# Patient Record
Sex: FEMALE | Race: BLACK OR AFRICAN AMERICAN | Employment: UNEMPLOYED | URBAN - METROPOLITAN AREA
[De-identification: names, ages, dates, MRNs, and addresses within clinical notes are randomized per-mention and may not be internally consistent; named-entity substitution may affect disease eponyms.]

---

## 2017-01-18 RX ORDER — INSULIN LISPRO 100 [IU]/ML
INJECTION, SOLUTION INTRAVENOUS; SUBCUTANEOUS
Qty: 1 PACKAGE | Refills: 5 | Status: SHIPPED | OUTPATIENT
Start: 2017-01-18 | End: 2017-11-17

## 2017-01-18 RX ORDER — INSULIN GLARGINE 100 [IU]/ML
30 INJECTION, SOLUTION SUBCUTANEOUS
Qty: 1 VIAL | Refills: 5
Start: 2017-01-18 | End: 2017-11-17

## 2017-01-18 RX ORDER — LANCETS
EACH MISCELLANEOUS
Qty: 60 EACH | Refills: 11 | Status: SHIPPED | OUTPATIENT
Start: 2017-01-18 | End: 2017-01-31 | Stop reason: SDUPTHER

## 2017-01-26 DIAGNOSIS — E11.65 UNCONTROLLED TYPE 2 DIABETES MELLITUS WITH HYPERGLYCEMIA, UNSPECIFIED LONG TERM INSULIN USE STATUS: Primary | ICD-10-CM

## 2017-01-28 RX ORDER — INSULIN PUMP SYRINGE, 3 ML
EACH MISCELLANEOUS
Qty: 1 KIT | Refills: 0 | Status: SHIPPED | OUTPATIENT
Start: 2017-01-28 | End: 2017-01-31 | Stop reason: SDUPTHER

## 2017-01-31 RX ORDER — LANCETS
EACH MISCELLANEOUS
Qty: 60 EACH | Refills: 11 | Status: SHIPPED | OUTPATIENT
Start: 2017-01-31 | End: 2017-11-17

## 2017-01-31 NOTE — TELEPHONE ENCOUNTER
Pt has changed her mind, she will be picking the Blood-Glucose Meter monitoring up from [facility], she would like test strips called in as well  Pt ph number is [phone]

## 2017-02-15 RX ORDER — TRAMADOL HYDROCHLORIDE 50 MG/1
50 TABLET ORAL
Qty: 20 TAB | Refills: 0 | OUTPATIENT
Start: 2017-02-15 | End: 2017-02-22 | Stop reason: SDUPTHER

## 2017-02-15 NOTE — TELEPHONE ENCOUNTER
----- Message from Karen Saucedo sent at 2/15/2017  9:31 AM EST -----  Regarding: NP [employer]/rx  Pt (p) [phone], pt said she just called 20 min ago for NP [employer] to call her something in for tooth ache, she wanted to add that she is allergic to ibuprofen  it makes her nauseas

## 2017-02-15 NOTE — TELEPHONE ENCOUNTER
Pt would like a rx for a tooth ache   She has had the ache since 3 am  She states she has had something previously called in for this issue  Pt ph number is [phone]  [facility]/pharmacy #[id number]- [place] - [address]

## 2017-02-15 NOTE — TELEPHONE ENCOUNTER
Call patient requesting tramadol. Mat Reardon has seen her before for the same problem. Suggest to the patient to set an appt with the dentist soon.

## 2017-02-23 ENCOUNTER — TELEPHONE (OUTPATIENT)
Dept: FAMILY MEDICINE CLINIC | Age: 42
End: 2017-02-23

## 2017-02-23 NOTE — TELEPHONE ENCOUNTER
----- Message from Johana Gifford sent at 2/23/2017  8:48 AM EST -----  Regarding: NP, [place]/Refill   [facility]/C, [phone]. Pt is requesting a refill for \"Tramadol\" due to her dentist appointment is scheduled for Friday March 3.

## 2017-02-24 NOTE — TELEPHONE ENCOUNTER
Provided 1 additional refill for Tramadol #20.   Patient will need to see dentist.  No additional refills will be provided

## 2017-03-06 ENCOUNTER — HOSPITAL ENCOUNTER (EMERGENCY)
Age: 42
Discharge: HOME | End: 2017-03-06
Attending: EMERGENCY MEDICINE
Payer: MEDICARE

## 2017-03-06 VITALS
OXYGEN SATURATION: 100 % | SYSTOLIC BLOOD PRESSURE: 140 MMHG | RESPIRATION RATE: 18 BRPM | DIASTOLIC BLOOD PRESSURE: 88 MMHG | HEIGHT: 66 IN | HEART RATE: 86 BPM | TEMPERATURE: 98.3 F | WEIGHT: 138 LBS | BODY MASS INDEX: 22.18 KG/M2

## 2017-03-06 DIAGNOSIS — Z76.0 MEDICATION REFILL: ICD-10-CM

## 2017-03-06 DIAGNOSIS — R73.9 HYPERGLYCEMIA: Primary | ICD-10-CM

## 2017-03-06 DIAGNOSIS — F25.9 SCHIZOAFFECTIVE DISORDER, UNSPECIFIED TYPE (HCC): ICD-10-CM

## 2017-03-06 LAB
ALBUMIN SERPL BCP-MCNC: 3.1 G/DL (ref 3.5–5)
ALBUMIN/GLOB SERPL: 0.8 {RATIO} (ref 1.1–2.2)
ALP SERPL-CCNC: 83 U/L (ref 45–117)
ALT SERPL-CCNC: 40 U/L (ref 12–78)
AMPHET UR QL SCN: NEGATIVE
ANION GAP BLD CALC-SCNC: 8 MMOL/L (ref 5–15)
APPEARANCE UR: CLEAR
AST SERPL W P-5'-P-CCNC: 17 U/L (ref 15–37)
BACTERIA URNS QL MICRO: NEGATIVE /HPF
BARBITURATES UR QL SCN: NEGATIVE
BASOPHILS # BLD AUTO: 0 K/UL (ref 0–0.1)
BASOPHILS # BLD: 0 % (ref 0–1)
BENZODIAZ UR QL: NEGATIVE
BILIRUB SERPL-MCNC: 1.1 MG/DL (ref 0.2–1)
BILIRUB UR QL: NEGATIVE
BUN SERPL-MCNC: 10 MG/DL (ref 6–20)
BUN/CREAT SERPL: 9 (ref 12–20)
CALCIUM SERPL-MCNC: 8.2 MG/DL (ref 8.5–10.1)
CANNABINOIDS UR QL SCN: NEGATIVE
CHLORIDE SERPL-SCNC: 95 MMOL/L (ref 97–108)
CO2 SERPL-SCNC: 31 MMOL/L (ref 21–32)
COCAINE UR QL SCN: NEGATIVE
COLOR UR: ABNORMAL
CREAT SERPL-MCNC: 1.16 MG/DL (ref 0.55–1.02)
DIFFERENTIAL METHOD BLD: ABNORMAL
DRUG SCRN COMMENT,DRGCM: NORMAL
EOSINOPHIL # BLD: 0.1 K/UL (ref 0–0.4)
EOSINOPHIL NFR BLD: 2 % (ref 0–7)
EPITH CASTS URNS QL MICRO: ABNORMAL /LPF
ERYTHROCYTE [DISTWIDTH] IN BLOOD BY AUTOMATED COUNT: 14.5 % (ref 11.5–14.5)
GLOBULIN SER CALC-MCNC: 3.7 G/DL (ref 2–4)
GLUCOSE BLD STRIP.AUTO-MCNC: 83 MG/DL (ref 65–100)
GLUCOSE BLD STRIP.AUTO-MCNC: >600 MG/DL (ref 65–100)
GLUCOSE SERPL-MCNC: 625 MG/DL (ref 65–100)
GLUCOSE UR STRIP.AUTO-MCNC: >1000 MG/DL
HCT VFR BLD AUTO: 51.8 % (ref 35–47)
HGB BLD-MCNC: 17.3 G/DL (ref 11.5–16)
HGB UR QL STRIP: NEGATIVE
KETONES SERPL QL: NEGATIVE
KETONES UR QL STRIP.AUTO: NEGATIVE MG/DL
LEUKOCYTE ESTERASE UR QL STRIP.AUTO: NEGATIVE
LYMPHOCYTES # BLD AUTO: 33 % (ref 12–49)
LYMPHOCYTES # BLD: 2.1 K/UL (ref 0.8–3.5)
MCH RBC QN AUTO: 30.2 PG (ref 26–34)
MCHC RBC AUTO-ENTMCNC: 33.4 G/DL (ref 30–36.5)
MCV RBC AUTO: 90.4 FL (ref 80–99)
METHADONE UR QL: NEGATIVE
MONOCYTES # BLD: 0.5 K/UL (ref 0–1)
MONOCYTES NFR BLD AUTO: 8 % (ref 5–13)
NEUTS SEG # BLD: 3.8 K/UL (ref 1.8–8)
NEUTS SEG NFR BLD AUTO: 57 % (ref 32–75)
NITRITE UR QL STRIP.AUTO: NEGATIVE
OPIATES UR QL: NEGATIVE
PCP UR QL: NEGATIVE
PH UR STRIP: 5 [PH] (ref 5–8)
PLATELET # BLD AUTO: 254 K/UL (ref 150–400)
POTASSIUM SERPL-SCNC: 4.3 MMOL/L (ref 3.5–5.1)
PROT SERPL-MCNC: 6.8 G/DL (ref 6.4–8.2)
PROT UR STRIP-MCNC: NEGATIVE MG/DL
RBC # BLD AUTO: 5.73 M/UL (ref 3.8–5.2)
RBC #/AREA URNS HPF: ABNORMAL /HPF (ref 0–5)
RBC MORPH BLD: ABNORMAL
RBC MORPH BLD: ABNORMAL
SERVICE CMNT-IMP: ABNORMAL
SERVICE CMNT-IMP: NORMAL
SODIUM SERPL-SCNC: 134 MMOL/L (ref 136–145)
SP GR UR REFRACTOMETRY: 1.03 (ref 1–1.03)
UA: UC IF INDICATED,UAUC: ABNORMAL
UROBILINOGEN UR QL STRIP.AUTO: 0.2 EU/DL (ref 0.2–1)
VALPROATE SERPL-MCNC: <2 UG/ML (ref 50–100)
WBC # BLD AUTO: 6.5 K/UL (ref 3.6–11)
WBC URNS QL MICRO: ABNORMAL /HPF (ref 0–4)

## 2017-03-06 PROCEDURE — 36415 COLL VENOUS BLD VENIPUNCTURE: CPT | Performed by: PHYSICIAN ASSISTANT

## 2017-03-06 PROCEDURE — 80053 COMPREHEN METABOLIC PANEL: CPT | Performed by: PHYSICIAN ASSISTANT

## 2017-03-06 PROCEDURE — 81001 URINALYSIS AUTO W/SCOPE: CPT | Performed by: PHYSICIAN ASSISTANT

## 2017-03-06 PROCEDURE — 80307 DRUG TEST PRSMV CHEM ANLYZR: CPT | Performed by: PHYSICIAN ASSISTANT

## 2017-03-06 PROCEDURE — 82962 GLUCOSE BLOOD TEST: CPT

## 2017-03-06 PROCEDURE — 96374 THER/PROPH/DIAG INJ IV PUSH: CPT

## 2017-03-06 PROCEDURE — 74011250637 HC RX REV CODE- 250/637: Performed by: PHYSICIAN ASSISTANT

## 2017-03-06 PROCEDURE — 74011636637 HC RX REV CODE- 636/637: Performed by: PHYSICIAN ASSISTANT

## 2017-03-06 PROCEDURE — 99284 EMERGENCY DEPT VISIT MOD MDM: CPT

## 2017-03-06 PROCEDURE — 74011250636 HC RX REV CODE- 250/636: Performed by: PHYSICIAN ASSISTANT

## 2017-03-06 PROCEDURE — 96361 HYDRATE IV INFUSION ADD-ON: CPT

## 2017-03-06 PROCEDURE — 80164 ASSAY DIPROPYLACETIC ACD TOT: CPT | Performed by: PHYSICIAN ASSISTANT

## 2017-03-06 PROCEDURE — 85025 COMPLETE CBC W/AUTO DIFF WBC: CPT | Performed by: PHYSICIAN ASSISTANT

## 2017-03-06 PROCEDURE — 82009 KETONE BODYS QUAL: CPT | Performed by: PHYSICIAN ASSISTANT

## 2017-03-06 RX ORDER — DIVALPROEX SODIUM 500 MG/1
1000 TABLET, DELAYED RELEASE ORAL 2 TIMES DAILY
Qty: 14 TAB | Refills: 0 | Status: SHIPPED | OUTPATIENT
Start: 2017-03-06 | End: 2017-03-13

## 2017-03-06 RX ORDER — SERTRALINE HYDROCHLORIDE 100 MG/1
100 TABLET, FILM COATED ORAL DAILY
Qty: 7 TAB | Refills: 0 | Status: SHIPPED | OUTPATIENT
Start: 2017-03-06 | End: 2017-03-13

## 2017-03-06 RX ORDER — QUETIAPINE FUMARATE 200 MG/1
200 TABLET, FILM COATED ORAL
Qty: 7 TAB | Refills: 0 | Status: SHIPPED | OUTPATIENT
Start: 2017-03-06 | End: 2017-03-13

## 2017-03-06 RX ORDER — BUTALBITAL, ACETAMINOPHEN AND CAFFEINE 50; 325; 40 MG/1; MG/1; MG/1
1 TABLET ORAL
Status: COMPLETED | OUTPATIENT
Start: 2017-03-06 | End: 2017-03-06

## 2017-03-06 RX ORDER — SODIUM CHLORIDE 0.9 % (FLUSH) 0.9 %
5-10 SYRINGE (ML) INJECTION AS NEEDED
Status: DISCONTINUED | OUTPATIENT
Start: 2017-03-06 | End: 2017-03-06 | Stop reason: HOSPADM

## 2017-03-06 RX ORDER — BENZTROPINE MESYLATE 1 MG/1
1 TABLET ORAL 2 TIMES DAILY
Qty: 14 TAB | Refills: 0 | Status: SHIPPED | OUTPATIENT
Start: 2017-03-06 | End: 2017-03-13

## 2017-03-06 RX ORDER — HALOPERIDOL 5 MG/1
5 TABLET ORAL
Qty: 7 TAB | Refills: 0 | Status: SHIPPED | OUTPATIENT
Start: 2017-03-06 | End: 2017-03-13

## 2017-03-06 RX ADMIN — SODIUM CHLORIDE 1000 ML: 900 INJECTION, SOLUTION INTRAVENOUS at 17:31

## 2017-03-06 RX ADMIN — BUTALBITAL, ACETAMINOPHEN, AND CAFFEINE 1 TABLET: 50; 325; 40 TABLET ORAL at 16:26

## 2017-03-06 RX ADMIN — HUMAN INSULIN 10 UNITS: 100 INJECTION, SOLUTION SUBCUTANEOUS at 17:41

## 2017-03-06 NOTE — ED PROVIDER NOTES
Patient is a 40 y.o. female presenting with headaches and medication refill. Headache This is a new problem. Episode onset: 1 wk. The problem occurs constantly. The headache is aggravated by an unknown factor. The pain is located in the frontal and left unilateral region. The pain is at a severity of 7/10. Pertinent negatives include no fever, no weakness, no dizziness, no visual change, no nausea and no vomiting. She has tried nothing for the symptoms. Medication Refill This is a new problem. Episode onset: pt states she has been out of psych meds x 1wk since she missed her appt on 3/1. Pt denies SI/HI and requesting refill on meds- Seroquel, Haldol, Zoloft, Cogentin, Depakote. The problem occurs daily. The problem has not changed since onset. Associated symptoms include headaches. Associated symptoms comments: Pt states \"I'm just starting to feel funny\" but denies visual or auditory hallucinations. The symptoms are relieved by medications. She has tried nothing for the symptoms. Past Medical History:  
Diagnosis Date  Alcohol abuse, in remission   
 quit 17 days ago  Asthma   
 does not use inhalers  Borderline personality disorder  Diabetes ([facility] [address]  GERD (gastroesophageal reflux disease)  Hypertension  Other ill-defined conditions(799.89)   
 kidney stones ,passed one  Other ill-defined conditions(799.89) sickle cell trait  Other ill-defined conditions(799.89)   
 increased cholesterol  Pancreatitis  Psychiatric disorder   
 schizophrenia, bipolar, depression, anxiety Past Surgical History:  
Procedure Laterality Date  ABDOMEN SURGERY PROC UNLISTED  3/12/14 CHOLECYSTECTOMY LAPAROSCOPIC    
 HX GASTRIC BYPASS  HX GYN  11/8/2012  
 c section x2  HX OTHER SURGICAL  3/13/14 ENDOSCOPIC RETROGRADE CHOLANGIOPANCREATOGRAPHY  HX OTHER SURGICAL  8/4/14  
 endoscopic stent placed to bile duct  HX TUBAL LIGATION  2012 Family History:  
Problem Relation Age of Onset  Heart Disease Mother  Diabetes Mother  Hypertension Mother  Hypertension Maternal Grandmother Social History Social History  Marital status: SINGLE Spouse name: N/A  
 Number of children: N/A  
 Years of education: N/A Occupational History  Not on file. Social History Main Topics  Smoking status: Current Every Day Smoker Packs/day: 1.00 Years: 14.00 Types: Cigarettes  Smokeless tobacco: Never Used Comment: cigarettes  Alcohol use No  
 Drug use: No  
   Comment: past use is in rubicon treatment currently  Sexual activity: Yes  
  Partners: Female Birth control/ protection: Surgical  
 
Other Topics Concern  Not on file Social History Narrative ALLERGIES: Dilaudid [hydromorphone (bulk)] and Ibuprofen Review of Systems Constitutional: Negative for appetite change and fever. Eyes: Negative for visual disturbance. Respiratory: Negative. Gastrointestinal: Negative for nausea and vomiting. Genitourinary: Positive for frequency. Allergic/Immunologic: Negative for immunocompromised state. Neurological: Positive for headaches. Negative for dizziness, seizures, speech difficulty, weakness and light-headedness. Psychiatric/Behavioral: Positive for decreased concentration. Negative for hallucinations and suicidal ideas. All other systems reviewed and are negative. Vitals:  
 03/06/17 1449 BP: (!) 157/94 Pulse: 90 Resp: 12 Temp: 98.3 °F (36.8 °C) SpO2: 100% Weight: 62.6 kg (138 lb) Height: 5' 6\" (1.676 m) Physical Exam  
Constitutional: She is oriented to person, place, and time. She appears well-developed and well-nourished. No distress. HENT:  
Head: Normocephalic and atraumatic. Eyes: Conjunctivae are normal.  
Cardiovascular: Normal rate, regular rhythm and normal heart sounds.    
Pulmonary/Chest: Effort normal and breath sounds normal. No respiratory distress. She has no wheezes. She has no rales. Musculoskeletal: Normal range of motion. Neurological: She is alert and oriented to person, place, and time. Skin: Skin is warm and dry. Psychiatric: Her speech is normal and behavior is normal. Judgment normal. Her mood appears not anxious. Her affect is not angry. Cognition and memory are normal. She expresses no homicidal and no suicidal ideation. Nursing note and vitals reviewed. MDM Number of Diagnoses or Management Options Diagnosis management comments: DDX: non therapeutic drug level, HTN, UTI, tension HA,migraine HA Progress note: 
5:10p Pt advised of hyperglycemia and need for IV fluids and insulin now. Pt admits she hasn't taken insulin but has continued to eat and drink normally. Pt has soda and snacks with her that she has been eating while waiting in waiting room. Pt does report that HA has resolved at this time however. 7:04 PM 
Pt reevaluated. BS 84 now. Pt given OJ and bag lunch. Pt states HA starting to return but advised food should help. Will give pt a week's worth of medication until able to f/u with psychiatrist. 
 
  
Amount and/or Complexity of Data Reviewed Clinical lab tests: ordered and reviewed Review and summarize past medical records: yes ED Course Procedures

## 2017-03-06 NOTE — ED NOTES
Pt reported headache x 5 days. States\"I ran out my psych medicine since February 10 th when I get my medicine I will be all right. \" Denies nausea,vomirting,fever,chills. Emergency Department Nursing Plan of Care The Nursing Plan of Care is developed from the Nursing assessment and Emergency Department Attending provider initial evaluation. The plan of care may be reviewed in the ED Provider note. The Plan of Care was developed with the following considerations:  
Patient / Family readiness to learn indicated by:verbalized understanding Persons(s) to be included in education: patient Barriers to Learning/Limitations:No 
 
Signed Emi Hunt RN   
3/6/2017   4:35 PM

## 2017-03-07 NOTE — ED NOTES
Bedside and Verbal shift change [facility] by Sam Verduzco (offgoing nurse). Report included the following information SBAR and Kardex.

## 2017-03-07 NOTE — ED NOTES
Discharge Instructions Reviewed with patient per this nurse. Discharge instructions given to patient per this nurse. Patient able to return verbalize discharge instructions. Paper copy of discharge instructions given. Instructed pateint RX sent electronically to chosen [facility] per provider. Patient condition stable, Respiratory status WNL, Neurostatus intact. Ambulatory out of er, to home with self

## 2017-03-07 NOTE — DISCHARGE INSTRUCTIONS
Learning About High Blood Sugar  What is high blood sugar? Your body turns the food you eat into glucose (sugar), which it uses for energy. But if your body isn't able to use the sugar right away, it can build up in your blood and lead to high blood sugar. When the amount of sugar in your blood stays too high for too much of the time, you may have diabetes. Diabetes is a disease that can cause serious health problems. The good news is that lifestyle changes may help you get your blood sugar back to normal and avoid or delay diabetes. What causes high blood sugar? Sugar (glucose) can build up in your blood if you:  · Are overweight. · Have a family history of diabetes. · Take certain medicines, such as steroids. What are the symptoms? Having high blood sugar may not cause any symptoms at all. Or it may make you feel very thirsty or very hungry. You may also urinate more often than usual, have blurry vision, or lose weight without trying. How is high blood sugar treated? You can take steps to lower your blood sugar level if you understand what makes it get higher. Your doctor may want you to learn how to test your blood sugar level at home. Then you can see how illness, stress, or different kinds of food or medicine raise or lower your blood sugar level. Other tests may be needed to see if you have diabetes. How can you prevent high blood sugar? · Watch your weight. If you're overweight, losing just a small amount of weight may help. Reducing fat around your waist is most important. · Limit the amount of calories, sweets, and unhealthy fat you eat. Ask your doctor if a dietitian can help you. A registered dietitian can help you create meal plans that fit your lifestyle. · Get at least 30 minutes of exercise on most days of the week. Exercise helps control your blood sugar. It also helps you maintain a healthy weight. Walking is a good choice.  You also may want to do other activities, such as running, swimming, cycling, or playing tennis or team sports. · If your doctor prescribed medicines, take them exactly as prescribed. Call your doctor if you think you are having a problem with your medicine. You will get more details on the specific medicines your doctor prescribes. Follow-up care is a key part of your treatment and safety. Be sure to make and go to all appointments, and call your doctor if you are having problems. It's also a good idea to know your test results and keep a list of the medicines you take. Where can you learn more? Go to http://miryam-polo.info/. Enter O108 in the search box to learn more about \"Learning About High Blood Sugar. \"  Current as of: May 23, 2016  Content Version: 11.1  © 9064-1768 [employer]. Care instructions adapted under license by [employer] (which disclaims liability or warranty for this information). If you have questions about a medical condition or this instruction, always ask your healthcare professional. [address] any warranty or liability for your use of this information.

## 2017-04-12 DIAGNOSIS — E11.65 UNCONTROLLED TYPE 2 DIABETES MELLITUS WITH HYPERGLYCEMIA, UNSPECIFIED LONG TERM INSULIN USE STATUS: Primary | ICD-10-CM

## 2017-04-12 DIAGNOSIS — K08.89 PAIN, DENTAL: ICD-10-CM

## 2017-04-14 RX ORDER — TRAMADOL HYDROCHLORIDE 50 MG/1
TABLET ORAL
Qty: 15 TAB | Refills: 0 | OUTPATIENT
Start: 2017-04-14 | End: 2017-07-03

## 2017-04-17 ENCOUNTER — HOSPITAL ENCOUNTER (EMERGENCY)
Age: 42
Discharge: HOME | End: 2017-04-17
Attending: EMERGENCY MEDICINE
Payer: MEDICARE

## 2017-04-17 VITALS
WEIGHT: 140 LBS | BODY MASS INDEX: 22.5 KG/M2 | OXYGEN SATURATION: 99 % | HEIGHT: 66 IN | HEART RATE: 74 BPM | TEMPERATURE: 98.3 F | RESPIRATION RATE: 16 BRPM | SYSTOLIC BLOOD PRESSURE: 144 MMHG | DIASTOLIC BLOOD PRESSURE: 89 MMHG

## 2017-04-17 DIAGNOSIS — S03.2XXA AVULSED TOOTH, INITIAL ENCOUNTER: ICD-10-CM

## 2017-04-17 DIAGNOSIS — N89.8 VAGINAL DISCHARGE: Primary | ICD-10-CM

## 2017-04-17 LAB
APPEARANCE UR: CLEAR
BACTERIA URNS QL MICRO: NEGATIVE /HPF
BILIRUB UR QL: NEGATIVE
CLUE CELLS VAG QL WET PREP: NORMAL
COLOR UR: NORMAL
EPITH CASTS URNS QL MICRO: NORMAL /LPF
GLUCOSE UR STRIP.AUTO-MCNC: NEGATIVE MG/DL
HGB UR QL STRIP: NEGATIVE
KETONES UR QL STRIP.AUTO: NEGATIVE MG/DL
KOH PREP SPEC: NORMAL
LEUKOCYTE ESTERASE UR QL STRIP.AUTO: NEGATIVE
NITRITE UR QL STRIP.AUTO: NEGATIVE
PH UR STRIP: 7.5 [PH] (ref 5–8)
PROT UR STRIP-MCNC: NEGATIVE MG/DL
RBC #/AREA URNS HPF: NORMAL /HPF (ref 0–5)
SERVICE CMNT-IMP: NORMAL
SP GR UR REFRACTOMETRY: 1.02 (ref 1–1.03)
T VAGINALIS VAG QL WET PREP: NORMAL
UA: UC IF INDICATED,UAUC: NORMAL
UROBILINOGEN UR QL STRIP.AUTO: 1 EU/DL (ref 0.2–1)
WBC URNS QL MICRO: NORMAL /HPF (ref 0–4)

## 2017-04-17 PROCEDURE — 87210 SMEAR WET MOUNT SALINE/INK: CPT | Performed by: PHYSICIAN ASSISTANT

## 2017-04-17 PROCEDURE — 74011000250 HC RX REV CODE- 250: Performed by: PHYSICIAN ASSISTANT

## 2017-04-17 PROCEDURE — 74011250637 HC RX REV CODE- 250/637: Performed by: PHYSICIAN ASSISTANT

## 2017-04-17 PROCEDURE — 99284 EMERGENCY DEPT VISIT MOD MDM: CPT

## 2017-04-17 PROCEDURE — 96372 THER/PROPH/DIAG INJ SC/IM: CPT

## 2017-04-17 PROCEDURE — 74011250636 HC RX REV CODE- 250/636: Performed by: PHYSICIAN ASSISTANT

## 2017-04-17 PROCEDURE — 81001 URINALYSIS AUTO W/SCOPE: CPT | Performed by: EMERGENCY MEDICINE

## 2017-04-17 PROCEDURE — 87491 CHLMYD TRACH DNA AMP PROBE: CPT | Performed by: PHYSICIAN ASSISTANT

## 2017-04-17 RX ORDER — AZITHROMYCIN 250 MG/1
1000 TABLET, FILM COATED ORAL
Status: COMPLETED | OUTPATIENT
Start: 2017-04-17 | End: 2017-04-17

## 2017-04-17 RX ORDER — HALOPERIDOL 5 MG/1
5 TABLET ORAL 2 TIMES DAILY
COMMUNITY
End: 2018-09-15 | Stop reason: SDUPTHER

## 2017-04-17 RX ORDER — DIVALPROEX SODIUM 500 MG/1
500 TABLET, DELAYED RELEASE ORAL 2 TIMES DAILY
COMMUNITY
End: 2018-09-15 | Stop reason: SDUPTHER

## 2017-04-17 RX ORDER — HYDROCODONE BITARTRATE AND ACETAMINOPHEN 5; 325 MG/1; MG/1
1 TABLET ORAL
Qty: 8 TAB | Refills: 0 | Status: SHIPPED | OUTPATIENT
Start: 2017-04-17 | End: 2017-07-03

## 2017-04-17 RX ORDER — PENICILLIN V POTASSIUM 500 MG/1
500 TABLET, FILM COATED ORAL 4 TIMES DAILY
Qty: 40 TAB | Refills: 0 | Status: SHIPPED | OUTPATIENT
Start: 2017-04-17 | End: 2017-04-27

## 2017-04-17 RX ORDER — QUETIAPINE FUMARATE 200 MG/1
200 TABLET, FILM COATED ORAL
Status: ON HOLD | COMMUNITY
End: 2018-08-09

## 2017-04-17 RX ADMIN — LIDOCAINE HYDROCHLORIDE 250 MG: 10 INJECTION, SOLUTION EPIDURAL; INFILTRATION; INTRACAUDAL; PERINEURAL at 13:13

## 2017-04-17 RX ADMIN — AZITHROMYCIN 1000 MG: 250 TABLET, FILM COATED ORAL at 13:12

## 2017-04-17 NOTE — DISCHARGE INSTRUCTIONS
Exposure to Sexually Transmitted Infections: Care Instructions  Your Care Instructions  Sexually transmitted infections (STIs) are those diseases spread by sexual contact. There are at least 20 different STIs, including chlamydia, gonorrhea, syphilis, and human immunodeficiency virus (HIV), which causes AIDS. Bacteria-caused STIs can be treated and cured. STIs caused by viruses, such as HIV, can be treated but not cured. Some STIs can reduce a woman's chances of getting pregnant in the future. STIs are spread during sexual contact, such as vaginal intercourse and oral or anal sex. Follow-up care is a key part of your treatment and safety. Be sure to make and go to all appointments, and call your doctor if you are having problems. Its also a good idea to know your test results and keep a list of the medicines you take. How can you care for yourself at home? · Your doctor may have given you a shot of antibiotics. If your doctor prescribed antibiotic pills, take them as directed. Do not stop taking them just because you feel better. You need to take the full course of antibiotics. · Do not have sexual contact while you have symptoms of an STI or are being treated for an STI. · Tell your sex partner (or partners) that he or she will need treatment. · If you are a woman, do not douche. Douching changes the normal balance of bacteria in the vagina and may spread an infection up into your reproductive organs. To prevent exposure to STIs in the future  · Use latex condoms every time you have sex. Use them from the beginning to the end of sexual contact. · Talk to your partner before you have sex. Find out if he or she has or is at risk for any STI. Keep in mind that a person may be able to spread an STI even if he or she does not have symptoms. · Do not have sex if you are being treated for an STI. · Do not have sex with anyone who has symptoms of an STI, such as sores on the genitals or mouth.   · Having one sex partner (who does not have STIs and does not have sex with anyone else) is a good way to avoid STIs. When should you call for help? Call your doctor now or seek immediate medical care if:  · You have new pain in your belly or pelvis. · You have symptoms of a urinary tract infection. These may include:  ¨ Pain or burning when you urinate. ¨ A frequent need to urinate without being able to pass much urine. ¨ Pain in the flank, which is just below the rib cage and above the waist on either side of the back. ¨ Blood in your urine. ¨ A fever. · You have new or worsening pain or swelling in the scrotum. Watch closely for changes in your health, and be sure to contact your doctor if:  · You have unusual vaginal bleeding. · You have a discharge from the vagina or penis. · You have any new symptoms, such as sores, bumps, rashes, blisters, or warts. · You have itching, tingling, pain, or burning in the genital or anal area. · You think you may have an STI. Where can you learn more? Go to http://miryam-polo.info/. Enter Q504 in the search box to learn more about \"Exposure to Sexually Transmitted Infections: Care Instructions. \"  Current as of: May 27, 2016  Content Version: 11.2  © 0675-5164 [employer]. Care instructions adapted under license by [employer] (which disclaims liability or warranty for this information). If you have questions about a medical condition or this instruction, always ask your healthcare professional. [place] [address] any warranty or liability for your use of this information. Dental Pain: After Your Visit  Your Care Instructions  The most common cause of dental pain is tooth decay. It can also be caused by an infection of the tooth (abscess) or gum, a tooth that has not broken all the way through the gum (impacted tooth), or a problem with the nerve-filled center of the tooth.   Follow-up care is a key part of your treatment and safety. Be sure to make and go to all appointments, and call your doctor if you are having problems. Its also a good idea to know your test results and keep a list of the medicines you take. How can you care for yourself at home? · Contact a dentist for follow-up care. · Put ice or a cold pack on the outside of your mouth for 10 to 20 minutes at a time to reduce pain and swelling. Put a thin cloth between the ice and your skin. · Take an over-the-counter pain medicine, such as acetaminophen (Tylenol), ibuprofen (Advil, Motrin), or naproxen (Aleve). Read and follow all instructions on the label. · Do not take two or more pain medicines at the same time unless the doctor told you to. Many pain medicines have acetaminophen, which is Tylenol. Too much acetaminophen (Tylenol) can be harmful. · Rinse your mouth with warm salt water every 2 hours to help relieve pain and swelling from an infected tooth. Mix 1 teaspoon of salt in 8 ounces of water. · If your doctor prescribed antibiotics, take them as directed. Do not stop taking them just because you feel better. You need to take the full course of antibiotics. When should you call for help? Call your doctor now or seek immediate medical care if:  · You have signs of infection, such as:  ¨ Increased pain, swelling, warmth, or redness. ¨ Pus draining from the gum, tooth, or face. ¨ A fever. Watch closely for changes in your health, and be sure to contact your doctor if:  · You do not get better as expected. Where can you learn more? Go to [employer]  Enter V264 in the search box to learn more about \"Dental Pain: After Your Visit. \"   © 0716-7194 [employer]. Care instructions adapted under license by [employer] (which disclaims liability or warranty for this information).  This care instruction is for use with your licensed healthcare professional. If you have questions about a medical condition or this instruction, always ask your healthcare professional. [place] [address] any warranty or liability for your use of this information. Content Version: 08.3.476905;  Last Revised: May 17, 2013

## 2017-04-17 NOTE — ED PROVIDER NOTES
Patient is a 40 y.o. female presenting with vaginal discharge and dental problem. The history is provided by the patient. Vaginal Discharge This is a new problem. Episode onset: White, thick vaginal discharge with no odor x 1 week. Denies vaginal itching and burning. Admits to unprotected intercourse. Denies urinary sx, fever/chills, abd pain, back pain. The discharge was white and thick. She is not pregnant. She has not missed her period. Pertinent negatives include no anorexia, no diaphoresis, no fever, no abdominal swelling, no abdominal pain, no constipation, no diarrhea, no nausea, no vomiting, no dyspareunia, no dysuria, no frequency, no genital burning, no genital itching, no genital lesions, no perineal pain, no perineal odor and no painful intercourse. She has tried nothing for the symptoms. Dental Pain This is a new problem. Episode onset: Pt reports hx of right lower cavity \"for a while now\". Reports increasing pain x 3 days. Denies drainage, facial swelling, trouble swallowing, fever/chills. The pain is located in the right lower mouth. The pain is mild. There was no vomiting, no nausea, no fever, no swelling, no chest pain, no shortness of breath, no headaches, no gum redness and no drainage. She has tried nothing for the symptoms. The patient has no cardiac history. Past Medical History:  
Diagnosis Date  Alcohol abuse, in remission   
 quit 17 days ago  Asthma   
 does not use inhalers  Borderline personality disorder  Diabetes ([facility] [address]  GERD (gastroesophageal reflux disease)  Hypertension  Other ill-defined conditions   
 kidney stones ,passed one  Other ill-defined conditions   
 sickle cell trait  Other ill-defined conditions   
 increased cholesterol  Pancreatitis  Psychiatric disorder   
 schizophrenia, bipolar, depression, anxiety Past Surgical History:  
Procedure Laterality Date  ABDOMEN SURGERY PROC UNLISTED  3/12/14 CHOLECYSTECTOMY LAPAROSCOPIC    
 HX GASTRIC BYPASS  HX GYN  11/8/2012  
 c section x2  HX OTHER SURGICAL  3/13/14 ENDOSCOPIC RETROGRADE CHOLANGIOPANCREATOGRAPHY  HX OTHER SURGICAL  8/4/14  
 endoscopic stent placed to bile duct  HX TUBAL LIGATION  2012 Family History:  
Problem Relation Age of Onset  Heart Disease Mother  Diabetes Mother  Hypertension Mother  Hypertension Maternal Grandmother Social History Social History  Marital status: SINGLE Spouse name: N/A  
 Number of children: N/A  
 Years of education: N/A Occupational History  Not on file. Social History Main Topics  Smoking status: Current Every Day Smoker Packs/day: 1.00 Years: 14.00 Types: Cigarettes  Smokeless tobacco: Never Used Comment: cigarettes  Alcohol use No  
 Drug use: No  
   Comment: reports last drug use was 2/2017  Sexual activity: Yes  
  Partners: Female Birth control/ protection: Surgical  
 
Other Topics Concern  Not on file Social History Narrative ALLERGIES: Dilaudid [hydromorphone (bulk)] and Ibuprofen Review of Systems Constitutional: Negative for chills, diaphoresis and fever. HENT: Positive for dental problem. Negative for congestion, drooling, ear pain, facial swelling, mouth sores, sore throat and trouble swallowing. Respiratory: Negative for chest tightness and shortness of breath. Cardiovascular: Negative for chest pain. Gastrointestinal: Negative for abdominal pain, anorexia, constipation, diarrhea, nausea and vomiting. Genitourinary: Positive for vaginal discharge. Negative for decreased urine volume, dyspareunia, dysuria, flank pain, frequency, genital sores, hematuria, menstrual problem, pelvic pain, vaginal bleeding and vaginal pain. Musculoskeletal: Negative for back pain and myalgias. Skin: Negative for color change, pallor, rash and wound.   
Neurological: Negative for dizziness, weakness and light-headedness. All other systems reviewed and are negative. Vitals:  
 04/17/17 1211 BP: 144/89 Pulse: 74 Resp: 16 Temp: 98.3 °F (36.8 °C) SpO2: 99% Weight: 63.5 kg (140 lb) Height: 5' 6\" (1.676 m) Physical Exam  
Constitutional: She is oriented to person, place, and time. She appears well-developed and well-nourished. No distress. HENT:  
Head: Normocephalic and atraumatic. Right Ear: Tympanic membrane, external ear and ear canal normal.  
Left Ear: Tympanic membrane, external ear and ear canal normal.  
Nose: Nose normal. No mucosal edema. Mouth/Throat: Uvula is midline, oropharynx is clear and moist and mucous membranes are normal. She does not have dentures. No oral lesions. No trismus in the jaw. Abnormal dentition. Dental caries present. No dental abscesses or uvula swelling. No oropharyngeal exudate, posterior oropharyngeal edema, posterior oropharyngeal erythema or tonsillar abscesses. Eyes: Conjunctivae are normal.  
Cardiovascular: Normal rate, regular rhythm and normal heart sounds. Pulmonary/Chest: Effort normal and breath sounds normal. No respiratory distress. Abdominal: Soft. Bowel sounds are normal. There is no tenderness. Genitourinary: No labial fusion. There is no rash, tenderness, lesion or injury on the right labia. There is no rash, tenderness, lesion or injury on the left labia. Uterus is not tender. Cervix exhibits discharge. Cervix exhibits no motion tenderness and no friability. Right adnexum displays no tenderness. Left adnexum displays no tenderness. No erythema, tenderness or bleeding in the vagina. No foreign body in the vagina. No signs of injury around the vagina. Vaginal discharge (white, thick, no odor) found. Genitourinary Comments: Cervical os closed Musculoskeletal: Normal range of motion. Neurological: She is alert and oriented to person, place, and time. Skin: Skin is warm. No rash noted. Psychiatric: She has a normal mood and affect. Her behavior is normal.  
Nursing note and vitals reviewed. MDM Number of Diagnoses or Management Options Diagnosis management comments: DDx: Chlamydia, Gonorrhea, Trich, UTI, BV, Yeast, Dental Pain vs Infection vs Abscess, Avulsed Tooth Amount and/or Complexity of Data Reviewed Clinical lab tests: ordered and reviewed ED Course Procedures LABORATORY TESTS: 
Recent Results (from the past 12 hour(s)) URINALYSIS W/ REFLEX CULTURE Collection Time: 04/17/17 12:30 PM  
Result Value Ref Range Color YELLOW/STRAW Appearance CLEAR CLEAR Specific gravity 1.020 1.003 - 1.030    
 pH (UA) 7.5 5.0 - 8.0 Protein NEGATIVE  NEG mg/dL Glucose NEGATIVE  NEG mg/dL Ketone NEGATIVE  NEG mg/dL Bilirubin NEGATIVE  NEG Blood NEGATIVE  NEG Urobilinogen 1.0 0.2 - 1.0 EU/dL Nitrites NEGATIVE  NEG Leukocyte Esterase NEGATIVE  NEG    
 WBC 0-4 0 - 4 /hpf  
 RBC 0-5 0 - 5 /hpf Epithelial cells FEW FEW /lpf Bacteria NEGATIVE  NEG /hpf  
 UA:UC IF INDICATED CULTURE NOT INDICATED BY UA RESULT CNI    
KOH, OTHER SOURCES Collection Time: 04/17/17 12:46 PM  
Result Value Ref Range Special Requests: NO SPECIAL REQUESTS    
 KOH NO YEAST SEEN    
WET PREP Collection Time: 04/17/17 12:46 PM  
Result Value Ref Range Clue cells CLUE CELLS ABSENT Wet prep NO TRICHOMONAS SEEN    
 
 
IMAGING RESULTS: 
No orders to display MEDICATIONS GIVEN: 
Medications  
azithromycin (ZITHROMAX) tablet 1,000 mg (1,000 mg Oral Given 4/17/17 1312) cefTRIAXone (ROCEPHIN) 250 mg in lidocaine (PF) (XYLOCAINE) 10 mg/mL (1 %) IM injection (250 mg IntraMUSCular Given 4/17/17 1313) IMPRESSION: 
1. Vaginal discharge 2. Avulsed tooth, initial encounter PLAN: 
1. Discharge Medication List as of 4/17/2017  1:29 PM  
  
START taking these medications  Details  
penicillin v potassium (VEETID) 500 mg tablet Take 1 Tab by mouth four (4) times daily for 10 days. , Print, Disp-40 Tab, R-0  
  
HYDROcodone-acetaminophen (NORCO) 5-325 mg per tablet Take 1 Tab by mouth every six (6) hours as needed for Pain. Max Daily Amount: 4 Tabs., Print, Disp-8 Tab, R-0  
  
  
CONTINUE these medications which have NOT CHANGED Details QUEtiapine (SEROQUEL) 200 mg tablet Take 200 mg by mouth nightly., Historical Med  
  
divalproex DR (DEPAKOTE) 500 mg tablet Take 1,000 mg by mouth two (2) times a day., Historical Med  
  
haloperidol (HALDOL) 5 mg tablet Take 2.5 mg by mouth nightly., Historical Med  
  
traMADol (ULTRAM) 50 mg tablet TAKE 1 TABLET BY MOUTH EVERY 8 HOURS AS NEEDED FOR BACK PAIN, Phone In, Disp-15 Tab, R-0 Blood-Glucose Meter monitoring kit Monitor BG 4 times daily. Dx uncontrolled type 2 DM E11.65, Normal, Disp-1 Kit, R-0 Lancets (ACCU-CHEK SOFTCLIX LANCETS) misc CHECK BLOOD SUGAR TWICE DAILY, Normal, Disp-60 Each, R-11 Syringe with Needle, Disp, 1 mL 26 gauge x 5/8\" syrg Dispense syringe with needle for insulin injection 4 times a day. May substitute any appropriate size or brand., Normal, Disp-120 Each, R-11  
  
insulin lispro (HUMALOG) 100 unit/mL kwikpen Take 4 units for BG between 150-200, take 6 unites for -250, take 8 units for  or higher  Indications: type 1 diabetes mellitus, Normal, Disp-1 Package, R-5  
  
insulin glargine (LANTUS) 100 unit/mL injection 30 Units by SubCUTAneous route nightly. Indications: type 1 diabetes mellitus, No Print, Disp-1 Vial, R-5  
  
sertraline (ZOLOFT) 50 mg tablet Take 100 mg by mouth daily. , Historical Med  
  
amLODIPine (NORVASC) 5 mg tablet Take 1 Tab by mouth daily. , Normal, Disp-30 Tab, R-5  
  
predniSONE (STERAPRED DS) 10 mg dose pack See administration instruction per 10mg dose pack, Normal, Disp-21 Tab, R-0  
  
albuterol-ipratropium (DUO-NEB) 2.5 mg-0.5 mg/3 ml nebu 3 mL by Nebulization route every four (4) hours as needed.  For wheezing and shortness of breath, Normal, Disp-25 Nebule, R-11  
  
pravastatin (PRAVACHOL) 20 mg tablet Take 10 mg by mouth nightly., Historical Med  
  
ipratropium-albuterol (COMBIVENT RESPIMAT)  mcg/actuation inhaler Take 1 Puff by inhalation every four (4) hours as needed for Wheezing., Print, Disp-1 Inhaler, R-0  
  
  
 
2. Follow-up Information Follow up With Details Comments Contact Info Susi Joaquin NP Schedule an appointment as soon as possible for a visit As needed for PCP follow up [address] [phone] Return to ED if worse

## 2017-04-18 ENCOUNTER — TELEPHONE (OUTPATIENT)
Dept: FAMILY MEDICINE CLINIC | Age: 42
End: 2017-04-18

## 2017-04-18 NOTE — TELEPHONE ENCOUNTER
Called pt LM have received refill request for Tramadol. Will need an office visit prior to refill. Please call back to schedule appt.

## 2017-04-19 LAB
C TRACH DNA SPEC QL NAA+PROBE: NEGATIVE
N GONORRHOEA DNA SPEC QL NAA+PROBE: NEGATIVE
SAMPLE TYPE: NORMAL
SERVICE CMNT-IMP: NORMAL
SPECIMEN SOURCE: NORMAL

## 2017-07-03 ENCOUNTER — HOSPITAL ENCOUNTER (EMERGENCY)
Age: 42
Discharge: HOME | End: 2017-07-03
Attending: EMERGENCY MEDICINE
Payer: MEDICARE

## 2017-07-03 VITALS
DIASTOLIC BLOOD PRESSURE: 79 MMHG | HEIGHT: 66 IN | OXYGEN SATURATION: 96 % | HEART RATE: 74 BPM | RESPIRATION RATE: 14 BRPM | SYSTOLIC BLOOD PRESSURE: 164 MMHG | WEIGHT: 154 LBS | BODY MASS INDEX: 24.75 KG/M2 | TEMPERATURE: 98.2 F

## 2017-07-03 DIAGNOSIS — B96.89 BV (BACTERIAL VAGINOSIS): ICD-10-CM

## 2017-07-03 DIAGNOSIS — K08.89 PAIN, DENTAL: Primary | ICD-10-CM

## 2017-07-03 DIAGNOSIS — N76.0 BV (BACTERIAL VAGINOSIS): ICD-10-CM

## 2017-07-03 DIAGNOSIS — R73.9 HYPERGLYCEMIA: ICD-10-CM

## 2017-07-03 LAB
ALBUMIN SERPL BCP-MCNC: 3.5 G/DL (ref 3.5–5)
ALBUMIN/GLOB SERPL: 0.9 {RATIO} (ref 1.1–2.2)
ALP SERPL-CCNC: 76 U/L (ref 45–117)
ALT SERPL-CCNC: 24 U/L (ref 12–78)
ANION GAP BLD CALC-SCNC: 11 MMOL/L (ref 5–15)
APPEARANCE UR: CLEAR
AST SERPL W P-5'-P-CCNC: 14 U/L (ref 15–37)
BACTERIA URNS QL MICRO: NEGATIVE /HPF
BASOPHILS # BLD AUTO: 0 K/UL (ref 0–0.1)
BASOPHILS # BLD: 0 % (ref 0–1)
BILIRUB SERPL-MCNC: 0.4 MG/DL (ref 0.2–1)
BILIRUB UR QL: NEGATIVE
BUN SERPL-MCNC: 8 MG/DL (ref 6–20)
BUN/CREAT SERPL: 8 (ref 12–20)
CALCIUM SERPL-MCNC: 8.5 MG/DL (ref 8.5–10.1)
CHLORIDE SERPL-SCNC: 95 MMOL/L (ref 97–108)
CLUE CELLS VAG QL WET PREP: NORMAL
CO2 SERPL-SCNC: 26 MMOL/L (ref 21–32)
COLOR UR: ABNORMAL
CREAT SERPL-MCNC: 1.04 MG/DL (ref 0.55–1.02)
EOSINOPHIL # BLD: 0.2 K/UL (ref 0–0.4)
EOSINOPHIL NFR BLD: 4 % (ref 0–7)
EPITH CASTS URNS QL MICRO: ABNORMAL /LPF
ERYTHROCYTE [DISTWIDTH] IN BLOOD BY AUTOMATED COUNT: 13.1 % (ref 11.5–14.5)
GLOBULIN SER CALC-MCNC: 4.1 G/DL (ref 2–4)
GLUCOSE BLD STRIP.AUTO-MCNC: 123 MG/DL (ref 65–100)
GLUCOSE BLD STRIP.AUTO-MCNC: 566 MG/DL (ref 65–100)
GLUCOSE SERPL-MCNC: 493 MG/DL (ref 65–100)
GLUCOSE UR STRIP.AUTO-MCNC: >1000 MG/DL
HCG UR QL: NEGATIVE
HCT VFR BLD AUTO: 43 % (ref 35–47)
HGB BLD-MCNC: 15 G/DL (ref 11.5–16)
HGB UR QL STRIP: NEGATIVE
KETONES SERPL QL: NEGATIVE
KETONES UR QL STRIP.AUTO: NEGATIVE MG/DL
KOH PREP SPEC: NORMAL
LEUKOCYTE ESTERASE UR QL STRIP.AUTO: NEGATIVE
LYMPHOCYTES # BLD AUTO: 41 % (ref 12–49)
LYMPHOCYTES # BLD: 2.3 K/UL (ref 0.8–3.5)
MCH RBC QN AUTO: 30 PG (ref 26–34)
MCHC RBC AUTO-ENTMCNC: 34.9 G/DL (ref 30–36.5)
MCV RBC AUTO: 86 FL (ref 80–99)
MONOCYTES # BLD: 0.6 K/UL (ref 0–1)
MONOCYTES NFR BLD AUTO: 11 % (ref 5–13)
NEUTS SEG # BLD: 2.5 K/UL (ref 1.8–8)
NEUTS SEG NFR BLD AUTO: 44 % (ref 32–75)
NITRITE UR QL STRIP.AUTO: NEGATIVE
PH UR STRIP: 6 [PH] (ref 5–8)
PLATELET # BLD AUTO: 148 K/UL (ref 150–400)
POTASSIUM SERPL-SCNC: 4.3 MMOL/L (ref 3.5–5.1)
PROT SERPL-MCNC: 7.6 G/DL (ref 6.4–8.2)
PROT UR STRIP-MCNC: NEGATIVE MG/DL
RBC # BLD AUTO: 5 M/UL (ref 3.8–5.2)
RBC #/AREA URNS HPF: ABNORMAL /HPF (ref 0–5)
SERVICE CMNT-IMP: ABNORMAL
SERVICE CMNT-IMP: ABNORMAL
SERVICE CMNT-IMP: NORMAL
SODIUM SERPL-SCNC: 132 MMOL/L (ref 136–145)
SP GR UR REFRACTOMETRY: 1.01 (ref 1–1.03)
T VAGINALIS VAG QL WET PREP: NORMAL
UA: UC IF INDICATED,UAUC: ABNORMAL
UROBILINOGEN UR QL STRIP.AUTO: 0.2 EU/DL (ref 0.2–1)
WBC # BLD AUTO: 5.7 K/UL (ref 3.6–11)
WBC URNS QL MICRO: ABNORMAL /HPF (ref 0–4)

## 2017-07-03 PROCEDURE — 80053 COMPREHEN METABOLIC PANEL: CPT | Performed by: PHYSICIAN ASSISTANT

## 2017-07-03 PROCEDURE — 87210 SMEAR WET MOUNT SALINE/INK: CPT | Performed by: PHYSICIAN ASSISTANT

## 2017-07-03 PROCEDURE — 87491 CHLMYD TRACH DNA AMP PROBE: CPT | Performed by: PHYSICIAN ASSISTANT

## 2017-07-03 PROCEDURE — 96372 THER/PROPH/DIAG INJ SC/IM: CPT

## 2017-07-03 PROCEDURE — 74011000250 HC RX REV CODE- 250: Performed by: PHYSICIAN ASSISTANT

## 2017-07-03 PROCEDURE — 74011636637 HC RX REV CODE- 636/637: Performed by: PHYSICIAN ASSISTANT

## 2017-07-03 PROCEDURE — 99284 EMERGENCY DEPT VISIT MOD MDM: CPT

## 2017-07-03 PROCEDURE — 96374 THER/PROPH/DIAG INJ IV PUSH: CPT

## 2017-07-03 PROCEDURE — 81025 URINE PREGNANCY TEST: CPT

## 2017-07-03 PROCEDURE — 96361 HYDRATE IV INFUSION ADD-ON: CPT

## 2017-07-03 PROCEDURE — 36415 COLL VENOUS BLD VENIPUNCTURE: CPT | Performed by: PHYSICIAN ASSISTANT

## 2017-07-03 PROCEDURE — 74011250637 HC RX REV CODE- 250/637: Performed by: PHYSICIAN ASSISTANT

## 2017-07-03 PROCEDURE — 82962 GLUCOSE BLOOD TEST: CPT

## 2017-07-03 PROCEDURE — 81001 URINALYSIS AUTO W/SCOPE: CPT | Performed by: EMERGENCY MEDICINE

## 2017-07-03 PROCEDURE — 82009 KETONE BODYS QUAL: CPT | Performed by: PHYSICIAN ASSISTANT

## 2017-07-03 PROCEDURE — 85025 COMPLETE CBC W/AUTO DIFF WBC: CPT | Performed by: PHYSICIAN ASSISTANT

## 2017-07-03 PROCEDURE — 74011250636 HC RX REV CODE- 250/636: Performed by: PHYSICIAN ASSISTANT

## 2017-07-03 RX ORDER — AZITHROMYCIN 250 MG/1
1000 TABLET, FILM COATED ORAL
Status: COMPLETED | OUTPATIENT
Start: 2017-07-03 | End: 2017-07-03

## 2017-07-03 RX ORDER — METRONIDAZOLE 500 MG/1
500 TABLET ORAL 3 TIMES DAILY
Qty: 30 TAB | Refills: 0 | Status: SHIPPED | OUTPATIENT
Start: 2017-07-03 | End: 2017-07-13

## 2017-07-03 RX ORDER — TRAMADOL HYDROCHLORIDE 50 MG/1
50 TABLET ORAL
Qty: 9 TAB | Refills: 0 | Status: SHIPPED | OUTPATIENT
Start: 2017-07-03 | End: 2017-09-30

## 2017-07-03 RX ORDER — SODIUM CHLORIDE 0.9 % (FLUSH) 0.9 %
5-10 SYRINGE (ML) INJECTION AS NEEDED
Status: DISCONTINUED | OUTPATIENT
Start: 2017-07-03 | End: 2017-07-03 | Stop reason: HOSPADM

## 2017-07-03 RX ORDER — PENICILLIN V POTASSIUM 500 MG/1
500 TABLET, FILM COATED ORAL 4 TIMES DAILY
Qty: 40 TAB | Refills: 0 | Status: SHIPPED | OUTPATIENT
Start: 2017-07-03 | End: 2017-09-30

## 2017-07-03 RX ORDER — SODIUM CHLORIDE 0.9 % (FLUSH) 0.9 %
5-10 SYRINGE (ML) INJECTION EVERY 8 HOURS
Status: DISCONTINUED | OUTPATIENT
Start: 2017-07-03 | End: 2017-07-03 | Stop reason: HOSPADM

## 2017-07-03 RX ADMIN — AZITHROMYCIN 1000 MG: 250 TABLET, FILM COATED ORAL at 13:13

## 2017-07-03 RX ADMIN — LIDOCAINE HYDROCHLORIDE: 20 SOLUTION ORAL; TOPICAL at 13:13

## 2017-07-03 RX ADMIN — HUMAN INSULIN 10 UNITS: 100 INJECTION, SOLUTION SUBCUTANEOUS at 13:20

## 2017-07-03 RX ADMIN — LIDOCAINE HYDROCHLORIDE 250 MG: 10 INJECTION, SOLUTION EPIDURAL; INFILTRATION; INTRACAUDAL; PERINEURAL at 13:13

## 2017-07-03 RX ADMIN — SODIUM CHLORIDE 1000 ML: 900 INJECTION, SOLUTION INTRAVENOUS at 13:20

## 2017-07-03 NOTE — DISCHARGE INSTRUCTIONS
Bacterial Vaginosis: Care Instructions  Your Care Instructions    Bacterial vaginosis is a type of vaginal infection. It is caused by excess growth of certain bacteria that are normally found in the vagina. Symptoms can include itching, swelling, pain when you urinate or have sex, and a gray or yellow discharge with a \"fishy\" odor. It is not considered an infection that is spread through sexual contact. Although symptoms can be annoying and uncomfortable, bacterial vaginosis does not usually cause other health problems. However, if you have it while you are pregnant, it can cause complications. While the infection may go away on its own, most doctors use antibiotics to treat it. You may have been prescribed pills or vaginal cream. With treatment, bacterial vaginosis usually clears up in 5 to 7 days. Follow-up care is a key part of your treatment and safety. Be sure to make and go to all appointments, and call your doctor if you are having problems. It's also a good idea to know your test results and keep a list of the medicines you take. How can you care for yourself at home? · Take your antibiotics as directed. Do not stop taking them just because you feel better. You need to take the full course of antibiotics. · Do not eat or drink anything that contains alcohol if you are taking metronidazole (Flagyl). · Keep using your medicine if you start your period. Use pads instead of tampons while using a vaginal cream or suppository. Tampons can absorb the medicine. · Wear loose cotton clothing. Do not wear nylon and other materials that hold body heat and moisture close to the skin. · Do not scratch. Relieve itching with a cold pack or a cool bath. · Do not wash your vaginal area more than once a day. Use plain water or a mild, unscented soap. Do not douche. When should you call for help?   Watch closely for changes in your health, and be sure to contact your doctor if:  · You have unexpected vaginal bleeding. · You have a fever. · You have new or increased pain in your vagina or pelvis. · You are not getting better after 1 week. · Your symptoms return after you finish the course of your medicine. Where can you learn more? Go to http://miryam-polo.info/. Shalini Anne in the search box to learn more about \"Bacterial Vaginosis: Care Instructions. \"  Current as of: October 13, 2016  Content Version: 11.3  © 2407-7446 [employer]. Care instructions adapted under license by [employer] (which disclaims liability or warranty for this information). If you have questions about a medical condition or this instruction, always ask your healthcare professional. [address] any warranty or liability for your use of this information. Learning About High Blood Sugar  What is high blood sugar? Your body turns the food you eat into glucose (sugar), which it uses for energy. But if your body isn't able to use the sugar right away, it can build up in your blood and lead to high blood sugar. When the amount of sugar in your blood stays too high for too much of the time, you may have diabetes. Diabetes is a disease that can cause serious health problems. The good news is that lifestyle changes may help you get your blood sugar back to normal and avoid or delay diabetes. What causes high blood sugar? Sugar (glucose) can build up in your blood if you:  · Are overweight. · Have a family history of diabetes. · Take certain medicines, such as steroids. What are the symptoms? Having high blood sugar may not cause any symptoms at all. Or it may make you feel very thirsty or very hungry. You may also urinate more often than usual, have blurry vision, or lose weight without trying. How is high blood sugar treated? You can take steps to lower your blood sugar level if you understand what makes it get higher.  Your doctor may want you to learn how to test your blood sugar level at home. Then you can see how illness, stress, or different kinds of food or medicine raise or lower your blood sugar level. Other tests may be needed to see if you have diabetes. How can you prevent high blood sugar? · Watch your weight. If you're overweight, losing just a small amount of weight may help. Reducing fat around your waist is most important. · Limit the amount of calories, sweets, and unhealthy fat you eat. Ask your doctor if a dietitian can help you. A registered dietitian can help you create meal plans that fit your lifestyle. · Get at least 30 minutes of exercise on most days of the week. Exercise helps control your blood sugar. It also helps you maintain a healthy weight. Walking is a good choice. You also may want to do other activities, such as running, swimming, cycling, or playing tennis or team sports. · If your doctor prescribed medicines, take them exactly as prescribed. Call your doctor if you think you are having a problem with your medicine. You will get more details on the specific medicines your doctor prescribes. Follow-up care is a key part of your treatment and safety. Be sure to make and go to all appointments, and call your doctor if you are having problems. It's also a good idea to know your test results and keep a list of the medicines you take. Where can you learn more? Go to http://miryam-polo.info/. Enter O108 in the search box to learn more about \"Learning About High Blood Sugar. \"  Current as of: March 13, 2017  Content Version: 11.3  © 5829-5749 [employer]. Care instructions adapted under license by [employer] (which disclaims liability or warranty for this information). If you have questions about a medical condition or this instruction, always ask your healthcare professional. [address] any warranty or liability for your use of this information.          Tooth and Gum Pain: Care Instructions  Your Care Instructions    The most common causes of dental pain are tooth decay and gum disease. Pain can also be caused by an infection of the tooth (abscess) or the gums. Or you may have pain from a broken or cracked tooth. Other causes of pain include infection and damage to a tooth from nervous grinding of your teeth. A wisdom tooth can be painful when it is coming in but cannot break through the gum. It can also be painful when the tooth is only partway in and extra gum tissue has formed around it. The tissue can get inflamed (pericoronitis), and sometimes it gets infected. Prompt dental care can help find the cause of your toothache and keep the tooth from dying or gum disease from getting worse. Self-care at home may reduce your pain and discomfort. Follow-up care is a key part of your treatment and safety. Be sure to make and go to all appointments, and call your dentist or doctor if you are having problems. It's also a good idea to know your test results and keep a list of the medicines you take. How can you care for yourself at home? · To reduce pain and facial swelling, put an ice or cold pack on the outside of your cheek for 10 to 20 minutes at a time. Put a thin cloth between the ice and your skin. Do not use heat. · If your doctor prescribed antibiotics, take them as directed. Do not stop taking them just because you feel better. You need to take the full course of antibiotics. · Ask your doctor if you can take an over-the-counter pain medicine, such as acetaminophen (Tylenol), ibuprofen (Advil, Motrin), or naproxen (Aleve). Be safe with medicines. Read and follow all instructions on the label. · Avoid very hot, cold, or sweet foods and drinks if they increase your pain. · Rinse your mouth with warm salt water every 2 hours to help relieve pain and swelling. Mix 1 teaspoon of salt in 8 ounces of water.   · Talk to your dentist about using special toothpaste for sensitive teeth. To reduce pain on contact with heat or cold or when brushing, brush with this toothpaste regularly or rub a small amount of the paste on the sensitive area with a clean finger 2 or 3 times a day. Floss gently between your teeth. · Do not smoke or use spit tobacco. Tobacco use can make gum problems worse, decreases your ability to fight infection in your gums, and delays healing. If you need help quitting, talk to your doctor about stop-smoking programs and medicines. These can increase your chances of quitting for good. When should you call for help? Call 911 anytime you think you may need emergency care. For example, call if:  · You have trouble breathing. Call your dentist or doctor now or seek immediate medical care if:  · You have signs of infection, such as:  ¨ Increased pain, swelling, warmth, or redness. ¨ Red streaks leading from the area. ¨ Pus draining from the area. ¨ A fever. Watch closely for changes in your health, and be sure to contact your doctor if:  · You do not get better as expected. Where can you learn more? Go to http://miryam-polo.info/. Enter [id number] in the search box to learn more about \"Tooth and Gum Pain: Care Instructions. \"  Current as of: August 11, 2016  Content Version: 11.3  © 3981-1198 [employer]. Care instructions adapted under license by [employer] (which disclaims liability or warranty for this information). If you have questions about a medical condition or this instruction, always ask your healthcare professional. [place] [address] any warranty or liability for your use of this information.

## 2017-07-03 NOTE — ED PROVIDER NOTES
HPI  
To ED with complaints of R lower dental pain. This pain has been intermittent for many months, returning past few days. Denies known injury to tooth. Aching, moderate, Right lower tooth ache. No gum swelling/ drainage. Sts has not had dental care due to financial issues. Also here with white vaginal discharge of about one weeks duration. No itching. No odor. Concerned about STDs as condom broke. No urinary sx except \"peeing a lot\". No nausea, vomiting, diarrhea. Requested that glucose be checked - over 500. She states had not been using her sliding scale and has been using her Lantus only. She sts glucose past few days has been in low 200s. Notes that \"I have been eating a lot of sugary food, a lot, I know I need to quit\". Past Medical History:  
Diagnosis Date  Alcohol abuse, in remission   
 quit 17 days ago  Asthma   
 does not use inhalers  Borderline personality disorder  Diabetes ([address]  GERD (gastroesophageal reflux disease)  Hypertension  Other ill-defined conditions   
 kidney stones ,passed one  Other ill-defined conditions   
 sickle cell trait  Other ill-defined conditions   
 increased cholesterol  Pancreatitis  Psychiatric disorder   
 schizophrenia, bipolar, depression, anxiety Past Surgical History:  
Procedure Laterality Date  ABDOMEN SURGERY PROC UNLISTED  3/12/14 CHOLECYSTECTOMY LAPAROSCOPIC    
 HX GASTRIC BYPASS  HX GYN  11/8/2012  
 c section x2  HX OTHER SURGICAL  3/13/14 ENDOSCOPIC RETROGRADE CHOLANGIOPANCREATOGRAPHY  HX OTHER SURGICAL  8/4/14  
 endoscopic stent placed to bile duct  HX TUBAL LIGATION  2012 Family History:  
Problem Relation Age of Onset  Heart Disease Mother  Diabetes Mother  Hypertension Mother  Hypertension Maternal Grandmother Social History Social History  Marital status: SINGLE   Spouse name: N/A  
 Number of children: N/A  
 Years of education: N/A Occupational History  Not on file. Social History Main Topics  Smoking status: Current Every Day Smoker Packs/day: 1.00 Years: 14.00 Types: Cigarettes  Smokeless tobacco: Never Used Comment: cigarettes  Alcohol use No  
 Drug use: No  
   Comment: reports last drug use was 2/2017  Sexual activity: Yes  
  Partners: Female Birth control/ protection: Surgical  
 
Other Topics Concern  Not on file Social History Narrative ALLERGIES: Dilaudid [hydromorphone (bulk)] and Ibuprofen Review of Systems Constitutional: Negative for activity change, chills, fever and unexpected weight change. HENT: Negative for congestion, mouth sores, rhinorrhea and sore throat. Eyes: Negative for pain and discharge. Respiratory: Negative for cough and shortness of breath. Cardiovascular: Negative for chest pain. Gastrointestinal: Negative for abdominal pain, constipation, nausea and vomiting. Endocrine: Positive for polyuria. Negative for cold intolerance. Genitourinary: Positive for vaginal discharge. Negative for difficulty urinating, dysuria, frequency, urgency and vaginal pain. Musculoskeletal: Negative for back pain and neck pain. Skin: Negative for rash and wound. Neurological: Negative for seizures, syncope and headaches. Psychiatric/Behavioral: Negative for confusion. The patient is not nervous/anxious. All other systems reviewed and are negative. Vitals:  
 07/03/17 1225 BP: (!) 177/106 Pulse: 81 Resp: 16 Temp: 98.2 °F (36.8 °C) SpO2: 96% Weight: 69.9 kg (154 lb) Height: 5' 6\" (1.676 m) Physical Exam  
Nursing note and vitals reviewed. GENERAL ASSESSMENT: active, alert, no acute distress, well hydrated, well nourished SKIN: no lesions, jaundice, petechiae, pallor, cyanosis, ecchymosis HEAD: Atraumatic, normocephalic.  
EARS: bilateral TM's and external ear canals normal. 
NOSE: nasal mucosa, septum, turbinates normal bilaterally MOUTH: mucous membranes moist.  pharynx without erythema or exudate Right lower Rear most molar TTP. No gum swelling, no fluctuance, no drainage. Some caries. NECK: supple, full range of motion, no mass RESP: respiratory effort normal. clear to auscultation with normal breath sounds bilaterally. CARD: Regular rate and rhythm, normal S1/S2, no murmurs, normal pulses and capillary fill ABDOMEN: normal bowel sounds, soft and non-tender throughout, nondistended, no mass, no organomegaly. NEURO: AAOX3. Non-focal.  
EXTREMITY: Normal muscle tone. No deformity or tenderness. MDM Number of Diagnoses or Management Options BV (bacterial vaginosis): Hyperglycemia:  
Pain, dental:  
Diagnosis management comments: DDX:  Dental Caries, Dental Abscess, FX toot, Dental pain DKA, electrolyte abnormalities, UTI, STI, vaginitis Of interesting note: In march 2017 she also had ED visit where she was treated for STDs/ vag discharge, had toothach, and hyperglycemia (over 600) that responded to one dose insulin. Amount and/or Complexity of Data Reviewed Clinical lab tests: ordered and reviewed ED Course Pelvic Exam 
Performed by: PA Type of exam performed: bimanual and speculum. External genitalia appearance: normal.   
Vaginal exam:  discharge. The amount of discharge was:  moderate. The discharge was milky. Cervical exam:  normal.   
Specimen(s) collected:  GC and chlamydia. Bimanual exam:  normal.   
 
 
 
 
 
LABORATORY TESTS: 
Recent Results (from the past 12 hour(s)) URINALYSIS W/ REFLEX CULTURE Collection Time: 07/03/17 12:33 PM  
Result Value Ref Range Color YELLOW/STRAW Appearance CLEAR CLEAR Specific gravity 1.010 1.003 - 1.030    
 pH (UA) 6.0 5.0 - 8.0 Protein NEGATIVE  NEG mg/dL Glucose >1000 (A) NEG mg/dL Ketone NEGATIVE  NEG mg/dL Bilirubin NEGATIVE  NEG Blood NEGATIVE  NEG  Urobilinogen 0.2 0.2 - 1.0 EU/dL Nitrites NEGATIVE  NEG Leukocyte Esterase NEGATIVE  NEG    
 WBC 0-4 0 - 4 /hpf  
 RBC 0-5 0 - 5 /hpf Epithelial cells FEW FEW /lpf Bacteria NEGATIVE  NEG /hpf  
 UA:UC IF INDICATED CULTURE NOT INDICATED BY UA RESULT CNI    
HCG URINE, QL. - POC Collection Time: 07/03/17 12:34 PM  
Result Value Ref Range Pregnancy test,urine (POC) NEGATIVE  NEG    
GLUCOSE, POC Collection Time: 07/03/17 12:35 PM  
Result Value Ref Range Glucose (POC) 566 (H) 65 - 100 mg/dL Performed by George Wall CBC WITH AUTOMATED DIFF Collection Time: 07/03/17  1:00 PM  
Result Value Ref Range WBC 5.7 3.6 - 11.0 K/uL  
 RBC 5.00 3.80 - 5.20 M/uL  
 HGB 15.0 11.5 - 16.0 g/dL HCT 43.0 35.0 - 47.0 % MCV 86.0 80.0 - 99.0 FL  
 MCH 30.0 26.0 - 34.0 PG  
 MCHC 34.9 30.0 - 36.5 g/dL  
 RDW 13.1 11.5 - 14.5 % PLATELET 256 (L) 968 - 400 K/uL NEUTROPHILS 44 32 - 75 % LYMPHOCYTES 41 12 - 49 % MONOCYTES 11 5 - 13 % EOSINOPHILS 4 0 - 7 % BASOPHILS 0 0 - 1 %  
 ABS. NEUTROPHILS 2.5 1.8 - 8.0 K/UL  
 ABS. LYMPHOCYTES 2.3 0.8 - 3.5 K/UL  
 ABS. MONOCYTES 0.6 0.0 - 1.0 K/UL  
 ABS. EOSINOPHILS 0.2 0.0 - 0.4 K/UL  
 ABS. BASOPHILS 0.0 0.0 - 0.1 K/UL METABOLIC PANEL, COMPREHENSIVE Collection Time: 07/03/17  1:00 PM  
Result Value Ref Range Sodium 132 (L) 136 - 145 mmol/L Potassium 4.3 3.5 - 5.1 mmol/L Chloride 95 (L) 97 - 108 mmol/L  
 CO2 26 21 - 32 mmol/L Anion gap 11 5 - 15 mmol/L Glucose 493 (H) 65 - 100 mg/dL BUN 8 6 - 20 MG/DL Creatinine 1.04 (H) 0.55 - 1.02 MG/DL  
 BUN/Creatinine ratio 8 (L) 12 - 20 GFR est AA >60 >60 ml/min/1.73m2 GFR est non-AA 58 (L) >60 ml/min/1.73m2 Calcium 8.5 8.5 - 10.1 MG/DL Bilirubin, total 0.4 0.2 - 1.0 MG/DL  
 ALT (SGPT) 24 12 - 78 U/L  
 AST (SGOT) 14 (L) 15 - 37 U/L Alk. phosphatase 76 45 - 117 U/L Protein, total 7.6 6.4 - 8.2 g/dL Albumin 3.5 3.5 - 5.0 g/dL Globulin 4.1 (H) 2.0 - 4.0 g/dL A-G Ratio 0.9 (L) 1.1 - 2.2 ACETONE/KETONE, QL Collection Time: 07/03/17  1:00 PM  
Result Value Ref Range Acetone/Ketone serum, Ql. NEGATIVE  NEG       
KOH, OTHER SOURCES Collection Time: 07/03/17  1:00 PM  
Result Value Ref Range Special Requests: NO SPECIAL REQUESTS    
 KOH NO YEAST SEEN    
WET PREP Collection Time: 07/03/17  1:00 PM  
Result Value Ref Range Clue cells CLUE CELLS PRESENT Wet prep NO TRICHOMONAS SEEN    
GLUCOSE, POC Collection Time: 07/03/17  2:21 PM  
Result Value Ref Range Glucose (POC) 123 (H) 65 - 100 mg/dL Performed by [facility] IMAGING RESULTS: 
No orders to display MEDICATIONS GIVEN: 
Medications  
sodium chloride (NS) flush 5-10 mL (not administered)  
sodium chloride (NS) flush 5-10 mL (not administered)  
sodium chloride 0.9 % bolus infusion 1,000 mL (0 mL IntraVENous IV Completed 7/3/17 1445) insulin regular (NOVOLIN R, HUMULIN R) injection 10 Units (10 Units IntraVENous Given 7/3/17 1320) cefTRIAXone (ROCEPHIN) 250 mg in lidocaine (PF) (XYLOCAINE) 10 mg/mL (1 %) IM injection (250 mg IntraMUSCular Given 7/3/17 1313) dental ball (lidocaine/Benadryl/Cetacaine) mixture ( Mucous Membrane Given 7/3/17 1313) azithromycin (ZITHROMAX) tablet 1,000 mg (1,000 mg Oral Given 7/3/17 1313) IMPRESSION: 
1. Pain, dental   
2. BV (bacterial vaginosis) 3. Hyperglycemia PLAN: 
1. Discharge Medication List as of 7/3/2017  3:33 PM  
  
START taking these medications Details  
metroNIDAZOLE (FLAGYL) 500 mg tablet Take 1 Tab by mouth three (3) times daily for 10 days. , Normal, Disp-30 Tab, R-0  
  
penicillin v potassium (VEETID) 500 mg tablet Take 1 Tab by mouth four (4) times daily. , Print, Disp-40 Tab, R-0  
  
  
CONTINUE these medications which have CHANGED Details  
traMADol (ULTRAM) 50 mg tablet Take 1 Tab by mouth every eight (8) hours as needed for Pain.  Max Daily Amount: 150 mg., Print, Disp-9 Tab, R-0  
  
 CONTINUE these medications which have NOT CHANGED Details QUEtiapine (SEROQUEL) 200 mg tablet Take 200 mg by mouth nightly., Historical Med  
  
divalproex DR (DEPAKOTE) 500 mg tablet Take 1,000 mg by mouth two (2) times a day., Historical Med  
  
haloperidol (HALDOL) 5 mg tablet Take 2.5 mg by mouth nightly., Historical Med Blood-Glucose Meter monitoring kit Monitor BG 4 times daily. Dx uncontrolled type 2 DM E11.65, Normal, Disp-1 Kit, R-0 Lancets (ACCU-CHEK SOFTCLIX LANCETS) misc CHECK BLOOD SUGAR TWICE DAILY, Normal, Disp-60 Each, R-11 Syringe with Needle, Disp, 1 mL 26 gauge x 5/8\" syrg Dispense syringe with needle for insulin injection 4 times a day. May substitute any appropriate size or brand., Normal, Disp-120 Each, R-11  
  
insulin lispro (HUMALOG) 100 unit/mL kwikpen Take 4 units for BG between 150-200, take 6 unites for -250, take 8 units for  or higher  Indications: type 1 diabetes mellitus, Normal, Disp-1 Package, R-5  
  
insulin glargine (LANTUS) 100 unit/mL injection 30 Units by SubCUTAneous route nightly. Indications: type 1 diabetes mellitus, No Print, Disp-1 Vial, R-5  
  
sertraline (ZOLOFT) 50 mg tablet Take 100 mg by mouth daily. , Historical Med  
  
amLODIPine (NORVASC) 5 mg tablet Take 1 Tab by mouth daily. , Normal, Disp-30 Tab, R-5  
  
albuterol-ipratropium (DUO-NEB) 2.5 mg-0.5 mg/3 ml nebu 3 mL by Nebulization route every four (4) hours as needed. For wheezing and shortness of breath, Normal, Disp-25 Nebule, R-11  
  
pravastatin (PRAVACHOL) 20 mg tablet Take 10 mg by mouth nightly., Historical Med  
  
ipratropium-albuterol (COMBIVENT RESPIMAT)  mcg/actuation inhaler Take 1 Puff by inhalation every four (4) hours as needed for Wheezing., Print, Disp-1 Inhaler, R-0  
  
  
STOP taking these medications  HYDROcodone-acetaminophen (NORCO) 5-325 mg per tablet Comments:  
Reason for Stopping:   
   
 predniSONE (STERAPRED DS) 10 mg dose pack Comments:  
Reason for Stoppin.  
Follow-up Information Follow up With Details Comments Contact Info Krupa Breen NP  Follow up with your primary care doctor in the next few days for further management of your diabetes. [phone] [id number] [phone] Follow up with your dentist or one of the ones from the list provided. Return to ED if worse

## 2017-07-03 NOTE — ED NOTES
Patient awake and pleasant. States \"I feel better\" and indicates she would like to be discharged home. PA informed. Emergency Department Nursing Plan of Care The Nursing Plan of Care is developed from the Nursing assessment and Emergency Department Attending provider initial evaluation. The plan of care may be reviewed in the ED Provider note. The Plan of Care was developed with the following considerations:  
Patient / Family readiness to learn indicated by:verbalized understanding Persons(s) to be included in education: patient Barriers to Learning/Limitations:No 
 
Signed Rebekah Bass RN   
7/3/2017   3:07 PM

## 2017-07-03 NOTE — ED NOTES
patient calm and pleasant. Presents to ED with c/o dental pain. Reports no difficulty taking medications but admits not compliant with diabetic diet. Emergency Department Nursing Plan of Care The Nursing Plan of Care is developed from the Nursing assessment and Emergency Department Attending provider initial evaluation. The plan of care may be reviewed in the ED Provider note. The Plan of Care was developed with the following considerations:  
Patient / Family readiness to learn indicated by:verbalized understanding Persons(s) to be included in education: patient Barriers to Learning/Limitations:No 
 
Signed Isai Butler RN   
7/3/2017   2:50 PM

## 2017-08-13 ENCOUNTER — HOSPITAL ENCOUNTER (EMERGENCY)
Age: 42
Discharge: HOME | End: 2017-08-13
Attending: EMERGENCY MEDICINE
Payer: MEDICARE

## 2017-08-13 VITALS
OXYGEN SATURATION: 99 % | WEIGHT: 140 LBS | HEART RATE: 84 BPM | RESPIRATION RATE: 18 BRPM | TEMPERATURE: 98.3 F | DIASTOLIC BLOOD PRESSURE: 81 MMHG | HEIGHT: 66 IN | SYSTOLIC BLOOD PRESSURE: 146 MMHG | BODY MASS INDEX: 22.5 KG/M2

## 2017-08-13 DIAGNOSIS — K08.89 PAIN, DENTAL: Primary | ICD-10-CM

## 2017-08-13 PROCEDURE — 74011250637 HC RX REV CODE- 250/637: Performed by: PHYSICIAN ASSISTANT

## 2017-08-13 PROCEDURE — 74011000250 HC RX REV CODE- 250: Performed by: PHYSICIAN ASSISTANT

## 2017-08-13 PROCEDURE — 99283 EMERGENCY DEPT VISIT LOW MDM: CPT

## 2017-08-13 RX ORDER — PENICILLIN V POTASSIUM 500 MG/1
500 TABLET, FILM COATED ORAL 4 TIMES DAILY
Qty: 40 TAB | Refills: 0 | Status: SHIPPED | OUTPATIENT
Start: 2017-08-13 | End: 2017-08-23

## 2017-08-13 RX ADMIN — LIDOCAINE HYDROCHLORIDE: 20 SOLUTION ORAL; TOPICAL at 18:52

## 2017-08-13 NOTE — ED PROVIDER NOTES
Patient is a 40 y.o. female presenting with dental problem. The history is provided by the patient. Dental Pain This is a new problem. Episode onset: Pt reports right lower dental pain x 2 mo, worsening x  1 day. Denies fever/chills, facial swellling, drainage, erythema, trouble swallowing. Seen on 7/1 in ED. Treated with pen vk and tramadol. Has not yet seen dentist.  The pain is located in the right lower mouth. The pain is at a severity of 10/10. There was no vomiting, no nausea, no fever, no swelling, no chest pain, no shortness of breath, no headaches, no gum redness and no drainage. She has tried nothing for the symptoms. The patient has no cardiac history. Past Medical History:  
Diagnosis Date  Alcohol abuse, in remission   
 quit 17 days ago  Asthma   
 does not use inhalers  Borderline personality disorder  [address] [address]  GERD (gastroesophageal reflux disease)  Hypertension  Other ill-defined conditions   
 kidney stones ,passed one  Other ill-defined conditions   
 sickle cell trait  Other ill-defined conditions   
 increased cholesterol  Pancreatitis  Psychiatric disorder   
 schizophrenia, bipolar, depression, anxiety Past Surgical History:  
Procedure Laterality Date  ABDOMEN SURGERY PROC UNLISTED  3/12/14 CHOLECYSTECTOMY LAPAROSCOPIC    
 HX GASTRIC BYPASS  HX GYN  11/8/2012  
 c section x2  HX OTHER SURGICAL  3/13/14 ENDOSCOPIC RETROGRADE CHOLANGIOPANCREATOGRAPHY  HX OTHER SURGICAL  8/4/14  
 endoscopic stent placed to bile duct  HX TUBAL LIGATION  2012 Family History:  
Problem Relation Age of Onset  Heart Disease Mother  Diabetes Mother  Hypertension Mother  Hypertension Maternal Grandmother Social History Social History  Marital status: SINGLE Spouse name: N/A  
 Number of children: N/A  
 Years of education: N/A Occupational History  Not on file.   
 
Social History Main Topics  Smoking status: Current Every Day Smoker Packs/day: 1.00 Years: 14.00 Types: Cigarettes  Smokeless tobacco: Never Used Comment: cigarettes  Alcohol use No  
 Drug use: No  
   Comment: reports last drug use was 2/2017  Sexual activity: Yes  
  Partners: Female Birth control/ protection: Surgical  
 
Other Topics Concern  Not on file Social History Narrative ALLERGIES: Dilaudid [hydromorphone (bulk)] and Ibuprofen Review of Systems Constitutional: Negative for chills and fever. HENT: Positive for dental problem. Negative for congestion, facial swelling, sinus pressure, sore throat and trouble swallowing. Eyes: Negative for photophobia and visual disturbance. Respiratory: Negative for chest tightness and shortness of breath. Gastrointestinal: Negative for abdominal pain, nausea and vomiting. Genitourinary: Negative for flank pain. Musculoskeletal: Negative for back pain and myalgias. Skin: Negative for color change, pallor, rash and wound. Neurological: Negative for dizziness, weakness and light-headedness. All other systems reviewed and are negative. Vitals:  
 08/13/17 1808 BP: (!) 170/92 Pulse: 84 Resp: 18 Temp: 98.3 °F (36.8 °C) SpO2: 99% Weight: 63.5 kg (140 lb) Height: 5' 6\" (1.676 m) Physical Exam  
Constitutional: She is oriented to person, place, and time. She appears well-developed and well-nourished. No distress. HENT:  
Head: Normocephalic and atraumatic. Right Ear: Tympanic membrane, external ear and ear canal normal.  
Left Ear: Tympanic membrane, external ear and ear canal normal.  
Nose: Nose normal. No mucosal edema or rhinorrhea. Right sinus exhibits no maxillary sinus tenderness and no frontal sinus tenderness. Left sinus exhibits no maxillary sinus tenderness and no frontal sinus tenderness. Mouth/Throat: Uvula is midline and oropharynx is clear and moist. Abnormal dentition. Dental caries present. No dental abscesses. No oropharyngeal exudate, posterior oropharyngeal edema, posterior oropharyngeal erythema or tonsillar abscesses. Eyes: Conjunctivae are normal.  
Cardiovascular: Normal rate, regular rhythm and normal heart sounds. Pulmonary/Chest: Effort normal and breath sounds normal. No respiratory distress. Abdominal: Soft. Bowel sounds are normal. She exhibits no distension. Musculoskeletal: Normal range of motion. Neurological: She is alert and oriented to person, place, and time. Skin: Skin is warm. No rash noted. Psychiatric: She has a normal mood and affect. Her behavior is normal.  
Nursing note and vitals reviewed. MDM Number of Diagnoses or Management Options Pain, dental:  
Diagnosis management comments: DDx: Dental Pain vs Infection vs Abscess ED Course Procedures LABORATORY TESTS: 
No results found for this or any previous visit (from the past 12 hour(s)). IMAGING RESULTS: 
No orders to display MEDICATIONS GIVEN: 
Medications  
dental ball (lidocaine/Benadryl/Cetacaine) mixture ( Mucous Membrane Given 8/13/17 5192) IMPRESSION: 
1. Pain, dental   
 
 
PLAN: 
1. Current Discharge Medication List  
  
CONTINUE these medications which have NOT CHANGED Details  
penicillin v potassium (VEETID) 500 mg tablet Take 1 Tab by mouth four (4) times daily. Qty: 40 Tab, Refills: 0  
  
traMADol (ULTRAM) 50 mg tablet Take 1 Tab by mouth every eight (8) hours as needed for Pain. Max Daily Amount: 150 mg. 
Qty: 9 Tab, Refills: 0 Associated Diagnoses: Pain, dental  
  
QUEtiapine (SEROQUEL) 200 mg tablet Take 200 mg by mouth nightly. divalproex DR (DEPAKOTE) 500 mg tablet Take 1,000 mg by mouth two (2) times a day.  
  
haloperidol (HALDOL) 5 mg tablet Take 2.5 mg by mouth nightly. Blood-Glucose Meter monitoring kit Monitor BG 4 times daily. Dx uncontrolled type 2 DM E11.65 Qty: 1 Kit, Refills: 0 Lancets (ACCU-CHEK SOFTCLIX LANCETS) misc CHECK BLOOD SUGAR TWICE DAILY Qty: 60 Each, Refills: 11 Syringe with Needle, Disp, 1 mL 26 gauge x 5/8\" syrg Dispense syringe with needle for insulin injection 4 times a day. May substitute any appropriate size or brand. Qty: 120 Each, Refills: 11  
  
insulin lispro (HUMALOG) 100 unit/mL kwikpen Take 4 units for BG between 150-200, take 6 unites for -250, take 8 units for  or higher  Indications: type 1 diabetes mellitus Qty: 1 Package, Refills: 5  
  
insulin glargine (LANTUS) 100 unit/mL injection 30 Units by SubCUTAneous route nightly. Indications: type 1 diabetes mellitus Qty: 1 Vial, Refills: 5  
  
sertraline (ZOLOFT) 50 mg tablet Take 100 mg by mouth daily. amLODIPine (NORVASC) 5 mg tablet Take 1 Tab by mouth daily. Qty: 30 Tab, Refills: 5 Associated Diagnoses: Essential hypertension  
  
albuterol-ipratropium (DUO-NEB) 2.5 mg-0.5 mg/3 ml nebu 3 mL by Nebulization route every four (4) hours as needed. For wheezing and shortness of breath Qty: 25 Nebule, Refills: 11  
 Associated Diagnoses: Moderate persistent asthma with acute exacerbation; Bronchitis  
  
pravastatin (PRAVACHOL) 20 mg tablet Take 10 mg by mouth nightly. ipratropium-albuterol (COMBIVENT RESPIMAT)  mcg/actuation inhaler Take 1 Puff by inhalation every four (4) hours as needed for Wheezing. Qty: 1 Inhaler, Refills: 0  
  
  
 
2. Follow-up Information Follow up With Details Comments Contact Info Lydia Shankar MD Schedule an appointment as soon as possible for a visit in 2 days As needed [address] [place] [address] 
[phone] Return to ED if worse

## 2017-08-13 NOTE — ED NOTES
Pt here for dental pain rt jaw. Emergency Department Nursing Plan of Care The Nursing Plan of Care is developed from the Nursing assessment and Emergency Department Attending provider initial evaluation. The plan of care may be reviewed in the ED Provider note. The Plan of Care was developed with the following considerations:  
Patient / Family readiness to learn indicated by:verbalized understanding Persons(s) to be included in education: patient Barriers to Learning/Limitations:No 
 
Signed Bess Perez RN   
8/13/2017   6:19 PM

## 2017-08-13 NOTE — ED NOTES
Pt discharged by provider. Pt declined wheelchair transport from ED. Pt observed ambulating safely upon discharge.

## 2017-08-13 NOTE — DISCHARGE INSTRUCTIONS
Tooth and Gum Pain: Care Instructions  Your Care Instructions    The most common causes of dental pain are tooth decay and gum disease. Pain can also be caused by an infection of the tooth (abscess) or the gums. Or you may have pain from a broken or cracked tooth. Other causes of pain include infection and damage to a tooth from nervous grinding of your teeth. A wisdom tooth can be painful when it is coming in but cannot break through the gum. It can also be painful when the tooth is only partway in and extra gum tissue has formed around it. The tissue can get inflamed (pericoronitis), and sometimes it gets infected. Prompt dental care can help find the cause of your toothache and keep the tooth from dying or gum disease from getting worse. Self-care at home may reduce your pain and discomfort. Follow-up care is a key part of your treatment and safety. Be sure to make and go to all appointments, and call your dentist or doctor if you are having problems. It's also a good idea to know your test results and keep a list of the medicines you take. How can you care for yourself at home? · To reduce pain and facial swelling, put an ice or cold pack on the outside of your cheek for 10 to 20 minutes at a time. Put a thin cloth between the ice and your skin. Do not use heat. · If your doctor prescribed antibiotics, take them as directed. Do not stop taking them just because you feel better. You need to take the full course of antibiotics. · Ask your doctor if you can take an over-the-counter pain medicine, such as acetaminophen (Tylenol), ibuprofen (Advil, Motrin), or naproxen (Aleve). Be safe with medicines. Read and follow all instructions on the label. · Avoid very hot, cold, or sweet foods and drinks if they increase your pain. · Rinse your mouth with warm salt water every 2 hours to help relieve pain and swelling. Mix 1 teaspoon of salt in 8 ounces of water.   · Talk to your dentist about using special toothpaste for sensitive teeth. To reduce pain on contact with heat or cold or when brushing, brush with this toothpaste regularly or rub a small amount of the paste on the sensitive area with a clean finger 2 or 3 times a day. Floss gently between your teeth. · Do not smoke or use spit tobacco. Tobacco use can make gum problems worse, decreases your ability to fight infection in your gums, and delays healing. If you need help quitting, talk to your doctor about stop-smoking programs and medicines. These can increase your chances of quitting for good. When should you call for help? Call 911 anytime you think you may need emergency care. For example, call if:  · You have trouble breathing. Call your dentist or doctor now or seek immediate medical care if:  · You have signs of infection, such as:  ¨ Increased pain, swelling, warmth, or redness. ¨ Red streaks leading from the area. ¨ Pus draining from the area. ¨ A fever. Watch closely for changes in your health, and be sure to contact your doctor if:  · You do not get better as expected. Where can you learn more? Go to http://miryam-polo.info/. Enter [id number] in the search box to learn more about \"Tooth and Gum Pain: Care Instructions. \"  Current as of: August 11, 2016  Content Version: 11.3  © 4647-5944 [employer]. Care instructions adapted under license by [employer] (which disclaims liability or warranty for this information). If you have questions about a medical condition or this instruction, always ask your healthcare professional. [place] [address] any warranty or liability for your use of this information.

## 2017-09-12 ENCOUNTER — HOSPITAL ENCOUNTER (EMERGENCY)
Age: 42
Discharge: HOME | End: 2017-09-12
Attending: EMERGENCY MEDICINE
Payer: MEDICARE

## 2017-09-12 ENCOUNTER — APPOINTMENT (OUTPATIENT)
Dept: CT IMAGING | Age: 42
End: 2017-09-12
Attending: PHYSICIAN ASSISTANT
Payer: MEDICARE

## 2017-09-12 ENCOUNTER — APPOINTMENT (OUTPATIENT)
Dept: GENERAL RADIOLOGY | Age: 42
End: 2017-09-12
Attending: PHYSICIAN ASSISTANT
Payer: MEDICARE

## 2017-09-12 VITALS
TEMPERATURE: 98.4 F | BODY MASS INDEX: 22.5 KG/M2 | SYSTOLIC BLOOD PRESSURE: 151 MMHG | DIASTOLIC BLOOD PRESSURE: 98 MMHG | WEIGHT: 140 LBS | OXYGEN SATURATION: 98 % | HEIGHT: 66 IN | RESPIRATION RATE: 14 BRPM | HEART RATE: 74 BPM

## 2017-09-12 DIAGNOSIS — J05.10 EPIGLOTTITIS: Primary | ICD-10-CM

## 2017-09-12 DIAGNOSIS — J36 ABSCESS, PERITONSILLAR: ICD-10-CM

## 2017-09-12 LAB
ANION GAP BLD CALC-SCNC: 18 MMOL/L (ref 5–15)
BUN BLD-MCNC: <3 MG/DL (ref 9–20)
CA-I BLD-MCNC: 1.13 MMOL/L (ref 1.12–1.32)
CHLORIDE BLD-SCNC: 98 MMOL/L (ref 98–107)
CO2 BLD-SCNC: 24 MMOL/L (ref 21–32)
CREAT BLD-MCNC: 0.6 MG/DL (ref 0.6–1.3)
DEPRECATED S PYO AG THROAT QL EIA: NEGATIVE
GLUCOSE BLD STRIP.AUTO-MCNC: 209 MG/DL (ref 65–100)
GLUCOSE BLD-MCNC: 365 MG/DL (ref 65–100)
HCT VFR BLD CALC: 47 % (ref 35–47)
HGB BLD-MCNC: 16 GM/DL (ref 11.5–16)
POTASSIUM BLD-SCNC: 3.9 MMOL/L (ref 3.5–5.1)
SERVICE CMNT-IMP: ABNORMAL
SERVICE CMNT-IMP: ABNORMAL
SODIUM BLD-SCNC: 135 MMOL/L (ref 136–145)

## 2017-09-12 PROCEDURE — 74011000250 HC RX REV CODE- 250: Performed by: PHYSICIAN ASSISTANT

## 2017-09-12 PROCEDURE — 74011250636 HC RX REV CODE- 250/636: Performed by: PHYSICIAN ASSISTANT

## 2017-09-12 PROCEDURE — 87147 CULTURE TYPE IMMUNOLOGIC: CPT | Performed by: EMERGENCY MEDICINE

## 2017-09-12 PROCEDURE — 82962 GLUCOSE BLOOD TEST: CPT

## 2017-09-12 PROCEDURE — 70491 CT SOFT TISSUE NECK W/DYE: CPT

## 2017-09-12 PROCEDURE — 74011636637 HC RX REV CODE- 636/637: Performed by: PHYSICIAN ASSISTANT

## 2017-09-12 PROCEDURE — 96374 THER/PROPH/DIAG INJ IV PUSH: CPT

## 2017-09-12 PROCEDURE — 87880 STREP A ASSAY W/OPTIC: CPT | Performed by: PHYSICIAN ASSISTANT

## 2017-09-12 PROCEDURE — 99284 EMERGENCY DEPT VISIT MOD MDM: CPT

## 2017-09-12 PROCEDURE — 74011250637 HC RX REV CODE- 250/637: Performed by: PHYSICIAN ASSISTANT

## 2017-09-12 PROCEDURE — 70360 X-RAY EXAM OF NECK: CPT

## 2017-09-12 PROCEDURE — 74011636320 HC RX REV CODE- 636/320: Performed by: EMERGENCY MEDICINE

## 2017-09-12 PROCEDURE — 87070 CULTURE OTHR SPECIMN AEROBIC: CPT | Performed by: EMERGENCY MEDICINE

## 2017-09-12 PROCEDURE — 80047 BASIC METABLC PNL IONIZED CA: CPT

## 2017-09-12 PROCEDURE — 96375 TX/PRO/DX INJ NEW DRUG ADDON: CPT

## 2017-09-12 PROCEDURE — 96361 HYDRATE IV INFUSION ADD-ON: CPT

## 2017-09-12 RX ORDER — SODIUM CHLORIDE 0.9 % (FLUSH) 0.9 %
5-10 SYRINGE (ML) INJECTION AS NEEDED
Status: DISCONTINUED | OUTPATIENT
Start: 2017-09-12 | End: 2017-09-12 | Stop reason: HOSPADM

## 2017-09-12 RX ORDER — SODIUM CHLORIDE 0.9 % (FLUSH) 0.9 %
5-10 SYRINGE (ML) INJECTION
Status: COMPLETED | OUTPATIENT
Start: 2017-09-12 | End: 2017-09-12

## 2017-09-12 RX ORDER — HYDROCODONE BITARTRATE AND ACETAMINOPHEN 5; 325 MG/1; MG/1
1 TABLET ORAL
Status: COMPLETED | OUTPATIENT
Start: 2017-09-12 | End: 2017-09-12

## 2017-09-12 RX ORDER — CLINDAMYCIN HYDROCHLORIDE 300 MG/1
300 CAPSULE ORAL 4 TIMES DAILY
Qty: 40 CAP | Refills: 0 | Status: SHIPPED | OUTPATIENT
Start: 2017-09-12 | End: 2017-09-22

## 2017-09-12 RX ORDER — CLINDAMYCIN HYDROCHLORIDE 150 MG/1
300 CAPSULE ORAL
Status: COMPLETED | OUTPATIENT
Start: 2017-09-12 | End: 2017-09-12

## 2017-09-12 RX ORDER — SODIUM CHLORIDE 0.9 % (FLUSH) 0.9 %
5-10 SYRINGE (ML) INJECTION EVERY 8 HOURS
Status: DISCONTINUED | OUTPATIENT
Start: 2017-09-12 | End: 2017-09-12 | Stop reason: HOSPADM

## 2017-09-12 RX ORDER — HYDROCODONE BITARTRATE AND ACETAMINOPHEN 5; 325 MG/1; MG/1
1 TABLET ORAL
Qty: 8 TAB | Refills: 0 | Status: SHIPPED | OUTPATIENT
Start: 2017-09-12 | End: 2017-09-30

## 2017-09-12 RX ORDER — DEXAMETHASONE SODIUM PHOSPHATE 100 MG/10ML
10 INJECTION INTRAMUSCULAR; INTRAVENOUS ONCE
Status: COMPLETED | OUTPATIENT
Start: 2017-09-12 | End: 2017-09-12

## 2017-09-12 RX ADMIN — DEXAMETHASONE SODIUM PHOSPHATE 10 MG: 10 INJECTION INTRAMUSCULAR; INTRAVENOUS at 14:04

## 2017-09-12 RX ADMIN — IOPAMIDOL 100 ML: 612 INJECTION, SOLUTION INTRAVENOUS at 14:42

## 2017-09-12 RX ADMIN — HYDROCODONE BITARTRATE AND ACETAMINOPHEN 1 TABLET: 5; 325 TABLET ORAL at 13:20

## 2017-09-12 RX ADMIN — CLINDAMYCIN HYDROCHLORIDE 300 MG: 150 CAPSULE ORAL at 16:05

## 2017-09-12 RX ADMIN — Medication 10 ML: at 14:42

## 2017-09-12 RX ADMIN — HUMAN INSULIN 8 UNITS: 100 INJECTION, SOLUTION SUBCUTANEOUS at 14:28

## 2017-09-12 RX ADMIN — SODIUM CHLORIDE 1000 ML: 900 INJECTION, SOLUTION INTRAVENOUS at 14:08

## 2017-09-12 RX ADMIN — LIDOCAINE HYDROCHLORIDE: 20 SOLUTION ORAL; TOPICAL at 13:22

## 2017-09-12 NOTE — ED NOTES
Emergency Department Nursing Plan of Care The Nursing Plan of Care is developed from the Nursing assessment and Emergency Department Attending provider initial evaluation. The plan of care may be reviewed in the ED Provider note. The Plan of Care was developed with the following considerations:  
Patient / Family readiness to learn indicated by:verbalized understanding Persons(s) to be included in education: patient Barriers to Learning/Limitations:No 
 
Signed Smooth Boogie RN   
9/12/2017   1:26 PM

## 2017-09-12 NOTE — ED PROVIDER NOTES
Patient is a 40 y.o. female presenting with sore throat and dental problem. The history is provided by the patient. Sore Throat This is a new problem. The current episode started more than 2 days ago. The problem has been gradually worsening. There has been no fever. Associated symptoms include ear pain, headaches, swollen glands and trouble swallowing. Pertinent negatives include no diarrhea, no vomiting, no congestion, no drooling, no ear discharge, no plugged ear sensation, no shortness of breath, no stridor, no stiff neck and no cough. She has tried nothing for the symptoms. Dental Pain This is a new problem. The current episode started more than 2 days ago. The problem occurs constantly. The problem has not changed since onset. The pain is located in the right lower mouth. The quality of the pain is aching. The pain is moderate. There was no vomiting, no nausea, no fever, no swelling, no chest pain, no shortness of breath, no headaches, no gum redness and no drainage. She has tried nothing for the symptoms. Past Medical History:  
Diagnosis Date  Alcohol abuse, in remission   
 quit 17 days ago  Asthma   
 does not use inhalers  Borderline personality disorder  Diabetes ([facility] [address]  GERD (gastroesophageal reflux disease)  Hypertension  Other ill-defined conditions   
 kidney stones ,passed one  Other ill-defined conditions   
 sickle cell trait  Other ill-defined conditions   
 increased cholesterol  Pancreatitis  Psychiatric disorder   
 schizophrenia, bipolar, depression, anxiety Past Surgical History:  
Procedure Laterality Date  ABDOMEN SURGERY PROC UNLISTED  3/12/14 CHOLECYSTECTOMY LAPAROSCOPIC    
 HX GASTRIC BYPASS  HX GYN  11/8/2012  
 c section x2  HX OTHER SURGICAL  3/13/14 ENDOSCOPIC RETROGRADE CHOLANGIOPANCREATOGRAPHY  HX OTHER SURGICAL  8/4/14  
 endoscopic stent placed to bile duct  HX TUBAL LIGATION  2012 Family History:  
Problem Relation Age of Onset  Heart Disease Mother  Diabetes Mother  Hypertension Mother  Hypertension Maternal Grandmother Social History Social History  Marital status: SINGLE Spouse name: N/A  
 Number of children: N/A  
 Years of education: N/A Occupational History  Not on file. Social History Main Topics  Smoking status: Current Every Day Smoker Packs/day: 1.00 Years: 14.00 Types: Cigarettes  Smokeless tobacco: Never Used Comment: cigarettes  Alcohol use No  
 Drug use: No  
   Comment: reports last drug use was 2/2017  Sexual activity: Yes  
  Partners: Female Birth control/ protection: Surgical  
 
Other Topics Concern  Not on file Social History Narrative ALLERGIES: Dilaudid [hydromorphone (bulk)] and Ibuprofen Review of Systems Constitutional: Negative for activity change, chills, fatigue and fever. HENT: Positive for dental problem, ear pain, facial swelling, sore throat, trouble swallowing and voice change. Negative for congestion, drooling, ear discharge, hearing loss, mouth sores, postnasal drip, rhinorrhea, sinus pain, sinus pressure, sneezing and tinnitus. Eyes: Negative for photophobia, pain, discharge and visual disturbance. Respiratory: Negative for cough, chest tightness, shortness of breath and stridor. Cardiovascular: Negative for chest pain. Gastrointestinal: Negative for abdominal pain, constipation, diarrhea, nausea and vomiting. Genitourinary: Negative. Musculoskeletal: Negative. Negative for myalgias and neck pain. Skin: Negative. Negative for rash. Neurological: Positive for headaches. Negative for dizziness, tremors, seizures, syncope, facial asymmetry, speech difficulty, weakness, light-headedness and numbness. Psychiatric/Behavioral: Negative. Vitals:  
 09/12/17 1254 09/12/17 1255 BP: (!) 157/102 (!) 159/100 Pulse: 91   
Resp: 16 Temp: 98.2 °F (36.8 °C) SpO2: 97% Weight: 63.5 kg (140 lb) Height: 5' 6\" (1.676 m) Physical Exam  
Constitutional: She is oriented to person, place, and time. She appears well-developed and well-nourished. She appears distressed. Pt able to talk in complete sentences, with mild distress/ pain. HENT:  
Head: Normocephalic and atraumatic. Right Ear: Hearing, tympanic membrane, external ear and ear canal normal.  
Left Ear: Hearing, tympanic membrane, external ear and ear canal normal.  
Nose: No mucosal edema or rhinorrhea. Right sinus exhibits no maxillary sinus tenderness and no frontal sinus tenderness. Left sinus exhibits no maxillary sinus tenderness and no frontal sinus tenderness. Mouth/Throat: Mucous membranes are normal. No oral lesions. No trismus in the jaw. Uvula swelling and dental caries present. No dental abscesses. Posterior oropharyngeal edema and posterior oropharyngeal erythema present. No oropharyngeal exudate. Rt sided uvula shift w/ erythema and Left side post oropharyngeal edema. RL dental caries and TTP. No obvious dental abscess. Eyes: Conjunctivae and EOM are normal. Pupils are equal, round, and reactive to light. Right eye exhibits no discharge. Left eye exhibits no discharge. No scleral icterus. Neck: Normal range of motion and full passive range of motion without pain. Neck supple. No tracheal tenderness, no spinous process tenderness and no muscular tenderness present. No rigidity. No tracheal deviation, no edema, no erythema and normal range of motion present. Cardiovascular: Normal rate, regular rhythm, normal heart sounds and intact distal pulses. Exam reveals no gallop and no friction rub. No murmur heard. Pulmonary/Chest: Effort normal and breath sounds normal. No accessory muscle usage or stridor. No respiratory distress. She has no decreased breath sounds. She has no wheezes. She has no rhonchi. She has no rales.  She exhibits no tenderness. Abdominal: Soft. Bowel sounds are normal. She exhibits no distension and no mass. There is no tenderness. There is no rebound and no guarding. Musculoskeletal: Normal range of motion. Lymphadenopathy:  
     Head (left side): Submandibular and tonsillar adenopathy present. She has cervical adenopathy. Left cervical: Superficial cervical adenopathy present. Neurological: She is alert and oriented to person, place, and time. No cranial nerve deficit. Skin: Skin is warm and dry. No rash noted. She is not diaphoretic. Psychiatric: She has a normal mood and affect. Her behavior is normal. Judgment and thought content normal.  
Nursing note and vitals reviewed. MDM Number of Diagnoses or Management Options Abscess, peritonsillar:  
Epiglottitis:  
Diagnosis management comments: DDx: tonsillitis, tonsillar abscess, post oropharyngeal abscess, pharyngitis, dental abscess, dental caries, dentalgia, viral infection, epiglotittis LABORATORY TESTS: 
Recent Results (from the past 12 hour(s)) -STREP AG SCREEN, GROUP A Collection Time: 09/12/17  1:14 PM 
     Result                                            Value                         Ref Range Group A Strep Ag ID                               NEGATIVE                      NEG                       
-POC CHEM8 Collection Time: 09/12/17  2:01 PM 
     Result                                            Value                         Ref Range Calcium, ionized (POC)                            1.13                          1.12 - 1.32 MMOL/L Sodium (POC)                                      135 (L)                       136 - 145 MMOL/L Potassium (POC)                                   3.9                           3.5 - 5.1 MMOL/L      Chloride (POC)                                    98                            98 - 107 MMOL/L           
     CO2 (POC)                                         24                            21 - 32 MMOL/L Anion gap (POC)                                   18 (H)                        5 - 15 mmol/L Glucose (POC)                                     365 (H)                       65 - 100 MG/DL            
     BUN (POC)                                         <3 (L)                        9 - 20 MG/DL Creatinine (POC)                                  0.6                           0.6 - 1.3 MG/DL           
     GFRAA, POC                                        >60                           >60 ml/min/1.73m2 GFRNA, POC                                        >60                           >60 ml/min/1.73m2 Hemoglobin (POC)                                  16.0                          11.5 - 16.0 GM/DL Hematocrit (POC)                                  47                            35.0 - 47.0 % Comment                                           Comment Not Indicated. -GLUCOSE, POC Collection Time: 09/12/17  3:12 PM 
     Result                                            Value                         Ref Range Glucose (POC)                                     209 (H)                       65 - 100 mg/dL Performed by                                      Francheska Anne IMAGING RESULTS: 
CT NECK SOFT TISSUE W CONT Final Result FINDINGS: 
  
No mass or adenopathy is identified within the neck.  
  
The carotid and jugular vessels enhance normally.  
  
The thyroid gland, submandibular salivary glands and parotid glands are normal. 
  
No nasopharyngeal, pharyngeal or laryngeal mass is identified. 
  
No abnormalities are identified in the visualized portions of the brain or 
orbits.  
  
The visualized mastoid air cells and paranasal sinuses are clear with exception 
of a 5 mm mucous retention cyst arising from the anterior aspect of the right 
maxillary sinus. 
  
The visualized portions of the lung apices are clear. 
  
Within the left tonsil, there is a 10.4 mm x 10.2 x 19 mm hypodensity (axial 
image 68 and coronal image 48). The left piriform sinus is compressed by the 
soft tissue swelling (axial image 46). Thickening of the epiglottis is noted on 
sagittal image 52).   
There is disc space narrowing at C4-5 and C5-6. 
  
IMPRESSION IMPRESSION: 
  
Left tonsillar abscess Mild epiglottitis XR NECK SOFT TISSUE Final Result HISTORY: Left posterior pharyngeal swelling and pain 
  
2 views of the soft tissue neck demonstrate a thickened the epiglottis but 
normal subglottic tissues. Spondylitic changes are present in the cervical spine 
at C4-5 and C5-6. There is no retained radiopaque foreign body. 
  
IMPRESSION IMPRESSION: 
Findings compatible with epiglottitis. This result was called to Dr. Fredo Kelley at 1344 hours. [address] MEDICATIONS GIVEN: 
Medications 
sodium chloride (NS) flush 5-10 mL (not administered) 
sodium chloride (NS) flush 5-10 mL (not administered) HYDROcodone-acetaminophen (NORCO) 5-325 mg per tablet 1 Tab (1 Tab Oral Given 9/12/17 1320) dental ball (lidocaine/Benadryl/Cetacaine) mixture ( Mucous Membrane Given 9/12/17 1322) dexamethasone (DECADRON) injection 10 mg (10 mg IntraVENous Given 9/12/17 1404) sodium chloride 0.9 % bolus infusion 1,000 mL (0 mL IntraVENous IV Completed 9/12/17 1523) insulin regular (NOVOLIN R, HUMULIN R) injection 8 Units (8 Units IntraVENous Given 9/12/17 1428) iopamidol (ISOVUE 300) 61 % contrast injection 100 mL (100 mL IntraVENous Given 9/12/17 1442) sodium chloride (NS) flush 5-10 mL (10 mL IntraVENous Given 9/12/17 1442) IMPRESSION: 
Epiglottitis  (primary encounter diagnosis) Abscess, peritonsillar PLAN: 
1.  Current Discharge Medication List 
 
START taking these medications 
 
clindamycin (CLEOCIN) 300 mg capsule Take 1 Cap by mouth four (4) times daily for 10 days. Qty: 40 Cap Refills: 0 HYDROcodone-acetaminophen (NORCO) 5-325 mg per tablet Take 1 Tab by mouth every four (4) hours as needed for Pain. Max Daily Amount: 6 Tabs. Qty: 8 Tab Refills: 0 CONTINUE these medications which have NOT CHANGED 
 
insulin detemir (LEVEMIR) 100 unit/mL injection 15 Units by SubCUTAneous route nightly. penicillin v potassium (VEETID) 500 mg tablet Take 1 Tab by mouth four (4) times daily. Qty: 40 Tab Refills: 0 
 
traMADol (ULTRAM) 50 mg tablet Take 1 Tab by mouth every eight (8) hours as needed for Pain. Max Daily Amount: 150 mg. 
Qty: 9 Tab Refills: 0 Associated Diagnoses:Pain, dental 
 
QUEtiapine (SEROQUEL) 200 mg tablet Take 200 mg by mouth nightly. divalproex DR (DEPAKOTE) 500 mg tablet Take 1,000 mg by mouth two (2) times a day. 
 
haloperidol (HALDOL) 5 mg tablet Take 2.5 mg by mouth nightly. Blood-Glucose Meter monitoring kit Monitor BG 4 times daily. Dx uncontrolled type 2 DM E11.65 Qty: 1 Kit Refills: 0 Lancets (ACCU-CHEK SOFTCLIX LANCETS) misc CHECK BLOOD SUGAR TWICE DAILY Qty: 60 Each Refills: 11 Syringe with Needle, Disp, 1 mL 26 gauge x 5/8\" syrg Dispense syringe with needle for insulin injection 4 times a day. May substitute any appropriate size or brand. Qty: 120 Each Refills: 11 
 
insulin lispro (HUMALOG) 100 unit/mL kwikpen Take 4 units for BG between 150-200, take 6 unites for -250, take 8 units for  or higher  Indications: type 1 diabetes mellitus Qty: 1 Package Refills: 5 
 
insulin glargine (LANTUS) 100 unit/mL injection 30 Units by SubCUTAneous route nightly. Indications: type 1 diabetes mellitus Qty: 1 Vial Refills: 5 
 
sertraline (ZOLOFT) 50 mg tablet Take 100 mg by mouth daily. amLODIPine (NORVASC) 5 mg tablet Take 1 Tab by mouth daily.  
Qty: 30 Tab Refills: 5 Associated Diagnoses:Essential hypertension 
 
albuterol-ipratropium (DUO-NEB) 2.5 mg-0.5 mg/3 ml nebu 
3 mL by Nebulization route every four (4) hours as needed. For wheezing and shortness of breath Qty: 25 Nebule Refills: 11 
Associated Diagnoses: Moderate persistent asthma with acute exacerbation; Bronchitis 
 
pravastatin (PRAVACHOL) 20 mg tablet Take 10 mg by mouth nightly. ipratropium-albuterol (COMBIVENT RESPIMAT)  mcg/actuation inhaler Take 1 Puff by inhalation every four (4) hours as needed for Wheezing. Qty: 1 Inhaler Refills: 0 
 
 
 
2. Follow-up Information Follow up With Details Comments Contact Info Kesha Walter MD Schedule an appointment as soon as possible for a visit  
in 1 day As needed [address] [facility] [address] 
[phone] Arianna Smith MD Schedule an appointment as soon as possible for a  
visit in 1 day As needed [address] [facility] 
[phone] [address] [facility] DEPT OF OTOLARYNGOLOGY Schedule an appointment as soon as  
possible for a visit in 1 day As needed [address] 
[address] [facility] [address] 
[phone] Return to ED if worse Amount and/or Complexity of Data Reviewed Clinical lab tests: ordered and reviewed Tests in the radiology section of CPT®: ordered and reviewed Tests in the medicine section of CPT®: ordered and reviewed Discuss the patient with other providers: yes (I reviewed pt's hx, sxs, vitals, and PE w/ attending, Dr. Damian Dawkins. He is in agreement with the care plan. ) Patient Progress Patient progress: improved ED Course Procedures 2:08 PM 
Pt resting comfortably in bed in NAD. States she is feeling better after receiving medications. Endorses she has not taken her insulin today (15units) nor has she had anything to eat. 2:30 PM 
Pt endorses her BG usually runs high (200-300).   
 
2:30 PM 
Pt escorted to CT. 
 
3:45 PM 
Discussed CT soft tissue neck results with attending, Dr. Kavon Blackmon. He recommends Abx tx with Clindamycin and close follow up with ENT ASAP. States there is no obstruction and the abscess is not compromising any vital structures. Pt symptoms have improved with IV Decadron and pain medicine since arrival. Educated pt on management plan and pt endorses understanding. Return to ED or call 911 with any worsening symptoms. 3:47 PM 
I have discussed with patient their diagnosis, treatment, and follow up plan. The patient agrees to follow up as outlined in discharge paperwork and also to return to the ED with any worsening.  Rossy Freeman PA-C

## 2017-09-12 NOTE — ED NOTES
Patient (s)  given copy of dc instructions and 2 script(s). Patient(s)  verbalized understanding of instructions and script (s). Patient given a current medication reconciliation form and verbalized understanding of their medications. Patient (s) verbalized understanding of the importance of discussing medications with  his or her physician or clinic when they follow up. Patient alert and oriented and in no acute distress. Pt verbalizes pain scale of 5 out of 10. Patient discharged home ambulatory with self.

## 2017-09-12 NOTE — DISCHARGE INSTRUCTIONS
Peritonsillar Abscess: Care Instructions  Your Care Instructions    A peritonsillar abscess is a collection of pus that forms in tissues around the tonsils. It can occur as a result of strep throat or another infection. An abscess can cause severe pain and make it very hard to swallow. You will need antibiotics. In some cases, your abscess will have been drained through a needle or small incision. You may have had a sedative to help you relax. You may be unsteady after having sedation. It can take a few hours for the medicine's effects to wear off. Common side effects of sedation include nausea, vomiting, and feeling sleepy or tired. The doctor has checked you carefully, but problems can develop later. If you notice any problems or new symptoms, get medical treatment right away. Follow-up care is a key part of your treatment and safety. Be sure to make and go to all appointments, and call your doctor if you are having problems. It's also a good idea to know your test results and keep a list of the medicines you take. How can you care for yourself at home? · If the doctor gave you a sedative:  ¨ For 24 hours, don't do anything that requires attention to detail. It takes time for the medicine's effects to completely wear off. ¨ For your safety, do not drive or operate any machinery that could be dangerous. Wait until the medicine wears off and you can think clearly and react easily. · Take your antibiotics as directed. Do not stop taking them just because you feel better. You need to take the full course of antibiotics. · Take pain medicines exactly as directed. ¨ If the doctor gave you a prescription medicine for pain, take it as prescribed. ¨ If you are not taking a prescription pain medicine, ask your doctor if you can take an over-the-counter medicine, such as acetaminophen (Tylenol), ibuprofen (Advil, Motrin), or naproxen (Aleve). Read and follow all instructions on the label.   ¨ Do not take two or more pain medicines at the same time unless the doctor told you to. Many pain medicines have acetaminophen, which is Tylenol. Too much acetaminophen (Tylenol) can be harmful. · Gargle with warm salt water once an hour to help reduce swelling and relieve discomfort. Use 1 teaspoon of salt mixed in 8 fluid ounces of warm water. · Get lots of rest.  · Follow your doctor's instructions if your abscess was drained through a needle or small incision. · While your throat is very sore, use liquid nourishment such as soup or high-protein drinks. · Prevent spreading an infection. Wash your hands often, do not sneeze or cough on others, and do not share toothbrushes, eating utensils, or drinking glasses. When should you call for help? Call 911 anytime you think you may need emergency care. For example, call if:  · You have trouble breathing. · You passed out (lost consciousness). · You have a lot of blood coming from the mouth. Call your doctor now or seek immediate medical care if:  · You have new or worse symptoms of infection, such as:  ¨ Increased pain, swelling, warmth, or redness. ¨ Red streaks coming from the area. ¨ Pus draining from the area. ¨ A fever. · You are bleeding. · You have new or worse nausea or vomiting. · You have new or worse trouble swallowing. · You have a hard time drinking fluids. Watch closely for changes in your health, and be sure to contact your doctor if:  · You do not get better as expected. Where can you learn more? Go to http://miryam-polo.info/. Enter P769 in the search box to learn more about \"Peritonsillar Abscess: Care Instructions. \"  Current as of: July 29, 2016  Content Version: 11.3  © 2569-5045 "[employer]. Care instructions adapted under license by [employer] (which disclaims liability or warranty for this information).  If you have questions about a medical condition or this instruction, always ask your healthcare professional. [address] any warranty or liability for your use of this information. Epiglottitis: Care Instructions  Your Care Instructions  Epiglottitis is pain and swelling of the epiglottis. The epiglottis is a flap of tissue at the back of the throat. It closes when you swallow to prevent food and fluids from getting into the trachea, or windpipe. The disease can be life-threatening. This is because the epiglottis can quickly block the windpipe and make breathing difficult. The cause of epiglottitis is an infection from viruses or bacteria. Epiglottitis begins suddenly. You may be very sick and have a fever and trouble breathing. Your voice may be muffled, and you may have swallowing problems and may drool. Follow-up care is a key part of your treatment and safety. Be sure to make and go to all appointments, and call your doctor if you are having problems. It's also a good idea to know your test results and keep a list of the medicines you take. How can you care for yourself at home? · Take your antibiotics as directed. Do not stop taking them just because you feel better. You need to take the full course of antibiotics. When should you call for help? Call 911 anytime you think you may need emergency care. For example, call if:  · You have severe trouble breathing or swallowing. Call your doctor now or seek immediate medical care if:  · You have new or worse symptoms of infection, such as:  ¨ Increased pain, swelling, warmth, or redness. ¨ Pus draining from the area. ¨ A fever. Watch closely for changes in your health, and be sure to contact your doctor if:  · You do not get better as expected. Where can you learn more? Go to http://miryam-polo.info/. Enter Y216 in the search box to learn more about \"Epiglottitis: Care Instructions. \"  Current as of: July 29, 2016  Content Version: 11.3  © 2832-3002 [employer]  Care instructions adapted under license by [employer] (which disclaims liability or warranty for this information). If you have questions about a medical condition or this instruction, always ask your healthcare professional. [address] any warranty or liability for your use of this information.

## 2017-09-13 LAB
BACTERIA SPEC CULT: ABNORMAL
BACTERIA SPEC CULT: ABNORMAL
SERVICE CMNT-IMP: ABNORMAL

## 2017-09-13 NOTE — ED NOTES
Contacted by  from [address] [place] [address] . Received group A strep of the throat report and verbally read back results. Nurse notified English of result.

## 2017-09-30 ENCOUNTER — HOSPITAL ENCOUNTER (EMERGENCY)
Age: 42
Discharge: OTHER INSTITUTION | End: 2017-09-30
Attending: EMERGENCY MEDICINE
Payer: MEDICARE

## 2017-09-30 ENCOUNTER — APPOINTMENT (OUTPATIENT)
Dept: CT IMAGING | Age: 42
End: 2017-09-30
Attending: EMERGENCY MEDICINE
Payer: MEDICARE

## 2017-09-30 VITALS
RESPIRATION RATE: 15 BRPM | DIASTOLIC BLOOD PRESSURE: 82 MMHG | BODY MASS INDEX: 22.5 KG/M2 | HEART RATE: 87 BPM | WEIGHT: 140 LBS | OXYGEN SATURATION: 96 % | HEIGHT: 66 IN | SYSTOLIC BLOOD PRESSURE: 131 MMHG | TEMPERATURE: 98.2 F

## 2017-09-30 DIAGNOSIS — E10.10 DIABETIC KETOACIDOSIS WITHOUT COMA ASSOCIATED WITH TYPE 1 DIABETES MELLITUS (HCC): ICD-10-CM

## 2017-09-30 DIAGNOSIS — J36 TONSILLAR ABSCESS: Primary | ICD-10-CM

## 2017-09-30 LAB
ADMINISTERED INITIALS, ADMINIT: NORMAL
ADMINISTERED INITIALS, ADMINIT: NORMAL
ALBUMIN SERPL-MCNC: 3.4 G/DL (ref 3.5–5)
ALBUMIN/GLOB SERPL: 0.7 {RATIO} (ref 1.1–2.2)
ALP SERPL-CCNC: 79 U/L (ref 45–117)
ALT SERPL-CCNC: 20 U/L (ref 12–78)
ANION GAP SERPL CALC-SCNC: 18 MMOL/L (ref 5–15)
AST SERPL-CCNC: 12 U/L (ref 15–37)
BASOPHILS # BLD: 0 K/UL (ref 0–0.1)
BASOPHILS NFR BLD: 0 % (ref 0–1)
BILIRUB SERPL-MCNC: 0.9 MG/DL (ref 0.2–1)
BUN SERPL-MCNC: 8 MG/DL (ref 6–20)
BUN/CREAT SERPL: 9 (ref 12–20)
CALCIUM SERPL-MCNC: 9 MG/DL (ref 8.5–10.1)
CHLORIDE SERPL-SCNC: 95 MMOL/L (ref 97–108)
CO2 SERPL-SCNC: 19 MMOL/L (ref 21–32)
CREAT SERPL-MCNC: 0.85 MG/DL (ref 0.55–1.02)
D50 ADMINISTERED, D50ADM: 0 ML
D50 ADMINISTERED, D50ADM: 0 ML
D50 ORDER, D50ORD: 0 ML
D50 ORDER, D50ORD: 0 ML
EOSINOPHIL # BLD: 0.2 K/UL (ref 0–0.4)
EOSINOPHIL NFR BLD: 2 % (ref 0–7)
ERYTHROCYTE [DISTWIDTH] IN BLOOD BY AUTOMATED COUNT: 14.3 % (ref 11.5–14.5)
GLOBULIN SER CALC-MCNC: 4.6 G/DL (ref 2–4)
GLSCOM COMMENTS: NORMAL
GLSCOM COMMENTS: NORMAL
GLUCOSE BLD STRIP.AUTO-MCNC: 234 MG/DL (ref 65–100)
GLUCOSE BLD STRIP.AUTO-MCNC: 306 MG/DL (ref 65–100)
GLUCOSE SERPL-MCNC: 338 MG/DL (ref 65–100)
GLUCOSE, GLC: 234 MG/DL
GLUCOSE, GLC: 306 MG/DL
HCG SERPL QL: NEGATIVE
HCT VFR BLD AUTO: 45.5 % (ref 35–47)
HGB BLD-MCNC: 16.5 G/DL (ref 11.5–16)
HIGH TARGET, HITG: 250 MG/DL
HIGH TARGET, HITG: 250 MG/DL
INSULIN ADMINSTERED, INSADM: 3.5 UNITS/HOUR
INSULIN ADMINSTERED, INSADM: 4.9 UNITS/HOUR
INSULIN ORDER, INSORD: 3.5 UNITS/HOUR
INSULIN ORDER, INSORD: 4.9 UNITS/HOUR
KETONES SERPL QL: ABNORMAL
LOW TARGET, LOT: 150 MG/DL
LOW TARGET, LOT: 150 MG/DL
LYMPHOCYTES # BLD: 2.6 K/UL (ref 0.8–3.5)
LYMPHOCYTES NFR BLD: 26 % (ref 12–49)
MCH RBC QN AUTO: 31.4 PG (ref 26–34)
MCHC RBC AUTO-ENTMCNC: 36.3 G/DL (ref 30–36.5)
MCV RBC AUTO: 86.7 FL (ref 80–99)
MINUTES UNTIL NEXT BG, NBG: 60 MIN
MINUTES UNTIL NEXT BG, NBG: 60 MIN
MONOCYTES # BLD: 1.3 K/UL (ref 0–1)
MONOCYTES NFR BLD: 14 % (ref 5–13)
MULTIPLIER, MUL: 0.02
MULTIPLIER, MUL: 0.02
NEUTS SEG # BLD: 5.6 K/UL (ref 1.8–8)
NEUTS SEG NFR BLD: 58 % (ref 32–75)
ORDER INITIALS, ORDINIT: NORMAL
ORDER INITIALS, ORDINIT: NORMAL
PLATELET # BLD AUTO: 186 K/UL (ref 150–400)
POTASSIUM SERPL-SCNC: 4.4 MMOL/L (ref 3.5–5.1)
PROT SERPL-MCNC: 8 G/DL (ref 6.4–8.2)
RBC # BLD AUTO: 5.25 M/UL (ref 3.8–5.2)
SERVICE CMNT-IMP: ABNORMAL
SERVICE CMNT-IMP: ABNORMAL
SODIUM SERPL-SCNC: 132 MMOL/L (ref 136–145)
WBC # BLD AUTO: 9.7 K/UL (ref 3.6–11)

## 2017-09-30 PROCEDURE — 80053 COMPREHEN METABOLIC PANEL: CPT | Performed by: EMERGENCY MEDICINE

## 2017-09-30 PROCEDURE — 96367 TX/PROPH/DG ADDL SEQ IV INF: CPT

## 2017-09-30 PROCEDURE — 82009 KETONE BODYS QUAL: CPT | Performed by: EMERGENCY MEDICINE

## 2017-09-30 PROCEDURE — 74011250636 HC RX REV CODE- 250/636: Performed by: EMERGENCY MEDICINE

## 2017-09-30 PROCEDURE — 84703 CHORIONIC GONADOTROPIN ASSAY: CPT | Performed by: EMERGENCY MEDICINE

## 2017-09-30 PROCEDURE — 36415 COLL VENOUS BLD VENIPUNCTURE: CPT | Performed by: EMERGENCY MEDICINE

## 2017-09-30 PROCEDURE — 74011000258 HC RX REV CODE- 258: Performed by: EMERGENCY MEDICINE

## 2017-09-30 PROCEDURE — 96375 TX/PRO/DX INJ NEW DRUG ADDON: CPT

## 2017-09-30 PROCEDURE — 99285 EMERGENCY DEPT VISIT HI MDM: CPT

## 2017-09-30 PROCEDURE — 74011636637 HC RX REV CODE- 636/637: Performed by: EMERGENCY MEDICINE

## 2017-09-30 PROCEDURE — 82962 GLUCOSE BLOOD TEST: CPT

## 2017-09-30 PROCEDURE — 85025 COMPLETE CBC W/AUTO DIFF WBC: CPT | Performed by: EMERGENCY MEDICINE

## 2017-09-30 PROCEDURE — 70491 CT SOFT TISSUE NECK W/DYE: CPT

## 2017-09-30 PROCEDURE — 96365 THER/PROPH/DIAG IV INF INIT: CPT

## 2017-09-30 PROCEDURE — 74011636320 HC RX REV CODE- 636/320: Performed by: EMERGENCY MEDICINE

## 2017-09-30 RX ORDER — SODIUM CHLORIDE 0.9 % (FLUSH) 0.9 %
5-10 SYRINGE (ML) INJECTION EVERY 8 HOURS
Status: DISCONTINUED | OUTPATIENT
Start: 2017-09-30 | End: 2017-09-30 | Stop reason: HOSPADM

## 2017-09-30 RX ORDER — SODIUM CHLORIDE 0.9 % (FLUSH) 0.9 %
5-10 SYRINGE (ML) INJECTION AS NEEDED
Status: DISCONTINUED | OUTPATIENT
Start: 2017-09-30 | End: 2017-09-30 | Stop reason: HOSPADM

## 2017-09-30 RX ORDER — CLINDAMYCIN PHOSPHATE 600 MG/50ML
600 INJECTION INTRAVENOUS
Status: COMPLETED | OUTPATIENT
Start: 2017-09-30 | End: 2017-09-30

## 2017-09-30 RX ORDER — DEXAMETHASONE SODIUM PHOSPHATE 100 MG/10ML
10 INJECTION INTRAMUSCULAR; INTRAVENOUS
Status: COMPLETED | OUTPATIENT
Start: 2017-09-30 | End: 2017-09-30

## 2017-09-30 RX ORDER — SODIUM CHLORIDE 0.9 % (FLUSH) 0.9 %
10 SYRINGE (ML) INJECTION
Status: COMPLETED | OUTPATIENT
Start: 2017-09-30 | End: 2017-09-30

## 2017-09-30 RX ADMIN — DEXAMETHASONE SODIUM PHOSPHATE 10 MG: 10 INJECTION INTRAMUSCULAR; INTRAVENOUS at 08:17

## 2017-09-30 RX ADMIN — IOPAMIDOL 100 ML: 612 INJECTION, SOLUTION INTRAVENOUS at 08:38

## 2017-09-30 RX ADMIN — Medication 10 ML: at 08:38

## 2017-09-30 RX ADMIN — CLINDAMYCIN PHOSPHATE 600 MG: 600 INJECTION INTRAVENOUS at 08:20

## 2017-09-30 RX ADMIN — SODIUM CHLORIDE 3.4 UNITS/HR: 900 INJECTION, SOLUTION INTRAVENOUS at 11:29

## 2017-09-30 RX ADMIN — SODIUM CHLORIDE 1000 ML: 900 INJECTION, SOLUTION INTRAVENOUS at 08:16

## 2017-09-30 RX ADMIN — SODIUM CHLORIDE 4.9 UNITS/HR: 900 INJECTION, SOLUTION INTRAVENOUS at 10:30

## 2017-09-30 NOTE — ED NOTES
TRANSFER - OUT REPORT: 
 
Verbal report given to Jak Escamilla (name) on [facility]  being transferred to [facility] ER (unit) for routine progression of care Report consisted of patients Situation, Background, Assessment and  
Recommendations(SBAR). Information from the following report(s) SBAR, ED Summary, Procedure Summary, MAR and Recent Results was reviewed with the receiving nurse. Lines:  
Peripheral IV 09/30/17 Right Antecubital (Active) Site Assessment Clean, dry, & intact 9/30/2017  8:10 AM  
Phlebitis Assessment 0 9/30/2017  8:10 AM  
Infiltration Assessment 0 9/30/2017  8:10 AM  
Dressing Status Clean, dry, & intact 9/30/2017  8:10 AM  
Dressing Type 4 X 4 9/30/2017  8:10 AM  
Hub Color/Line Status Pink;Flushed 9/30/2017  8:10 AM  
Action Taken Blood drawn 9/30/2017  8:10 AM  
  
 
Opportunity for questions and clarification was provided.    
 
Patient transported with: 
 MELO

## 2017-09-30 NOTE — ED TRIAGE NOTES
Pt arrives by EMS, reports sore throat x couple days with trouble swallowing and pain when talking. Pt's tonsils extremely swollen.

## 2017-09-30 NOTE — ED NOTES
Emergency Department Nursing Plan of Care The Nursing Plan of Care is developed from the Nursing assessment and Emergency Department Attending provider initial evaluation. The plan of care may be reviewed in the ED Provider note. The Plan of Care was developed with the following considerations:  
Patient / Family readiness to learn indicated by:verbalized understanding Persons(s) to be included in education: patient Barriers to Learning/Limitations:No 
 
Signed Manohar Webster 9/30/2017   7:34 AM 
 
See nursing assessment Patient is alert and oriented x 4 and in no acute distress at this time. Respirations are at a regular rate, depth, and pattern. Patient updated on plan of care and has no questions or concerns at this time.

## 2017-09-30 NOTE — ED TRIAGE NOTES
Pt is diabetic, pt was here 2 weeks ago for similar symptoms and admits that she did not finish her antibiotics.

## 2017-09-30 NOTE — ED PROVIDER NOTES
[address] [facility] 
EMERGENCY DEPARTMENT HISTORY AND PHYSICAL EXAM  
 
 
Date of Service: 9/30/2017 Patient Name: Elenita Zimmerman YOB: 1975 Medical Record Number: [id number] History of Presenting Illness Chief Complaint Patient presents with  Sore Throat History Provided By:  Patient and chart review Additional History:  
Elenita Zimmerman is a 40 y.o. female with PMhx significant for DM, HTN, pancreatitis, borderline personality disorder, alcohol abuse, GERD, asthma, epiglottitis, peritonsillar abscess, schizophrenia, bipolar who presents via EMS to the ED with cc of gradually worsening sore throat and swelling x 20 days. Her pain is exacerbated by swallowing and talking, but pt endorses being able to physically swallow PO. Pt was seen here on 9/12/17 for similar, less severe sxs and was dx'd with a peritonsillar abscess and epiglottitis after a positive CT soft tissue of the neck. She was given clindamycin for 2 weeks and close follow up instructions with ENT, but pt admits that she only took half the course of her rx'd clinda and that she never followed up with ENT. Pt's pain and swelling has worsened since stopping the clinda, which prompted her to come back to the [facility] ED for further evaluation. She endorses that she will follow up with ENT this time. She specifically denies fever or voice change. Social Hx: + Tobacco (1 ppd), + EtOH (occasionally, hx of abuse), - Illicit Drugs There are no other complaints, changes or physical findings at this time. Primary Care Provider: Jeff Darden MD  
 
 
Past History Past Medical History:  
Past Medical History:  
Diagnosis Date  Alcohol abuse, in remission   
 quit 17 days ago  Asthma   
 does not use inhalers  Borderline personality disorder  Diabetes ([facility] [address]  GERD (gastroesophageal reflux disease)  Hypertension  Other ill-defined conditions   
 kidney stones ,passed one  Other ill-defined conditions   
 sickle cell trait  Other ill-defined conditions   
 increased cholesterol  Pancreatitis  Psychiatric disorder   
 schizophrenia, bipolar, depression, anxiety Past Surgical History:  
Past Surgical History:  
Procedure Laterality Date  ABDOMEN SURGERY PROC UNLISTED  3/12/14 CHOLECYSTECTOMY LAPAROSCOPIC    
 HX GASTRIC BYPASS  HX GYN  11/8/2012  
 c section x2  HX OTHER SURGICAL  3/13/14 ENDOSCOPIC RETROGRADE CHOLANGIOPANCREATOGRAPHY  HX OTHER SURGICAL  8/4/14  
 endoscopic stent placed to bile duct  HX TUBAL LIGATION  2012 Family History:  
Family History Problem Relation Age of Onset  Heart Disease Mother  Diabetes Mother  Hypertension Mother  Hypertension Maternal Grandmother Social History:  
Social History Substance Use Topics  Smoking status: Current Every Day Smoker Packs/day: 1.00 Years: 14.00 Types: Cigarettes  Smokeless tobacco: Never Used Comment: cigarettes  Alcohol use Yes Comment: occasionally Allergies: Allergies Allergen Reactions  Dilaudid [Hydromorphone (Bulk)] Itching  Ibuprofen Not Reported This Time Review of Systems Review of Systems Constitutional: Negative for chills and fever. HENT: Positive for sore throat. Negative for congestion, rhinorrhea, sneezing and voice change. + swelling of throat Eyes: Negative for redness and visual disturbance. Respiratory: Negative for shortness of breath. Cardiovascular: Negative for leg swelling. Gastrointestinal: Negative for abdominal pain, nausea and vomiting. Genitourinary: Negative for difficulty urinating and frequency. Musculoskeletal: Negative for back pain, myalgias and neck stiffness. Skin: Negative for rash. Neurological: Negative for dizziness, syncope, weakness and headaches. Hematological: Negative for adenopathy.   
 
 
 
Physical Exam 
Physical Exam  
Constitutional: She is oriented to person, place, and time. She appears well-developed and well-nourished. HENT:  
Head: Normocephalic and atraumatic. Mouth/Throat: Oropharynx is clear and moist.  
L tonsillar swelling with erythema, no exudate No change in voice Eyes: Conjunctivae and EOM are normal.  
Neck: Normal range of motion and full passive range of motion without pain. Neck supple. Lymphadenopathy L posterior neck Cardiovascular: Normal rate, regular rhythm, S1 normal, S2 normal, normal heart sounds, intact distal pulses and normal pulses. No murmur heard. Pulmonary/Chest: Effort normal and breath sounds normal. No respiratory distress. She has no wheezes. Abdominal: Soft. Normal appearance and bowel sounds are normal. She exhibits no distension. There is no tenderness. There is no rebound. Musculoskeletal: Normal range of motion. Neurological: She is alert and oriented to person, place, and time. She has normal strength. Skin: Skin is warm, dry and intact. No rash noted. Psychiatric: She has a normal mood and affect. Her speech is normal and behavior is normal. Judgment and thought content normal.  
Nursing note and vitals reviewed. Medical Decision Making I am the first provider for this patient. I reviewed the vital signs, available nursing notes, past medical history, past surgical history, family history and social history. Provider Notes: Pt failed abx for tonsillar abscess given noncompliance. Pt now has DKA complication and now needs higher level and specialty care. ED Course: 
7:31 AM  
Initial assessment performed. The patients presenting problems have been discussed, and they are in agreement with the care plan formulated and outlined with them. I have encouraged them to ask questions as they arise throughout their visit. CONSULT NOTE:  
10:28 AM 
Angela Garza MD spoke with Dr. Keila Quiros, Specialty: ER 
Discussed pt's hx, disposition, and available diagnostic and imaging results. Reviewed care plans. Consultant will accept pt into the [facility] ED. Written by CAROLINE Sims, as dictated by Trini Garza MD. Diagnostic Study Results Labs - Recent Results (from the past 12 hour(s)) CBC WITH AUTOMATED DIFF Collection Time: 09/30/17  8:06 AM  
Result Value Ref Range WBC 9.7 3.6 - 11.0 K/uL  
 RBC 5.25 (H) 3.80 - 5.20 M/uL  
 HGB 16.5 (H) 11.5 - 16.0 g/dL HCT 45.5 35.0 - 47.0 % MCV 86.7 80.0 - 99.0 FL  
 MCH 31.4 26.0 - 34.0 PG  
 MCHC 36.3 30.0 - 36.5 g/dL  
 RDW 14.3 11.5 - 14.5 % PLATELET 905 600 - 800 K/uL NEUTROPHILS 58 32 - 75 % LYMPHOCYTES 26 12 - 49 % MONOCYTES 14 (H) 5 - 13 % EOSINOPHILS 2 0 - 7 % BASOPHILS 0 0 - 1 %  
 ABS. NEUTROPHILS 5.6 1.8 - 8.0 K/UL  
 ABS. LYMPHOCYTES 2.6 0.8 - 3.5 K/UL  
 ABS. MONOCYTES 1.3 (H) 0.0 - 1.0 K/UL  
 ABS. EOSINOPHILS 0.2 0.0 - 0.4 K/UL  
 ABS. BASOPHILS 0.0 0.0 - 0.1 K/UL METABOLIC PANEL, COMPREHENSIVE Collection Time: 09/30/17  8:06 AM  
Result Value Ref Range Sodium 132 (L) 136 - 145 mmol/L Potassium 4.4 3.5 - 5.1 mmol/L Chloride 95 (L) 97 - 108 mmol/L  
 CO2 19 (L) 21 - 32 mmol/L Anion gap 18 (H) 5 - 15 mmol/L Glucose 338 (H) 65 - 100 mg/dL BUN 8 6 - 20 MG/DL Creatinine 0.85 0.55 - 1.02 MG/DL  
 BUN/Creatinine ratio 9 (L) 12 - 20 GFR est AA >60 >60 ml/min/1.73m2 GFR est non-AA >60 >60 ml/min/1.73m2 Calcium 9.0 8.5 - 10.1 MG/DL Bilirubin, total 0.9 0.2 - 1.0 MG/DL  
 ALT (SGPT) 20 12 - 78 U/L  
 AST (SGOT) 12 (L) 15 - 37 U/L Alk. phosphatase 79 45 - 117 U/L Protein, total 8.0 6.4 - 8.2 g/dL Albumin 3.4 (L) 3.5 - 5.0 g/dL Globulin 4.6 (H) 2.0 - 4.0 g/dL A-G Ratio 0.7 (L) 1.1 - 2.2 ACETONE/KETONE, QL Collection Time: 09/30/17  8:59 AM  
Result Value Ref Range Acetone/Ketone serum, QL. LARGE (A) NEG       
HCG QL SERUM Collection Time: 09/30/17  8:59 AM  
Result Value Ref Range HCG, Ql. NEGATIVE  NEG    
GLUCOSE, POC Collection Time: 09/30/17 10:22 AM  
Result Value Ref Range Glucose (POC) 306 (H) 65 - 100 mg/dL Performed by Pepper Farr Collection Time: 09/30/17 10:24 AM  
Result Value Ref Range Glucose 306 mg/dL Insulin order 4.9 units/hour Insulin adminstered 4.9 units/hour Multiplier 0.020 Low target 150 mg/dL High target 250 mg/dL D50 order 0.0 ml  
 D50 administered 0.00 ml Minutes until next BG 60 min Order initials SAG Administered initials dgj GLSCOM Comments Radiologic Studies - The following have been ordered and reviewed: 
CT Results  (Last 48 hours) 09/30/17 0853  CT NECK SOFT TISSUE W CONT Final result Impression:  IMPRESSION:    
1. Rim-enhancing collection in the left tonsillar pillar suggesting developing  
abscess. Narrative:  CT NECK WITH CONTRAST, 9/30/2017 8:53 AM  
INDICATION: tonsillar abscess COMPARISON: None Rahul Giles TECHNIQUE: Multislice helical CT was performed from the aortic arch to the skull  
base after intravenous administration of iodine-based contrast for the primary  
purpose of visualizing the soft tissues of the neck. Portions of the brain,  
face, and cervical spine were also included in the imaging field. Supplemental  
2D reformatted images were generated and reviewed as needed. CT dose reduction was achieved through use of a standardized protocol tailored  
for this examination and automatic exposure control for dose modulation. Rahul Giles FINDINGS:  
Soft tissues/Face: Within normal limits. Normal parotid and submandibular  
glands. Brain: Visualized portions unremarkable. Spine: No acute lesions identified in the visualized portions. Disc osteophyte  
complex formation at C4/C5 and C5/C6 Oral Cavity/Pharynx:  There is expansion of the left tonsillar pillar with a  
rim-enhancing low-attenuation lesion which measures approximately 2.1 x 1.8 x 2.9 cm. There is some mass effect upon the nasopharynx. Swelling of the soft  
palate/uvula. Larynx: Within normal limits. Thyroid: No masses. Lymph nodes: No pathologically enlarged nodes. Upper chest/mediastinum: Unremarkable. Additional Comments: None. .  
  
  
 
 
 
Vital Signs-Reviewed the patient's vital signs. Patient Vitals for the past 12 hrs: 
 Temp Pulse Resp BP SpO2  
09/30/17 0800 - 89 16 163/86 97 % 09/30/17 0734 - - - 168/90 -  
09/30/17 0720 98.3 °F (36.8 °C) 98 16 (!) 173/91 100 % Medications Given in the ED: 
Medications  
sodium chloride (NS) flush 5-10 mL (not administered)  
sodium chloride (NS) flush 5-10 mL (not administered)  
sodium chloride 0.9 % bolus infusion 1,000 mL (0 mL IntraVENous IV Completed 9/30/17 0846) insulin regular (NOVOLIN R, HUMULIN R) 100 Units in 0.9% sodium chloride 100 mL infusion (4.9 Units/hr IntraVENous New Bag 9/30/17 1030) dexamethasone (DECADRON) injection 10 mg (10 mg IntraVENous Given 9/30/17 0817) clindamycin (CLEOCIN) 600mg D5W 50mL IVPB (premix) (0 mg IntraVENous IV Completed 9/30/17 0857) iopamidol (ISOVUE 300) 61 % contrast injection 100 mL (100 mL IntraVENous Given 9/30/17 0838) sodium chloride (NS) flush 10 mL (10 mL IntraVENous Given 9/30/17 0838) Diagnosis: 
Clinical Impression: 1. Tonsillar abscess 2. Diabetic ketoacidosis without coma associated with type 1 diabetes mellitus ([facility] [address] Disposition: 
TRANSFER NOTE: 
10:33 AM 
Pt is being transferred to ED at [facility], transfer accepted by Dr. Keila Quiros. The reasons for pt's transfer have been discussed with the pt and available family. They convey agreement and understanding for the need to be transferred as explained to them by Angela Sy. MD Kayla. 
_______________________________ Attestations: This note is prepared by Arlys Perfect acting as scribe for Angela Sy. MD Chandni Garza MD : The scribe's documentation has been prepared under my direction and personally reviewed by me in its entirety. I confirm that the note above accurately reflects all work, treatment, procedures, and medical decision making performed by me. 
_______________________________

## 2017-11-17 ENCOUNTER — HOSPITAL ENCOUNTER (EMERGENCY)
Age: 42
Discharge: HOME | End: 2017-11-17
Attending: EMERGENCY MEDICINE
Payer: MEDICARE

## 2017-11-17 VITALS
DIASTOLIC BLOOD PRESSURE: 89 MMHG | TEMPERATURE: 98.4 F | OXYGEN SATURATION: 97 % | SYSTOLIC BLOOD PRESSURE: 141 MMHG | RESPIRATION RATE: 18 BRPM | BODY MASS INDEX: 25.55 KG/M2 | HEIGHT: 66 IN | WEIGHT: 159 LBS | HEART RATE: 86 BPM

## 2017-11-17 DIAGNOSIS — N76.0 BV (BACTERIAL VAGINOSIS): Primary | ICD-10-CM

## 2017-11-17 DIAGNOSIS — B96.89 BV (BACTERIAL VAGINOSIS): Primary | ICD-10-CM

## 2017-11-17 LAB
APPEARANCE UR: CLEAR
BACTERIA URNS QL MICRO: ABNORMAL /HPF
BILIRUB UR QL: NEGATIVE
CLUE CELLS VAG QL WET PREP: NORMAL
COLOR UR: ABNORMAL
EPITH CASTS URNS QL MICRO: ABNORMAL /LPF
GLUCOSE BLD STRIP.AUTO-MCNC: 153 MG/DL (ref 65–100)
GLUCOSE UR STRIP.AUTO-MCNC: >1000 MG/DL
HCG UR QL: NEGATIVE
HGB UR QL STRIP: NEGATIVE
KETONES UR QL STRIP.AUTO: NEGATIVE MG/DL
KOH PREP SPEC: NORMAL
LEUKOCYTE ESTERASE UR QL STRIP.AUTO: NEGATIVE
NITRITE UR QL STRIP.AUTO: NEGATIVE
PH UR STRIP: 7.5 [PH] (ref 5–8)
PROT UR STRIP-MCNC: NEGATIVE MG/DL
RBC #/AREA URNS HPF: ABNORMAL /HPF (ref 0–5)
SERVICE CMNT-IMP: ABNORMAL
SERVICE CMNT-IMP: NORMAL
SP GR UR REFRACTOMETRY: 1.01 (ref 1–1.03)
T VAGINALIS VAG QL WET PREP: NORMAL
UA: UC IF INDICATED,UAUC: ABNORMAL
UROBILINOGEN UR QL STRIP.AUTO: 0.2 EU/DL (ref 0.2–1)
WBC URNS QL MICRO: ABNORMAL /HPF (ref 0–4)

## 2017-11-17 PROCEDURE — 82962 GLUCOSE BLOOD TEST: CPT

## 2017-11-17 PROCEDURE — 87210 SMEAR WET MOUNT SALINE/INK: CPT | Performed by: PHYSICIAN ASSISTANT

## 2017-11-17 PROCEDURE — 99284 EMERGENCY DEPT VISIT MOD MDM: CPT

## 2017-11-17 PROCEDURE — 81001 URINALYSIS AUTO W/SCOPE: CPT | Performed by: PHYSICIAN ASSISTANT

## 2017-11-17 PROCEDURE — 87491 CHLMYD TRACH DNA AMP PROBE: CPT | Performed by: PHYSICIAN ASSISTANT

## 2017-11-17 PROCEDURE — 96372 THER/PROPH/DIAG INJ SC/IM: CPT

## 2017-11-17 PROCEDURE — 87147 CULTURE TYPE IMMUNOLOGIC: CPT | Performed by: PHYSICIAN ASSISTANT

## 2017-11-17 PROCEDURE — 74011000250 HC RX REV CODE- 250: Performed by: PHYSICIAN ASSISTANT

## 2017-11-17 PROCEDURE — 74011250637 HC RX REV CODE- 250/637: Performed by: PHYSICIAN ASSISTANT

## 2017-11-17 PROCEDURE — 74011250636 HC RX REV CODE- 250/636: Performed by: PHYSICIAN ASSISTANT

## 2017-11-17 PROCEDURE — 81025 URINE PREGNANCY TEST: CPT

## 2017-11-17 PROCEDURE — 87086 URINE CULTURE/COLONY COUNT: CPT | Performed by: PHYSICIAN ASSISTANT

## 2017-11-17 RX ORDER — INSULIN LISPRO 100 [IU]/ML
INJECTION, SOLUTION INTRAVENOUS; SUBCUTANEOUS
Qty: 1 PACKAGE | Refills: 5 | Status: ON HOLD | OUTPATIENT
Start: 2017-11-17 | End: 2018-08-09

## 2017-11-17 RX ORDER — METRONIDAZOLE 500 MG/1
500 TABLET ORAL 2 TIMES DAILY
Qty: 14 TAB | Refills: 0 | Status: SHIPPED | OUTPATIENT
Start: 2017-11-17 | End: 2017-11-24

## 2017-11-17 RX ORDER — INSULIN GLARGINE 100 [IU]/ML
30 INJECTION, SOLUTION SUBCUTANEOUS
Qty: 1 VIAL | Refills: 5 | Status: SHIPPED
Start: 2017-11-17 | End: 2018-11-18

## 2017-11-17 RX ORDER — INSULIN PUMP SYRINGE, 3 ML
EACH MISCELLANEOUS
Qty: 1 KIT | Refills: 0 | Status: ON HOLD | OUTPATIENT
Start: 2017-11-17 | End: 2018-08-09

## 2017-11-17 RX ORDER — AZITHROMYCIN 250 MG/1
1000 TABLET, FILM COATED ORAL
Status: COMPLETED | OUTPATIENT
Start: 2017-11-17 | End: 2017-11-17

## 2017-11-17 RX ORDER — LANCETS
EACH MISCELLANEOUS
Qty: 60 EACH | Refills: 11 | Status: ON HOLD | OUTPATIENT
Start: 2017-11-17 | End: 2018-08-09

## 2017-11-17 RX ADMIN — AZITHROMYCIN 1000 MG: 250 TABLET, FILM COATED ORAL at 15:06

## 2017-11-17 RX ADMIN — LIDOCAINE HYDROCHLORIDE 250 MG: 10 INJECTION, SOLUTION EPIDURAL; INFILTRATION; INTRACAUDAL; PERINEURAL at 15:06

## 2017-11-17 NOTE — ED NOTES
Emergency Department Nursing Plan of Care The Nursing Plan of Care is developed from the Nursing assessment and Emergency Department Attending provider initial evaluation. The plan of care may be reviewed in the ED Provider note. The Plan of Care was developed with the following considerations:  
Patient / Family readiness to learn indicated by:verbalized understanding Persons(s) to be included in education: patient Barriers to Learning/Limitations:No 
 
Signed Illene Pickup 11/17/2017   3:15 PM

## 2017-11-17 NOTE — DISCHARGE INSTRUCTIONS
Bacterial Vaginosis: Care Instructions  Your Care Instructions    Bacterial vaginosis is a type of vaginal infection. It is caused by excess growth of certain bacteria that are normally found in the vagina. Symptoms can include itching, swelling, pain when you urinate or have sex, and a gray or yellow discharge with a \"fishy\" odor. It is not considered an infection that is spread through sexual contact. Although symptoms can be annoying and uncomfortable, bacterial vaginosis does not usually cause other health problems. However, if you have it while you are pregnant, it can cause complications. While the infection may go away on its own, most doctors use antibiotics to treat it. You may have been prescribed pills or vaginal cream. With treatment, bacterial vaginosis usually clears up in 5 to 7 days. Follow-up care is a key part of your treatment and safety. Be sure to make and go to all appointments, and call your doctor if you are having problems. It's also a good idea to know your test results and keep a list of the medicines you take. How can you care for yourself at home? · Take your antibiotics as directed. Do not stop taking them just because you feel better. You need to take the full course of antibiotics. · Do not eat or drink anything that contains alcohol if you are taking metronidazole (Flagyl). · Keep using your medicine if you start your period. Use pads instead of tampons while using a vaginal cream or suppository. Tampons can absorb the medicine. · Wear loose cotton clothing. Do not wear nylon and other materials that hold body heat and moisture close to the skin. · Do not scratch. Relieve itching with a cold pack or a cool bath. · Do not wash your vaginal area more than once a day. Use plain water or a mild, unscented soap. Do not douche. When should you call for help?   Watch closely for changes in your health, and be sure to contact your doctor if:  ? · You have unexpected vaginal bleeding. ? · You have a fever. ? · You have new or increased pain in your vagina or pelvis. ? · You are not getting better after 1 week. ? · Your symptoms return after you finish the course of your medicine. Where can you learn more? Go to http://miryam-polo.info/. Ansiha Desai in the search box to learn more about \"Bacterial Vaginosis: Care Instructions. \"  Current as of: October 13, 2016  Content Version: 11.4  © 6583-4887 [employer]. Care instructions adapted under license by [employer] (which disclaims liability or warranty for this information). If you have questions about a medical condition or this instruction, always ask your healthcare professional. [address] any warranty or liability for your use of this information.

## 2017-11-17 NOTE — ED PROVIDER NOTES
Patient is a 40 y.o. female presenting with vaginal discharge. The history is provided by the patient. Vaginal Discharge This is a new problem. The current episode started more than 2 days ago. The problem occurs constantly. The problem has not changed since onset. The discharge occurs spontaneously. The discharge was white. She is not pregnant. She has not missed her period. Pertinent negatives include no anorexia, no diaphoresis, no fever, no abdominal swelling, no abdominal pain, no constipation, no diarrhea, no nausea, no vomiting, no dyspareunia, no dysuria, no frequency, no genital burning, no genital itching, no genital lesions, no perineal pain, no perineal odor and no painful intercourse. She has tried nothing for the symptoms. Past Medical History:  
Diagnosis Date  Alcohol abuse, in remission   
 quit 17 days ago  Asthma   
 does not use inhalers  Borderline personality disorder  Diabetes ([facility] [address]  GERD (gastroesophageal reflux disease)  Hypertension  Other ill-defined conditions(799.89)   
 kidney stones ,passed one  Other ill-defined conditions(799.89) sickle cell trait  Other ill-defined conditions(799.89)   
 increased cholesterol  Pancreatitis  Psychiatric disorder   
 schizophrenia, bipolar, depression, anxiety Past Surgical History:  
Procedure Laterality Date  ABDOMEN SURGERY PROC UNLISTED  3/12/14 CHOLECYSTECTOMY LAPAROSCOPIC    
 HX GASTRIC BYPASS  HX GYN  11/8/2012  
 c section x2  HX OTHER SURGICAL  3/13/14 ENDOSCOPIC RETROGRADE CHOLANGIOPANCREATOGRAPHY  HX OTHER SURGICAL  8/4/14  
 endoscopic stent placed to bile duct  HX TUBAL LIGATION  2012 Family History:  
Problem Relation Age of Onset  Heart Disease Mother  Diabetes Mother  Hypertension Mother  Hypertension Maternal Grandmother Social History Social History  Marital status: SINGLE   Spouse name: N/A  
 Number of children: N/A  
 Years of education: N/A Occupational History  Not on file. Social History Main Topics  Smoking status: Current Every Day Smoker Packs/day: 1.00 Years: 14.00 Types: Cigarettes  Smokeless tobacco: Never Used Comment: cigarettes  Alcohol use Yes Comment: occasionally  Drug use: No  
 Sexual activity: Yes  
  Partners: Female Birth control/ protection: Surgical  
 
Other Topics Concern  Not on file Social History Narrative ALLERGIES: Dilaudid [hydromorphone (bulk)] and Ibuprofen Review of Systems Constitutional: Negative. Negative for activity change, appetite change, chills, diaphoresis and fever. HENT: Negative. Negative for congestion, ear pain, postnasal drip and sore throat. Eyes: Negative. Negative for pain and visual disturbance. Respiratory: Negative. Negative for cough and shortness of breath. Cardiovascular: Negative. Negative for chest pain. Gastrointestinal: Negative for abdominal pain, anal bleeding, anorexia, constipation, diarrhea, nausea, rectal pain and vomiting. Genitourinary: Positive for vaginal discharge. Negative for difficulty urinating, dyspareunia, dysuria, flank pain, frequency, menstrual problem, pelvic pain, urgency, vaginal bleeding and vaginal pain. Musculoskeletal: Positive for back pain. Negative for joint swelling. Skin: Negative. Negative for rash. Neurological: Negative. Negative for dizziness, light-headedness and headaches. Psychiatric/Behavioral: Negative. Vitals:  
 11/17/17 1439 BP: 141/89 Pulse: 86 Resp: 18 Temp: 98.4 °F (36.9 °C) SpO2: 97% Weight: 72.1 kg (159 lb) Height: 5' 6\" (1.676 m) Physical Exam  
Constitutional: She is oriented to person, place, and time. She appears well-developed and well-nourished. No distress. HENT:  
Head: Normocephalic and atraumatic.   
Right Ear: External ear normal.  
Left Ear: External ear normal. Eyes: Conjunctivae and EOM are normal. Pupils are equal, round, and reactive to light. Neck: Normal range of motion. Pulmonary/Chest: Effort normal.  
Abdominal: Soft. Bowel sounds are normal. There is no tenderness. Genitourinary: Uterus normal. Pelvic exam was performed with patient supine. There is no rash, tenderness, lesion or injury on the right labia. There is no rash, tenderness, lesion or injury on the left labia. Cervix exhibits no motion tenderness, no discharge and no friability. Right adnexum displays no mass, no tenderness and no fullness. Left adnexum displays no mass, no tenderness and no fullness. No erythema, tenderness or bleeding in the vagina. No foreign body in the vagina. No signs of injury around the vagina. Vaginal discharge (scant white) found. Musculoskeletal: Normal range of motion. Neurological: She is alert and oriented to person, place, and time. Skin: Skin is warm and dry. She is not diaphoretic. Psychiatric: She has a normal mood and affect. Her behavior is normal. Judgment and thought content normal.  
Nursing note and vitals reviewed. MDM Number of Diagnoses or Management Options BV (bacterial vaginosis):  
Diagnosis management comments: Ddx: bv, vaginal candidiasis, sti, pid, uti, pregnancy LABORATORY TESTS: 
Recent Results (from the past 12 hour(s)) -URINALYSIS W/ REFLEX CULTURE Collection Time: 11/17/17  3:03 PM 
     Result                                            Value                         Ref Range Color                                             YELLOW/STRAW Appearance                                        CLEAR                         CLEAR      Specific gravity                                  1.015                         1.003 - 1.030             
     pH (UA)                                           7.5                           5.0 - 8.0 Protein                                           NEGATIVE                      NEG mg/dL Glucose                                           >1000 (A)                     NEG mg/dL Ketone                                            NEGATIVE                      NEG mg/dL Bilirubin                                         NEGATIVE                      NEG Blood                                             NEGATIVE                      NEG Urobilinogen                                      0.2                           0.2 - 1.0 EU/dL Nitrites                                          NEGATIVE                      NEG Leukocyte Esterase                                NEGATIVE                      NEG                       
     WBC                                               0-4                           0 - 4 /hpf                
     RBC                                               0-5                           0 - 5 /hpf Epithelial cells                                  MODERATE (A)                  FEW /lpf Bacteria                                          1+ (A)                        NEG /hpf                  
     UA:UC IF INDICATED                                URINE CULTURE ORDERED (A)     CNI                       
-WET PREP Collection Time: 11/17/17  3:03 PM 
     Result                                            Value                         Ref Range Clue cells                                        CLUE CELLS PRESENT Wet prep                                          NO TRICHOMONAS SEEN                                     
-KOH, OTHER SOURCES Collection Time: 11/17/17  3:03 PM 
     Result Value                         Ref Range Special Requests:                                 NO SPECIAL REQUESTS                                     
     DAISY                                               NO YEAST SEEN                                           
-HCG URINE, QL. - POC Collection Time: 11/17/17  3:07 PM 
     Result                                            Value                         Ref Range Pregnancy test,urine (POC)                        NEGATIVE                      NEG IMAGING RESULTS: 
No orders to display MEDICATIONS GIVEN: 
Medications 
azithromycin (ZITHROMAX) tablet 1,000 mg (1,000 mg Oral Given 11/17/17 1506) cefTRIAXone (ROCEPHIN) 250 mg in lidocaine (PF) (XYLOCAINE) 10 mg/mL (1 %) IM injection (250 mg IntraMUSCular Given 11/17/17 1506) IMPRESSION: 
BV (bacterial vaginosis)  (primary encounter diagnosis) PLAN: 
1. Current Discharge Medication List 
 
CONTINUE these medications which have CHANGED Lancets (ACCU-CHEK SOFTCLIX LANCETS) misc CHECK BLOOD SUGAR TWICE DAILY Qty: 60 Each Refills: 11 Blood-Glucose Meter monitoring kit Monitor BG 4 times daily. Dx uncontrolled type 2 DM E11.65 Qty: 1 Kit Refills: 0 
 
insulin lispro (HUMALOG) 100 unit/mL kwikpen Take 4 units for BG between 150-200, take 6 unites for -250, take 8 units for  or higher  Indications: type 1 diabetes mellitus Qty: 1 Package Refills: 5 
 
insulin glargine (LANTUS) 100 unit/mL injection 30 Units by SubCUTAneous route nightly. Indications: type 1 diabetes mellitus Qty: 1 Vial Refills: 5 CONTINUE these medications which have NOT CHANGED Syringe with Needle, Disp, 1 mL 26 gauge x 5/8\" syrg Dispense syringe with needle for insulin injection 4 times a day. Indications: PATIENT NEEDS OFFICE VISIT FOR FURTHER REFILLS Qty: 200 Each Refills: 0 
 
insulin detemir (LEVEMIR) 100 unit/mL injection 15 Units by SubCUTAneous route nightly. QUEtiapine (SEROQUEL) 200 mg tablet Take 200 mg by mouth nightly. divalproex DR (DEPAKOTE) 500 mg tablet Take 1,000 mg by mouth two (2) times a day. 
 
haloperidol (HALDOL) 5 mg tablet Take 2.5 mg by mouth nightly. sertraline (ZOLOFT) 50 mg tablet Take 100 mg by mouth daily. amLODIPine (NORVASC) 5 mg tablet Take 1 Tab by mouth daily. Qty: 30 Tab Refills: 5 Associated Diagnoses:Essential hypertension 
 
albuterol-ipratropium (DUO-NEB) 2.5 mg-0.5 mg/3 ml nebu 
3 mL by Nebulization route every four (4) hours as needed. For wheezing and shortness of breath Qty: 25 Nebule Refills: 11 
Associated Diagnoses: Moderate persistent asthma with acute exacerbation; Bronchitis 
 
pravastatin (PRAVACHOL) 20 mg tablet Take 10 mg by mouth nightly. ipratropium-albuterol (COMBIVENT RESPIMAT)  mcg/actuation inhaler Take 1 Puff by inhalation every four (4) hours as needed for Wheezing. Qty: 1 Inhaler Refills: 0 
 
 
 
2. Follow-up Information Follow up With Details Comments Contact Info Herman Li MD Schedule an appointment as soon as possible for a visit  
in 2 days As needed [address] 
[phone] Return to ED if worse Amount and/or Complexity of Data Reviewed Clinical lab tests: ordered and reviewed Tests in the medicine section of CPT®: ordered and reviewed Patient Progress Patient progress: stable ED Course Procedures 3:31 PM 
Pt states she has not checked her glucose levels since last week. Denies abd pain, n/v, headache, fever, chills. Pt states she took 20 units of Lantus this AM but does not have her glucometer or Humalog Rx. Needs her counselor to take her to [facility] today or Monday to get the meter and Rx. Pt endorses eating sweets this AM d/t depression coping.  
  
POC glucose 153 
 
3:53 PM 
I have discussed with patient their diagnosis, treatment, and follow up plan. The patient agrees to follow up as outlined in discharge paperwork and also to return to the ED with any worsening.  Valentin Acharya PA-C

## 2017-11-17 NOTE — LETTER
11/19/2017 [facility] 
[phone] Dear Ms. MALDONADO You were seen in the Emergency Department of [address] [place] [address] on 11/17/17 and had lab tests performed. We would like to discuss these results with you . Please call the Emergency Department at your earliest convenience at [phone], to speak with one of our providers. The Urine culture from your Emergency Department visit on 11/17/17 was positive. If you have not improved or are worsening, please follow up with your primary care doctor or Emergency department as soon as possible. You may need antibiotics If you have any questions please contact the Emergency Department at [phone]. Sincerely, Debbie Vitale PA-C 
 
[facility] EMERGENCY DEPT 
[address] [place] [address] [phone]

## 2017-11-19 LAB
BACTERIA SPEC CULT: ABNORMAL
CC UR VC: ABNORMAL
SERVICE CMNT-IMP: ABNORMAL

## 2017-11-29 RX ORDER — CEPHALEXIN 500 MG/1
500 CAPSULE ORAL 2 TIMES DAILY
Qty: 14 CAP | Refills: 0 | Status: SHIPPED | OUTPATIENT
Start: 2017-11-29 | End: 2017-12-06

## 2018-01-10 ENCOUNTER — HOSPITAL ENCOUNTER (EMERGENCY)
Age: 43
Discharge: STILL ADMITTED OR ADMINISTRATIVE | End: 2018-01-10
Attending: EMERGENCY MEDICINE
Payer: MEDICARE

## 2018-01-10 VITALS
HEIGHT: 66 IN | HEART RATE: 73 BPM | RESPIRATION RATE: 16 BRPM | WEIGHT: 145 LBS | DIASTOLIC BLOOD PRESSURE: 96 MMHG | TEMPERATURE: 98.5 F | SYSTOLIC BLOOD PRESSURE: 166 MMHG | OXYGEN SATURATION: 99 % | BODY MASS INDEX: 23.3 KG/M2

## 2018-01-10 PROCEDURE — 75810000275 HC EMERGENCY DEPT VISIT NO LEVEL OF CARE

## 2018-01-15 ENCOUNTER — HOSPITAL ENCOUNTER (EMERGENCY)
Age: 43
Discharge: HOME | End: 2018-01-15
Attending: EMERGENCY MEDICINE | Admitting: EMERGENCY MEDICINE
Payer: MEDICARE

## 2018-01-15 VITALS
BODY MASS INDEX: 23.56 KG/M2 | HEIGHT: 66 IN | OXYGEN SATURATION: 100 % | DIASTOLIC BLOOD PRESSURE: 90 MMHG | HEART RATE: 72 BPM | RESPIRATION RATE: 16 BRPM | TEMPERATURE: 98.4 F | SYSTOLIC BLOOD PRESSURE: 162 MMHG | WEIGHT: 146.6 LBS

## 2018-01-15 DIAGNOSIS — E11.65 TYPE 2 DIABETES MELLITUS WITH HYPERGLYCEMIA, UNSPECIFIED LONG TERM INSULIN USE STATUS: ICD-10-CM

## 2018-01-15 DIAGNOSIS — K08.89 DENTALGIA: Primary | ICD-10-CM

## 2018-01-15 DIAGNOSIS — B37.31 VAGINAL CANDIDIASIS: ICD-10-CM

## 2018-01-15 LAB
ALBUMIN SERPL-MCNC: 3.1 G/DL (ref 3.5–5)
ALBUMIN/GLOB SERPL: 0.8 {RATIO} (ref 1.1–2.2)
ALP SERPL-CCNC: 64 U/L (ref 45–117)
ALT SERPL-CCNC: 21 U/L (ref 12–78)
ANION GAP SERPL CALC-SCNC: 8 MMOL/L (ref 5–15)
APPEARANCE UR: CLEAR
AST SERPL-CCNC: 12 U/L (ref 15–37)
BACTERIA URNS QL MICRO: NEGATIVE /HPF
BASOPHILS # BLD: 0 K/UL (ref 0–0.1)
BASOPHILS NFR BLD: 1 % (ref 0–1)
BILIRUB SERPL-MCNC: 0.7 MG/DL (ref 0.2–1)
BILIRUB UR QL: NEGATIVE
BUN SERPL-MCNC: 11 MG/DL (ref 6–20)
BUN/CREAT SERPL: 12 (ref 12–20)
CALCIUM SERPL-MCNC: 8.6 MG/DL (ref 8.5–10.1)
CHLORIDE SERPL-SCNC: 103 MMOL/L (ref 97–108)
CLUE CELLS VAG QL WET PREP: NORMAL
CO2 SERPL-SCNC: 27 MMOL/L (ref 21–32)
COLOR UR: ABNORMAL
CREAT SERPL-MCNC: 0.91 MG/DL (ref 0.55–1.02)
EOSINOPHIL # BLD: 0.4 K/UL (ref 0–0.4)
EOSINOPHIL NFR BLD: 7 % (ref 0–7)
EPITH CASTS URNS QL MICRO: ABNORMAL /LPF
ERYTHROCYTE [DISTWIDTH] IN BLOOD BY AUTOMATED COUNT: 14 % (ref 11.5–14.5)
GLOBULIN SER CALC-MCNC: 3.8 G/DL (ref 2–4)
GLUCOSE BLD STRIP.AUTO-MCNC: 124 MG/DL (ref 65–100)
GLUCOSE BLD STRIP.AUTO-MCNC: 469 MG/DL (ref 65–100)
GLUCOSE SERPL-MCNC: 387 MG/DL (ref 65–100)
GLUCOSE UR STRIP.AUTO-MCNC: >1000 MG/DL
HCG UR QL: NEGATIVE
HCT VFR BLD AUTO: 41.9 % (ref 35–47)
HGB BLD-MCNC: 15.2 G/DL (ref 11.5–16)
HGB UR QL STRIP: NEGATIVE
KETONES UR QL STRIP.AUTO: NEGATIVE MG/DL
KOH PREP SPEC: NORMAL
LEUKOCYTE ESTERASE UR QL STRIP.AUTO: NEGATIVE
LYMPHOCYTES # BLD: 2 K/UL (ref 0.8–3.5)
LYMPHOCYTES NFR BLD: 36 % (ref 12–49)
MCH RBC QN AUTO: 29.5 PG (ref 26–34)
MCHC RBC AUTO-ENTMCNC: 36.3 G/DL (ref 30–36.5)
MCV RBC AUTO: 81.2 FL (ref 80–99)
MONOCYTES # BLD: 0.8 K/UL (ref 0–1)
MONOCYTES NFR BLD: 14 % (ref 5–13)
NEUTS SEG # BLD: 2.4 K/UL (ref 1.8–8)
NEUTS SEG NFR BLD: 42 % (ref 32–75)
NITRITE UR QL STRIP.AUTO: NEGATIVE
PH UR STRIP: 6 [PH] (ref 5–8)
PLATELET # BLD AUTO: 209 K/UL (ref 150–400)
POTASSIUM SERPL-SCNC: 4 MMOL/L (ref 3.5–5.1)
PROT SERPL-MCNC: 6.9 G/DL (ref 6.4–8.2)
PROT UR STRIP-MCNC: NEGATIVE MG/DL
RBC # BLD AUTO: 5.16 M/UL (ref 3.8–5.2)
RBC #/AREA URNS HPF: ABNORMAL /HPF (ref 0–5)
SERVICE CMNT-IMP: ABNORMAL
SERVICE CMNT-IMP: ABNORMAL
SERVICE CMNT-IMP: NORMAL
SODIUM SERPL-SCNC: 138 MMOL/L (ref 136–145)
SP GR UR REFRACTOMETRY: 1.01 (ref 1–1.03)
T VAGINALIS VAG QL WET PREP: NORMAL
UA: UC IF INDICATED,UAUC: ABNORMAL
UROBILINOGEN UR QL STRIP.AUTO: 0.2 EU/DL (ref 0.2–1)
WBC # BLD AUTO: 5.5 K/UL (ref 3.6–11)
WBC URNS QL MICRO: ABNORMAL /HPF (ref 0–4)

## 2018-01-15 PROCEDURE — 74011000250 HC RX REV CODE- 250: Performed by: PHYSICIAN ASSISTANT

## 2018-01-15 PROCEDURE — 36415 COLL VENOUS BLD VENIPUNCTURE: CPT | Performed by: PHYSICIAN ASSISTANT

## 2018-01-15 PROCEDURE — 82962 GLUCOSE BLOOD TEST: CPT

## 2018-01-15 PROCEDURE — 80053 COMPREHEN METABOLIC PANEL: CPT | Performed by: PHYSICIAN ASSISTANT

## 2018-01-15 PROCEDURE — 81025 URINE PREGNANCY TEST: CPT

## 2018-01-15 PROCEDURE — 87491 CHLMYD TRACH DNA AMP PROBE: CPT | Performed by: PHYSICIAN ASSISTANT

## 2018-01-15 PROCEDURE — 74011636637 HC RX REV CODE- 636/637: Performed by: PHYSICIAN ASSISTANT

## 2018-01-15 PROCEDURE — 96361 HYDRATE IV INFUSION ADD-ON: CPT

## 2018-01-15 PROCEDURE — 99285 EMERGENCY DEPT VISIT HI MDM: CPT

## 2018-01-15 PROCEDURE — 81001 URINALYSIS AUTO W/SCOPE: CPT | Performed by: PHYSICIAN ASSISTANT

## 2018-01-15 PROCEDURE — 85025 COMPLETE CBC W/AUTO DIFF WBC: CPT | Performed by: PHYSICIAN ASSISTANT

## 2018-01-15 PROCEDURE — 87210 SMEAR WET MOUNT SALINE/INK: CPT | Performed by: PHYSICIAN ASSISTANT

## 2018-01-15 PROCEDURE — 74011250637 HC RX REV CODE- 250/637: Performed by: PHYSICIAN ASSISTANT

## 2018-01-15 PROCEDURE — 74011250636 HC RX REV CODE- 250/636: Performed by: PHYSICIAN ASSISTANT

## 2018-01-15 PROCEDURE — 96374 THER/PROPH/DIAG INJ IV PUSH: CPT

## 2018-01-15 PROCEDURE — 96372 THER/PROPH/DIAG INJ SC/IM: CPT

## 2018-01-15 RX ORDER — SODIUM CHLORIDE 0.9 % (FLUSH) 0.9 %
5-10 SYRINGE (ML) INJECTION EVERY 8 HOURS
Status: DISCONTINUED | OUTPATIENT
Start: 2018-01-15 | End: 2018-01-15 | Stop reason: HOSPADM

## 2018-01-15 RX ORDER — AZITHROMYCIN 250 MG/1
1000 TABLET, FILM COATED ORAL
Status: COMPLETED | OUTPATIENT
Start: 2018-01-15 | End: 2018-01-15

## 2018-01-15 RX ORDER — SODIUM CHLORIDE 0.9 % (FLUSH) 0.9 %
5-10 SYRINGE (ML) INJECTION AS NEEDED
Status: DISCONTINUED | OUTPATIENT
Start: 2018-01-15 | End: 2018-01-15 | Stop reason: HOSPADM

## 2018-01-15 RX ORDER — ACETAMINOPHEN 325 MG/1
650 TABLET ORAL
Qty: 20 TAB | Refills: 0 | Status: SHIPPED | OUTPATIENT
Start: 2018-01-15 | End: 2018-06-13

## 2018-01-15 RX ORDER — ASPIRIN 325 MG
1 TABLET, DELAYED RELEASE (ENTERIC COATED) ORAL
Qty: 7 APPLICATOR | Refills: 0 | Status: SHIPPED | OUTPATIENT
Start: 2018-01-15 | End: 2018-01-22

## 2018-01-15 RX ADMIN — AZITHROMYCIN 1000 MG: 250 TABLET, FILM COATED ORAL at 11:19

## 2018-01-15 RX ADMIN — SODIUM CHLORIDE 1000 ML: 900 INJECTION, SOLUTION INTRAVENOUS at 11:27

## 2018-01-15 RX ADMIN — HUMAN INSULIN 5 UNITS: 100 INJECTION, SOLUTION SUBCUTANEOUS at 11:20

## 2018-01-15 RX ADMIN — LIDOCAINE HYDROCHLORIDE 250 MG: 10 INJECTION, SOLUTION EPIDURAL; INFILTRATION; INTRACAUDAL; PERINEURAL at 11:19

## 2018-01-15 NOTE — ED NOTES
Pt repeat accu check shared with provider. Pt for Dc home. PIV removed intact and plan of care accepted by patient. Patient (s)  given copy of dc instructions and 2 script(s). Patient (s)  verbalized understanding of instructions and script (s). Patient given a current medication reconciliation form and verbalized understanding of their medications. Patient (s) verbalized understanding of the importance of discussing medications with  his or her physician or clinic they will be following up with. Patient alert and oriented and in no acute distress. Patient discharged home ambulatory with self.

## 2018-01-15 NOTE — ED PROVIDER NOTES
EMERGENCY DEPARTMENT HISTORY AND PHYSICAL EXAM 
 
Date: 1/15/2018 Patient Name: Jos Jauregui History of Presenting Illness Chief Complaint Patient presents with  Abdominal Pain Pt c/o abdominal pain x 2 days with vaginal discharge  Dental Pain Pt c/o rt lower jaw dental pain History Provided By: Patient Chief Complaint: vaginal discharge Duration: 3 Days Timing:  Acute Location: vagina; lower abd Quality: Aching Severity: 7 out of 10 Modifying Factors: none Associated Symptoms: urinary frequency HPI: Jos Jauregui is a 40 y.o. female with a PMH of diabetes, hypertension, asthma and GERD, schizophrenia, borderline personality disorder, pancreatitis who presents with malodorous white vaginal discharge x 3 days with mild lower abd pain and urinary frequency. Denies fever, chills, n/v, hematuria, dysuria, constipation, diarrhea. Endorses recent unprotected intercourse. LMP 12/20/17. PCP: Irene Rubio NP Current Facility-Administered Medications Medication Dose Route Frequency Provider Last Rate Last Dose  sodium chloride (NS) flush 5-10 mL  5-10 mL IntraVENous Q8H Sheryle Cleverly, PA-C      
 sodium chloride (NS) flush 5-10 mL  5-10 mL IntraVENous PRN Sheryle Cleverly, PA-C Current Outpatient Prescriptions Medication Sig Dispense Refill  acetaminophen (TYLENOL) 325 mg tablet Take 2 Tabs by mouth every four (4) hours as needed for Pain. 20 Tab 0  clotrimazole (MYCELEX) 1 % vaginal cream Insert 1 Applicator into vagina nightly for 7 days. 7 Applicator 0  
 Lancets (ACCU-CHEK SOFTCLIX LANCETS) misc CHECK BLOOD SUGAR TWICE DAILY 60 Each 11  Blood-Glucose Meter monitoring kit Monitor BG 4 times daily.   Dx uncontrolled type 2 DM E11.65 1 Kit 0  
 insulin lispro (HUMALOG) 100 unit/mL kwikpen Take 4 units for BG between 150-200, take 6 unites for -250, take 8 units for  or higher  Indications: type 1 diabetes mellitus 1 Package 5  
 insulin glargine (LANTUS) 100 unit/mL injection 30 Units by SubCUTAneous route nightly. Indications: type 1 diabetes mellitus 1 Vial 5  Syringe with Needle, Disp, 1 mL 26 gauge x 5/8\" syrg Dispense syringe with needle for insulin injection 4 times a day. Indications: PATIENT NEEDS OFFICE VISIT FOR FURTHER REFILLS 200 Each 0  
 insulin detemir (LEVEMIR) 100 unit/mL injection 15 Units by SubCUTAneous route nightly.  QUEtiapine (SEROQUEL) 200 mg tablet Take 200 mg by mouth nightly.  divalproex DR (DEPAKOTE) 500 mg tablet Take 1,000 mg by mouth two (2) times a day.  haloperidol (HALDOL) 5 mg tablet Take 2.5 mg by mouth nightly.  sertraline (ZOLOFT) 50 mg tablet Take 100 mg by mouth daily.  amLODIPine (NORVASC) 5 mg tablet Take 1 Tab by mouth daily. 30 Tab 5  
 albuterol-ipratropium (DUO-NEB) 2.5 mg-0.5 mg/3 ml nebu 3 mL by Nebulization route every four (4) hours as needed. For wheezing and shortness of breath 25 Nebule 11  
 pravastatin (PRAVACHOL) 20 mg tablet Take 10 mg by mouth nightly.  ipratropium-albuterol (COMBIVENT RESPIMAT)  mcg/actuation inhaler Take 1 Puff by inhalation every four (4) hours as needed for Wheezing. 1 Inhaler 0 Past History Past Medical History: 
Past Medical History:  
Diagnosis Date  Alcohol abuse, in remission   
 quit 17 days ago  Asthma   
 does not use inhalers  Borderline personality disorder  Diabetes ([facility] [address]  GERD (gastroesophageal reflux disease)  Hypertension  Other ill-defined conditions(799.89)   
 kidney stones ,passed one  Other ill-defined conditions(799.89) sickle cell trait  Other ill-defined conditions(799.89)   
 increased cholesterol  Pancreatitis  Psychiatric disorder   
 schizophrenia, bipolar, depression, anxiety Past Surgical History: 
Past Surgical History:  
Procedure Laterality Date  ABDOMEN SURGERY PROC UNLISTED  3/12/14  CHOLECYSTECTOMY LAPAROSCOPIC    
 HX GASTRIC BYPASS  HX GYN  11/8/2012  
 c section x2  HX OTHER SURGICAL  3/13/14 ENDOSCOPIC RETROGRADE CHOLANGIOPANCREATOGRAPHY  HX OTHER SURGICAL  8/4/14  
 endoscopic stent placed to bile duct  HX TUBAL LIGATION  2012 Family History: 
Family History Problem Relation Age of Onset  Heart Disease Mother  Diabetes Mother  Hypertension Mother  Hypertension Maternal Grandmother Social History: 
Social History Substance Use Topics  Smoking status: Current Every Day Smoker Packs/day: 1.00 Years: 14.00 Types: Cigarettes  Smokeless tobacco: Never Used Comment: cigarettes  Alcohol use Yes Comment: occasionally Allergies: Allergies Allergen Reactions  Dilaudid [Hydromorphone (Bulk)] Itching  Ibuprofen Not Reported This Time Review of Systems Review of Systems Constitutional: Negative. Negative for activity change, appetite change, chills and fever. HENT: Positive for dental problem (chronic dentalgia). Negative for congestion, ear pain, postnasal drip and sore throat. Eyes: Negative. Negative for pain and visual disturbance. Respiratory: Negative. Negative for cough and shortness of breath. Cardiovascular: Negative. Negative for chest pain. Gastrointestinal: Positive for abdominal pain. Negative for anal bleeding, diarrhea, nausea, rectal pain and vomiting. Endocrine: Positive for polyuria. Pt endorses infrequent BG checks at home; last check yesterday 200s. Used Fast acting insulin this AM after eating syrup on breakfast.  
Genitourinary: Positive for frequency and vaginal discharge. Negative for difficulty urinating, dysuria, flank pain, hematuria, menstrual problem, pelvic pain, urgency, vaginal bleeding and vaginal pain. Musculoskeletal: Negative. Negative for joint swelling. Skin: Negative. Negative for rash. Neurological: Negative.   Negative for dizziness, light-headedness and headaches. Psychiatric/Behavioral: Negative. Physical Exam  
 
Vitals:  
 01/15/18 1012 01/15/18 1128 01/15/18 1200 BP: 157/85 149/87 162/90 Pulse: 72 70 72 Resp: 15 16 16 Temp: 98.4 °F (36.9 °C) SpO2: 99% (!) 101% 100% Weight: 66.5 kg (146 lb 9.6 oz) Height: 5' 6\" (1.676 m) Physical Exam  
Constitutional: She is oriented to person, place, and time. She appears well-developed and well-nourished. No distress. HENT:  
Head: Normocephalic and atraumatic. Right Ear: External ear normal.  
Left Ear: External ear normal.  
Mouth/Throat: Uvula is midline, oropharynx is clear and moist and mucous membranes are normal. Mucous membranes are not dry. No trismus in the jaw. Abnormal dentition. Dental caries (negative erythema, TTP, swelling, fluctuance, drainage.) present. No dental abscesses or uvula swelling. No oropharyngeal exudate, posterior oropharyngeal edema, posterior oropharyngeal erythema or tonsillar abscesses. Eyes: Conjunctivae and EOM are normal. Pupils are equal, round, and reactive to light. Neck: Normal range of motion. Pulmonary/Chest: Effort normal.  
Abdominal: Soft. Bowel sounds are normal. There is no tenderness. There is no rigidity, no rebound, no guarding, no CVA tenderness, no tenderness at McBurney's point and negative Aviles's sign. Genitourinary: Uterus normal. Pelvic exam was performed with patient supine. There is no rash, tenderness, lesion or injury on the right labia. There is no rash, tenderness, lesion or injury on the left labia. Cervix exhibits no motion tenderness, no discharge and no friability. Right adnexum displays no mass, no tenderness and no fullness. Left adnexum displays no mass, no tenderness and no fullness. There is tenderness in the vagina. No erythema or bleeding in the vagina. No foreign body in the vagina. No signs of injury around the vagina.  Vaginal discharge (mild amount of thick white discharge adhered to vaginal wall) found. Musculoskeletal: Normal range of motion. Neurological: She is alert and oriented to person, place, and time. Skin: Skin is warm and dry. She is not diaphoretic. Psychiatric: She has a normal mood and affect. Her behavior is normal. Judgment and thought content normal.  
Nursing note and vitals reviewed. Diagnostic Study Results Labs - Recent Results (from the past 12 hour(s)) GLUCOSE, POC Collection Time: 01/15/18 10:51 AM  
Result Value Ref Range Glucose (POC) 469 (H) 65 - 100 mg/dL Performed by [employer]   
URINALYSIS W/ REFLEX CULTURE Collection Time: 01/15/18 11:17 AM  
Result Value Ref Range Color YELLOW/STRAW Appearance CLEAR CLEAR Specific gravity 1.010 1.003 - 1.030    
 pH (UA) 6.0 5.0 - 8.0 Protein NEGATIVE  NEG mg/dL Glucose >1000 (A) NEG mg/dL Ketone NEGATIVE  NEG mg/dL Bilirubin NEGATIVE  NEG Blood NEGATIVE  NEG Urobilinogen 0.2 0.2 - 1.0 EU/dL Nitrites NEGATIVE  NEG Leukocyte Esterase NEGATIVE  NEG    
 WBC 0-4 0 - 4 /hpf  
 RBC 0-5 0 - 5 /hpf Epithelial cells FEW FEW /lpf Bacteria NEGATIVE  NEG /hpf  
 UA:UC IF INDICATED CULTURE NOT INDICATED BY UA RESULT CNI    
WET PREP Collection Time: 01/15/18 11:17 AM  
Result Value Ref Range Clue cells CLUE CELLS ABSENT Wet prep NO TRICHOMONAS SEEN    
KOH, OTHER SOURCES Collection Time: 01/15/18 11:17 AM  
Result Value Ref Range Special Requests: NO SPECIAL REQUESTS    
 KOH NO YEAST SEEN    
CBC WITH AUTOMATED DIFF Collection Time: 01/15/18 11:17 AM  
Result Value Ref Range WBC 5.5 3.6 - 11.0 K/uL  
 RBC 5.16 3.80 - 5.20 M/uL  
 HGB 15.2 11.5 - 16.0 g/dL HCT 41.9 35.0 - 47.0 % MCV 81.2 80.0 - 99.0 FL  
 MCH 29.5 26.0 - 34.0 PG  
 MCHC 36.3 30.0 - 36.5 g/dL  
 RDW 14.0 11.5 - 14.5 % PLATELET 957 297 - 581 K/uL NEUTROPHILS 42 32 - 75 % LYMPHOCYTES 36 12 - 49 % MONOCYTES 14 (H) 5 - 13 %  EOSINOPHILS 7 0 - 7 % BASOPHILS 1 0 - 1 %  
 ABS. NEUTROPHILS 2.4 1.8 - 8.0 K/UL  
 ABS. LYMPHOCYTES 2.0 0.8 - 3.5 K/UL  
 ABS. MONOCYTES 0.8 0.0 - 1.0 K/UL  
 ABS. EOSINOPHILS 0.4 0.0 - 0.4 K/UL  
 ABS. BASOPHILS 0.0 0.0 - 0.1 K/UL METABOLIC PANEL, COMPREHENSIVE Collection Time: 01/15/18 11:17 AM  
Result Value Ref Range Sodium 138 136 - 145 mmol/L Potassium 4.0 3.5 - 5.1 mmol/L Chloride 103 97 - 108 mmol/L  
 CO2 27 21 - 32 mmol/L Anion gap 8 5 - 15 mmol/L Glucose 387 (H) 65 - 100 mg/dL BUN 11 6 - 20 MG/DL Creatinine 0.91 0.55 - 1.02 MG/DL  
 BUN/Creatinine ratio 12 12 - 20 GFR est AA >60 >60 ml/min/1.73m2 GFR est non-AA >60 >60 ml/min/1.73m2 Calcium 8.6 8.5 - 10.1 MG/DL Bilirubin, total 0.7 0.2 - 1.0 MG/DL  
 ALT (SGPT) 21 12 - 78 U/L  
 AST (SGOT) 12 (L) 15 - 37 U/L Alk. phosphatase 64 45 - 117 U/L Protein, total 6.9 6.4 - 8.2 g/dL Albumin 3.1 (L) 3.5 - 5.0 g/dL Globulin 3.8 2.0 - 4.0 g/dL A-G Ratio 0.8 (L) 1.1 - 2.2 HCG URINE, QL. - POC Collection Time: 01/15/18 11:18 AM  
Result Value Ref Range Pregnancy test,urine (POC) NEGATIVE  NEG    
GLUCOSE, POC Collection Time: 01/15/18 12:25 PM  
Result Value Ref Range Glucose (POC) 124 (H) 65 - 100 mg/dL Performed by Ivy Ritchie Radiologic Studies - No orders to display CT Results  (Last 48 hours) None CXR Results  (Last 48 hours) None Medical Decision Making I am the first provider for this patient. I reviewed the vital signs, available nursing notes, past medical history, past surgical history, family history and social history. Vital Signs-Reviewed the patient's vital signs. Records Reviewed: Nursing Notes and Old Medical Records ED Course:  
 
Disposition: 
 
DISCHARGE NOTE:  
12:28 PM 
 
  Care plan outlined and precautions discussed. Patient has no new complaints, changes, or physical findings. Results of labs were reviewed with the patient.  All medications were reviewed with the patient; will d/c home with clotrimazole applicators and tylenol. All of pt's questions and concerns were addressed. Patient was instructed and agrees to follow up with PCP, dentist, and Dietitian, as well as to return to the ED upon further deterioration. Patient is ready to go home. Follow-up Information Follow up With Details Comments Contact Info Meryle Muscat, NP Schedule an appointment as soon as possible for a visit in 1 week As needed, If symptoms worsen [address] [facility] [address] [employer] [address] 
[phone] Current Discharge Medication List  
  
START taking these medications Details  
acetaminophen (TYLENOL) 325 mg tablet Take 2 Tabs by mouth every four (4) hours as needed for Pain. Qty: 20 Tab, Refills: 0  
  
clotrimazole (MYCELEX) 1 % vaginal cream Insert 1 Applicator into vagina nightly for 7 days. Qty: 7 Applicator, Refills: 0 CONTINUE these medications which have NOT CHANGED Details Lancets (ACCU-CHEK SOFTCLIX LANCETS) misc CHECK BLOOD SUGAR TWICE DAILY Qty: 60 Each, Refills: 11 Blood-Glucose Meter monitoring kit Monitor BG 4 times daily. Dx uncontrolled type 2 DM E11.65 Qty: 1 Kit, Refills: 0  
  
insulin lispro (HUMALOG) 100 unit/mL kwikpen Take 4 units for BG between 150-200, take 6 unites for -250, take 8 units for  or higher  Indications: type 1 diabetes mellitus Qty: 1 Package, Refills: 5  
  
insulin glargine (LANTUS) 100 unit/mL injection 30 Units by SubCUTAneous route nightly. Indications: type 1 diabetes mellitus Qty: 1 Vial, Refills: 5 Syringe with Needle, Disp, 1 mL 26 gauge x 5/8\" syrg Dispense syringe with needle for insulin injection 4 times a day. Indications: PATIENT NEEDS OFFICE VISIT FOR FURTHER REFILLS Qty: 200 Each, Refills: 0  
  
insulin detemir (LEVEMIR) 100 unit/mL injection 15 Units by SubCUTAneous route nightly.   
  
QUEtiapine (SEROQUEL) 200 mg tablet Take 200 mg by mouth nightly. divalproex DR (DEPAKOTE) 500 mg tablet Take 1,000 mg by mouth two (2) times a day.  
  
haloperidol (HALDOL) 5 mg tablet Take 2.5 mg by mouth nightly. sertraline (ZOLOFT) 50 mg tablet Take 100 mg by mouth daily. amLODIPine (NORVASC) 5 mg tablet Take 1 Tab by mouth daily. Qty: 30 Tab, Refills: 5 Associated Diagnoses: Essential hypertension  
  
albuterol-ipratropium (DUO-NEB) 2.5 mg-0.5 mg/3 ml nebu 3 mL by Nebulization route every four (4) hours as needed. For wheezing and shortness of breath Qty: 25 Nebule, Refills: 11  
 Associated Diagnoses: Moderate persistent asthma with acute exacerbation; Bronchitis  
  
pravastatin (PRAVACHOL) 20 mg tablet Take 10 mg by mouth nightly. ipratropium-albuterol (COMBIVENT RESPIMAT)  mcg/actuation inhaler Take 1 Puff by inhalation every four (4) hours as needed for Wheezing. Qty: 1 Inhaler, Refills: 0 Provider Notes (Medical Decision Making): DDX: bv, vaginal candidiasis, sti, pid, uti, pregnancy 
dental abscess, dental caries, dentalgia, dental fx, gingivitis Procedures: 
Procedures Diagnosis Clinical Impression: 1. Vipin Mayer 2. Type 2 diabetes mellitus with hyperglycemia, unspecified long term insulin use status ([facility] [address] 3. Vaginal candidiasis

## 2018-01-15 NOTE — DISCHARGE INSTRUCTIONS
Vaginal Yeast Infection: Care Instructions  Your Care Instructions    A vaginal yeast infection is caused by too many yeast cells in the vagina. This is common in women of all ages. Itching, vaginal discharge and irritation, and other symptoms can bother you. But yeast infections don't often cause other health problems. Some medicines can increase your risk of getting a yeast infection. These include antibiotics, birth control pills, hormones, and steroids. You may also be more likely to get a yeast infection if you are pregnant, have diabetes, douche, or wear tight clothes. With treatment, most yeast infections get better in 2 to 3 days. Follow-up care is a key part of your treatment and safety. Be sure to make and go to all appointments, and call your doctor if you are having problems. It's also a good idea to know your test results and keep a list of the medicines you take. How can you care for yourself at home? · Take your medicines exactly as prescribed. Call your doctor if you think you are having a problem with your medicine. · Ask your doctor about over-the-counter (OTC) medicines for yeast infections. They may cost less than prescription medicines. If you use an OTC treatment, read and follow all instructions on the label. · Do not use tampons while using a vaginal cream or suppository. The tampons can absorb the medicine. Use pads instead. · Wear loose cotton clothing. Do not wear nylon or other fabric that holds body heat and moisture close to the skin. · Try sleeping without underwear. · Do not scratch. Relieve itching with a cold pack or a cool bath. · Do not wash your vaginal area more than once a day. Use plain water or a mild, unscented soap. Air-dry the vaginal area. · Change out of wet swimsuits after swimming. · Do not have sex until you have finished your treatment. · Do not douche. When should you call for help?   Call your doctor now or seek immediate medical care if:  ? · You have unexpected vaginal bleeding. ? · You have new or increased pain in your vagina or pelvis. ? Watch closely for changes in your health, and be sure to contact your doctor if:  ? · You have a fever. ? · You are not getting better after 2 days. ? · Your symptoms come back after you finish your medicines. Where can you learn more? Go to http://miryam-polo.info/. Enter Q125 in the search box to learn more about \"Vaginal Yeast Infection: Care Instructions. \"  Current as of: October 13, 2016  Content Version: 11.4  © 5743-4176 [employer]. Care instructions adapted under license by [employer] (which disclaims liability or warranty for this information). If you have questions about a medical condition or this instruction, always ask your healthcare professional. [address] any warranty or liability for your use of this information. Dental Pain: After Your Visit  Your Care Instructions  The most common cause of dental pain is tooth decay. It can also be caused by an infection of the tooth (abscess) or gum, a tooth that has not broken all the way through the gum (impacted tooth), or a problem with the nerve-filled center of the tooth. Follow-up care is a key part of your treatment and safety. Be sure to make and go to all appointments, and call your doctor if you are having problems. Its also a good idea to know your test results and keep a list of the medicines you take. How can you care for yourself at home? · Contact a dentist for follow-up care. · Put ice or a cold pack on the outside of your mouth for 10 to 20 minutes at a time to reduce pain and swelling. Put a thin cloth between the ice and your skin. · Take an over-the-counter pain medicine, such as acetaminophen (Tylenol), ibuprofen (Advil, Motrin), or naproxen (Aleve). Read and follow all instructions on the label.   · Do not take two or more pain medicines at the same time unless the doctor told you to. Many pain medicines have acetaminophen, which is Tylenol. Too much acetaminophen (Tylenol) can be harmful. · Rinse your mouth with warm salt water every 2 hours to help relieve pain and swelling from an infected tooth. Mix 1 teaspoon of salt in 8 ounces of water. · If your doctor prescribed antibiotics, take them as directed. Do not stop taking them just because you feel better. You need to take the full course of antibiotics. When should you call for help? Call your doctor now or seek immediate medical care if:  · You have signs of infection, such as:  ¨ Increased pain, swelling, warmth, or redness. ¨ Pus draining from the gum, tooth, or face. ¨ A fever. Watch closely for changes in your health, and be sure to contact your doctor if:  · You do not get better as expected. Where can you learn more? Go to [employer]  Enter V264 in the search box to learn more about \"Dental Pain: After Your Visit. \"   © 4316-8169 [employer]. Care instructions adapted under license by [employer] (which disclaims liability or warranty for this information). This care instruction is for use with your licensed healthcare professional. If you have questions about a medical condition or this instruction, always ask your healthcare professional. [address] any warranty or liability for your use of this information. Content Version: 36.1.055950; Last Revised: May 17, 2013                 Diabetes Blood Sugar Emergencies: Your Action Plan  How can you prevent a blood sugar emergency? An important part of living with diabetes is keeping your blood sugar in your target range. You'll need to know what to do if it's too high or too low. Managing your blood sugar levels helps you avoid emergencies. This care sheet will teach you about the signs of high and low blood sugar.  It will help you make an action plan with your doctor for when these signs occur. Low blood sugar is more likely to happen if you take certain medicines for diabetes. It can also happen if you skip a meal, drink alcohol, or exercise more than usual.  You may get high blood sugar if you eat differently than you normally do. One example is eating more carbohydrate than usual. Having a cold, the flu, or other sudden illness can also cause high blood sugar levels. Levels can also rise if you miss a dose of medicine. Any change in how you take your medicine may affect your blood sugar level. So it's important to work with your doctor before you make any changes. Check your blood sugar  Work with your doctor to fill in the blank spaces below that apply to you. Track your levels, know your target range, and write down ways you can get your blood sugar back in your target range. A log book can help you track your levels. Take the book to all of your medical appointments. · Check your blood sugar _____ times a day, at these times:________________________________________________. (For example: Before meals, at bedtime, before exercise, during exercise, other.)  · Your blood sugar target range before a meal is ___________________. Your blood sugar target range after a meal is _______________________. · Do euxj-___________________________________________________-yc get your blood sugar back within your safe range if your blood sugar results are _________________________________________. (For example: Less than 70 or above 250 mg/dL.)  Call your doctor when your blood sugar results are ___________________________________. (For example: Less than 70 or above 250 mg/dL.)  What are the symptoms of low and high blood sugar? Common symptoms of low blood sugar are sweating and feeling shaky, weak, hungry, or confused. Symptoms can start quickly. Common symptoms of high blood sugar are feeling very thirsty or very hungry.  You may also pass urine more often than usual. You may have blurry vision and may lose weight without trying. But some people may have high or low blood sugar without having any symptoms. That's a good reason to check your blood sugar on a regular schedule. What should you do if you have symptoms? Work with your doctor to fill in the blank spaces below that apply to you. Low blood sugar  If you have symptoms of low blood sugar, check your blood sugar. If it's below _____ ( for example, below 70), eat or drink a quick-sugar food that has about 15 grams of carbohydrate. Your goal is to get your level back to your safe range. Check your blood sugar again 15 minutes later. If it's still not in your target range, take another 15 grams of carbohydrate and check your blood sugar again in 15 minutes. Repeat this until you reach your target. Then go back to your regular testing schedule. When you have low blood sugar, it's best to stop or reduce any physical activity until your blood sugar is back in your target range and is stable. If you must stay active, eat or drink 30 grams of carbohydrate. Then check your blood sugar again in 15 minutes. If it's not in your target range, take another 30 grams of carbohydrates. Check your blood sugar again in 15 minutes. Keep doing this until you reach your target. You can then go back to your regular testing schedule. If your symptoms or blood sugar levels are getting worse or have not improved after 15 minutes, seek medical care right away. Here are some examples of quick-sugar foods with 15 grams of carbohydrate:  · 3 or 4 glucose tablets  · 1 tube of glucose gel  · Hard candy (such as 3 Jolly Ranchers or 5 to 7 Life Savers)  · ½ cup to ¾ cup (4 to 6 ounces) of fruit juice or regular (not diet) soda  High blood sugar  If you have symptoms of high blood sugar, check your blood sugar. Your goal is to get your level back to your target range.  If it's above ______ ( for example, above 250), follow these steps:  · If you missed a dose of your diabetes medicine, take it now. Take only the amount of medicine that you have been prescribed. Do not take more or less medicine. · Give yourself insulin if your doctor has prescribed it for high blood sugar. · Test for ketones, if the doctor told you to do so. If the results of the ketone test show a moderate-to-large amount of ketones, call the doctor for advice. · Wait 30 minutes after you take the extra insulin or the missed medicine. Check your blood sugar again. If your symptoms or blood sugar levels are getting worse or have not improved after taking these steps, seek medical care right away. Follow-up care is a key part of your treatment and safety. Be sure to make and go to all appointments, and call your doctor if you are having problems. It's also a good idea to know your test results and keep a list of the medicines you take. Where can you learn more? Go to http://miryam-polo.info/. Enter C635 in the search box to learn more about \"Diabetes Blood Sugar Emergencies: Your Action Plan. \"  Current as of: March 13, 2017  Content Version: 11.4  © 3701-0727 [employer]wise, Incorporated. Care instructions adapted under license by [employer] (which disclaims liability or warranty for this information). If you have questions about a medical condition or this instruction, always ask your healthcare professional. [address] any warranty or liability for your use of this information.

## 2018-01-15 NOTE — ED NOTES
Pt c/o abdominal pain and white discharge, Also c/o burning with urination. Pt sts she is having right lower jaw pain. Emergency Department Nursing Plan of Care The Nursing Plan of Care is developed from the Nursing assessment and Emergency Department Attending provider initial evaluation. The plan of care may be reviewed in the ED Provider note. The Plan of Care was developed with the following considerations:  
Patient / Family readiness to learn indicated by:verbalized understanding Persons(s) to be included in education: patient Barriers to Learning/Limitations:No 
 
Signed Konstantin Méndez RN   
1/15/2018   10:17 AM

## 2018-03-18 ENCOUNTER — HOSPITAL ENCOUNTER (EMERGENCY)
Age: 43
Discharge: HOME | End: 2018-03-18
Attending: INTERNAL MEDICINE
Payer: MEDICARE

## 2018-03-18 VITALS
RESPIRATION RATE: 18 BRPM | DIASTOLIC BLOOD PRESSURE: 75 MMHG | SYSTOLIC BLOOD PRESSURE: 138 MMHG | HEART RATE: 84 BPM | BODY MASS INDEX: 21.86 KG/M2 | TEMPERATURE: 97.3 F | WEIGHT: 136 LBS | HEIGHT: 66 IN | OXYGEN SATURATION: 96 %

## 2018-03-18 DIAGNOSIS — N89.8 VAGINAL DISCHARGE: ICD-10-CM

## 2018-03-18 DIAGNOSIS — R73.9 HYPERGLYCEMIA: Primary | ICD-10-CM

## 2018-03-18 LAB
ALBUMIN SERPL-MCNC: 2.9 G/DL (ref 3.5–5)
ALBUMIN/GLOB SERPL: 0.8 {RATIO} (ref 1.1–2.2)
ALP SERPL-CCNC: 66 U/L (ref 45–117)
ALT SERPL-CCNC: 20 U/L (ref 12–78)
ANION GAP SERPL CALC-SCNC: 13 MMOL/L (ref 5–15)
APPEARANCE UR: CLEAR
AST SERPL-CCNC: 15 U/L (ref 15–37)
BACTERIA URNS QL MICRO: NEGATIVE /HPF
BASOPHILS # BLD: 0 K/UL (ref 0–0.1)
BASOPHILS NFR BLD: 0 % (ref 0–1)
BILIRUB SERPL-MCNC: 0.4 MG/DL (ref 0.2–1)
BILIRUB UR QL: NEGATIVE
BUN SERPL-MCNC: 6 MG/DL (ref 6–20)
BUN/CREAT SERPL: 8 (ref 12–20)
CALCIUM SERPL-MCNC: 8.5 MG/DL (ref 8.5–10.1)
CHLORIDE SERPL-SCNC: 104 MMOL/L (ref 97–108)
CO2 SERPL-SCNC: 24 MMOL/L (ref 21–32)
COLOR UR: ABNORMAL
CREAT SERPL-MCNC: 0.73 MG/DL (ref 0.55–1.02)
DIFFERENTIAL METHOD BLD: ABNORMAL
EOSINOPHIL # BLD: 0.4 K/UL (ref 0–0.4)
EOSINOPHIL NFR BLD: 9 % (ref 0–7)
EPITH CASTS URNS QL MICRO: ABNORMAL /LPF
ERYTHROCYTE [DISTWIDTH] IN BLOOD BY AUTOMATED COUNT: 14.1 % (ref 11.5–14.5)
GLOBULIN SER CALC-MCNC: 3.7 G/DL (ref 2–4)
GLUCOSE BLD STRIP.AUTO-MCNC: 130 MG/DL (ref 65–100)
GLUCOSE BLD STRIP.AUTO-MCNC: 328 MG/DL (ref 65–100)
GLUCOSE SERPL-MCNC: 180 MG/DL (ref 65–100)
GLUCOSE UR STRIP.AUTO-MCNC: >1000 MG/DL
HCG UR QL: NEGATIVE
HCT VFR BLD AUTO: 40.1 % (ref 35–47)
HGB BLD-MCNC: 14.9 G/DL (ref 11.5–16)
HGB UR QL STRIP: NEGATIVE
IMM GRANULOCYTES # BLD: 0 K/UL (ref 0–0.04)
IMM GRANULOCYTES NFR BLD AUTO: 0 % (ref 0–0.5)
KETONES UR QL STRIP.AUTO: NEGATIVE MG/DL
LEUKOCYTE ESTERASE UR QL STRIP.AUTO: NEGATIVE
LYMPHOCYTES # BLD: 2.6 K/UL (ref 0.8–3.5)
LYMPHOCYTES NFR BLD: 55 % (ref 12–49)
MCH RBC QN AUTO: 29.6 PG (ref 26–34)
MCHC RBC AUTO-ENTMCNC: 37.2 G/DL (ref 30–36.5)
MCV RBC AUTO: 79.6 FL (ref 80–99)
MONOCYTES # BLD: 0.5 K/UL (ref 0–1)
MONOCYTES NFR BLD: 10 % (ref 5–13)
NEUTS SEG # BLD: 1.2 K/UL (ref 1.8–8)
NEUTS SEG NFR BLD: 26 % (ref 32–75)
NITRITE UR QL STRIP.AUTO: NEGATIVE
NRBC # BLD: 0 K/UL (ref 0–0.01)
NRBC BLD-RTO: 0 PER 100 WBC
PH UR STRIP: 6 [PH] (ref 5–8)
PLATELET # BLD AUTO: 138 K/UL (ref 150–400)
PMV BLD AUTO: 11.8 FL (ref 8.9–12.9)
POTASSIUM SERPL-SCNC: 3.7 MMOL/L (ref 3.5–5.1)
PROT SERPL-MCNC: 6.6 G/DL (ref 6.4–8.2)
PROT UR STRIP-MCNC: NEGATIVE MG/DL
RBC # BLD AUTO: 5.04 M/UL (ref 3.8–5.2)
RBC #/AREA URNS HPF: ABNORMAL /HPF (ref 0–5)
SERVICE CMNT-IMP: ABNORMAL
SERVICE CMNT-IMP: ABNORMAL
SODIUM SERPL-SCNC: 141 MMOL/L (ref 136–145)
SP GR UR REFRACTOMETRY: 1.01 (ref 1–1.03)
UA: UC IF INDICATED,UAUC: ABNORMAL
UROBILINOGEN UR QL STRIP.AUTO: 0.2 EU/DL (ref 0.2–1)
WBC # BLD AUTO: 4.8 K/UL (ref 3.6–11)
WBC URNS QL MICRO: ABNORMAL /HPF (ref 0–4)

## 2018-03-18 PROCEDURE — 87491 CHLMYD TRACH DNA AMP PROBE: CPT | Performed by: INTERNAL MEDICINE

## 2018-03-18 PROCEDURE — 96372 THER/PROPH/DIAG INJ SC/IM: CPT

## 2018-03-18 PROCEDURE — 82962 GLUCOSE BLOOD TEST: CPT

## 2018-03-18 PROCEDURE — 36415 COLL VENOUS BLD VENIPUNCTURE: CPT | Performed by: INTERNAL MEDICINE

## 2018-03-18 PROCEDURE — 80053 COMPREHEN METABOLIC PANEL: CPT | Performed by: INTERNAL MEDICINE

## 2018-03-18 PROCEDURE — 74011000250 HC RX REV CODE- 250: Performed by: INTERNAL MEDICINE

## 2018-03-18 PROCEDURE — 74011250637 HC RX REV CODE- 250/637: Performed by: INTERNAL MEDICINE

## 2018-03-18 PROCEDURE — 74011250636 HC RX REV CODE- 250/636: Performed by: INTERNAL MEDICINE

## 2018-03-18 PROCEDURE — 81025 URINE PREGNANCY TEST: CPT

## 2018-03-18 PROCEDURE — 85025 COMPLETE CBC W/AUTO DIFF WBC: CPT | Performed by: INTERNAL MEDICINE

## 2018-03-18 PROCEDURE — 81001 URINALYSIS AUTO W/SCOPE: CPT | Performed by: INTERNAL MEDICINE

## 2018-03-18 PROCEDURE — 96360 HYDRATION IV INFUSION INIT: CPT

## 2018-03-18 PROCEDURE — 99285 EMERGENCY DEPT VISIT HI MDM: CPT

## 2018-03-18 RX ORDER — METRONIDAZOLE 250 MG/1
2000 TABLET ORAL
Status: COMPLETED | OUTPATIENT
Start: 2018-03-18 | End: 2018-03-18

## 2018-03-18 RX ORDER — AZITHROMYCIN 250 MG/1
1000 TABLET, FILM COATED ORAL
Status: COMPLETED | OUTPATIENT
Start: 2018-03-18 | End: 2018-03-18

## 2018-03-18 RX ADMIN — METRONIDAZOLE 2000 MG: 250 TABLET ORAL at 14:41

## 2018-03-18 RX ADMIN — CEFTRIAXONE 1 G: 1 INJECTION, POWDER, FOR SOLUTION INTRAMUSCULAR; INTRAVENOUS at 14:41

## 2018-03-18 RX ADMIN — SODIUM CHLORIDE 1000 ML: 900 INJECTION, SOLUTION INTRAVENOUS at 14:40

## 2018-03-18 RX ADMIN — AZITHROMYCIN 1000 MG: 250 TABLET, FILM COATED ORAL at 14:41

## 2018-03-18 NOTE — ED NOTES
Pt updated on plan of care. No si/s of acute distress. Call bell within reach. Pt requesting bagged lunch-asked to wait until labs return/discharged and pt agreed.

## 2018-03-18 NOTE — ED NOTES
.. 
Emergency Department Nursing Plan of Care The Nursing Plan of Care is developed from the Nursing assessment and Emergency Department Attending provider initial evaluation. The plan of care may be reviewed in the ED Provider note. The Plan of Care was developed with the following considerations:  
Patient / Family readiness to learn indicated by:verbalized understanding and appropriate questions asked Persons(s) to be included in education: patient Barriers to Learning/Limitations:No 
 
Signed Owen Garzon RN   
3/18/2018   1:49 PM

## 2018-03-18 NOTE — ED NOTES
....Discharge summary and discharge medications reviewed with patient and appropriate educational materials and side effects teaching were provided. No prescriptions given. Patient (s) verbalized understanding of the importance of discussing medications with his or her physician or clinic they will be following up with. No si/s of acute distress prior to discharge. Patient offered wheelchair from treatment area to hospital entrance, patient refused wheelchair. Pt reported that her lower abd pain slightly better. Pt given bagged lunch and diet ginger ale per request. No other pt complaints. Insurance transportation being arranged for home. Pt discharged but agreed to wait in the waiting room until transportation arrived.

## 2018-03-18 NOTE — DISCHARGE INSTRUCTIONS
Learning About High Blood Sugar  What is high blood sugar? Your body turns the food you eat into glucose (sugar), which it uses for energy. But if your body isn't able to use the sugar right away, it can build up in your blood and lead to high blood sugar. When the amount of sugar in your blood stays too high for too much of the time, you may have diabetes. Diabetes is a disease that can cause serious health problems. The good news is that lifestyle changes may help you get your blood sugar back to normal and avoid or delay diabetes. What causes high blood sugar? Sugar (glucose) can build up in your blood if you:  · Are overweight. · Have a family history of diabetes. · Take certain medicines, such as steroids. What are the symptoms? Having high blood sugar may not cause any symptoms at all. Or it may make you feel very thirsty or very hungry. You may also urinate more often than usual, have blurry vision, or lose weight without trying. How is high blood sugar treated? You can take steps to lower your blood sugar level if you understand what makes it get higher. Your doctor may want you to learn how to test your blood sugar level at home. Then you can see how illness, stress, or different kinds of food or medicine raise or lower your blood sugar level. Other tests may be needed to see if you have diabetes. How can you prevent high blood sugar? · Watch your weight. If you're overweight, losing just a small amount of weight may help. Reducing fat around your waist is most important. · Limit the amount of calories, sweets, and unhealthy fat you eat. Ask your doctor if a dietitian can help you. A registered dietitian can help you create meal plans that fit your lifestyle. · Get at least 30 minutes of exercise on most days of the week. Exercise helps control your blood sugar. It also helps you maintain a healthy weight. Walking is a good choice.  You also may want to do other activities, such as running, swimming, cycling, or playing tennis or team sports. · If your doctor prescribed medicines, take them exactly as prescribed. Call your doctor if you think you are having a problem with your medicine. You will get more details on the specific medicines your doctor prescribes. Follow-up care is a key part of your treatment and safety. Be sure to make and go to all appointments, and call your doctor if you are having problems. It's also a good idea to know your test results and keep a list of the medicines you take. Where can you learn more? Go to http://miryam-polo.info/. Enter O108 in the search box to learn more about \"Learning About High Blood Sugar. \"  Current as of: March 13, 2017  Content Version: 11.4  © 4274-5795 [employer]wise, [employer]. Care instructions adapted under license by [employer] (which disclaims liability or warranty for this information). If you have questions about a medical condition or this instruction, always ask your healthcare professional. [address] any warranty or liability for your use of this information.

## 2018-03-18 NOTE — ED PROVIDER NOTES
EMERGENCY DEPARTMENT HISTORY AND PHYSICAL EXAM 
 
 
Date: 3/18/2018 Patient Name: Denise Harris History of Presenting Illness Chief Complaint Patient presents with  Vaginal Discharge x2 days  Blood sugar problem History Provided By: Patient HPI: Denise Harris, 44 y.o. female with PMHx significant for type 2 diabetes, hypertension, pancreatitis, substance abuse, and schizophrenia presents via EMS to the ED with cc of hyperglycemia with blood glucose level in the 500's days. Pt endorses consumption of alcohol and eating cake today. Pt additionally complains of recent malodorous white vaginal discharge which is similar to previous occurences of STD. She notes a male sexual partner's condom broke inside her recently. She lives in a boarding house, and goes to [facility] for psychiatry and counseling. She denies any fever. As there are no physical symptoms or complaints of pain, there are no modifying factors, severity (0/10), quality, or duration regarding the pt's presenting complaint. PCP: Raghav Ames, DEWAYNE There are no other complaints, changes, or physical findings at this time. Current Outpatient Prescriptions Medication Sig Dispense Refill  Lancets (ACCU-CHEK SOFTCLIX LANCETS) misc CHECK BLOOD SUGAR TWICE DAILY 60 Each 11  Blood-Glucose Meter monitoring kit Monitor BG 4 times daily. Dx uncontrolled type 2 DM E11.65 1 Kit 0  
 insulin lispro (HUMALOG) 100 unit/mL kwikpen Take 4 units for BG between 150-200, take 6 unites for -250, take 8 units for  or higher  Indications: type 1 diabetes mellitus 1 Package 5  
 insulin glargine (LANTUS) 100 unit/mL injection 30 Units by SubCUTAneous route nightly. Indications: type 1 diabetes mellitus (Patient taking differently: 20 Units by SubCUTAneous route nightly. Indications: type 1 diabetes mellitus) 1 Vial 5  Syringe with Needle, Disp, 1 mL 26 gauge x 5/8\" syrg Dispense syringe with needle for insulin injection 4 times a day. Indications: PATIENT NEEDS OFFICE VISIT FOR FURTHER REFILLS 200 Each 0  
 QUEtiapine (SEROQUEL) 200 mg tablet Take 200 mg by mouth nightly.  divalproex DR (DEPAKOTE) 500 mg tablet Take 1,000 mg by mouth two (2) times a day.  haloperidol (HALDOL) 5 mg tablet Take 2.5 mg by mouth nightly.  sertraline (ZOLOFT) 50 mg tablet Take 100 mg by mouth daily.  acetaminophen (TYLENOL) 325 mg tablet Take 2 Tabs by mouth every four (4) hours as needed for Pain. 20 Tab 0  
 insulin detemir (LEVEMIR) 100 unit/mL injection 15 Units by SubCUTAneous route nightly.  amLODIPine (NORVASC) 5 mg tablet Take 1 Tab by mouth daily. 30 Tab 5  
 albuterol-ipratropium (DUO-NEB) 2.5 mg-0.5 mg/3 ml nebu 3 mL by Nebulization route every four (4) hours as needed. For wheezing and shortness of breath 25 Nebule 11  
 pravastatin (PRAVACHOL) 20 mg tablet Take 10 mg by mouth nightly.  ipratropium-albuterol (COMBIVENT RESPIMAT)  mcg/actuation inhaler Take 1 Puff by inhalation every four (4) hours as needed for Wheezing. 1 Inhaler 0 Past History Past Medical History: 
Past Medical History:  
Diagnosis Date  Alcohol abuse, in remission   
 quit 17 days ago  Asthma   
 does not use inhalers  Borderline personality disorder  Diabetes ([facility] [address]  GERD (gastroesophageal reflux disease)  Hypertension  Other ill-defined conditions(799.89)   
 kidney stones ,passed one  Other ill-defined conditions(799.89) sickle cell trait  Other ill-defined conditions(799.89)   
 increased cholesterol  Pancreatitis  Psychiatric disorder   
 schizophrenia, bipolar, depression, anxiety Past Surgical History: 
Past Surgical History:  
Procedure Laterality Date  ABDOMEN SURGERY PROC UNLISTED  3/12/14 CHOLECYSTECTOMY LAPAROSCOPIC    
 HX GASTRIC BYPASS  HX GYN  11/8/2012  
 c section x2  HX OTHER SURGICAL  3/13/14  ENDOSCOPIC RETROGRADE CHOLANGIOPANCREATOGRAPHY  HX OTHER SURGICAL  8/4/14  
 endoscopic stent placed to bile duct  HX TUBAL LIGATION  2012 Family History: 
Family History Problem Relation Age of Onset  Heart Disease Mother  Diabetes Mother  Hypertension Mother  Hypertension Maternal Grandmother Social History: 
Social History Substance Use Topics  Smoking status: Current Every Day Smoker Packs/day: 0.50 Years: 14.00 Types: Cigarettes  Smokeless tobacco: Never Used Comment: cigarettes  Alcohol use Yes Comment: occasionally, last had \"i had one beer\" today Allergies: Allergies Allergen Reactions  Dilaudid [Hydromorphone (Bulk)] Itching  Ibuprofen Not Reported This Time Review of Systems Review of Systems Constitutional: Negative for chills and fever. HENT: Negative for congestion, rhinorrhea, sneezing and sore throat. Eyes: Negative for redness and visual disturbance. Respiratory: Negative for shortness of breath. Cardiovascular: Negative for leg swelling. Gastrointestinal: Negative for abdominal pain, nausea and vomiting. Genitourinary: Positive for vaginal discharge (malodorous white). Negative for difficulty urinating and frequency. Musculoskeletal: Negative for back pain, myalgias and neck stiffness. Skin: Negative for rash. Neurological: Negative for dizziness, syncope, weakness and headaches. Hematological: Negative for adenopathy. All other systems reviewed and are negative. Physical Exam  
Physical Exam  
Constitutional: She is oriented to person, place, and time. She appears well-developed and well-nourished. Intoxicated. Ketotic smelling. Sluggish. Slow to respond. HENT:  
Head: Normocephalic and atraumatic. Mouth/Throat: Oropharynx is clear and moist. Mucous membranes are dry. Eyes: Conjunctivae and EOM are normal.  
Neck: Normal range of motion and full passive range of motion without pain.  Neck supple. Cardiovascular: Normal rate, regular rhythm, S1 normal, S2 normal, normal heart sounds, intact distal pulses and normal pulses. No murmur heard. Pulmonary/Chest: Effort normal and breath sounds normal. No respiratory distress. She has no wheezes. Abdominal: Soft. Normal appearance and bowel sounds are normal. She exhibits no distension. There is no tenderness. There is no rebound. Musculoskeletal: Normal range of motion. Neurological: She is alert and oriented to person, place, and time. She has normal strength. Skin: Skin is warm, dry and intact. No rash noted. Psychiatric: She has a normal mood and affect. Her speech is normal and behavior is normal. Judgment and thought content normal.  
Nursing note and vitals reviewed. Diagnostic Study Results Labs - Recent Results (from the past 12 hour(s)) GLUCOSE, POC Collection Time: 03/18/18  1:34 PM  
Result Value Ref Range Glucose (POC) 328 (H) 65 - 100 mg/dL Performed by Rene Molina URINALYSIS W/ REFLEX CULTURE Collection Time: 03/18/18  2:14 PM  
Result Value Ref Range Color YELLOW/STRAW Appearance CLEAR CLEAR Specific gravity 1.010 1.003 - 1.030    
 pH (UA) 6.0 5.0 - 8.0 Protein NEGATIVE  NEG mg/dL Glucose >1000 (A) NEG mg/dL Ketone NEGATIVE  NEG mg/dL Bilirubin NEGATIVE  NEG Blood NEGATIVE  NEG Urobilinogen 0.2 0.2 - 1.0 EU/dL Nitrites NEGATIVE  NEG Leukocyte Esterase NEGATIVE  NEG    
 WBC 0-4 0 - 4 /hpf  
 RBC 0-5 0 - 5 /hpf Epithelial cells FEW FEW /lpf Bacteria NEGATIVE  NEG /hpf  
 UA:UC IF INDICATED CULTURE NOT INDICATED BY UA RESULT CNI METABOLIC PANEL, COMPREHENSIVE Collection Time: 03/18/18  2:14 PM  
Result Value Ref Range Sodium 141 136 - 145 mmol/L Potassium 3.7 3.5 - 5.1 mmol/L Chloride 104 97 - 108 mmol/L  
 CO2 24 21 - 32 mmol/L Anion gap 13 5 - 15 mmol/L Glucose 180 (H) 65 - 100 mg/dL BUN 6 6 - 20 MG/DL  Creatinine 0. 73 0.55 - 1.02 MG/DL  
 BUN/Creatinine ratio 8 (L) 12 - 20 GFR est AA >60 >60 ml/min/1.73m2 GFR est non-AA >60 >60 ml/min/1.73m2 Calcium 8.5 8.5 - 10.1 MG/DL Bilirubin, total 0.4 0.2 - 1.0 MG/DL  
 ALT (SGPT) 20 12 - 78 U/L  
 AST (SGOT) 15 15 - 37 U/L Alk. phosphatase 66 45 - 117 U/L Protein, total 6.6 6.4 - 8.2 g/dL Albumin 2.9 (L) 3.5 - 5.0 g/dL Globulin 3.7 2.0 - 4.0 g/dL A-G Ratio 0.8 (L) 1.1 - 2.2    
CBC WITH AUTOMATED DIFF Collection Time: 03/18/18  2:14 PM  
Result Value Ref Range WBC 4.8 3.6 - 11.0 K/uL  
 RBC 5.04 3.80 - 5.20 M/uL  
 HGB 14.9 11.5 - 16.0 g/dL HCT 40.1 35.0 - 47.0 % MCV 79.6 (L) 80.0 - 99.0 FL  
 MCH 29.6 26.0 - 34.0 PG  
 MCHC 37.2 (H) 30.0 - 36.5 g/dL  
 RDW 14.1 11.5 - 14.5 % PLATELET 084 (L) 603 - 400 K/uL MPV 11.8 8.9 - 12.9 FL  
 NRBC 0.0 0  WBC ABSOLUTE NRBC 0.00 0.00 - 0.01 K/uL NEUTROPHILS 26 (L) 32 - 75 % LYMPHOCYTES 55 (H) 12 - 49 % MONOCYTES 10 5 - 13 % EOSINOPHILS 9 (H) 0 - 7 % BASOPHILS 0 0 - 1 % IMMATURE GRANULOCYTES 0 0.0 - 0.5 % ABS. NEUTROPHILS 1.2 (L) 1.8 - 8.0 K/UL  
 ABS. LYMPHOCYTES 2.6 0.8 - 3.5 K/UL  
 ABS. MONOCYTES 0.5 0.0 - 1.0 K/UL  
 ABS. EOSINOPHILS 0.4 0.0 - 0.4 K/UL  
 ABS. BASOPHILS 0.0 0.0 - 0.1 K/UL  
 ABS. IMM. GRANS. 0.0 0.00 - 0.04 K/UL  
 DF AUTOMATED    
HCG URINE, QL. - POC Collection Time: 03/18/18  2:24 PM  
Result Value Ref Range Pregnancy test,urine (POC) NEGATIVE  NEG    
GLUCOSE, POC Collection Time: 03/18/18  2:39 PM  
Result Value Ref Range Glucose (POC) 130 (H) 65 - 100 mg/dL Performed by Hailey Tinsley Medical Decision Making I am the first provider for this patient. I reviewed the vital signs, available nursing notes, past medical history, past surgical history, family history and social history. Vital Signs-Reviewed the patient's vital signs.  
Patient Vitals for the past 12 hrs: 
 Temp Pulse Resp BP SpO2  
03/18/18 1336 97.3 °F (36.3 °C) 84 18 130/80 98 % Pulse Oximetry Analysis - 98% on room air Records Reviewed: Nursing Notes and Old Medical Records Provider Notes (Medical Decision Making): DDx: DKA, type 2 diabetes, alcohol intoxication, STD, UTI  
 
ED Course:  
Initial assessment performed. The patients presenting problems have been discussed, and they are in agreement with the care plan formulated and outlined with them. I have encouraged them to ask questions as they arise throughout their visit. Disposition: 
DISCHARGE NOTE 
3:36 PM 
The patient has been re-evaluated and is ready for discharge. Reviewed available results with patient. Counseled pt on diagnosis and care plan. Pt has expressed understanding, and all questions have been answered. Pt agrees with plan and agrees to follow up as recommended, or return to the ED if their symptoms worsen. Discharge instructions have been provided and explained to the pt, along with reasons to return to the ED. PLAN: 
1. Current Discharge Medication List  
  
 
2. Follow-up Information Follow up With Details Comments Contact Info Osmany Duran NP In 2 days If symptoms worsen [id number] [id number] 
[phone] Return to ED if worse Diagnosis Clinical Impression: 1. Hyperglycemia 2. Vaginal discharge Attestations: This note is prepared by Nikhil Mohr. Glory Gutierres, acting as Scribe for Andrew Mccurdy MD. Andrew Mccurdy MD: The scribe's documentation has been prepared under my direction and personally reviewed by me in its entirety. I confirm that the note above accurately reflects all work, treatment, procedures, and medical decision making performed by me.

## 2018-06-12 ENCOUNTER — HOSPITAL ENCOUNTER (EMERGENCY)
Age: 43
Discharge: HOME | End: 2018-06-12
Attending: EMERGENCY MEDICINE
Payer: MEDICARE

## 2018-06-12 VITALS
BODY MASS INDEX: 24.09 KG/M2 | DIASTOLIC BLOOD PRESSURE: 85 MMHG | SYSTOLIC BLOOD PRESSURE: 148 MMHG | OXYGEN SATURATION: 100 % | WEIGHT: 149.91 LBS | TEMPERATURE: 97.6 F | HEART RATE: 85 BPM | RESPIRATION RATE: 16 BRPM | HEIGHT: 66 IN

## 2018-06-12 DIAGNOSIS — R73.9 HYPERGLYCEMIA: Primary | ICD-10-CM

## 2018-06-12 DIAGNOSIS — Z71.1 CONCERN ABOUT STD IN FEMALE WITHOUT DIAGNOSIS: ICD-10-CM

## 2018-06-12 DIAGNOSIS — B37.31 VAGINAL YEAST INFECTION: ICD-10-CM

## 2018-06-12 LAB
ALBUMIN SERPL-MCNC: 2.9 G/DL (ref 3.5–5)
ALBUMIN/GLOB SERPL: 0.8 {RATIO} (ref 1.1–2.2)
ALP SERPL-CCNC: 59 U/L (ref 45–117)
ALT SERPL-CCNC: 20 U/L (ref 12–78)
ANION GAP SERPL CALC-SCNC: 6 MMOL/L (ref 5–15)
APPEARANCE UR: CLEAR
AST SERPL-CCNC: 18 U/L (ref 15–37)
BACTERIA URNS QL MICRO: NEGATIVE /HPF
BASOPHILS # BLD: 0.1 K/UL (ref 0–0.1)
BASOPHILS NFR BLD: 1 % (ref 0–1)
BILIRUB SERPL-MCNC: 0.3 MG/DL (ref 0.2–1)
BILIRUB UR QL: NEGATIVE
BUN SERPL-MCNC: 7 MG/DL (ref 6–20)
BUN/CREAT SERPL: 8 (ref 12–20)
CALCIUM SERPL-MCNC: 8.4 MG/DL (ref 8.5–10.1)
CHLORIDE SERPL-SCNC: 97 MMOL/L (ref 97–108)
CLUE CELLS VAG QL WET PREP: NORMAL
CO2 SERPL-SCNC: 29 MMOL/L (ref 21–32)
COLOR UR: ABNORMAL
CREAT SERPL-MCNC: 0.83 MG/DL (ref 0.55–1.02)
DIFFERENTIAL METHOD BLD: ABNORMAL
EOSINOPHIL # BLD: 0.2 K/UL (ref 0–0.4)
EOSINOPHIL NFR BLD: 4 % (ref 0–7)
EPITH CASTS URNS QL MICRO: ABNORMAL /LPF
ERYTHROCYTE [DISTWIDTH] IN BLOOD BY AUTOMATED COUNT: 13.1 % (ref 11.5–14.5)
GLOBULIN SER CALC-MCNC: 3.8 G/DL (ref 2–4)
GLUCOSE BLD STRIP.AUTO-MCNC: 134 MG/DL (ref 65–100)
GLUCOSE BLD STRIP.AUTO-MCNC: >600 MG/DL (ref 65–100)
GLUCOSE BLD STRIP.AUTO-MCNC: >600 MG/DL (ref 65–100)
GLUCOSE SERPL-MCNC: 550 MG/DL (ref 65–100)
GLUCOSE UR STRIP.AUTO-MCNC: >1000 MG/DL
HCT VFR BLD AUTO: 39.7 % (ref 35–47)
HGB BLD-MCNC: 14.7 G/DL (ref 11.5–16)
HGB UR QL STRIP: NEGATIVE
IMM GRANULOCYTES # BLD: 0 K/UL (ref 0–0.04)
IMM GRANULOCYTES NFR BLD AUTO: 0 % (ref 0–0.5)
KETONES SERPL QL: NEGATIVE
KETONES UR QL STRIP.AUTO: NEGATIVE MG/DL
KOH PREP SPEC: NORMAL
LEUKOCYTE ESTERASE UR QL STRIP.AUTO: NEGATIVE
LYMPHOCYTES # BLD: 2.1 K/UL (ref 0.8–3.5)
LYMPHOCYTES NFR BLD: 43 % (ref 12–49)
MCH RBC QN AUTO: 29.6 PG (ref 26–34)
MCHC RBC AUTO-ENTMCNC: 37 G/DL (ref 30–36.5)
MCV RBC AUTO: 79.9 FL (ref 80–99)
MONOCYTES # BLD: 0.5 K/UL (ref 0–1)
MONOCYTES NFR BLD: 10 % (ref 5–13)
NEUTS SEG # BLD: 2.1 K/UL (ref 1.8–8)
NEUTS SEG NFR BLD: 42 % (ref 32–75)
NITRITE UR QL STRIP.AUTO: NEGATIVE
NRBC # BLD: 0 K/UL (ref 0–0.01)
NRBC BLD-RTO: 0 PER 100 WBC
PH UR STRIP: 6 [PH] (ref 5–8)
PLATELET # BLD AUTO: 131 K/UL (ref 150–400)
PLATELET COMMENTS,PCOM: ABNORMAL
POTASSIUM SERPL-SCNC: 4.6 MMOL/L (ref 3.5–5.1)
PROT SERPL-MCNC: 6.7 G/DL (ref 6.4–8.2)
PROT UR STRIP-MCNC: NEGATIVE MG/DL
RBC # BLD AUTO: 4.97 M/UL (ref 3.8–5.2)
RBC #/AREA URNS HPF: ABNORMAL /HPF (ref 0–5)
RBC MORPH BLD: ABNORMAL
SERVICE CMNT-IMP: ABNORMAL
SERVICE CMNT-IMP: NORMAL
SODIUM SERPL-SCNC: 132 MMOL/L (ref 136–145)
SP GR UR REFRACTOMETRY: 1.01 (ref 1–1.03)
T VAGINALIS VAG QL WET PREP: NORMAL
UA: UC IF INDICATED,UAUC: ABNORMAL
UROBILINOGEN UR QL STRIP.AUTO: 0.2 EU/DL (ref 0.2–1)
WBC # BLD AUTO: 5 K/UL (ref 3.6–11)
WBC URNS QL MICRO: ABNORMAL /HPF (ref 0–4)

## 2018-06-12 PROCEDURE — 87491 CHLMYD TRACH DNA AMP PROBE: CPT | Performed by: PHYSICIAN ASSISTANT

## 2018-06-12 PROCEDURE — 96372 THER/PROPH/DIAG INJ SC/IM: CPT

## 2018-06-12 PROCEDURE — 74011000250 HC RX REV CODE- 250: Performed by: PHYSICIAN ASSISTANT

## 2018-06-12 PROCEDURE — 74011250637 HC RX REV CODE- 250/637: Performed by: PHYSICIAN ASSISTANT

## 2018-06-12 PROCEDURE — 82009 KETONE BODYS QUAL: CPT | Performed by: PHYSICIAN ASSISTANT

## 2018-06-12 PROCEDURE — 87210 SMEAR WET MOUNT SALINE/INK: CPT | Performed by: PHYSICIAN ASSISTANT

## 2018-06-12 PROCEDURE — 80053 COMPREHEN METABOLIC PANEL: CPT | Performed by: PHYSICIAN ASSISTANT

## 2018-06-12 PROCEDURE — 81001 URINALYSIS AUTO W/SCOPE: CPT | Performed by: PHYSICIAN ASSISTANT

## 2018-06-12 PROCEDURE — 74011250636 HC RX REV CODE- 250/636: Performed by: PHYSICIAN ASSISTANT

## 2018-06-12 PROCEDURE — 82962 GLUCOSE BLOOD TEST: CPT

## 2018-06-12 PROCEDURE — 36415 COLL VENOUS BLD VENIPUNCTURE: CPT | Performed by: PHYSICIAN ASSISTANT

## 2018-06-12 PROCEDURE — 74011636637 HC RX REV CODE- 636/637: Performed by: PHYSICIAN ASSISTANT

## 2018-06-12 PROCEDURE — 85025 COMPLETE CBC W/AUTO DIFF WBC: CPT | Performed by: PHYSICIAN ASSISTANT

## 2018-06-12 PROCEDURE — 96374 THER/PROPH/DIAG INJ IV PUSH: CPT

## 2018-06-12 PROCEDURE — 96361 HYDRATE IV INFUSION ADD-ON: CPT

## 2018-06-12 PROCEDURE — 99284 EMERGENCY DEPT VISIT MOD MDM: CPT

## 2018-06-12 RX ORDER — CEFTRIAXONE 250 MG/8ML
INJECTION, POWDER, FOR SOLUTION INTRAMUSCULAR; INTRAVENOUS
Status: DISCONTINUED
Start: 2018-06-12 | End: 2018-06-12 | Stop reason: HOSPADM

## 2018-06-12 RX ORDER — AZITHROMYCIN 250 MG/1
TABLET, FILM COATED ORAL
Status: DISCONTINUED
Start: 2018-06-12 | End: 2018-06-12 | Stop reason: HOSPADM

## 2018-06-12 RX ORDER — LIDOCAINE HYDROCHLORIDE 10 MG/ML
INJECTION, SOLUTION EPIDURAL; INFILTRATION; INTRACAUDAL; PERINEURAL
Status: DISCONTINUED
Start: 2018-06-12 | End: 2018-06-12 | Stop reason: HOSPADM

## 2018-06-12 RX ORDER — AZITHROMYCIN 250 MG/1
1000 TABLET, FILM COATED ORAL
Status: COMPLETED | OUTPATIENT
Start: 2018-06-12 | End: 2018-06-12

## 2018-06-12 RX ORDER — BENZTROPINE MESYLATE 2 MG/1
2 TABLET ORAL 2 TIMES DAILY
Status: ON HOLD | COMMUNITY
End: 2018-08-09

## 2018-06-12 RX ORDER — FLUCONAZOLE 150 MG/1
150 TABLET ORAL
Qty: 1 TAB | Refills: 0 | Status: SHIPPED | OUTPATIENT
Start: 2018-06-12 | End: 2018-06-12

## 2018-06-12 RX ADMIN — LIDOCAINE HYDROCHLORIDE 250 MG: 10 INJECTION, SOLUTION EPIDURAL; INFILTRATION; INTRACAUDAL; PERINEURAL at 15:06

## 2018-06-12 RX ADMIN — SODIUM CHLORIDE 1000 ML: 900 INJECTION, SOLUTION INTRAVENOUS at 13:57

## 2018-06-12 RX ADMIN — HUMAN INSULIN 10 UNITS: 100 INJECTION, SOLUTION SUBCUTANEOUS at 13:59

## 2018-06-12 RX ADMIN — AZITHROMYCIN 1000 MG: 250 TABLET, FILM COATED ORAL at 15:05

## 2018-06-12 NOTE — ED PROVIDER NOTES
EMERGENCY DEPARTMENT HISTORY AND PHYSICAL EXAM 
 
Date: 6/12/2018 Patient Name: Edith Pope History of Presenting Illness Chief Complaint Patient presents with  Vaginal Discharge  High Blood Sugar History Provided By: Patient HPI: Edith Pope is a 44 y.o. female with a PMH of chronic pancreatitis, uncontrolled diabetes mellitus, schizophrenia, hyperlipidemia who presents with multiple complaints today. Pt c/o white malodorous vaginal discharge X 3 weeks. Pt denies any burning, itching, dysuria, vaginal bleeding, fevers or back pain. Pt states she has had trichomonas in the past and the symptoms are similar. Pt requests STD screening and treatment at this time. Pt also c/o HA secondary to high blood sugars. Pt states her blood sugars have been reading \"high\", pt unable to identity her last normal blood sugar. Pt currently on Lantus and a sliding scale for her blood sugar but states that it has not been working. Pt has not seen her PCP in \" a long time\". Pt also admits to a right sided sharp HA located at her temple that has been causing her to have blurred vision. She states the headache has occurred for a week. Denies any numbness or tingling. Pt admits to chronic alcoholism stating that she \"stopped cold turkey\" three weeks ago. PCP: Jodee Garcia NP Current Facility-Administered Medications Medication Dose Route Frequency Provider Last Rate Last Dose  azithromycin (ZITHROMAX) 250 mg tablet  cefTRIAXone (ROCEPHIN) 250 mg injection  lidocaine (PF) (XYLOCAINE) 10 mg/mL (1 %) injection Current Outpatient Prescriptions Medication Sig Dispense Refill  benztropine (COGENTIN) 2 mg tablet Take 2 mg by mouth two (2) times a day.  fluconazole (DIFLUCAN) 150 mg tablet Take 1 Tab by mouth now for 1 dose. FDA advises cautious prescribing of oral fluconazole in pregnancy.  1 Tab 0  
 acetaminophen (TYLENOL) 325 mg tablet Take 2 Tabs by mouth every four (4) hours as needed for Pain. 20 Tab 0  
 Lancets (ACCU-CHEK SOFTCLIX LANCETS) misc CHECK BLOOD SUGAR TWICE DAILY 60 Each 11  Blood-Glucose Meter monitoring kit Monitor BG 4 times daily. Dx uncontrolled type 2 DM E11.65 1 Kit 0  
 insulin lispro (HUMALOG) 100 unit/mL kwikpen Take 4 units for BG between 150-200, take 6 unites for -250, take 8 units for  or higher  Indications: type 1 diabetes mellitus 1 Package 5  
 insulin glargine (LANTUS) 100 unit/mL injection 30 Units by SubCUTAneous route nightly. Indications: type 1 diabetes mellitus (Patient taking differently: 20 Units by SubCUTAneous route nightly. Indications: type 1 diabetes mellitus) 1 Vial 5  Syringe with Needle, Disp, 1 mL 26 gauge x 5/8\" syrg Dispense syringe with needle for insulin injection 4 times a day. Indications: PATIENT NEEDS OFFICE VISIT FOR FURTHER REFILLS 200 Each 0  
 QUEtiapine (SEROQUEL) 200 mg tablet Take 200 mg by mouth nightly.  divalproex DR (DEPAKOTE) 500 mg tablet Take 1,000 mg by mouth two (2) times a day.  haloperidol (HALDOL) 5 mg tablet Take 2.5 mg by mouth nightly.  sertraline (ZOLOFT) 50 mg tablet Take 100 mg by mouth daily.  amLODIPine (NORVASC) 5 mg tablet Take 1 Tab by mouth daily. 30 Tab 5  
 albuterol-ipratropium (DUO-NEB) 2.5 mg-0.5 mg/3 ml nebu 3 mL by Nebulization route every four (4) hours as needed. For wheezing and shortness of breath 25 Nebule 11  
 insulin detemir (LEVEMIR) 100 unit/mL injection 15 Units by SubCUTAneous route nightly.  pravastatin (PRAVACHOL) 20 mg tablet Take 10 mg by mouth nightly.  ipratropium-albuterol (COMBIVENT RESPIMAT)  mcg/actuation inhaler Take 1 Puff by inhalation every four (4) hours as needed for Wheezing. 1 Inhaler 0 Past History Past Medical History: 
Past Medical History:  
Diagnosis Date  Alcohol abuse, in remission   
 quit 17 days ago  Asthma   
 does not use inhalers  Borderline personality disorder  Diabetes ([facility] [address]  GERD (gastroesophageal reflux disease)  Hypertension  Other ill-defined conditions(799.89)   
 kidney stones ,passed one  Other ill-defined conditions(799.89) sickle cell trait  Other ill-defined conditions(799.89)   
 increased cholesterol  Pancreatitis  Psychiatric disorder   
 schizophrenia, bipolar, depression, anxiety Past Surgical History: 
Past Surgical History:  
Procedure Laterality Date  ABDOMEN SURGERY PROC UNLISTED  3/12/14 CHOLECYSTECTOMY LAPAROSCOPIC    
 HX GASTRIC BYPASS  HX GYN  11/8/2012  
 c section x2  HX OTHER SURGICAL  3/13/14 ENDOSCOPIC RETROGRADE CHOLANGIOPANCREATOGRAPHY  HX OTHER SURGICAL  8/4/14  
 endoscopic stent placed to bile duct  HX TUBAL LIGATION  2012 Family History: 
Family History Problem Relation Age of Onset  Heart Disease Mother  Diabetes Mother  Hypertension Mother  Hypertension Maternal Grandmother Social History: 
Social History Substance Use Topics  Smoking status: Current Every Day Smoker Packs/day: 0.50 Years: 14.00 Types: Cigarettes  Smokeless tobacco: Never Used Comment: cigarettes  Alcohol use No  
   Comment: occasionally, last had \"i had one beer\" today Allergies: Allergies Allergen Reactions  Dilaudid [Hydromorphone (Bulk)] Itching  Ibuprofen Not Reported This Time Review of Systems Review of Systems Constitutional: Negative for chills and fever. HENT: Negative for hearing loss and sore throat. Eyes: Positive for visual disturbance ( blurry vision). Respiratory: Negative for cough, chest tightness, shortness of breath and wheezing. Cardiovascular: Negative for chest pain and palpitations. Gastrointestinal: Negative for abdominal distention, abdominal pain, constipation, diarrhea, nausea and vomiting.   
Genitourinary: Positive for vaginal discharge (white vaginal discharge). Negative for dysuria, hematuria, pelvic pain, vaginal bleeding and vaginal pain. Musculoskeletal: Negative for back pain and myalgias. Neurological: Positive for headaches. Negative for dizziness, syncope, weakness and light-headedness. All other systems reviewed and are negative. Physical Exam  
 
Vitals:  
 06/12/18 1311 06/12/18 1519 BP: 161/80 (!) 143/92 Pulse: 73 79 Resp: 18 16 Temp: 98 °F (36.7 °C) 97.6 °F (36.4 °C) SpO2: 100% 100% Weight: 68 kg (149 lb 14.6 oz) Height: 5' 6\" (1.676 m) Physical Exam  
Constitutional: She is oriented to person, place, and time. Vital signs are normal. She appears well-developed and well-nourished. Non-toxic appearance. She does not have a sickly appearance. She does not appear ill. No distress. HENT:  
Head: Normocephalic and atraumatic. Right Ear: Hearing and external ear normal.  
Left Ear: Hearing and external ear normal.  
Nose: Nose normal.  
Mouth/Throat: Oropharynx is clear and moist and mucous membranes are normal.  
Eyes: Conjunctivae and EOM are normal. Pupils are equal, round, and reactive to light. Neck: Normal range of motion. Cardiovascular: Normal rate, regular rhythm, S1 normal, S2 normal, normal heart sounds and intact distal pulses. Exam reveals no distant heart sounds and no friction rub. Pulmonary/Chest: Effort normal and breath sounds normal. No accessory muscle usage. No respiratory distress. She has no wheezes. She has no rales. She exhibits no tenderness. Abdominal: Soft. Normal appearance and bowel sounds are normal. She exhibits no distension and no mass. There is no tenderness. There is no rebound, no guarding and no CVA tenderness. Genitourinary: Vagina normal and uterus normal. Pelvic exam was performed with patient supine. No labial fusion. There is no rash, tenderness, lesion or injury on the right labia. There is no rash, tenderness, lesion or injury on the left labia.  Cervix exhibits discharge (moderate white chunky discharge noted ). Cervix exhibits no motion tenderness and no friability. Right adnexum displays no mass and no tenderness. Left adnexum displays no mass, no tenderness and no fullness. Neurological: She is alert and oriented to person, place, and time. Coordination and gait normal. GCS eye subscore is 4. GCS verbal subscore is 5. GCS motor subscore is 6. Skin: Skin is warm, dry and intact. She is not diaphoretic. No pallor. Psychiatric: She has a normal mood and affect. Her speech is normal and behavior is normal. Judgment and thought content normal. Cognition and memory are normal.  
Nursing note and vitals reviewed. at 3:48 PM 
 
Diagnostic Study Results Labs - Recent Results (from the past 12 hour(s)) GLUCOSE, POC Collection Time: 06/12/18  1:10 PM  
Result Value Ref Range Glucose (POC) >600 (HH) 65 - 100 mg/dL Performed by Jerrell Pham GLUCOSE, POC Collection Time: 06/12/18  1:11 PM  
Result Value Ref Range Glucose (POC) >600 (HH) 65 - 100 mg/dL Performed by Jerrell Pham URINALYSIS W/ REFLEX CULTURE Collection Time: 06/12/18  1:23 PM  
Result Value Ref Range Color YELLOW/STRAW Appearance CLEAR CLEAR Specific gravity 1.010 1.003 - 1.030    
 pH (UA) 6.0 5.0 - 8.0 Protein NEGATIVE  NEG mg/dL Glucose >1000 (A) NEG mg/dL Ketone NEGATIVE  NEG mg/dL Bilirubin NEGATIVE  NEG Blood NEGATIVE  NEG Urobilinogen 0.2 0.2 - 1.0 EU/dL Nitrites NEGATIVE  NEG Leukocyte Esterase NEGATIVE  NEG    
 WBC 0-4 0 - 4 /hpf  
 RBC 0-5 0 - 5 /hpf Epithelial cells FEW FEW /lpf Bacteria NEGATIVE  NEG /hpf  
 UA:UC IF INDICATED CULTURE NOT INDICATED BY UA RESULT CNI METABOLIC PANEL, COMPREHENSIVE Collection Time: 06/12/18  1:40 PM  
Result Value Ref Range Sodium 132 (L) 136 - 145 mmol/L Potassium 4.6 3.5 - 5.1 mmol/L Chloride 97 97 - 108 mmol/L  
 CO2 29 21 - 32 mmol/L  Anion gap 6 5 - 15 mmol/L Glucose 550 (HH) 65 - 100 mg/dL BUN 7 6 - 20 MG/DL Creatinine 0.83 0.55 - 1.02 MG/DL  
 BUN/Creatinine ratio 8 (L) 12 - 20 GFR est AA >60 >60 ml/min/1.73m2 GFR est non-AA >60 >60 ml/min/1.73m2 Calcium 8.4 (L) 8.5 - 10.1 MG/DL Bilirubin, total 0.3 0.2 - 1.0 MG/DL  
 ALT (SGPT) 20 12 - 78 U/L  
 AST (SGOT) 18 15 - 37 U/L Alk. phosphatase 59 45 - 117 U/L Protein, total 6.7 6.4 - 8.2 g/dL Albumin 2.9 (L) 3.5 - 5.0 g/dL Globulin 3.8 2.0 - 4.0 g/dL A-G Ratio 0.8 (L) 1.1 - 2.2    
CBC WITH AUTOMATED DIFF Collection Time: 06/12/18  1:40 PM  
Result Value Ref Range WBC 5.0 3.6 - 11.0 K/uL  
 RBC 4.97 3.80 - 5.20 M/uL  
 HGB 14.7 11.5 - 16.0 g/dL HCT 39.7 35.0 - 47.0 % MCV 79.9 (L) 80.0 - 99.0 FL  
 MCH 29.6 26.0 - 34.0 PG  
 MCHC 37.0 (H) 30.0 - 36.5 g/dL  
 RDW 13.1 11.5 - 14.5 % PLATELET 703 (L) 903 - 400 K/uL NRBC 0.0 0  WBC ABSOLUTE NRBC 0.00 0.00 - 0.01 K/uL NEUTROPHILS 42 32 - 75 % LYMPHOCYTES 43 12 - 49 % MONOCYTES 10 5 - 13 % EOSINOPHILS 4 0 - 7 % BASOPHILS 1 0 - 1 % IMMATURE GRANULOCYTES 0 0.0 - 0.5 % ABS. NEUTROPHILS 2.1 1.8 - 8.0 K/UL  
 ABS. LYMPHOCYTES 2.1 0.8 - 3.5 K/UL  
 ABS. MONOCYTES 0.5 0.0 - 1.0 K/UL  
 ABS. EOSINOPHILS 0.2 0.0 - 0.4 K/UL  
 ABS. BASOPHILS 0.1 0.0 - 0.1 K/UL  
 ABS. IMM. GRANS. 0.0 0.00 - 0.04 K/UL  
 DF SMEAR SCANNED    
 PLATELET COMMENTS Large Platelets RBC COMMENTS TARGET CELLS 
PRESENT 
    
ACETONE/KETONE, QL Collection Time: 06/12/18  1:40 PM  
Result Value Ref Range Acetone/Ketone serum, QL. NEGATIVE  NEG       
KOH, OTHER SOURCES Collection Time: 06/12/18  1:40 PM  
Result Value Ref Range Special Requests: NO SPECIAL REQUESTS    
 KOH NO YEAST SEEN    
WET PREP Collection Time: 06/12/18  1:40 PM  
Result Value Ref Range Clue cells CLUE CELLS ABSENT Wet prep NO TRICHOMONAS SEEN    
GLUCOSE, POC  Collection Time: 06/12/18  3:18 PM  
Result Value Ref Range Glucose (POC) 134 (H) 65 - 100 mg/dL Performed by Baby Embs Radiologic Studies - No orders to display CT Results  (Last 48 hours) None CXR Results  (Last 48 hours) None Medical Decision Making I am the first provider for this patient. I reviewed the vital signs, available nursing notes, past medical history, past surgical history, family history and social history. Vital Signs-Reviewed the patient's vital signs. Records Reviewed: Old Medical Records ED Course:  
Pt seen and not written by Philip AVALOS and in conjunction with this provider. Linda Pelletier PA-C Disposition: 
discharged DISCHARGE NOTE:  
3:46 PM 
 
  Care plan outlined and precautions discussed. Patient has no new complaints, changes, or physical findings. Results of labs and swabs were reviewed with the patient. All medications were reviewed with the patient; will d/c home. All of pt's questions and concerns were addressed. Patient was instructed and agrees to follow up with PCP, as well as to return to the ED upon further deterioration. Patient is ready to go home. Follow-up Information Follow up With Details Comments Contact Info Angela Mansfield NP Schedule an appointment as soon as possible for a visit in 2 days As needed [address] [place] [address] [phone] 
[phone] Current Discharge Medication List  
  
START taking these medications Details  
fluconazole (DIFLUCAN) 150 mg tablet Take 1 Tab by mouth now for 1 dose. FDA advises cautious prescribing of oral fluconazole in pregnancy. Qty: 1 Tab, Refills: 0 CONTINUE these medications which have NOT CHANGED Details  
benztropine (COGENTIN) 2 mg tablet Take 2 mg by mouth two (2) times a day. acetaminophen (TYLENOL) 325 mg tablet Take 2 Tabs by mouth every four (4) hours as needed for Pain. Qty: 20 Tab, Refills: 0 Lancets (ACCU-CHEK SOFTCLIX LANCETS) misc CHECK BLOOD SUGAR TWICE DAILY Qty: 60 Each, Refills: 11 Blood-Glucose Meter monitoring kit Monitor BG 4 times daily. Dx uncontrolled type 2 DM E11.65 Qty: 1 Kit, Refills: 0  
  
insulin lispro (HUMALOG) 100 unit/mL kwikpen Take 4 units for BG between 150-200, take 6 unites for -250, take 8 units for  or higher  Indications: type 1 diabetes mellitus Qty: 1 Package, Refills: 5  
  
insulin glargine (LANTUS) 100 unit/mL injection 30 Units by SubCUTAneous route nightly. Indications: type 1 diabetes mellitus Qty: 1 Vial, Refills: 5 Syringe with Needle, Disp, 1 mL 26 gauge x 5/8\" syrg Dispense syringe with needle for insulin injection 4 times a day. Indications: PATIENT NEEDS OFFICE VISIT FOR FURTHER REFILLS Qty: 200 Each, Refills: 0 QUEtiapine (SEROQUEL) 200 mg tablet Take 200 mg by mouth nightly. divalproex DR (DEPAKOTE) 500 mg tablet Take 1,000 mg by mouth two (2) times a day.  
  
haloperidol (HALDOL) 5 mg tablet Take 2.5 mg by mouth nightly. sertraline (ZOLOFT) 50 mg tablet Take 100 mg by mouth daily. amLODIPine (NORVASC) 5 mg tablet Take 1 Tab by mouth daily. Qty: 30 Tab, Refills: 5 Associated Diagnoses: Essential hypertension  
  
albuterol-ipratropium (DUO-NEB) 2.5 mg-0.5 mg/3 ml nebu 3 mL by Nebulization route every four (4) hours as needed. For wheezing and shortness of breath Qty: 25 Nebule, Refills: 11  
 Associated Diagnoses: Moderate persistent asthma with acute exacerbation; Bronchitis  
  
insulin detemir (LEVEMIR) 100 unit/mL injection 15 Units by SubCUTAneous route nightly. pravastatin (PRAVACHOL) 20 mg tablet Take 10 mg by mouth nightly. ipratropium-albuterol (COMBIVENT RESPIMAT)  mcg/actuation inhaler Take 1 Puff by inhalation every four (4) hours as needed for Wheezing. Qty: 1 Inhaler, Refills: 0 Provider Notes (Medical Decision Making): DDx: candidiasis vulvovaginitis, trichomonas, chlamydia, gonorrhea, bacterial vaginosis, hyperglycemia, DKA, migraine, cluster headache, tension headache. Procedures Diagnosis Clinical Impression: 1. Hyperglycemia 2. Vaginal yeast infection 3. Concern about STD in female without diagnosis

## 2018-06-12 NOTE — ED TRIAGE NOTES
Patient reports vaginal discharge. Patient states, \"I think my sugar is high, I been drinking sugary drinks. I quit drinking cold turkey 3 weeks ago, I got to work on one thing at a time. \" Patient says the sugary drinks are cheaper for her. Patient's glucose reading is \"HI\" x 2 in triage.

## 2018-06-12 NOTE — DISCHARGE INSTRUCTIONS
Vaginal Yeast Infection: Care Instructions  Your Care Instructions    A vaginal yeast infection is caused by too many yeast cells in the vagina. This is common in women of all ages. Itching, vaginal discharge and irritation, and other symptoms can bother you. But yeast infections don't often cause other health problems. Some medicines can increase your risk of getting a yeast infection. These include antibiotics, birth control pills, hormones, and steroids. You may also be more likely to get a yeast infection if you are pregnant, have diabetes, douche, or wear tight clothes. With treatment, most yeast infections get better in 2 to 3 days. Follow-up care is a key part of your treatment and safety. Be sure to make and go to all appointments, and call your doctor if you are having problems. It's also a good idea to know your test results and keep a list of the medicines you take. How can you care for yourself at home? · Take your medicines exactly as prescribed. Call your doctor if you think you are having a problem with your medicine. · Ask your doctor about over-the-counter (OTC) medicines for yeast infections. They may cost less than prescription medicines. If you use an OTC treatment, read and follow all instructions on the label. · Do not use tampons while using a vaginal cream or suppository. The tampons can absorb the medicine. Use pads instead. · Wear loose cotton clothing. Do not wear nylon or other fabric that holds body heat and moisture close to the skin. · Try sleeping without underwear. · Do not scratch. Relieve itching with a cold pack or a cool bath. · Do not wash your vaginal area more than once a day. Use plain water or a mild, unscented soap. Air-dry the vaginal area. · Change out of wet swimsuits after swimming. · Do not have sex until you have finished your treatment. · Do not douche. When should you call for help?   Call your doctor now or seek immediate medical care if:  ? · You have unexpected vaginal bleeding. ? · You have new or increased pain in your vagina or pelvis. ? Watch closely for changes in your health, and be sure to contact your doctor if:  ? · You have a fever. ? · You are not getting better after 2 days. ? · Your symptoms come back after you finish your medicines. Where can you learn more? Go to http://miryam-polo.info/. Enter V994 in the search box to learn more about \"Vaginal Yeast Infection: Care Instructions. \"  Current as of: October 13, 2016  Content Version: 11.4  © 6002-2723 [employer]. Care instructions adapted under license by [employer] (which disclaims liability or warranty for this information). If you have questions about a medical condition or this instruction, always ask your healthcare professional. [address] any warranty or liability for your use of this information. Learning About High Blood Sugar  What is high blood sugar? Your body turns the food you eat into glucose (sugar), which it uses for energy. But if your body isn't able to use the sugar right away, it can build up in your blood and lead to high blood sugar. When the amount of sugar in your blood stays too high for too much of the time, you may have diabetes. Diabetes is a disease that can cause serious health problems. The good news is that lifestyle changes may help you get your blood sugar back to normal and avoid or delay diabetes. What causes high blood sugar? Sugar (glucose) can build up in your blood if you:  · Are overweight. · Have a family history of diabetes. · Take certain medicines, such as steroids. What are the symptoms? Having high blood sugar may not cause any symptoms at all. Or it may make you feel very thirsty or very hungry. You may also urinate more often than usual, have blurry vision, or lose weight without trying. How is high blood sugar treated?   You can take steps to lower your blood sugar level if you understand what makes it get higher. Your doctor may want you to learn how to test your blood sugar level at home. Then you can see how illness, stress, or different kinds of food or medicine raise or lower your blood sugar level. Other tests may be needed to see if you have diabetes. How can you prevent high blood sugar? · Watch your weight. If you're overweight, losing just a small amount of weight may help. Reducing fat around your waist is most important. · Limit the amount of calories, sweets, and unhealthy fat you eat. Ask your doctor if a dietitian can help you. A registered dietitian can help you create meal plans that fit your lifestyle. · Get at least 30 minutes of exercise on most days of the week. Exercise helps control your blood sugar. It also helps you maintain a healthy weight. Walking is a good choice. You also may want to do other activities, such as running, swimming, cycling, or playing tennis or team sports. · If your doctor prescribed medicines, take them exactly as prescribed. Call your doctor if you think you are having a problem with your medicine. You will get more details on the specific medicines your doctor prescribes. Follow-up care is a key part of your treatment and safety. Be sure to make and go to all appointments, and call your doctor if you are having problems. It's also a good idea to know your test results and keep a list of the medicines you take. Where can you learn more? Go to http://miryam-polo.info/. Enter O108 in the search box to learn more about \"Learning About High Blood Sugar. \"  Current as of: March 13, 2017  Content Version: 11.4  © 6003-6550 [employer]. Care instructions adapted under license by [employer] (which disclaims liability or warranty for this information).  If you have questions about a medical condition or this instruction, always ask your healthcare professional. [address] any warranty or liability for your use of this information.

## 2018-06-12 NOTE — ED NOTES
Pt presents to the ED with c/o vaginal discharge x 3 weeks and high blood sugar. Pt reports\" I stopped drinking cold turkey three weeks ago. \" Pt denies burning with urination but does reports frequency. Pt denies abdominal pain and back pain. Pt denies vaginal bleeding. Pt reprots that vaginal discharge is white. Pt has a hx of trichomonas. Pt took insulin last night. Pt is alert and oriented. Pt skin is warm and dry. Pt is ambulatory independently. Emergency Department Nursing Plan of Care The Nursing Plan of Care is developed from the Nursing assessment and Emergency Department Attending provider initial evaluation. The plan of care may be reviewed in the ED Provider note. The Plan of Care was developed with the following considerations:  
Patient / Family readiness to learn indicated by:verbalized understanding Persons(s) to be included in education: patient Barriers to Learning/Limitations:No 
 
Signed Janina Bryant 6/12/2018   1:28 PM

## 2018-06-12 NOTE — ED NOTES
Patient  given copy of dc instructions and 0 paper script(s) and 1 electronic scripts. Patient  verbalized understanding of instructions and script (s). Patient given a current medication reconciliation form and verbalized understanding of their medications. Patient  verbalized understanding of the importance of discussing medications with  his or her physician or clinic they will be following up with. Patient alert and oriented and in no acute distress. Patient offered wheelchair from treatment area to hospital entrance, patient declined wheelchair.

## 2018-06-13 ENCOUNTER — DOCUMENTATION ONLY (OUTPATIENT)
Dept: FAMILY MEDICINE CLINIC | Age: 43
End: 2018-06-13

## 2018-06-13 ENCOUNTER — HOSPITAL ENCOUNTER (EMERGENCY)
Age: 43
Discharge: HOME | End: 2018-06-13
Attending: EMERGENCY MEDICINE
Payer: MEDICARE

## 2018-06-13 VITALS
RESPIRATION RATE: 18 BRPM | WEIGHT: 149 LBS | TEMPERATURE: 98.4 F | DIASTOLIC BLOOD PRESSURE: 97 MMHG | BODY MASS INDEX: 23.95 KG/M2 | HEIGHT: 66 IN | SYSTOLIC BLOOD PRESSURE: 186 MMHG | OXYGEN SATURATION: 100 % | HEART RATE: 75 BPM

## 2018-06-13 DIAGNOSIS — G44.209 TENSION HEADACHE: ICD-10-CM

## 2018-06-13 DIAGNOSIS — H66.90 ACUTE OTITIS MEDIA, UNSPECIFIED OTITIS MEDIA TYPE: Primary | ICD-10-CM

## 2018-06-13 PROCEDURE — 99284 EMERGENCY DEPT VISIT MOD MDM: CPT

## 2018-06-13 RX ORDER — NAPROXEN 500 MG/1
500 TABLET ORAL 2 TIMES DAILY WITH MEALS
Qty: 20 TAB | Refills: 0 | Status: SHIPPED | OUTPATIENT
Start: 2018-06-13 | End: 2018-06-23

## 2018-06-13 RX ORDER — AMOXICILLIN 500 MG/1
500 TABLET, FILM COATED ORAL 3 TIMES DAILY
Qty: 30 TAB | Refills: 0 | Status: SHIPPED | OUTPATIENT
Start: 2018-06-13 | End: 2018-08-08

## 2018-06-13 RX ORDER — LISINOPRIL 2.5 MG/1
TABLET ORAL DAILY
Status: ON HOLD | COMMUNITY
End: 2018-08-09

## 2018-06-13 NOTE — ED NOTES
Pt reports intermittent left sided headache since yesterday; denies pain currently but states that it comes and goes; reports pain in left ear at times; minimal blurred vision; denies n/v 
 
 
Emergency Department Nursing Plan of Care The Nursing Plan of Care is developed from the Nursing assessment and Emergency Department Attending provider initial evaluation. The plan of care may be reviewed in the ED Provider note. The Plan of Care was developed with the following considerations:  
Patient / Family readiness to learn indicated by:verbalized understanding Persons(s) to be included in education: patient Barriers to Learning/Limitations:No 
 
Signed Sharon Fernandes RN   
6/13/2018   6:17 PM

## 2018-06-13 NOTE — ED PROVIDER NOTES
EMERGENCY DEPARTMENT HISTORY AND PHYSICAL EXAM 
 
 
Date: 6/13/2018 Patient Name: Juanjo Moore History of Presenting Illness Chief Complaint Patient presents with  
 Headache  
  left sided headache since yesterday History Provided By: Patient HPI: Juanjo Moore, 44 y.o. female with PMHx significant for diabetes, hypertension, pancreatitis, GERD, pancreatitis, presents with ambulation to the ED with cc of intermittent, moderate, left ear pain with radiation to her left temple since yesterday, along with associated headache. Per patient, she reports cough. She denies fevers, chills, nausea, vomiting, sore throat. She smokes daily. Chief Complaint: Left ear pain Duration: Yesterday Timing:  Constant Location: Left ear Quality: Pressure Severity: 8 out of 10 Modifying Factors: Intermittent Associated Symptoms: denies any other associated signs or symptoms There are no other complaints, changes, or physical findings at this time. PCP: Merrill Kan NP Current Outpatient Prescriptions Medication Sig Dispense Refill  lisinopril (PRINIVIL, ZESTRIL) 2.5 mg tablet Take  by mouth daily.  naproxen (NAPROSYN) 500 mg tablet Take 1 Tab by mouth two (2) times daily (with meals) for 10 days. 20 Tab 0  
 amoxicillin 500 mg tab Take 500 mg by mouth three (3) times daily. 30 Tab 0  
 benztropine (COGENTIN) 2 mg tablet Take 2 mg by mouth two (2) times a day.  Lancets (ACCU-CHEK SOFTCLIX LANCETS) misc CHECK BLOOD SUGAR TWICE DAILY 60 Each 11  Blood-Glucose Meter monitoring kit Monitor BG 4 times daily. Dx uncontrolled type 2 DM E11.65 1 Kit 0  
 insulin lispro (HUMALOG) 100 unit/mL kwikpen Take 4 units for BG between 150-200, take 6 unites for -250, take 8 units for  or higher  Indications: type 1 diabetes mellitus 1 Package 5  
 insulin glargine (LANTUS) 100 unit/mL injection 30 Units by SubCUTAneous route nightly.  Indications: type 1 diabetes mellitus (Patient taking differently: 20 Units by SubCUTAneous route nightly. Indications: type 1 diabetes mellitus) 1 Vial 5  Syringe with Needle, Disp, 1 mL 26 gauge x 5/8\" syrg Dispense syringe with needle for insulin injection 4 times a day. Indications: PATIENT NEEDS OFFICE VISIT FOR FURTHER REFILLS 200 Each 0  
 QUEtiapine (SEROQUEL) 200 mg tablet Take 200 mg by mouth nightly.  divalproex DR (DEPAKOTE) 500 mg tablet Take 1,000 mg by mouth two (2) times a day.  haloperidol (HALDOL) 5 mg tablet Take 2.5 mg by mouth nightly.  sertraline (ZOLOFT) 50 mg tablet Take 100 mg by mouth daily.  albuterol-ipratropium (DUO-NEB) 2.5 mg-0.5 mg/3 ml nebu 3 mL by Nebulization route every four (4) hours as needed. For wheezing and shortness of breath 25 Nebule 11 Past History Past Medical History: 
Past Medical History:  
Diagnosis Date  Alcohol abuse, in remission   
 quit 17 days ago  Asthma   
 does not use inhalers  Borderline personality disorder  Diabetes ([facility] [address]  GERD (gastroesophageal reflux disease)  Hypertension  Other ill-defined conditions(799.89)   
 kidney stones ,passed one  Other ill-defined conditions(799.89) sickle cell trait  Other ill-defined conditions(799.89)   
 increased cholesterol  Pancreatitis  Psychiatric disorder   
 schizophrenia, bipolar, depression, anxiety Past Surgical History: 
Past Surgical History:  
Procedure Laterality Date  ABDOMEN SURGERY PROC UNLISTED  3/12/14 CHOLECYSTECTOMY LAPAROSCOPIC    
 HX GASTRIC BYPASS  HX GYN  11/8/2012  
 c section x2  HX OTHER SURGICAL  3/13/14 ENDOSCOPIC RETROGRADE CHOLANGIOPANCREATOGRAPHY  HX OTHER SURGICAL  8/4/14  
 endoscopic stent placed to bile duct  HX TUBAL LIGATION  2012 Family History: 
Family History Problem Relation Age of Onset  Heart Disease Mother  Diabetes Mother  Hypertension Mother  Hypertension Maternal Grandmother Social History: 
Social History Substance Use Topics  Smoking status: Current Every Day Smoker Packs/day: 0.50 Years: 14.00 Types: Cigarettes  Smokeless tobacco: Never Used Comment: cigarettes  Alcohol use No  
   Comment: occasionally, last had \"i had one beer\" today Allergies: Allergies Allergen Reactions  Dilaudid [Hydromorphone (Bulk)] Itching  Ibuprofen Not Reported This Time Review of Systems Review of Systems Constitutional: Negative. Negative for chills, diaphoresis and fever. HENT: Positive for ear pain (Left ear). Negative for congestion, sinus pain, sore throat and trouble swallowing. Eyes: Negative for photophobia. Respiratory: Positive for cough. Negative for chest tightness, shortness of breath and wheezing. Cardiovascular: Negative. Negative for chest pain and palpitations. Gastrointestinal: Negative. Negative for abdominal pain, blood in stool, diarrhea, nausea and vomiting. Genitourinary: Negative for difficulty urinating, dysuria and frequency. Musculoskeletal: Negative. Negative for arthralgias, myalgias, neck pain and neck stiffness. Skin: Negative. Neurological: Positive for headaches (Left temple). Negative for dizziness, tremors, seizures, syncope, speech difficulty and light-headedness. Psychiatric/Behavioral: Negative. Negative for confusion. The patient is not nervous/anxious. All other systems reviewed and are negative. Physical Exam  
Physical Exam  
Constitutional: She is oriented to person, place, and time. She appears well-developed and well-nourished. HENT:  
Head: Normocephalic. Left ear TM swollen and erythematous, postpharyngeal erythema Eyes: Conjunctivae and EOM are normal. Pupils are equal, round, and reactive to light. Neck: Normal range of motion. Neck supple. Cardiovascular: Normal rate, regular rhythm, normal heart sounds and intact distal pulses. Pulmonary/Chest: Effort normal and breath sounds normal.  
Abdominal: Soft. Bowel sounds are normal. She exhibits no distension. There is no rebound. Musculoskeletal: Normal range of motion. She exhibits no edema or deformity. Neurological: She is alert and oriented to person, place, and time. Skin: Skin is warm and dry. Psychiatric: She has a normal mood and affect. Her behavior is normal. Judgment and thought content normal.  
Nursing note and vitals reviewed. Medical Decision Making I am the first provider for this patient. I reviewed the vital signs, available nursing notes, past medical history, past surgical history, family history and social history. Vital Signs-Reviewed the patient's vital signs. Patient Vitals for the past 12 hrs: 
 Temp Pulse Resp BP SpO2  
06/13/18 1812 98.4 °F (36.9 °C) 75 18 (!) 186/97 100 % Pulse Oximetry Analysis - 100% on room air Cardiac Monitor:  
Rate: 75 bpm 
Rhythm: Normal Sinus Rhythm Records Reviewed: Nursing Notes and Old Medical Records Provider Notes (Medical Decision Making): DDx: otitis media vs. Trigeminal neuralgia vs. Tension headache ED Course:  
Initial assessment performed. The patients presenting problems have been discussed, and they are in agreement with the care plan formulated and outlined with them. I have encouraged them to ask questions as they arise throughout their visit. Disposition: 
DISCHARGE NOTE 
6:26 PM 
The patient has been re-evaluated and is ready for discharge. Reviewed available results with patient. Counseled pt on diagnosis and care plan. Pt has expressed understanding, and all questions have been answered. Pt agrees with plan and agrees to F/U as recommended, or return to the ED if their sxs worsen. Discharge instructions have been provided and explained to the pt, along with reasons to return to the ED.  
Written by Alycia Monge ED Scribe, as dictated by Rishi Montero MD. 
 
PLAN: 1.  
Current Discharge Medication List  
  
START taking these medications Details  
naproxen (NAPROSYN) 500 mg tablet Take 1 Tab by mouth two (2) times daily (with meals) for 10 days. Qty: 20 Tab, Refills: 0  
  
amoxicillin 500 mg tab Take 500 mg by mouth three (3) times daily. Qty: 30 Tab, Refills: 0  
  
  
 
2. Follow-up Information Follow up With Details Comments Contact Info Simón Cross NP Call  [address] [place]  [address] [phone] [facility] EMERGENCY DEPT  As needed, If symptoms worsen [address] [facility],[phone] Return to ED if worse Diagnosis Clinical Impression: 1. Acute otitis media, unspecified otitis media type 2. Tension headache Attestations: This note is prepared by Isha Post, acting as Scribe for Patricia Irwin MD. 
 
Patricia Irwin MD: The scribe's documentation has been prepared under my direction and personally reviewed by me in its entirety. I confirm that the note above accurately reflects all work, treatment, procedures, and medical decision making performed by me.

## 2018-06-13 NOTE — PROGRESS NOTES
Patient is [employer] patient, has been to the ED 4 times this year. Patient does not have an appt. Scheduled, [address] at both her home and cell #.

## 2018-06-13 NOTE — DISCHARGE INSTRUCTIONS
Tension Headache: Care Instructions  Your Care Instructions  Most headaches are tension headaches. These headaches tend to happen again, especially if you are under stress. A tension headache may cause pain or a feeling of pressure all over your head. You probably can't pinpoint the center of the pain. If you keep getting tension headaches, the best thing you can do to limit them is to find out what is causing them and then make changes in those areas. Follow-up care is a key part of your treatment and safety. Be sure to make and go to all appointments, and call your doctor if you are having problems. It's also a good idea to know your test results and keep a list of the medicines you take. How can you care for yourself at home? · Rest in a quiet, dark room with a cool cloth on your forehead until your headache is gone. Close your eyes, and try to relax or go to sleep. Don't watch TV or read. Avoid using the computer. · Use a warm, moist towel or a heating pad set on low to relax tight shoulder and neck muscles. · Have someone gently massage your neck and shoulders. · Take pain medicines exactly as directed. ¨ If the doctor gave you a prescription medicine for pain, take it as prescribed. ¨ If you are not taking a prescription pain medicine, ask your doctor if you can take an over-the-counter medicine. · Be careful not to take pain medicine more often than the instructions allow, because you may get worse or more frequent headaches when the medicine wears off. · If you get another tension headache, stop what you are doing and sit quietly for a moment. Close your eyes and breathe slowly. Try to relax your head and neck muscles. · Do not ignore new symptoms that occur with a headache, such as fever, weakness or numbness, vision changes, or confusion. These may be signs of a more serious problem. To help prevent headaches  · Keep a headache diary so you can figure out what triggers your headaches. Avoiding triggers may help you prevent headaches. Record when each headache began, how long it lasted, and what the pain was like (throbbing, aching, stabbing, or dull). List anything that may have triggered the headache, such as being physically or emotionally stressed or being anxious or depressed. Other possible triggers are hunger, anger, fatigue, poor posture, and muscle strain. · Find healthy ways to deal with stress. Headaches are most common during or right after stressful times. Take time to relax before and after you do something that has caused a headache in the past.  · Exercise daily to relieve stress. Relaxation exercises may help reduce tension. · Get plenty of sleep. · Eat regularly and well. Long periods without food can trigger a headache. · Treat yourself to a massage. Some people find that massages are very helpful in relieving tension. · Try to keep your muscles relaxed by keeping good posture. Check your jaw, face, neck, and shoulder muscles for tension, and try to relax them. When sitting at a desk, change positions often, and stretch for 30 seconds each hour. · Reduce eyestrain from computers by blinking frequently and looking away from the computer screen every so often. Make sure you have proper eyewear and that your monitor is set up properly, about an arm's length away. When should you call for help? Call 911 anytime you think you may need emergency care. For example, call if:  ? · You have signs of a stroke. These may include:  ¨ Sudden numbness, paralysis, or weakness in your face, arm, or leg, especially on only one side of your body. ¨ Sudden vision changes. ¨ Sudden trouble speaking. ¨ Sudden confusion or trouble understanding simple statements. ¨ Sudden problems with walking or balance. ¨ A sudden, severe headache that is different from past headaches. ?Call your doctor now or seek immediate medical care if:  ? · You have new or worse nausea and vomiting.    ? · You have a new or higher fever. ? · Your headache gets much worse. ? Watch closely for changes in your health, and be sure to contact your doctor if:  ? · You are not getting better after 2 days (48 hours). Where can you learn more? Go to http://miryam-polo.info/. Enter 84 17 46 in the search box to learn more about \"Tension Headache: Care Instructions. \"  Current as of: October 14, 2016  Content Version: 11.4  © 8499-3457 [employer]. Care instructions adapted under license by [employer] (which disclaims liability or warranty for this information). If you have questions about a medical condition or this instruction, always ask your healthcare professional. [place] [address] any warranty or liability for your use of this information. Ear Infection (Otitis Media): Care Instructions  Your Care Instructions    An ear infection may start with a cold and affect the middle ear (otitis media). It can hurt a lot. Most ear infections clear up on their own in a couple of days. Most often you will not need antibiotics. This is because many ear infections are caused by a virus. Antibiotics don't work against a virus. Regular doses of pain medicines are the best way to reduce your fever and help you feel better. Follow-up care is a key part of your treatment and safety. Be sure to make and go to all appointments, and call your doctor if you are having problems. It's also a good idea to know your test results and keep a list of the medicines you take. How can you care for yourself at home? · Take pain medicines exactly as directed. ¨ If the doctor gave you a prescription medicine for pain, take it as prescribed. ¨ If you are not taking a prescription pain medicine, take an over-the-counter medicine, such as acetaminophen (Tylenol), ibuprofen (Advil, Motrin), or naproxen (Aleve). Read and follow all instructions on the label.   ¨ Do not take two or more pain medicines at the same time unless the doctor told you to. Many pain medicines have acetaminophen, which is Tylenol. Too much acetaminophen (Tylenol) can be harmful. · Plan to take a full dose of pain reliever before bedtime. Getting enough sleep will help you get better. · Try a warm, moist washcloth on the ear. It may help relieve pain. · If your doctor prescribed antibiotics, take them as directed. Do not stop taking them just because you feel better. You need to take the full course of antibiotics. When should you call for help? Call your doctor now or seek immediate medical care if:  ? · You have new or increasing ear pain. ? · You have new or increasing pus or blood draining from your ear. ? · You have a fever with a stiff neck or a severe headache. ? Watch closely for changes in your health, and be sure to contact your doctor if:  ? · You have new or worse symptoms. ? · You are not getting better after taking an antibiotic for 2 days. Where can you learn more? Go to http://miryam-polo.info/. Enter B380 in the search box to learn more about \"Ear Infection (Otitis Media): Care Instructions. \"  Current as of: May 12, 2017  Content Version: 11.4  © 6845-4218 [employer], [employer]. Care instructions adapted under license by [employer] (which disclaims liability or warranty for this information). If you have questions about a medical condition or this instruction, always ask your healthcare professional. [place] [address] any warranty or liability for your use of this information.

## 2018-08-08 ENCOUNTER — HOSPITAL ENCOUNTER (INPATIENT)
Age: 43
LOS: 6 days | Discharge: HOME | DRG: 885 | End: 2018-08-14
Attending: EMERGENCY MEDICINE | Admitting: PSYCHIATRY & NEUROLOGY
Payer: MEDICARE

## 2018-08-08 DIAGNOSIS — R73.9 HYPERGLYCEMIA: ICD-10-CM

## 2018-08-08 DIAGNOSIS — F33.9 RECURRENT MAJOR DEPRESSIVE DISORDER, REMISSION STATUS UNSPECIFIED (HCC): Primary | ICD-10-CM

## 2018-08-08 PROBLEM — F25.9 SCHIZOAFFECTIVE DISORDER (HCC): Status: ACTIVE | Noted: 2018-08-08

## 2018-08-08 LAB
AMPHET UR QL SCN: NEGATIVE
ANION GAP SERPL CALC-SCNC: 8 MMOL/L (ref 5–15)
APPEARANCE UR: CLEAR
BACTERIA URNS QL MICRO: NEGATIVE /HPF
BARBITURATES UR QL SCN: NEGATIVE
BASOPHILS # BLD: 0 K/UL (ref 0–0.1)
BASOPHILS NFR BLD: 1 % (ref 0–1)
BENZODIAZ UR QL: NEGATIVE
BILIRUB UR QL: NEGATIVE
BUN SERPL-MCNC: 9 MG/DL (ref 6–20)
BUN/CREAT SERPL: 8 (ref 12–20)
CALCIUM SERPL-MCNC: 8.5 MG/DL (ref 8.5–10.1)
CANNABINOIDS UR QL SCN: NEGATIVE
CHLORIDE SERPL-SCNC: 99 MMOL/L (ref 97–108)
CO2 SERPL-SCNC: 28 MMOL/L (ref 21–32)
COCAINE UR QL SCN: NEGATIVE
COLOR UR: ABNORMAL
CREAT SERPL-MCNC: 1.06 MG/DL (ref 0.55–1.02)
DIFFERENTIAL METHOD BLD: ABNORMAL
DRUG SCRN COMMENT,DRGCM: NORMAL
EOSINOPHIL # BLD: 0.3 K/UL (ref 0–0.4)
EOSINOPHIL NFR BLD: 5 % (ref 0–7)
EPITH CASTS URNS QL MICRO: ABNORMAL /LPF
ERYTHROCYTE [DISTWIDTH] IN BLOOD BY AUTOMATED COUNT: 13.2 % (ref 11.5–14.5)
ETHANOL SERPL-MCNC: <10 MG/DL
GLUCOSE BLD STRIP.AUTO-MCNC: 290 MG/DL (ref 65–100)
GLUCOSE BLD STRIP.AUTO-MCNC: 526 MG/DL (ref 65–100)
GLUCOSE SERPL-MCNC: 492 MG/DL (ref 65–100)
GLUCOSE UR STRIP.AUTO-MCNC: >1000 MG/DL
HCG UR QL: NEGATIVE
HCG UR QL: NEGATIVE
HCT VFR BLD AUTO: 42.4 % (ref 35–47)
HGB BLD-MCNC: 15.5 G/DL (ref 11.5–16)
HGB UR QL STRIP: NEGATIVE
IMM GRANULOCYTES # BLD: 0 K/UL (ref 0–0.04)
IMM GRANULOCYTES NFR BLD AUTO: 0 % (ref 0–0.5)
KETONES UR QL STRIP.AUTO: NEGATIVE MG/DL
LEUKOCYTE ESTERASE UR QL STRIP.AUTO: NEGATIVE
LYMPHOCYTES # BLD: 2.3 K/UL (ref 0.8–3.5)
LYMPHOCYTES NFR BLD: 32 % (ref 12–49)
MCH RBC QN AUTO: 28.6 PG (ref 26–34)
MCHC RBC AUTO-ENTMCNC: 36.6 G/DL (ref 30–36.5)
MCV RBC AUTO: 78.2 FL (ref 80–99)
METHADONE UR QL: NEGATIVE
MONOCYTES # BLD: 0.6 K/UL (ref 0–1)
MONOCYTES NFR BLD: 8 % (ref 5–13)
NEUTS SEG # BLD: 4 K/UL (ref 1.8–8)
NEUTS SEG NFR BLD: 55 % (ref 32–75)
NITRITE UR QL STRIP.AUTO: NEGATIVE
NRBC # BLD: 0 K/UL (ref 0–0.01)
NRBC BLD-RTO: 0 PER 100 WBC
OPIATES UR QL: NEGATIVE
PCP UR QL: NEGATIVE
PH UR STRIP: 6 [PH] (ref 5–8)
PLATELET # BLD AUTO: 185 K/UL (ref 150–400)
PMV BLD AUTO: 13 FL (ref 8.9–12.9)
POTASSIUM SERPL-SCNC: 4 MMOL/L (ref 3.5–5.1)
PROT UR STRIP-MCNC: NEGATIVE MG/DL
RBC # BLD AUTO: 5.42 M/UL (ref 3.8–5.2)
RBC #/AREA URNS HPF: ABNORMAL /HPF (ref 0–5)
SERVICE CMNT-IMP: ABNORMAL
SERVICE CMNT-IMP: ABNORMAL
SODIUM SERPL-SCNC: 135 MMOL/L (ref 136–145)
SP GR UR REFRACTOMETRY: 1.02 (ref 1–1.03)
UA: UC IF INDICATED,UAUC: ABNORMAL
UROBILINOGEN UR QL STRIP.AUTO: 0.2 EU/DL (ref 0.2–1)
VALPROATE SERPL-MCNC: 61 UG/ML (ref 50–100)
WBC # BLD AUTO: 7.3 K/UL (ref 3.6–11)
WBC URNS QL MICRO: ABNORMAL /HPF (ref 0–4)

## 2018-08-08 PROCEDURE — 96374 THER/PROPH/DIAG INJ IV PUSH: CPT

## 2018-08-08 PROCEDURE — 74011250636 HC RX REV CODE- 250/636: Performed by: EMERGENCY MEDICINE

## 2018-08-08 PROCEDURE — 74011636637 HC RX REV CODE- 636/637: Performed by: EMERGENCY MEDICINE

## 2018-08-08 PROCEDURE — 81025 URINE PREGNANCY TEST: CPT

## 2018-08-08 PROCEDURE — 36415 COLL VENOUS BLD VENIPUNCTURE: CPT | Performed by: EMERGENCY MEDICINE

## 2018-08-08 PROCEDURE — 85025 COMPLETE CBC W/AUTO DIFF WBC: CPT | Performed by: EMERGENCY MEDICINE

## 2018-08-08 PROCEDURE — 65220000003 HC RM SEMIPRIVATE PSYCH

## 2018-08-08 PROCEDURE — 82962 GLUCOSE BLOOD TEST: CPT

## 2018-08-08 PROCEDURE — 80307 DRUG TEST PRSMV CHEM ANLYZR: CPT | Performed by: EMERGENCY MEDICINE

## 2018-08-08 PROCEDURE — 96361 HYDRATE IV INFUSION ADD-ON: CPT

## 2018-08-08 PROCEDURE — 93005 ELECTROCARDIOGRAM TRACING: CPT

## 2018-08-08 PROCEDURE — 99285 EMERGENCY DEPT VISIT HI MDM: CPT

## 2018-08-08 PROCEDURE — 80048 BASIC METABOLIC PNL TOTAL CA: CPT | Performed by: EMERGENCY MEDICINE

## 2018-08-08 PROCEDURE — GZHZZZZ GROUP PSYCHOTHERAPY: ICD-10-PCS | Performed by: PSYCHIATRY & NEUROLOGY

## 2018-08-08 PROCEDURE — 90791 PSYCH DIAGNOSTIC EVALUATION: CPT

## 2018-08-08 PROCEDURE — 81001 URINALYSIS AUTO W/SCOPE: CPT | Performed by: EMERGENCY MEDICINE

## 2018-08-08 PROCEDURE — 80164 ASSAY DIPROPYLACETIC ACD TOT: CPT | Performed by: EMERGENCY MEDICINE

## 2018-08-08 RX ORDER — LORAZEPAM 1 MG/1
1 TABLET ORAL
Status: DISCONTINUED | OUTPATIENT
Start: 2018-08-08 | End: 2018-08-14 | Stop reason: HOSPADM

## 2018-08-08 RX ORDER — OLANZAPINE 5 MG/1
5 TABLET ORAL
Status: DISCONTINUED | OUTPATIENT
Start: 2018-08-08 | End: 2018-08-14 | Stop reason: HOSPADM

## 2018-08-08 RX ORDER — LORAZEPAM 2 MG/ML
2 INJECTION INTRAMUSCULAR
Status: DISCONTINUED | OUTPATIENT
Start: 2018-08-08 | End: 2018-08-14 | Stop reason: HOSPADM

## 2018-08-08 RX ORDER — BENZTROPINE MESYLATE 1 MG/ML
2 INJECTION INTRAMUSCULAR; INTRAVENOUS
Status: DISCONTINUED | OUTPATIENT
Start: 2018-08-08 | End: 2018-08-14 | Stop reason: HOSPADM

## 2018-08-08 RX ORDER — BENZTROPINE MESYLATE 2 MG/1
2 TABLET ORAL
Status: DISCONTINUED | OUTPATIENT
Start: 2018-08-08 | End: 2018-08-14 | Stop reason: HOSPADM

## 2018-08-08 RX ORDER — ZOLPIDEM TARTRATE 10 MG/1
10 TABLET ORAL
Status: DISCONTINUED | OUTPATIENT
Start: 2018-08-08 | End: 2018-08-14 | Stop reason: HOSPADM

## 2018-08-08 RX ORDER — IBUPROFEN 200 MG
1 TABLET ORAL
Status: DISCONTINUED | OUTPATIENT
Start: 2018-08-08 | End: 2018-08-14 | Stop reason: HOSPADM

## 2018-08-08 RX ORDER — ACETAMINOPHEN 325 MG/1
650 TABLET ORAL
Status: DISCONTINUED | OUTPATIENT
Start: 2018-08-08 | End: 2018-08-14 | Stop reason: HOSPADM

## 2018-08-08 RX ORDER — SODIUM CHLORIDE 0.9 % (FLUSH) 0.9 %
5-10 SYRINGE (ML) INJECTION EVERY 8 HOURS
Status: DISCONTINUED | OUTPATIENT
Start: 2018-08-08 | End: 2018-08-09 | Stop reason: HOSPADM

## 2018-08-08 RX ORDER — ADHESIVE BANDAGE
30 BANDAGE TOPICAL DAILY PRN
Status: DISCONTINUED | OUTPATIENT
Start: 2018-08-08 | End: 2018-08-14 | Stop reason: HOSPADM

## 2018-08-08 RX ORDER — SODIUM CHLORIDE 0.9 % (FLUSH) 0.9 %
5-10 SYRINGE (ML) INJECTION AS NEEDED
Status: DISCONTINUED | OUTPATIENT
Start: 2018-08-08 | End: 2018-08-09 | Stop reason: HOSPADM

## 2018-08-08 RX ORDER — METFORMIN HYDROCHLORIDE 500 MG/1
500 TABLET ORAL 2 TIMES DAILY WITH MEALS
Status: ON HOLD | COMMUNITY
End: 2018-08-09

## 2018-08-08 RX ADMIN — SODIUM CHLORIDE 1000 ML: 900 INJECTION, SOLUTION INTRAVENOUS at 18:14

## 2018-08-08 RX ADMIN — HUMAN INSULIN 10 UNITS: 100 INJECTION, SOLUTION SUBCUTANEOUS at 18:14

## 2018-08-08 NOTE — ED NOTES
Pt arrived from [facility] ,[address] screened for pre admit,pt had auditory hallucination,said she till hearing voices and they said to kill herself,denies HI,Pt BP high,,Provider made aware,meds given as per ordered. Pt placed in gown,pt belonging placed in x 3 personal belonging bags and place at designated area. Pt alert,oriented x 4,no s/s of any distress noticed on arrival,pt walked to BR with steady gait,denies any pain. Pt said\"I need to come out from my legal charges so I need to kill myself. \"Pt don't know what type of legal charges she has,denies any future plan,but per EMS pt attempted to chock herself by her both hands. Provider at bedside. Pt plan of care updated. Emergency Department Nursing Plan of Care The Nursing Plan of Care is developed from the Nursing assessment and Emergency Department Attending provider initial evaluation. The plan of care may be reviewed in the ED Provider note. The Plan of Care was developed with the following considerations:  
Patient / Family readiness to learn indicated by:verbalized understanding Persons(s) to be included in education: patient Barriers to Learning/Limitations:No 
 
Signed Buck Paulino RN   
8/8/2018   6:48 PM

## 2018-08-08 NOTE — ED PROVIDER NOTES
EMERGENCY DEPARTMENT HISTORY AND PHYSICAL EXAM 
 
 
Date: 8/8/2018 Patient Name: Amina Abernathy History of Presenting Illness Chief Complaint Patient presents with  Suicidal  
  pt reported she want to kill yourself as want to come out from legal charges. History Provided By: Patient, EMS 
 
HPI: Amina Abernathy, 44 y.o. female with PMHx significant for DM, HTN, HLD, pancreatitis, alcohol abuse, GERD, asthma, schizophrenia, BPD, depression, anxiety presents via EMS to the ED with cc of SI today. Patient states she was having suicidal thoughts earlier today but not currently and that she feels depressed. EMS reports patient tried choking herself in the ambulance en route to ED. Patient also reports hearing voices telling her she's going to snf. Patient denies HI. Patient states she is compliant with her diabetes medication. There are no other complaints, changes, or physical findings at this time. PCP: Zack Hamilton NP Current Facility-Administered Medications Medication Dose Route Frequency Provider Last Rate Last Dose  sodium chloride (NS) flush 5-10 mL  5-10 mL IntraVENous Q8H Cristian Pepe MD      
 sodium chloride (NS) flush 5-10 mL  5-10 mL IntraVENous PRN Cristian Pepe MD      
 
Current Outpatient Prescriptions Medication Sig Dispense Refill  haloperidol (HALDOL) 5 mg tablet Take 2.5 mg by mouth nightly.  lisinopril (PRINIVIL, ZESTRIL) 2.5 mg tablet Take  by mouth daily.  amoxicillin 500 mg tab Take 500 mg by mouth three (3) times daily. 30 Tab 0  
 benztropine (COGENTIN) 2 mg tablet Take 2 mg by mouth two (2) times a day.  Lancets (ACCU-CHEK SOFTCLIX LANCETS) misc CHECK BLOOD SUGAR TWICE DAILY 60 Each 11  Blood-Glucose Meter monitoring kit Monitor BG 4 times daily.   Dx uncontrolled type 2 DM E11.65 1 Kit 0  
 insulin lispro (HUMALOG) 100 unit/mL kwikpen Take 4 units for BG between 150-200, take 6 unites for -250, take 8 units for  or higher  Indications: type 1 diabetes mellitus 1 Package 5  
 insulin glargine (LANTUS) 100 unit/mL injection 30 Units by SubCUTAneous route nightly. Indications: type 1 diabetes mellitus (Patient taking differently: 20 Units by SubCUTAneous route nightly. Indications: type 1 diabetes mellitus) 1 Vial 5  Syringe with Needle, Disp, 1 mL 26 gauge x 5/8\" syrg Dispense syringe with needle for insulin injection 4 times a day. Indications: PATIENT NEEDS OFFICE VISIT FOR FURTHER REFILLS 200 Each 0  
 QUEtiapine (SEROQUEL) 200 mg tablet Take 200 mg by mouth nightly.  divalproex DR (DEPAKOTE) 500 mg tablet Take 1,000 mg by mouth two (2) times a day.  sertraline (ZOLOFT) 50 mg tablet Take 100 mg by mouth daily.  albuterol-ipratropium (DUO-NEB) 2.5 mg-0.5 mg/3 ml nebu 3 mL by Nebulization route every four (4) hours as needed. For wheezing and shortness of breath 25 Nebule 11 Past History Past Medical History: 
Past Medical History:  
Diagnosis Date  Alcohol abuse, in remission   
 quit 17 days ago  Asthma   
 does not use inhalers  Borderline personality disorder  Diabetes ([facility] [address]  GERD (gastroesophageal reflux disease)  Hypertension  Other ill-defined conditions(799.89)   
 kidney stones ,passed one  Other ill-defined conditions(799.89) sickle cell trait  Other ill-defined conditions(799.89)   
 increased cholesterol  Pancreatitis  Psychiatric disorder   
 schizophrenia, bipolar, depression, anxiety Past Surgical History: 
Past Surgical History:  
Procedure Laterality Date  ABDOMEN SURGERY PROC UNLISTED  3/12/14 CHOLECYSTECTOMY LAPAROSCOPIC    
 HX GASTRIC BYPASS  HX GYN  11/8/2012  
 c section x2  HX OTHER SURGICAL  3/13/14 ENDOSCOPIC RETROGRADE CHOLANGIOPANCREATOGRAPHY  HX OTHER SURGICAL  8/4/14  
 endoscopic stent placed to bile duct  HX TUBAL LIGATION  2012 Family History: 
Family History Problem Relation Age of Onset  Heart Disease Mother  Diabetes Mother  Hypertension Mother  Hypertension Maternal Grandmother Social History: 
Social History Substance Use Topics  Smoking status: Current Every Day Smoker Packs/day: 0.50 Years: 14.00 Types: Cigarettes  Smokeless tobacco: Never Used Comment: cigarettes  Alcohol use No  
   Comment: occasionally, last had \"i had one beer\" today Allergies: Allergies Allergen Reactions  Dilaudid [Hydromorphone (Bulk)] Itching  Ibuprofen Not Reported This Time Review of Systems Review of Systems Constitutional: Negative for fever. HENT: Negative for sore throat. Eyes: Negative for photophobia and redness. Respiratory: Negative for shortness of breath and wheezing. Cardiovascular: Negative for chest pain and leg swelling. Gastrointestinal: Negative for abdominal pain, blood in stool, nausea and vomiting. Genitourinary: Negative for difficulty urinating, dysuria, hematuria, menstrual problem and vaginal bleeding. Musculoskeletal: Negative for back pain and joint swelling. Neurological: Negative for dizziness, seizures, syncope, speech difficulty, weakness, numbness and headaches. Hematological: Negative for adenopathy. Psychiatric/Behavioral: Positive for hallucinations and suicidal ideas. Negative for agitation and confusion. The patient is not nervous/anxious. Physical Exam  
Physical Exam  
Constitutional: She is oriented to person, place, and time. She appears well-developed and well-nourished. No distress. HENT:  
Head: Normocephalic and atraumatic. Mouth/Throat: Oropharynx is clear and moist. No oropharyngeal exudate. Eyes: Conjunctivae and EOM are normal. Pupils are equal, round, and reactive to light. Left eye exhibits no discharge. Neck: Normal range of motion. Neck supple. No JVD present.   
Cardiovascular: Normal rate, regular rhythm, normal heart sounds and intact distal pulses. Pulmonary/Chest: Effort normal and breath sounds normal. No respiratory distress. She has no wheezes. Abdominal: Soft. Bowel sounds are normal. She exhibits no distension. There is no tenderness. There is no rebound and no guarding. Musculoskeletal: Normal range of motion. She exhibits no edema or tenderness. Lymphadenopathy:  
  She has no cervical adenopathy. Neurological: She is alert and oriented to person, place, and time. She has normal reflexes. No cranial nerve deficit. Skin: Skin is warm and dry. No rash noted. Psychiatric: She has a normal mood and affect. Her behavior is normal. Thought content is delusional. She expresses suicidal ideation. Nursing note and vitals reviewed. Diagnostic Study Results Labs - Recent Results (from the past 12 hour(s)) CBC WITH AUTOMATED DIFF Collection Time: 08/08/18  5:57 PM  
Result Value Ref Range WBC 7.3 3.6 - 11.0 K/uL  
 RBC 5.42 (H) 3.80 - 5.20 M/uL  
 HGB 15.5 11.5 - 16.0 g/dL HCT 42.4 35.0 - 47.0 % MCV 78.2 (L) 80.0 - 99.0 FL  
 MCH 28.6 26.0 - 34.0 PG  
 MCHC 36.6 (H) 30.0 - 36.5 g/dL  
 RDW 13.2 11.5 - 14.5 % PLATELET 387 133 - 015 K/uL MPV 13.0 (H) 8.9 - 12.9 FL  
 NRBC 0.0 0  WBC ABSOLUTE NRBC 0.00 0.00 - 0.01 K/uL NEUTROPHILS 55 32 - 75 % LYMPHOCYTES 32 12 - 49 % MONOCYTES 8 5 - 13 % EOSINOPHILS 5 0 - 7 % BASOPHILS 1 0 - 1 % IMMATURE GRANULOCYTES 0 0.0 - 0.5 % ABS. NEUTROPHILS 4.0 1.8 - 8.0 K/UL  
 ABS. LYMPHOCYTES 2.3 0.8 - 3.5 K/UL  
 ABS. MONOCYTES 0.6 0.0 - 1.0 K/UL  
 ABS. EOSINOPHILS 0.3 0.0 - 0.4 K/UL  
 ABS. BASOPHILS 0.0 0.0 - 0.1 K/UL  
 ABS. IMM. GRANS. 0.0 0.00 - 0.04 K/UL  
 DF AUTOMATED METABOLIC PANEL, BASIC Collection Time: 08/08/18  5:57 PM  
Result Value Ref Range Sodium 135 (L) 136 - 145 mmol/L Potassium 4.0 3.5 - 5.1 mmol/L Chloride 99 97 - 108 mmol/L  
 CO2 28 21 - 32 mmol/L Anion gap 8 5 - 15 mmol/L  Glucose 492 (H) 65 - 100 mg/dL BUN 9 6 - 20 MG/DL Creatinine 1.06 (H) 0.55 - 1.02 MG/DL  
 BUN/Creatinine ratio 8 (L) 12 - 20 GFR est AA >60 >60 ml/min/1.73m2 GFR est non-AA 57 (L) >60 ml/min/1.73m2 Calcium 8.5 8.5 - 10.1 MG/DL  
ETHYL ALCOHOL Collection Time: 08/08/18  5:57 PM  
Result Value Ref Range ALCOHOL(ETHYL),SERUM <10 <10 MG/DL  
DRUG SCREEN, URINE Collection Time: 08/08/18  5:57 PM  
Result Value Ref Range AMPHETAMINES NEGATIVE  NEG    
 BARBITURATES NEGATIVE  NEG BENZODIAZEPINES NEGATIVE  NEG    
 COCAINE NEGATIVE  NEG METHADONE NEGATIVE  NEG    
 OPIATES NEGATIVE  NEG    
 PCP(PHENCYCLIDINE) NEGATIVE  NEG    
 THC (TH-CANNABINOL) NEGATIVE  NEG Drug screen comment (NOTE) URINALYSIS W/ REFLEX CULTURE Collection Time: 08/08/18  5:57 PM  
Result Value Ref Range Color YELLOW/STRAW Appearance CLEAR CLEAR Specific gravity 1.025 1.003 - 1.030    
 pH (UA) 6.0 5.0 - 8.0 Protein NEGATIVE  NEG mg/dL Glucose >1000 (A) NEG mg/dL Ketone NEGATIVE  NEG mg/dL Bilirubin NEGATIVE  NEG Blood NEGATIVE  NEG Urobilinogen 0.2 0.2 - 1.0 EU/dL Nitrites NEGATIVE  NEG Leukocyte Esterase NEGATIVE  NEG    
 WBC 0-4 0 - 4 /hpf  
 RBC 0-5 0 - 5 /hpf Epithelial cells FEW FEW /lpf Bacteria NEGATIVE  NEG /hpf  
 UA:UC IF INDICATED CULTURE NOT INDICATED BY UA RESULT CNI    
GLUCOSE, POC Collection Time: 08/08/18  6:00 PM  
Result Value Ref Range Glucose (POC) 526 (H) 65 - 100 mg/dL Performed by Enoch Cervantes (Tech) HCG URINE, QL. - POC Collection Time: 08/08/18  6:38 PM  
Result Value Ref Range Pregnancy test,urine (POC) NEGATIVE  NEG    
HCG URINE, QL. - POC Collection Time: 08/08/18  6:42 PM  
Result Value Ref Range Pregnancy test,urine (POC) NEGATIVE  NEG    
GLUCOSE, POC Collection Time: 08/08/18  6:57 PM  
Result Value Ref Range Glucose (POC) 290 (H) 65 - 100 mg/dL Performed by Enoch Cervantes (Tech) Medical Decision Making I am the first provider for this patient. I reviewed the vital signs, available nursing notes, past medical history, past surgical history, family history and social history. Vital Signs-Reviewed the patient's vital signs. Patient Vitals for the past 12 hrs: 
 Temp Pulse Resp BP SpO2  
08/08/18 1743 98.7 °F (37.1 °C) 87 18 (!) 175/92 100 % Pulse Oximetry Analysis - 100% on RA Cardiac Monitor:  
Rate: 87 bpm 
Rhythm: Normal Sinus Rhythm EKG interpretation: (Preliminary) 1816 Rhythm: normal sinus rhythm; and regular . Rate (approx.): 86; Axis: normal; TX interval: normal; QRS interval: normal ; ST/T wave: normal;Other findings: normal. 
 
 
Records Reviewed: Nursing Notes and Old Medical Records Provider Notes (Medical Decision Making): DDx: depression, SI, hyperglycemia, dehydration ED Course:  
Initial assessment performed. The patients presenting problems have been discussed, and they are in agreement with the care plan formulated and outlined with them. I have encouraged them to ask questions as they arise throughout their visit. 6:07 PM 
[facility] has already seen patient, will make TDO 
 
SIGN OUT: 
6:42 PM 
Patient's presentation, labs/imaging and plan of care was reviewed with Venancio Méndez MD as part of sign out. They will medically clear patient and see what [facility] plans to do as part of the plan discussed with the patient. Venancio Méndez MD's assistance in completion of this plan is greatly appreciated but it should be noted that I will be the provider of record for this patient. Anika Buckner MD 
 
 
Disposition: 
Admit Note: 
9:00 PM 
Pt is being admitted by Nasreen King MD. The results of their tests and reason(s) for their admission have been discussed with pt and/or available family. They convey agreement and understanding for the need to be admitted and for admission diagnosis. PLAN: 
1. Current Discharge Medication List  
  
 
2. Follow-up Information None Return to ED if worse Diagnosis Clinical Impression: 1. Recurrent major depressive disorder, remission status unspecified ([facility] [address] 2. Hyperglycemia Attestations: This note is prepared by Sisi Mariano, acting as Scribe for MD Monica Flores MD: The scribe's documentation has been prepared under my direction and personally reviewed by me in its entirety. I confirm that the note above accurately reflects all work, treatment, procedures, and medical decision making performed by me.

## 2018-08-08 NOTE — IP AVS SNAPSHOT
[facility] 
 
 
 [address] [place] Patient: Maribell Mederos MRN: [id number] [id number] A check ze indicates which time of day the medication should be taken. My Medications CHANGE how you take these medications Instructions Each Dose to Equal  
 Morning Noon Evening Bedtime * benztropine 1 mg tablet Commonly known as:  COGENTIN What changed:  Another medication with the same name was added. Make sure you understand how and when to take each. Your last dose was: Your next dose is: Take 1 mg by mouth two (2) times a day. Indications: drug-induced extrapyramidal reaction 1 mg * benztropine 1 mg tablet Commonly known as:  COGENTIN What changed: You were already taking a medication with the same name, and this prescription was added. Make sure you understand how and when to take each. Your last dose was: Your next dose is: Take 1 Tab by mouth two (2) times a day. Indications: drug-induced extrapyramidal reaction 1 mg * DEPAKOTE 500 mg tablet Generic drug:  divalproex DR What changed:  Another medication with the same name was added. Make sure you understand how and when to take each. Your last dose was: Your next dose is: Take 500 mg by mouth two (2) times a day. Indications: Schizoaffective disorder 500 mg  
    
   
   
   
  
 * divalproex  mg tablet Commonly known as:  DEPAKOTE What changed: You were already taking a medication with the same name, and this prescription was added. Make sure you understand how and when to take each. Your last dose was: Your next dose is: Take 1 Tab by mouth two (2) times a day. Indications: Schizoaffective disorder 500 mg  
    
   
   
   
  
 * haloperidol 5 mg tablet Commonly known as:  HALDOL What changed:  Another medication with the same name was added. Make sure you understand how and when to take each. Your last dose was: Your next dose is: Take 5 mg by mouth two (2) times a day. Indications: Schizoaffective disorder 5 mg * haloperidol 5 mg tablet Commonly known as:  HALDOL What changed: You were already taking a medication with the same name, and this prescription was added. Make sure you understand how and when to take each. Your last dose was: Your next dose is: Take 1 Tab by mouth every twelve (12) hours. Indications: Schizoaffective disorder 5 mg  
    
   
   
   
  
 insulin glargine 100 unit/mL injection Commonly known as:  LANTUS What changed:  when to take this Your last dose was: Your next dose is:    
   
   
 30 Units by SubCUTAneous route nightly. Indications: type 1 diabetes mellitus 30 Units * lisinopril 10 mg tablet Commonly known as:  Emily Cavanaugh What changed:  Another medication with the same name was added. Make sure you understand how and when to take each. Your last dose was: Your next dose is: Take 10 mg by mouth daily. Indications: hypertension 10 mg  
    
   
   
   
  
 * lisinopril 10 mg tablet Commonly known as:  Emily Cavanaugh Start taking on:  8/15/2018 What changed: You were already taking a medication with the same name, and this prescription was added. Make sure you understand how and when to take each. Your last dose was: Your next dose is: Take 1 Tab by mouth daily. Indications: hypertension 10 mg  
    
   
   
   
  
 * Notice: This list has 8 medication(s) that are the same as other medications prescribed for you. Read the directions carefully, and ask your doctor or other care provider to review them with you.   
  
CONTINUE taking these medications Instructions Each Dose to Equal  
 Morning Noon Evening Bedtime  
 albuterol 90 mcg/actuation inhaler Commonly known as:  PROVENTIL HFA, VENTOLIN HFA, PROAIR HFA Your last dose was: Your next dose is: Take 1 Puff by inhalation every four (4) hours as needed for Wheezing. 1 Puff Blood-Glucose Meter monitoring kit Your last dose was: Your next dose is:    
   
   
 Monitor BG 4 times daily. Dx uncontrolled type 2 DM E11.65  Indications: Diabetes Mellitus ASK your doctor about these medications Instructions Each Dose to Equal  
 Morning Noon Evening Bedtime Lancets Misc Commonly known as:  ACCU-CHEK SOFTCLIX LANCETS Your last dose was: Your next dose is: CHECK BLOOD SUGAR TWICE DAILY Syringe with Needle (Disp) 1 mL 26 gauge x 5/8\" Syrg Your last dose was: Your next dose is:    
   
   
 Dispense syringe with needle for insulin injection 4 times a day. Indications: PATIENT NEEDS OFFICE VISIT FOR FURTHER REFILLS Where to Get Your Medications These medications were sent to [address] [place] [address] [address]  [address] [place], [place] [id number] Hours:  24-hours Phone:  [phone]  
  lisinopril 10 mg tablet Information on where to get these meds will be given to you by the nurse or doctor. ! Ask your nurse or doctor about these medications  
  benztropine 1 mg tablet Blood-Glucose Meter monitoring kit  
 divalproex  mg tablet  
 haloperidol 5 mg tablet

## 2018-08-08 NOTE — ED NOTES
Verbal and bedside shift change report given to Renetta Del Rio RN (oncoming nurse) by [place]. Doris Toure RN (offgoing nurse). Report included the following information SBAR, ED Summary, MAR and Recent Results. Assumed pt care at this time. Pt noted to be sleeping comfortably in no acute distress.

## 2018-08-08 NOTE — ED TRIAGE NOTES
Pt arrived in ER via EMS from [facility]. Per EMS report pt had plan for suicide reported pt tried to chock her in ambulance,Pt has hallucinations,hearning voices.

## 2018-08-08 NOTE — IP AVS SNAPSHOT
[address] [facility] 
 
 
 [address] [facility] Patient: Hansel Gould MRN: [id number] HPL:4[birth year]4/[birth year]45 About your hospitalization You were admitted on:  August 8, 2018 You last received care in the:  [facility] You were discharged on:  August 14, 2018 Why you were hospitalized Your primary diagnosis was:  Schizoaffective Disorder, Depressive Type (Hcc) Your diagnoses also included:  Essential Hypertension, Hyperlipidemia, Uncontrolled Diabetes Mellitus (Hcc) Follow-up Information Follow up With Details Comments Contact Info [facility] On 8/16/2018 Follow up 8/16/2018 @ 10:00am with Monica Nguyen for case management services. [address] [place] [address] 
[phone] [facility] On 8/20/2018 Follow up 8/20/2018 @ 3:20pm with Dr. Verna Neville for psychiatric services. *please bring id, insurance card and medications [address] [place] [address] 
[phone] Bhupinder Cooper/Linda [place]  Follow up with Jovi Sarabia at discharge to continue mhsb services. [phone] Lily Wakefield NP   [address] [address] 
[phone] Discharge Orders None A check ze indicates which time of day the medication should be taken. My Medications CHANGE how you take these medications Instructions Each Dose to Equal  
 Morning Noon Evening Bedtime * benztropine 1 mg tablet Commonly known as:  COGENTIN What changed:  Another medication with the same name was added. Make sure you understand how and when to take each. Your last dose was: Your next dose is: Take 1 mg by mouth two (2) times a day. Indications: drug-induced extrapyramidal reaction 1 mg * benztropine 1 mg tablet Commonly known as:  COGENTIN What changed:   You were already taking a medication with the same name, and this prescription was added. Make sure you understand how and when to take each. Your last dose was: Your next dose is: Take 1 Tab by mouth two (2) times a day. Indications: drug-induced extrapyramidal reaction 1 mg * DEPAKOTE 500 mg tablet Generic drug:  divalproex DR What changed:  Another medication with the same name was added. Make sure you understand how and when to take each. Your last dose was: Your next dose is: Take 500 mg by mouth two (2) times a day. Indications: Schizoaffective disorder 500 mg  
    
   
   
   
  
 * divalproex  mg tablet Commonly known as:  DEPAKOTE What changed: You were already taking a medication with the same name, and this prescription was added. Make sure you understand how and when to take each. Your last dose was: Your next dose is: Take 1 Tab by mouth two (2) times a day. Indications: Schizoaffective disorder 500 mg  
    
   
   
   
  
 * haloperidol 5 mg tablet Commonly known as:  HALDOL What changed:  Another medication with the same name was added. Make sure you understand how and when to take each. Your last dose was: Your next dose is: Take 5 mg by mouth two (2) times a day. Indications: Schizoaffective disorder 5 mg * haloperidol 5 mg tablet Commonly known as:  HALDOL What changed: You were already taking a medication with the same name, and this prescription was added. Make sure you understand how and when to take each. Your last dose was: Your next dose is: Take 1 Tab by mouth every twelve (12) hours. Indications: Schizoaffective disorder 5 mg  
    
   
   
   
  
 insulin glargine 100 unit/mL injection Commonly known as:  LANTUS What changed:  when to take this Your last dose was:     
   
Your next dose is:    
   
   
 30 Units by SubCUTAneous route nightly. Indications: type 1 diabetes mellitus 30 Units * lisinopril 10 mg tablet Commonly known as:  Nyasia Onofre What changed:  Another medication with the same name was added. Make sure you understand how and when to take each. Your last dose was: Your next dose is: Take 10 mg by mouth daily. Indications: hypertension 10 mg  
    
   
   
   
  
 * lisinopril 10 mg tablet Commonly known as:  Nyasia Onofre Start taking on:  8/15/2018 What changed: You were already taking a medication with the same name, and this prescription was added. Make sure you understand how and when to take each. Your last dose was: Your next dose is: Take 1 Tab by mouth daily. Indications: hypertension 10 mg  
    
   
   
   
  
 * Notice: This list has 8 medication(s) that are the same as other medications prescribed for you. Read the directions carefully, and ask your doctor or other care provider to review them with you. CONTINUE taking these medications Instructions Each Dose to Equal  
 Morning Noon Evening Bedtime  
 albuterol 90 mcg/actuation inhaler Commonly known as:  PROVENTIL HFA, VENTOLIN HFA, PROAIR HFA Your last dose was: Your next dose is: Take 1 Puff by inhalation every four (4) hours as needed for Wheezing. 1 Puff Blood-Glucose Meter monitoring kit Your last dose was: Your next dose is:    
   
   
 Monitor BG 4 times daily. Dx uncontrolled type 2 DM E11.65  Indications: Diabetes Mellitus ASK your doctor about these medications Instructions Each Dose to Equal  
 Morning Noon Evening Bedtime Lancets Misc Commonly known as:  ACCU-CHEK SOFTCLIX LANCETS Your last dose was: Your next dose is: CHECK BLOOD SUGAR TWICE DAILY Syringe with Needle (Disp) 1 mL 26 gauge x 5/8\" Syrg Your last dose was: Your next dose is:    
   
   
 Dispense syringe with needle for insulin injection 4 times a day. Indications: PATIENT NEEDS OFFICE VISIT FOR FURTHER REFILLS Where to Get Your Medications These medications were sent to [id number] [place] [address] S. [address]  [address] [facility], [address] [place] [address] Hours:  24-hours Phone:  [phone]  
  lisinopril 10 mg tablet Information on where to get these meds will be given to you by the nurse or doctor. ! Ask your nurse or doctor about these medications  
  benztropine 1 mg tablet Blood-Glucose Meter monitoring kit  
 divalproex  mg tablet  
 haloperidol 5 mg tablet Discharge Instructions DISCHARGE SUMMARY from Nurse PATIENT INSTRUCTIONS: 
What to do at Home: 
Recommended activity: Activity as tolerated Please give a list of your current medications to your Primary Care Provider. *  Please update this list whenever your medications are discontinued, doses are 
    changed, or new medications (including over-the-counter products) are added. *  Please carry medication information at all times in case of emergency situations. These are general instructions for a healthy lifestyle: No smoking/ No tobacco products/ Avoid exposure to second hand smoke Surgeon General's Warning:  Quitting smoking now greatly reduces serious risk to your health. Obesity, smoking, and sedentary lifestyle greatly increases your risk for illness A healthy diet, regular physical exercise & weight monitoring are important for maintaining a healthy lifestyle The discharge information has been reviewed with the patient. The patient verbalized understanding.  
Discharge medications reviewed with the patient and appropriate educational materials and side effects teaching were provided. ___________________________________________________________________________________________________________________________________ Ira Zapata If I feel I am at risk of hurting myself or others, I will call the crisis office and speak with a crisis worker who will assist me during my crisis. Ira Zapata [facility]  [phone] [address] [place] [address] [phone] [place] [address]  [phone] [employer] 27- [phone] [employer]-  [phone] 0 [facility]  [phone] [address] [place] [address]  [phone] Schoolwires Announcement We are excited to announce that we are making your provider's discharge notes available to you in Schoolwires. You will see these notes when they are completed and signed by the physician that discharged you from your recent hospital stay. If you have any questions or concerns about any information you see in Schoolwires, please call the Health Information Department where you were seen or reach out to your Primary Care Provider for more information about your plan of care. Introducing [facility]! [facility] introduces Schoolwires patient portal. Now you can access parts of your medical record, email your doctor's office, and request medication refills online. 1. In your internet browser, go to [email or web address] 2. Click on the First Time User? Click Here link in the Sign In box. You will see the New Member Sign Up page. 3. Enter your Schoolwires Access Code exactly as it appears below. You will not need to use this code after youve completed the sign-up process. If you do not sign up before the expiration date, you must request a new code. · Schoolwires Access Code: [email or web address] Expires: 9/10/2018  1:02 PM 
 
4. Enter the last four digits of your Social Security Number (xxxx) and Date of Birth (mm/dd/yyyy) as indicated and click Submit. You will be taken to the next sign-up page.  
5. Create a Venvy Interactive Videot ID. This will be your LaunchKey login ID and cannot be changed, so think of one that is secure and easy to remember. 6. Create a LaunchKey password. You can change your password at any time. 7. Enter your Password Reset Question and Answer. This can be used at a later time if you forget your password. 8. Enter your e-mail address. You will receive e-mail notification when new information is available in [address] 9. Click Sign Up. You can now view and download portions of your medical record. 10. Click the Download Summary menu link to download a portable copy of your medical information. If you have questions, please visit the Frequently Asked Questions section of the LaunchKey website. Remember, LaunchKey is NOT to be used for urgent needs. For medical emergencies, dial 911. Now available from your iPhone and Android! Introducing Cesar rGay As a [employer] patient, I wanted to make you aware of our electronic visit tool called Cesar Gray. [employer] 24/7 allows you to connect within minutes with a medical provider 24 hours a day, seven days a week via a mobile device or tablet or logging into a secure website from your computer. You can access Cesar Ferrarifin from anywhere in the [place]. A virtual visit might be right for you when you have a simple condition and feel like you just dont want to get out of bed, or cant get away from work for an appointment, when your regular [employer] provider is not available (evenings, weekends or holidays), or when youre out of town and need minor care. Electronic visits cost only $49 and if the [employer] 24/7 provider determines a prescription is needed to treat your condition, one can be electronically transmitted to a nearby pharmacy*. Please take a moment to enroll today if you have not already done so. The enrollment process is free and takes just a few minutes.   To enroll, please download the [employer] 24/7 tay to your tablet or phone, or visit [email or web address] to enroll on your computer. And, as an [address] [place] [address] patient with a 3 day Blinds account, the results of your visits will be scanned into your electronic medical record and your primary care provider will be able to view the scanned results. We urge you to continue to see your regular [facility] provider for your ongoing medical care. And while your primary care provider may not be the one available when you seek a Cesar Ovalleavefin virtual visit, the peace of mind you get from getting a real diagnosis real time can be priceless. For more information on Whitepages, view our Frequently Asked Questions (FAQs) at [email or web address]. Sincerely, 
 
Eduardo Choudhary MD 
Chief Medical Officer Dakota Swanson *:  certain medications cannot be prescribed via Green Cleanavefin Providers Seen During Your Hospitalization Provider Specialty Primary office phone Irene Cox MD Emergency Medicine [phone] Audrey Maldonado MD Emergency Medicine [phone] Doroteo Hall MD Psychiatry [phone] Claudell Catching, MD Psychiatry [phone] Your Primary Care Physician (PCP) Primary Care Physician Office Phone Office Fax Aarti Gonzalez [phone] You are allergic to the following Allergen Reactions Dilaudid (Hydromorphone (Bulk)) Itching Ibuprofen Not Reported This Time Recent Documentation Height Weight Breastfeeding? BMI OB Status Smoking Status 1.676 m 63.5 kg No 22.6 kg/m2 Having regular periods Current Every Day Smoker Emergency Contacts Name Discharge Info Relation Home Work Mobile DenisseJoe DISCHARGE CAREGIVER [3] Son [22] [phone] Patient Belongings  The following personal items are in your possession at time of discharge: 
  Dental Appliances: None  Visual Aid: None Home Medications: Sent to pharmacy   Jewelry: None  Clothing: Belt, Pants, Shirt, Slippers, Undergarments, Other (comment) (bra)    Other Valuables: Cell Phone, Keys, Personal toiletries (drivers license)  Personal Items Sent to Safe: see valuables card Please provide this summary of care documentation to your next provider. Signatures-by signing, you are acknowledging that this After Visit Summary has been reviewed with you and you have received a copy. Patient Signature:  ____________________________________________________________ Date:  ____________________________________________________________  
  
Aloma Snellen Provider Signature:  ____________________________________________________________ Date:  ____________________________________________________________

## 2018-08-09 PROBLEM — F25.1 SCHIZOAFFECTIVE DISORDER, DEPRESSIVE TYPE (HCC): Status: ACTIVE | Noted: 2018-08-08

## 2018-08-09 LAB
ATRIAL RATE: 86 BPM
CALCULATED P AXIS, ECG09: 50 DEGREES
CALCULATED R AXIS, ECG10: 37 DEGREES
CALCULATED T AXIS, ECG11: 58 DEGREES
CHOLEST SERPL-MCNC: 105 MG/DL
DIAGNOSIS, 93000: NORMAL
GLUCOSE BLD STRIP.AUTO-MCNC: 142 MG/DL (ref 65–100)
GLUCOSE BLD STRIP.AUTO-MCNC: 201 MG/DL (ref 65–100)
GLUCOSE BLD STRIP.AUTO-MCNC: 366 MG/DL (ref 65–100)
GLUCOSE BLD STRIP.AUTO-MCNC: 397 MG/DL (ref 65–100)
GLUCOSE BLD STRIP.AUTO-MCNC: 71 MG/DL (ref 65–100)
GLUCOSE BLD STRIP.AUTO-MCNC: 72 MG/DL (ref 65–100)
GLUCOSE P FAST SERPL-MCNC: 274 MG/DL (ref 65–100)
HDLC SERPL-MCNC: 44 MG/DL
HDLC SERPL: 2.4 {RATIO} (ref 0–5)
LDLC SERPL CALC-MCNC: 48.2 MG/DL (ref 0–100)
LIPID PROFILE,FLP: NORMAL
P-R INTERVAL, ECG05: 136 MS
Q-T INTERVAL, ECG07: 384 MS
QRS DURATION, ECG06: 68 MS
QTC CALCULATION (BEZET), ECG08: 459 MS
SERVICE CMNT-IMP: ABNORMAL
SERVICE CMNT-IMP: NORMAL
SERVICE CMNT-IMP: NORMAL
TRIGL SERPL-MCNC: 64 MG/DL (ref ?–150)
TSH SERPL DL<=0.05 MIU/L-ACNC: 2.3 UIU/ML (ref 0.36–3.74)
VENTRICULAR RATE, ECG03: 86 BPM
VLDLC SERPL CALC-MCNC: 12.8 MG/DL

## 2018-08-09 PROCEDURE — A4216 STERILE WATER/SALINE, 10 ML: HCPCS | Performed by: PSYCHIATRY & NEUROLOGY

## 2018-08-09 PROCEDURE — 82962 GLUCOSE BLOOD TEST: CPT | Performed by: PSYCHIATRY & NEUROLOGY

## 2018-08-09 PROCEDURE — 74011636637 HC RX REV CODE- 636/637: Performed by: INTERNAL MEDICINE

## 2018-08-09 PROCEDURE — 82947 ASSAY GLUCOSE BLOOD QUANT: CPT | Performed by: PSYCHIATRY & NEUROLOGY

## 2018-08-09 PROCEDURE — 74011636637 HC RX REV CODE- 636/637: Performed by: FAMILY MEDICINE

## 2018-08-09 PROCEDURE — 65220000003 HC RM SEMIPRIVATE PSYCH

## 2018-08-09 PROCEDURE — 74011250636 HC RX REV CODE- 250/636: Performed by: PSYCHIATRY & NEUROLOGY

## 2018-08-09 PROCEDURE — 82962 GLUCOSE BLOOD TEST: CPT

## 2018-08-09 PROCEDURE — 74011636637 HC RX REV CODE- 636/637: Performed by: PSYCHIATRY & NEUROLOGY

## 2018-08-09 PROCEDURE — 74011250637 HC RX REV CODE- 250/637: Performed by: PSYCHIATRY & NEUROLOGY

## 2018-08-09 PROCEDURE — 80061 LIPID PANEL: CPT | Performed by: PSYCHIATRY & NEUROLOGY

## 2018-08-09 PROCEDURE — 36415 COLL VENOUS BLD VENIPUNCTURE: CPT | Performed by: PSYCHIATRY & NEUROLOGY

## 2018-08-09 PROCEDURE — 84443 ASSAY THYROID STIM HORMONE: CPT | Performed by: PSYCHIATRY & NEUROLOGY

## 2018-08-09 RX ORDER — FOLIC ACID 1 MG/1
1 TABLET ORAL DAILY
Status: DISCONTINUED | OUTPATIENT
Start: 2018-08-10 | End: 2018-08-14 | Stop reason: HOSPADM

## 2018-08-09 RX ORDER — INSULIN LISPRO 100 [IU]/ML
10 INJECTION, SOLUTION INTRAVENOUS; SUBCUTANEOUS ONCE
Status: COMPLETED | OUTPATIENT
Start: 2018-08-09 | End: 2018-08-09

## 2018-08-09 RX ORDER — INSULIN GLARGINE 100 [IU]/ML
30 INJECTION, SOLUTION SUBCUTANEOUS
Status: DISCONTINUED | OUTPATIENT
Start: 2018-08-09 | End: 2018-08-09

## 2018-08-09 RX ORDER — INSULIN LISPRO 100 [IU]/ML
4 INJECTION, SOLUTION INTRAVENOUS; SUBCUTANEOUS ONCE
Status: COMPLETED | OUTPATIENT
Start: 2018-08-09 | End: 2018-08-09

## 2018-08-09 RX ORDER — MAGNESIUM SULFATE 100 %
4 CRYSTALS MISCELLANEOUS AS NEEDED
Status: DISCONTINUED | OUTPATIENT
Start: 2018-08-09 | End: 2018-08-14 | Stop reason: HOSPADM

## 2018-08-09 RX ORDER — LISINOPRIL 5 MG/1
10 TABLET ORAL DAILY
Status: DISCONTINUED | OUTPATIENT
Start: 2018-08-09 | End: 2018-08-14 | Stop reason: HOSPADM

## 2018-08-09 RX ORDER — INSULIN LISPRO 100 [IU]/ML
INJECTION, SOLUTION INTRAVENOUS; SUBCUTANEOUS
Status: DISCONTINUED | OUTPATIENT
Start: 2018-08-09 | End: 2018-08-10

## 2018-08-09 RX ORDER — HALOPERIDOL 5 MG/1
5 TABLET ORAL EVERY 12 HOURS
Status: DISCONTINUED | OUTPATIENT
Start: 2018-08-09 | End: 2018-08-14 | Stop reason: HOSPADM

## 2018-08-09 RX ORDER — BENZTROPINE MESYLATE 1 MG/1
1 TABLET ORAL 2 TIMES DAILY
COMMUNITY
End: 2018-09-15 | Stop reason: SDUPTHER

## 2018-08-09 RX ORDER — THERA TABS 400 MCG
1 TAB ORAL DAILY
Status: DISCONTINUED | OUTPATIENT
Start: 2018-08-10 | End: 2018-08-14 | Stop reason: HOSPADM

## 2018-08-09 RX ORDER — INSULIN LISPRO 100 [IU]/ML
INJECTION, SOLUTION INTRAVENOUS; SUBCUTANEOUS SEE ADMIN INSTRUCTIONS
Status: ON HOLD | COMMUNITY
End: 2018-08-09

## 2018-08-09 RX ORDER — ALBUTEROL SULFATE 90 UG/1
1 AEROSOL, METERED RESPIRATORY (INHALATION)
COMMUNITY
End: 2018-12-28

## 2018-08-09 RX ORDER — LISINOPRIL 10 MG/1
10 TABLET ORAL DAILY
COMMUNITY
End: 2018-09-15 | Stop reason: SDUPTHER

## 2018-08-09 RX ORDER — DEXTROSE 50 % IN WATER (D50W) INTRAVENOUS SYRINGE
12.5-25 AS NEEDED
Status: DISCONTINUED | OUTPATIENT
Start: 2018-08-09 | End: 2018-08-14 | Stop reason: HOSPADM

## 2018-08-09 RX ORDER — BENZTROPINE MESYLATE 1 MG/1
1 TABLET ORAL 2 TIMES DAILY
Status: DISCONTINUED | OUTPATIENT
Start: 2018-08-09 | End: 2018-08-14 | Stop reason: HOSPADM

## 2018-08-09 RX ORDER — LANOLIN ALCOHOL/MO/W.PET/CERES
100 CREAM (GRAM) TOPICAL DAILY
Status: DISCONTINUED | OUTPATIENT
Start: 2018-08-10 | End: 2018-08-14 | Stop reason: HOSPADM

## 2018-08-09 RX ORDER — DIVALPROEX SODIUM 500 MG/1
500 TABLET, DELAYED RELEASE ORAL 2 TIMES DAILY
Status: DISCONTINUED | OUTPATIENT
Start: 2018-08-09 | End: 2018-08-14 | Stop reason: HOSPADM

## 2018-08-09 RX ORDER — SERTRALINE HYDROCHLORIDE 50 MG/1
50 TABLET, FILM COATED ORAL DAILY
Status: DISCONTINUED | OUTPATIENT
Start: 2018-08-09 | End: 2018-08-13

## 2018-08-09 RX ORDER — ALBUTEROL SULFATE 90 UG/1
1 AEROSOL, METERED RESPIRATORY (INHALATION)
Status: DISCONTINUED | OUTPATIENT
Start: 2018-08-09 | End: 2018-08-14 | Stop reason: HOSPADM

## 2018-08-09 RX ORDER — INSULIN GLARGINE 100 [IU]/ML
30 INJECTION, SOLUTION SUBCUTANEOUS DAILY
Status: DISCONTINUED | OUTPATIENT
Start: 2018-08-09 | End: 2018-08-14 | Stop reason: HOSPADM

## 2018-08-09 RX ADMIN — DIVALPROEX SODIUM 500 MG: 500 TABLET, DELAYED RELEASE ORAL at 17:18

## 2018-08-09 RX ADMIN — INSULIN LISPRO 4 UNITS: 100 INJECTION, SOLUTION INTRAVENOUS; SUBCUTANEOUS at 21:51

## 2018-08-09 RX ADMIN — ZOLPIDEM TARTRATE 10 MG: 10 TABLET ORAL at 21:53

## 2018-08-09 RX ADMIN — INSULIN LISPRO 10 UNITS: 100 INJECTION, SOLUTION INTRAVENOUS; SUBCUTANEOUS at 14:18

## 2018-08-09 RX ADMIN — BENZTROPINE MESYLATE 1 MG: 1 TABLET ORAL at 17:18

## 2018-08-09 RX ADMIN — INSULIN GLARGINE 30 UNITS: 100 INJECTION, SOLUTION SUBCUTANEOUS at 12:13

## 2018-08-09 RX ADMIN — HALOPERIDOL 5 MG: 5 TABLET ORAL at 21:52

## 2018-08-09 RX ADMIN — WATER 20 MG: 1 INJECTION INTRAMUSCULAR; INTRAVENOUS; SUBCUTANEOUS at 10:27

## 2018-08-09 RX ADMIN — LORAZEPAM 2 MG: 2 INJECTION INTRAMUSCULAR; INTRAVENOUS at 10:27
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
## 2018-08-09 NOTE — PROGRESS NOTES
[facility] Medication Reconciliation Recommendations/Findings:  
1) Patient confirms insulin glargine dose to be 30 units SQ daily. She states last dose was day before yesterday (Tuesday) but bottle patient brought in with her is unopened. She has been on insulin lispro for meal coverage but states she has run out of it and hasn't been using it. The following changes were made to the PTA medication list:  Additions: albuterol inhaler  Modifications: benztropine changed from 2 mg to 1 mg, divalproex DR dose changed from 1000 mg twice daily to 500 mg twice daily, haloperidol dose changed from 2.5 mg nightly to 5 mg twice daily, lisinopril dose changed from 2.5 mg daily to 10 mg daily  Deletions: metformin, quetiapine, sertraline, insulin lispro Total Time Spent: 10 minutes Information obtained from: Patient's medication bottles, RxQuery, patient Patient allergies: Allergies as of 2018 - Review Complete 2018 Allergen Reaction Noted  Dilaudid [hydromorphone (bulk)] Itching 2014  Ibuprofen Not Reported This Time 02/15/2017 Prior to Admission Medications Prescriptions Last Dose Informant Patient Reported? Taking? albuterol (PROVENTIL HFA, VENTOLIN HFA, PROAIR HFA) 90 mcg/actuation inhaler   Yes Yes Sig: Take 1 Puff by inhalation every four (4) hours as needed for Wheezing. benztropine (COGENTIN) 1 mg tablet   Yes Yes Sig: Take 1 mg by mouth two (2) times a day. Indications: drug-induced extrapyramidal reaction  
divalproex DR (DEPAKOTE) 500 mg tablet   Yes Yes Sig: Take 500 mg by mouth two (2) times a day. Indications: Schizoaffective disorder  
haloperidol (HALDOL) 5 mg tablet   Yes Yes Sig: Take 5 mg by mouth two (2) times a day. Indications: Schizoaffective disorder  
insulin glargine (LANTUS) 100 unit/mL injection  Self No Yes Si Units by SubCUTAneous route nightly.  Indications: type 1 diabetes mellitus  
lisinopril (PRINIVIL, ZESTRIL) 10 mg tablet   Yes Yes Sig: Take 10 mg by mouth daily. Indications: hypertension Facility-Administered Medications: None Thank you, Anthony Renteria, PHARMD, BCPS

## 2018-08-09 NOTE — ROUTINE PROCESS
TRANSFER - OUT REPORT: 
 
Verbal report given to TIANNA Galvez RN on [employer]  being transferred to [facility] for routine progression of care Report consisted of patients Situation, Background, Assessment and  
Recommendations(SBAR). Information from the following report(s) SBAR, ED Summary, [facility] and recent results was reviewed with the receiving nurse. Receiving nurse requesting a valproic acid level. Lab called to inform, stating they can run this test as an add on to samples in the lab. Orders placed. Lines:  
Peripheral IV 08/03/18 Left Antecubital (Active) Site Assessment Clean, dry, & intact 8/8/2018  6:24 PM  
Phlebitis Assessment 0 8/8/2018  6:24 PM  
Infiltration Assessment 0 8/8/2018  6:24 PM  
Dressing Status Clean, dry, & intact 8/8/2018  6:24 PM  
Dressing Type Transparent 8/8/2018  6:24 PM  
Hub Color/Line Status Pink;Flushed;Patent 8/8/2018  6:24 PM  
Action Taken Blood drawn 8/8/2018  6:24 PM  
  
 
Opportunity for questions and clarification was provided. Patient transported with: 
 Security staff and ZULMA
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
## 2018-08-09 NOTE — BH NOTES
GROUP THERAPY PROGRESS NOTE Amy Choudhary is participating in [employer]. Group time: 30 minutes Personal goal for participation:  Unit orientation Goal orientation: [place] Group therapy participation: active Therapeutic interventions reviewed and discussed: Yes Impression of participation: good

## 2018-08-09 NOTE — ED NOTES
Pt noted to be resting comfortably with lights dimmed. Pt updated on plan of care, has no questions or concerns at this time.

## 2018-08-09 NOTE — BH NOTES
Pt is paranoid. Yelling and screaming thinking someone is after her. Pt was given ativan 2mg IM and Geodon 20mg IM. Will continue to monitor patient and assess needs.

## 2018-08-09 NOTE — PROGRESS NOTES
Laboratory monitoring for mood stabilizer and antipsychotics: 
 
Recommended baseline monitoring has been completed based on this patient's current medication regimen. The patient is currently taking the following medication(s):  
Current Facility-Administered Medications Medication Dose Route Frequency  benztropine (COGENTIN) tablet 1 mg  1 mg Oral BID  lisinopril (PRINIVIL, ZESTRIL) tablet 10 mg  10 mg Oral DAILY  divalproex DR (DEPAKOTE) tablet 500 mg  500 mg Oral BID  
 haloperidol (HALDOL) tablet 5 mg  5 mg Oral Q12H  sertraline (ZOLOFT) tablet 50 mg  50 mg Oral DAILY  insulin glargine (LANTUS) injection 30 Units  30 Units SubCUTAneous DAILY  insulin lispro (HUMALOG) injection   SubCUTAneous TIDAC Serum Drug Level(s) VALPROIC ACID Recent Labs 08/08/18 
 1757 VALP  61 Height, Weight, BMI Estimation Estimated body mass index is 22.6 kg/(m^2) as calculated from the following: 
  Height as of this encounter: 167.6 cm (66\"). Weight as of this encounter: 63.5 kg (140 lb). Renal Function, Hepatic Function and Chemistry Estimated Creatinine Clearance: 64.7 mL/min (based on Cr of 1.06). Lab Results Component Value Date/Time Sodium 135 (L) 08/08/2018 05:57 PM  
 Potassium 4.0 08/08/2018 05:57 PM  
 Chloride 99 08/08/2018 05:57 PM  
 CO2 28 08/08/2018 05:57 PM  
 Anion gap 8 08/08/2018 05:57 PM  
 Glucose 274 (H) 08/09/2018 04:41 AM  
 BUN 9 08/08/2018 05:57 PM  
 Creatinine 1.06 (H) 08/08/2018 05:57 PM  
 BUN/Creatinine ratio 8 (L) 08/08/2018 05:57 PM  
 GFR est AA >60 08/08/2018 05:57 PM  
 GFR est non-AA 57 (L) 08/08/2018 05:57 PM  
 Calcium 8.5 08/08/2018 05:57 PM  
 ALT (SGPT) 20 06/12/2018 01:40 PM  
 AST (SGOT) 18 06/12/2018 01:40 PM  
 Alk.  phosphatase 59 06/12/2018 01:40 PM  
 Protein, total 6.7 06/12/2018 01:40 PM  
 Albumin 2.9 (L) 06/12/2018 01:40 PM  
 Globulin 3.8 06/12/2018 01:40 PM  
 A-G Ratio 0.8 (L) 06/12/2018 01:40 PM  
 Bilirubin, total 0.3 06/12/2018 01:40 PM  
 
 
Lab Results Component Value Date/Time Glucose 274 (H) 08/09/2018 04:41 AM  
 Glucose (POC) 366 (H) 08/09/2018 12:06 PM  
 
 
Lab Results Component Value Date/Time Hemoglobin A1c 13.0 (H) 05/09/2016 03:47 PM  
 
 
Hematology Lab Results Component Value Date/Time WBC 7.3 08/08/2018 05:57 PM  
 Hemoglobin (POC) 16.0 09/12/2017 02:01 PM  
 HGB 15.5 08/08/2018 05:57 PM  
 Hematocrit (POC) 47 09/12/2017 02:01 PM  
 HCT 42.4 08/08/2018 05:57 PM  
 PLATELET 561 84/48/9138 05:57 PM  
 MCV 78.2 (L) 08/08/2018 05:57 PM  
 
 
Lipids Lab Results Component Value Date/Time Cholesterol, total 105 08/09/2018 04:41 AM  
 HDL Cholesterol 44 08/09/2018 04:41 AM  
 LDL,Direct 144 (H) 04/03/2011 04:10 AM  
 LDL, calculated 48.2 08/09/2018 04:41 AM  
 Triglyceride 64 08/09/2018 04:41 AM  
 CHOL/HDL Ratio 2.4 08/09/2018 04:41 AM  
 
Thyroid Function Lab Results Component Value Date/Time TSH 2.30 08/09/2018 04:41 AM  
 
Vitals Visit Vitals  /63 (BP 1 Location: Right arm, BP Patient Position: At rest)  Pulse 73  Temp 98 °F (36.7 °C)  Resp 20  
 Ht 167.6 cm (66\")  Wt 63.5 kg (140 lb)  SpO2 100%  Breastfeeding No  
 BMI 22.6 kg/m2 Pregnancy Test 
Lab Results Component Value Date/Time HCG urine, QL NEGATIVE  02/26/2014 08:30 PM  
 Pregnancy test,urine (POC) NEGATIVE  08/08/2018 06:42 PM  
 HCG, Ql. NEGATIVE  09/30/2017 08:59 AM  
 
 
Kyung John Pharm D. Candidate, Class of 2019 
[phone] (pharmacy)

## 2018-08-09 NOTE — BH NOTES
Patient given ginger ale. Patient requested to drink that prior to eating her meal so that her blood sugar could be rechecked. 1705-patient blood sugar 71, patient given 4 oz of juice and told that her blood sugar would be rechecked.

## 2018-08-10 LAB
GLUCOSE BLD STRIP.AUTO-MCNC: 107 MG/DL (ref 65–100)
GLUCOSE BLD STRIP.AUTO-MCNC: 174 MG/DL (ref 65–100)
GLUCOSE BLD STRIP.AUTO-MCNC: 183 MG/DL (ref 65–100)
GLUCOSE BLD STRIP.AUTO-MCNC: 295 MG/DL (ref 65–100)
GLUCOSE BLD STRIP.AUTO-MCNC: 311 MG/DL (ref 65–100)
GLUCOSE BLD STRIP.AUTO-MCNC: 66 MG/DL (ref 65–100)
SERVICE CMNT-IMP: ABNORMAL
SERVICE CMNT-IMP: NORMAL

## 2018-08-10 PROCEDURE — 74011636637 HC RX REV CODE- 636/637: Performed by: INTERNAL MEDICINE

## 2018-08-10 PROCEDURE — 74011250637 HC RX REV CODE- 250/637: Performed by: INTERNAL MEDICINE

## 2018-08-10 PROCEDURE — 65220000003 HC RM SEMIPRIVATE PSYCH

## 2018-08-10 PROCEDURE — 74011250637 HC RX REV CODE- 250/637: Performed by: PSYCHIATRY & NEUROLOGY

## 2018-08-10 PROCEDURE — 74011636637 HC RX REV CODE- 636/637: Performed by: PSYCHIATRY & NEUROLOGY

## 2018-08-10 PROCEDURE — 82962 GLUCOSE BLOOD TEST: CPT

## 2018-08-10 RX ORDER — INSULIN LISPRO 100 [IU]/ML
INJECTION, SOLUTION INTRAVENOUS; SUBCUTANEOUS
Status: DISCONTINUED | OUTPATIENT
Start: 2018-08-10 | End: 2018-08-11

## 2018-08-10 RX ORDER — INSULIN LISPRO 100 [IU]/ML
2 INJECTION, SOLUTION INTRAVENOUS; SUBCUTANEOUS ONCE
Status: COMPLETED | OUTPATIENT
Start: 2018-08-10 | End: 2018-08-10

## 2018-08-10 RX ADMIN — LISINOPRIL 10 MG: 5 TABLET ORAL at 08:56

## 2018-08-10 RX ADMIN — FOLIC ACID 1 MG: 1 TABLET ORAL at 08:55

## 2018-08-10 RX ADMIN — SERTRALINE HYDROCHLORIDE 50 MG: 50 TABLET ORAL at 08:58

## 2018-08-10 RX ADMIN — THERA TABS 1 TABLET: TAB at 08:58

## 2018-08-10 RX ADMIN — HALOPERIDOL 5 MG: 5 TABLET ORAL at 21:29

## 2018-08-10 RX ADMIN — INSULIN LISPRO 3 UNITS: 100 INJECTION, SOLUTION INTRAVENOUS; SUBCUTANEOUS at 11:55

## 2018-08-10 RX ADMIN — BENZTROPINE MESYLATE 1 MG: 1 TABLET ORAL at 08:55

## 2018-08-10 RX ADMIN — HALOPERIDOL 5 MG: 5 TABLET ORAL at 08:56

## 2018-08-10 RX ADMIN — DIVALPROEX SODIUM 500 MG: 500 TABLET, DELAYED RELEASE ORAL at 08:55

## 2018-08-10 RX ADMIN — BENZTROPINE MESYLATE 1 MG: 1 TABLET ORAL at 16:48

## 2018-08-10 RX ADMIN — DIVALPROEX SODIUM 500 MG: 500 TABLET, DELAYED RELEASE ORAL at 16:48

## 2018-08-10 RX ADMIN — INSULIN GLARGINE 30 UNITS: 100 INJECTION, SOLUTION SUBCUTANEOUS at 08:00

## 2018-08-10 RX ADMIN — Medication 100 MG: at 08:58

## 2018-08-10 RX ADMIN — INSULIN LISPRO 2 UNITS: 100 INJECTION, SOLUTION INTRAVENOUS; SUBCUTANEOUS at 21:57

## 2018-08-10 NOTE — BH NOTES
Up and walking at times, in hallway. Little interaction with this patient. Is often in her room except for meals and medications. Will continue to monitor patient's status & assess needs.

## 2018-08-10 NOTE — DIABETES MGMT
DTC Progress/Consult Note Recommendations/ Comments: If appropriate, please consider: 
1) adding new A1c to next lab draw - last value was from 2016. 2) change correctional insulin scale to high sensitivity due to current renal status - contributing to pt's hypoglycemia event this am 66 mg/dl. 3) consider starting mealtime/prandial insulin at Lispro 3 units ac tid. 4) add consistent carb diet restrictions to her diet order as pt has difficulty selecting within a carbohydrate limit. Consult for insulin instruction received and will complete today. Current hospital DM medication: Lantus 30 units and Lispro correctional insulin - normal sensitivity ac and hs. Chart reviewed on Roxanne Salcido and visited with patient in her room today. Patient is a 44 y.o. female with known DM on Lantus 30 units at home. She reports taking her Lantus faithfully each day. She reports using Humulin insulin with a \"sliding scale\" to fix her BG levels. Pt confirms that her BG levels usually climb throughout the day. She shared that she has testing supplies and glucometer at home and has been checking her BG 3-4 times each day for the last 2 months. She verbally is able to explain in detail her technique for drawing up her insulin using syringe. She demonstrated excellent understanding of proper injection technique, storage and site rotation for her medications. Reports eating 3 meals daily and is somewhat confused about carbohydrate portions and managing them at this time. She correctly  Describes proper foot care - however, she reports that there is something on her toe that she is concerned about (it is black in color and on her left foot second toe below the joint on her toe- asked Nursing to have MD look at it to assess at next visit.). She reports needing to f/u with eye doctor and dentist.  She is following up with her standards of care and PCP at this time.   
 
A1c:  
Lab Results Component Value Date/Time Hemoglobin A1c 13.0 (H) 05/09/2016 03:47 PM  
 Hemoglobin A1c 12.6 (H) 04/25/2016 07:30 PM  
 
 
Recent Glucose Results:  
Lab Results Component Value Date/Time GLUCPOC 174 (H) 08/10/2018 07:42 AM  
 GLUCPOC 107 (H) 08/10/2018 07:08 AM  
 GLUCPOC 66 08/10/2018 06:51 AM  
  
 
Lab Results Component Value Date/Time Creatinine 1.06 (H) 08/08/2018 05:57 PM  
 
Estimated Creatinine Clearance: 64.7 mL/min (based on Cr of 1.06). Active Orders Diet DIET DIABETIC CONSISTENT CARB Regular PO intake: No data found. Will continue to follow as needed. Thank you Sherren Roch RD CDE

## 2018-08-10 NOTE — PROGRESS NOTES
Problem: Suicide/Homicide (Adult/Pediatric) Goal: *STG: Remains safe in hospital 
Client has spent the majority of this shift asleep. Blood sugar at 5pm was low x2 and then after she ate it was back up to 142. Eating and states she's ok. Polite and cooperative with care, just sleeping a lot. Will continue to observe with q 15 min checks for safety.

## 2018-08-10 NOTE — BH NOTES
Social Work Pt was seen in treatment team this morning. Pt is alert and oriented. Pt today denied SI/HI. Pt's mood is depressed, affect is tearful. Pt's thought process is improved as she denied hearing voices today but was still hyper focused about wrong doings. Pt's insight poor and judgment impaired, reliability is poor. Voicemail left for pt's daughter to update. Writer spoke with Ms. Ruben Huddleston, pt's mhsb who reported she began to see a decline in pt at the beginning of last week regarding delusions about police out to get her. Ms. [place] shared pt has not had 5 year sobriety period and continues to drink daily. Ms [place] shared that pt was hospitalized last month and then she was offered last week a chance to go to residential SA treatment and she refused. Ms. [place] shared that when pt makes attempts to stop drinking then she ends up presenting how she does now in the hospital. Writer briefly spoke with pt's , Narcisa Thayer who reported she had just walked into the office and she would call writer back and send [facility] which was received. Social work department will continue to coordinate discharge plans. Addendum:  Writer spoke with pt's daughter who reported that pt was hospitalized last year with similar symptoms. Pt's daughter really did not understand why her mother was hospitalized. Writer updated pt's daughter and shared hospital visiting hours.

## 2018-08-10 NOTE — BH NOTES
PSYCHIATRIC PROGRESS NOTE Patient Name  Galina Mohr Date of Birth 1975 CSN [id number] Medical Record Number  [id number] Age  44 y.o. PCP Amie Del Cid NP Admit date:  8/8/2018 Room Number  089/45  @ [facility]  
Date of Service  8/10/2018 PSYCHOTHERAPY SESSION NOTE: 
Length of psychotherapy session: 30 minutes Main condition/diagnosis/issues treated during session today, 8/10/2018 : schizoaffective disorder I employed Cognitive Behavioral therapy techniques, Reality-Oriented psychotherapy, as well as supportive psychotherapy in regards to various ongoing psychosocial stressors, including the following: pre-admission and current problems; housing issues; legal issues; medical issues; and stress of hospitalization. Interpersonal relationship issues and psychodynamic conflicts explored. Attempts made to alleviate maladaptive patterns. We, also, worked on issues of denial & effects of substance dependency/use. Session focused on the patient's fears about her situation upon discharge. The patient tearful at times, but able to speak about her goals. Overall, patient is progressing. Treatment Plan Update (reviewed an updated 8/10/2018) : I will modify psychotherapy tx plan by implementing more stress management strategies, building upon cognitive behavioral techniques, increasing coping skills, as well as shoring up psychological defenses). An extended energy and skill set was needed to engage pt in psychotherapy due to some of the following: resistiveness, complexity, negativity, confrontational nature, hostile behaviors, and/or severe abnormalities in thought processes/psychosis resulting in the loss of expressive/receptive language communication skills. E & M PROGRESS NOTE: 
  
 
 
HISTORY  
   
CC:  \"this has been hard for me\" HISTORY OF PRESENT ILLNESS/INTERVAL HISTORY:  (reviewed/updated 8/10/2018).  
per initial evaluation: The patient, Mari Pham, is a 44 y.o. BLACK OR  female with a past psychiatric history significant for schizoaffective disorder bipolar type most recent episode depressed, who presents at this time with complaints of (and/or evidence of) the following emotional symptoms: suicidal thoughts/threats and suicide attempt threat of hanging self. Additional symptomatology include hearing voices. The above symptoms have been present for 2+ weeks. These symptoms are of high severity. These symptoms are constant. The patient's condition has been precipitated by psychosocial stressors. Patient's condition made worse by recent relapse on ilicit substance use and legal issues. UDS: negative; BAL=0.  
  
Per intake note, the patient voiced active SI to her outpatient , and was sent to ED for evaluation due to worsening and intrusive thoughts of self harm as well as paranoid ideation (patient preoccupied with being sent to [facility]); per note, patient attempted to choke herself while in ambulance en route to hospital. Patient with non-compliance with medication as outpatient.  
  
Patient seen in the morning with treatment team. She corroborates the above account, stating that she will be sent to [facility] due to having smoked cocaine in June for unclear reasons, and endorsing vague AH. The patient is tearful, asking to be restarted on medications as soon as possible. The patient endorses depressed mood, SI, PI for the past 2 months. She contracts for safety on the unit and asks that the team reach out to her daughter. Mari Pham presents/reports/evidences the following emotional symptoms today, 8/10/2018:depression and delusions. The above symptoms have been present since admission. These symptoms are of moderate severity. The symptoms are constant. Additional symptomatology and features include anxiety and concern about health problems.   
 
Patient seen by diabetic education team, who recommended adjustments to her insulin regimen. No acute overnight events, patient isolative in room, attending some groups, medication compliant slept 6 hours overnight. Patient seen in the morning with full treatment team present. She reports generally feeling better, and denies AH. The patient reports that she was bothered by noise from other patients in the evening and when opening her door, she pushed too hard and the door hit the wall which startled her. The patient is tearful but reports feeling safe in the hospital.  
  
SIDE EFFECTS: (reviewed/updated 8/10/2018) None reported or admitted to. No noted toxicity with use of Depakote. ALLERGIES:(reviewed/updated 8/10/2018) Allergies Allergen Reactions  Dilaudid [Hydromorphone (Bulk)] Itching  Ibuprofen Not Reported This Time MEDICATIONS PRIOR TO ADMISSION:(reviewed/updated 8/10/2018) Prescriptions Prior to Admission Medication Sig  
 albuterol (PROVENTIL HFA, VENTOLIN HFA, PROAIR HFA) 90 mcg/actuation inhaler Take 1 Puff by inhalation every four (4) hours as needed for Wheezing.  benztropine (COGENTIN) 1 mg tablet Take 1 mg by mouth two (2) times a day. Indications: drug-induced extrapyramidal reaction  lisinopril (PRINIVIL, ZESTRIL) 10 mg tablet Take 10 mg by mouth daily. Indications: hypertension  insulin glargine (LANTUS) 100 unit/mL injection 30 Units by SubCUTAneous route nightly. Indications: type 1 diabetes mellitus (Patient taking differently: 30 Units by SubCUTAneous route daily. Indications: type 1 diabetes mellitus)  divalproex DR (DEPAKOTE) 500 mg tablet Take 500 mg by mouth two (2) times a day. Indications: Schizoaffective disorder  haloperidol (HALDOL) 5 mg tablet Take 5 mg by mouth two (2) times a day. Indications: Schizoaffective disorder PAST MEDICAL HISTORY: Past medical history from the initial psychiatric evaluation has been reviewed (reviewed/updated 8/10/2018) with no additional updates (I asked patient and no additional past medical history provided). Past Medical History:  
Diagnosis Date  Alcohol abuse, in remission   
 quit 17 days ago  Asthma   
 does not use inhalers  Borderline personality disorder  Diabetes ([address]  GERD (gastroesophageal reflux disease)  Hypertension  Other ill-defined conditions(799.89)   
 kidney stones ,passed one  Other ill-defined conditions(799.89) sickle cell trait  Other ill-defined conditions(799.89)   
 increased cholesterol  Pancreatitis  Psychiatric disorder   
 schizophrenia, bipolar, depression, anxiety Past Surgical History:  
Procedure Laterality Date  ABDOMEN SURGERY PROC UNLISTED  3/12/14 CHOLECYSTECTOMY LAPAROSCOPIC    
 HX GASTRIC BYPASS  HX GYN  11/8/2012  
 c section x2  HX OTHER SURGICAL  3/13/14 ENDOSCOPIC RETROGRADE CHOLANGIOPANCREATOGRAPHY  HX OTHER SURGICAL  8/4/14  
 endoscopic stent placed to bile duct  HX TUBAL LIGATION  2012 SOCIAL HISTORY: Social history from the initial psychiatric evaluation has been reviewed (reviewed/updated 8/10/2018) with no additional updates (I asked patient and no additional social history provided). Social History Social History  Marital status: SINGLE Spouse name: N/A  
 Number of children: N/A  
 Years of education: N/A Occupational History  Not on file. Social History Main Topics  Smoking status: Current Every Day Smoker Packs/day: 0.50 Years: 14.00 Types: Cigarettes  Smokeless tobacco: Never Used Comment: cigarettes  Alcohol use No  
   Comment: occasionally, last had \"i had one beer\" today  Drug use: No  
   Comment: \"crack cocaine\" 3-4 days ago  Sexual activity: Yes  
  Partners: Female Birth control/ protection: Surgical  
 
Other Topics Concern  Not on file Social History Narrative FAMILY HISTORY: Family history from the initial psychiatric evaluation has been reviewed (reviewed/updated 8/10/2018) with no additional updates (I asked patient and no additional family history provided). Family History Problem Relation Age of Onset  Heart Disease Mother  Diabetes Mother  Hypertension Mother  Hypertension Maternal Grandmother REVIEW OF SYSTEMS: (reviewed/updated 8/10/2018) Appetite:improved Sleep: good All other Review of Systems: Negative except psychiatric per interval HPI [place] MENTAL STATUS EXAM (MSE): MSE FINDINGS ARE WITHIN NORMAL LIMITS (WNL) UNLESS OTHERWISE STATED BELOW. ( ALL OF THE BELOW CATEGORIES OF THE MSE HAVE BEEN REVIEWED (reviewed 8/10/2018) AND UPDATED AS DEEMED APPROPRIATE ) General Presentation older than stated age, cooperative and guarded Orientation oriented to time, place and person Vital Signs  See below (reviewed 8/10/2018); Vital Signs (BP, Pulse, & Temp) are within normal limits if not listed below. Gait and Station Stable/steady, no ataxia Musculoskeletal System No extrapyramidal symptoms (EPS); no abnormal muscular movements or Tardive Dyskinesia (TD); muscle strength and tone are within normal limits Language No aphasia or dysarthria Speech:  increased latency of response, non-pressured and soft Thought Processes Disorganized; slow rate of thoughts; fair abstract reasoning/computation Thought Associations tangential  
Thought Content paranoid delusions and internally preoccupied Suicidal Ideations no plan , no intention and contracts for safety Homicidal Ideations none Mood:  depressed Affect:  constricted, labile and mood-congruent Memory recent  fair Memory remote:  good Concentration/Attention:  intact Fund of Knowledge average Insight:  fair Reliability good Judgment:  improving VITALS:    
Patient Vitals for the past 24 hrs: 
 Temp Pulse Resp BP SpO2  
08/10/18 0855 - 70 - 120/60 -  
08/09/18 1224 - 73 20 120/63 100 % Wt Readings from Last 3 Encounters:  
08/08/18 63.5 kg (140 lb)  
06/13/18 67.6 kg (149 lb)  
06/12/18 68 kg (149 lb 14.6 oz) Temp Readings from Last 3 Encounters:  
06/13/18 98.4 °F (36.9 °C)  
06/12/18 97.6 °F (36.4 °C)  
03/18/18 97.3 °F (36.3 °C) BP Readings from Last 3 Encounters:  
08/10/18 120/60  
06/13/18 (!) 186/97  
06/12/18 148/85 Pulse Readings from Last 3 Encounters:  
08/10/18 70  
06/13/18 75  
06/12/18 85 DATA LABORATORY DATA:(reviewed/updated 8/10/2018) Recent Results (from the past 24 hour(s)) GLUCOSE, POC Collection Time: 08/09/18 12:06 PM  
Result Value Ref Range Glucose (POC) 366 (H) 65 - 100 mg/dL Performed by Karolina Warner GLUCOSE, POC Collection Time: 08/09/18  4:50 PM  
Result Value Ref Range Glucose (POC) 72 65 - 100 mg/dL Performed by [employer] GLUCOSE, POC Collection Time: 08/09/18  5:02 PM  
Result Value Ref Range Glucose (POC) 71 65 - 100 mg/dL Performed by Onelia Juju GLUCOSE, POC Collection Time: 08/09/18  5:16 PM  
Result Value Ref Range Glucose (POC) 142 (H) 65 - 100 mg/dL Performed by Onelia Juju GLUCOSE, POC Collection Time: 08/09/18  8:46 PM  
Result Value Ref Range Glucose (POC) 397 (H) 65 - 100 mg/dL Performed by Laura Naik, POC Collection Time: 08/10/18  6:51 AM  
Result Value Ref Range Glucose (POC) 66 65 - 100 mg/dL Performed by [employer] GLUCOSE, POC Collection Time: 08/10/18  7:08 AM  
Result Value Ref Range Glucose (POC) 107 (H) 65 - 100 mg/dL Performed by Franco María Elena GLUCOSE, POC Collection Time: 08/10/18  7:42 AM  
Result Value Ref Range Glucose (POC) 174 (H) 65 - 100 mg/dL Performed by [employer] Lab Results Component Value Date/Time Valproic acid 61 08/08/2018 05:57 PM  
 
No results found for: LITHM  
RADIOLOGY REPORTS:(reviewed/updated 8/10/2018) No results found. MEDICATIONS ALL MEDICATIONS:  
Current Facility-Administered Medications Medication Dose Route Frequency  insulin lispro (HUMALOG) injection   SubCUTAneous TIDAC  
 benztropine (COGENTIN) tablet 1 mg  1 mg Oral BID  albuterol (PROVENTIL HFA, VENTOLIN HFA, PROAIR HFA) inhaler 1 Puff  1 Puff Inhalation Q4H PRN  
 lisinopril (PRINIVIL, ZESTRIL) tablet 10 mg  10 mg Oral DAILY  divalproex DR (DEPAKOTE) tablet 500 mg  500 mg Oral BID  
 haloperidol (HALDOL) tablet 5 mg  5 mg Oral Q12H  sertraline (ZOLOFT) tablet 50 mg  50 mg Oral DAILY  insulin glargine (LANTUS) injection 30 Units  30 Units SubCUTAneous DAILY  glucose chewable tablet 16 g  4 Tab Oral PRN  
 dextrose (D50W) injection syrg 12.5-25 g  12.5-25 g IntraVENous PRN  
 glucagon (GLUCAGEN) injection 1 mg  1 mg IntraMUSCular PRN  thiamine HCL (B-1) tablet 100 mg  100 mg Oral DAILY  folic acid (FOLVITE) tablet 1 mg  1 mg Oral DAILY  therapeutic multivitamin (THERAGRAN) tablet 1 Tab  1 Tab Oral DAILY  ziprasidone (GEODON) 20 mg in sterile water (preservative free) 1 mL injection  20 mg IntraMUSCular BID PRN  
 OLANZapine (ZyPREXA) tablet 5 mg  5 mg Oral Q6H PRN  
 benztropine (COGENTIN) tablet 2 mg  2 mg Oral BID PRN  
 benztropine (COGENTIN) injection 2 mg  2 mg IntraMUSCular BID PRN  
 LORazepam (ATIVAN) injection 2 mg  2 mg IntraMUSCular Q4H PRN  
 LORazepam (ATIVAN) tablet 1 mg  1 mg Oral Q4H PRN  
 zolpidem (AMBIEN) tablet 10 mg  10 mg Oral QHS PRN  
 acetaminophen (TYLENOL) tablet 650 mg  650 mg Oral Q4H PRN  
 magnesium hydroxide (MILK OF MAGNESIA) 400 mg/5 mL oral suspension 30 mL  30 mL Oral DAILY PRN  
 nicotine (NICODERM CQ) 21 mg/24 hr patch 1 Patch  1 Patch TransDERmal DAILY PRN  
  
SCHEDULED MEDICATIONS:  
Current Facility-Administered Medications Medication Dose Route Frequency  insulin lispro (HUMALOG) injection   SubCUTAneous TIDAC  
 benztropine (COGENTIN) tablet 1 mg  1 mg Oral BID  lisinopril (PRINIVIL, ZESTRIL) tablet 10 mg  10 mg Oral DAILY  divalproex DR (DEPAKOTE) tablet 500 mg  500 mg Oral BID  
 haloperidol (HALDOL) tablet 5 mg  5 mg Oral Q12H  sertraline (ZOLOFT) tablet 50 mg  50 mg Oral DAILY  insulin glargine (LANTUS) injection 30 Units  30 Units SubCUTAneous DAILY  thiamine HCL (B-1) tablet 100 mg  100 mg Oral DAILY  folic acid (FOLVITE) tablet 1 mg  1 mg Oral DAILY  therapeutic multivitamin (THERAGRAN) tablet 1 Tab  1 Tab Oral DAILY ASSESSMENT & PLAN  
 
DIAGNOSES REQUIRING ACTIVE TREATMENT AND MONITORING: (reviewed/updated 8/10/2018) Patient Active Hospital Problem List: The patient, Anthony Benavidez, is a 44 y.o.  female who presents at this time for treatment of the following diagnoses: 
Patient Active Hospital Problem List: 
 Schizoaffective disorder ([facility] [address] (8/8/2018) Assessment: patient dysphoric, tearful, eager to resume care. Patient seeking help, which confers a better prognosis. Plan:  
- CONTINUE Depakote 500 mg Q12H for mood lability - VPA trough 8/13/18 0600 
- CONTINUE Zoloft 50 mg QDAY for depression, can increase to 100 mg on 8/13 
- CONTINUE Haldol 5 mg Q12H for psychosis 
- CONTINUE Cogentin 1 mg Q12H for EPS prophylaxis 
  
  
## Diabetes Assessment: IDDM, will resume home regimen 
- CONTINUE Lantus 30U - CHANGE SS to high sensitivity 
 
  
  
## HTN Assessment: patient with poorly controlled BP, will resume home regimen 
- CONTINUE Lisinopril 10 mg QDAY I will continue to monitor blood levels (Depakote, Tegretol, lithium, clozapine---a drug with a narrow therapeutic index= NTI) and associated labs for drug therapy implemented that require intense monitoring for toxicity as deemed appropriate based on current medication side effects and pharmacodynamically determined drug 1/2 lives.  
 
In summary, Anthony Benavidez, is a 43 y.o.  female who presents with a severe exacerbation of the principal diagnosis of Schizoaffective disorder, depressive type (Ny Utca 75.) Patient's condition is improving. Patient requires continued inpatient hospitalization for further stabilization, safety monitoring and medication management. I will continue to coordinate the provision of individual, milieu, occupational, group, and substance abuse therapies to address target symptoms/diagnoses as deemed appropriate for the individual patient. A coordinated, multidisplinary treatment team round was conducted with the patient (this team consists of the nurse, psychiatric unit pharmcist,  and writer). Complete current electronic health record for patient has been reviewed today including consultant notes, ancillary staff notes, nurses and psychiatric tech notes. Suicide risk assessment completed and patient deemed to be of low risk for suicide at this time. The following regarding medications was addressed during rounds with patient:  
the risks and benefits of the proposed medication. The patient was given the opportunity to ask questions. Informed consent given to the use of the above medications. Will continue to adjust psychiatric and non-psychiatric medications (see above \"medication\" section and orders section for details) as deemed appropriate & based upon diagnoses and response to treatment. I will continue to order blood tests/labs and diagnostic tests as deemed appropriate and review results as they become available (see orders for details and above listed lab/test results). I will order psychiatric records from previous Fleming County Hospital hospitals to further elucidate the nature of patient's psychopathology and review once available. I will gather additional collateral information from friends, family and o/p treatment team to further elucidate the nature of patient's psychopathology and baselline level of psychiatric functioning.  
  
 
 
I certify that this patient's inpatient psychiatric hospital services furnished since the previous certification were, and continue to be, required for treatment that could reasonably be expected to improve the patient's condition, or for diagnostic study, and that the patient continues to need, on a daily basis, active treatment furnished directly by or requiring the supervision of inpatient psychiatric facility personnel. In addition, the hospital records show that services furnished were intensive treatment services, admission or related services, or equivalent services. EXPECTED DISCHARGE DATE/DAY: 8/14/18 DISPOSITION: Home Signed By:  
Vasile Noriega MD 
8/10/2018

## 2018-08-10 NOTE — H&P
History and Physical 
 
Subjective:  
 
Ama Salazar is a 44 y.o. female with past medical hx significant for Alcohol abuse, Asthma, DM, GERD, HTN, Kidney stone, sickle cell trait, HLD,  Vit D def and schizophrenia. Per psych documentation, Pt admitted under a temporary prison order (TDO) for severe psychosis proving to be an imminent danger to self and an inability to care for self. Pt is a BLACK OR  female with a past psychiatric history significant for schizoaffective disorder bipolar type most recent episode depressed, who is admitted at this time with complaints of (and/or evidence of) the following emotional symptoms: suicidal thoughts/threats and suicide attempt threat of hanging self. Additional symptomatology include hearing voices. The above symptoms have been present for 2+ weeks. These symptoms are of high severity. These symptoms are constant. The patient's condition has been precipitated by psychosocial stressors. Patient's condition made worse by recent relapse on ilicit substance use and legal issues. UDS: negative; BAL=0. Pt is maintained on a number of medications to treat her chronic medical issues. She is a poor historian but does not c/o anything serious. She is showing no signs of acute distress. She is maintained on Insulin and ACE I. Past Medical History:  
Diagnosis Date  Alcohol abuse, in remission   
 quit 17 days ago  Asthma   
 does not use inhalers  Borderline personality disorder  Diabetes ([facility] [address]  GERD (gastroesophageal reflux disease)  Hypertension  Other ill-defined conditions(799.89)   
 kidney stones ,passed one  Other ill-defined conditions(799.89) sickle cell trait  Other ill-defined conditions(799.89)   
 increased cholesterol  Pancreatitis  Psychiatric disorder   
 schizophrenia, bipolar, depression, anxiety Past Surgical History:  
Procedure Laterality Date [address] [place] UNLISTED 3/12/14 CHOLECYSTECTOMY LAPAROSCOPIC    
 HX GASTRIC BYPASS  HX GYN  11/8/2012  
 c section x2  HX OTHER SURGICAL  3/13/14 ENDOSCOPIC RETROGRADE CHOLANGIOPANCREATOGRAPHY  HX OTHER SURGICAL  8/4/14  
 endoscopic stent placed to bile duct  HX TUBAL LIGATION  2012 Family History Problem Relation Age of Onset  Heart Disease Mother  Diabetes Mother  Hypertension Mother  Hypertension Maternal Grandmother Social History Substance Use Topics  Smoking status: Current Every Day Smoker Packs/day: 0.50 Years: 14.00 Types: Cigarettes  Smokeless tobacco: Never Used Comment: cigarettes  Alcohol use No  
   Comment: occasionally, last had \"i had one beer\" today Prior to Admission medications Medication Sig Start Date End Date Taking? Authorizing Provider  
albuterol (PROVENTIL HFA, VENTOLIN HFA, PROAIR HFA) 90 mcg/actuation inhaler Take 1 Puff by inhalation every four (4) hours as needed for Wheezing. Yes Historical Provider  
benztropine (COGENTIN) 1 mg tablet Take 1 mg by mouth two (2) times a day. Indications: drug-induced extrapyramidal reaction   Yes Historical Provider  
lisinopril (PRINIVIL, ZESTRIL) 10 mg tablet Take 10 mg by mouth daily. Indications: hypertension   Yes Historical Provider  
insulin glargine (LANTUS) 100 unit/mL injection 30 Units by SubCUTAneous route nightly. Indications: type 1 diabetes mellitus Patient taking differently: 30 Units by SubCUTAneous route daily. Indications: type 1 diabetes mellitus 11/17/17  Yes Jos Cochran PA-C  
divalproex DR (DEPAKOTE) 500 mg tablet Take 500 mg by mouth two (2) times a day. Indications: Schizoaffective disorder   Yes Marcel Gonzalez MD  
haloperidol (HALDOL) 5 mg tablet Take 5 mg by mouth two (2) times a day. Indications: Schizoaffective disorder   Yes Marcel Gonzalez MD  
 
Allergies Allergen Reactions  Dilaudid [Hydromorphone (Bulk)] Itching  Ibuprofen Not Reported This Time Review of Systems: 
Constitutional: negative Eyes: negative Ears, Nose, Mouth, Throat, and Face: negative Respiratory: negative Cardiovascular: negative Gastrointestinal: negative Genitourinary:negative Integument/Breast: negative Hematologic/Lymphatic: negative Musculoskeletal:negative Neurological: negative Behavioral/Psychiatric: SI, hearing voices Endocrine: negative Allergic/Immunologic: negative Objective: Intake and Output:   
  
  
 
Physical Exam:  
Visit Vitals  /63 (BP 1 Location: Right arm, BP Patient Position: At rest)  Pulse 73  Temp 98 °F (36.7 °C)  Resp 20  
 Ht 5' 6\" (1.676 m)  Wt 63.5 kg (140 lb)  SpO2 100%  Breastfeeding No  
 BMI 22.6 kg/m2 General:  Alert, cooperative, no distress, appears stated age. Head:  Normocephalic, without obvious abnormality, atraumatic. Eyes:  Conjunctivae/corneas clear. PERRL, EOMs intact. Ears:  Normal external ear canals both ears. Nose: Nares normal. Septum midline. Mucosa normal. No drainage or sinus tenderness. Throat: Lips, mucosa, and tongue normal. Teeth and gums normal.  
Neck: Supple, symmetrical, trachea midline, no adenopathy, thyroid: no enlargement/tenderness/nodules. Back:   Symmetric, no curvature. ROM normal. No CVA tenderness. Lungs:   Clear to auscultation bilaterally. Chest wall:  No tenderness or deformity. Heart:  Regular rate and rhythm, S1, S2 normal, no murmur, click, rub or gallop. Abdomen:   Soft, non-tender. Bowel sounds normal. No masses,  No organomegaly. Extremities: Extremities normal, atraumatic, no cyanosis or edema. Pulses: 2+ distally. Skin: Skin color, texture, turgor normal. No rashes or lesions Lymph nodes: Cervical and supraclavicular nodes not enlarged. Neurologic: CNII-XII intact. Normal strength, sensation intact, reflexes patellar region intact. Data Review:  
Recent Results (from the past 24 hour(s)) TSH 3RD GENERATION  
 Collection Time: 18  4:41 AM  
Result Value Ref Range TSH 2.30 0.36 - 3.74 uIU/mL  
LIPID PANEL Collection Time: 18  4:41 AM  
Result Value Ref Range LIPID PROFILE Cholesterol, total 105 <200 MG/DL Triglyceride 64 <150 MG/DL  
 HDL Cholesterol 44 MG/DL  
 LDL, calculated 48.2 0 - 100 MG/DL VLDL, calculated 12.8 MG/DL  
 CHOL/HDL Ratio 2.4 0 - 5.0 GLUCOSE, FASTING Collection Time: 18  4:41 AM  
Result Value Ref Range Glucose 274 (H) 65 - 100 MG/DL  
GLUCOSE, POC Collection Time: 18  8:25 AM  
Result Value Ref Range Glucose (POC) 201 (H) 65 - 100 mg/dL Performed by Migdalia Wana GLUCOSE, POC Collection Time: 18 12:06 PM  
Result Value Ref Range Glucose (POC) 366 (H) 65 - 100 mg/dL Performed by Migdalia Wana GLUCOSE, POC Collection Time: 18  4:50 PM  
Result Value Ref Range Glucose (POC) 72 65 - 100 mg/dL Performed by Pauly Dallas GLUCOSE, POC Collection Time: 18  5:02 PM  
Result Value Ref Range Glucose (POC) 71 65 - 100 mg/dL Performed by Pauly  GLUCOSE, POC Collection Time: 18  5:16 PM  
Result Value Ref Range Glucose (POC) 142 (H) 65 - 100 mg/dL Performed by Pauly  Assessment:  
 
Principal Problem: 
  Schizoaffective disorder, depressive type ([facility] (2018) Active Problems: 
  Uncontrolled diabetes mellitus ([facility] [address] (2016) Essential hypertension (2016) Hyperlipidemia (2016) Vit D def Anemia Hx pancreatitis Hx of Etoh Hx cocaine abuse Plan:  
 
Restart Insulin Regimen Restart ACE I Thiamine/MVI/Folate No VTE prophylaxis indicated or necessary at this time. Signed By: Shira Bowers MD   
 2018

## 2018-08-11 LAB
EST. AVERAGE GLUCOSE BLD GHB EST-MCNC: 220 MG/DL
GLUCOSE BLD STRIP.AUTO-MCNC: 249 MG/DL (ref 65–100)
GLUCOSE BLD STRIP.AUTO-MCNC: 270 MG/DL (ref 65–100)
GLUCOSE BLD STRIP.AUTO-MCNC: 299 MG/DL (ref 65–100)
GLUCOSE BLD STRIP.AUTO-MCNC: 334 MG/DL (ref 65–100)
HBA1C MFR BLD: 9.3 % (ref 4.2–6.3)
SERVICE CMNT-IMP: ABNORMAL

## 2018-08-11 PROCEDURE — 74011250637 HC RX REV CODE- 250/637: Performed by: PSYCHIATRY & NEUROLOGY

## 2018-08-11 PROCEDURE — 74011636637 HC RX REV CODE- 636/637: Performed by: INTERNAL MEDICINE

## 2018-08-11 PROCEDURE — 82962 GLUCOSE BLOOD TEST: CPT

## 2018-08-11 PROCEDURE — 74011250637 HC RX REV CODE- 250/637: Performed by: INTERNAL MEDICINE

## 2018-08-11 PROCEDURE — 65220000003 HC RM SEMIPRIVATE PSYCH

## 2018-08-11 PROCEDURE — 74011636637 HC RX REV CODE- 636/637: Performed by: PSYCHIATRY & NEUROLOGY

## 2018-08-11 PROCEDURE — 83036 HEMOGLOBIN GLYCOSYLATED A1C: CPT | Performed by: PSYCHIATRY & NEUROLOGY

## 2018-08-11 PROCEDURE — 74011000250 HC RX REV CODE- 250: Performed by: INTERNAL MEDICINE

## 2018-08-11 PROCEDURE — 36415 COLL VENOUS BLD VENIPUNCTURE: CPT | Performed by: PSYCHIATRY & NEUROLOGY

## 2018-08-11 RX ORDER — BACITRACIN 500 UNIT/G
1 PACKET (EA) TOPICAL 2 TIMES DAILY
Status: DISCONTINUED | OUTPATIENT
Start: 2018-08-11 | End: 2018-08-14 | Stop reason: HOSPADM

## 2018-08-11 RX ORDER — INSULIN LISPRO 100 [IU]/ML
INJECTION, SOLUTION INTRAVENOUS; SUBCUTANEOUS
Status: DISCONTINUED | OUTPATIENT
Start: 2018-08-11 | End: 2018-08-14 | Stop reason: HOSPADM

## 2018-08-11 RX ADMIN — LISINOPRIL 10 MG: 5 TABLET ORAL at 08:35

## 2018-08-11 RX ADMIN — INSULIN LISPRO 3 UNITS: 100 INJECTION, SOLUTION INTRAVENOUS; SUBCUTANEOUS at 11:47

## 2018-08-11 RX ADMIN — DIVALPROEX SODIUM 500 MG: 500 TABLET, DELAYED RELEASE ORAL at 08:35

## 2018-08-11 RX ADMIN — SERTRALINE HYDROCHLORIDE 50 MG: 50 TABLET ORAL at 08:35

## 2018-08-11 RX ADMIN — INSULIN LISPRO 2 UNITS: 100 INJECTION, SOLUTION INTRAVENOUS; SUBCUTANEOUS at 08:37

## 2018-08-11 RX ADMIN — HALOPERIDOL 5 MG: 5 TABLET ORAL at 08:40

## 2018-08-11 RX ADMIN — DIVALPROEX SODIUM 500 MG: 500 TABLET, DELAYED RELEASE ORAL at 16:42

## 2018-08-11 RX ADMIN — INSULIN LISPRO 2 UNITS: 100 INJECTION, SOLUTION INTRAVENOUS; SUBCUTANEOUS at 21:54

## 2018-08-11 RX ADMIN — HALOPERIDOL 5 MG: 5 TABLET ORAL at 21:11

## 2018-08-11 RX ADMIN — INSULIN GLARGINE 30 UNITS: 100 INJECTION, SOLUTION SUBCUTANEOUS at 08:34

## 2018-08-11 RX ADMIN — FOLIC ACID 1 MG: 1 TABLET ORAL at 08:35

## 2018-08-11 RX ADMIN — Medication 100 MG: at 08:35

## 2018-08-11 RX ADMIN — INSULIN LISPRO 4 UNITS: 100 INJECTION, SOLUTION INTRAVENOUS; SUBCUTANEOUS at 16:41

## 2018-08-11 RX ADMIN — BENZTROPINE MESYLATE 1 MG: 1 TABLET ORAL at 16:43

## 2018-08-11 RX ADMIN — BENZTROPINE MESYLATE 1 MG: 1 TABLET ORAL at 08:35

## 2018-08-11 RX ADMIN — THERA TABS 1 TABLET: TAB at 08:35

## 2018-08-11 RX ADMIN — ZOLPIDEM TARTRATE 10 MG: 10 TABLET ORAL at 21:11

## 2018-08-11 RX ADMIN — ZOLPIDEM TARTRATE 10 MG: 10 TABLET ORAL at 00:01

## 2018-08-11 RX ADMIN — BACITRACIN 1 PACKET: 500 OINTMENT TOPICAL at 11:32

## 2018-08-11 RX ADMIN — BACITRACIN 1 PACKET: 500 OINTMENT TOPICAL at 16:43

## 2018-08-11 NOTE — PROGRESS NOTES
Problem: Suicide/Homicide (Adult/Pediatric) Goal: *STG: Remains safe in hospital 
Outcome: Progressing Towards Goal 
Pt is calm and cooperative. Isolative to self. Medication and meal complaint. Voices no issues. Will continue to monitor patient and assess needs.

## 2018-08-11 NOTE — BH NOTES
PSYCHIATRIC PROGRESS NOTE Patient Name  Tonja Barraza Date of Birth 1975 [facility] [id number] Medical Record Number  [id number] Age  44 y.o. PCP Elisa Bello NP Admit date:  8/8/2018 Room Number  731/42  @ [facility]  
Date of Service  8/11/2018 PSYCHOTHERAPY SESSION NOTE: 
Length of psychotherapy session: 30 minutes Main condition/diagnosis/issues treated during session today, 8/11/2018 : schizoaffective disorder I employed Cognitive Behavioral therapy techniques, Reality-Oriented psychotherapy, as well as supportive psychotherapy in regards to various ongoing psychosocial stressors, including the following: pre-admission and current problems; housing issues; legal issues; medical issues; and stress of hospitalization. Interpersonal relationship issues and psychodynamic conflicts explored. Attempts made to alleviate maladaptive patterns. We, also, worked on issues of denial & effects of substance dependency/use. Session focused on the patient's fears about her situation upon discharge. The patient tearful at times, but able to speak about her goals. Overall, patient is progressing. Treatment Plan Update (reviewed an updated 8/11/2018) : I will modify psychotherapy tx plan by implementing more stress management strategies, building upon cognitive behavioral techniques, increasing coping skills, as well as shoring up psychological defenses). An extended energy and skill set was needed to engage pt in psychotherapy due to some of the following: resistiveness, complexity, negativity, confrontational nature, hostile behaviors, and/or severe abnormalities in thought processes/psychosis resulting in the loss of expressive/receptive language communication skills. E & M PROGRESS NOTE: 
  
 
 
HISTORY  
   
CC:  \"this has been hard for me\" HISTORY OF PRESENT ILLNESS/INTERVAL HISTORY:  (reviewed/updated 8/11/2018).  
per initial evaluation: The patient, Ailin Villanueva, is a 44 y.o. BLACK OR  female with a past psychiatric history significant for schizoaffective disorder bipolar type most recent episode depressed, who presents at this time with complaints of (and/or evidence of) the following emotional symptoms: suicidal thoughts/threats and suicide attempt threat of hanging self. Additional symptomatology include hearing voices. The above symptoms have been present for 2+ weeks. These symptoms are of high severity. These symptoms are constant. The patient's condition has been precipitated by psychosocial stressors. Patient's condition made worse by recent relapse on ilicit substance use and legal issues. UDS: negative; BAL=0.  
  
Per intake note, the patient voiced active SI to her outpatient , and was sent to ED for evaluation due to worsening and intrusive thoughts of self harm as well as paranoid ideation (patient preoccupied with being sent to [facility]); per note, patient attempted to choke herself while in ambulance en route to hospital. Patient with non-compliance with medication as outpatient.  
  
Patient seen in the morning with treatment team. She corroborates the above account, stating that she will be sent to [facility] due to having smoked cocaine in June for unclear reasons, and endorsing vague AH. The patient is tearful, asking to be restarted on medications as soon as possible. The patient endorses depressed mood, SI, PI for the past 2 months. She contracts for safety on the unit and asks that the team reach out to her daughter. Ailin Villanueva presents/reports/evidences the following emotional symptoms today, 8/11/2018:depression and delusions. The above symptoms have been present since admission. These symptoms are of moderate severity. The symptoms are constant. Additional symptomatology and features include anxiety and concern about health problems.   
 
Patient seen by diabetic education team, who recommended adjustments to her insulin regimen. No acute overnight events, patient isolative in room, attending some groups, medication compliant slept 6 hours overnight. Patient seen in the morning with full treatment team present. She reports generally feeling better, and denies AH. The patient reports that she was bothered by noise from other patients in the evening and when opening her door, she pushed too hard and the door hit the wall which startled her. The patient is tearful but reports feeling safe in the hospital. 
8/11- pt reports her mood to be neutral \"good and not so good\". Affect is brighter, accepting med and no agitation reported. Sleep is improving . Paranoid ideation of people chasing- intensity is low. Denies SI/HI/AVH  
  
SIDE EFFECTS: (reviewed/updated 8/11/2018) None reported or admitted to. No noted toxicity with use of Depakote. ALLERGIES:(reviewed/updated 8/11/2018) Allergies Allergen Reactions  Dilaudid [Hydromorphone (Bulk)] Itching  Ibuprofen Not Reported This Time MEDICATIONS PRIOR TO ADMISSION:(reviewed/updated 8/11/2018) Prescriptions Prior to Admission Medication Sig  
 albuterol (PROVENTIL HFA, VENTOLIN HFA, PROAIR HFA) 90 mcg/actuation inhaler Take 1 Puff by inhalation every four (4) hours as needed for Wheezing.  benztropine (COGENTIN) 1 mg tablet Take 1 mg by mouth two (2) times a day. Indications: drug-induced extrapyramidal reaction  lisinopril (PRINIVIL, ZESTRIL) 10 mg tablet Take 10 mg by mouth daily. Indications: hypertension  insulin glargine (LANTUS) 100 unit/mL injection 30 Units by SubCUTAneous route nightly. Indications: type 1 diabetes mellitus (Patient taking differently: 30 Units by SubCUTAneous route daily. Indications: type 1 diabetes mellitus)  divalproex DR (DEPAKOTE) 500 mg tablet Take 500 mg by mouth two (2) times a day. Indications: Schizoaffective disorder  haloperidol (HALDOL) 5 mg tablet Take 5 mg by mouth two (2) times a day. Indications: Schizoaffective disorder PAST MEDICAL HISTORY: Past medical history from the initial psychiatric evaluation has been reviewed (reviewed/updated 8/11/2018) with no additional updates (I asked patient and no additional past medical history provided). Past Medical History:  
Diagnosis Date  Alcohol abuse, in remission   
 quit 17 days ago  Asthma   
 does not use inhalers  Borderline personality disorder  Diabetes ([address]  GERD (gastroesophageal reflux disease)  Hypertension  Other ill-defined conditions(799.89)   
 kidney stones ,passed one  Other ill-defined conditions(799.89) sickle cell trait  Other ill-defined conditions(799.89)   
 increased cholesterol  Pancreatitis  Psychiatric disorder   
 schizophrenia, bipolar, depression, anxiety Past Surgical History:  
Procedure Laterality Date  ABDOMEN SURGERY PROC UNLISTED  3/12/14 CHOLECYSTECTOMY LAPAROSCOPIC    
 HX GASTRIC BYPASS  HX GYN  11/8/2012  
 c section x2  HX OTHER SURGICAL  3/13/14 ENDOSCOPIC RETROGRADE CHOLANGIOPANCREATOGRAPHY  HX OTHER SURGICAL  8/4/14  
 endoscopic stent placed to bile duct  HX TUBAL LIGATION  2012 SOCIAL HISTORY: Social history from the initial psychiatric evaluation has been reviewed (reviewed/updated 8/11/2018) with no additional updates (I asked patient and no additional social history provided). Social History Social History  Marital status: SINGLE Spouse name: N/A  
 Number of children: N/A  
 Years of education: N/A Occupational History  Not on file. Social History Main Topics  Smoking status: Current Every Day Smoker Packs/day: 0.50 Years: 14.00 Types: Cigarettes  Smokeless tobacco: Never Used Comment: cigarettes  Alcohol use No  
   Comment: occasionally, last had \"i had one beer\" today  Drug use: No  
   Comment: \"crack cocaine\" 3-4 days ago  Sexual activity: Yes  
  Partners: Female Birth control/ protection: Surgical  
 
Other Topics Concern  Not on file Social History Narrative FAMILY HISTORY: Family history from the initial psychiatric evaluation has been reviewed (reviewed/updated 8/11/2018) with no additional updates (I asked patient and no additional family history provided). Family History Problem Relation Age of Onset  Heart Disease Mother  Diabetes Mother  Hypertension Mother  Hypertension Maternal Grandmother REVIEW OF SYSTEMS: (reviewed/updated 8/11/2018) Appetite:improved Sleep: good All other Review of Systems: Negative except psychiatric per interval HPI Palatinest MENTAL STATUS EXAM (MSE): MSE FINDINGS ARE WITHIN NORMAL LIMITS (WNL) UNLESS OTHERWISE STATED BELOW. ( ALL OF THE BELOW CATEGORIES OF THE MSE HAVE BEEN REVIEWED (reviewed 8/11/2018) AND UPDATED AS DEEMED APPROPRIATE ) General Presentation older than stated age, cooperative and guarded Orientation oriented to time, place and person Vital Signs  See below (reviewed 8/11/2018); Vital Signs (BP, Pulse, & Temp) are within normal limits if not listed below. Gait and Station Stable/steady, no ataxia Musculoskeletal System No extrapyramidal symptoms (EPS); no abnormal muscular movements or Tardive Dyskinesia (TD); muscle strength and tone are within normal limits Language No aphasia or dysarthria Speech:  increased latency of response, non-pressured and soft Thought Processes Disorganized; slow rate of thoughts; fair abstract reasoning/computation Thought Associations tangential  
Thought Content paranoid delusions and internally preoccupied Suicidal Ideations no plan , no intention and contracts for safety Homicidal Ideations none Mood:  depressed Affect:  constricted, labile and mood-congruent Memory recent  fair Memory remote:  good Concentration/Attention:  intact Fund of Knowledge average Insight:  fair Reliability good Judgment:  improving VITALS:    
Patient Vitals for the past 24 hrs: 
 Temp Pulse Resp BP  
08/11/18 0626 98.2 °F (36.8 °C) 77 18 144/78 Wt Readings from Last 3 Encounters:  
08/08/18 63.5 kg (140 lb)  
06/13/18 67.6 kg (149 lb)  
06/12/18 68 kg (149 lb 14.6 oz) Temp Readings from Last 3 Encounters:  
08/11/18 98.2 °F (36.8 °C)  
06/13/18 98.4 °F (36.9 °C)  
06/12/18 97.6 °F (36.4 °C) BP Readings from Last 3 Encounters:  
08/11/18 144/78  
06/13/18 (!) 186/97  
06/12/18 148/85 Pulse Readings from Last 3 Encounters:  
08/11/18 77  
06/13/18 75  
06/12/18 85 DATA LABORATORY DATA:(reviewed/updated 8/11/2018) Recent Results (from the past 24 hour(s)) GLUCOSE, POC Collection Time: 08/10/18 11:15 AM  
Result Value Ref Range Glucose (POC) 295 (H) 65 - 100 mg/dL Performed by Stion GLUCOSE, POC Collection Time: 08/10/18  4:44 PM  
Result Value Ref Range Glucose (POC) 183 (H) 65 - 100 mg/dL Performed by Stion GLUCOSE, POC Collection Time: 08/10/18  8:29 PM  
Result Value Ref Range Glucose (POC) 311 (H) 65 - 100 mg/dL Performed by Tanya GATES WITH EAG Collection Time: 08/11/18  4:56 AM  
Result Value Ref Range Hemoglobin A1c 9.3 (H) 4.2 - 6.3 % Est. average glucose 220 mg/dL GLUCOSE, POC Collection Time: 08/11/18  8:34 AM  
Result Value Ref Range Glucose (POC) 249 (H) 65 - 100 mg/dL Performed by Stion Lab Results Component Value Date/Time Valproic acid 61 08/08/2018 05:57 PM  
 
No results found for: OTILIA  
RADIOLOGY REPORTS:(reviewed/updated 8/11/2018) No results found. MEDICATIONS ALL MEDICATIONS:  
Current Facility-Administered Medications Medication Dose Route Frequency  bacitracin 500 unit/gram packet 1 Packet  1 Packet Topical BID  insulin lispro (HUMALOG) injection   SubCUTAneous TIDAC  benztropine (COGENTIN) tablet 1 mg  1 mg Oral BID  albuterol (PROVENTIL HFA, VENTOLIN HFA, PROAIR HFA) inhaler 1 Puff  1 Puff Inhalation Q4H PRN  
 lisinopril (PRINIVIL, ZESTRIL) tablet 10 mg  10 mg Oral DAILY  divalproex DR (DEPAKOTE) tablet 500 mg  500 mg Oral BID  
 haloperidol (HALDOL) tablet 5 mg  5 mg Oral Q12H  sertraline (ZOLOFT) tablet 50 mg  50 mg Oral DAILY  insulin glargine (LANTUS) injection 30 Units  30 Units SubCUTAneous DAILY  glucose chewable tablet 16 g  4 Tab Oral PRN  
 dextrose (D50W) injection syrg 12.5-25 g  12.5-25 g IntraVENous PRN  
 glucagon (GLUCAGEN) injection 1 mg  1 mg IntraMUSCular PRN  thiamine HCL (B-1) tablet 100 mg  100 mg Oral DAILY  folic acid (FOLVITE) tablet 1 mg  1 mg Oral DAILY  therapeutic multivitamin (THERAGRAN) tablet 1 Tab  1 Tab Oral DAILY  ziprasidone (GEODON) 20 mg in sterile water (preservative free) 1 mL injection  20 mg IntraMUSCular BID PRN  
 OLANZapine (ZyPREXA) tablet 5 mg  5 mg Oral Q6H PRN  
 benztropine (COGENTIN) tablet 2 mg  2 mg Oral BID PRN  
 benztropine (COGENTIN) injection 2 mg  2 mg IntraMUSCular BID PRN  
 LORazepam (ATIVAN) injection 2 mg  2 mg IntraMUSCular Q4H PRN  
 LORazepam (ATIVAN) tablet 1 mg  1 mg Oral Q4H PRN  
 zolpidem (AMBIEN) tablet 10 mg  10 mg Oral QHS PRN  
 acetaminophen (TYLENOL) tablet 650 mg  650 mg Oral Q4H PRN  
 magnesium hydroxide (MILK OF MAGNESIA) 400 mg/5 mL oral suspension 30 mL  30 mL Oral DAILY PRN  
 nicotine (NICODERM CQ) 21 mg/24 hr patch 1 Patch  1 Patch TransDERmal DAILY PRN  
  
SCHEDULED MEDICATIONS:  
Current Facility-Administered Medications Medication Dose Route Frequency  bacitracin 500 unit/gram packet 1 Packet  1 Packet Topical BID  insulin lispro (HUMALOG) injection   SubCUTAneous TIDAC  
 benztropine (COGENTIN) tablet 1 mg  1 mg Oral BID  lisinopril (PRINIVIL, ZESTRIL) tablet 10 mg  10 mg Oral DAILY  divalproex DR (DEPAKOTE) tablet 500 mg  500 mg Oral BID  
 haloperidol (HALDOL) tablet 5 mg  5 mg Oral Q12H  sertraline (ZOLOFT) tablet 50 mg  50 mg Oral DAILY  insulin glargine (LANTUS) injection 30 Units  30 Units SubCUTAneous DAILY  thiamine HCL (B-1) tablet 100 mg  100 mg Oral DAILY  folic acid (FOLVITE) tablet 1 mg  1 mg Oral DAILY  therapeutic multivitamin (THERAGRAN) tablet 1 Tab  1 Tab Oral DAILY ASSESSMENT & PLAN  
 
DIAGNOSES REQUIRING ACTIVE TREATMENT AND MONITORING: (reviewed/updated 8/11/2018) Patient Active Hospital Problem List: The patient, Frederick Bean, is a 44 y.o.  female who presents at this time for treatment of the following diagnoses: 
Patient Active Hospital Problem List: 
 Schizoaffective disorder ([facility] [address] (8/8/2018) Assessment: patient dysphoric, tearful, eager to resume care. Patient seeking help, which confers a better prognosis. Plan:  
- CONTINUE Depakote 500 mg Q12H for mood lability - VPA trough 8/13/18 0600 
- CONTINUE Zoloft 50 mg QDAY for depression, can increase to 100 mg on 8/13 
- CONTINUE Haldol 5 mg Q12H for psychosis 
- CONTINUE Cogentin 1 mg Q12H for EPS prophylaxis 
  
8/11- ct with current tx. IM to review foot pain- change in Diabetic management 
  
## Diabetes Assessment: IDDM, will resume home regimen 
- CONTINUE Lantus 30U - CHANGE SS to high sensitivity 
 
  
  
## HTN Assessment: patient with poorly controlled BP, will resume home regimen 
- CONTINUE Lisinopril 10 mg QDAY I will continue to monitor blood levels (Depakote, Tegretol, lithium, clozapine---a drug with a narrow therapeutic index= NTI) and associated labs for drug therapy implemented that require intense monitoring for toxicity as deemed appropriate based on current medication side effects and pharmacodynamically determined drug 1/2 lives.  
 
In summary, Frederick Bean, is a 43 y.o.  female who presents with a severe exacerbation of the principal diagnosis of Schizoaffective disorder, depressive type (Ny Utca 75.) Patient's condition is improving. Patient requires continued inpatient hospitalization for further stabilization, safety monitoring and medication management. I will continue to coordinate the provision of individual, milieu, occupational, group, and substance abuse therapies to address target symptoms/diagnoses as deemed appropriate for the individual patient. A coordinated, multidisplinary treatment team round was conducted with the patient (this team consists of the nurse, psychiatric unit pharmcist,  and writer). Complete current electronic health record for patient has been reviewed today including consultant notes, ancillary staff notes, nurses and psychiatric tech notes. Suicide risk assessment completed and patient deemed to be of low risk for suicide at this time. The following regarding medications was addressed during rounds with patient:  
the risks and benefits of the proposed medication. The patient was given the opportunity to ask questions. Informed consent given to the use of the above medications. Will continue to adjust psychiatric and non-psychiatric medications (see above \"medication\" section and orders section for details) as deemed appropriate & based upon diagnoses and response to treatment. I will continue to order blood tests/labs and diagnostic tests as deemed appropriate and review results as they become available (see orders for details and above listed lab/test results). I will order psychiatric records from previous Deaconess Health System hospitals to further elucidate the nature of patient's psychopathology and review once available. I will gather additional collateral information from friends, family and o/p treatment team to further elucidate the nature of patient's psychopathology and baselline level of psychiatric functioning.  
  
 
 
I certify that this patient's inpatient psychiatric hospital services furnished since the previous certification were, and continue to be, required for treatment that could reasonably be expected to improve the patient's condition, or for diagnostic study, and that the patient continues to need, on a daily basis, active treatment furnished directly by or requiring the supervision of inpatient psychiatric facility personnel. In addition, the hospital records show that services furnished were intensive treatment services, admission or related services, or equivalent services. EXPECTED DISCHARGE DATE/DAY: 8/14/18 DISPOSITION: Home Signed By:  
Rafael Jaquez MD 
8/11/2018

## 2018-08-11 NOTE — PROGRESS NOTES
Problem: Suicide/Homicide (Adult/Pediatric) Goal: *STG: Remains safe in hospital 
Outcome: Progressing Towards Goal 
Pt is alert/oriented x4. Calm and Cooperative. Appropriate and visible in the milieu. Attending groups. Mood is good. Meds/meal compliant. No PRNs given this shift. Pt had to receive 2 units of insulin for HS  this shift. No behavioral issues. She seems disorganized. Denies suicidal and homicidal ideations. Denies auditory and visual hallucinations. Will continue to monitor pt with q15 min rounds for safety.

## 2018-08-11 NOTE — PROGRESS NOTES
Problem: Suicide/Homicide (Adult/Pediatric) Goal: *STG: Remains safe in hospital 
Outcome: Progressing Towards Goal 
Pt slept 7 hours. Cooperative with lab draw this morning. Pt had PRN Ambien this shift. Pt had no complaints or signs of distress throughout night. Respirations were unlabored. Continuing to monitor with q15 min rounds for safety.

## 2018-08-11 NOTE — PROGRESS NOTES
Spiritual Care Assessment/Progress Note Cherelle Khanna 
 
 
NAME: Abdirashid Lopez      MRN: [id number] AGE: 44 y.o. SEX: female Gnosticism Affiliation: [facility]  
Language: [place] 8/11/2018     Total Time (in minutes): 26 Spiritual Assessment begun in [place] [address] [facility] through conversation with: 
  
    [x]Patient        [] Family    [] Friend(s) Reason for Consult: Request by patient Spiritual beliefs: (Please include comment if needed) [x] Identifies with a celsa tradition:     
   [] Supported by a celsa community:        
   [] Claims no spiritual orientation:       
   [] Seeking spiritual identity:            
   [] Adheres to an individual form of spirituality:       
   [] Not able to assess:                   
 
    
Identified resources for coping:  
   [x] Prayer                           
   [] Music                  [] Guided Imagery 
   [] Family/friends                 [] Pet visits [] Devotional reading                         [] Unknown 
   [] Other:                                          
 
 
Interventions offered during this visit: (See comments for more details) Patient Interventions: Affirmation of emotions/emotional suffering, Affirmation of celsa, Initial/Spiritual assessment, patient floor, Prayer (actual), Iconic (affirming the presence of God/Higher Power) Plan of Care: 
 
 [] Support spiritual and/or cultural needs  
 [] Support AMD and/or advance care planning process    
 [] Support grieving process 
 [] Coordinate Rites and/or Rituals  
 [] Coordination with community clergy  [] No spiritual needs identified at this time 
 [] Detailed Plan of Care below (See Comments)  [] Make referral to Music Therapy 
[] Make referral to Pet Therapy    
[] Make referral to Addiction services 
[] Make referral to [facility] 
[] Make referral to Spiritual Care Partner 
[] No future visits requested       
[x] Follow up visits as needed Responded to page requesting pastoral visit and prayer in [phone] per pt. Met with pt who shared concerns about going to half-way after this hospitalization and concerns about daughter not having a motherly presence once pt is away since daughter cut ties with godmother. Led pt in processing while providing active listening. Drawing from pt's celsa encouraged pt to ask for God's presence and protection over daughter while she's away from her. Pt asked if  could write down prayer.  wrote down 5 one-line prayers to be recited on behalf of daughter. Pt tearful and expressed appreciation. Provided word of reassurance and comfort. Prayed with pt to conclude visit. Will follow up as needed. YULISSA Lopez. Angeline Leonardo

## 2018-08-11 NOTE — PROGRESS NOTES
General Daily Progress Note Admit Date: 8/8/2018 Subjective:  
 
Patient is c/o toe pain. She has a small eschar over her toe. Current Facility-Administered Medications Medication Dose Route Frequency  insulin lispro (HUMALOG) injection   SubCUTAneous TIDAC  
 benztropine (COGENTIN) tablet 1 mg  1 mg Oral BID  albuterol (PROVENTIL HFA, VENTOLIN HFA, PROAIR HFA) inhaler 1 Puff  1 Puff Inhalation Q4H PRN  
 lisinopril (PRINIVIL, ZESTRIL) tablet 10 mg  10 mg Oral DAILY  divalproex DR (DEPAKOTE) tablet 500 mg  500 mg Oral BID  
 haloperidol (HALDOL) tablet 5 mg  5 mg Oral Q12H  sertraline (ZOLOFT) tablet 50 mg  50 mg Oral DAILY  insulin glargine (LANTUS) injection 30 Units  30 Units SubCUTAneous DAILY  glucose chewable tablet 16 g  4 Tab Oral PRN  
 dextrose (D50W) injection syrg 12.5-25 g  12.5-25 g IntraVENous PRN  
 glucagon (GLUCAGEN) injection 1 mg  1 mg IntraMUSCular PRN  thiamine HCL (B-1) tablet 100 mg  100 mg Oral DAILY  folic acid (FOLVITE) tablet 1 mg  1 mg Oral DAILY  therapeutic multivitamin (THERAGRAN) tablet 1 Tab  1 Tab Oral DAILY  ziprasidone (GEODON) 20 mg in sterile water (preservative free) 1 mL injection  20 mg IntraMUSCular BID PRN  
 OLANZapine (ZyPREXA) tablet 5 mg  5 mg Oral Q6H PRN  
 benztropine (COGENTIN) tablet 2 mg  2 mg Oral BID PRN  
 benztropine (COGENTIN) injection 2 mg  2 mg IntraMUSCular BID PRN  
 LORazepam (ATIVAN) injection 2 mg  2 mg IntraMUSCular Q4H PRN  
 LORazepam (ATIVAN) tablet 1 mg  1 mg Oral Q4H PRN  
 zolpidem (AMBIEN) tablet 10 mg  10 mg Oral QHS PRN  
 acetaminophen (TYLENOL) tablet 650 mg  650 mg Oral Q4H PRN  
 magnesium hydroxide (MILK OF MAGNESIA) 400 mg/5 mL oral suspension 30 mL  30 mL Oral DAILY PRN  
 nicotine (NICODERM CQ) 21 mg/24 hr patch 1 Patch  1 Patch TransDERmal DAILY PRN Objective:  
 
Patient Vitals for the past 8 hrs: 
 BP Temp Pulse Resp  
08/11/18 0626 144/78 98.2 °F (36.8 °C) 77 18  
 
  
  
 
Physical Exam:  
Visit Vitals  /78  Pulse 77  Temp 98.2 °F (36.8 °C) Comment: 98.2  Resp 18  Ht 5' 6\" (1.676 m)  Wt 63.5 kg (140 lb)  SpO2 100%  Breastfeeding No  
 BMI 22.6 kg/m2 General:  Alert, cooperative, no distress, appears stated age. Head:  Normocephalic, without obvious abnormality, atraumatic. Eyes:  Conjunctivae/corneas clear. PERRL, EOMs intact. Lungs:   Clear to auscultation bilaterally. Chest wall:  No tenderness or deformity. Heart:  Regular rate and rhythm, S1, S2 normal, no murmur, click, rub or gallop. Abdomen:   Soft, non-tender. Bowel sounds normal. No masses,  No organomegaly. Extremities: Small eschar tip of middle toe. tender to palpation. Pulses: 2+ and symmetric all extremities. Skin: Skin color, texture, turgor normal. No rashes or lesions Recent Results (from the past 24 hour(s)) GLUCOSE, POC Collection Time: 08/10/18 11:15 AM  
Result Value Ref Range Glucose (POC) 295 (H) 65 - 100 mg/dL Performed by Monet Joseph GLUCOSE, POC Collection Time: 08/10/18  4:44 PM  
Result Value Ref Range Glucose (POC) 183 (H) 65 - 100 mg/dL Performed by Monet Joseph GLUCOSE, POC Collection Time: 08/10/18  8:29 PM  
Result Value Ref Range Glucose (POC) 311 (H) 65 - 100 mg/dL Performed by Marshall Baum, POC Collection Time: 08/11/18  8:34 AM  
Result Value Ref Range Glucose (POC) 249 (H) 65 - 100 mg/dL Performed by Monet Joseph Assessment:  
 
Principal Problem: 
  Schizoaffective disorder, depressive type ([facility] [address] (8/8/2018) Active Problems: 
  Uncontrolled diabetes mellitus ([facility] [address] (4/25/2016) Essential hypertension (5/9/2016) Hyperlipidemia (5/9/2016) Plan:  
 
Change POC testing for BS to 4X/day. Apply antibiotic ointment to toe wound.

## 2018-08-12 LAB
GLUCOSE BLD STRIP.AUTO-MCNC: 196 MG/DL (ref 65–100)
GLUCOSE BLD STRIP.AUTO-MCNC: 231 MG/DL (ref 65–100)
GLUCOSE BLD STRIP.AUTO-MCNC: 340 MG/DL (ref 65–100)
GLUCOSE BLD STRIP.AUTO-MCNC: 343 MG/DL (ref 65–100)
SERVICE CMNT-IMP: ABNORMAL

## 2018-08-12 PROCEDURE — 74011000250 HC RX REV CODE- 250: Performed by: INTERNAL MEDICINE

## 2018-08-12 PROCEDURE — 82962 GLUCOSE BLOOD TEST: CPT

## 2018-08-12 PROCEDURE — 65220000003 HC RM SEMIPRIVATE PSYCH

## 2018-08-12 PROCEDURE — 74011636637 HC RX REV CODE- 636/637: Performed by: INTERNAL MEDICINE

## 2018-08-12 PROCEDURE — 74011250637 HC RX REV CODE- 250/637: Performed by: PSYCHIATRY & NEUROLOGY

## 2018-08-12 PROCEDURE — 74011636637 HC RX REV CODE- 636/637: Performed by: PSYCHIATRY & NEUROLOGY

## 2018-08-12 PROCEDURE — 74011250637 HC RX REV CODE- 250/637: Performed by: INTERNAL MEDICINE

## 2018-08-12 RX ADMIN — HALOPERIDOL 5 MG: 5 TABLET ORAL at 20:41

## 2018-08-12 RX ADMIN — LISINOPRIL 10 MG: 5 TABLET ORAL at 08:08

## 2018-08-12 RX ADMIN — BENZTROPINE MESYLATE 1 MG: 1 TABLET ORAL at 16:42

## 2018-08-12 RX ADMIN — BENZTROPINE MESYLATE 1 MG: 1 TABLET ORAL at 08:08

## 2018-08-12 RX ADMIN — BACITRACIN 1 PACKET: 500 OINTMENT TOPICAL at 16:42

## 2018-08-12 RX ADMIN — BACITRACIN 1 PACKET: 500 OINTMENT TOPICAL at 08:08

## 2018-08-12 RX ADMIN — INSULIN LISPRO 4 UNITS: 100 INJECTION, SOLUTION INTRAVENOUS; SUBCUTANEOUS at 11:46

## 2018-08-12 RX ADMIN — THERA TABS 1 TABLET: TAB at 08:08

## 2018-08-12 RX ADMIN — INSULIN LISPRO 2 UNITS: 100 INJECTION, SOLUTION INTRAVENOUS; SUBCUTANEOUS at 08:12

## 2018-08-12 RX ADMIN — FOLIC ACID 1 MG: 1 TABLET ORAL at 08:08

## 2018-08-12 RX ADMIN — Medication 100 MG: at 08:08

## 2018-08-12 RX ADMIN — INSULIN LISPRO 4 UNITS: 100 INJECTION, SOLUTION INTRAVENOUS; SUBCUTANEOUS at 16:43

## 2018-08-12 RX ADMIN — DIVALPROEX SODIUM 500 MG: 500 TABLET, DELAYED RELEASE ORAL at 08:08

## 2018-08-12 RX ADMIN — SERTRALINE HYDROCHLORIDE 50 MG: 50 TABLET ORAL at 08:08

## 2018-08-12 RX ADMIN — INSULIN GLARGINE 30 UNITS: 100 INJECTION, SOLUTION SUBCUTANEOUS at 08:13

## 2018-08-12 RX ADMIN — DIVALPROEX SODIUM 500 MG: 500 TABLET, DELAYED RELEASE ORAL at 16:42

## 2018-08-12 RX ADMIN — ZOLPIDEM TARTRATE 10 MG: 10 TABLET ORAL at 23:59

## 2018-08-12 RX ADMIN — HALOPERIDOL 5 MG: 5 TABLET ORAL at 08:08

## 2018-08-12 NOTE — BH NOTES
PSYCHIATRIC PROGRESS NOTE Patient Name  Hansel Gould Date of Birth 1975 CSN [id number] Medical Record Number  [id number] Age  44 y.o. PCP Lily Wakefield NP Admit date:  8/8/2018 Room Number  176/17  @ [facility]  
Date of Service  8/12/2018 PSYCHOTHERAPY SESSION NOTE: 
Length of psychotherapy session: 30 minutes Main condition/diagnosis/issues treated during session today, 8/12/2018 : schizoaffective disorder I employed Cognitive Behavioral therapy techniques, Reality-Oriented psychotherapy, as well as supportive psychotherapy in regards to various ongoing psychosocial stressors, including the following: pre-admission and current problems; housing issues; legal issues; medical issues; and stress of hospitalization. Interpersonal relationship issues and psychodynamic conflicts explored. Attempts made to alleviate maladaptive patterns. We, also, worked on issues of denial & effects of substance dependency/use. Session focused on the patient's fears about her situation upon discharge. The patient tearful at times, but able to speak about her goals. Overall, patient is progressing. Treatment Plan Update (reviewed an updated 8/12/2018) : I will modify psychotherapy tx plan by implementing more stress management strategies, building upon cognitive behavioral techniques, increasing coping skills, as well as shoring up psychological defenses). An extended energy and skill set was needed to engage pt in psychotherapy due to some of the following: resistiveness, complexity, negativity, confrontational nature, hostile behaviors, and/or severe abnormalities in thought processes/psychosis resulting in the loss of expressive/receptive language communication skills. E & M PROGRESS NOTE: 
  
 
 
HISTORY  
   
CC:  \"this has been hard for me\" HISTORY OF PRESENT ILLNESS/INTERVAL HISTORY:  (reviewed/updated 8/12/2018).  
per initial evaluation: The patient, Ailin Villanueva, is a 44 y.o. BLACK [place]  female with a past psychiatric history significant for schizoaffective disorder bipolar type most recent episode depressed, who presents at this time with complaints of (and/or evidence of) the following emotional symptoms: suicidal thoughts/threats and suicide attempt threat of hanging self. Additional symptomatology include hearing voices. The above symptoms have been present for 2+ weeks. These symptoms are of high severity. These symptoms are constant. The patient's condition has been precipitated by psychosocial stressors. Patient's condition made worse by recent relapse on ilicit substance use and legal issues. UDS: negative; BAL=0.  
  
Per intake note, the patient voiced active SI to her outpatient , and was sent to ED for evaluation due to worsening and intrusive thoughts of self harm as well as paranoid ideation (patient preoccupied with being sent to intermediate); per note, patient attempted to choke herself while in ambulance en route to hospital. Patient with non-compliance with medication as outpatient.  
  
Patient seen in the morning with treatment team. She corroborates the above account, stating that she will be sent to intermediate due to having smoked cocaine in June for unclear reasons, and endorsing vague AH. The patient is tearful, asking to be restarted on medications as soon as possible. The patient endorses depressed mood, SI, PI for the past 2 months. She contracts for safety on the unit and asks that the team reach out to her daughter. Ailin Villanueva presents/reports/evidences the following emotional symptoms today, 8/12/2018:depression and delusions. The above symptoms have been present since admission. These symptoms are of moderate severity. The symptoms are constant. Additional symptomatology and features include anxiety and concern about health problems.   
 
Patient seen by diabetic education team, who recommended adjustments to her insulin regimen. No acute overnight events, patient isolative in room, attending some groups, medication compliant slept 6 hours overnight. Patient seen in the morning with full treatment team present. She reports generally feeling better, and denies AH. The patient reports that she was bothered by noise from other patients in the evening and when opening her door, she pushed too hard and the door hit the wall which startled her. The patient is tearful but reports feeling safe in the hospital. 
8/11- pt reports her mood to be neutral \"good and not so good\". Affect is brighter, accepting med and no agitation reported. Sleep is improving . Paranoid ideation of people chasing- intensity is low. Denies SI/HI/AVH 
8/12- chato reported as better, c/o poor concentration. Sleep is improved. Denies specific delusional themes. Accepting med SIDE EFFECTS: (reviewed/updated 8/12/2018) None reported or admitted to. No noted toxicity with use of Depakote. ALLERGIES:(reviewed/updated 8/12/2018) Allergies Allergen Reactions  Dilaudid [Hydromorphone (Bulk)] Itching  Ibuprofen Not Reported This Time MEDICATIONS PRIOR TO ADMISSION:(reviewed/updated 8/12/2018) Prescriptions Prior to Admission Medication Sig  
 albuterol (PROVENTIL HFA, VENTOLIN HFA, PROAIR HFA) 90 mcg/actuation inhaler Take 1 Puff by inhalation every four (4) hours as needed for Wheezing.  benztropine (COGENTIN) 1 mg tablet Take 1 mg by mouth two (2) times a day. Indications: drug-induced extrapyramidal reaction  lisinopril (PRINIVIL, ZESTRIL) 10 mg tablet Take 10 mg by mouth daily. Indications: hypertension  insulin glargine (LANTUS) 100 unit/mL injection 30 Units by SubCUTAneous route nightly. Indications: type 1 diabetes mellitus (Patient taking differently: 30 Units by SubCUTAneous route daily. Indications: type 1 diabetes mellitus)  divalproex DR (DEPAKOTE) 500 mg tablet Take 500 mg by mouth two (2) times a day. Indications: Schizoaffective disorder  haloperidol (HALDOL) 5 mg tablet Take 5 mg by mouth two (2) times a day. Indications: Schizoaffective disorder PAST MEDICAL HISTORY: Past medical history from the initial psychiatric evaluation has been reviewed (reviewed/updated 8/12/2018) with no additional updates (I asked patient and no additional past medical history provided). Past Medical History:  
Diagnosis Date  Alcohol abuse, in remission   
 quit 17 days ago  Asthma   
 does not use inhalers  Borderline personality disorder  Diabetes ([facility] [address]  GERD (gastroesophageal reflux disease)  Hypertension  Other ill-defined conditions(799.89)   
 kidney stones ,passed one  Other ill-defined conditions(799.89) sickle cell trait  Other ill-defined conditions(799.89)   
 increased cholesterol  Pancreatitis  Psychiatric disorder   
 schizophrenia, bipolar, depression, anxiety Past Surgical History:  
Procedure Laterality Date  ABDOMEN SURGERY PROC UNLISTED  3/12/14 CHOLECYSTECTOMY LAPAROSCOPIC    
 HX GASTRIC BYPASS  HX GYN  11/8/2012  
 c section x2  HX OTHER SURGICAL  3/13/14 ENDOSCOPIC RETROGRADE CHOLANGIOPANCREATOGRAPHY  HX OTHER SURGICAL  8/4/14  
 endoscopic stent placed to bile duct  HX TUBAL LIGATION  2012 SOCIAL HISTORY: Social history from the initial psychiatric evaluation has been reviewed (reviewed/updated 8/12/2018) with no additional updates (I asked patient and no additional social history provided). Social History Social History  Marital status: SINGLE Spouse name: N/A  
 Number of children: N/A  
 Years of education: N/A Occupational History  Not on file. Social History Main Topics  Smoking status: Current Every Day Smoker Packs/day: 0.50 Years: 14.00 Types: Cigarettes  Smokeless tobacco: Never Used Comment: cigarettes  Alcohol use No  
 Comment: occasionally, last had \"i had one beer\" today  Drug use: No  
   Comment: \"crack cocaine\" 3-4 days ago  Sexual activity: Yes  
  Partners: Female Birth control/ protection: Surgical  
 
Other Topics Concern  Not on file Social History Narrative FAMILY HISTORY: Family history from the initial psychiatric evaluation has been reviewed (reviewed/updated 8/12/2018) with no additional updates (I asked patient and no additional family history provided). Family History Problem Relation Age of Onset  Heart Disease Mother  Diabetes Mother  Hypertension Mother  Hypertension Maternal Grandmother REVIEW OF SYSTEMS: (reviewed/updated 8/12/2018) Appetite:improved Sleep: good All other Review of Systems: Negative except psychiatric per interval HPI [place] MENTAL STATUS EXAM (MSE): MSE FINDINGS ARE WITHIN NORMAL LIMITS (WNL) UNLESS OTHERWISE STATED BELOW. ( ALL OF THE BELOW CATEGORIES OF THE MSE HAVE BEEN REVIEWED (reviewed 8/12/2018) AND UPDATED AS DEEMED APPROPRIATE ) General Presentation older than stated age, cooperative and guarded Orientation oriented to time, place and person Vital Signs  See below (reviewed 8/12/2018); Vital Signs (BP, Pulse, & Temp) are within normal limits if not listed below. Gait and Station Stable/steady, no ataxia Musculoskeletal System No extrapyramidal symptoms (EPS); no abnormal muscular movements or Tardive Dyskinesia (TD); muscle strength and tone are within normal limits Language No aphasia or dysarthria Speech:  increased latency of response, non-pressured and soft Thought Processes Disorganized; slow rate of thoughts; fair abstract reasoning/computation Thought Associations tangential  
Thought Content paranoid delusions and internally preoccupied Suicidal Ideations no plan , no intention and contracts for safety Homicidal Ideations none Mood:  depressed Affect: constricted, labile and mood-congruent Memory recent  fair Memory remote:  good Concentration/Attention:  intact Fund of Knowledge average Insight:  fair Reliability good Judgment:  improving VITALS:    
Patient Vitals for the past 24 hrs: 
 Temp Pulse Resp BP SpO2  
08/12/18 0924 97.2 °F (36.2 °C) 79 18 148/87 99 % Wt Readings from Last 3 Encounters:  
08/08/18 63.5 kg (140 lb)  
06/13/18 67.6 kg (149 lb)  
06/12/18 68 kg (149 lb 14.6 oz) Temp Readings from Last 3 Encounters:  
08/12/18 97.2 °F (36.2 °C)  
06/13/18 98.4 °F (36.9 °C)  
06/12/18 97.6 °F (36.4 °C) BP Readings from Last 3 Encounters:  
08/12/18 148/87  
06/13/18 (!) 186/97  
06/12/18 148/85 Pulse Readings from Last 3 Encounters:  
08/12/18 79  
06/13/18 75  
06/12/18 85 DATA LABORATORY DATA:(reviewed/updated 8/12/2018) Recent Results (from the past 24 hour(s)) GLUCOSE, POC Collection Time: 08/11/18  4:35 PM  
Result Value Ref Range Glucose (POC) 334 (H) 65 - 100 mg/dL Performed by Kapil Santa GLUCOSE, POC Collection Time: 08/11/18  8:35 PM  
Result Value Ref Range Glucose (POC) 299 (H) 65 - 100 mg/dL Performed by Rey Prater, POC Collection Time: 08/12/18  7:44 AM  
Result Value Ref Range Glucose (POC) 231 (H) 65 - 100 mg/dL Performed by Kapil Santa GLUCOSE, POC Collection Time: 08/12/18 11:43 AM  
Result Value Ref Range Glucose (POC) 343 (H) 65 - 100 mg/dL Performed by Jazmyne Moreira Lab Results Component Value Date/Time Valproic acid 61 08/08/2018 05:57 PM  
 
No results found for: LITHM  
RADIOLOGY REPORTS:(reviewed/updated 8/12/2018) No results found. MEDICATIONS ALL MEDICATIONS:  
Current Facility-Administered Medications Medication Dose Route Frequency  bacitracin 500 unit/gram packet 1 Packet  1 Packet Topical BID  insulin lispro (HUMALOG) injection   SubCUTAneous AC&HS  
 benztropine (COGENTIN) tablet 1 mg  1 mg Oral BID  albuterol (PROVENTIL HFA, VENTOLIN HFA, PROAIR HFA) inhaler 1 Puff  1 Puff Inhalation Q4H PRN  
 lisinopril (PRINIVIL, ZESTRIL) tablet 10 mg  10 mg Oral DAILY  divalproex DR (DEPAKOTE) tablet 500 mg  500 mg Oral BID  
 haloperidol (HALDOL) tablet 5 mg  5 mg Oral Q12H  sertraline (ZOLOFT) tablet 50 mg  50 mg Oral DAILY  insulin glargine (LANTUS) injection 30 Units  30 Units SubCUTAneous DAILY  glucose chewable tablet 16 g  4 Tab Oral PRN  
 dextrose (D50W) injection syrg 12.5-25 g  12.5-25 g IntraVENous PRN  
 glucagon (GLUCAGEN) injection 1 mg  1 mg IntraMUSCular PRN  thiamine HCL (B-1) tablet 100 mg  100 mg Oral DAILY  folic acid (FOLVITE) tablet 1 mg  1 mg Oral DAILY  therapeutic multivitamin (THERAGRAN) tablet 1 Tab  1 Tab Oral DAILY  ziprasidone (GEODON) 20 mg in sterile water (preservative free) 1 mL injection  20 mg IntraMUSCular BID PRN  
 OLANZapine (ZyPREXA) tablet 5 mg  5 mg Oral Q6H PRN  
 benztropine (COGENTIN) tablet 2 mg  2 mg Oral BID PRN  
 benztropine (COGENTIN) injection 2 mg  2 mg IntraMUSCular BID PRN  
 LORazepam (ATIVAN) injection 2 mg  2 mg IntraMUSCular Q4H PRN  
 LORazepam (ATIVAN) tablet 1 mg  1 mg Oral Q4H PRN  
 zolpidem (AMBIEN) tablet 10 mg  10 mg Oral QHS PRN  
 acetaminophen (TYLENOL) tablet 650 mg  650 mg Oral Q4H PRN  
 magnesium hydroxide (MILK OF MAGNESIA) 400 mg/5 mL oral suspension 30 mL  30 mL Oral DAILY PRN  
 nicotine (NICODERM CQ) 21 mg/24 hr patch 1 Patch  1 Patch TransDERmal DAILY PRN  
  
SCHEDULED MEDICATIONS:  
Current Facility-Administered Medications Medication Dose Route Frequency  bacitracin 500 unit/gram packet 1 Packet  1 Packet Topical BID  insulin lispro (HUMALOG) injection   SubCUTAneous AC&HS  
 benztropine (COGENTIN) tablet 1 mg  1 mg Oral BID  lisinopril (PRINIVIL, ZESTRIL) tablet 10 mg  10 mg Oral DAILY  divalproex DR (DEPAKOTE) tablet 500 mg  500 mg Oral BID  
 haloperidol (HALDOL) tablet 5 mg  5 mg Oral Q12H  sertraline (ZOLOFT) tablet 50 mg  50 mg Oral DAILY  insulin glargine (LANTUS) injection 30 Units  30 Units SubCUTAneous DAILY  thiamine HCL (B-1) tablet 100 mg  100 mg Oral DAILY  folic acid (FOLVITE) tablet 1 mg  1 mg Oral DAILY  therapeutic multivitamin (THERAGRAN) tablet 1 Tab  1 Tab Oral DAILY ASSESSMENT & PLAN  
 
DIAGNOSES REQUIRING ACTIVE TREATMENT AND MONITORING: (reviewed/updated 8/12/2018) Patient Active Hospital Problem List: The patient, Anthony Benavidez, is a 44 y.o.  female who presents at this time for treatment of the following diagnoses: 
Patient Active Hospital Problem List: 
 Schizoaffective disorder ([facility] [address] (8/8/2018) Assessment: patient dysphoric, tearful, eager to resume care. Patient seeking help, which confers a better prognosis. Plan:  
- CONTINUE Depakote 500 mg Q12H for mood lability - VPA trough 8/13/18 0600 
- CONTINUE Zoloft 50 mg QDAY for depression, can increase to 100 mg on 8/13 
- CONTINUE Haldol 5 mg Q12H for psychosis 
- CONTINUE Cogentin 1 mg Q12H for EPS prophylaxis 
  
8/11- ct with current tx. IM to review foot pain- change in Diabetic management 
 8/12- no change in tx plan ## Diabetes Assessment: IDDM, will resume home regimen 
- CONTINUE Lantus 30U - CHANGE SS to high sensitivity 
 
  
  
## HTN Assessment: patient with poorly controlled BP, will resume home regimen 
- CONTINUE Lisinopril 10 mg QDAY I will continue to monitor blood levels (Depakote, Tegretol, lithium, clozapine---a drug with a narrow therapeutic index= NTI) and associated labs for drug therapy implemented that require intense monitoring for toxicity as deemed appropriate based on current medication side effects and pharmacodynamically determined drug 1/2 lives.  
 
In summary, Anthony Benavidez, is a 43 y.o.  female who presents with a severe exacerbation of the principal diagnosis of Schizoaffective disorder, depressive type (Ny Utca 75.) Patient's condition is improving. Patient requires continued inpatient hospitalization for further stabilization, safety monitoring and medication management. I will continue to coordinate the provision of individual, milieu, occupational, group, and substance abuse therapies to address target symptoms/diagnoses as deemed appropriate for the individual patient. A coordinated, multidisplinary treatment team round was conducted with the patient (this team consists of the nurse, psychiatric unit pharmcist,  and writer). Complete current electronic health record for patient has been reviewed today including consultant notes, ancillary staff notes, nurses and psychiatric tech notes. Suicide risk assessment completed and patient deemed to be of low risk for suicide at this time. The following regarding medications was addressed during rounds with patient:  
the risks and benefits of the proposed medication. The patient was given the opportunity to ask questions. Informed consent given to the use of the above medications. Will continue to adjust psychiatric and non-psychiatric medications (see above \"medication\" section and orders section for details) as deemed appropriate & based upon diagnoses and response to treatment. I will continue to order blood tests/labs and diagnostic tests as deemed appropriate and review results as they become available (see orders for details and above listed lab/test results). I will order psychiatric records from previous Ephraim McDowell Regional Medical Center hospitals to further elucidate the nature of patient's psychopathology and review once available. I will gather additional collateral information from friends, family and o/p treatment team to further elucidate the nature of patient's psychopathology and baselline level of psychiatric functioning.  
  
 
 
I certify that this patient's inpatient psychiatric hospital services furnished since the previous certification were, and continue to be, required for treatment that could reasonably be expected to improve the patient's condition, or for diagnostic study, and that the patient continues to need, on a daily basis, active treatment furnished directly by or requiring the supervision of inpatient psychiatric facility personnel. In addition, the hospital records show that services furnished were intensive treatment services, admission or related services, or equivalent services. EXPECTED DISCHARGE DATE/DAY: 8/14/18 DISPOSITION: Home Signed By:  
Sam Arora MD 
8/12/2018

## 2018-08-12 NOTE — BH NOTES
Visible on unit this evening, interacting with staff and peers, no behavior issues. Will continue to monitor for safety per unit policy.

## 2018-08-12 NOTE — BH NOTES
GROUP THERAPY PROGRESS NOTE Adrián Howe is not participating in [facility]. Group time: 15 minutes Personal goal for participation:  
 
Goal orientation: personal 
 
Group therapy participation:  
 
Therapeutic interventions reviewed and discussed:  
 
Impression of participation:

## 2018-08-12 NOTE — BH NOTES
Rested quietly throughout night, no c/o voiced. Will continue to monitor for safety per unit policy.

## 2018-08-12 NOTE — BH NOTES
GROUP THERAPY PROGRESS NOTE The patient Ama Salazar is participating in [place]. Group time: 30 minutes Personal goal for participation: to orient the patient to the unit. Goal orientation: successful adoption of unit rules Group therapy participation: active Therapeutic interventions reviewed and discussed: Yes Impression of participation:  
 
Estela Ny 8/12/2018 8:34 AM

## 2018-08-12 NOTE — BH NOTES
Pt is visible on the unit. Pt is calm and cooperative. Pt denies any needs. Will continue to monitor q15 minutes for safety.

## 2018-08-12 NOTE — PROGRESS NOTES
Problem: Suicide/Homicide (Adult/Pediatric) Goal: *STG: Remains safe in hospital 
Outcome: Progressing Towards Goal 
Pt is alert. She is compliant with medications and meals. Pt denies pain. She has been visible on the unit but with minimal interaction with peers and staff. Pt has made several phone calls to family that were uneventful. PT denies hallucinations and denies suicidal and homicidal ideations. Will continue to monitor pt q15 minutes.

## 2018-08-13 ENCOUNTER — HOSPITAL ENCOUNTER (OUTPATIENT)
Dept: GENERAL RADIOLOGY | Age: 43
Discharge: HOME | End: 2018-08-13
Attending: INTERNAL MEDICINE
Payer: MEDICARE

## 2018-08-13 LAB
ANION GAP SERPL CALC-SCNC: 9 MMOL/L (ref 5–15)
BUN SERPL-MCNC: 9 MG/DL (ref 6–20)
BUN/CREAT SERPL: 15 (ref 12–20)
CALCIUM SERPL-MCNC: 8.2 MG/DL (ref 8.5–10.1)
CHLORIDE SERPL-SCNC: 106 MMOL/L (ref 97–108)
CO2 SERPL-SCNC: 26 MMOL/L (ref 21–32)
CREAT SERPL-MCNC: 0.6 MG/DL (ref 0.55–1.02)
GLUCOSE BLD STRIP.AUTO-MCNC: 174 MG/DL (ref 65–100)
GLUCOSE BLD STRIP.AUTO-MCNC: 343 MG/DL (ref 65–100)
GLUCOSE BLD STRIP.AUTO-MCNC: 374 MG/DL (ref 65–100)
GLUCOSE SERPL-MCNC: 179 MG/DL (ref 65–100)
POTASSIUM SERPL-SCNC: 3.9 MMOL/L (ref 3.5–5.1)
SERVICE CMNT-IMP: ABNORMAL
SODIUM SERPL-SCNC: 141 MMOL/L (ref 136–145)
VALPROATE SERPL-MCNC: 54 UG/ML (ref 50–100)

## 2018-08-13 PROCEDURE — 65220000003 HC RM SEMIPRIVATE PSYCH

## 2018-08-13 PROCEDURE — 80164 ASSAY DIPROPYLACETIC ACD TOT: CPT | Performed by: PSYCHIATRY & NEUROLOGY

## 2018-08-13 PROCEDURE — 74011000250 HC RX REV CODE- 250: Performed by: INTERNAL MEDICINE

## 2018-08-13 PROCEDURE — 74011250637 HC RX REV CODE- 250/637: Performed by: INTERNAL MEDICINE

## 2018-08-13 PROCEDURE — 74011250637 HC RX REV CODE- 250/637: Performed by: PSYCHIATRY & NEUROLOGY

## 2018-08-13 PROCEDURE — 74011636637 HC RX REV CODE- 636/637: Performed by: FAMILY MEDICINE

## 2018-08-13 PROCEDURE — 73630 X-RAY EXAM OF FOOT: CPT

## 2018-08-13 PROCEDURE — 82962 GLUCOSE BLOOD TEST: CPT

## 2018-08-13 PROCEDURE — 74011636637 HC RX REV CODE- 636/637: Performed by: PSYCHIATRY & NEUROLOGY

## 2018-08-13 PROCEDURE — 36415 COLL VENOUS BLD VENIPUNCTURE: CPT | Performed by: PSYCHIATRY & NEUROLOGY

## 2018-08-13 PROCEDURE — 74011636637 HC RX REV CODE- 636/637: Performed by: INTERNAL MEDICINE

## 2018-08-13 PROCEDURE — 80048 BASIC METABOLIC PNL TOTAL CA: CPT | Performed by: PSYCHIATRY & NEUROLOGY

## 2018-08-13 RX ORDER — SERTRALINE HYDROCHLORIDE 50 MG/1
100 TABLET, FILM COATED ORAL DAILY
Status: DISCONTINUED | OUTPATIENT
Start: 2018-08-14 | End: 2018-08-14 | Stop reason: HOSPADM

## 2018-08-13 RX ORDER — INSULIN LISPRO 100 [IU]/ML
6 INJECTION, SOLUTION INTRAVENOUS; SUBCUTANEOUS ONCE
Status: COMPLETED | OUTPATIENT
Start: 2018-08-13 | End: 2018-08-13

## 2018-08-13 RX ADMIN — BACITRACIN 1 PACKET: 500 OINTMENT TOPICAL at 08:17

## 2018-08-13 RX ADMIN — THERA TABS 1 TABLET: TAB at 08:18

## 2018-08-13 RX ADMIN — DIVALPROEX SODIUM 500 MG: 500 TABLET, DELAYED RELEASE ORAL at 17:24

## 2018-08-13 RX ADMIN — Medication 100 MG: at 08:18

## 2018-08-13 RX ADMIN — FOLIC ACID 1 MG: 1 TABLET ORAL at 08:17

## 2018-08-13 RX ADMIN — BENZTROPINE MESYLATE 1 MG: 1 TABLET ORAL at 08:18

## 2018-08-13 RX ADMIN — LISINOPRIL 10 MG: 5 TABLET ORAL at 08:18

## 2018-08-13 RX ADMIN — INSULIN GLARGINE 30 UNITS: 100 INJECTION, SOLUTION SUBCUTANEOUS at 08:17

## 2018-08-13 RX ADMIN — INSULIN LISPRO 6 UNITS: 100 INJECTION, SOLUTION INTRAVENOUS; SUBCUTANEOUS at 17:25

## 2018-08-13 RX ADMIN — DIVALPROEX SODIUM 500 MG: 500 TABLET, DELAYED RELEASE ORAL at 08:18

## 2018-08-13 RX ADMIN — INSULIN LISPRO 4 UNITS: 100 INJECTION, SOLUTION INTRAVENOUS; SUBCUTANEOUS at 12:10

## 2018-08-13 RX ADMIN — HALOPERIDOL 5 MG: 5 TABLET ORAL at 21:32

## 2018-08-13 RX ADMIN — SERTRALINE HYDROCHLORIDE 50 MG: 50 TABLET ORAL at 08:17

## 2018-08-13 RX ADMIN — BENZTROPINE MESYLATE 2 MG: 2 TABLET ORAL at 17:25

## 2018-08-13 RX ADMIN — HALOPERIDOL 5 MG: 5 TABLET ORAL at 08:18

## 2018-08-13 NOTE — BH NOTES
GROUP THERAPY PROGRESS NOTE The patient Yeimi pineda 44 y.o. female is participating in [address] [place] [address]. Group time: 45 minutes Personal goal for participation: To develop a personal plan for success Goal orientation:  personal 
 
Group therapy participation: active Therapeutic interventions reviewed and discussed: positive coping strategies/goals Impression of participation:  The patient was attentive. Lito Amador 8/13/2018  2:24 PM

## 2018-08-13 NOTE — BH NOTES
PSYCHIATRIC PROGRESS NOTE Patient Name  Frederick Bean Date of Birth 1975 [facility] [id number] Medical Record Number  [id number] Age  44 y.o. PCP Lorna Samuel NP Admit date:  8/8/2018 Room Number  008/14  @ [facility]  
Date of Service  8/13/2018 PSYCHOTHERAPY SESSION NOTE: 
Length of psychotherapy session: 20 minutes Main condition/diagnosis/issues treated during session today, 8/13/2018 : schizoaffective disorder I employed Cognitive Behavioral therapy techniques, Reality-Oriented psychotherapy, as well as supportive psychotherapy in regards to various ongoing psychosocial stressors, including the following: pre-admission and current problems; housing issues; legal issues; medical issues; and stress of hospitalization. Interpersonal relationship issues and psychodynamic conflicts explored. Attempts made to alleviate maladaptive patterns. We, also, worked on issues of denial & effects of substance dependency/use. Session focused on the patient's fears about her situation upon discharge. The patient tearful at times, but able to speak about her goals. Overall, patient is progressing. Treatment Plan Update (reviewed an updated 8/13/2018) : I will modify psychotherapy tx plan by implementing more stress management strategies, building upon cognitive behavioral techniques, increasing coping skills, as well as shoring up psychological defenses). An extended energy and skill set was needed to engage pt in psychotherapy due to some of the following: resistiveness, complexity, negativity, confrontational nature, hostile behaviors, and/or severe abnormalities in thought processes/psychosis resulting in the loss of expressive/receptive language communication skills. E & M PROGRESS NOTE: 
  
 
 
HISTORY  
   
CC:  \"this has been hard for me\" HISTORY OF PRESENT ILLNESS/INTERVAL HISTORY:  (reviewed/updated 8/13/2018).  
per initial evaluation: The patient, Myra Sandoval, is a 44 y.o. BLACK [place]  female with a past psychiatric history significant for schizoaffective disorder bipolar type most recent episode depressed, who presents at this time with complaints of (and/or evidence of) the following emotional symptoms: suicidal thoughts/threats and suicide attempt threat of hanging self. Additional symptomatology include hearing voices. The above symptoms have been present for 2+ weeks. These symptoms are of high severity. These symptoms are constant. The patient's condition has been precipitated by psychosocial stressors. Patient's condition made worse by recent relapse on ilicit substance use and legal issues. UDS: negative; BAL=0.  
  
Per intake note, the patient voiced active SI to her outpatient , and was sent to ED for evaluation due to worsening and intrusive thoughts of self harm as well as paranoid ideation (patient preoccupied with being sent to [facility]); per note, patient attempted to choke herself while in ambulance en route to hospital. Patient with non-compliance with medication as outpatient.  
  
Patient seen in the morning with treatment team. She corroborates the above account, stating that she will be sent to [facility] due to having smoked cocaine in June for unclear reasons, and endorsing vague AH. The patient is tearful, asking to be restarted on medications as soon as possible. The patient endorses depressed mood, SI, PI for the past 2 months. She contracts for safety on the unit and asks that the team reach out to her daughter. Myra Sandoval presents/reports/evidences the following emotional symptoms today, 8/13/2018:depression and delusions. The above symptoms have been present since admission. These symptoms are of moderate severity. The symptoms are constant. Additional symptomatology and features include anxiety and concern about health problems.   
 
Patient seen by diabetic education team, who recommended adjustments to her insulin regimen. No acute overnight events, patient isolative in room, attending some groups, medication compliant slept 6 hours overnight. Patient seen in the morning with full treatment team present. She reports generally feeling better, and denies AH. The patient reports that she was bothered by noise from other patients in the evening and when opening her door, she pushed too hard and the door hit the wall which startled her. The patient is tearful but reports feeling safe in the hospital. 
8/11- pt reports her mood to be neutral \"good and not so good\". Affect is brighter, accepting med and no agitation reported. Sleep is improving . Paranoid ideation of people chasing- intensity is low. Denies SI/HI/AVH 
8/12- chato reported as better, c/o poor concentration. Sleep is improved. Denies specific delusional themes. Accepting med 8/13 patient reports that over the weekend she did not want to leave her room because she feared that people would talk about her. Still feeling very sad and depressed. Compliant with medications. Very vague in her responses. (-) AH  
  
SIDE EFFECTS: (reviewed/updated 8/13/2018) None reported or admitted to. No noted toxicity with use of Depakote. ALLERGIES:(reviewed/updated 8/13/2018) Allergies Allergen Reactions  Dilaudid [Hydromorphone (Bulk)] Itching  Ibuprofen Not Reported This Time MEDICATIONS PRIOR TO ADMISSION:(reviewed/updated 8/13/2018) Prescriptions Prior to Admission Medication Sig  
 albuterol (PROVENTIL HFA, VENTOLIN HFA, PROAIR HFA) 90 mcg/actuation inhaler Take 1 Puff by inhalation every four (4) hours as needed for Wheezing.  benztropine (COGENTIN) 1 mg tablet Take 1 mg by mouth two (2) times a day. Indications: drug-induced extrapyramidal reaction  lisinopril (PRINIVIL, ZESTRIL) 10 mg tablet Take 10 mg by mouth daily. Indications: hypertension  insulin glargine (LANTUS) 100 unit/mL injection 30 Units by SubCUTAneous route nightly. Indications: type 1 diabetes mellitus (Patient taking differently: 30 Units by SubCUTAneous route daily. Indications: type 1 diabetes mellitus)  divalproex DR (DEPAKOTE) 500 mg tablet Take 500 mg by mouth two (2) times a day. Indications: Schizoaffective disorder  haloperidol (HALDOL) 5 mg tablet Take 5 mg by mouth two (2) times a day. Indications: Schizoaffective disorder PAST MEDICAL HISTORY: Past medical history from the initial psychiatric evaluation has been reviewed (reviewed/updated 8/13/2018) with no additional updates (I asked patient and no additional past medical history provided). Past Medical History:  
Diagnosis Date  Alcohol abuse, in remission   
 quit 17 days ago  Asthma   
 does not use inhalers  Borderline personality disorder  Diabetes ([facility] [address]  GERD (gastroesophageal reflux disease)  Hypertension  Other ill-defined conditions(799.89)   
 kidney stones ,passed one  Other ill-defined conditions(799.89) sickle cell trait  Other ill-defined conditions(799.89)   
 increased cholesterol  Pancreatitis  Psychiatric disorder   
 schizophrenia, bipolar, depression, anxiety Past Surgical History:  
Procedure Laterality Date  ABDOMEN SURGERY PROC UNLISTED  3/12/14 CHOLECYSTECTOMY LAPAROSCOPIC    
 HX GASTRIC BYPASS  HX GYN  11/8/2012  
 c section x2  HX OTHER SURGICAL  3/13/14 ENDOSCOPIC RETROGRADE CHOLANGIOPANCREATOGRAPHY  HX OTHER SURGICAL  8/4/14  
 endoscopic stent placed to bile duct  HX TUBAL LIGATION  2012 SOCIAL HISTORY: Social history from the initial psychiatric evaluation has been reviewed (reviewed/updated 8/13/2018) with no additional updates (I asked patient and no additional social history provided). Social History Social History  Marital status: SINGLE Spouse name: N/A  
 Number of children: N/A  
 Years of education: N/A Occupational History  Not on file. Social History Main Topics  Smoking status: Current Every Day Smoker Packs/day: 0.50 Years: 14.00 Types: Cigarettes  Smokeless tobacco: Never Used Comment: cigarettes  Alcohol use No  
   Comment: occasionally, last had \"i had one beer\" today  Drug use: No  
   Comment: \"crack cocaine\" 3-4 days ago  Sexual activity: Yes  
  Partners: Female Birth control/ protection: Surgical  
 
Other Topics Concern  Not on file Social History Narrative FAMILY HISTORY: Family history from the initial psychiatric evaluation has been reviewed (reviewed/updated 8/13/2018) with no additional updates (I asked patient and no additional family history provided). Family History Problem Relation Age of Onset  Heart Disease Mother  Diabetes Mother  Hypertension Mother  Hypertension Maternal Grandmother REVIEW OF SYSTEMS: (reviewed/updated 8/13/2018) Appetite:improved Sleep: good All other Review of Systems: Negative except psychiatric per interval HPI [place] MENTAL STATUS EXAM (MSE): MSE FINDINGS ARE WITHIN NORMAL LIMITS (WNL) UNLESS OTHERWISE STATED BELOW. ( ALL OF THE BELOW CATEGORIES OF THE MSE HAVE BEEN REVIEWED (reviewed 8/13/2018) AND UPDATED AS DEEMED APPROPRIATE ) General Presentation older than stated age, cooperative and guarded Orientation oriented to time, place and person Vital Signs  See below (reviewed 8/13/2018); Vital Signs (BP, Pulse, & Temp) are within normal limits if not listed below. Gait and Station Stable/steady, no ataxia Musculoskeletal System No extrapyramidal symptoms (EPS); no abnormal muscular movements or Tardive Dyskinesia (TD); muscle strength and tone are within normal limits Language No aphasia or dysarthria Speech:  increased latency of response, non-pressured and soft Thought Processes Disorganized; slow rate of thoughts; fair abstract reasoning/computation Thought Associations tangential  
Thought Content paranoid delusions and internally preoccupied Suicidal Ideations no plan , no intention and contracts for safety Homicidal Ideations none Mood:  depressed Affect:  constricted, labile and mood-congruent Memory recent  fair Memory remote:  good Concentration/Attention:  intact Fund of Knowledge average Insight:  fair Reliability good Judgment:  improving VITALS:    
Patient Vitals for the past 24 hrs: 
 Temp Pulse Resp BP  
08/13/18 0552 98 °F (36.7 °C) 98 18 151/81  
08/12/18 1625 - 92 18 143/80 Wt Readings from Last 3 Encounters:  
08/08/18 63.5 kg (140 lb)  
06/13/18 67.6 kg (149 lb)  
06/12/18 68 kg (149 lb 14.6 oz) Temp Readings from Last 3 Encounters:  
08/13/18 98 °F (36.7 °C)  
06/13/18 98.4 °F (36.9 °C)  
06/12/18 97.6 °F (36.4 °C) BP Readings from Last 3 Encounters:  
08/13/18 151/81  
06/13/18 (!) 186/97  
06/12/18 148/85 Pulse Readings from Last 3 Encounters:  
08/13/18 98  
06/13/18 75  
06/12/18 85 DATA LABORATORY DATA:(reviewed/updated 8/13/2018) Recent Results (from the past 24 hour(s)) GLUCOSE, POC Collection Time: 08/12/18 11:43 AM  
Result Value Ref Range Glucose (POC) 343 (H) 65 - 100 mg/dL Performed by Marcos Llanos GLUCOSE, POC Collection Time: 08/12/18  4:41 PM  
Result Value Ref Range Glucose (POC) 340 (H) 65 - 100 mg/dL Performed by Mary Grey GLUCOSE, POC Collection Time: 08/12/18  8:39 PM  
Result Value Ref Range Glucose (POC) 196 (H) 65 - 100 mg/dL Performed by Mary Grey VALPROIC ACID Collection Time: 08/13/18  4:44 AM  
Result Value Ref Range Valproic acid 54 50 - 100 ug/ml METABOLIC PANEL, BASIC Collection Time: 08/13/18  4:44 AM  
Result Value Ref Range Sodium 141 136 - 145 mmol/L Potassium 3.9 3.5 - 5.1 mmol/L  Chloride 106 97 - 108 mmol/L  
 CO2 26 21 - 32 mmol/L  
 Anion gap 9 5 - 15 mmol/L Glucose 179 (H) 65 - 100 mg/dL BUN 9 6 - 20 MG/DL Creatinine 0.60 0.55 - 1.02 MG/DL  
 BUN/Creatinine ratio 15 12 - 20 GFR est AA >60 >60 ml/min/1.73m2 GFR est non-AA >60 >60 ml/min/1.73m2 Calcium 8.2 (L) 8.5 - 10.1 MG/DL  
GLUCOSE, POC Collection Time: 08/13/18  8:14 AM  
Result Value Ref Range Glucose (POC) 174 (H) 65 - 100 mg/dL Performed by Shaun [facility] Lab Results Component Value Date/Time Valproic acid 54 08/13/2018 04:44 AM  
 
No results found for: LITHM  
RADIOLOGY REPORTS:(reviewed/updated 8/13/2018) No results found. MEDICATIONS ALL MEDICATIONS:  
Current Facility-Administered Medications Medication Dose Route Frequency  bacitracin 500 unit/gram packet 1 Packet  1 Packet Topical BID  insulin lispro (HUMALOG) injection   SubCUTAneous AC&HS  
 benztropine (COGENTIN) tablet 1 mg  1 mg Oral BID  albuterol (PROVENTIL HFA, VENTOLIN HFA, PROAIR HFA) inhaler 1 Puff  1 Puff Inhalation Q4H PRN  
 lisinopril (PRINIVIL, ZESTRIL) tablet 10 mg  10 mg Oral DAILY  divalproex DR (DEPAKOTE) tablet 500 mg  500 mg Oral BID  
 haloperidol (HALDOL) tablet 5 mg  5 mg Oral Q12H  sertraline (ZOLOFT) tablet 50 mg  50 mg Oral DAILY  insulin glargine (LANTUS) injection 30 Units  30 Units SubCUTAneous DAILY  glucose chewable tablet 16 g  4 Tab Oral PRN  
 dextrose (D50W) injection syrg 12.5-25 g  12.5-25 g IntraVENous PRN  
 glucagon (GLUCAGEN) injection 1 mg  1 mg IntraMUSCular PRN  thiamine HCL (B-1) tablet 100 mg  100 mg Oral DAILY  folic acid (FOLVITE) tablet 1 mg  1 mg Oral DAILY  therapeutic multivitamin (THERAGRAN) tablet 1 Tab  1 Tab Oral DAILY  ziprasidone (GEODON) 20 mg in sterile water (preservative free) 1 mL injection  20 mg IntraMUSCular BID PRN  
 OLANZapine (ZyPREXA) tablet 5 mg  5 mg Oral Q6H PRN  
 benztropine (COGENTIN) tablet 2 mg  2 mg Oral BID PRN  
 benztropine (COGENTIN) injection 2 mg 2 mg IntraMUSCular BID PRN  
 LORazepam (ATIVAN) injection 2 mg  2 mg IntraMUSCular Q4H PRN  
 LORazepam (ATIVAN) tablet 1 mg  1 mg Oral Q4H PRN  
 zolpidem (AMBIEN) tablet 10 mg  10 mg Oral QHS PRN  
 acetaminophen (TYLENOL) tablet 650 mg  650 mg Oral Q4H PRN  
 magnesium hydroxide (MILK OF MAGNESIA) 400 mg/5 mL oral suspension 30 mL  30 mL Oral DAILY PRN  
 nicotine (NICODERM CQ) 21 mg/24 hr patch 1 Patch  1 Patch TransDERmal DAILY PRN  
  
SCHEDULED MEDICATIONS:  
Current Facility-Administered Medications Medication Dose Route Frequency  bacitracin 500 unit/gram packet 1 Packet  1 Packet Topical BID  insulin lispro (HUMALOG) injection   SubCUTAneous AC&HS  
 benztropine (COGENTIN) tablet 1 mg  1 mg Oral BID  lisinopril (PRINIVIL, ZESTRIL) tablet 10 mg  10 mg Oral DAILY  divalproex DR (DEPAKOTE) tablet 500 mg  500 mg Oral BID  
 haloperidol (HALDOL) tablet 5 mg  5 mg Oral Q12H  sertraline (ZOLOFT) tablet 50 mg  50 mg Oral DAILY  insulin glargine (LANTUS) injection 30 Units  30 Units SubCUTAneous DAILY  thiamine HCL (B-1) tablet 100 mg  100 mg Oral DAILY  folic acid (FOLVITE) tablet 1 mg  1 mg Oral DAILY  therapeutic multivitamin (THERAGRAN) tablet 1 Tab  1 Tab Oral DAILY ASSESSMENT & PLAN  
 
DIAGNOSES REQUIRING ACTIVE TREATMENT AND MONITORING: (reviewed/updated 8/13/2018) Patient Active Hospital Problem List: The patient, Ethan Valerio, is a 44 y.o.  female who presents at this time for treatment of the following diagnoses: 
Patient Active Hospital Problem List: 
 Schizoaffective disorder ([facility] [address] (8/8/2018) Assessment: patient dysphoric, tearful, eager to resume care. Patient seeking help, which confers a better prognosis. Plan:  
- CONTINUE Depakote 500 mg Q12H for mood lability - VPA trough 8/13/18 0600 
- CONTINUE Haldol 5 mg Q12H for psychosis 
- CONTINUE Cogentin 1 mg Q12H for EPS prophylaxis - Increase Zoloft to 100mg daily 
  
8/11- ct with current tx. IM to review foot pain- change in Diabetic management 
 8/12- no change in tx plan ## Diabetes Assessment: IDDM, will resume home regimen 
- CONTINUE Lantus 30U - CHANGE SS to high sensitivity 
 
  
  
## HTN Assessment: patient with poorly controlled BP, will resume home regimen 
- CONTINUE Lisinopril 10 mg QDAY I will continue to monitor blood levels (Depakote, Tegretol, lithium, clozapine---a drug with a narrow therapeutic index= NTI) and associated labs for drug therapy implemented that require intense monitoring for toxicity as deemed appropriate based on current medication side effects and pharmacodynamically determined drug 1/2 lives. In summary, Elenita Zimmerman, is a 43 y.o.  female who presents with a severe exacerbation of the principal diagnosis of Schizoaffective disorder, depressive type (Copper Queen Community Hospital Utca 75.) Patient's condition is improving. Patient requires continued inpatient hospitalization for further stabilization, safety monitoring and medication management. I will continue to coordinate the provision of individual, milieu, occupational, group, and substance abuse therapies to address target symptoms/diagnoses as deemed appropriate for the individual patient. A coordinated, multidisplinary treatment team round was conducted with the patient (this team consists of the nurse, psychiatric unit pharmcist,  and writer). Complete current electronic health record for patient has been reviewed today including consultant notes, ancillary staff notes, nurses and psychiatric tech notes. Suicide risk assessment completed and patient deemed to be of low risk for suicide at this time. The following regarding medications was addressed during rounds with patient:  
the risks and benefits of the proposed medication. The patient was given the opportunity to ask questions. Informed consent given to the use of the above medications.  Will continue to adjust psychiatric and non-psychiatric medications (see above \"medication\" section and orders section for details) as deemed appropriate & based upon diagnoses and response to treatment. I will continue to order blood tests/labs and diagnostic tests as deemed appropriate and review results as they become available (see orders for details and above listed lab/test results). I will order psychiatric records from previous Spring View Hospital hospitals to further elucidate the nature of patient's psychopathology and review once available. I will gather additional collateral information from friends, family and o/p treatment team to further elucidate the nature of patient's psychopathology and baselline level of psychiatric functioning. I certify that this patient's inpatient psychiatric hospital services furnished since the previous certification were, and continue to be, required for treatment that could reasonably be expected to improve the patient's condition, or for diagnostic study, and that the patient continues to need, on a daily basis, active treatment furnished directly by or requiring the supervision of inpatient psychiatric facility personnel. In addition, the hospital records show that services furnished were intensive treatment services, admission or related services, or equivalent services. EXPECTED DISCHARGE DATE/DAY: 8/14/18 DISPOSITION: Home Signed By:  
Marge Torrez MD 
8/13/2018

## 2018-08-13 NOTE — BH NOTES
Social Work Pt was seen in treatment team this morning. Pt is alert and oriented. Pt denies SI/HI. Pt's mood is depressed, affect is sad and she stated she is not \"happy. \" Pt's thought process is within normal limits. Pt inquired about taking more Zoloft after she shared she is no longer hearing voices. However, pt stated she is still \"sort of worried about senior care. \" Pt's insight fair to poor and judgment is fair to poor, reliability is poor. Estimated discharge date is 8/14/2018. Voicemail left for pt's [facility]  to obtain  and psychiatry discharge appontments. Writer made attempt to reach and update Ms. Torres/sb in which voicemail left. Reena contacted [facility] @ [phone]. Social work department will continue to coordinate discharge plans.

## 2018-08-13 NOTE — BH NOTES
GROUP THERAPY PROGRESS NOTE The patient Marilee pineda 44 y.o. female is participating in Creative Expression Group. Group time: 1 hour Personal goal for participation: To concentrate on selected task Goal orientation: social 
 
Group therapy participation: active Therapeutic interventions reviewed and discussed: Crafts, games, music Impression of participation: The patient was attentive. Cristin Ramon 8/13/2018  5:28 PM

## 2018-08-13 NOTE — BH NOTES
Pt slept most of the shift, however she is calm and cooperative. Pt will continue to be monitored for safety.

## 2018-08-13 NOTE — BH NOTES
Pt observed resting quietly, respirations even and unlabored. No s/s distress noted, no complaints voices. Pt slept 5 hours. Will continue Q 15 minute monitoring for safety. Pt had labs drawn as ordered, pt tolerated well.

## 2018-08-13 NOTE — PROGRESS NOTES
Laboratory Monitoring for Valproic Acid This patient is currently prescribed the following medication(s):  
Current Facility-Administered Medications Medication Dose Route Frequency  [START ON 8/14/2018] sertraline (ZOLOFT) tablet 100 mg  100 mg Oral DAILY  bacitracin 500 unit/gram packet 1 Packet  1 Packet Topical BID  insulin lispro (HUMALOG) injection   SubCUTAneous AC&HS  
 benztropine (COGENTIN) tablet 1 mg  1 mg Oral BID  lisinopril (PRINIVIL, ZESTRIL) tablet 10 mg  10 mg Oral DAILY  divalproex DR (DEPAKOTE) tablet 500 mg  500 mg Oral BID  
 haloperidol (HALDOL) tablet 5 mg  5 mg Oral Q12H  
 insulin glargine (LANTUS) injection 30 Units  30 Units SubCUTAneous DAILY  thiamine HCL (B-1) tablet 100 mg  100 mg Oral DAILY  folic acid (FOLVITE) tablet 1 mg  1 mg Oral DAILY  therapeutic multivitamin (THERAGRAN) tablet 1 Tab  1 Tab Oral DAILY The following labs have been completed for monitoring of valproic acid: 
 
Valproic Acid Serum Concentration Lab Results Component Value Date/Time Valproic acid 54 08/13/2018 04:44 AM  
   
 
Hepatic Function Lab Results Component Value Date/Time Bilirubin, total 0.3 06/12/2018 01:40 PM  
 Protein, total 6.7 06/12/2018 01:40 PM  
 Albumin 2.9 (L) 06/12/2018 01:40 PM  
 Globulin 3.8 06/12/2018 01:40 PM  
 A-G Ratio 0.8 (L) 06/12/2018 01:40 PM  
 ALT (SGPT) 20 06/12/2018 01:40 PM  
 Alk. phosphatase 59 06/12/2018 01:40 PM  
 
 
Hematology Lab Results Component Value Date/Time WBC 7.3 08/08/2018 05:57 PM  
 RBC 5.42 (H) 08/08/2018 05:57 PM  
 HGB 15.5 08/08/2018 05:57 PM  
 HCT 42.4 08/08/2018 05:57 PM  
 MCV 78.2 (L) 08/08/2018 05:57 PM  
 MCH 28.6 08/08/2018 05:57 PM  
 MCHC 36.6 (H) 08/08/2018 05:57 PM  
 RDW 13.2 08/08/2018 05:57 PM  
 PLATELET 896 59/23/7106 05:57 PM  
 
 
Assessment/Plan: 
Valproic acid level is within therapeutic limits, 54 mcg/mL.   Level was drawn approximately 12 hours post-dose and does not reflect steady-state concentration yet (drawn after 3.5 days of therapy). Same dose continued. Néstor Heard, PharmD, BCPS 
[phone]

## 2018-08-14 VITALS
TEMPERATURE: 98.7 F | HEART RATE: 62 BPM | SYSTOLIC BLOOD PRESSURE: 142 MMHG | WEIGHT: 140 LBS | RESPIRATION RATE: 18 BRPM | OXYGEN SATURATION: 99 % | DIASTOLIC BLOOD PRESSURE: 81 MMHG | BODY MASS INDEX: 22.5 KG/M2 | HEIGHT: 66 IN

## 2018-08-14 LAB
GLUCOSE BLD STRIP.AUTO-MCNC: 156 MG/DL (ref 65–100)
GLUCOSE BLD STRIP.AUTO-MCNC: 265 MG/DL (ref 65–100)
SERVICE CMNT-IMP: ABNORMAL
SERVICE CMNT-IMP: ABNORMAL

## 2018-08-14 PROCEDURE — 74011636637 HC RX REV CODE- 636/637: Performed by: INTERNAL MEDICINE

## 2018-08-14 PROCEDURE — 74011250637 HC RX REV CODE- 250/637: Performed by: PSYCHIATRY & NEUROLOGY

## 2018-08-14 PROCEDURE — 74011000250 HC RX REV CODE- 250: Performed by: INTERNAL MEDICINE

## 2018-08-14 PROCEDURE — 82962 GLUCOSE BLOOD TEST: CPT

## 2018-08-14 PROCEDURE — 74011250637 HC RX REV CODE- 250/637: Performed by: INTERNAL MEDICINE

## 2018-08-14 PROCEDURE — 74011636637 HC RX REV CODE- 636/637: Performed by: PSYCHIATRY & NEUROLOGY

## 2018-08-14 RX ORDER — DIVALPROEX SODIUM 500 MG/1
500 TABLET, DELAYED RELEASE ORAL 2 TIMES DAILY
Qty: 28 TAB | Refills: 0 | Status: SHIPPED | OUTPATIENT
Start: 2018-08-14 | End: 2018-12-28

## 2018-08-14 RX ORDER — LISINOPRIL 10 MG/1
10 TABLET ORAL DAILY
Qty: 14 TAB | Refills: 0 | Status: ON HOLD | OUTPATIENT
Start: 2018-08-15 | End: 2018-10-21

## 2018-08-14 RX ORDER — BENZTROPINE MESYLATE 1 MG/1
1 TABLET ORAL 2 TIMES DAILY
Qty: 28 TAB | Refills: 0 | Status: SHIPPED | OUTPATIENT
Start: 2018-08-14 | End: 2018-12-28

## 2018-08-14 RX ORDER — INSULIN PUMP SYRINGE, 3 ML
EACH MISCELLANEOUS
Qty: 1 KIT | Refills: 0 | Status: ON HOLD | OUTPATIENT
Start: 2018-08-14 | End: 2018-11-23

## 2018-08-14 RX ORDER — HALOPERIDOL 5 MG/1
5 TABLET ORAL EVERY 12 HOURS
Qty: 28 TAB | Refills: 0 | Status: SHIPPED | OUTPATIENT
Start: 2018-08-14 | End: 2018-12-28

## 2018-08-14 RX ADMIN — DIVALPROEX SODIUM 500 MG: 500 TABLET, DELAYED RELEASE ORAL at 08:21

## 2018-08-14 RX ADMIN — BENZTROPINE MESYLATE 1 MG: 1 TABLET ORAL at 08:19

## 2018-08-14 RX ADMIN — INSULIN LISPRO 3 UNITS: 100 INJECTION, SOLUTION INTRAVENOUS; SUBCUTANEOUS at 11:58

## 2018-08-14 RX ADMIN — SERTRALINE HYDROCHLORIDE 100 MG: 50 TABLET ORAL at 08:19

## 2018-08-14 RX ADMIN — HALOPERIDOL 5 MG: 5 TABLET ORAL at 08:18

## 2018-08-14 RX ADMIN — THERA TABS 1 TABLET: TAB at 08:19

## 2018-08-14 RX ADMIN — BACITRACIN 1 PACKET: 500 OINTMENT TOPICAL at 08:20

## 2018-08-14 RX ADMIN — ZOLPIDEM TARTRATE 10 MG: 10 TABLET ORAL at 00:33

## 2018-08-14 RX ADMIN — FOLIC ACID 1 MG: 1 TABLET ORAL at 08:21

## 2018-08-14 RX ADMIN — INSULIN GLARGINE 30 UNITS: 100 INJECTION, SOLUTION SUBCUTANEOUS at 08:22

## 2018-08-14 RX ADMIN — Medication 100 MG: at 08:18

## 2018-08-14 RX ADMIN — LISINOPRIL 10 MG: 5 TABLET ORAL at 08:19

## 2018-08-14 NOTE — DISCHARGE SUMMARY
PSYCHIATRIC DISCHARGE SUMMARY         IDENTIFICATION:    Patient Name  Zuleyka Miles   Date of Birth 1975   [facility] [id number]   Medical Record Number  [id number]      Age  44 y.o. PCP Junior Low NP   Admit date:  8/8/2018    Discharge date: 8/14/2018   Room Number  12/36  @ [address]   Date of Service  8/14/2018            TYPE OF DISCHARGE: REGULAR               CONDITION AT DISCHARGE: improved       PROVISIONAL & DISCHARGE DIAGNOSES:    Problem List  Date Reviewed: 11/9/2016          Codes Class    * (Principal)Schizoaffective disorder, depressive type ([facility] ICD-10-CM: F25.1  ICD-9-CM: 295.70         Essential hypertension ICD-10-CM: I10  ICD-9-CM: 401.9         History of cholecystectomy ICD-10-CM: Z90.49  ICD-9-CM: V45.79         Polysubstance abuse ICD-10-CM: F19.10  ICD-9-CM: 305.90     Overview Signed 5/9/2016  3:33 PM by Oral Peterson     ETOH, cocaine             Hyperlipidemia ICD-10-CM: E78.5  ICD-9-CM: 272.4         Drug-seeking behavior ICD-10-CM: Z76.5  ICD-9-CM: 305.90         Uncontrolled diabetes mellitus ([facility] ICD-10-CM: E11.65  ICD-9-CM: 250.02         Paranoid schizophrenia ([facility] ICD-10-CM: F20.0  ICD-9-CM: 295.30         Hyponatremia ICD-10-CM: E87.1  ICD-9-CM: 276.1         Chronic pancreatitis ([facility] ICD-10-CM: K86.1  ICD-9-CM: 577.1               Active Hospital Problems    *Schizoaffective disorder, depressive type ([facility]      Essential hypertension      Hyperlipidemia      Uncontrolled diabetes mellitus ([facility]        DISCHARGE DIAGNOSIS:   Axis I:  SEE ABOVE  Axis II: SEE ABOVE  Axis III: SEE ABOVE  Axis IV:  lack of structure  Axis V:  30 on admission, 50 on discharge 50 (baseline)       CC & HISTORY OF PRESENT ILLNESS:  \"I am going to intermediate\"    The patient, Zuleyka Miles, is a 44 y.o.   BLACK OR  female with a past psychiatric history significant for schizoaffective disorder bipolar type most recent episode depressed, who presents at this time with complaints of (and/or evidence of) the following emotional symptoms: suicidal thoughts/threats and suicide attempt threat of hanging self. Additional symptomatology include hearing voices. The above symptoms have been present for 2+ weeks. These symptoms are of high severity. These symptoms are constant. The patient's condition has been precipitated by psychosocial stressors. Patient's condition made worse by recent relapse on ilicit substance use and legal issues. UDS: negative; BAL=0.      Per intake note, the patient voiced active SI to her outpatient , and was sent to ED for evaluation due to worsening and intrusive thoughts of self harm as well as paranoid ideation (patient preoccupied with being sent to [facility]); per note, patient attempted to choke herself while in ambulance en route to hospital. Patient with non-compliance with medication as outpatient.      Patient seen in the morning with treatment team. She corroborates the above account, stating that she will be sent to [facility] due to having smoked cocaine in June for unclear reasons, and endorsing vague AH. The patient is tearful, asking to be restarted on medications as soon as possible. The patient endorses depressed mood, SI, PI for the past 2 months. She contracts for safety on the unit and asks that the team reach out to her daughter. SOCIAL HISTORY:    Social History     Social History    Marital status: SINGLE     Spouse name: N/A    Number of children: N/A    Years of education: N/A     Occupational History    Not on file.      Social History Main Topics    Smoking status: Current Every Day Smoker     Packs/day: 0.50     Years: 14.00     Types: Cigarettes    Smokeless tobacco: Never Used      Comment: cigarettes    Alcohol use No      Comment: occasionally, last had \"i had one beer\" today    Drug use: No      Comment: \"crack cocaine\" 3-4 days ago    Sexual activity: Yes     Partners: Female     Birth control/ protection: Surgical     Other Topics Concern    Not on file     Social History Narrative      FAMILY HISTORY:   Family History   Problem Relation Age of Onset    Heart Disease Mother     Diabetes Mother     Hypertension Mother     Hypertension Maternal Grandmother              HOSPITALIZATION COURSE:    Vee Danielson was admitted to the inpatient psychiatric unit [facility] for acute psychiatric stabilization in regards to symptomatology as described in the HPI above. The differential diagnosis at time of admission included: bipolar dis vs. schizoaffective vs. Schizophrenia. While on the unit Vee Danielson was involved in individual, group, occupational and milieu therapy. Psychiatric medications were adjusted during this hospitalization including Haldol, Depakote, Cogentin and Zoloft. VPA level at the time of discharge was 54. Vee Danielson demonstrated a slow, but progressive improvement in overall condition. Much of patient's depression appeared to be related to situational stressors, effects of drugs of abuse, and psychological factors. Please see individual progress notes for more specific details regarding patient's hospitalization course. At time of discharge, Vee Danielson is without significant problems of psychosis and depression. Patient free of suicidal and homicidal ideations (appears to be at very low risk of suicide or homicide) and reports many positive predictive factors in terms of not attempting suicide or homicide. Overall presentation at time of discharge is most consistent with the diagnosis of sczhizoaffective disorder. Patient has maximized benefit to be derived from acute inpatient psychiatric treatment. All members of the treatment team concur with each other in regards to plans for discharge today. Patient aware and in agreement with discharge and discharge plan.            LABS AND IMAGAING:    Labs Reviewed   CBC WITH AUTOMATED DIFF - Abnormal; Notable for the following:        Result Value    RBC 5.42 (*)     MCV 78.2 (*)     MCHC 36.6 (*)     MPV 13.0 (*)     All other components within normal limits   METABOLIC PANEL, BASIC - Abnormal; Notable for the following:     Sodium 135 (*)     Glucose 492 (*)     Creatinine 1.06 (*)     BUN/Creatinine ratio 8 (*)     GFR est non-AA 57 (*)     All other components within normal limits   URINALYSIS W/ REFLEX CULTURE - Abnormal; Notable for the following:     Glucose >1000 (*)     All other components within normal limits   GLUCOSE, FASTING - Abnormal; Notable for the following:     Glucose 274 (*)     All other components within normal limits   HEMOGLOBIN A1C WITH EAG - Abnormal; Notable for the following:     Hemoglobin A1c 9.3 (*)     All other components within normal limits   METABOLIC PANEL, BASIC - Abnormal; Notable for the following:     Glucose 179 (*)     Calcium 8.2 (*)     All other components within normal limits   GLUCOSE, POC - Abnormal; Notable for the following:     Glucose (POC) 526 (*)     All other components within normal limits   GLUCOSE, POC - Abnormal; Notable for the following:     Glucose (POC) 290 (*)     All other components within normal limits   GLUCOSE, POC - Abnormal; Notable for the following:     Glucose (POC) 201 (*)     All other components within normal limits   GLUCOSE, POC - Abnormal; Notable for the following:     Glucose (POC) 366 (*)     All other components within normal limits   GLUCOSE, POC - Abnormal; Notable for the following:     Glucose (POC) 142 (*)     All other components within normal limits   GLUCOSE, POC - Abnormal; Notable for the following:     Glucose (POC) 397 (*)     All other components within normal limits   GLUCOSE, POC - Abnormal; Notable for the following:     Glucose (POC) 107 (*)     All other components within normal limits   GLUCOSE, POC - Abnormal; Notable for the following:     Glucose (POC) 174 (*)     All other components within normal limits GLUCOSE, POC - Abnormal; Notable for the following:     Glucose (POC) 295 (*)     All other components within normal limits   GLUCOSE, POC - Abnormal; Notable for the following:     Glucose (POC) 183 (*)     All other components within normal limits   GLUCOSE, POC - Abnormal; Notable for the following:     Glucose (POC) 311 (*)     All other components within normal limits   GLUCOSE, POC - Abnormal; Notable for the following:     Glucose (POC) 249 (*)     All other components within normal limits   GLUCOSE, POC - Abnormal; Notable for the following:     Glucose (POC) 270 (*)     All other components within normal limits   GLUCOSE, POC - Abnormal; Notable for the following:     Glucose (POC) 334 (*)     All other components within normal limits   GLUCOSE, POC - Abnormal; Notable for the following:     Glucose (POC) 299 (*)     All other components within normal limits   GLUCOSE, POC - Abnormal; Notable for the following:     Glucose (POC) 231 (*)     All other components within normal limits   GLUCOSE, POC - Abnormal; Notable for the following:     Glucose (POC) 343 (*)     All other components within normal limits   GLUCOSE, POC - Abnormal; Notable for the following:     Glucose (POC) 340 (*)     All other components within normal limits   GLUCOSE, POC - Abnormal; Notable for the following:     Glucose (POC) 196 (*)     All other components within normal limits   GLUCOSE, POC - Abnormal; Notable for the following:     Glucose (POC) 174 (*)     All other components within normal limits   GLUCOSE, POC - Abnormal; Notable for the following:     Glucose (POC) 343 (*)     All other components within normal limits   GLUCOSE, POC - Abnormal; Notable for the following:     Glucose (POC) 374 (*)     All other components within normal limits   GLUCOSE, POC - Abnormal; Notable for the following:     Glucose (POC) 156 (*)     All other components within normal limits   ETHYL ALCOHOL   DRUG SCREEN, URINE   VALPROIC ACID   TSH 3RD GENERATION   LIPID PANEL   VALPROIC ACID   HCG URINE, QL. - POC   HCG URINE, QL. - POC   GLUCOSE, POC   GLUCOSE, POC   GLUCOSE, POC     Lab Results   Component Value Date/Time    Valproic acid 54 08/13/2018 04:44 AM     Admission on 08/08/2018   Component Date Value Ref Range Status    WBC 08/08/2018 7.3  3.6 - 11.0 K/uL Final    RBC 08/08/2018 5.42* 3.80 - 5.20 M/uL Final    HGB 08/08/2018 15.5  11.5 - 16.0 g/dL Final    HCT 08/08/2018 42.4  35.0 - 47.0 % Final    MCV 08/08/2018 78.2* 80.0 - 99.0 FL Final    MCH 08/08/2018 28.6  26.0 - 34.0 PG Final    MCHC 08/08/2018 36.6* 30.0 - 36.5 g/dL Final    RDW 08/08/2018 13.2  11.5 - 14.5 % Final    PLATELET 83/70/2729 209  150 - 400 K/uL Final    MPV 08/08/2018 13.0* 8.9 - 12.9 FL Final    NRBC 08/08/2018 0.0  0  WBC Final    ABSOLUTE NRBC 08/08/2018 0.00  0.00 - 0.01 K/uL Final    NEUTROPHILS 08/08/2018 55  32 - 75 % Final    LYMPHOCYTES 08/08/2018 32  12 - 49 % Final    MONOCYTES 08/08/2018 8  5 - 13 % Final    EOSINOPHILS 08/08/2018 5  0 - 7 % Final    BASOPHILS 08/08/2018 1  0 - 1 % Final    IMMATURE GRANULOCYTES 08/08/2018 0  0.0 - 0.5 % Final    ABS. NEUTROPHILS 08/08/2018 4.0  1.8 - 8.0 K/UL Final    ABS. LYMPHOCYTES 08/08/2018 2.3  0.8 - 3.5 K/UL Final    ABS. MONOCYTES 08/08/2018 0.6  0.0 - 1.0 K/UL Final    ABS. EOSINOPHILS 08/08/2018 0.3  0.0 - 0.4 K/UL Final    ABS. BASOPHILS 08/08/2018 0.0  0.0 - 0.1 K/UL Final    ABS. IMM.  GRANS. 08/08/2018 0.0  0.00 - 0.04 K/UL Final    DF 08/08/2018 AUTOMATED    Final    Sodium 08/08/2018 135* 136 - 145 mmol/L Final    Potassium 08/08/2018 4.0  3.5 - 5.1 mmol/L Final    Chloride 08/08/2018 99  97 - 108 mmol/L Final    CO2 08/08/2018 28  21 - 32 mmol/L Final    Anion gap 08/08/2018 8  5 - 15 mmol/L Final    Glucose 08/08/2018 492* 65 - 100 mg/dL Final    BUN 08/08/2018 9  6 - 20 MG/DL Final    Creatinine 08/08/2018 1.06* 0.55 - 1.02 MG/DL Final    BUN/Creatinine ratio 08/08/2018 8* 12 - 20   Final    GFR est AA 08/08/2018 >60  >60 ml/min/1.73m2 Final    GFR est non-AA 08/08/2018 57* >60 ml/min/1.73m2 Final    Calcium 08/08/2018 8.5  8.5 - 10.1 MG/DL Final    ALCOHOL(ETHYL),SERUM 08/08/2018 <10  <10 MG/DL Final    AMPHETAMINES 08/08/2018 NEGATIVE   NEG   Final    BARBITURATES 08/08/2018 NEGATIVE   NEG   Final    BENZODIAZEPINES 08/08/2018 NEGATIVE   NEG   Final    COCAINE 08/08/2018 NEGATIVE   NEG   Final    METHADONE 08/08/2018 NEGATIVE   NEG   Final    OPIATES 08/08/2018 NEGATIVE   NEG   Final    PCP(PHENCYCLIDINE) 08/08/2018 NEGATIVE   NEG   Final    THC (TH-CANNABINOL) 08/08/2018 NEGATIVE   NEG   Final    Drug screen comment 08/08/2018 (NOTE)   Final    Color 08/08/2018 YELLOW/STRAW    Final    Appearance 08/08/2018 CLEAR  CLEAR   Final    Specific gravity 08/08/2018 1.025  1.003 - 1.030   Final    pH (UA) 08/08/2018 6.0  5.0 - 8.0   Final    Protein 08/08/2018 NEGATIVE   NEG mg/dL Final    Glucose 08/08/2018 >1000* NEG mg/dL Final    Ketone 08/08/2018 NEGATIVE   NEG mg/dL Final    Bilirubin 08/08/2018 NEGATIVE   NEG   Final    Blood 08/08/2018 NEGATIVE   NEG   Final    Urobilinogen 08/08/2018 0.2  0.2 - 1.0 EU/dL Final    Nitrites 08/08/2018 NEGATIVE   NEG   Final    Leukocyte Esterase 08/08/2018 NEGATIVE   NEG   Final    WBC 08/08/2018 0-4  0 - 4 /hpf Final    RBC 08/08/2018 0-5  0 - 5 /hpf Final    Epithelial cells 08/08/2018 FEW  FEW /lpf Final    Bacteria 08/08/2018 NEGATIVE   NEG /hpf Final    UA:UC IF INDICATED 08/08/2018 CULTURE NOT INDICATED BY UA RESULT  CNI   Final    Ventricular Rate 08/08/2018 86  BPM Final    Atrial Rate 08/08/2018 86  BPM Final    P-R Interval 08/08/2018 136  ms Final    QRS Duration 08/08/2018 68  ms Final    Q-T Interval 08/08/2018 384  ms Final    QTC Calculation (Bezet) 08/08/2018 459  ms Final    Calculated P Axis 08/08/2018 50  degrees Final    Calculated R Axis 08/08/2018 37  degrees Final    Calculated T Axis 08/08/2018 58  degrees Final    Diagnosis 08/08/2018    Final                    Value:Normal sinus rhythm  Normal ECG  When compared with ECG of 08-AUG-2018 18:15,  No significant change was found  Confirmed by Chinmay Duenas ([id number]) on 8/9/2018 11:59:28 AM      Glucose (POC) 08/08/2018 526* 65 - 100 mg/dL Final    Performed by 08/08/2018 Sarah Fabián (Tech)   Final    Pregnancy test,urine (POC) 08/08/2018 NEGATIVE   NEG   Final    Pregnancy test,urine (POC) 08/08/2018 NEGATIVE   NEG   Final    Glucose (POC) 08/08/2018 290* 65 - 100 mg/dL Final    Performed by 08/08/2018 Sarah CogniSens (Tech)   Final    Valproic acid 08/08/2018 61  50 - 100 ug/ml Final    TSH 08/09/2018 2.30  0.36 - 3.74 uIU/mL Final    LIPID PROFILE 08/09/2018        Final    Cholesterol, total 08/09/2018 105  <200 MG/DL Final    Triglyceride 08/09/2018 64  <150 MG/DL Final    HDL Cholesterol 08/09/2018 44  MG/DL Final    LDL, calculated 08/09/2018 48.2  0 - 100 MG/DL Final    VLDL, calculated 08/09/2018 12.8  MG/DL Final    CHOL/HDL Ratio 08/09/2018 2.4  0 - 5.0   Final    Glucose 08/09/2018 274* 65 - 100 MG/DL Final    Glucose (POC) 08/09/2018 201* 65 - 100 mg/dL Final    Performed by 08/09/2018 Simona Innocent   Final    Glucose (POC) 08/09/2018 366* 65 - 100 mg/dL Final    Performed by 08/09/2018 Simona Innocent   Final    Glucose (POC) 08/09/2018 72  65 - 100 mg/dL Final    Performed by 08/09/2018 [employer] Juli   Final    Glucose (POC) 08/09/2018 71  65 - 100 mg/dL Final    Performed by 08/09/2018 [employer]   Final    Glucose (POC) 08/09/2018 142* 65 - 100 mg/dL Final    Performed by 08/09/2018 [employer]   Final    Glucose (POC) 08/09/2018 397* 65 - 100 mg/dL Final    Performed by 08/09/2018 Rossy Engle   Final    Glucose (POC) 08/10/2018 66  65 - 100 mg/dL Final    Performed by 08/10/2018 Franklin Smoker   Final    Glucose (POC) 08/10/2018 107* 65 - 100 mg/dL Final    Performed by 08/10/2018 [place]irt   Final    Glucose (POC) 08/10/2018 174* 65 - 100 mg/dL Final    [facility]2018 [facility]   Final    Glucose (POC) 08/10/2018 295* 65 - 100 mg/dL Final    Performed by 08/10/2018 Dala Em   Final    Glucose (POC) 08/10/2018 183* 65 - 100 mg/dL Final    Performed [facility]10/2018 [facility]    Final    Glucose (POC) 08/10/2018 311* 65 - 100 mg/dL Final    [facility]   Final    Hemoglobin A1c 08/11/2018 9.3* 4.2 - 6.3 % Final    Est. average glucose 08/11/2018 220  mg/dL Final    Glucose (POC) 08/11/2018 249* 65 - 100 mg/dL Final    Performed by 08/11/2018 [facility]    Final    Glucose (POC) 08/11/2018 270* 65 - 100 mg/dL Final    Performed by 08/11/2018 [facility]    Final    Glucose (POC) 08/11/2018 334* 65 - 100 mg/dL Final    Performed by 08/11/2018 [facility]    Final    Glucose (POC) 08/11/2018 299* 65 - 100 mg/dL Final    Performed by [facility]/11/2018 [facility]   Final    Glucose (POC) 08/12/2018 231* 65 - 100 mg/dL Final    Performed by 08/12/2018 Dala Em   Final    Glucose (POC) 08/12/2018 343* 65 - 100 mg/dL Final    Performed by 08/12/2018 Kay Graves   Final    Glucose (POC) 08/12/2018 340* 65 - 100 mg/dL Final    Performed by 08/12/2018 [facility]   Final    Glucose (POC) 08/12/2018 196* 65 - 100 mg/dL Final    Performed by 08/12/2018 Joel Moritz   Final    Valproic acid 08/13/2018 54  50 - 100 ug/ml Final    Sodium 08/13/2018 141  136 - 145 mmol/L Final    Potassium 08/13/2018 3.9  3.5 - 5.1 mmol/L Final    Chloride 08/13/2018 106  97 - 108 mmol/L Final    CO2 08/13/2018 26  21 - 32 mmol/L Final    Anion gap 08/13/2018 9  5 - 15 mmol/L Final    Glucose 08/13/2018 179* 65 - 100 mg/dL Final    BUN 08/13/2018 9  6 - 20 MG/DL Final    Creatinine 08/13/2018 0.60  0.55 - 1.02 MG/DL Final    BUN/Creatinine ratio 08/13/2018 15  12 - 20   Final    GFR est AA 08/13/2018 >60  >60 ml/min/1.73m2 Final    GFR est non-AA 08/13/2018 >60  >60 ml/min/1.73m2 Final    Calcium 08/13/2018 8.2* 8.5 - 10.1 MG/DL Final    Glucose (POC) 08/13/2018 174* 65 - 100 mg/dL Final    Performed by 08/13/2018 Jessica Kamaraons   Final    Glucose (POC) 08/13/2018 343* 65 - 100 mg/dL Final    Performed by 08/13/2018 Jessica Benjamin   Final    Glucose (POC) 08/13/2018 374* 65 - 100 mg/dL Final    Performed by 08/13/2018 Ángel Coral   Final    Glucose (POC) 08/14/2018 156* 65 - 100 mg/dL Final    Performed by 08/14/2018 [place]onardo Garzon   Final     Xr Foot Lt Min 3 V    Result Date: 8/13/2018  EXAM:  XR FOOT LT MIN 3 V INDICATION:   Left toe pain and lesion. COMPARISON:  None. FINDINGS:  Three views of the left foot demonstrate no fracture or other acute osseous or articular abnormality. The soft tissues are within normal limits. IMPRESSION:  No acute abnormality. DISPOSITION:    Home. Patient to f/u with drug/etoh rehabilitation, psychiatric, and psychotherapy appointments. Patient is to f/u with internist as directed. Patient should have a depakote level and associated labs checked within the next 1-2 weeks by patient's o/p psychiatrist/internist.               FOLLOW-UP CARE:    Activity as tolerated  Diabetic Diet  Wound Care: none needed. Follow-up Information     Follow up With Details Comments [facility] On 8/16/2018 Follow up 8/16/2018 @ 10:00am with Dionte Chávez for case management services. [address] [facility]  [facility] On 8/20/2018 Follow up 8/20/2018 @ 3:20pm with Dr. Azalea Jaime for psychiatric services. *please bring id, insurance card and medications [address] [facility]  Follow up with Senait Feliciano at discharge to continue [facility] services.  [address] [facility], [address] [place] [address] Dr Caren Jung 600 [facility]  [phone]                   PROGNOSIS:   Fair ---- based on nature of patient's pathology/ies and treatment compliance issues. Prognosis is greatly dependent upon patient's ability to remain sober and to follow up with drug/etoh rehabilitation and psychiatric/psychotherapy appointments as well as to comply with psychiatric medications as prescribed. DISCHARGE MEDICATIONS:    Informed consent given for the use of following psychotropic medications:  Current Discharge Medication List      CONTINUE these medications which have CHANGED    Details   !! benztropine (COGENTIN) 1 mg tablet Take 1 Tab by mouth two (2) times a day. Indications: drug-induced extrapyramidal reaction  Qty: 28 Tab, Refills: 0      Blood-Glucose Meter monitoring kit Monitor BG 4 times daily. Dx uncontrolled type 2 DM E11.65  Indications: Diabetes Mellitus  Qty: 1 Kit, Refills: 0      !! divalproex DR (DEPAKOTE) 500 mg tablet Take 1 Tab by mouth two (2) times a day. Indications: Schizoaffective disorder  Qty: 28 Tab, Refills: 0      !! haloperidol (HALDOL) 5 mg tablet Take 1 Tab by mouth every twelve (12) hours. Indications: Schizoaffective disorder  Qty: 28 Tab, Refills: 0      !! lisinopril (PRINIVIL, ZESTRIL) 10 mg tablet Take 1 Tab by mouth daily. Indications: hypertension  Qty: 14 Tab, Refills: 0       !! - Potential duplicate medications found. Please discuss with provider. CONTINUE these medications which have NOT CHANGED    Details   albuterol (PROVENTIL HFA, VENTOLIN HFA, PROAIR HFA) 90 mcg/actuation inhaler Take 1 Puff by inhalation every four (4) hours as needed for Wheezing. !! benztropine (COGENTIN) 1 mg tablet Take 1 mg by mouth two (2) times a day. Indications: drug-induced extrapyramidal reaction      !! lisinopril (PRINIVIL, ZESTRIL) 10 mg tablet Take 10 mg by mouth daily.  Indications: hypertension      insulin glargine (LANTUS) 100 unit/mL injection 30 Units by SubCUTAneous route nightly. Indications: type 1 diabetes mellitus  Qty: 1 Vial, Refills: 5      !! divalproex DR (DEPAKOTE) 500 mg tablet Take 500 mg by mouth two (2) times a day. Indications: Schizoaffective disorder      !! haloperidol (HALDOL) 5 mg tablet Take 5 mg by mouth two (2) times a day. Indications: Schizoaffective disorder       !! - Potential duplicate medications found. Please discuss with provider. A coordinated, multidisplinary treatment team round was conducted with Diana Wells is done daily here at [facility]. This team consists of the nurse, psychiatric unit pharmcist,  and writer. I have spent greater than 35 minutes on discharge work.     Signed:  Rebecca Bishop MD  8/14/2018

## 2018-08-14 NOTE — BH NOTES
Lab Results Component Value Date/Time Glucose 179 (H) 08/13/2018 04:44 AM  
 Glucose 274 (H) 08/09/2018 04:41 AM  
 Glucose (POC) 374 (H) 08/13/2018 04:56 PM  
 
 
Paged Dr. Cesar Zamudio for glucose of 374.  6 units of Humalog Ordered. Pt received and tolerated well. Will continue to monitor pt q15 minutes.

## 2018-08-14 NOTE — BH NOTES
Behavioral Health Transition Record to Provider Patient Name: Ankur Henry YOB: 1975 Medical Record Number: [id number] Date of Admission: 8/8/2018 Date of Discharge: 8/14/2018 Attending Provider: Beatriz Hamlin MD 
Discharging Provider: Beatriz Hamlin To contact this individual call [phone] and ask the  to page. If unavailable, ask to be transferred to [facility] Provider on call. [facility] Provider will be available on call 24/7 and during holidays. Primary Care Provider: Jaimee Enciso NP Allergies Allergen Reactions  Dilaudid [Hydromorphone (Bulk)] Itching  Ibuprofen Not Reported This Time Reason for Admission: Pt brought via EMS from [facility] after presenting with psychosis. Per report, pt attempted to hang self in EMS vehicle during transportation while en route. Pt was reported to have been admitted to [place] [address] yesterday by her . Per report, while pt was at [place] [address] she was unable to contract for safety and continued endorsing SI with the plan to hang herself. Pt was reported to be laying on the common area floor of [facility]. Pt shared she has a history of suicide attempts but pt did not appear to be reliable historian because of active psychosis. Per report, pt has had decrease in sleep and appetite over the last 2 days. Pt has history of Cocaine and etoh abuse with relapse 6/2018. Per report, pt reported to have lost her children's father and the grief has been a factor that seems to be exacerbating pt's decomposition. Pt is medication non-compliant at this time. Pt has history of Scizzoaffective Disorder, Bipolar type diagnosis.  
  
 
Admission Diagnosis: Schizoaffective disorder ([facility] [address] Social Work-Pt will be discharged today. Pt is alert and oriented. Pt denies SI/HI. Pt is free of delusions. Pt's mood is within normal limits.  Pt's thought process is within normal limits and she reported she is not hearing any voices. Pt stated the importance to stay away from drugs and alcohol for her mental and physical health. Pt is to follow-up with LARRY/Uma Castaneda on 8/16/2018 @10am for case management/outpatient therapy services and with [facility]ARR[facility]/Dr. Marcel Veloz on 8/20/2018 @ 3:20pm for psychiatry services. Pt's mhsb with Bhupinder Youngce/Ms. Chantell Suh ([phone]) will be picking pt up at discharge and resuming services. Pt provided with latest A NA meeting list and advised to attend meetings at discharge to protect sobriety. Pt's daughter left voicemail to update on mother's discharge. Pt is in agreement with the plan. [facility] care plan will be faxed electronically via [employer] Epp Dr. Marcel Veloz and Chely Chavez at [facility]#[phone]. * No surgery found * Results for orders placed or performed during the hospital encounter of 08/08/18 CBC WITH AUTOMATED DIFF Result Value Ref Range WBC 7.3 3.6 - 11.0 K/uL  
 RBC 5.42 (H) 3.80 - 5.20 M/uL  
 HGB 15.5 11.5 - 16.0 g/dL HCT 42.4 35.0 - 47.0 % MCV 78.2 (L) 80.0 - 99.0 FL  
 MCH 28.6 26.0 - 34.0 PG  
 MCHC 36.6 (H) 30.0 - 36.5 g/dL  
 RDW 13.2 11.5 - 14.5 % PLATELET 095 230 - 255 K/uL MPV 13.0 (H) 8.9 - 12.9 FL  
 NRBC 0.0 0  WBC ABSOLUTE NRBC 0.00 0.00 - 0.01 K/uL NEUTROPHILS 55 32 - 75 % LYMPHOCYTES 32 12 - 49 % MONOCYTES 8 5 - 13 % EOSINOPHILS 5 0 - 7 % BASOPHILS 1 0 - 1 % IMMATURE GRANULOCYTES 0 0.0 - 0.5 % ABS. NEUTROPHILS 4.0 1.8 - 8.0 K/UL  
 ABS. LYMPHOCYTES 2.3 0.8 - 3.5 K/UL  
 ABS. MONOCYTES 0.6 0.0 - 1.0 K/UL  
 ABS. EOSINOPHILS 0.3 0.0 - 0.4 K/UL  
 ABS. BASOPHILS 0.0 0.0 - 0.1 K/UL  
 ABS. IMM. GRANS. 0.0 0.00 - 0.04 K/UL  
 DF AUTOMATED METABOLIC PANEL, BASIC Result Value Ref Range Sodium 135 (L) 136 - 145 mmol/L Potassium 4.0 3.5 - 5.1 mmol/L Chloride 99 97 - 108 mmol/L  
 CO2 28 21 - 32 mmol/L Anion gap 8 5 - 15 mmol/L Glucose 492 (H) 65 - 100 mg/dL BUN 9 6 - 20 MG/DL  Creatinine 1.06 (H) 0.55 - 1.02 MG/DL  
 BUN/Creatinine ratio 8 (L) 12 - 20 GFR est AA >60 >60 ml/min/1.73m2 GFR est non-AA 57 (L) >60 ml/min/1.73m2 Calcium 8.5 8.5 - 10.1 MG/DL  
ETHYL ALCOHOL Result Value Ref Range ALCOHOL(ETHYL),SERUM <10 <10 MG/DL  
DRUG SCREEN, URINE Result Value Ref Range AMPHETAMINES NEGATIVE  NEG    
 BARBITURATES NEGATIVE  NEG BENZODIAZEPINES NEGATIVE  NEG    
 COCAINE NEGATIVE  NEG METHADONE NEGATIVE  NEG    
 OPIATES NEGATIVE  NEG    
 PCP(PHENCYCLIDINE) NEGATIVE  NEG    
 THC (TH-CANNABINOL) NEGATIVE  NEG Drug screen comment (NOTE) URINALYSIS W/ REFLEX CULTURE Result Value Ref Range Color YELLOW/STRAW Appearance CLEAR CLEAR Specific gravity 1.025 1.003 - 1.030    
 pH (UA) 6.0 5.0 - 8.0 Protein NEGATIVE  NEG mg/dL Glucose >1000 (A) NEG mg/dL Ketone NEGATIVE  NEG mg/dL Bilirubin NEGATIVE  NEG Blood NEGATIVE  NEG Urobilinogen 0.2 0.2 - 1.0 EU/dL Nitrites NEGATIVE  NEG Leukocyte Esterase NEGATIVE  NEG    
 WBC 0-4 0 - 4 /hpf  
 RBC 0-5 0 - 5 /hpf Epithelial cells FEW FEW /lpf Bacteria NEGATIVE  NEG /hpf  
 UA:UC IF INDICATED CULTURE NOT INDICATED BY UA RESULT CNI    
VALPROIC ACID Result Value Ref Range Valproic acid 61 50 - 100 ug/ml TSH 3RD GENERATION Result Value Ref Range TSH 2.30 0.36 - 3.74 uIU/mL  
LIPID PANEL Result Value Ref Range LIPID PROFILE Cholesterol, total 105 <200 MG/DL Triglyceride 64 <150 MG/DL  
 HDL Cholesterol 44 MG/DL  
 LDL, calculated 48.2 0 - 100 MG/DL VLDL, calculated 12.8 MG/DL  
 CHOL/HDL Ratio 2.4 0 - 5.0 GLUCOSE, FASTING Result Value Ref Range Glucose 274 (H) 65 - 100 MG/DL  
HEMOGLOBIN A1C WITH EAG Result Value Ref Range Hemoglobin A1c 9.3 (H) 4.2 - 6.3 % Est. average glucose 220 mg/dL VALPROIC ACID Result Value Ref Range Valproic acid 54 50 - 100 ug/ml METABOLIC PANEL, BASIC Result Value Ref Range  Sodium 141 136 - 145 mmol/L  
 Potassium 3.9 3.5 - 5.1 mmol/L Chloride 106 97 - 108 mmol/L  
 CO2 26 21 - 32 mmol/L Anion gap 9 5 - 15 mmol/L Glucose 179 (H) 65 - 100 mg/dL BUN 9 6 - 20 MG/DL Creatinine 0.60 0.55 - 1.02 MG/DL  
 BUN/Creatinine ratio 15 12 - 20 GFR est AA >60 >60 ml/min/1.73m2 GFR est non-AA >60 >60 ml/min/1.73m2 Calcium 8.2 (L) 8.5 - 10.1 MG/DL  
GLUCOSE, POC Result Value Ref Range Glucose (POC) 526 (H) 65 - 100 mg/dL Performed by [employer] (Tech) HCG URINE, QL. - POC Result Value Ref Range Pregnancy test,urine (POC) NEGATIVE  NEG    
HCG URINE, QL. - POC Result Value Ref Range Pregnancy test,urine (POC) NEGATIVE  NEG    
GLUCOSE, POC Result Value Ref Range Glucose (POC) 290 (H) 65 - 100 mg/dL Performed by Wendyrosa Mitchell (Tech) GLUCOSE, POC Result Value Ref Range Glucose (POC) 201 (H) 65 - 100 mg/dL Performed by [employer] GLUCOSE, POC Result Value Ref Range Glucose (POC) 366 (H) 65 - 100 mg/dL Performed by [employer] GLUCOSE, POC Result Value Ref Range Glucose (POC) 72 65 - 100 mg/dL Performed by Eboni introNetworkssharon GLUCOSE, POC Result Value Ref Range Glucose (POC) 71 65 - 100 mg/dL Performed by Eboni introNetworkssharon GLUCOSE, POC Result Value Ref Range Glucose (POC) 142 (H) 65 - 100 mg/dL Performed by Eboni Lozoya GLUCOSE, POC Result Value Ref Range Glucose (POC) 397 (H) 65 - 100 mg/dL Performed by Naty Brown, POC Result Value Ref Range Glucose (POC) 66 65 - 100 mg/dL Performed by Neha Barrera GLUCOSE, POC Result Value Ref Range Glucose (POC) 107 (H) 65 - 100 mg/dL Performed by Neha Barrera GLUCOSE, POC Result Value Ref Range Glucose (POC) 174 (H) 65 - 100 mg/dL Performed by Edita Hathaway GLUCOSE, POC Result Value Ref Range Glucose (POC) 295 (H) 65 - 100 mg/dL Performed by Edita Hathaway GLUCOSE, POC Result Value Ref Range  Glucose (POC) 183 (H) 65 - 100 mg/dL Performed by [employer] GLUCOSE, POC Result Value Ref Range Glucose (POC) 311 (H) 65 - 100 mg/dL Performed by Birda Goodell, POC Result Value Ref Range Glucose (POC) 249 (H) 65 - 100 mg/dL Performed by [employer] GLUCOSE, POC Result Value Ref Range Glucose (POC) 270 (H) 65 - 100 mg/dL Performed by [employer] GLUCOSE, POC Result Value Ref Range Glucose (POC) 334 (H) 65 - 100 mg/dL Performed by [employer] GLUCOSE, POC Result Value Ref Range Glucose (POC) 299 (H) 65 - 100 mg/dL Performed by Birda Goodell, POC Result Value Ref Range Glucose (POC) 231 (H) 65 - 100 mg/dL Performed by [employer] GLUCOSE, POC Result Value Ref Range Glucose (POC) 343 (H) 65 - 100 mg/dL Performed by Sharla Landau GLUCOSE, POC Result Value Ref Range Glucose (POC) 340 (H) 65 - 100 mg/dL Performed by [employer] GLUCOSE, POC Result Value Ref Range Glucose (POC) 196 (H) 65 - 100 mg/dL Performed by [employer] GLUCOSE, POC Result Value Ref Range Glucose (POC) 174 (H) 65 - 100 mg/dL Performed by [employer] GLUCOSE, POC Result Value Ref Range Glucose (POC) 343 (H) 65 - 100 mg/dL Performed by [employer] GLUCOSE, POC Result Value Ref Range Glucose (POC) 374 (H) 65 - 100 mg/dL Performed by Tina Landau GLUCOSE, POC Result Value Ref Range Glucose (POC) 156 (H) 65 - 100 mg/dL Performed by Steph Alston EKG, 12 LEAD, INITIAL Result Value Ref Range Ventricular Rate 86 BPM  
 Atrial Rate 86 BPM  
 P-R Interval 136 ms QRS Duration 68 ms Q-T Interval 384 ms QTC Calculation (Bezet) 459 ms Calculated P Axis 50 degrees Calculated R Axis 37 degrees Calculated T Axis 58 degrees Diagnosis Normal sinus rhythm Normal ECG When compared with ECG of 08-AUG-2018 18:15, No significant change was found Confirmed by Syd Groves ([id number]) on 8/9/2018 11:59:28 AM 
  
 
 
Immunizations administered during this encounter:  
Immunization History Administered Date(s) Administered  Influenza Vaccine 12/01/2013  Pneumococcal Vaccine (Unspecified Type) 12/01/2013 Screening for Metabolic Disorders for Patients on Antipsychotic Medications 
(Data obtained from the EMR) Estimated Body Mass Index Estimated body mass index is 22.6 kg/(m^2) as calculated from the following: 
  Height as of this encounter: 5' 6\" (1.676 m). Weight as of this encounter: 63.5 kg (140 lb). Vital Signs/Blood Pressure Visit Vitals  /81  Pulse 62  Temp 98.7 °F (37.1 °C) Comment: 98.7  Resp 18  Ht 5' 6\" (1.676 m)  Wt 63.5 kg (140 lb)  SpO2 99%  Breastfeeding No  
 BMI 22.6 kg/m2 Blood Glucose/Hemoglobin A1c Lab Results Component Value Date/Time Glucose 179 (H) 08/13/2018 04:44 AM  
 Glucose 274 (H) 08/09/2018 04:41 AM  
 Glucose (POC) 156 (H) 08/14/2018 08:14 AM  
 
 
Lab Results Component Value Date/Time Hemoglobin A1c 9.3 (H) 08/11/2018 04:56 AM  
 
  
Lipid Panel Lab Results Component Value Date/Time Cholesterol, total 105 08/09/2018 04:41 AM  
 HDL Cholesterol 44 08/09/2018 04:41 AM  
 LDL,Direct 144 (H) 04/03/2011 04:10 AM  
 LDL, calculated 48.2 08/09/2018 04:41 AM  
 Triglyceride 64 08/09/2018 04:41 AM  
 CHOL/HDL Ratio 2.4 08/09/2018 04:41 AM  
 
  
Discharge Diagnosis: Please refer to psychiatrist's note. Discharge Plan: Pt is to follow-up with LARRY/Uma Alarcon on 8/16/2018 @10am for case management/outpatient therapy services and with LARRY/Dr. Tami Kimball on 8/20/2018 @ 3:20pm for psychiatry services. Pt's mhsb with [facility]/Ms. Rachael Lama ([phone]) will be picking pt up at discharge and resuming services. Pt provided with latest A NA meeting list and advised to attend meetings at discharge to protect sobriety.   
 
Discharge Medication List and Instructions:  
Current Discharge Medication List  
  
 
 
Unresulted Labs None To obtain results of studies pending at discharge, please contact [phone] Follow-up Information Follow up With Details Comments Contact Info [address] On 8/16/2018 Follow up 8/16/2018 @ 10:00am with Alphonse López for case management services. [facility] 
[phone] [address] On 8/20/2018 Follow up 8/20/2018 @ 3:20pm with Dr. Tomas Jones for psychiatric services. *please bring id, insurance card and medications [facility] 
[phone] Bhupinder Cooper/Linda Torres  Follow up with Simona Henry at discharge to continue mhsb services. [phone] Advanced Directive:  
Does the patient have an appointed surrogate decision maker? Yes Does the patient have a Medical Advance Directive? No 
Does the patient have a Psychiatric Advance Directive? No 
If the patient does not have a surrogate or Medical Advance Directive AND Psychiatric Advance Directive, the patient was offered information on these advance directives Patient will complete at a later time Patient Instructions: Please continue all medications until otherwise directed by physician. Tobacco Cessation Discharge Plan:  
Is the patient a smoker and needs referral for smoking cessation? Not applicable Patient referred to the following for smoking cessation with an appointment? Not applicable Patient was offered medication to assist with smoking cessation at discharge? Not applicable Was education for smoking cessation added to the discharge instructions? Not applicable Alcohol/Substance Abuse Discharge Plan:  
Does the patient have a history of substance/alcohol abuse and requires a referral for treatment? Yes Patient referred to the following for substance/alcohol abuse treatment with an appointment? Yes Patient was offered medication to assist with alcohol cessation at discharge? No 
Was education for substance/alcohol abuse added to discharge instructions? No 
 
Patient discharged to Home; provided to the patient/caregiver either in hard copy or electronically.

## 2018-08-14 NOTE — PROGRESS NOTES
Problem: Suicide/Homicide (Adult/Pediatric) Goal: *STG: Remains safe in hospital 
Outcome: Progressing Towards Goal 
Pt rested quietly in bed with eyes closed. No signs/symptoms of distress, agitation, or anxiety. Will monitor for changes. Q 15 minute checks continue. Pt slept 5 hrs

## 2018-08-14 NOTE — BH NOTES
Pt is alert and oriented x person, place and time. Pt denies any SI/HI or AV hallucinations. Pt denies any depression. Pt plans to return home. Discharge information reviewed with patient. Pt verbalizes understanding. Pt's belongings/valuables returned. Pt to be transported home by case manger.

## 2018-08-14 NOTE — BH NOTES
GROUP THERAPY PROGRESS NOTE The patient Carlotta pineda 44 y.o. female is participating in [facility] Group time: 30 minutes Personal goal for participation: To discuss the daily goals Goal orientation: personal 
 
Group therapy participation: passive Therapeutic interventions reviewed and discussed:  Yes Impression of participation: Adela Casey 8/13/2018  9:16 PM

## 2018-08-14 NOTE — BH NOTES
GROUP THERAPY PROGRESS NOTE Nick Hdz is participating in [employer]. Group time: 30 minutes Personal goal for participation:  Unit orientation Goal orientation: [place] Group therapy participation: active Therapeutic interventions reviewed and discussed: Yes Impression of participation: good

## 2018-08-14 NOTE — DISCHARGE INSTRUCTIONS
DISCHARGE SUMMARY from Nurse    PATIENT INSTRUCTIONS:  What to do at Home:  Recommended activity: Activity as tolerated      Please give a list of your current medications to your Primary Care Provider. *  Please update this list whenever your medications are discontinued, doses are      changed, or new medications (including over-the-counter products) are added. *  Please carry medication information at all times in case of emergency situations. These are general instructions for a healthy lifestyle:    No smoking/ No tobacco products/ Avoid exposure to second hand smoke  Surgeon General's Warning:  Quitting smoking now greatly reduces serious risk to your health. Obesity, smoking, and sedentary lifestyle greatly increases your risk for illness    A healthy diet, regular physical exercise & weight monitoring are important for maintaining a healthy lifestyle    The discharge information has been reviewed with the patient. The patient verbalized understanding. Discharge medications reviewed with the patient and appropriate educational materials and side effects teaching were provided. ___________________________________________________________________________________________________________________________________  . If I feel I am at risk of hurting myself or others, I will call the crisis office and speak with a crisis worker who will assist me during my crisis. [address]  [phone]  [address] [place] [address] [phone] [address] [facility]-  [phone]

## 2018-09-15 ENCOUNTER — HOSPITAL ENCOUNTER (EMERGENCY)
Age: 43
Discharge: HOME | End: 2018-09-15
Attending: EMERGENCY MEDICINE
Payer: MEDICARE

## 2018-09-15 VITALS
SYSTOLIC BLOOD PRESSURE: 157 MMHG | RESPIRATION RATE: 16 BRPM | WEIGHT: 147.8 LBS | BODY MASS INDEX: 22.4 KG/M2 | HEIGHT: 68 IN | TEMPERATURE: 98.4 F | HEART RATE: 75 BPM | DIASTOLIC BLOOD PRESSURE: 91 MMHG | OXYGEN SATURATION: 98 %

## 2018-09-15 DIAGNOSIS — R73.9 HYPERGLYCEMIA: Primary | ICD-10-CM

## 2018-09-15 DIAGNOSIS — Z91.14 NONCOMPLIANCE WITH MEDICATION REGIMEN: ICD-10-CM

## 2018-09-15 DIAGNOSIS — K04.7 DENTAL ABSCESS: ICD-10-CM

## 2018-09-15 LAB
APPEARANCE UR: CLEAR
BACTERIA URNS QL MICRO: NEGATIVE /HPF
BASOPHILS # BLD: 0.1 K/UL (ref 0–0.1)
BASOPHILS NFR BLD: 1 % (ref 0–1)
BILIRUB UR QL: NEGATIVE
COLOR UR: ABNORMAL
DIFFERENTIAL METHOD BLD: ABNORMAL
EOSINOPHIL # BLD: 0.4 K/UL (ref 0–0.4)
EOSINOPHIL NFR BLD: 5 % (ref 0–7)
EPITH CASTS URNS QL MICRO: ABNORMAL /LPF
ERYTHROCYTE [DISTWIDTH] IN BLOOD BY AUTOMATED COUNT: 14.7 % (ref 11.5–14.5)
GLUCOSE BLD STRIP.AUTO-MCNC: 180 MG/DL (ref 65–100)
GLUCOSE BLD STRIP.AUTO-MCNC: 350 MG/DL (ref 65–100)
GLUCOSE BLD STRIP.AUTO-MCNC: >600 MG/DL (ref 65–100)
GLUCOSE BLD STRIP.AUTO-MCNC: >600 MG/DL (ref 65–100)
GLUCOSE UR STRIP.AUTO-MCNC: >1000 MG/DL
HCT VFR BLD AUTO: 39.5 % (ref 35–47)
HGB BLD-MCNC: 14.1 G/DL (ref 11.5–16)
HGB UR QL STRIP: ABNORMAL
IMM GRANULOCYTES # BLD: 0 K/UL (ref 0–0.04)
IMM GRANULOCYTES NFR BLD AUTO: 0 % (ref 0–0.5)
KETONES SERPL QL: NEGATIVE
KETONES UR QL STRIP.AUTO: NEGATIVE MG/DL
LEUKOCYTE ESTERASE UR QL STRIP.AUTO: NEGATIVE
LYMPHOCYTES # BLD: 2.7 K/UL (ref 0.8–3.5)
LYMPHOCYTES NFR BLD: 34 % (ref 12–49)
MCH RBC QN AUTO: 27.3 PG (ref 26–34)
MCHC RBC AUTO-ENTMCNC: 35.7 G/DL (ref 30–36.5)
MCV RBC AUTO: 76.6 FL (ref 80–99)
MONOCYTES # BLD: 0.9 K/UL (ref 0–1)
MONOCYTES NFR BLD: 11 % (ref 5–13)
NEUTS SEG # BLD: 3.8 K/UL (ref 1.8–8)
NEUTS SEG NFR BLD: 49 % (ref 32–75)
NITRITE UR QL STRIP.AUTO: NEGATIVE
NRBC # BLD: 0 K/UL (ref 0–0.01)
NRBC BLD-RTO: 0 PER 100 WBC
PH UR STRIP: 6 [PH] (ref 5–8)
PLATELET # BLD AUTO: 144 K/UL (ref 150–400)
PMV BLD AUTO: 11.9 FL (ref 8.9–12.9)
PROT UR STRIP-MCNC: NEGATIVE MG/DL
RBC # BLD AUTO: 5.16 M/UL (ref 3.8–5.2)
RBC #/AREA URNS HPF: ABNORMAL /HPF (ref 0–5)
SERVICE CMNT-IMP: ABNORMAL
SP GR UR REFRACTOMETRY: 1 (ref 1–1.03)
UA: UC IF INDICATED,UAUC: ABNORMAL
UROBILINOGEN UR QL STRIP.AUTO: 0.2 EU/DL (ref 0.2–1)
WBC # BLD AUTO: 7.9 K/UL (ref 3.6–11)
WBC URNS QL MICRO: ABNORMAL /HPF (ref 0–4)

## 2018-09-15 PROCEDURE — 82962 GLUCOSE BLOOD TEST: CPT

## 2018-09-15 PROCEDURE — 85025 COMPLETE CBC W/AUTO DIFF WBC: CPT | Performed by: EMERGENCY MEDICINE

## 2018-09-15 PROCEDURE — 74011250636 HC RX REV CODE- 250/636: Performed by: EMERGENCY MEDICINE

## 2018-09-15 PROCEDURE — 36415 COLL VENOUS BLD VENIPUNCTURE: CPT | Performed by: EMERGENCY MEDICINE

## 2018-09-15 PROCEDURE — 96361 HYDRATE IV INFUSION ADD-ON: CPT

## 2018-09-15 PROCEDURE — 81001 URINALYSIS AUTO W/SCOPE: CPT | Performed by: EMERGENCY MEDICINE

## 2018-09-15 PROCEDURE — 74011000250 HC RX REV CODE- 250: Performed by: EMERGENCY MEDICINE

## 2018-09-15 PROCEDURE — 74011636637 HC RX REV CODE- 636/637: Performed by: EMERGENCY MEDICINE

## 2018-09-15 PROCEDURE — 96374 THER/PROPH/DIAG INJ IV PUSH: CPT

## 2018-09-15 PROCEDURE — 82009 KETONE BODYS QUAL: CPT | Performed by: EMERGENCY MEDICINE

## 2018-09-15 PROCEDURE — 99285 EMERGENCY DEPT VISIT HI MDM: CPT

## 2018-09-15 PROCEDURE — 74011250637 HC RX REV CODE- 250/637: Performed by: EMERGENCY MEDICINE

## 2018-09-15 PROCEDURE — 96376 TX/PRO/DX INJ SAME DRUG ADON: CPT

## 2018-09-15 PROCEDURE — 80048 BASIC METABOLIC PNL TOTAL CA: CPT | Performed by: EMERGENCY MEDICINE

## 2018-09-15 RX ORDER — SODIUM CHLORIDE 0.9 % (FLUSH) 0.9 %
5-10 SYRINGE (ML) INJECTION AS NEEDED
Status: DISCONTINUED | OUTPATIENT
Start: 2018-09-15 | End: 2018-09-15 | Stop reason: HOSPADM

## 2018-09-15 RX ORDER — PENICILLIN V POTASSIUM 500 MG/1
500 TABLET, FILM COATED ORAL 4 TIMES DAILY
Qty: 28 TAB | Refills: 0 | Status: SHIPPED | OUTPATIENT
Start: 2018-09-15 | End: 2018-09-22

## 2018-09-15 RX ORDER — TRAMADOL HYDROCHLORIDE 50 MG/1
50 TABLET ORAL
Status: COMPLETED | OUTPATIENT
Start: 2018-09-15 | End: 2018-09-15

## 2018-09-15 RX ORDER — SODIUM CHLORIDE 0.9 % (FLUSH) 0.9 %
5-10 SYRINGE (ML) INJECTION EVERY 8 HOURS
Status: DISCONTINUED | OUTPATIENT
Start: 2018-09-15 | End: 2018-09-15 | Stop reason: HOSPADM

## 2018-09-15 RX ADMIN — LIDOCAINE HYDROCHLORIDE: 20 SOLUTION ORAL; TOPICAL at 19:46

## 2018-09-15 RX ADMIN — SODIUM CHLORIDE 1000 ML: 900 INJECTION, SOLUTION INTRAVENOUS at 17:43

## 2018-09-15 RX ADMIN — TRAMADOL HYDROCHLORIDE 50 MG: 50 TABLET, FILM COATED ORAL at 18:03

## 2018-09-15 RX ADMIN — SODIUM CHLORIDE 1000 ML: 900 INJECTION, SOLUTION INTRAVENOUS at 19:11

## 2018-09-15 RX ADMIN — INSULIN HUMAN 10 UNITS: 100 INJECTION, SOLUTION PARENTERAL at 18:03

## 2018-09-15 RX ADMIN — HUMAN INSULIN 5 UNITS: 100 INJECTION, SOLUTION SUBCUTANEOUS at 19:10

## 2018-09-15 NOTE — ED NOTES
Assumed pt care for task only. Pt first IV fluids completed. This RN in for lab draw and urine collection. Primary nurse made aware

## 2018-09-15 NOTE — ED PROVIDER NOTES
EMERGENCY DEPARTMENT HISTORY AND PHYSICAL EXAM 
 
 
Date: 9/15/2018 Patient Name: Maribell Mederos History of Presenting Illness Chief Complaint Patient presents with  Dental Pain  High Blood Sugar History Provided By: Patient HPI: Maribell Mederos, 44 y.o. female with PMHx significant for DM, HTN, pancreatitis, kidney stones, alcohol abuse, GERD,  asthma, sickle cell trait, schizophrenia, bipolar, depression, anxiety presents via EMS to the ED with cc of dental pain in her R lower jaw that began today. Pt reports she had a high BG today but does not remember what it was. Pt reports she has been non-compliant with her DM medications. Per EMS, pt received approximately 200 mL of NS en route to ED. Pt denies HA, CP, SOB, abdominal pain, nausea, vomiting, diarrhea, or urinary sxs. There are no other complaints, changes, or physical findings at this time. PCP: Amadou Montano NP Current Outpatient Prescriptions Medication Sig Dispense Refill  penicillin v potassium (VEETID) 500 mg tablet Take 1 Tab by mouth four (4) times daily for 7 days. 28 Tab 0  
 benztropine (COGENTIN) 1 mg tablet Take 1 Tab by mouth two (2) times a day. Indications: drug-induced extrapyramidal reaction 28 Tab 0  Blood-Glucose Meter monitoring kit Monitor BG 4 times daily. Dx uncontrolled type 2 DM E11.65  Indications: Diabetes Mellitus 1 Kit 0  
 divalproex DR (DEPAKOTE) 500 mg tablet Take 1 Tab by mouth two (2) times a day. Indications: Schizoaffective disorder 28 Tab 0  
 haloperidol (HALDOL) 5 mg tablet Take 1 Tab by mouth every twelve (12) hours. Indications: Schizoaffective disorder 28 Tab 0  
 lisinopril (PRINIVIL, ZESTRIL) 10 mg tablet Take 1 Tab by mouth daily. Indications: hypertension 14 Tab 0  
 albuterol (PROVENTIL HFA, VENTOLIN HFA, PROAIR HFA) 90 mcg/actuation inhaler Take 1 Puff by inhalation every four (4) hours as needed for Wheezing.     
 insulin glargine (LANTUS) 100 unit/mL injection 30 Units by SubCUTAneous route nightly. Indications: type 1 diabetes mellitus (Patient taking differently: 30 Units by SubCUTAneous route daily. Indications: type 1 diabetes mellitus) 1 Vial 5 Past History Past Medical History: 
Past Medical History:  
Diagnosis Date  Alcohol abuse, in remission   
 quit 17 days ago  Asthma   
 does not use inhalers  Borderline personality disorder  Diabetes ([facility] [address]  GERD (gastroesophageal reflux disease)  Hypertension  Other ill-defined conditions(799.89)   
 kidney stones ,passed one  Other ill-defined conditions(799.89) sickle cell trait  Other ill-defined conditions(799.89)   
 increased cholesterol  Pancreatitis  Psychiatric disorder   
 schizophrenia, bipolar, depression, anxiety Past Surgical History: 
Past Surgical History:  
Procedure Laterality Date  ABDOMEN SURGERY PROC UNLISTED  3/12/14 CHOLECYSTECTOMY LAPAROSCOPIC    
 HX GASTRIC BYPASS  HX GYN  11/8/2012  
 c section x2  HX OTHER SURGICAL  3/13/14 ENDOSCOPIC RETROGRADE CHOLANGIOPANCREATOGRAPHY  HX OTHER SURGICAL  8/4/14  
 endoscopic stent placed to bile duct  HX TUBAL LIGATION  2012 Family History: 
Family History Problem Relation Age of Onset  Heart Disease Mother  Diabetes Mother  Hypertension Mother  Hypertension Maternal Grandmother Social History: 
Social History Substance Use Topics  Smoking status: Current Every Day Smoker Packs/day: 0.50 Years: 14.00 Types: Cigarettes  Smokeless tobacco: Never Used Comment: cigarettes  Alcohol use No  
   Comment: occasionally, last had \"i had one beer\" today Allergies: Allergies Allergen Reactions  Dilaudid [Hydromorphone (Bulk)] Itching  Ibuprofen Not Reported This Time Review of Systems Review of Systems Constitutional: Negative for fever. HENT: Positive for dental problem.  Negative for sore throat. Eyes: Negative for photophobia and redness. Respiratory: Negative for shortness of breath and wheezing. Cardiovascular: Negative for chest pain and leg swelling. Gastrointestinal: Negative for abdominal pain, blood in stool, nausea and vomiting. Genitourinary: Negative for difficulty urinating, dysuria, hematuria, menstrual problem and vaginal bleeding. Musculoskeletal: Negative for back pain and joint swelling. Neurological: Negative for dizziness, seizures, syncope, speech difficulty, weakness, numbness and headaches. Hematological: Negative for adenopathy. Psychiatric/Behavioral: Negative for agitation, confusion and suicidal ideas. The patient is not nervous/anxious. Physical Exam  
Physical Exam  
Constitutional: She is oriented to person, place, and time. She appears well-developed and well-nourished. No distress. HENT:  
Head: Normocephalic and atraumatic. Mouth/Throat: Oropharynx is clear and moist. No oropharyngeal exudate. fx R lower tooth with abscess in gum Multiple dental caries Extensive tooth decay Mild swelling to R cheek area Eyes: Conjunctivae and EOM are normal. Pupils are equal, round, and reactive to light. Left eye exhibits no discharge. Neck: Normal range of motion. Neck supple. No JVD present. No cervical adenopathy Cardiovascular: Normal rate, regular rhythm, normal heart sounds and intact distal pulses. Pulmonary/Chest: Effort normal and breath sounds normal. No respiratory distress. She has no wheezes. Abdominal: Soft. Bowel sounds are normal. She exhibits no distension. There is no tenderness. There is no rebound and no guarding. Musculoskeletal: Normal range of motion. She exhibits no edema or tenderness. Lymphadenopathy:  
  She has no cervical adenopathy. Neurological: She is alert and oriented to person, place, and time. She has normal reflexes. No cranial nerve deficit. Skin: Skin is warm and dry. No rash noted. Psychiatric: She has a normal mood and affect. Her behavior is normal.  
Nursing note and vitals reviewed. Medical Decision Making I am the first provider for this patient. I reviewed the vital signs, available nursing notes, past medical history, past surgical history, family history and social history. Vital Signs-Reviewed the patient's vital signs. No data found. Records Reviewed: Nursing Notes, Old Medical Records and Ambulance Run Sheet Provider Notes (Medical Decision Making): DDx: dental abscess, toothache, hyperglycemia, DKA ED Course:  
Initial assessment performed. The patients presenting problems have been discussed, and they are in agreement with the care plan formulated and outlined with them. I have encouraged them to ask questions as they arise throughout their visit. SIGN OUT: 
7:00 PM 
Patient's presentation, labs/imaging and plan of care was reviewed with Shalom Hernandez M.D as part of sign out. They will obtain labs and re-assess as part of the plan discussed with the patient. Shalom Hernandez M.D's assistance in completion of this plan is greatly appreciated but it should be noted that I will be the provider of record for this patient. Maria L Sexton MD 
 
Critical Care Time: 0 Minutes Disposition: 
Pt discharged PLAN: 
1. Discharge Medication List as of 9/15/2018  7:30 PM  
  
CONTINUE these medications which have NOT CHANGED Details  
benztropine (COGENTIN) 1 mg tablet Take 1 Tab by mouth two (2) times a day. Indications: drug-induced extrapyramidal reaction, Print, Disp-28 Tab, R-0 Blood-Glucose Meter monitoring kit Monitor BG 4 times daily. Dx uncontrolled type 2 DM E11.65  Indications: Diabetes Mellitus, Print, Disp-1 Kit, R-0  
  
divalproex DR (DEPAKOTE) 500 mg tablet Take 1 Tab by mouth two (2) times a day.  Indications: Schizoaffective disorder, Print, Disp-28 Tab, R-0  
  
haloperidol (HALDOL) 5 mg tablet Take 1 Tab by mouth every twelve (12) hours. Indications: Schizoaffective disorder, Print, Disp-28 Tab, R-0  
  
lisinopril (PRINIVIL, ZESTRIL) 10 mg tablet Take 1 Tab by mouth daily. Indications: hypertension, Normal, Disp-14 Tab, R-0  
  
albuterol (PROVENTIL HFA, VENTOLIN HFA, PROAIR HFA) 90 mcg/actuation inhaler Take 1 Puff by inhalation every four (4) hours as needed for Wheezing., Historical Med  
  
insulin glargine (LANTUS) 100 unit/mL injection 30 Units by SubCUTAneous route nightly. Indications: type 1 diabetes mellitus, No Print, Disp-1 Vial, R-5  
  
  
 
2. Follow-up Information Follow up With Details Comments Contact Info Laura Albarado NP Schedule an appointment as soon as possible for a visit  [address] [place] [id number] [phone] Return to ED if worse Diagnosis Clinical Impression: 1. Hyperglycemia 2. Dental abscess 3. Noncompliance with medication regimen Attestations: This note is prepared by Dolores Alberts, acting as Scribe for Yovany Vail MD. Yovany Vail MD: The scribe's documentation has been prepared under my direction and personally reviewed by me in its entirety. I confirm that the note above accurately reflects all work, treatment, procedures, and medical decision making performed by me.

## 2018-09-15 NOTE — ED NOTES
Bedside and Verbal shift change report given to MATHEW Duncan (oncoming nurse) by Pink Brunner (offgoing nurse). Report included the following information SBAR, ED Summary, Procedure Summary, MAR and Recent Results.

## 2018-09-15 NOTE — DISCHARGE INSTRUCTIONS
Learning About High Blood Sugar  What is high blood sugar? Your body turns the food you eat into glucose (sugar), which it uses for energy. But if your body isn't able to use the sugar right away, it can build up in your blood and lead to high blood sugar. When the amount of sugar in your blood stays too high for too much of the time, you may have diabetes. Diabetes is a disease that can cause serious health problems. The good news is that lifestyle changes may help you get your blood sugar back to normal and avoid or delay diabetes. What causes high blood sugar? Sugar (glucose) can build up in your blood if you:  · Are overweight. · Have a family history of diabetes. · Take certain medicines, such as steroids. What are the symptoms? Having high blood sugar may not cause any symptoms at all. Or it may make you feel very thirsty or very hungry. You may also urinate more often than usual, have blurry vision, or lose weight without trying. How is high blood sugar treated? You can take steps to lower your blood sugar level if you understand what makes it get higher. Your doctor may want you to learn how to test your blood sugar level at home. Then you can see how illness, stress, or different kinds of food or medicine raise or lower your blood sugar level. Other tests may be needed to see if you have diabetes. How can you prevent high blood sugar? · Watch your weight. If you're overweight, losing just a small amount of weight may help. Reducing fat around your waist is most important. · Limit the amount of calories, sweets, and unhealthy fat you eat. Ask your doctor if a dietitian can help you. A registered dietitian can help you create meal plans that fit your lifestyle. · Get at least 30 minutes of exercise on most days of the week. Exercise helps control your blood sugar. It also helps you maintain a healthy weight. Walking is a good choice.  You also may want to do other activities, such as running, swimming, cycling, or playing tennis or team sports. · If your doctor prescribed medicines, take them exactly as prescribed. Call your doctor if you think you are having a problem with your medicine. You will get more details on the specific medicines your doctor prescribes. Follow-up care is a key part of your treatment and safety. Be sure to make and go to all appointments, and call your doctor if you are having problems. It's also a good idea to know your test results and keep a list of the medicines you take. Where can you learn more? Go to http://miryamSpensa Technologiespolo.info/. Enter O108 in the search box to learn more about \"Learning About High Blood Sugar. \"  Current as of: December 7, 2017  Content Version: 11.7  © [id number] [employer]. Care instructions adapted under license by [employer] (which disclaims liability or warranty for this information). If you have questions about a medical condition or this instruction, always ask your healthcare professional. [place] [address] any warranty or liability for your use of this information. Abscessed Tooth: Care Instructions  Your Care Instructions    An abscessed tooth is a tooth that has a pocket of pus in the tissues around it. Pus forms when the body tries to fight an infection caused by bacteria. If the pus cannot drain, it forms an abscess. An abscessed tooth can cause red, swollen gums and throbbing pain, especially when you chew. You may have a bad taste in your mouth and a fever, and your jaw may swell. Damage to the tooth, untreated tooth decay, or gum disease can cause an abscessed tooth. An abscessed tooth needs to be treated by a dental professional right away. If it is not treated, the infection could spread to other parts of your body. Your dentist will give you antibiotics to stop the infection.  He or she may make a hole in the tooth or cut open (vinay) the abscess inside your mouth so that the infection can drain, which should relieve your pain. You may need to have a root canal treatment, which tries to save your tooth by taking out the infected pulp and replacing it with a healing medicine and/or a filling. If these treatments do not work, your tooth may have to be removed. Follow-up care is a key part of your treatment and safety. Be sure to make and go to all appointments, and call your doctor if you are having problems. It's also a good idea to know your test results and keep a list of the medicines you take. How can you care for yourself at home? · Reduce pain and swelling in your face and jaw by putting ice or a cold pack on the outside of your cheek for 10 to 20 minutes at a time. Put a thin cloth between the ice and your skin. · Take pain medicines exactly as directed. ¨ If the doctor gave you a prescription medicine for pain, take it as prescribed. ¨ If you are not taking a prescription pain medicine, ask your doctor if you can take an over-the-counter medicine. · Take your antibiotics as directed. Do not stop taking them just because you feel better. You need to take the full course of antibiotics. To prevent tooth abscess  · Brush and floss every day, and have regular dental checkups. · Eat a healthy diet, and avoid sugary foods and drinks. · Do not smoke, use e-cigarettes with nicotine, or use spit tobacco. Tobacco and nicotine slow your ability to heal. Tobacco also increases your risk for gum disease and cancer of the mouth and throat. If you need help quitting, talk to your doctor about stop-smoking programs and medicines. These can increase your chances of quitting for good. When should you call for help? Call 911 anytime you think you may need emergency care.  For example, call if:    · You have trouble breathing.    Call your doctor now or seek immediate medical care if:    · You have new or worse symptoms of infection, such as:  ¨ Increased pain, swelling, warmth, or redness. ¨ Red streaks leading from the area. ¨ Pus draining from the area. ¨ A fever.    Watch closely for changes in your health, and be sure to contact your doctor if:    · You do not get better as expected. Where can you learn more? Go to http://miryam-polo.info/. Enter Z160 in the search box to learn more about \"Abscessed Tooth: Care Instructions. \"  Current as of: May 12, 2017  Content Version: 11.7  © 9440-9980 [employer]. Care instructions adapted under license by [employer] (which disclaims liability or warranty for this information). If you have questions about a medical condition or this instruction, always ask your healthcare professional. [address] any warranty or liability for your use of this information.

## 2018-09-15 NOTE — ED NOTES
Emergency Department Nursing Plan of Care The Nursing Plan of Care is developed from the Nursing assessment and Emergency Department Attending provider initial evaluation. The plan of care may be reviewed in the ED Provider note. The Plan of Care was developed with the following considerations:  
Patient / Family readiness to learn indicated by:verbalized understanding Persons(s) to be included in education: patient Barriers to Learning/Limitations:No 
 
Signed Theodor Speed 9/15/2018   5:37 PM 
 
See nursing assessment Patient is alert and oriented x 4 and in no acute distress at this time. Respirations are at a regular rate, depth, and pattern. Patient updated on plan of care and has no questions or concerns at this time.

## 2018-09-15 NOTE — ED NOTES
Patient educated on discharge instructions and prescription(s) by Dr. Jessica Hernández. Patient ambulated out of the ED room. No acute distress noted. Emergency Department Nursing Plan of Care The Nursing Plan of Care is developed from the Nursing assessment and Emergency Department Attending provider initial evaluation. The plan of care may be reviewed in the ED Provider note. The Plan of Care was developed with the following considerations:  
Patient / Family readiness to learn indicated by:verbalized understanding Persons(s) to be included in education: patient Barriers to Learning/Limitations:No 
 
Signed Sheyla Dorman RN   
9/15/2018   7:56 PM

## 2018-09-16 LAB
ANION GAP SERPL CALC-SCNC: 4 MMOL/L (ref 5–15)
BUN SERPL-MCNC: 13 MG/DL (ref 6–20)
BUN/CREAT SERPL: 14 (ref 12–20)
CALCIUM SERPL-MCNC: 7.6 MG/DL (ref 8.5–10.1)
CHLORIDE SERPL-SCNC: 98 MMOL/L (ref 97–108)
CO2 SERPL-SCNC: 26 MMOL/L (ref 21–32)
CREAT SERPL-MCNC: 0.94 MG/DL (ref 0.55–1.02)
GLUCOSE SERPL-MCNC: 599 MG/DL (ref 65–100)
POTASSIUM SERPL-SCNC: 4.5 MMOL/L (ref 3.5–5.1)
SODIUM SERPL-SCNC: 128 MMOL/L (ref 136–145)

## 2018-10-20 ENCOUNTER — HOSPITAL ENCOUNTER (OUTPATIENT)
Age: 43
Setting detail: OBSERVATION
Discharge: LEFT BEFORE DISCHARGE | DRG: 638 | End: 2018-10-21
Attending: INTERNAL MEDICINE | Admitting: HOSPITALIST
Payer: MEDICARE

## 2018-10-20 DIAGNOSIS — N76.0 BV (BACTERIAL VAGINOSIS): ICD-10-CM

## 2018-10-20 DIAGNOSIS — R73.9 HYPERGLYCEMIA: Primary | ICD-10-CM

## 2018-10-20 DIAGNOSIS — B96.89 BV (BACTERIAL VAGINOSIS): ICD-10-CM

## 2018-10-20 DIAGNOSIS — F19.10 POLYSUBSTANCE ABUSE (HCC): ICD-10-CM

## 2018-10-20 DIAGNOSIS — F25.9 SCHIZOAFFECTIVE DISORDER, UNSPECIFIED TYPE (HCC): ICD-10-CM

## 2018-10-20 PROBLEM — E11.10 DKA (DIABETIC KETOACIDOSES): Status: ACTIVE | Noted: 2018-10-20

## 2018-10-20 LAB
ALBUMIN SERPL-MCNC: 3.8 G/DL (ref 3.5–5)
ALBUMIN/GLOB SERPL: 0.8 {RATIO} (ref 1.1–2.2)
ALP SERPL-CCNC: 111 U/L (ref 45–117)
ALT SERPL-CCNC: 30 U/L (ref 12–78)
AMPHET UR QL SCN: NEGATIVE
ANION GAP SERPL CALC-SCNC: 16 MMOL/L (ref 5–15)
APPEARANCE UR: CLEAR
AST SERPL-CCNC: 9 U/L (ref 15–37)
BACTERIA URNS QL MICRO: NEGATIVE /HPF
BARBITURATES UR QL SCN: NEGATIVE
BASOPHILS # BLD: 0 K/UL (ref 0–0.1)
BASOPHILS NFR BLD: 0 % (ref 0–1)
BENZODIAZ UR QL: NEGATIVE
BILIRUB SERPL-MCNC: 0.6 MG/DL (ref 0.2–1)
BILIRUB UR QL: NEGATIVE
BUN SERPL-MCNC: 10 MG/DL (ref 6–20)
BUN/CREAT SERPL: 7 (ref 12–20)
CALCIUM SERPL-MCNC: 9 MG/DL (ref 8.5–10.1)
CANNABINOIDS UR QL SCN: NEGATIVE
CHLORIDE SERPL-SCNC: 88 MMOL/L (ref 97–108)
CLUE CELLS VAG QL WET PREP: NORMAL
CO2 SERPL-SCNC: 24 MMOL/L (ref 21–32)
COCAINE UR QL SCN: POSITIVE
COLOR UR: ABNORMAL
CREAT SERPL-MCNC: 1.39 MG/DL (ref 0.55–1.02)
DIFFERENTIAL METHOD BLD: ABNORMAL
DRUG SCRN COMMENT,DRGCM: ABNORMAL
EOSINOPHIL # BLD: 0.1 K/UL (ref 0–0.4)
EOSINOPHIL NFR BLD: 1 % (ref 0–7)
EPITH CASTS URNS QL MICRO: ABNORMAL /LPF
ERYTHROCYTE [DISTWIDTH] IN BLOOD BY AUTOMATED COUNT: 15.5 % (ref 11.5–14.5)
ETHANOL SERPL-MCNC: <10 MG/DL
GLOBULIN SER CALC-MCNC: 4.6 G/DL (ref 2–4)
GLUCOSE BLD STRIP.AUTO-MCNC: 506 MG/DL (ref 65–100)
GLUCOSE BLD STRIP.AUTO-MCNC: 512 MG/DL (ref 65–100)
GLUCOSE BLD STRIP.AUTO-MCNC: 515 MG/DL (ref 65–100)
GLUCOSE BLD STRIP.AUTO-MCNC: 529 MG/DL (ref 65–100)
GLUCOSE BLD STRIP.AUTO-MCNC: 539 MG/DL (ref 65–100)
GLUCOSE BLD STRIP.AUTO-MCNC: >600 MG/DL (ref 65–100)
GLUCOSE SERPL-MCNC: 615 MG/DL (ref 65–100)
GLUCOSE UR STRIP.AUTO-MCNC: >1000 MG/DL
HCG UR QL: NEGATIVE
HCT VFR BLD AUTO: 42.1 % (ref 35–47)
HGB BLD-MCNC: 15.1 G/DL (ref 11.5–16)
HGB UR QL STRIP: ABNORMAL
IMM GRANULOCYTES # BLD: 0 K/UL (ref 0–0.04)
IMM GRANULOCYTES NFR BLD AUTO: 0 % (ref 0–0.5)
KETONES UR QL STRIP.AUTO: 40 MG/DL
KOH PREP SPEC: NORMAL
LEUKOCYTE ESTERASE UR QL STRIP.AUTO: NEGATIVE
LYMPHOCYTES # BLD: 2.1 K/UL (ref 0.8–3.5)
LYMPHOCYTES NFR BLD: 20 % (ref 12–49)
MAGNESIUM SERPL-MCNC: 1.8 MG/DL (ref 1.6–2.4)
MCH RBC QN AUTO: 27.3 PG (ref 26–34)
MCHC RBC AUTO-ENTMCNC: 35.9 G/DL (ref 30–36.5)
MCV RBC AUTO: 76 FL (ref 80–99)
METHADONE UR QL: NEGATIVE
MONOCYTES # BLD: 1.3 K/UL (ref 0–1)
MONOCYTES NFR BLD: 12 % (ref 5–13)
NEUTS SEG # BLD: 7.2 K/UL (ref 1.8–8)
NEUTS SEG NFR BLD: 67 % (ref 32–75)
NITRITE UR QL STRIP.AUTO: NEGATIVE
NRBC # BLD: 0 K/UL (ref 0–0.01)
NRBC BLD-RTO: 0 PER 100 WBC
OPIATES UR QL: NEGATIVE
PCP UR QL: NEGATIVE
PH UR STRIP: 6 [PH] (ref 5–8)
PHOSPHATE SERPL-MCNC: 2.8 MG/DL (ref 2.6–4.7)
PLATELET # BLD AUTO: 193 K/UL (ref 150–400)
POTASSIUM SERPL-SCNC: 3.7 MMOL/L (ref 3.5–5.1)
PROT SERPL-MCNC: 8.4 G/DL (ref 6.4–8.2)
PROT UR STRIP-MCNC: NEGATIVE MG/DL
RBC # BLD AUTO: 5.54 M/UL (ref 3.8–5.2)
RBC #/AREA URNS HPF: ABNORMAL /HPF (ref 0–5)
SERVICE CMNT-IMP: ABNORMAL
SERVICE CMNT-IMP: NORMAL
SODIUM SERPL-SCNC: 128 MMOL/L (ref 136–145)
SP GR UR REFRACTOMETRY: 1 (ref 1–1.03)
T VAGINALIS VAG QL WET PREP: NORMAL
UA: UC IF INDICATED,UAUC: ABNORMAL
UROBILINOGEN UR QL STRIP.AUTO: 0.2 EU/DL (ref 0.2–1)
WBC # BLD AUTO: 10.8 K/UL (ref 3.6–11)
WBC URNS QL MICRO: ABNORMAL /HPF (ref 0–4)

## 2018-10-20 PROCEDURE — 94761 N-INVAS EAR/PLS OXIMETRY MLT: CPT

## 2018-10-20 PROCEDURE — 74011250636 HC RX REV CODE- 250/636: Performed by: NURSE PRACTITIONER

## 2018-10-20 PROCEDURE — 81001 URINALYSIS AUTO W/SCOPE: CPT | Performed by: NURSE PRACTITIONER

## 2018-10-20 PROCEDURE — 96361 HYDRATE IV INFUSION ADD-ON: CPT

## 2018-10-20 PROCEDURE — 36415 COLL VENOUS BLD VENIPUNCTURE: CPT | Performed by: NURSE PRACTITIONER

## 2018-10-20 PROCEDURE — 82962 GLUCOSE BLOOD TEST: CPT

## 2018-10-20 PROCEDURE — 96375 TX/PRO/DX INJ NEW DRUG ADDON: CPT

## 2018-10-20 PROCEDURE — 74011636637 HC RX REV CODE- 636/637: Performed by: HOSPITALIST

## 2018-10-20 PROCEDURE — 87210 SMEAR WET MOUNT SALINE/INK: CPT | Performed by: NURSE PRACTITIONER

## 2018-10-20 PROCEDURE — 96372 THER/PROPH/DIAG INJ SC/IM: CPT

## 2018-10-20 PROCEDURE — 85025 COMPLETE CBC W/AUTO DIFF WBC: CPT | Performed by: NURSE PRACTITIONER

## 2018-10-20 PROCEDURE — 99285 EMERGENCY DEPT VISIT HI MDM: CPT

## 2018-10-20 PROCEDURE — 80053 COMPREHEN METABOLIC PANEL: CPT | Performed by: NURSE PRACTITIONER

## 2018-10-20 PROCEDURE — 81025 URINE PREGNANCY TEST: CPT

## 2018-10-20 PROCEDURE — 80307 DRUG TEST PRSMV CHEM ANLYZR: CPT | Performed by: NURSE PRACTITIONER

## 2018-10-20 PROCEDURE — 74011250637 HC RX REV CODE- 250/637: Performed by: NURSE PRACTITIONER

## 2018-10-20 PROCEDURE — 87491 CHLMYD TRACH DNA AMP PROBE: CPT | Performed by: NURSE PRACTITIONER

## 2018-10-20 PROCEDURE — 74011636637 HC RX REV CODE- 636/637: Performed by: NURSE PRACTITIONER

## 2018-10-20 PROCEDURE — 99218 HC RM OBSERVATION: CPT

## 2018-10-20 PROCEDURE — 96374 THER/PROPH/DIAG INJ IV PUSH: CPT

## 2018-10-20 PROCEDURE — 65660000001 HC RM ICU INTERMED STEPDOWN

## 2018-10-20 PROCEDURE — 83735 ASSAY OF MAGNESIUM: CPT | Performed by: HOSPITALIST

## 2018-10-20 PROCEDURE — 74011250636 HC RX REV CODE- 250/636: Performed by: HOSPITALIST

## 2018-10-20 PROCEDURE — 84100 ASSAY OF PHOSPHORUS: CPT | Performed by: HOSPITALIST

## 2018-10-20 RX ORDER — NALOXONE HYDROCHLORIDE 1 MG/ML
0.4 INJECTION INTRAMUSCULAR; INTRAVENOUS; SUBCUTANEOUS AS NEEDED
Status: DISCONTINUED | OUTPATIENT
Start: 2018-10-20 | End: 2018-10-21 | Stop reason: HOSPADM

## 2018-10-20 RX ORDER — BISACODYL 5 MG
5 TABLET, DELAYED RELEASE (ENTERIC COATED) ORAL DAILY PRN
Status: DISCONTINUED | OUTPATIENT
Start: 2018-10-20 | End: 2018-10-21 | Stop reason: HOSPADM

## 2018-10-20 RX ORDER — CLONIDINE HYDROCHLORIDE 0.1 MG/1
0.2 TABLET ORAL
Status: COMPLETED | OUTPATIENT
Start: 2018-10-20 | End: 2018-10-20

## 2018-10-20 RX ORDER — METRONIDAZOLE 250 MG/1
500 TABLET ORAL EVERY 12 HOURS
Status: DISCONTINUED | OUTPATIENT
Start: 2018-10-21 | End: 2018-10-21 | Stop reason: HOSPADM

## 2018-10-20 RX ORDER — DEXTROSE 50 % IN WATER (D50W) INTRAVENOUS SYRINGE
12.5-25 AS NEEDED
Status: DISCONTINUED | OUTPATIENT
Start: 2018-10-20 | End: 2018-10-21 | Stop reason: HOSPADM

## 2018-10-20 RX ORDER — METRONIDAZOLE 500 MG/1
2000 TABLET ORAL
Status: DISCONTINUED | OUTPATIENT
Start: 2018-10-20 | End: 2018-10-20

## 2018-10-20 RX ORDER — FAMOTIDINE 10 MG/ML
20 INJECTION INTRAVENOUS
Status: COMPLETED | OUTPATIENT
Start: 2018-10-20 | End: 2018-10-20

## 2018-10-20 RX ORDER — SODIUM CHLORIDE 0.9 % (FLUSH) 0.9 %
5-10 SYRINGE (ML) INJECTION EVERY 8 HOURS
Status: DISCONTINUED | OUTPATIENT
Start: 2018-10-20 | End: 2018-10-21 | Stop reason: HOSPADM

## 2018-10-20 RX ORDER — INSULIN LISPRO 100 [IU]/ML
INJECTION, SOLUTION INTRAVENOUS; SUBCUTANEOUS
Status: DISCONTINUED | OUTPATIENT
Start: 2018-10-20 | End: 2018-10-21 | Stop reason: HOSPADM

## 2018-10-20 RX ORDER — METRONIDAZOLE 500 MG/1
500 TABLET ORAL
Status: COMPLETED | OUTPATIENT
Start: 2018-10-20 | End: 2018-10-20

## 2018-10-20 RX ORDER — MAGNESIUM SULFATE 100 %
4 CRYSTALS MISCELLANEOUS AS NEEDED
Status: DISCONTINUED | OUTPATIENT
Start: 2018-10-20 | End: 2018-10-21 | Stop reason: HOSPADM

## 2018-10-20 RX ORDER — METRONIDAZOLE 500 MG/1
500 TABLET ORAL EVERY 12 HOURS
Status: DISCONTINUED | OUTPATIENT
Start: 2018-10-20 | End: 2018-10-20

## 2018-10-20 RX ORDER — INSULIN GLARGINE 100 [IU]/ML
30 INJECTION, SOLUTION SUBCUTANEOUS
Status: DISCONTINUED | OUTPATIENT
Start: 2018-10-20 | End: 2018-10-21 | Stop reason: HOSPADM

## 2018-10-20 RX ORDER — ACETAMINOPHEN 325 MG/1
650 TABLET ORAL
Status: DISCONTINUED | OUTPATIENT
Start: 2018-10-20 | End: 2018-10-21 | Stop reason: HOSPADM

## 2018-10-20 RX ORDER — SODIUM CHLORIDE 0.9 % (FLUSH) 0.9 %
5-10 SYRINGE (ML) INJECTION AS NEEDED
Status: DISCONTINUED | OUTPATIENT
Start: 2018-10-20 | End: 2018-10-21 | Stop reason: HOSPADM

## 2018-10-20 RX ORDER — HEPARIN SODIUM 5000 [USP'U]/ML
5000 INJECTION, SOLUTION INTRAVENOUS; SUBCUTANEOUS EVERY 8 HOURS
Status: DISCONTINUED | OUTPATIENT
Start: 2018-10-20 | End: 2018-10-21 | Stop reason: HOSPADM

## 2018-10-20 RX ORDER — AZITHROMYCIN 500 MG/1
1000 TABLET, FILM COATED ORAL
Status: COMPLETED | OUTPATIENT
Start: 2018-10-20 | End: 2018-10-20

## 2018-10-20 RX ORDER — SODIUM CHLORIDE 9 MG/ML
100 INJECTION, SOLUTION INTRAVENOUS CONTINUOUS
Status: DISCONTINUED | OUTPATIENT
Start: 2018-10-20 | End: 2018-10-21 | Stop reason: HOSPADM

## 2018-10-20 RX ADMIN — SODIUM CHLORIDE 1000 ML: 900 INJECTION, SOLUTION INTRAVENOUS at 18:01

## 2018-10-20 RX ADMIN — Medication 10 ML: at 23:25

## 2018-10-20 RX ADMIN — FAMOTIDINE 20 MG: 10 INJECTION INTRAVENOUS at 19:24

## 2018-10-20 RX ADMIN — HEPARIN SODIUM 5000 UNITS: 5000 INJECTION, SOLUTION INTRAVENOUS; SUBCUTANEOUS at 23:25

## 2018-10-20 RX ADMIN — METRONIDAZOLE 500 MG: 500 TABLET ORAL at 19:25

## 2018-10-20 RX ADMIN — HUMAN INSULIN 10 UNITS: 100 INJECTION, SOLUTION SUBCUTANEOUS at 18:11

## 2018-10-20 RX ADMIN — SODIUM CHLORIDE 150 ML/HR: 900 INJECTION, SOLUTION INTRAVENOUS at 23:25

## 2018-10-20 RX ADMIN — LIDOCAINE HYDROCHLORIDE 250 MG: 10 INJECTION, SOLUTION EPIDURAL; INFILTRATION; INTRACAUDAL; PERINEURAL at 18:04

## 2018-10-20 RX ADMIN — INSULIN GLARGINE 30 UNITS: 100 INJECTION, SOLUTION SUBCUTANEOUS at 23:16

## 2018-10-20 RX ADMIN — SODIUM CHLORIDE 1000 ML: 900 INJECTION, SOLUTION INTRAVENOUS at 20:04

## 2018-10-20 RX ADMIN — INSULIN LISPRO 10 UNITS: 100 INJECTION, SOLUTION INTRAVENOUS; SUBCUTANEOUS at 23:23

## 2018-10-20 RX ADMIN — CLONIDINE HYDROCHLORIDE 0.2 MG: 0.1 TABLET ORAL at 18:03

## 2018-10-20 RX ADMIN — AZITHROMYCIN 1000 MG: 500 TABLET, FILM COATED ORAL at 18:04

## 2018-10-20 NOTE — BSMART NOTE
Comprehensive Assessment Form Part 1 Section I - Disposition Axis I - Schizoaffective Disorder, Cocaine Dependence Axis II - Deferred Axis III - Diabetes, Hypertension, Pancreatitis, GERD, Asthma, Sick cell trait, Hyperlipidemia Axis IV - Homelessness, Relationship stressors [place] - [address] The Medical Doctor to Psychiatrist conference was not completed. The Medical Doctor is in agreement with Psychiatrist disposition because of (reason) patient is going to be a medical admission. The plan is medically admit. A psychiatric consult can be ordered upon admission. The on-call Psychiatrist consulted was Dr. Laila Owens. The admitting Psychiatrist will be Dr. Laila Owens. The admitting Diagnosis is NA. The Payor source is [employer]. Section II - Integrated Summary Summary:  Patient is a 44year old female seen face to face in the ER. She was came to the ER complaining of vaginal pain and mental health issues. She initially denied suicidal ideation but then reported she is suicidal.  She denied having a plan. She reported she wants refills of her psychotropic medication as she \"ran out. \"  Patient reported 7 prior suicide attempts via hanging. She reported she thinks people are watching her and feels very paranoid. She reported she has been homeless for 3 weeks and when asked why she left her last housing she stated \"I just left there. \"  She reported she has lost about 10 pounds in the past 3 weeks and she has not been sleeping. She has been drinking and using crack cocaine and has not slept for several days. She denied homicidal ideation. She is requesting admission. [address] was contacted who reported she saw her psychiatrist Dr. Kaity Gonzalez on 9/24/18 and her next psychiatric appointment is Monday 10/22/18 at 2:40pm.  She was not able to answer how she didn't have her medication when she went to her appointment as scheduled last month.   Patient became very somnolent during the evaluation and was unable to explain how she ran out of medication when saw her psychiatrist last month. Patient is not medically cleared and will be medically admitted. The patient has demonstrated mental capacity to provide informed consent. The information is given by the patient, past medical records and [facility]. The Chief Complaint is mental health problem and vaginal pain. The Precipitant Factors are homelessness, chronic substance abuse. Previous Hospitalizations: yes The patient has been in restraints in the past and has not escaped from them. Current Psychiatrist is Dr. Kamla Lauren and  is Al Adams, both with [facility]. Lethality Assessment: 
 
The potential for suicide is noted by the following: previous history of attempts, ideation, and current substance abuse. The potential for homicide is not noted. The patient has not been a perpetrator of sexual or physical abuse. There are not pending charges. The patient is felt to be at risk for self harm or harm to others. The attending nurse was advised the patient needs supervision. Section III - Psychosocial 
The patient's overall mood and attitude is depressed. Feelings of helplessness and hopelessness are not observed. Generalized anxiety is not observed. Panic is not observed. Phobias are not observed. Obsessive compulsive tendencies are not observed. Section IV - Mental Status Exam 
The patient's appearance is unkempt. The patient's behavior is lethargic. The patient is oriented to time, place, person and situation. The patient's speech is slowed. The patient's mood is depressed and is withdrawn. The range of affect is flat. The patient's thought content demonstrates paranoia. The thought process shows no evidence of impairment. The patient's perception shows no evidence of impairment. The patient's memory shows no evidence of impairment. The patient's appetite is decreased and shows signs of weight loss.   The patient's sleep has evidence of insomnia. The patient's insight is blaming. The patient's judgement is psychologically impaired. Section V - Substance Abuse The patient is using substances. The patient is using alcohol for greater than 10 years with last use on yesterday and cocaine by smoking for greater than 10 years with last use on yesterday. The patient has experienced the following withdrawal symptoms: N/A. Section VI - Living Arrangements The patient is single. The patient is homeless. The patient has 7 children in total, 4 adult and 3 minor but who were adopted out. The patient does not plan to return home upon discharge. The patient does not have legal issues pending. The patient's source of income comes from social security. Episcopalian and cultural practices have not been voiced at this time. The patient's greatest support comes from [facility] and this person will not be involved with the treatment. The patient has been in an event described as horrible or outside the realm of ordinary life experience either currently or in the past. 
The patient has not been a victim of sexual/physical abuse. Section VII - Other Areas of Clinical Concern The highest grade achieved is unknown with the overall quality of school experience being described as unknown. The patient is currently disabled and speaks [place] as a primary language. The patient has no communication impairments affecting communication. The patient's preference for learning can be described as: can read and write adequately. The patient's hearing is normal.  The patient's vision is normal. 
 
 
Ye Adams

## 2018-10-20 NOTE — ED PROVIDER NOTES
EMERGENCY DEPARTMENT HISTORY AND PHYSICAL EXAM 
 
Date: 10/20/2018 Patient Name: Gabi Hamm History of Presenting Illness Chief Complaint Patient presents with  Vaginal Discharge  Sexually Transmitted Disease  Mental Health Problem  Medication Refill History Provided By: Patient Chief Complaint: vaginal discharge Duration: one Weeks Timing:  Acute Location: vagina Quality: white malodorous Severity: Mild Modifying Factors: none Associated Symptoms: none HPI: Gabi Hamm is a 44 y.o. female with a PMH of bipolar schizophrenia who presents with vaginal disdcharge for one week. Requests std check. Also states she is out of psych medications reports she is homeless and is not taking her medications. Admits to suicidal thoughts and is crying. \" I just want to harm myself\" PCP: Dhruv Pastor NP Current Facility-Administered Medications Medication Dose Route Frequency Provider Last Rate Last Dose  sodium chloride 0.9 % bolus infusion 1,000 mL  1,000 mL IntraVENous CONTINUOUS Gaston Constantino NP Current Outpatient Medications Medication Sig Dispense Refill  benztropine (COGENTIN) 1 mg tablet Take 1 Tab by mouth two (2) times a day. Indications: drug-induced extrapyramidal reaction 28 Tab 0  Blood-Glucose Meter monitoring kit Monitor BG 4 times daily. Dx uncontrolled type 2 DM E11.65  Indications: Diabetes Mellitus 1 Kit 0  
 divalproex DR (DEPAKOTE) 500 mg tablet Take 1 Tab by mouth two (2) times a day. Indications: Schizoaffective disorder 28 Tab 0  
 haloperidol (HALDOL) 5 mg tablet Take 1 Tab by mouth every twelve (12) hours. Indications: Schizoaffective disorder 28 Tab 0  
 lisinopril (PRINIVIL, ZESTRIL) 10 mg tablet Take 1 Tab by mouth daily. Indications: hypertension 14 Tab 0  
 albuterol (PROVENTIL HFA, VENTOLIN HFA, PROAIR HFA) 90 mcg/actuation inhaler Take 1 Puff by inhalation every four (4) hours as needed for Wheezing.  insulin glargine (LANTUS) 100 unit/mL injection 30 Units by SubCUTAneous route nightly. Indications: type 1 diabetes mellitus (Patient taking differently: 30 Units by SubCUTAneous route daily. Indications: type 1 diabetes mellitus) 1 Vial 5 Past History Past Medical History: 
Past Medical History:  
Diagnosis Date  Alcohol abuse, in remission   
 quit 17 days ago  Asthma   
 does not use inhalers  Borderline personality disorder ([address]  Diabetes ([facility] [facility]  GERD (gastroesophageal reflux disease)  Hypertension  Other ill-defined conditions(799.89)   
 kidney stones ,passed one  Other ill-defined conditions(799.89) sickle cell trait  Other ill-defined conditions(799.89)   
 increased cholesterol  Pancreatitis  Psychiatric disorder   
 schizophrenia, bipolar, depression, anxiety Past Surgical History: 
Past Surgical History:  
Procedure Laterality Date  ABDOMEN SURGERY PROC UNLISTED  3/12/14 CHOLECYSTECTOMY LAPAROSCOPIC    
 HX GASTRIC BYPASS  HX GYN  11/8/2012  
 c section x2  HX OTHER SURGICAL  3/13/14 ENDOSCOPIC RETROGRADE CHOLANGIOPANCREATOGRAPHY  HX OTHER SURGICAL  8/4/14  
 endoscopic stent placed to bile duct  HX TUBAL LIGATION  2012 Family History: 
Family History Problem Relation Age of Onset  Heart Disease Mother  Diabetes Mother  Hypertension Mother  Hypertension Maternal Grandmother Social History: 
Social History Tobacco Use  Smoking status: Current Every Day Smoker Packs/day: 0.50 Years: 14.00 Pack years: 7.00 Types: Cigarettes  Smokeless tobacco: Never Used  Tobacco comment: cigarettes Substance Use Topics  Alcohol use: Yes Alcohol/week: 0.6 oz Types: 1 Cans of beer per week Comment: occasionally, last had \"i had one beer\" today  Drug use: Yes Types: Cocaine Comment: every day Allergies: Allergies Allergen Reactions  Dilaudid [Hydromorphone (Bulk)] Itching  Ibuprofen Not Reported This Time Review of Systems Review of Systems Constitutional: Positive for fatigue. Negative for fever. Respiratory: Negative for shortness of breath and wheezing. Cardiovascular: Negative for chest pain and palpitations. Gastrointestinal: Negative for abdominal pain. Genitourinary: Positive for vaginal discharge. Negative for dysuria. Musculoskeletal: Negative for arthralgias, myalgias, neck pain and neck stiffness. Skin: Negative for pallor and rash. Neurological: Negative for dizziness, tremors, weakness and headaches. Hematological: Negative for adenopathy. Psychiatric/Behavioral: Positive for suicidal ideas. All other systems reviewed and are negative. Physical Exam  
 
Vitals:  
 10/20/18 1809 10/20/18 1814 10/20/18 1822 10/20/18 1900 BP: (!) 175/108 (!) 175/108  148/65 Pulse: 85 88 Resp:      
Temp:      
SpO2: 100% 100% 100% 97% Weight:      
Height:      
 
Physical Exam  
Constitutional: She is oriented to person, place, and time. She appears well-developed and well-nourished. No distress. HENT:  
Head: Normocephalic and atraumatic. Right Ear: External ear normal.  
Left Ear: External ear normal.  
Nose: Nose normal.  
Mouth/Throat: Oropharynx is clear and moist.  
Eyes: Conjunctivae are normal.  
Neck: Normal range of motion. Neck supple. Cardiovascular: Normal rate, regular rhythm and normal heart sounds. Pulmonary/Chest: Effort normal and breath sounds normal. No respiratory distress. She has no wheezes. Abdominal: Soft. Bowel sounds are normal. There is no tenderness. Musculoskeletal: Normal range of motion. Lymphadenopathy:  
  She has no cervical adenopathy. Neurological: She is alert and oriented to person, place, and time. No cranial nerve deficit. Coordination normal.  
Skin: Skin is warm and dry. No rash noted. Psychiatric: She is withdrawn.  She exhibits a depressed mood. She expresses suicidal ideation. She expresses no homicidal ideation. She expresses no suicidal plans and no homicidal plans. Nursing note and vitals reviewed. Diagnostic Study Results Labs - Recent Results (from the past 12 hour(s)) CBC WITH AUTOMATED DIFF Collection Time: 10/20/18  4:49 PM  
Result Value Ref Range WBC 10.8 3.6 - 11.0 K/uL  
 RBC 5.54 (H) 3.80 - 5.20 M/uL  
 HGB 15.1 11.5 - 16.0 g/dL HCT 42.1 35.0 - 47.0 % MCV 76.0 (L) 80.0 - 99.0 FL  
 MCH 27.3 26.0 - 34.0 PG  
 MCHC 35.9 30.0 - 36.5 g/dL  
 RDW 15.5 (H) 11.5 - 14.5 % PLATELET 581 143 - 083 K/uL NRBC 0.0 0  WBC ABSOLUTE NRBC 0.00 0.00 - 0.01 K/uL NEUTROPHILS 67 32 - 75 % LYMPHOCYTES 20 12 - 49 % MONOCYTES 12 5 - 13 % EOSINOPHILS 1 0 - 7 % BASOPHILS 0 0 - 1 % IMMATURE GRANULOCYTES 0 0.0 - 0.5 % ABS. NEUTROPHILS 7.2 1.8 - 8.0 K/UL  
 ABS. LYMPHOCYTES 2.1 0.8 - 3.5 K/UL  
 ABS. MONOCYTES 1.3 (H) 0.0 - 1.0 K/UL  
 ABS. EOSINOPHILS 0.1 0.0 - 0.4 K/UL  
 ABS. BASOPHILS 0.0 0.0 - 0.1 K/UL  
 ABS. IMM. GRANS. 0.0 0.00 - 0.04 K/UL  
 DF AUTOMATED METABOLIC PANEL, COMPREHENSIVE Collection Time: 10/20/18  4:49 PM  
Result Value Ref Range Sodium 128 (L) 136 - 145 mmol/L Potassium 3.7 3.5 - 5.1 mmol/L Chloride 88 (L) 97 - 108 mmol/L  
 CO2 24 21 - 32 mmol/L Anion gap 16 (H) 5 - 15 mmol/L Glucose 615 (HH) 65 - 100 mg/dL BUN 10 6 - 20 MG/DL Creatinine 1.39 (H) 0.55 - 1.02 MG/DL  
 BUN/Creatinine ratio 7 (L) 12 - 20 GFR est AA 50 (L) >60 ml/min/1.73m2 GFR est non-AA 42 (L) >60 ml/min/1.73m2 Calcium 9.0 8.5 - 10.1 MG/DL Bilirubin, total 0.6 0.2 - 1.0 MG/DL  
 ALT (SGPT) 30 12 - 78 U/L  
 AST (SGOT) 9 (L) 15 - 37 U/L Alk. phosphatase 111 45 - 117 U/L Protein, total 8.4 (H) 6.4 - 8.2 g/dL Albumin 3.8 3.5 - 5.0 g/dL Globulin 4.6 (H) 2.0 - 4.0 g/dL A-G Ratio 0.8 (L) 1.1 - 2.2 DRUG SCREEN, URINE  Collection Time: 10/20/18  4:49 PM Result Value Ref Range AMPHETAMINES NEGATIVE  NEG    
 BARBITURATES NEGATIVE  NEG BENZODIAZEPINES NEGATIVE  NEG    
 COCAINE POSITIVE (A) NEG METHADONE NEGATIVE  NEG    
 OPIATES NEGATIVE  NEG    
 PCP(PHENCYCLIDINE) NEGATIVE  NEG    
 THC (TH-CANNABINOL) NEGATIVE  NEG Drug screen comment (NOTE) URINALYSIS W/ REFLEX CULTURE Collection Time: 10/20/18  4:49 PM  
Result Value Ref Range Color YELLOW/STRAW Appearance CLEAR CLEAR Specific gravity 1.005 1.003 - 1.030    
 pH (UA) 6.0 5.0 - 8.0 Protein NEGATIVE  NEG mg/dL Glucose >1,000 (A) NEG mg/dL Ketone 40 (A) NEG mg/dL Bilirubin NEGATIVE  NEG Blood TRACE (A) NEG Urobilinogen 0.2 0.2 - 1.0 EU/dL Nitrites NEGATIVE  NEG Leukocyte Esterase NEGATIVE  NEG    
 WBC 0-4 0 - 4 /hpf  
 RBC 0-5 0 - 5 /hpf Epithelial cells FEW FEW /lpf Bacteria NEGATIVE  NEG /hpf  
 UA:UC IF INDICATED CULTURE NOT INDICATED BY UA RESULT CNI    
ETHYL ALCOHOL Collection Time: 10/20/18  4:49 PM  
Result Value Ref Range ALCOHOL(ETHYL),SERUM <10 <10 MG/DL  
KOH, OTHER SOURCES Collection Time: 10/20/18  4:49 PM  
Result Value Ref Range Special Requests: NO SPECIAL REQUESTS    
 KOH NO YEAST SEEN    
WET PREP Collection Time: 10/20/18  4:49 PM  
Result Value Ref Range Clue cells CLUE CELLS PRESENT Wet prep NO TRICHOMONAS SEEN    
HCG URINE, QL. - POC Collection Time: 10/20/18  4:52 PM  
Result Value Ref Range Pregnancy test,urine (POC) NEGATIVE  NEG    
GLUCOSE, POC Collection Time: 10/20/18  6:40 PM  
Result Value Ref Range Glucose (POC) >600 (HH) 65 - 100 mg/dL Performed by Do Rodgers (Tech) Radiologic Studies - No orders to display CT Results  (Last 48 hours) None CXR Results  (Last 48 hours) None Medical Decision Making I am the first provider for this patient.  
 
I reviewed the vital signs, available nursing notes, past medical history, past surgical history, family history and social history. Vital Signs-Reviewed the patient's vital signs. Records Reviewed: Nursing Notes and Old Medical Records Patient has been evaluated by Abi moreno [facility] Disposition: 
7:47 PM 
Patient is being admitted to the hospital.for DKA SI   The results of their tests and reasons for their admission have been discussed with them and/or available family. They convey agreement and understanding for the need to be admitted and for their admission diagnosis. Consultation has been made with the inpatient physician specialist for hospitalization. LABORATORY TESTS: 
Recent Results (from the past 12 hour(s)) CBC WITH AUTOMATED DIFF Collection Time: 10/20/18  4:49 PM  
Result Value Ref Range WBC 10.8 3.6 - 11.0 K/uL  
 RBC 5.54 (H) 3.80 - 5.20 M/uL  
 HGB 15.1 11.5 - 16.0 g/dL HCT 42.1 35.0 - 47.0 % MCV 76.0 (L) 80.0 - 99.0 FL  
 MCH 27.3 26.0 - 34.0 PG  
 MCHC 35.9 30.0 - 36.5 g/dL  
 RDW 15.5 (H) 11.5 - 14.5 % PLATELET 002 477 - 737 K/uL NRBC 0.0 0  WBC ABSOLUTE NRBC 0.00 0.00 - 0.01 K/uL NEUTROPHILS 67 32 - 75 % LYMPHOCYTES 20 12 - 49 % MONOCYTES 12 5 - 13 % EOSINOPHILS 1 0 - 7 % BASOPHILS 0 0 - 1 % IMMATURE GRANULOCYTES 0 0.0 - 0.5 % ABS. NEUTROPHILS 7.2 1.8 - 8.0 K/UL  
 ABS. LYMPHOCYTES 2.1 0.8 - 3.5 K/UL  
 ABS. MONOCYTES 1.3 (H) 0.0 - 1.0 K/UL  
 ABS. EOSINOPHILS 0.1 0.0 - 0.4 K/UL  
 ABS. BASOPHILS 0.0 0.0 - 0.1 K/UL  
 ABS. IMM. GRANS. 0.0 0.00 - 0.04 K/UL  
 DF AUTOMATED METABOLIC PANEL, COMPREHENSIVE Collection Time: 10/20/18  4:49 PM  
Result Value Ref Range Sodium 128 (L) 136 - 145 mmol/L Potassium 3.7 3.5 - 5.1 mmol/L Chloride 88 (L) 97 - 108 mmol/L  
 CO2 24 21 - 32 mmol/L Anion gap 16 (H) 5 - 15 mmol/L Glucose 615 (HH) 65 - 100 mg/dL BUN 10 6 - 20 MG/DL Creatinine 1.39 (H) 0.55 - 1.02 MG/DL  
 BUN/Creatinine ratio 7 (L) 12 - 20  GFR est AA 50 (L) >60 ml/min/1.73m2 GFR est non-AA 42 (L) >60 ml/min/1.73m2 Calcium 9.0 8.5 - 10.1 MG/DL Bilirubin, total 0.6 0.2 - 1.0 MG/DL  
 ALT (SGPT) 30 12 - 78 U/L  
 AST (SGOT) 9 (L) 15 - 37 U/L Alk. phosphatase 111 45 - 117 U/L Protein, total 8.4 (H) 6.4 - 8.2 g/dL Albumin 3.8 3.5 - 5.0 g/dL Globulin 4.6 (H) 2.0 - 4.0 g/dL A-G Ratio 0.8 (L) 1.1 - 2.2 DRUG SCREEN, URINE Collection Time: 10/20/18  4:49 PM  
Result Value Ref Range AMPHETAMINES NEGATIVE  NEG    
 BARBITURATES NEGATIVE  NEG BENZODIAZEPINES NEGATIVE  NEG    
 COCAINE POSITIVE (A) NEG METHADONE NEGATIVE  NEG    
 OPIATES NEGATIVE  NEG    
 PCP(PHENCYCLIDINE) NEGATIVE  NEG    
 THC (TH-CANNABINOL) NEGATIVE  NEG Drug screen comment (NOTE) URINALYSIS W/ REFLEX CULTURE Collection Time: 10/20/18  4:49 PM  
Result Value Ref Range Color YELLOW/STRAW Appearance CLEAR CLEAR Specific gravity 1.005 1.003 - 1.030    
 pH (UA) 6.0 5.0 - 8.0 Protein NEGATIVE  NEG mg/dL Glucose >1,000 (A) NEG mg/dL Ketone 40 (A) NEG mg/dL Bilirubin NEGATIVE  NEG Blood TRACE (A) NEG Urobilinogen 0.2 0.2 - 1.0 EU/dL Nitrites NEGATIVE  NEG Leukocyte Esterase NEGATIVE  NEG    
 WBC 0-4 0 - 4 /hpf  
 RBC 0-5 0 - 5 /hpf Epithelial cells FEW FEW /lpf Bacteria NEGATIVE  NEG /hpf  
 UA:UC IF INDICATED CULTURE NOT INDICATED BY UA RESULT CNI    
ETHYL ALCOHOL Collection Time: 10/20/18  4:49 PM  
Result Value Ref Range ALCOHOL(ETHYL),SERUM <10 <10 MG/DL  
KOH, OTHER SOURCES Collection Time: 10/20/18  4:49 PM  
Result Value Ref Range Special Requests: NO SPECIAL REQUESTS    
 KOH NO YEAST SEEN    
WET PREP Collection Time: 10/20/18  4:49 PM  
Result Value Ref Range Clue cells CLUE CELLS PRESENT Wet prep NO TRICHOMONAS SEEN    
HCG URINE, QL. - POC Collection Time: 10/20/18  4:52 PM  
Result Value Ref Range Pregnancy test,urine (POC) NEGATIVE  NEG    
GLUCOSE, POC  Collection Time: 10/20/18  6:40 PM  
Result Value Ref Range Glucose (POC) >600 () 65 - 100 mg/dL Performed by Qing Arriaga (Tech) IMAGING RESULTS: 
No orders to display No results found. MEDICATIONS GIVEN: 
Medications  
sodium chloride 0.9 % bolus infusion 1,000 mL (1,000 mL IntraVENous New Bag 10/20/18 1801)  
sodium chloride 0.9 % bolus infusion 1,000 mL (not administered)  
cefTRIAXone (ROCEPHIN) 250 mg in lidocaine (PF) (XYLOCAINE) 10 mg/mL (1 %) IM injection (250 mg IntraMUSCular Given 10/20/18 1804) azithromycin (ZITHROMAX) tablet 1,000 mg (1,000 mg Oral Given 10/20/18 1804) cloNIDine HCl (CATAPRES) tablet 0.2 mg (0.2 mg Oral Given 10/20/18 1803) insulin regular (NOVOLIN R, HUMULIN R) injection 10 Units (10 Units IntraVENous Given 10/20/18 1811) famotidine (PF) (PEPCID) injection 20 mg (20 mg IntraVENous Given 10/20/18 1924) metroNIDAZOLE (FLAGYL) tablet 500 mg (500 mg Oral Given 10/20/18 1925) IMPRESSION: 
1. Hyperglycemia 2. Schizoaffective disorder, unspecified type ([facility] [address] 3. BV (bacterial vaginosis) 4. [facility]ub[facility] abuse ([facility] PLAN: 
1. Admit to Dr Klever Hernandez Current Discharge Medication List  
  
 
 
Provider Notes (Medical Decision Making): DDX hyperglycemia DKA HHNK SI schizoaffective disorder STI BV UTI Procedures: 
Procedures Diagnosis Clinical Impression: 1. Hyperglycemia 2. Schizoaffective disorder, unspecified type ([address] 3. BV (bacterial vaginosis) 4. Pol[facility]ubstance abuse ([facility]

## 2018-10-20 NOTE — ED NOTES
Patient states she is here today with c/o SI with out a plan, possible STD and admits to unprotected sex, and not being on her home medications for the past month. She presents lethargic and states she is tired and hungry because she has been up doing crack cocaine for the past 3 days.

## 2018-10-20 NOTE — ED TRIAGE NOTES
Complains of vaginal discharge, requesting STD check. Per pt she is out of her Haldol, Depakote, Cogetin, and Zoloft and would like refills: denies SI/HI

## 2018-10-20 NOTE — ED NOTES
Emergency Department Nursing Plan of Care The Nursing Plan of Care is developed from the Nursing assessment and Emergency Department Attending provider initial evaluation. The plan of care may be reviewed in the ED Provider note. The Plan of Care was developed with the following considerations:  
Patient / Family readiness to learn indicated by:verbalized understanding Persons(s) to be included in education: patient Barriers to Learning/Limitations:No 
 
Signed Agus Parra RN   
10/20/2018   5:10 PM

## 2018-10-20 NOTE — ED NOTES
Verbal shift change report given to Kirsten Carbajal RN 
 (oncoming nurse) by Lisandra Garrett RN (offgoing nurse). Report included the following information SBAR.

## 2018-10-21 VITALS
OXYGEN SATURATION: 100 % | SYSTOLIC BLOOD PRESSURE: 144 MMHG | RESPIRATION RATE: 11 BRPM | WEIGHT: 138 LBS | BODY MASS INDEX: 22.18 KG/M2 | HEIGHT: 66 IN | TEMPERATURE: 97.9 F | DIASTOLIC BLOOD PRESSURE: 87 MMHG | HEART RATE: 75 BPM

## 2018-10-21 LAB
ANION GAP SERPL CALC-SCNC: 11 MMOL/L (ref 5–15)
BASOPHILS # BLD: 0 K/UL (ref 0–0.1)
BASOPHILS NFR BLD: 0 % (ref 0–1)
BUN SERPL-MCNC: 9 MG/DL (ref 6–20)
BUN/CREAT SERPL: 9 (ref 12–20)
CALCIUM SERPL-MCNC: 7.8 MG/DL (ref 8.5–10.1)
CHLORIDE SERPL-SCNC: 98 MMOL/L (ref 97–108)
CO2 SERPL-SCNC: 23 MMOL/L (ref 21–32)
CREAT SERPL-MCNC: 0.99 MG/DL (ref 0.55–1.02)
DIFFERENTIAL METHOD BLD: ABNORMAL
EOSINOPHIL # BLD: 0.1 K/UL (ref 0–0.4)
EOSINOPHIL NFR BLD: 2 % (ref 0–7)
ERYTHROCYTE [DISTWIDTH] IN BLOOD BY AUTOMATED COUNT: 15.4 % (ref 11.5–14.5)
GLUCOSE BLD STRIP.AUTO-MCNC: 188 MG/DL (ref 65–100)
GLUCOSE BLD STRIP.AUTO-MCNC: 350 MG/DL (ref 65–100)
GLUCOSE BLD STRIP.AUTO-MCNC: 366 MG/DL (ref 65–100)
GLUCOSE SERPL-MCNC: 551 MG/DL (ref 65–100)
HCT VFR BLD AUTO: 33.7 % (ref 35–47)
HGB BLD-MCNC: 11.9 G/DL (ref 11.5–16)
IMM GRANULOCYTES # BLD: 0 K/UL (ref 0–0.04)
IMM GRANULOCYTES NFR BLD AUTO: 0 % (ref 0–0.5)
LYMPHOCYTES # BLD: 2 K/UL (ref 0.8–3.5)
LYMPHOCYTES NFR BLD: 24 % (ref 12–49)
MAGNESIUM SERPL-MCNC: 1.8 MG/DL (ref 1.6–2.4)
MCH RBC QN AUTO: 27.1 PG (ref 26–34)
MCHC RBC AUTO-ENTMCNC: 35.6 G/DL (ref 30–36.5)
MCV RBC AUTO: 76.1 FL (ref 80–99)
MONOCYTES # BLD: 0.8 K/UL (ref 0–1)
MONOCYTES NFR BLD: 10 % (ref 5–13)
NEUTS SEG # BLD: 5.1 K/UL (ref 1.8–8)
NEUTS SEG NFR BLD: 64 % (ref 32–75)
NRBC # BLD: 0 K/UL (ref 0–0.01)
NRBC BLD-RTO: 0 PER 100 WBC
PLATELET # BLD AUTO: 167 K/UL (ref 150–400)
POTASSIUM SERPL-SCNC: 3.7 MMOL/L (ref 3.5–5.1)
RBC # BLD AUTO: 4.43 M/UL (ref 3.8–5.2)
SERVICE CMNT-IMP: ABNORMAL
SODIUM SERPL-SCNC: 132 MMOL/L (ref 136–145)
WBC # BLD AUTO: 7.8 K/UL (ref 3.6–11)

## 2018-10-21 PROCEDURE — 99218 HC RM OBSERVATION: CPT

## 2018-10-21 PROCEDURE — 74011636637 HC RX REV CODE- 636/637: Performed by: HOSPITALIST

## 2018-10-21 PROCEDURE — 74011250637 HC RX REV CODE- 250/637: Performed by: HOSPITALIST

## 2018-10-21 PROCEDURE — 82962 GLUCOSE BLOOD TEST: CPT

## 2018-10-21 PROCEDURE — 96361 HYDRATE IV INFUSION ADD-ON: CPT

## 2018-10-21 PROCEDURE — 36415 COLL VENOUS BLD VENIPUNCTURE: CPT | Performed by: HOSPITALIST

## 2018-10-21 PROCEDURE — 85025 COMPLETE CBC W/AUTO DIFF WBC: CPT | Performed by: HOSPITALIST

## 2018-10-21 PROCEDURE — 96372 THER/PROPH/DIAG INJ SC/IM: CPT

## 2018-10-21 PROCEDURE — 74011250636 HC RX REV CODE- 250/636: Performed by: HOSPITALIST

## 2018-10-21 RX ORDER — INSULIN LISPRO 100 [IU]/ML
10 INJECTION, SOLUTION INTRAVENOUS; SUBCUTANEOUS ONCE
Status: COMPLETED | OUTPATIENT
Start: 2018-10-21 | End: 2018-10-21

## 2018-10-21 RX ORDER — INSULIN ASPART 100 [IU]/ML
INJECTION, SOLUTION INTRAVENOUS; SUBCUTANEOUS
COMMUNITY
End: 2018-11-23

## 2018-10-21 RX ORDER — METFORMIN HYDROCHLORIDE 1000 MG/1
1000 TABLET ORAL
COMMUNITY
End: 2018-11-23

## 2018-10-21 RX ORDER — HALOPERIDOL DECANOATE 100 MG/ML
100 INJECTION INTRAMUSCULAR
COMMUNITY
End: 2018-12-28

## 2018-10-21 RX ORDER — IBUPROFEN 200 MG
1 TABLET ORAL DAILY
Status: DISCONTINUED | OUTPATIENT
Start: 2018-10-21 | End: 2018-10-21 | Stop reason: HOSPADM

## 2018-10-21 RX ORDER — SERTRALINE HYDROCHLORIDE 100 MG/1
100 TABLET, FILM COATED ORAL DAILY
COMMUNITY
End: 2018-12-28

## 2018-10-21 RX ORDER — MELOXICAM 15 MG/1
15 TABLET ORAL
COMMUNITY
End: 2018-12-28

## 2018-10-21 RX ADMIN — INSULIN LISPRO 10 UNITS: 100 INJECTION, SOLUTION INTRAVENOUS; SUBCUTANEOUS at 12:38

## 2018-10-21 RX ADMIN — METRONIDAZOLE 500 MG: 250 TABLET ORAL at 06:09

## 2018-10-21 RX ADMIN — INSULIN LISPRO 10 UNITS: 100 INJECTION, SOLUTION INTRAVENOUS; SUBCUTANEOUS at 00:40

## 2018-10-21 RX ADMIN — INSULIN LISPRO 2 UNITS: 100 INJECTION, SOLUTION INTRAVENOUS; SUBCUTANEOUS at 10:17

## 2018-10-21 RX ADMIN — SODIUM CHLORIDE 150 ML/HR: 900 INJECTION, SOLUTION INTRAVENOUS at 06:09

## 2018-10-21 RX ADMIN — INSULIN LISPRO 10 UNITS: 100 INJECTION, SOLUTION INTRAVENOUS; SUBCUTANEOUS at 17:23

## 2018-10-21 RX ADMIN — Medication 10 ML: at 14:04

## 2018-10-21 RX ADMIN — METRONIDAZOLE 500 MG: 250 TABLET ORAL at 18:21

## 2018-10-21 RX ADMIN — Medication 10 ML: at 10:24

## 2018-10-21 RX ADMIN — HEPARIN SODIUM 5000 UNITS: 5000 INJECTION, SOLUTION INTRAVENOUS; SUBCUTANEOUS at 14:03

## 2018-10-21 RX ADMIN — Medication 10 ML: at 06:12

## 2018-10-21 RX ADMIN — HEPARIN SODIUM 5000 UNITS: 5000 INJECTION, SOLUTION INTRAVENOUS; SUBCUTANEOUS at 06:09

## 2018-10-21 RX ADMIN — SODIUM CHLORIDE 100 ML/HR: 900 INJECTION, SOLUTION INTRAVENOUS at 14:10

## 2018-10-21 NOTE — PROGRESS NOTES
[facility] Admission Pharmacy Medication Reconciliation Pharmacy Medication Reconciliation The patient was interviewed regarding current PTA medication list, use and drug allergies; The patient was questioned regarding use of any other inhalers, topical products, over the counter medications, herbal medications, vitamin products or ophthalmic/nasal/otic medication use. Findings:  
Medications on list below were obtained from [facility]) [phone] via fax from [facility]. List is current as of 9/24/2018. Last doses taken of these medications are UNKNOWN as patient states she ran out of medications. Past Medical History/Disease States: 
Past Medical History:  
Diagnosis Date  Alcohol abuse, in remission   
 quit 17 days ago  Asthma   
 does not use inhalers  Borderline personality disorder ([address]  Diabetes ([address]  GERD (gastroesophageal reflux disease)  Hypertension  Other ill-defined conditions(799.89)   
 kidney stones ,passed one  Other ill-defined conditions(799.89) sickle cell trait  Other ill-defined conditions(799.89)   
 increased cholesterol  Pancreatitis  Psychiatric disorder   
 schizophrenia, bipolar, depression, anxiety Patient allergies: Allergies as of 10/20/2018 - Review Complete 10/20/2018 Allergen Reaction Noted  Dilaudid [hydromorphone (bulk)] Itching 07/30/2014  Ibuprofen Not Reported This Time 02/15/2017 Prior to Admission Medications Prescriptions Last Dose Informant  Taking? Blood-Glucose Meter monitoring kit Unknown at Unknown time Other  No  
Sig: Monitor BG 4 times daily. Dx uncontrolled type 2 DM E11.65  Indications: Diabetes Mellitus  
albuterol (PROVENTIL HFA, VENTOLIN HFA, PROAIR HFA) 90 mcg/actuation inhaler Unknown at Unknown time Other  No  
Sig: Take 1 Puff by inhalation every four (4) hours as needed for Wheezing.   
benztropine (COGENTIN) 1 mg tablet Unknown at Unknown time Other  No  
Sig: Take 1 Tab by mouth two (2) times a day. Indications: drug-induced extrapyramidal reaction Patient taking differently: Take 1 mg by mouth every twelve (12) hours. divalproex DR (DEPAKOTE) 500 mg tablet Unknown at Unknown time Other  No  
Sig: Take 1 Tab by mouth two (2) times a day. Indications: Schizoaffective disorder Patient taking differently: Take 500 mg by mouth every twelve (12) hours.  
haloperidol (HALDOL) 5 mg tablet Unknown at Unknown time Other  No  
Sig: Take 1 Tab by mouth every twelve (12) hours. Indications: Schizoaffective disorder  
haloperidol decanoate (HALDOL DECANOATE) 100 mg/mL injection Unknown at Unknown time Other  No  
Si mg by IntraMUSCular route every twenty-eight (28) days. insulin aspart U-100 (NOVOLOG FLEXPEN U-100 INSULIN) 100 unit/mL inpn Unknown at Unknown time Other  No  
Sig: by SubCUTAneous route three (3) times daily as needed. Sliding scale  
insulin glargine (LANTUS) 100 unit/mL injection Unknown at Unknown time Other  No  
Si Units by SubCUTAneous route nightly. Indications: type 1 diabetes mellitus Patient taking differently: 30 Units by SubCUTAneous route daily. Indications: type 1 diabetes mellitus  
insulin regular (HUMULIN R REGULAR U-100 INSULN) 100 unit/mL injection Unknown at Unknown time Other  No  
Sig: by SubCUTAneous route. Sliding scale  
meloxicam (MOBIC) 15 mg tablet Unknown at Unknown time Other  No  
Sig: Take 15 mg by mouth daily as needed for Pain.  
metFORMIN (GLUCOPHAGE) 1,000 mg tablet Unknown at Unknown time Other  No  
Sig: Take 1,000 mg by mouth. sertraline (ZOLOFT) 100 mg tablet Unknown at Unknown time Other  No  
Sig: Take 100 mg by mouth daily. Thank you, Chery Ashton, [facility]

## 2018-10-21 NOTE — PROGRESS NOTES
Bedside and Verbal shift change report given to Eric Dominguez RN (oncoming nurse) by Alphonse Sanchez RN (offgoing nurse). Report included the following information SBAR, Kardex, Intake/Output, MAR and Cardiac Rhythm NSR.

## 2018-10-21 NOTE — PROGRESS NOTES
Gave 10 units Humalog Insulin when pt arrived on unit per Dr Candido Presley order for BG of 515. Labs were drawn and taken to the lab. The lab just called to notify that the BG has gone up to 551. Spoke with Dr Diana Singh on the phone and received an order to give 10 units more of Humalog. clarified with Dr Diana Singh about when the BG should be checked again. She said not to check it again until morning. BG should be checked ACHS.

## 2018-10-21 NOTE — PROGRESS NOTES
Bedside, Verbal shift change report given to Carrillo Burton (oncoming nurse) by Ro Barraza (offgoing nurse). Report included the following information SBAR, Kardex, ED Summary, MAR and Cardiac Rhythm nsr. No acute distress noted; patient appears asleep; snoring. Sitter at bedside for suicide precautions. [id number] Patient has a large scrape wound on the left knee. States fell prior to admission. MD at bedside to assess. Also visualized vaginal discharge. 11:59 AM Contacted Dr. Bryant Ackerman regarding fingerstick blood glucose 350. The following orders were received: 10u lispro. 1:00 PM 
This nurse at bedside to relieve the sitter for lunch 
 
5:33 PM 
Dr. Virgen Garcia at bedside. 5:40 PM Contacted Dr. Bryant Ackerman regarding Dr. Kwadwo Brennan visit. He recommends psych admission once medically clear and we can discontinue 1:1 observation. Patient will likely transfer to [facility] tomorrow as her blood sugars are still high. 
 
 
6:31 PM 
Notified nursing supervisor. Patient states that she wants to go home. Nursing supervisor will come and speak with the patient. 6:40 PM 
Called and spoke with Dr. Bryant Ackerman. Patient apparently wants to leave because this RN did not give her anymore ice cream.  Dr. Bryant Ackerman called Dr. Virgen Garcia- patient can leave AMA 
 
6:59 PM 
 
Patient given paper scrub bottoms as her pants were soiled, and a pair of flip-flops as her shoes were wet.   Escorted by this RN to the Emergency Room entrance per patient request.

## 2018-10-21 NOTE — BH NOTES
Asked to see 43 yo AA female for SI. Pt came to ED yesterday due to high BS. She was admitted to PCU as her BS was near 600. Pt is reportedly homeless and somewhere during her assessment, she expressed SI but w/o any plan. A sitter was provided. Pt has past Psychiatric h/o Schizoaffective d/o and is followed by Sharath Romero at [facility]. She has been her PTA Psych meds Haldol and Depakote for over a month. She reports her monthly Haldol Depo injection is due tomorrow at [facility]. She presented Alert, verbal, cooperative. Thought process is goal directed. No AV hallucinations evident. Orientation is x3. She denies any current SI or HI. She acknowledged, she is currently homeless iraida has a friend Holley Chand whom she can stay with. Pt was suggested a transfer to Psychiatry to help her set up her appropriate out-patient Psyh follow up and reconnect with [facility]. Patient agreed. However, later it was learned, she insisted on leaving and was discharged AMA from Eduin Jama M.D. Psychatry (On Call)

## 2018-10-21 NOTE — ED NOTES
Report called to PCU. Ria Ty RN receiving. SBAR report given. Pt to be transported to PCU after stat labs drawn.

## 2018-10-21 NOTE — PROGRESS NOTES
PCU nurse contacted nursing supervisor about the patient wanting to leave. I went & spoke with the patient, she is stating \"I want to go home\". The patient was asked why, she reported I want to go home. The patient was told that she was here because her blood sugar was elevated. She stated \"I know, I have a doctors appointment tomorrow\". The patient was asked if she had insulin at home. The patient stated \"no\". The patient was asked if she could stay tonight & continue to get the help she needed & we would re-evaluate her situation tomorrow. The patient stated \"she won't give me anymore ice cream\". The patient was told that her blood sugar is high & the nurses are monitoring what you eat. The patient was asked if that was the reason she wanted to go home. The patient stated \"yes\". Nurse notified.

## 2018-10-21 NOTE — H&P
Hospitalist Admission Note NAME: Macho Hoskins :  1975 MRN:  [id number] Date/Time:  10/21/2018 8:30 AM 
 
Patient PCP: Elvia Wall NP 
______________________________________________________________________ Given the patient's current clinical presentation, I have a high level of concern for decompensation if discharged from the emergency department. Complex decision making was performed, which includes reviewing the patient's available past medical records, laboratory results, and x-ray films. My assessment of this patient's clinical condition and my plan of care is as follows. Assessment / Plan: 
 
Uncontrolled DM type 1 In setting of non compliance  
-BS imporved after fluids and SQ insulin 
-Continue IVF. -Consult diabetic educator - A1C 
  
FABIENNE. Pseudohyponatremia. Corrected sodium for glucose is 136. Continue NS IV and manage hyperglycemia as above.  
  
Paranoid schizophrenia/ Bipolar disorder 
-not on any meds for months 
  
HTN 
-not compliant Bacterial vaginosis -on metronidazole SI on admission 
-denies now 
-on one to one 
-for psych clearance Code Status: full Surrogate Decision Maker: son DVT Prophylaxis: lovenox GI Prophylaxis: not indicated Baseline: homeless Subjective: CHIEF COMPLAINT: vaginal discharge, homeless and hyperglycemia HISTORY OF PRESENT ILLNESS:   Admitted by telehospitalist last night and seen by me today. Phill Wall is a 44 y.o. female with h/o polysubstance abuse, drug seeking behavior, bipolar schizophrenia who presented to ER with c/o vaginal disdcharge for one week. Requested to check for STD. Denied fever, admits having high risk sexual contacts. Says she is homeless and is out of her medications. Apparently she also expressed concern of harming herself. Admits to alcohol abuse, last drink 2 days ago, denies h/o s/s of withdrawal if doesn't drink, last cocaine use yesterday.  In ER she was hyperglycemic with normal anion gap and bicarb. UA neg for infection. Denies any SI at time of my eval 
 
We were asked to admit for work up and evaluation of the above problems. Past Medical History:  
Diagnosis Date  Alcohol abuse, in remission   
 quit 17 days ago  Asthma   
 does not use inhalers  Borderline personality disorder ([facility] [address]  Diabetes ([facility] [address]  GERD (gastroesophageal reflux disease)  Hypertension  Other ill-defined conditions(799.89)   
 kidney stones ,passed one  Other ill-defined conditions(799.89) sickle cell trait  Other ill-defined conditions(799.89)   
 increased cholesterol  Pancreatitis  Psychiatric disorder   
 schizophrenia, bipolar, depression, anxiety Past Surgical History:  
Procedure Laterality Date  ABDOMEN SURGERY PROC UNLISTED  3/12/14 CHOLECYSTECTOMY LAPAROSCOPIC    
 HX GASTRIC BYPASS  HX GYN  11/8/2012  
 c section x2  HX OTHER SURGICAL  3/13/14 ENDOSCOPIC RETROGRADE CHOLANGIOPANCREATOGRAPHY  HX OTHER SURGICAL  8/4/14  
 endoscopic stent placed to bile duct  HX TUBAL LIGATION  2012 Social History Tobacco Use  Smoking status: Current Every Day Smoker Packs/day: 0.50 Years: 14.00 Pack years: 7.00 Types: Cigarettes  Smokeless tobacco: Never Used  Tobacco comment: cigarettes Substance Use Topics  Alcohol use: Yes Alcohol/week: 0.6 oz Types: 1 Cans of beer per week Comment: occasionally, last had \"i had one beer\" today Family History Problem Relation Age of Onset  Heart Disease Mother  Diabetes Mother  Hypertension Mother  Hypertension Maternal Grandmother Allergies Allergen Reactions  Dilaudid [Hydromorphone (Bulk)] Itching  Ibuprofen Not Reported This Time Prior to Admission medications Medication Sig Start Date End Date Taking?  Authorizing Provider  
benztropine (COGENTIN) 1 mg tablet Take 1 Tab by mouth two (2) times a day. Indications: drug-induced extrapyramidal reaction 8/14/18   Rajinder Wilburn MD  
Blood-Glucose Meter monitoring kit Monitor BG 4 times daily. Dx uncontrolled type 2 DM E11.65  Indications: Diabetes Mellitus 8/14/18   Rajinder Wilburn MD  
divalproex DR (DEPAKOTE) 500 mg tablet Take 1 Tab by mouth two (2) times a day. Indications: Schizoaffective disorder 8/14/18   Rajinder Wilburn MD  
haloperidol (HALDOL) 5 mg tablet Take 1 Tab by mouth every twelve (12) hours. Indications: Schizoaffective disorder 8/14/18   Rajinder Wilburn MD  
lisinopril (PRINIVIL, ZESTRIL) 10 mg tablet Take 1 Tab by mouth daily. Indications: hypertension 8/15/18   Rajinder Wilburn MD  
albuterol (PROVENTIL HFA, VENTOLIN HFA, PROAIR HFA) 90 mcg/actuation inhaler Take 1 Puff by inhalation every four (4) hours as needed for Wheezing. Provider, Historical  
insulin glargine (LANTUS) 100 unit/mL injection 30 Units by SubCUTAneous route nightly. Indications: type 1 diabetes mellitus Patient taking differently: 30 Units by SubCUTAneous route daily. Indications: type 1 diabetes mellitus 11/17/17   Noelle Rodgers PA-C  
 
 
REVIEW OF SYSTEMS:    
I am not able to complete the review of systems because: The patient is intubated and sedated The patient has altered mental status due to his acute medical problems The patient has baseline aphasia from prior stroke(s) The patient has baseline dementia and is not reliable historian The patient is in acute medical distress and unable to provide information Total of 12 systems reviewed as follows:   
   POSITIVE= underlined text  Negative = text not underlined General:  fever, chills, sweats, generalized weakness, weight loss/gain,  
   loss of appetite Eyes:    blurred vision, eye pain, loss of vision, double vision ENT:    rhinorrhea, pharyngitis Respiratory:   cough, sputum production, SOB, BARRY, wheezing, pleuritic pain  
Cardiology:   chest pain, palpitations, orthopnea, PND, edema, syncope Gastrointestinal:  abdominal pain , N/V, diarrhea, dysphagia, constipation, bleeding Genitourinary:  frequency, urgency, dysuria, hematuria, incontinence, vaginal discharge Muskuloskeletal :  arthralgia, myalgia, back pain Hematology:  easy bruising, nose or gum bleeding, lymphadenopathy Dermatological: rash, ulceration, pruritis, color change / jaundice Endocrine:   hot flashes or polydipsia Neurological:  headache, dizziness, confusion, focal weakness, paresthesia, Speech difficulties, memory loss, gait difficulty Psychological: Feelings of anxiety, depression, agitation Objective: VITALS:   
Visit Vitals /68 Pulse 72 Temp 98.5 °F (36.9 °C) Resp 14 Ht 5' 6\" (1.676 m) Wt 62.6 kg (138 lb) SpO2 97% Breastfeeding? No  
BMI 22.27 kg/m² PHYSICAL EXAM: 
 
General:    Drowsy, Poor hygiene, Alert, cooperative, no distress, appears stated age. HEENT: Atraumatic, anicteric sclerae, pink conjunctivae No oral ulcers, mucosa moist, throat clear, dentition fair Neck:  Supple, symmetrical,  thyroid: non tender Lungs:   Clear to auscultation bilaterally. No Wheezing or Rhonchi. No rales. Chest wall:  No tenderness  No Accessory muscle use. Heart:   Regular  rhythm,  No  murmur   No edema Abdomen:   Soft, non-tender. Not distended. Bowel sounds normal 
Extremities: No cyanosis. No clubbing,   
  Skin turgor normal, Capillary refill normal, Radial dial pulse 2+ Skin:     Left knee abrasion, Not pale. Not Jaundiced  No rashes Psych:  Not anxious or agitated. Neurologic: EOMs intact. No facial asymmetry. No aphasia or slurred speech. Symmetrical strength, Sensation grossly intact. Alert and oriented X 4.  
 
_______________________________________________________________________ Care Plan discussed with: 
  Comments Patient x Family RN x Care Manager Consultant:     
_______________________________________________________________________ Expected  Disposition:  
Home with Family x HH/PT/OT/RN   
SNF/LTC   
[place]   
________________________________________________________________________ TOTAL TIME:  60 Minutes Critical Care Provided     Minutes non procedure based Comments Reviewed previous records  
>50% of visit spent in counseling and coordination of care  Discussion with patient and/or family and questions answered 
  
 
________________________________________________________________________ Signed: Nader Allison MD 
 
Procedures: see electronic medical records for all procedures/Xrays and details which were not copied into this note but were reviewed prior to creation of Plan. LAB DATA REVIEWED:   
Recent Results (from the past 24 hour(s)) CBC WITH AUTOMATED DIFF Collection Time: 10/20/18  4:49 PM  
Result Value Ref Range WBC 10.8 3.6 - 11.0 K/uL  
 RBC 5.54 (H) 3.80 - 5.20 M/uL  
 HGB 15.1 11.5 - 16.0 g/dL HCT 42.1 35.0 - 47.0 % MCV 76.0 (L) 80.0 - 99.0 FL  
 MCH 27.3 26.0 - 34.0 PG  
 MCHC 35.9 30.0 - 36.5 g/dL  
 RDW 15.5 (H) 11.5 - 14.5 % PLATELET 820 752 - 075 K/uL NRBC 0.0 0  WBC ABSOLUTE NRBC 0.00 0.00 - 0.01 K/uL NEUTROPHILS 67 32 - 75 % LYMPHOCYTES 20 12 - 49 % MONOCYTES 12 5 - 13 % EOSINOPHILS 1 0 - 7 % BASOPHILS 0 0 - 1 % IMMATURE GRANULOCYTES 0 0.0 - 0.5 % ABS. NEUTROPHILS 7.2 1.8 - 8.0 K/UL  
 ABS. LYMPHOCYTES 2.1 0.8 - 3.5 K/UL  
 ABS. MONOCYTES 1.3 (H) 0.0 - 1.0 K/UL  
 ABS. EOSINOPHILS 0.1 0.0 - 0.4 K/UL  
 ABS. BASOPHILS 0.0 0.0 - 0.1 K/UL  
 ABS. IMM. GRANS. 0.0 0.00 - 0.04 K/UL  
 DF AUTOMATED METABOLIC PANEL, COMPREHENSIVE Collection Time: 10/20/18  4:49 PM  
Result Value Ref Range Sodium 128 (L) 136 - 145 mmol/L Potassium 3.7 3.5 - 5.1 mmol/L Chloride 88 (L) 97 - 108 mmol/L  
 CO2 24 21 - 32 mmol/L Anion gap 16 (H) 5 - 15 mmol/L  Glucose 615 (HH) 65 - 100 mg/dL BUN 10 6 - 20 MG/DL Creatinine 1.39 (H) 0.55 - 1.02 MG/DL  
 BUN/Creatinine ratio 7 (L) 12 - 20 GFR est AA 50 (L) >60 ml/min/1.73m2 GFR est non-AA 42 (L) >60 ml/min/1.73m2 Calcium 9.0 8.5 - 10.1 MG/DL Bilirubin, total 0.6 0.2 - 1.0 MG/DL  
 ALT (SGPT) 30 12 - 78 U/L  
 AST (SGOT) 9 (L) 15 - 37 U/L Alk. phosphatase 111 45 - 117 U/L Protein, total 8.4 (H) 6.4 - 8.2 g/dL Albumin 3.8 3.5 - 5.0 g/dL Globulin 4.6 (H) 2.0 - 4.0 g/dL A-G Ratio 0.8 (L) 1.1 - 2.2 DRUG SCREEN, URINE Collection Time: 10/20/18  4:49 PM  
Result Value Ref Range AMPHETAMINES NEGATIVE  NEG    
 BARBITURATES NEGATIVE  NEG BENZODIAZEPINES NEGATIVE  NEG    
 COCAINE POSITIVE (A) NEG METHADONE NEGATIVE  NEG    
 OPIATES NEGATIVE  NEG    
 PCP(PHENCYCLIDINE) NEGATIVE  NEG    
 THC (TH-CANNABINOL) NEGATIVE  NEG Drug screen comment (NOTE) URINALYSIS W/ REFLEX CULTURE Collection Time: 10/20/18  4:49 PM  
Result Value Ref Range Color YELLOW/STRAW Appearance CLEAR CLEAR Specific gravity 1.005 1.003 - 1.030    
 pH (UA) 6.0 5.0 - 8.0 Protein NEGATIVE  NEG mg/dL Glucose >1,000 (A) NEG mg/dL Ketone 40 (A) NEG mg/dL Bilirubin NEGATIVE  NEG Blood TRACE (A) NEG Urobilinogen 0.2 0.2 - 1.0 EU/dL Nitrites NEGATIVE  NEG Leukocyte Esterase NEGATIVE  NEG    
 WBC 0-4 0 - 4 /hpf  
 RBC 0-5 0 - 5 /hpf Epithelial cells FEW FEW /lpf Bacteria NEGATIVE  NEG /hpf  
 UA:UC IF INDICATED CULTURE NOT INDICATED BY UA RESULT CNI    
ETHYL ALCOHOL Collection Time: 10/20/18  4:49 PM  
Result Value Ref Range ALCOHOL(ETHYL),SERUM <10 <10 MG/DL  
KOH, OTHER SOURCES Collection Time: 10/20/18  4:49 PM  
Result Value Ref Range Special Requests: NO SPECIAL REQUESTS    
 KOH NO YEAST SEEN    
WET PREP Collection Time: 10/20/18  4:49 PM  
Result Value Ref Range Clue cells CLUE CELLS PRESENT  Wet prep NO TRICHOMONAS SEEN    
HCG URINE, QL. - POC  
 Collection Time: 10/20/18  4:52 PM  
Result Value Ref Range Pregnancy test,urine (POC) NEGATIVE  NEG    
GLUCOSE, POC Collection Time: 10/20/18  6:40 PM  
Result Value Ref Range Glucose (POC) >600 (HH) 65 - 100 mg/dL Performed by [employer] GLUCOSE, POC Collection Time: 10/20/18  8:03 PM  
Result Value Ref Range Glucose (POC) 529 (H) 65 - 100 mg/dL [facility], POC Collection Time: 10/20/18  8:34 PM  
Result Value Ref Range Glucose (POC) 506 (H) 65 - 100 mg/dL Performed by [employer] GLUCOSE, POC Collection Time: 10/20/18  8:35 PM  
Result Value Ref Range Glucose (POC) 512 (H) 65 - 100 mg/dL Performed by [employer] MAGNESIUM Collection Time: 10/20/18 10:12 PM  
Result Value Ref Range Magnesium 1.8 1.6 - 2.4 mg/dL PHOSPHORUS Collection Time: 10/20/18 10:12 PM  
Result Value Ref Range Phosphorus 2.8 2.6 - 4.7 MG/DL  
GLUCOSE, POC Collection Time: 10/20/18 11:12 PM  
Result Value Ref Range Glucose (POC) 515 (H) 65 - 100 mg/dL Performed by [facility] MAGNESIUM Collection Time: 10/20/18 11:36 PM  
Result Value Ref Range Magnesium 1.8 1.6 - 2.4 mg/dL METABOLIC PANEL, BASIC Collection Time: 10/20/18 11:36 PM  
Result Value Ref Range Sodium 132 (L) 136 - 145 mmol/L Potassium 3.7 3.5 - 5.1 mmol/L Chloride 98 97 - 108 mmol/L  
 CO2 23 21 - 32 mmol/L Anion gap 11 5 - 15 mmol/L Glucose 551 (HH) 65 - 100 mg/dL BUN 9 6 - 20 MG/DL Creatinine 0.99 0.55 - 1.02 MG/DL  
 BUN/Creatinine ratio 9 (L) 12 - 20 GFR est AA >60 >60 ml/min/1.73m2 GFR est non-AA >60 >60 ml/min/1.73m2 Calcium 7.8 (L) 8.5 - 10.1 MG/DL  
GLUCOSE, POC Collection Time: 10/20/18 11:46 PM  
Result Value Ref Range Glucose (POC) 539 (H) 65 - 100 mg/dL Performed by [facility] CBC WITH AUTOMATED DIFF Collection Time: 10/21/18 12:42 AM  
Result Value Ref Range  WBC 7.8 3.6 - 11.0 K/uL  
 RBC 4.43 3.80 - 5.20 M/uL  
 HGB 11.9 11.5 - 16.0 g/dL HCT 33.7 (L) 35.0 - 47.0 % MCV 76.1 (L) 80.0 - 99.0 FL  
 MCH 27.1 26.0 - 34.0 PG  
 MCHC 35.6 30.0 - 36.5 g/dL  
 RDW 15.4 (H) 11.5 - 14.5 % PLATELET 720 581 - 149 K/uL NRBC 0.0 0  WBC ABSOLUTE NRBC 0.00 0.00 - 0.01 K/uL NEUTROPHILS 64 32 - 75 % LYMPHOCYTES 24 12 - 49 % MONOCYTES 10 5 - 13 % EOSINOPHILS 2 0 - 7 % BASOPHILS 0 0 - 1 % IMMATURE GRANULOCYTES 0 0.0 - 0.5 % ABS. NEUTROPHILS 5.1 1.8 - 8.0 K/UL  
 ABS. LYMPHOCYTES 2.0 0.8 - 3.5 K/UL  
 ABS. MONOCYTES 0.8 0.0 - 1.0 K/UL  
 ABS. EOSINOPHILS 0.1 0.0 - 0.4 K/UL  
 ABS. BASOPHILS 0.0 0.0 - 0.1 K/UL  
 ABS. IMM. GRANS. 0.0 0.00 - 0.04 K/UL  
 DF AUTOMATED

## 2018-10-21 NOTE — DISCHARGE SUMMARY
Hospitalist Discharge Summary     Patient ID:  Matt Farr  [id number]  89 y.o.  1975    PCP on record: Vamsi Medina NP    Admit date: 10/20/2018  Discharge date and time: 10/21/2018      DISCHARGE DIAGNOSIS:    Uncontrolled DM type 1  FABIENNE  Pseudohyponatremia. Paranoid schizophrenia/ Bipolar disorder  HTN  Bacterial vaginosis  SI      CONSULTATIONS:  IP CONSULT TO PSYCHIATRY    Excerpted HPI from H&P of Deisy Aden MD:   Admitted by telehospitalist last night and seen by me today. Merlinda Sayres is a 44 y.o. female with h/o polysubstance abuse, drug seeking behavior, bipolar schizophrenia who presented to ER with c/o vaginal disdcharge for one week. Requested to check for STD. Denied fever, admits having high risk sexual contacts. Says she is homeless and is out of her medications. Apparently she also expressed concern of harming herself. Admits to alcohol abuse, last drink 2 days ago, denies h/o s/s of withdrawal if doesn't drink, last cocaine use yesterday. In ER she was hyperglycemic with normal anion gap and bicarb. UA neg for infection. Denies any SI at time of my eval     We were asked to admit for work up and evaluation of the above problems. ______________________________________________________________________  DISCHARGE SUMMARY/HOSPITAL COURSE:  for full details see H&P, daily progress notes, labs, consult notes. Left AMA. Was cleared by psychiatry. _______________________________________________________________________  Patient seen and examined by me on discharge day.   Pertinent Findings:  As per HPI  _______________________________________________________________________  DISCHARGE MEDICATIONS:   Current Discharge Medication List          My Recommended Diet, Activity, Wound Care, and follow-up labs are listed in the patient's Discharge Insturctions which I have personally completed and reviewed.     ______________________________________________________________________    Risk of deterioration: High    Condition at Discharge:  Stable  ______________________________________________________________________    Disposition  AMA  ______________________________________________________________________    Care Plan discussed with:   Patient, RN, Care Manager, Consultant    ______________________________________________________________________    Code Status: Full Code  ______________________________________________________________________      Follow up with:   PCP : Khushboo Joseph NP  Follow-up Information    None             Total time in minutes spent coordinating this discharge (includes going over instructions, follow-up, prescriptions, and preparing report for sign off to her PCP) :  35 minutes    Signed:  Omid Diaz MD

## 2018-10-21 NOTE — H&P
Hospitalist Admission Note NAME: Macho Hoskins :  1975 MRN:  [id number] Date/Time:  10/20/2018 11:06 PM 
 
Patient PCP: Fabian Bee NP 
________________________________________________________________________ Assessment :    Plan: 1. Uncontrolled DM secondary to medical non compliance 2. Cocaine Use 3. FABIENNE secondary to dehydration and glycosuria 4. Bacterial Vaginosis 5. Suicidal Intent Active Problems: 
  DKA (diabetic ketoacidoses) ([facility] [address] (10/20/2018) 
 
 -admit to ICU 
-start home lantus 
-start sliding scale insulin 
-diabetic diet -IVF 
-repeat bmp at 2300 
-flagyl 
-1:1, suicidal precautoin 
-psych consult Code Status: Full Code Subjective: CHIEF COMPLAINT:  
Chief Complaint Patient presents with  Vaginal Discharge  Sexually Transmitted Disease  Mental Health Problem  Medication Refill HISTORY OF PRESENT ILLNESS:    
43 yo female with polysubstance abuse, medical non compliance, DM, HTN and schizophrenia who presents to the ED with complaints of suicidal ideation. UDS was positive cocaine. BMP revealed hyperglycemia. Past Medical History:  
Diagnosis Date  Alcohol abuse, in remission   
 quit 17 days ago  Asthma   
 does not use inhalers  Borderline personality disorder ([facility] [address]  Diabetes ([facility] [address]  GERD (gastroesophageal reflux disease)  Hypertension  Other ill-defined conditions(799.89)   
 kidney stones ,passed one  Other ill-defined conditions(799.89) sickle cell trait  Other ill-defined conditions(799.89)   
 increased cholesterol  Pancreatitis  Psychiatric disorder   
 schizophrenia, bipolar, depression, anxiety Past Surgical History:  
Procedure Laterality Date  ABDOMEN SURGERY PROC UNLISTED  3/12/14 CHOLECYSTECTOMY LAPAROSCOPIC    
 HX GASTRIC BYPASS  HX GYN  2012  
 c section x2  HX OTHER SURGICAL  3/13/14  ENDOSCOPIC RETROGRADE CHOLANGIOPANCREATOGRAPHY  HX OTHER SURGICAL  8/4/14  
 endoscopic stent placed to bile duct  HX TUBAL LIGATION  2012 Social History Tobacco Use  Smoking status: Current Every Day Smoker Packs/day: 0.50 Years: 14.00 Pack years: 7.00 Types: Cigarettes  Smokeless tobacco: Never Used  Tobacco comment: cigarettes Substance Use Topics  Alcohol use: Yes Alcohol/week: 0.6 oz Types: 1 Cans of beer per week Comment: occasionally, last had \"i had one beer\" today Family History Problem Relation Age of Onset  Heart Disease Mother  Diabetes Mother  Hypertension Mother  Hypertension Maternal Grandmother Allergies Allergen Reactions  Dilaudid [Hydromorphone (Bulk)] Itching  Ibuprofen Not Reported This Time Prior to Admission medications Medication Sig Start Date End Date Taking? Authorizing Provider  
benztropine (COGENTIN) 1 mg tablet Take 1 Tab by mouth two (2) times a day. Indications: drug-induced extrapyramidal reaction 8/14/18   Osman Peterson MD  
Blood-Glucose Meter monitoring kit Monitor BG 4 times daily. Dx uncontrolled type 2 DM E11.65  Indications: Diabetes Mellitus 8/14/18   Osman Peterson MD  
divalproex DR (DEPAKOTE) 500 mg tablet Take 1 Tab by mouth two (2) times a day. Indications: Schizoaffective disorder 8/14/18   Osman Peterson MD  
haloperidol (HALDOL) 5 mg tablet Take 1 Tab by mouth every twelve (12) hours. Indications: Schizoaffective disorder 8/14/18   Osman Peterson MD  
lisinopril (PRINIVIL, ZESTRIL) 10 mg tablet Take 1 Tab by mouth daily. Indications: hypertension 8/15/18   Osman Peterson MD  
albuterol (PROVENTIL HFA, VENTOLIN HFA, PROAIR HFA) 90 mcg/actuation inhaler Take 1 Puff by inhalation every four (4) hours as needed for Wheezing.     Provider, Samm  
insulin glargine (LANTUS) 100 unit/mL injection 30 Units by SubCUTAneous route nightly. Indications: type 1 diabetes mellitus Patient taking differently: 30 Units by SubCUTAneous route daily. Indications: type 1 diabetes mellitus 11/17/17   Sergio Hunter PA-C Review of Systems:  
 
REVIEW OF SYSTEMS:   
General: negative for fever, chills, sweats, weakness Eyes: negative for blurred vision, loss of vision Ear Nose and Throat: negative for pharyngitis, speech or swallowing difficulties Respiratory:  negative for sputum production, wheezing, BARRY, pleuritic pain, sob or cough Cardiology:  negative for chest pain, palpitations, orthopnea, PND, edema, syncope Gastrointestinal: negative for abdominal pain, nausea, vomiting, diarrhea, constipation, hematemesis, melena or hematochezia Genitourinary: negative for frequency, urgency, dysuria, hematuria Neurological: negative for focal weakness, paresthesia Objective: VITALS:   
Visit Vitals /62 Pulse 88 Temp 98.6 °F (37 °C) Resp 18 Ht 5' 6\" (1.676 m) Wt 62.6 kg (138 lb) SpO2 97% BMI 22.27 kg/m² Physical Exam:  
 
Physical Exam: 
Gen:  Well-developed, well-nourished, in no acute distress, lying semi-supine in hospital stretcher HEENT:  Anicteric sclera, PER, hearing intact to voice Resp:  No accessory muscle use, breath sounds clear; no wheezes no rales no rhonchi 
Card:  No murmur, normal S1, S2 Abd:  Soft per RN exam, no TTP, non-distended, normoactive bowel sounds are present Musc:  Normal strength and movement of the major muscle groups without obvious deformity Psych:  Good insight, oriented to person, place and time, not anxious, not agitated LAB DATA REVIEWED:   
Recent Results (from the past 24 hour(s)) CBC WITH AUTOMATED DIFF Collection Time: 10/20/18  4:49 PM  
Result Value Ref Range WBC 10.8 3.6 - 11.0 K/uL  
 RBC 5.54 (H) 3.80 - 5.20 M/uL  
 HGB 15.1 11.5 - 16.0 g/dL HCT 42.1 35.0 - 47.0 %  MCV 76.0 (L) 80.0 - 99.0 FL  
 MCH 27.3 26.0 - 34.0 PG  
 MCHC 35.9 30.0 - 36.5 g/dL  
 RDW 15.5 (H) 11.5 - 14.5 % PLATELET 949 821 - 286 K/uL NRBC 0.0 0  WBC ABSOLUTE NRBC 0.00 0.00 - 0.01 K/uL NEUTROPHILS 67 32 - 75 % LYMPHOCYTES 20 12 - 49 % MONOCYTES 12 5 - 13 % EOSINOPHILS 1 0 - 7 % BASOPHILS 0 0 - 1 % IMMATURE GRANULOCYTES 0 0.0 - 0.5 % ABS. NEUTROPHILS 7.2 1.8 - 8.0 K/UL  
 ABS. LYMPHOCYTES 2.1 0.8 - 3.5 K/UL  
 ABS. MONOCYTES 1.3 (H) 0.0 - 1.0 K/UL  
 ABS. EOSINOPHILS 0.1 0.0 - 0.4 K/UL  
 ABS. BASOPHILS 0.0 0.0 - 0.1 K/UL  
 ABS. IMM. GRANS. 0.0 0.00 - 0.04 K/UL  
 DF AUTOMATED METABOLIC PANEL, COMPREHENSIVE Collection Time: 10/20/18  4:49 PM  
Result Value Ref Range Sodium 128 (L) 136 - 145 mmol/L Potassium 3.7 3.5 - 5.1 mmol/L Chloride 88 (L) 97 - 108 mmol/L  
 CO2 24 21 - 32 mmol/L Anion gap 16 (H) 5 - 15 mmol/L Glucose 615 (HH) 65 - 100 mg/dL BUN 10 6 - 20 MG/DL Creatinine 1.39 (H) 0.55 - 1.02 MG/DL  
 BUN/Creatinine ratio 7 (L) 12 - 20 GFR est AA 50 (L) >60 ml/min/1.73m2 GFR est non-AA 42 (L) >60 ml/min/1.73m2 Calcium 9.0 8.5 - 10.1 MG/DL Bilirubin, total 0.6 0.2 - 1.0 MG/DL  
 ALT (SGPT) 30 12 - 78 U/L  
 AST (SGOT) 9 (L) 15 - 37 U/L Alk. phosphatase 111 45 - 117 U/L Protein, total 8.4 (H) 6.4 - 8.2 g/dL Albumin 3.8 3.5 - 5.0 g/dL Globulin 4.6 (H) 2.0 - 4.0 g/dL A-G Ratio 0.8 (L) 1.1 - 2.2 DRUG SCREEN, URINE Collection Time: 10/20/18  4:49 PM  
Result Value Ref Range AMPHETAMINES NEGATIVE  NEG    
 BARBITURATES NEGATIVE  NEG BENZODIAZEPINES NEGATIVE  NEG    
 COCAINE POSITIVE (A) NEG METHADONE NEGATIVE  NEG    
 OPIATES NEGATIVE  NEG    
 PCP(PHENCYCLIDINE) NEGATIVE  NEG    
 THC (TH-CANNABINOL) NEGATIVE  NEG Drug screen comment (NOTE) URINALYSIS W/ REFLEX CULTURE Collection Time: 10/20/18  4:49 PM  
Result Value Ref Range Color YELLOW/STRAW Appearance CLEAR CLEAR Specific gravity 1.005 1.003 - 1.030    
 pH (UA) 6.0 5.0 - 8.0  Protein NEGATIVE  NEG mg/dL Glucose >1,000 (A) NEG mg/dL Ketone 40 (A) NEG mg/dL Bilirubin NEGATIVE  NEG Blood TRACE (A) NEG Urobilinogen 0.2 0.2 - 1.0 EU/dL Nitrites NEGATIVE  NEG Leukocyte Esterase NEGATIVE  NEG    
 WBC 0-4 0 - 4 /hpf  
 RBC 0-5 0 - 5 /hpf Epithelial cells FEW FEW /lpf Bacteria NEGATIVE  NEG /hpf  
 UA:UC IF INDICATED CULTURE NOT INDICATED BY UA RESULT CNI    
ETHYL ALCOHOL Collection Time: 10/20/18  4:49 PM  
Result Value Ref Range ALCOHOL(ETHYL),SERUM <10 <10 MG/DL  
KOH, OTHER SOURCES Collection Time: 10/20/18  4:49 PM  
Result Value Ref Range Special Requests: NO SPECIAL REQUESTS    
 KOH NO YEAST SEEN    
WET PREP Collection Time: 10/20/18  4:49 PM  
Result Value Ref Range Clue cells CLUE CELLS PRESENT Wet prep NO TRICHOMONAS SEEN    
HCG URINE, QL. - POC Collection Time: 10/20/18  4:52 PM  
Result Value Ref Range Pregnancy test,urine (POC) NEGATIVE  NEG    
GLUCOSE, POC Collection Time: 10/20/18  6:40 PM  
Result Value Ref Range Glucose (POC) >600 (HH) 65 - 100 mg/dL Performed by Sherri Blow (Tech) GLUCOSE, POC Collection Time: 10/20/18  8:03 PM  
Result Value Ref Range Glucose (POC) 529 (H) 65 - 100 mg/dL Performed by [employer]epar GLUCOSE, POC Collection Time: 10/20/18  8:34 PM  
Result Value Ref Range Glucose (POC) 506 (H) 65 - 100 mg/dL Performed by Sherri Blow (Tech) GLUCOSE, POC Collection Time: 10/20/18  8:35 PM  
Result Value Ref Range Glucose (POC) 512 (H) 65 - 100 mg/dL Performed by Sherri Blow (Tech) MAGNESIUM Collection Time: 10/20/18 10:12 PM  
Result Value Ref Range Magnesium 1.8 1.6 - 2.4 mg/dL PHOSPHORUS Collection Time: 10/20/18 10:12 PM  
Result Value Ref Range  Phosphorus 2.8 2.6 - 4.7 MG/DL

## 2018-10-22 ENCOUNTER — PATIENT OUTREACH (OUTPATIENT)
Dept: FAMILY MEDICINE CLINIC | Age: 43
End: 2018-10-22

## 2018-10-30 ENCOUNTER — PATIENT OUTREACH (OUTPATIENT)
Dept: FAMILY MEDICINE CLINIC | Age: 43
End: 2018-10-30

## 2018-10-30 NOTE — LETTER
10/31/2018 8:52 AM 
 
Ms. Lemus [facility] [place] 
[id number] Dear Ms. Denisse: 
 
We've missed you! Your doctor is interested in not only helping you feel better when you are sick, but also in keeping you from getting sick in the first place. In the spirit of maintaining your good health, please call the office at [phone] to schedule an appointment. If you have changed your Primary Care Provider please call and let us know so we can update your health records. You were last seen in our office 11/2016. Treatment in an emergency department can cost 2 to 3 times more than the same care in your doctor's office. It is important that you understand your health insurance and treatment options before an illness or injury occur. If your problem is not life threatening or risking further injury, but you are concerned, call your doctor first to see if they can work you in. If not, go to an urgent care clinic. Sincerely, Laurel Lowery RN

## 2018-10-31 NOTE — PROGRESS NOTES
Attempted to contact patient on 2 different days and her VM is full, letter sent.    [address] [facility]

## 2018-11-03 ENCOUNTER — HOSPITAL ENCOUNTER (INPATIENT)
Age: 43
LOS: 2 days | Discharge: LEFT BEFORE DISCHARGE | DRG: 638 | End: 2018-11-05
Attending: EMERGENCY MEDICINE | Admitting: INTERNAL MEDICINE
Payer: MEDICARE

## 2018-11-03 ENCOUNTER — APPOINTMENT (OUTPATIENT)
Dept: GENERAL RADIOLOGY | Age: 43
DRG: 638 | End: 2018-11-03
Attending: NURSE PRACTITIONER
Payer: MEDICARE

## 2018-11-03 DIAGNOSIS — F19.10 SUBSTANCE ABUSE (HCC): ICD-10-CM

## 2018-11-03 DIAGNOSIS — E10.10 DIABETIC KETOACIDOSIS WITHOUT COMA ASSOCIATED WITH TYPE 1 DIABETES MELLITUS (HCC): Primary | ICD-10-CM

## 2018-11-03 LAB
ALBUMIN SERPL-MCNC: 3.4 G/DL (ref 3.5–5)
ALBUMIN/GLOB SERPL: 0.8 {RATIO} (ref 1.1–2.2)
ALP SERPL-CCNC: 112 U/L (ref 45–117)
ALT SERPL-CCNC: 34 U/L (ref 12–78)
ANION GAP SERPL CALC-SCNC: 26 MMOL/L (ref 5–15)
AST SERPL-CCNC: 14 U/L (ref 15–37)
BASOPHILS # BLD: 0.1 K/UL (ref 0–0.1)
BASOPHILS NFR BLD: 0 % (ref 0–1)
BILIRUB SERPL-MCNC: 0.8 MG/DL (ref 0.2–1)
BNP SERPL-MCNC: 118 PG/ML (ref 0–125)
BUN SERPL-MCNC: 12 MG/DL (ref 6–20)
BUN/CREAT SERPL: 10 (ref 12–20)
CALCIUM SERPL-MCNC: 8.4 MG/DL (ref 8.5–10.1)
CHLORIDE SERPL-SCNC: 97 MMOL/L (ref 97–108)
CK MB CFR SERPL CALC: 4.8 % (ref 0–2.5)
CK MB SERPL-MCNC: 3.1 NG/ML (ref 5–25)
CK SERPL-CCNC: 65 U/L (ref 26–192)
CO2 SERPL-SCNC: 10 MMOL/L (ref 21–32)
CREAT SERPL-MCNC: 1.21 MG/DL (ref 0.55–1.02)
DIFFERENTIAL METHOD BLD: ABNORMAL
EOSINOPHIL # BLD: 0 K/UL (ref 0–0.4)
EOSINOPHIL NFR BLD: 0 % (ref 0–7)
ERYTHROCYTE [DISTWIDTH] IN BLOOD BY AUTOMATED COUNT: 15.7 % (ref 11.5–14.5)
GLOBULIN SER CALC-MCNC: 4.3 G/DL (ref 2–4)
GLUCOSE BLD STRIP.AUTO-MCNC: 414 MG/DL (ref 65–100)
GLUCOSE SERPL-MCNC: 444 MG/DL (ref 65–100)
HCT VFR BLD AUTO: 42.8 % (ref 35–47)
HGB BLD-MCNC: 15.1 G/DL (ref 11.5–16)
IMM GRANULOCYTES # BLD: 0.1 K/UL (ref 0–0.04)
IMM GRANULOCYTES NFR BLD AUTO: 0 % (ref 0–0.5)
LYMPHOCYTES # BLD: 1.5 K/UL (ref 0.8–3.5)
LYMPHOCYTES NFR BLD: 12 % (ref 12–49)
MCH RBC QN AUTO: 27.2 PG (ref 26–34)
MCHC RBC AUTO-ENTMCNC: 35.3 G/DL (ref 30–36.5)
MCV RBC AUTO: 77.1 FL (ref 80–99)
MONOCYTES # BLD: 0.7 K/UL (ref 0–1)
MONOCYTES NFR BLD: 6 % (ref 5–13)
NEUTS SEG # BLD: 10.4 K/UL (ref 1.8–8)
NEUTS SEG NFR BLD: 82 % (ref 32–75)
NRBC # BLD: 0 K/UL (ref 0–0.01)
NRBC BLD-RTO: 0 PER 100 WBC
PLATELET # BLD AUTO: 293 K/UL (ref 150–400)
PMV BLD AUTO: 12.1 FL (ref 8.9–12.9)
POTASSIUM SERPL-SCNC: 4.7 MMOL/L (ref 3.5–5.1)
PROT SERPL-MCNC: 7.7 G/DL (ref 6.4–8.2)
RBC # BLD AUTO: 5.55 M/UL (ref 3.8–5.2)
SERVICE CMNT-IMP: ABNORMAL
SODIUM SERPL-SCNC: 133 MMOL/L (ref 136–145)
TROPONIN I SERPL-MCNC: <0.05 NG/ML
WBC # BLD AUTO: 12.7 K/UL (ref 3.6–11)

## 2018-11-03 PROCEDURE — 65270000029 HC RM PRIVATE

## 2018-11-03 PROCEDURE — 74011250636 HC RX REV CODE- 250/636: Performed by: NURSE PRACTITIONER

## 2018-11-03 PROCEDURE — 84484 ASSAY OF TROPONIN QUANT: CPT | Performed by: NURSE PRACTITIONER

## 2018-11-03 PROCEDURE — 99285 EMERGENCY DEPT VISIT HI MDM: CPT

## 2018-11-03 PROCEDURE — 85025 COMPLETE CBC W/AUTO DIFF WBC: CPT | Performed by: NURSE PRACTITIONER

## 2018-11-03 PROCEDURE — 80053 COMPREHEN METABOLIC PANEL: CPT | Performed by: NURSE PRACTITIONER

## 2018-11-03 PROCEDURE — 83880 ASSAY OF NATRIURETIC PEPTIDE: CPT | Performed by: NURSE PRACTITIONER

## 2018-11-03 PROCEDURE — 71045 X-RAY EXAM CHEST 1 VIEW: CPT

## 2018-11-03 PROCEDURE — 82803 BLOOD GASES ANY COMBINATION: CPT | Performed by: NURSE PRACTITIONER

## 2018-11-03 PROCEDURE — 82962 GLUCOSE BLOOD TEST: CPT

## 2018-11-03 PROCEDURE — 74011636637 HC RX REV CODE- 636/637: Performed by: NURSE PRACTITIONER

## 2018-11-03 PROCEDURE — 36600 WITHDRAWAL OF ARTERIAL BLOOD: CPT | Performed by: NURSE PRACTITIONER

## 2018-11-03 PROCEDURE — 96374 THER/PROPH/DIAG INJ IV PUSH: CPT

## 2018-11-03 PROCEDURE — 83735 ASSAY OF MAGNESIUM: CPT | Performed by: INTERNAL MEDICINE

## 2018-11-03 PROCEDURE — 96361 HYDRATE IV INFUSION ADD-ON: CPT

## 2018-11-03 PROCEDURE — 93005 ELECTROCARDIOGRAM TRACING: CPT

## 2018-11-03 PROCEDURE — 82550 ASSAY OF CK (CPK): CPT | Performed by: NURSE PRACTITIONER

## 2018-11-03 PROCEDURE — 36600 WITHDRAWAL OF ARTERIAL BLOOD: CPT

## 2018-11-03 PROCEDURE — 36415 COLL VENOUS BLD VENIPUNCTURE: CPT | Performed by: NURSE PRACTITIONER

## 2018-11-03 RX ORDER — HYDRALAZINE HYDROCHLORIDE 20 MG/ML
10 INJECTION INTRAMUSCULAR; INTRAVENOUS
Status: DISCONTINUED | OUTPATIENT
Start: 2018-11-03 | End: 2018-11-05 | Stop reason: HOSPADM

## 2018-11-03 RX ORDER — SODIUM CHLORIDE 9 MG/ML
150 INJECTION, SOLUTION INTRAVENOUS CONTINUOUS
Status: DISCONTINUED | OUTPATIENT
Start: 2018-11-03 | End: 2018-11-04

## 2018-11-03 RX ORDER — SERTRALINE HYDROCHLORIDE 50 MG/1
100 TABLET, FILM COATED ORAL DAILY
Status: DISCONTINUED | OUTPATIENT
Start: 2018-11-04 | End: 2018-11-04

## 2018-11-03 RX ORDER — INSULIN LISPRO 100 [IU]/ML
INJECTION, SOLUTION INTRAVENOUS; SUBCUTANEOUS
Status: DISCONTINUED | OUTPATIENT
Start: 2018-11-04 | End: 2018-11-04

## 2018-11-03 RX ORDER — SODIUM CHLORIDE 0.9 % (FLUSH) 0.9 %
5-10 SYRINGE (ML) INJECTION EVERY 8 HOURS
Status: DISCONTINUED | OUTPATIENT
Start: 2018-11-03 | End: 2018-11-05 | Stop reason: HOSPADM

## 2018-11-03 RX ORDER — ALBUTEROL SULFATE 90 UG/1
2 AEROSOL, METERED RESPIRATORY (INHALATION)
Status: DISCONTINUED | OUTPATIENT
Start: 2018-11-03 | End: 2018-11-05 | Stop reason: HOSPADM

## 2018-11-03 RX ORDER — ONDANSETRON 2 MG/ML
4 INJECTION INTRAMUSCULAR; INTRAVENOUS
Status: DISCONTINUED | OUTPATIENT
Start: 2018-11-03 | End: 2018-11-05 | Stop reason: HOSPADM

## 2018-11-03 RX ORDER — MAGNESIUM SULFATE 100 %
4 CRYSTALS MISCELLANEOUS AS NEEDED
Status: DISCONTINUED | OUTPATIENT
Start: 2018-11-03 | End: 2018-11-05 | Stop reason: HOSPADM

## 2018-11-03 RX ORDER — ENOXAPARIN SODIUM 100 MG/ML
40 INJECTION SUBCUTANEOUS DAILY
Status: DISCONTINUED | OUTPATIENT
Start: 2018-11-04 | End: 2018-11-05 | Stop reason: HOSPADM

## 2018-11-03 RX ORDER — SODIUM CHLORIDE 0.9 % (FLUSH) 0.9 %
5-10 SYRINGE (ML) INJECTION AS NEEDED
Status: DISCONTINUED | OUTPATIENT
Start: 2018-11-03 | End: 2018-11-05 | Stop reason: HOSPADM

## 2018-11-03 RX ORDER — BISACODYL 5 MG
5 TABLET, DELAYED RELEASE (ENTERIC COATED) ORAL DAILY PRN
Status: DISCONTINUED | OUTPATIENT
Start: 2018-11-03 | End: 2018-11-05 | Stop reason: HOSPADM

## 2018-11-03 RX ORDER — DIVALPROEX SODIUM 500 MG/1
500 TABLET, DELAYED RELEASE ORAL 2 TIMES DAILY
Status: DISCONTINUED | OUTPATIENT
Start: 2018-11-04 | End: 2018-11-04

## 2018-11-03 RX ORDER — HALOPERIDOL 5 MG/1
5 TABLET ORAL EVERY 12 HOURS
Status: DISCONTINUED | OUTPATIENT
Start: 2018-11-03 | End: 2018-11-05 | Stop reason: HOSPADM

## 2018-11-03 RX ORDER — BENZTROPINE MESYLATE 1 MG/1
1 TABLET ORAL 2 TIMES DAILY
Status: DISCONTINUED | OUTPATIENT
Start: 2018-11-04 | End: 2018-11-04

## 2018-11-03 RX ORDER — MELOXICAM 7.5 MG/1
15 TABLET ORAL
Status: DISCONTINUED | OUTPATIENT
Start: 2018-11-03 | End: 2018-11-05 | Stop reason: HOSPADM

## 2018-11-03 RX ORDER — DEXTROSE 50 % IN WATER (D50W) INTRAVENOUS SYRINGE
25-50 AS NEEDED
Status: DISCONTINUED | OUTPATIENT
Start: 2018-11-03 | End: 2018-11-05 | Stop reason: HOSPADM

## 2018-11-03 RX ORDER — ACETAMINOPHEN 325 MG/1
650 TABLET ORAL
Status: DISCONTINUED | OUTPATIENT
Start: 2018-11-03 | End: 2018-11-05 | Stop reason: HOSPADM

## 2018-11-03 RX ADMIN — SODIUM CHLORIDE 1000 ML: 900 INJECTION, SOLUTION INTRAVENOUS at 23:18

## 2018-11-03 RX ADMIN — HUMAN INSULIN 10 UNITS: 100 INJECTION, SOLUTION SUBCUTANEOUS at 22:23

## 2018-11-04 LAB
ADMINISTERED INITIALS, ADMINIT: NORMAL
ANION GAP SERPL CALC-SCNC: 13 MMOL/L (ref 5–15)
ANION GAP SERPL CALC-SCNC: 14 MMOL/L (ref 5–15)
ANION GAP SERPL CALC-SCNC: 15 MMOL/L (ref 5–15)
ARTERIAL PATENCY WRIST A: YES
ATRIAL RATE: 96 BPM
BASE EXCESS BLDA CALC-SCNC: 18.5 MMOL/L
BDY SITE: ABNORMAL
BUN SERPL-MCNC: 12 MG/DL (ref 6–20)
BUN SERPL-MCNC: 7 MG/DL (ref 6–20)
BUN SERPL-MCNC: 9 MG/DL (ref 6–20)
BUN/CREAT SERPL: 10 (ref 12–20)
BUN/CREAT SERPL: 10 (ref 12–20)
BUN/CREAT SERPL: 8 (ref 12–20)
CALCIUM SERPL-MCNC: 7.8 MG/DL (ref 8.5–10.1)
CALCIUM SERPL-MCNC: 7.9 MG/DL (ref 8.5–10.1)
CALCIUM SERPL-MCNC: 8 MG/DL (ref 8.5–10.1)
CALCULATED P AXIS, ECG09: 68 DEGREES
CALCULATED R AXIS, ECG10: 32 DEGREES
CALCULATED T AXIS, ECG11: 74 DEGREES
CHLORIDE SERPL-SCNC: 106 MMOL/L (ref 97–108)
CHLORIDE SERPL-SCNC: 106 MMOL/L (ref 97–108)
CHLORIDE SERPL-SCNC: 109 MMOL/L (ref 97–108)
CO2 SERPL-SCNC: 15 MMOL/L (ref 21–32)
CO2 SERPL-SCNC: 16 MMOL/L (ref 21–32)
CO2 SERPL-SCNC: 17 MMOL/L (ref 21–32)
CREAT SERPL-MCNC: 0.86 MG/DL (ref 0.55–1.02)
CREAT SERPL-MCNC: 0.91 MG/DL (ref 0.55–1.02)
CREAT SERPL-MCNC: 1.16 MG/DL (ref 0.55–1.02)
D50 ADMINISTERED, D50ADM: 0 ML
D50 ORDER, D50ORD: 0 ML
DIAGNOSIS, 93000: NORMAL
EST. AVERAGE GLUCOSE BLD GHB EST-MCNC: 384 MG/DL
FIO2 ON VENT: 21 %
GLSCOM COMMENTS: NORMAL
GLUCOSE BLD STRIP.AUTO-MCNC: 101 MG/DL (ref 65–100)
GLUCOSE BLD STRIP.AUTO-MCNC: 112 MG/DL (ref 65–100)
GLUCOSE BLD STRIP.AUTO-MCNC: 113 MG/DL (ref 65–100)
GLUCOSE BLD STRIP.AUTO-MCNC: 116 MG/DL (ref 65–100)
GLUCOSE BLD STRIP.AUTO-MCNC: 149 MG/DL (ref 65–100)
GLUCOSE BLD STRIP.AUTO-MCNC: 172 MG/DL (ref 65–100)
GLUCOSE BLD STRIP.AUTO-MCNC: 173 MG/DL (ref 65–100)
GLUCOSE BLD STRIP.AUTO-MCNC: 175 MG/DL (ref 65–100)
GLUCOSE BLD STRIP.AUTO-MCNC: 196 MG/DL (ref 65–100)
GLUCOSE BLD STRIP.AUTO-MCNC: 196 MG/DL (ref 65–100)
GLUCOSE BLD STRIP.AUTO-MCNC: 290 MG/DL (ref 65–100)
GLUCOSE BLD STRIP.AUTO-MCNC: 293 MG/DL (ref 65–100)
GLUCOSE BLD STRIP.AUTO-MCNC: 352 MG/DL (ref 65–100)
GLUCOSE BLD STRIP.AUTO-MCNC: 415 MG/DL (ref 65–100)
GLUCOSE BLD STRIP.AUTO-MCNC: 429 MG/DL (ref 65–100)
GLUCOSE BLD STRIP.AUTO-MCNC: 430 MG/DL (ref 65–100)
GLUCOSE BLD STRIP.AUTO-MCNC: 439 MG/DL (ref 65–100)
GLUCOSE SERPL-MCNC: 107 MG/DL (ref 65–100)
GLUCOSE SERPL-MCNC: 165 MG/DL (ref 65–100)
GLUCOSE SERPL-MCNC: 281 MG/DL (ref 65–100)
GLUCOSE, GLC: 101 MG/DL
GLUCOSE, GLC: 112 MG/DL
GLUCOSE, GLC: 113 MG/DL
GLUCOSE, GLC: 116 MG/DL
GLUCOSE, GLC: 149 MG/DL
GLUCOSE, GLC: 172 MG/DL
GLUCOSE, GLC: 173 MG/DL
GLUCOSE, GLC: 175 MG/DL
GLUCOSE, GLC: 196 MG/DL
GLUCOSE, GLC: 196 MG/DL
GLUCOSE, GLC: 290 MG/DL
GLUCOSE, GLC: 293 MG/DL
GLUCOSE, GLC: 352 MG/DL
GLUCOSE, GLC: 415 MG/DL
GLUCOSE, GLC: 429 MG/DL
HBA1C MFR BLD: 15 % (ref 4.2–6.3)
HCO3 BLDA-SCNC: 6 MMOL/L (ref 22–26)
HIGH TARGET, HITG: 250 MG/DL
INSULIN ADMINSTERED, INSADM: 0.7 UNITS/HOUR
INSULIN ADMINSTERED, INSADM: 0.9 UNITS/HOUR
INSULIN ADMINSTERED, INSADM: 0.9 UNITS/HOUR
INSULIN ADMINSTERED, INSADM: 1 UNITS/HOUR
INSULIN ADMINSTERED, INSADM: 1.1 UNITS/HOUR
INSULIN ADMINSTERED, INSADM: 1.1 UNITS/HOUR
INSULIN ADMINSTERED, INSADM: 1.5 UNITS/HOUR
INSULIN ADMINSTERED, INSADM: 1.6 UNITS/HOUR
INSULIN ADMINSTERED, INSADM: 10.7 UNITS/HOUR
INSULIN ADMINSTERED, INSADM: 11.5 UNITS/HOUR
INSULIN ADMINSTERED, INSADM: 11.7 UNITS/HOUR
INSULIN ADMINSTERED, INSADM: 14 UNITS/HOUR
INSULIN ADMINSTERED, INSADM: 2.7 UNITS/HOUR
INSULIN ADMINSTERED, INSADM: 6.9 UNITS/HOUR
INSULIN ADMINSTERED, INSADM: 7.4 UNITS/HOUR
INSULIN ORDER, INSORD: 0.7 UNITS/HOUR
INSULIN ORDER, INSORD: 0.9 UNITS/HOUR
INSULIN ORDER, INSORD: 0.9 UNITS/HOUR
INSULIN ORDER, INSORD: 1 UNITS/HOUR
INSULIN ORDER, INSORD: 1.1 UNITS/HOUR
INSULIN ORDER, INSORD: 1.1 UNITS/HOUR
INSULIN ORDER, INSORD: 1.5 UNITS/HOUR
INSULIN ORDER, INSORD: 1.6 UNITS/HOUR
INSULIN ORDER, INSORD: 10.7 UNITS/HOUR
INSULIN ORDER, INSORD: 11.5 UNITS/HOUR
INSULIN ORDER, INSORD: 11.7 UNITS/HOUR
INSULIN ORDER, INSORD: 14 UNITS/HOUR
INSULIN ORDER, INSORD: 2.7 UNITS/HOUR
INSULIN ORDER, INSORD: 6.9 UNITS/HOUR
INSULIN ORDER, INSORD: 7.4 UNITS/HOUR
LOW TARGET, LOT: 150 MG/DL
MAGNESIUM SERPL-MCNC: 1.6 MG/DL (ref 1.6–2.4)
MAGNESIUM SERPL-MCNC: 1.7 MG/DL (ref 1.6–2.4)
MAGNESIUM SERPL-MCNC: 1.7 MG/DL (ref 1.6–2.4)
MAGNESIUM SERPL-MCNC: 1.9 MG/DL (ref 1.6–2.4)
MINUTES UNTIL NEXT BG, NBG: 60 MIN
MULTIPLIER, MUL: 0.01
MULTIPLIER, MUL: 0.02
MULTIPLIER, MUL: 0.02
MULTIPLIER, MUL: 0.03
MULTIPLIER, MUL: 0.03
MULTIPLIER, MUL: 0.04
MULTIPLIER, MUL: 0.04
MULTIPLIER, MUL: 0.05
MULTIPLIER, MUL: 0.05
MULTIPLIER, MUL: 0.06
MULTIPLIER, MUL: 0.06
ORDER INITIALS, ORDINIT: NORMAL
P-R INTERVAL, ECG05: 124 MS
PCO2 BLDA: 13 MMHG (ref 35–45)
PH BLDA: 7.26 [PH] (ref 7.35–7.45)
PHOSPHATE SERPL-MCNC: 2.4 MG/DL (ref 2.6–4.7)
PHOSPHATE SERPL-MCNC: 2.5 MG/DL (ref 2.6–4.7)
PHOSPHATE SERPL-MCNC: 2.7 MG/DL (ref 2.6–4.7)
PO2 BLDA: 108 MMHG (ref 80–100)
POTASSIUM SERPL-SCNC: 3.4 MMOL/L (ref 3.5–5.1)
POTASSIUM SERPL-SCNC: 3.5 MMOL/L (ref 3.5–5.1)
POTASSIUM SERPL-SCNC: 3.9 MMOL/L (ref 3.5–5.1)
Q-T INTERVAL, ECG07: 360 MS
QRS DURATION, ECG06: 72 MS
QTC CALCULATION (BEZET), ECG08: 454 MS
SAO2 % BLD: 97 % (ref 92–97)
SAO2% DEVICE SAO2% SENSOR NAME: ABNORMAL
SERVICE CMNT-IMP: ABNORMAL
SODIUM SERPL-SCNC: 136 MMOL/L (ref 136–145)
SODIUM SERPL-SCNC: 137 MMOL/L (ref 136–145)
SODIUM SERPL-SCNC: 138 MMOL/L (ref 136–145)
SPECIMEN SITE: ABNORMAL
VENTRICULAR RATE, ECG03: 96 BPM

## 2018-11-04 PROCEDURE — 74011636637 HC RX REV CODE- 636/637: Performed by: HOSPITALIST

## 2018-11-04 PROCEDURE — 74011250636 HC RX REV CODE- 250/636: Performed by: HOSPITALIST

## 2018-11-04 PROCEDURE — 74011250636 HC RX REV CODE- 250/636: Performed by: INTERNAL MEDICINE

## 2018-11-04 PROCEDURE — 80048 BASIC METABOLIC PNL TOTAL CA: CPT | Performed by: INTERNAL MEDICINE

## 2018-11-04 PROCEDURE — 80048 BASIC METABOLIC PNL TOTAL CA: CPT | Performed by: HOSPITALIST

## 2018-11-04 PROCEDURE — 83036 HEMOGLOBIN GLYCOSYLATED A1C: CPT | Performed by: INTERNAL MEDICINE

## 2018-11-04 PROCEDURE — 83735 ASSAY OF MAGNESIUM: CPT | Performed by: INTERNAL MEDICINE

## 2018-11-04 PROCEDURE — 74011250637 HC RX REV CODE- 250/637: Performed by: INTERNAL MEDICINE

## 2018-11-04 PROCEDURE — 84100 ASSAY OF PHOSPHORUS: CPT | Performed by: INTERNAL MEDICINE

## 2018-11-04 PROCEDURE — 83735 ASSAY OF MAGNESIUM: CPT | Performed by: HOSPITALIST

## 2018-11-04 PROCEDURE — 65660000001 HC RM ICU INTERMED STEPDOWN

## 2018-11-04 PROCEDURE — 82962 GLUCOSE BLOOD TEST: CPT

## 2018-11-04 PROCEDURE — 74011636637 HC RX REV CODE- 636/637: Performed by: INTERNAL MEDICINE

## 2018-11-04 PROCEDURE — 84100 ASSAY OF PHOSPHORUS: CPT | Performed by: HOSPITALIST

## 2018-11-04 PROCEDURE — 74011250637 HC RX REV CODE- 250/637: Performed by: HOSPITALIST

## 2018-11-04 PROCEDURE — 74011000258 HC RX REV CODE- 258: Performed by: INTERNAL MEDICINE

## 2018-11-04 PROCEDURE — 36415 COLL VENOUS BLD VENIPUNCTURE: CPT | Performed by: INTERNAL MEDICINE

## 2018-11-04 RX ORDER — AMLODIPINE BESYLATE 5 MG/1
5 TABLET ORAL DAILY
Status: DISCONTINUED | OUTPATIENT
Start: 2018-11-04 | End: 2018-11-05 | Stop reason: HOSPADM

## 2018-11-04 RX ORDER — INSULIN LISPRO 100 [IU]/ML
INJECTION, SOLUTION INTRAVENOUS; SUBCUTANEOUS
Status: DISCONTINUED | OUTPATIENT
Start: 2018-11-05 | End: 2018-11-05

## 2018-11-04 RX ORDER — INSULIN GLARGINE 100 [IU]/ML
15 INJECTION, SOLUTION SUBCUTANEOUS ONCE
Status: COMPLETED | OUTPATIENT
Start: 2018-11-04 | End: 2018-11-04

## 2018-11-04 RX ORDER — POTASSIUM CHLORIDE 1.5 G/1.77G
40 POWDER, FOR SOLUTION ORAL
Status: COMPLETED | OUTPATIENT
Start: 2018-11-04 | End: 2018-11-04

## 2018-11-04 RX ORDER — INSULIN LISPRO 100 [IU]/ML
INJECTION, SOLUTION INTRAVENOUS; SUBCUTANEOUS
Status: DISCONTINUED | OUTPATIENT
Start: 2018-11-05 | End: 2018-11-04

## 2018-11-04 RX ORDER — DEXTROSE MONOHYDRATE AND SODIUM CHLORIDE 5; .45 G/100ML; G/100ML
75 INJECTION, SOLUTION INTRAVENOUS CONTINUOUS
Status: DISCONTINUED | OUTPATIENT
Start: 2018-11-04 | End: 2018-11-04

## 2018-11-04 RX ORDER — SODIUM CHLORIDE 9 MG/ML
100 INJECTION, SOLUTION INTRAVENOUS CONTINUOUS
Status: DISCONTINUED | OUTPATIENT
Start: 2018-11-04 | End: 2018-11-05 | Stop reason: HOSPADM

## 2018-11-04 RX ADMIN — Medication 10 ML: at 21:40

## 2018-11-04 RX ADMIN — HALOPERIDOL 5 MG: 5 TABLET ORAL at 01:48

## 2018-11-04 RX ADMIN — INSULIN GLARGINE 15 UNITS: 100 INJECTION, SOLUTION SUBCUTANEOUS at 15:37

## 2018-11-04 RX ADMIN — Medication 10 ML: at 05:59

## 2018-11-04 RX ADMIN — SODIUM CHLORIDE 150 ML/HR: 900 INJECTION, SOLUTION INTRAVENOUS at 01:22

## 2018-11-04 RX ADMIN — Medication 10 ML: at 01:22

## 2018-11-04 RX ADMIN — ENOXAPARIN SODIUM 40 MG: 40 INJECTION, SOLUTION INTRAVENOUS; SUBCUTANEOUS at 11:47

## 2018-11-04 RX ADMIN — SODIUM CHLORIDE 100 ML/HR: 900 INJECTION, SOLUTION INTRAVENOUS at 17:39

## 2018-11-04 RX ADMIN — Medication 10 ML: at 14:00

## 2018-11-04 RX ADMIN — SODIUM CHLORIDE 7.4 UNITS/HR: 900 INJECTION, SOLUTION INTRAVENOUS at 01:22

## 2018-11-04 RX ADMIN — POTASSIUM CHLORIDE 40 MEQ: 1.5 POWDER, FOR SOLUTION ORAL at 22:42

## 2018-11-04 RX ADMIN — HUMAN INSULIN 6 UNITS: 100 INJECTION, SOLUTION SUBCUTANEOUS at 22:41

## 2018-11-04 RX ADMIN — DEXTROSE MONOHYDRATE AND SODIUM CHLORIDE 75 ML/HR: 5; .45 INJECTION, SOLUTION INTRAVENOUS at 06:14

## 2018-11-04 RX ADMIN — HALOPERIDOL 5 MG: 5 TABLET ORAL at 21:40

## 2018-11-04 NOTE — PROGRESS NOTES
2351: TRANSFER - IN REPORT: 
 
Verbal report received from [address] [facility](name) on [address] Miranda Hoskins  being received from ED(unit) for routine progression of care Report consisted of patients Situation, Background, Assessment and  
Recommendations(SBAR). Information from the following report(s) SBAR, Kardex, ED Summary, Intake/Output, MAR, Recent Results and Cardiac Rhythm SR, PVCs was reviewed with the receiving nurse. Opportunity for questions and clarification was provided. 0023: Pt arrived to floor. Assessment completed upon patients arrival to unit and care assumed. The ending of Daylight Saving Time occurred at 0200 hrs. Documentation of patient care and medications administered is done with respect to the time change. 0448: Critical lab value CO2 15 notified by Marielos Suggs in lab. 
 
0505: Dr. Basil Worley notified of critical lab value CO2 15. 
 
0800: Bedside shift change report given to Teresa Goodwin (oncoming nurse) by Marito Conway (offgoing nurse).  Report included the following information SBAR, Kardex, ED Summary, Intake/Output, MAR, Recent Results and Cardiac Rhythm ST, SR.

## 2018-11-04 NOTE — H&P
Hospitalist Admission Note NAME: Macho Hoskins :  1975 MRN:  [id number] Date/Time:  2018 7:59 AM 
 
Patient PCP: None 
______________________________________________________________________ Given the patient's current clinical presentation, I have a high level of concern for decompensation if discharged from the emergency department. Complex decision making was performed, which includes reviewing the patient's available past medical records, laboratory results, and x-ray films. My assessment of this patient's clinical condition and my plan of care is as follows. Assessment / Plan: 
 
 
DKA in setting of uncontrolled DM1: POA Anion gap metabolic acidosis due to above Non compliant with medication, pt known form last admission AG 26, bicarb 10 on labs. ABG on admission ph 7.26, bicarb 
- admitted per protocol, on insulin gtt 
- NPO for now, IV fluids, Hba1c, bmp every 4 hours and replace electrolytes as needed Chest pain and SOB 
likeley secondary to cocaine use and compensation for metabolic acidosis -CXR with no acute process on portable chest. No change. 
-trop <0.05 
-monitor on tele Elevated creatinine, doesn't meet criteria for FABIENNE 
likely volmue depleted Monitor renal function on IVF Asthma without acute exacerbation: POA 
- continue albuterol prn Schizoaffective disorder with anxiety and depression: POA 
with polysubstance abuse and drug-seeking behavior Non compliant with psychiatry meds 
-psych consulted in house to review meds Hypertension: POA 
SBP was in 180 on presentation in setting of cocaine use  
-not on meds at home, will start on CCB Code Status: full  
Surrogate Decision Maker: son DVT Prophylaxis: lovenox Baseline: homeless Subjective: CHIEF COMPLAINT: Chest pain and SOB HISTORY OF PRESENT ILLNESS:   Admitted by telehospitalist last night, seen and examined by me 0745 am today.  
Janine Leung is a 44 y.o. is a [facility] American female who presented last night for above symptoms. Pt is knonwn to me from last admission when she left AMA because she could not get 'ice cream\". Pt is very sleepy and lethargic and poor historian. Says currently she is living with a friend, admits to non compliance with medications and recent use of cocaine. Chest pain and SOB resolved. She denies recent fever, cough or URI symptoms diarrhea or constipation. In ER initial eval she was found in DKA with high anion gap, elevated bicarb, low ph. CXR neg for acute process, troponin neg. We were asked to admit for work up and evaluation of the above problems. Past Medical History:  
Diagnosis Date  Alcohol abuse, in remission   
 quit 17 days ago  Asthma   
 does not use inhalers  Borderline personality disorder ([address]  Diabetes ([address]  GERD (gastroesophageal reflux disease)  Hypertension  Other ill-defined conditions(799.89)   
 kidney stones ,passed one  Other ill-defined conditions(799.89) sickle cell trait  Other ill-defined conditions(799.89)   
 increased cholesterol  Pancreatitis  Psychiatric disorder   
 schizophrenia, bipolar, depression, anxiety Past Surgical History:  
Procedure Laterality Date  ABDOMEN SURGERY PROC UNLISTED  3/12/14 CHOLECYSTECTOMY LAPAROSCOPIC    
 HX GASTRIC BYPASS  HX GYN  11/8/2012  
 c section x2  HX OTHER SURGICAL  3/13/14 ENDOSCOPIC RETROGRADE CHOLANGIOPANCREATOGRAPHY  HX OTHER SURGICAL  8/4/14  
 endoscopic stent placed to bile duct  HX TUBAL LIGATION  2012 Social History Tobacco Use  Smoking status: Current Every Day Smoker Packs/day: 0.50 Years: 14.00 Pack years: 7.00 Types: Cigarettes  Smokeless tobacco: Never Used  Tobacco comment: cigarettes Substance Use Topics  Alcohol use: Yes Alcohol/week: 0.6 oz Types: 1 Cans of beer per week   Comment: occasionally, last had \"i had one beer\" today Family History Problem Relation Age of Onset  Heart Disease Mother  Diabetes Mother  Hypertension Mother  Hypertension Maternal Grandmother Allergies Allergen Reactions  Dilaudid [Hydromorphone (Bulk)] Itching  Ibuprofen Not Reported This Time Prior to Admission medications Medication Sig Start Date End Date Taking? Authorizing Provider  
haloperidol decanoate (HALDOL DECANOATE) 100 mg/mL injection 100 mg by IntraMUSCular route every twenty-eight (28) days. Provider, Historical  
metFORMIN (GLUCOPHAGE) 1,000 mg tablet Take 1,000 mg by mouth. Provider, Historical  
meloxicam (MOBIC) 15 mg tablet Take 15 mg by mouth daily as needed for Pain. Provider, Historical  
insulin regular (HUMULIN R REGULAR U-100 INSULN) 100 unit/mL injection by SubCUTAneous route. Sliding scale    Provider, Historical  
insulin aspart U-100 (NOVOLOG FLEXPEN U-100 INSULIN) 100 unit/mL inpn by SubCUTAneous route three (3) times daily as needed. Sliding scale    Provider, Historical  
sertraline (ZOLOFT) 100 mg tablet Take 100 mg by mouth daily. Provider, Historical  
benztropine (COGENTIN) 1 mg tablet Take 1 Tab by mouth two (2) times a day. Indications: drug-induced extrapyramidal reaction Patient taking differently: Take 1 mg by mouth every twelve (12) hours. 8/14/18   Reshma Mcbride MD  
Blood-Glucose Meter monitoring kit Monitor BG 4 times daily. Dx uncontrolled type 2 DM E11.65  Indications: Diabetes Mellitus 8/14/18   Reshma Mcbride MD  
divalproex DR (DEPAKOTE) 500 mg tablet Take 1 Tab by mouth two (2) times a day. Indications: Schizoaffective disorder Patient taking differently: Take 500 mg by mouth every twelve (12) hours. 8/14/18   Reshma Mcbride MD  
haloperidol (HALDOL) 5 mg tablet Take 1 Tab by mouth every twelve (12) hours.  Indications: Schizoaffective disorder 8/14/18   Reshma Mcbride MD  
albuterol (PROVENTIL HFA, VENTOLIN HFA, PROAIR HFA) 90 mcg/actuation inhaler Take 1 Puff by inhalation every four (4) hours as needed for Wheezing. Provider, Historical  
insulin glargine (LANTUS) 100 unit/mL injection 30 Units by SubCUTAneous route nightly. Indications: type 1 diabetes mellitus Patient taking differently: 30 Units by SubCUTAneous route daily. Indications: type 1 diabetes mellitus 11/17/17   Harsha Hubbard PA-C  
 
 
REVIEW OF SYSTEMS:    
I am not able to complete the review of systems because: The patient is intubated and sedated The patient has altered mental status due to his acute medical problems The patient has baseline aphasia from prior stroke(s) The patient has baseline dementia and is not reliable historian The patient is in acute medical distress and unable to provide information Total of 12 systems reviewed as follows:   
   POSITIVE= underlined text  Negative = text not underlined General:  fever, chills, sweats, generalized weakness, weight loss/gain,  
   loss of appetite Eyes:    blurred vision, eye pain, loss of vision, double vision ENT:    rhinorrhea, pharyngitis Respiratory:   cough, sputum production, SOB, BARRY, wheezing, pleuritic pain  
Cardiology:   chest pain, palpitations, orthopnea, PND, edema, syncope Gastrointestinal:  abdominal pain , N/V, diarrhea, dysphagia, constipation, bleeding Genitourinary:  frequency, urgency, dysuria, hematuria, incontinence Muskuloskeletal :  arthralgia, myalgia, back pain Hematology:  easy bruising, nose or gum bleeding, lymphadenopathy Dermatological: rash, ulceration, pruritis, color change / jaundice Endocrine:   hot flashes or polydipsia Neurological:  headache, dizziness, confusion, focal weakness, paresthesia, Speech difficulties, memory loss, gait difficulty Psychological: Feelings of anxiety, depression, agitation Objective: VITALS:   
Visit Vitals /65 (BP 1 Location: Right arm, BP Patient Position: At rest) Pulse 93 Temp 98.5 °F (36.9 °C) Resp 15 Ht 5' 6\" (1.676 m) Wt 57.2 kg (126 lb 1.7 oz) SpO2 100% BMI 20.35 kg/m² PHYSICAL EXAM: 
 
General:    Drowsy, Alert, cooperative, no distress, appears stated age. HEENT: Atraumatic, anicteric sclerae, pink conjunctivae No oral ulcers, mucosa moist, throat clear, dentition fair Neck:  Supple, symmetrical,  thyroid: non tender Lungs:   Clear to auscultation bilaterally. No Wheezing or Rhonchi. No rales. Chest wall:  No tenderness  No Accessory muscle use. Heart:   Regular  rhythm,  No  murmur   No edema Abdomen:   Soft, non-tender. Not distended. Bowel sounds normal 
Extremities: No cyanosis. No clubbing,   
  Skin turgor normal, Capillary refill normal, Radial dial pulse 2+ Skin:     Not pale. Not Jaundiced  No rashes Psych:  Not depressed. Not anxious or agitated. Neurologic: EOMs intact. No facial asymmetry. No aphasia or slurred speech. Symmetrical strength, Sensation grossly intact. Alert and oriented X 4.  
 
_______________________________________________________________________ Care Plan discussed with: 
  Comments Patient x Family RN x Care Manager Consultant:     
_______________________________________________________________________ Expected  Disposition:  
Home with Family x HH/PT/OT/RN   
SNF/LTC   
[facility]   
________________________________________________________________________ TOTAL TIME:  60 Minutes Critical Care Provided     Minutes non procedure based Comments  
 x Reviewed previous records  
>50% of visit spent in counseling and coordination of care x Discussion with patient and/or family and questions answered 
  
 
________________________________________________________________________ Signed: Donnell Long MD 
 
Procedures: see electronic medical records for all procedures/Xrays and details which were not copied into this note but were reviewed prior to creation of Plan. LAB DATA REVIEWED:   
Recent Results (from the past 24 hour(s)) EKG, 12 LEAD, INITIAL Collection Time: 11/03/18  8:40 PM  
Result Value Ref Range Ventricular Rate 96 BPM  
 Atrial Rate 96 BPM  
 P-R Interval 124 ms QRS Duration 72 ms Q-T Interval 360 ms QTC Calculation (Bezet) 454 ms Calculated P Axis 68 degrees Calculated R Axis 32 degrees Calculated T Axis 74 degrees Diagnosis Sinus rhythm with premature ventricular complexes or fusion complexes Possible Left atrial enlargement Borderline ECG When compared with ECG of 08-AUG-2018 18:16, 
fusion complexes are now present 
premature ventricular complexes are now present BLOOD GAS, ARTERIAL Collection Time: 11/03/18  9:15 PM  
Result Value Ref Range pH 7.26 (L) 7.35 - 7.45    
 PCO2 13 (L) 35.0 - 45.0 mmHg PO2 108 (H) 80 - 100 mmHg O2 SAT 97 92 - 97 % BICARBONATE 6 (L) 22 - 26 mmol/L  
 BASE EXCESS 18.5 mmol/L  
 O2 METHOD ROOM AIR    
 FIO2 21 % Sample source ARTERIAL    
 SITE RIGHT BRACHIAL TOSHIA'S TEST YES    
CBC WITH AUTOMATED DIFF Collection Time: 11/03/18  9:22 PM  
Result Value Ref Range WBC 12.7 (H) 3.6 - 11.0 K/uL  
 RBC 5.55 (H) 3.80 - 5.20 M/uL  
 HGB 15.1 11.5 - 16.0 g/dL HCT 42.8 35.0 - 47.0 % MCV 77.1 (L) 80.0 - 99.0 FL  
 MCH 27.2 26.0 - 34.0 PG  
 MCHC 35.3 30.0 - 36.5 g/dL  
 RDW 15.7 (H) 11.5 - 14.5 % PLATELET 588 688 - 604 K/uL MPV 12.1 8.9 - 12.9 FL  
 NRBC 0.0 0  WBC ABSOLUTE NRBC 0.00 0.00 - 0.01 K/uL NEUTROPHILS 82 (H) 32 - 75 % LYMPHOCYTES 12 12 - 49 % MONOCYTES 6 5 - 13 % EOSINOPHILS 0 0 - 7 % BASOPHILS 0 0 - 1 % IMMATURE GRANULOCYTES 0 0.0 - 0.5 % ABS. NEUTROPHILS 10.4 (H) 1.8 - 8.0 K/UL  
 ABS. LYMPHOCYTES 1.5 0.8 - 3.5 K/UL  
 ABS. MONOCYTES 0.7 0.0 - 1.0 K/UL  
 ABS. EOSINOPHILS 0.0 0.0 - 0.4 K/UL  
 ABS. BASOPHILS 0.1 0.0 - 0.1 K/UL  
 ABS. IMM.  GRANS. 0.1 (H) 0.00 - 0.04 K/UL  
 DF AUTOMATED METABOLIC PANEL, COMPREHENSIVE Collection Time: 11/03/18  9:22 PM  
Result Value Ref Range Sodium 133 (L) 136 - 145 mmol/L Potassium 4.7 3.5 - 5.1 mmol/L Chloride 97 97 - 108 mmol/L  
 CO2 10 (LL) 21 - 32 mmol/L Anion gap 26 (H) 5 - 15 mmol/L Glucose 444 (H) 65 - 100 mg/dL BUN 12 6 - 20 MG/DL Creatinine 1.21 (H) 0.55 - 1.02 MG/DL  
 BUN/Creatinine ratio 10 (L) 12 - 20 GFR est AA 59 (L) >60 ml/min/1.73m2 GFR est non-AA 49 (L) >60 ml/min/1.73m2 Calcium 8.4 (L) 8.5 - 10.1 MG/DL Bilirubin, total 0.8 0.2 - 1.0 MG/DL  
 ALT (SGPT) 34 12 - 78 U/L  
 AST (SGOT) 14 (L) 15 - 37 U/L Alk. phosphatase 112 45 - 117 U/L Protein, total 7.7 6.4 - 8.2 g/dL Albumin 3.4 (L) 3.5 - 5.0 g/dL Globulin 4.3 (H) 2.0 - 4.0 g/dL A-G Ratio 0.8 (L) 1.1 - 2.2    
TROPONIN I Collection Time: 11/03/18  9:22 PM  
Result Value Ref Range Troponin-I, Qt. <0.05 <0.05 ng/mL CK W/ CKMB & INDEX Collection Time: 11/03/18  9:22 PM  
Result Value Ref Range CK 65 26 - 192 U/L  
 CK - MB 3.1 <3.6 NG/ML  
 CK-MB Index 4.8 (H) 0 - 2.5 NT-PRO BNP Collection Time: 11/03/18  9:22 PM  
Result Value Ref Range NT pro- 0 - 125 PG/ML  
MAGNESIUM Collection Time: 11/03/18  9:22 PM  
Result Value Ref Range Magnesium 1.9 1.6 - 2.4 mg/dL GLUCOSE, POC Collection Time: 11/03/18 11:42 PM  
Result Value Ref Range Glucose (POC) 414 (H) 65 - 100 mg/dL Performed by Toney Tripp (Tech) GLUCOSE, POC Collection Time: 11/04/18 12:29 AM  
Result Value Ref Range Glucose (POC) 415 (H) 65 - 100 mg/dL Performed by Saeed Roche* GLUCOSE, POC Collection Time: 11/04/18 12:46 AM  
Result Value Ref Range Glucose (POC) 439 (H) 65 - 100 mg/dL Performed by Saeed Roche* GLUCOSE, POC Collection Time: 11/04/18  1:16 AM  
Result Value Ref Range Glucose (POC) 429 (H) 65 - 100 mg/dL Performed by Saeed Roche* Joe Ortiz  
 Collection Time: 11/04/18  1:16 AM  
Result Value Ref Range Glucose 429 mg/dL Insulin order 7.4 units/hour Insulin adminstered 7.4 units/hour Multiplier 0.020 Low target 150 mg/dL High target 250 mg/dL D50 order 0.0 ml  
 D50 administered 0.00 ml Minutes until next BG 60 min Order initials jcbs Administered initials hardy GLSCOM Comments start Vannie Ambrosia Collection Time: 11/04/18  1:32 AM  
Result Value Ref Range Glucose 415 mg/dL Insulin order 10.7 units/hour Insulin adminstered 10.7 units/hour Multiplier 0.030 Low target 150 mg/dL High target 250 mg/dL D50 order 0.0 ml  
 D50 administered 0.00 ml Minutes until next BG 60 min Order initials JCBS Administered initials HARDY   
 GLSCOM Comments GLUCOSE, POC Collection Time: 11/04/18  2:34 AM  
Result Value Ref Range Glucose (POC) 352 (H) 65 - 100 mg/dL Performed by Tess Nichols) Maurilio Ahumada* Vannie Ambrosia Collection Time: 11/04/18  2:35 AM  
Result Value Ref Range Glucose 352 mg/dL Insulin order 11.7 units/hour Insulin adminstered 11.7 units/hour Multiplier 0.040 Low target 150 mg/dL High target 250 mg/dL D50 order 0.0 ml  
 D50 administered 0.00 ml Minutes until next BG 60 min Order initials JCBS Administered initials HARDY   
 GLSCOM Comments GLUCOSE, POC Collection Time: 11/04/18  3:43 AM  
Result Value Ref Range Glucose (POC) 290 (H) 65 - 100 mg/dL Performed by Deann Gavel Shann Stabs) Maurilio Ahumada* Vannie Ambrosia Collection Time: 11/04/18  3:45 AM  
Result Value Ref Range Glucose 290 mg/dL Insulin order 11.5 units/hour Insulin adminstered 11.5 units/hour Multiplier 0.050 Low target 150 mg/dL High target 250 mg/dL D50 order 0.0 ml  
 D50 administered 0.00 ml Minutes until next BG 60 min Order initials JCBS Administered initials HARDY   
 GLSCOM Comments MAGNESIUM  Collection Time: 11/04/18  3:57 AM Result Value Ref Range Magnesium 1.7 1.6 - 2.4 mg/dL METABOLIC PANEL, BASIC Collection Time: 11/04/18  3:57 AM  
Result Value Ref Range Sodium 136 136 - 145 mmol/L Potassium 3.9 3.5 - 5.1 mmol/L Chloride 106 97 - 108 mmol/L  
 CO2 15 (LL) 21 - 32 mmol/L Anion gap 15 5 - 15 mmol/L Glucose 281 (H) 65 - 100 mg/dL BUN 12 6 - 20 MG/DL Creatinine 1.16 (H) 0.55 - 1.02 MG/DL  
 BUN/Creatinine ratio 10 (L) 12 - 20 GFR est AA >60 >60 ml/min/1.73m2 GFR est non-AA 51 (L) >60 ml/min/1.73m2 Calcium 7.9 (L) 8.5 - 10.1 MG/DL  
PHOSPHORUS Collection Time: 11/04/18  3:57 AM  
Result Value Ref Range Phosphorus 2.4 (L) 2.6 - 4.7 MG/DL  
GLUCOSE, POC Collection Time: 11/04/18  4:47 AM  
Result Value Ref Range Glucose (POC) 293 (H) 65 - 100 mg/dL Performed by Kiley Rodriguez* [facility] Collection Time: 11/04/18  4:49 AM  
Result Value Ref Range Glucose 293 mg/dL Insulin order 14.0 units/hour Insulin adminstered 14.0 units/hour Multiplier 0.060 Low target 150 mg/dL High target 250 mg/dL D50 order 0.0 ml  
 D50 administered 0.00 ml Minutes until next BG 60 min Order initials SHAMAR Administered initials SHAMAR   
 GLSCOM Comments GLUCOSE, POC Collection Time: 11/04/18  5:51 AM  
Result Value Ref Range Glucose (POC) 175 (H) 65 - 100 mg/dL Performed by Kiley Rodriguez* [facility] Collection Time: 11/04/18  5:52 AM  
Result Value Ref Range Glucose 175 mg/dL Insulin order 6.9 units/hour Insulin adminstered 6.9 units/hour Multiplier 0.060 Low target 150 mg/dL High target 250 mg/dL D50 order 0.0 ml  
 D50 administered 0.00 ml Minutes until next BG 60 min Order initials JCBS Administered initials JCBS   
 GLSCOM Comments GLUCOSE, POC Collection Time: 11/04/18  6:53 AM  
Result Value Ref Range Glucose (POC) 116 (H) 65 - 100 mg/dL  Performed by Kiley Matamoros) Diane Rodriguez* Josefina Christianson Collection Time: 11/04/18  6:55 AM  
Result Value Ref Range Glucose 116 mg/dL Insulin order 2.7 units/hour Insulin adminstered 2.7 units/hour Multiplier 0.048 Low target 150 mg/dL High target 250 mg/dL D50 order 0.0 ml  
 D50 administered 0.00 ml Minutes until next BG 60 min Order initials JCBS Administered initials JCSARMAD   
 GLSCOM Comments

## 2018-11-04 NOTE — ED NOTES
Pt presents to ED via EMS complaining of SOB and drug problem. Pt reporting SOB started after smoking crack cocaine earlier today. Pt also reporting chest tightness. Pt is a diabetic with poorly controlled blood sugar and blood pressure. Per EMS pt's BG was 400. Pt reports she does not have access to insulin or BP mediations. Pt's lungs noted to be clear. Pt is alert and oriented x 4, RR even and unlabored, skin is warm and dry. Assessment completed and pt updated on plan of care. Emergency Department Nursing Plan of Care The Nursing Plan of Care is developed from the Nursing assessment and Emergency Department Attending provider initial evaluation. The plan of care may be reviewed in the ED Provider note. The Plan of Care was developed with the following considerations:  
Patient / Family readiness to learn indicated by:verbalized understanding Persons(s) to be included in education: patient Barriers to Learning/Limitations:No 
 
Signed Leigh Loza V   
11/3/2018   8:52 PM

## 2018-11-04 NOTE — ED PROVIDER NOTES
EMERGENCY DEPARTMENT HISTORY AND PHYSICAL EXAM 
 
Date: 11/3/2018 Patient Name: Marissa Medina History of Presenting Illness Chief Complaint Patient presents with  Shortness of Breath  Drug Problem History Provided By: Patient Chief Complaint: shortness of breath Duration: onset today Timing:  Acute HPI: Marissa Medina is a 44 y.o. female with a PMH of diabetes who presents with shortness of breath onset today. Reports she was snorting cocaine today. Denies cough fever. Reports not taking prescribed insulin \" I just dont have it. Because there are no symptoms or complaints of pain, there is no reported quality, severity, modifying factors, or associated signs and symptoms reported. PCP: None Current Facility-Administered Medications Medication Dose Route Frequency Provider Last Rate Last Dose  sodium chloride 0.9 % bolus infusion 1,000 mL  1,000 mL IntraVENous CONTINUOUS Katelyn Chance NP   1,000 mL at 11/03/18 2318  [START ON 11/4/2018] insulin lispro (HUMALOG) injection   SubCUTAneous TIDAC Loyda Mayo MD      
 glucose chewable tablet 16 g  4 Tab Oral PRN Franny Reyes MD      
 dextrose (D50W) injection syrg 12.5-25 g  25-50 mL IntraVENous PRN Franny Reyes MD      
 glucagon (GLUCAGEN) injection 1 mg  1 mg IntraMUSCular PRN Franny Reyes MD      
 insulin regular (NOVOLIN R, HUMULIN R) 100 Units in 0.9% sodium chloride 100 mL infusion  0-50 Units/hr IntraVENous TITRATE Franny Reyes MD      
 0.9% sodium chloride infusion  150 mL/hr IntraVENous CONTINUOUS Franny Reyes MD      
 sodium chloride (NS) flush 5-10 mL  5-10 mL IntraVENous Q8H Franny Reyes MD      
 sodium chloride (NS) flush 5-10 mL  5-10 mL IntraVENous PRN Franny Reyes MD      
 acetaminophen (TYLENOL) tablet 650 mg  650 mg Oral Q4H PRN Franny Reyes MD      
 ondansetron [facility]) injection 4 mg  4 mg IntraVENous Q4H PRN Franny Reyes MD      
 bisacodyl (DULCOLAX) tablet 5 mg  5 mg Oral DAILY PRN Harsh Duncan MD      
 [START ON 11/4/2018] enoxaparin (LOVENOX) injection 40 mg  40 mg SubCUTAneous DAILY Harsh Duncan MD      
 Jai Plan ON 11/4/2018] benztropine (COGENTIN) tablet 1 mg  1 mg Oral BID Harsh Duncan MD      
 albuterol (PROVENTIL HFA, VENTOLIN HFA, PROAIR HFA) inhaler 2 Puff  2 Puff Inhalation Q4H PRN Harsh Duncan MD      
 [START ON 11/4/2018] divalproex DR (DEPAKOTE) tablet 500 mg  500 mg Oral BID Harsh Duncan MD      
 haloperidol (HALDOL) tablet 5 mg  5 mg Oral Q12H Harsh Duncan MD      
 meloxicam [facility]) tablet 15 mg  15 mg Oral DAILY PRN Harsh Duncan MD      
 [START ON 11/4/2018] sertraline (ZOLOFT) tablet 100 mg  100 mg Oral DAILY Harsh Duncan MD      
 hydrALAZINE (APRESOLINE) 20 mg/mL injection 10 mg  10 mg IntraVENous Q6H PRN Harsh Duncan MD      
 
Current Outpatient Medications Medication Sig Dispense Refill  haloperidol decanoate (HALDOL DECANOATE) 100 mg/mL injection 100 mg by IntraMUSCular route every twenty-eight (28) days.  metFORMIN (GLUCOPHAGE) 1,000 mg tablet Take 1,000 mg by mouth.  meloxicam (MOBIC) 15 mg tablet Take 15 mg by mouth daily as needed for Pain.  insulin regular (HUMULIN R REGULAR U-100 INSULN) 100 unit/mL injection by SubCUTAneous route. Sliding scale  insulin aspart U-100 (NOVOLOG FLEXPEN U-100 INSULIN) 100 unit/mL inpn by SubCUTAneous route three (3) times daily as needed. Sliding scale  sertraline (ZOLOFT) 100 mg tablet Take 100 mg by mouth daily.  benztropine (COGENTIN) 1 mg tablet Take 1 Tab by mouth two (2) times a day. Indications: drug-induced extrapyramidal reaction (Patient taking differently: Take 1 mg by mouth every twelve (12) hours.) 28 Tab 0  Blood-Glucose Meter monitoring kit Monitor BG 4 times daily. Dx uncontrolled type 2 DM E11.65  Indications: Diabetes Mellitus 1 Kit 0  
 divalproex DR (DEPAKOTE) 500 mg tablet Take 1 Tab by mouth two (2) times a day.  Indications: Schizoaffective disorder (Patient taking differently: Take 500 mg by mouth every twelve (12) hours.) 28 Tab 0  
 haloperidol (HALDOL) 5 mg tablet Take 1 Tab by mouth every twelve (12) hours. Indications: Schizoaffective disorder 28 Tab 0  
 albuterol (PROVENTIL HFA, VENTOLIN HFA, PROAIR HFA) 90 mcg/actuation inhaler Take 1 Puff by inhalation every four (4) hours as needed for Wheezing.  insulin glargine (LANTUS) 100 unit/mL injection 30 Units by SubCUTAneous route nightly. Indications: type 1 diabetes mellitus (Patient taking differently: 30 Units by SubCUTAneous route daily. Indications: type 1 diabetes mellitus) 1 Vial 5 Past History Past Medical History: 
Past Medical History:  
Diagnosis Date  Alcohol abuse, in remission   
 quit 17 days ago  Asthma   
 does not use inhalers  Borderline personality disorder ([facility] [address]  Diabetes ([facility] [address]  GERD (gastroesophageal reflux disease)  Hypertension  Other ill-defined conditions(799.89)   
 kidney stones ,passed one  Other ill-defined conditions(799.89) sickle cell trait  Other ill-defined conditions(799.89)   
 increased cholesterol  Pancreatitis  Psychiatric disorder   
 schizophrenia, bipolar, depression, anxiety Past Surgical History: 
Past Surgical History:  
Procedure Laterality Date  ABDOMEN SURGERY PROC UNLISTED  3/12/14 CHOLECYSTECTOMY LAPAROSCOPIC    
 HX GASTRIC BYPASS  HX GYN  11/8/2012  
 c section x2  HX OTHER SURGICAL  3/13/14 ENDOSCOPIC RETROGRADE CHOLANGIOPANCREATOGRAPHY  HX OTHER SURGICAL  8/4/14  
 endoscopic stent placed to bile duct  HX TUBAL LIGATION  2012 Family History: 
Family History Problem Relation Age of Onset  Heart Disease Mother  Diabetes Mother  Hypertension Mother  Hypertension Maternal Grandmother Social History: 
Social History Tobacco Use  Smoking status: Current Every Day Smoker Packs/day: 0.50   Years: 14.00 Pack years: 7.00 Types: Cigarettes  Smokeless tobacco: Never Used  Tobacco comment: cigarettes Substance Use Topics  Alcohol use: Yes Alcohol/week: 0.6 oz Types: 1 Cans of beer per week Comment: occasionally, last had \"i had one beer\" today  Drug use: Yes Types: Cocaine Comment: every day Allergies: Allergies Allergen Reactions  Dilaudid [Hydromorphone (Bulk)] Itching  Ibuprofen Not Reported This Time Review of Systems Review of Systems Constitutional: Positive for fatigue. HENT: Negative for congestion. Respiratory: Positive for shortness of breath. Negative for choking. Cardiovascular: Negative for chest pain. Gastrointestinal: Negative for abdominal pain. Genitourinary: Negative for dysuria. Musculoskeletal: Negative for arthralgias. Neurological: Negative for dizziness and headaches. All other systems reviewed and are negative. Physical Exam  
 
Vitals:  
 11/03/18 2100 11/03/18 2130 11/03/18 2200 11/03/18 2239 BP: 182/83 160/86 166/68 163/80 Pulse: 98 98 97 (!) 104 Resp: 23 20 20 22 Temp:      
SpO2: 100% 100% 100% 100% Weight:      
Height:      
 
Physical Exam  
Constitutional: She is oriented to person, place, and time. She appears well-developed and well-nourished. She appears distressed. HENT:  
Head: Normocephalic and atraumatic. Right Ear: External ear normal.  
Left Ear: External ear normal.  
Nose: Nose normal.  
Mouth/Throat: Oropharynx is clear and moist.  
Eyes: Conjunctivae are normal.  
Neck: Normal range of motion. Neck supple. Cardiovascular: Normal rate and regular rhythm. Pulmonary/Chest: Effort normal and breath sounds normal. No respiratory distress. She has no wheezes. Abdominal: Soft. Bowel sounds are normal. There is no tenderness. Musculoskeletal: Normal range of motion. LE edema Lymphadenopathy:  
  She has no cervical adenopathy.   
Neurological: She is alert and oriented to person, place, and time. No cranial nerve deficit. Coordination normal.  
Skin: Skin is warm and dry. No rash noted. Psychiatric: She has a normal mood and affect. Her behavior is normal.  
Nursing note and vitals reviewed. Diagnostic Study Results Labs - Recent Results (from the past 12 hour(s)) EKG, 12 LEAD, INITIAL Collection Time: 11/03/18  8:40 PM  
Result Value Ref Range Ventricular Rate 96 BPM  
 Atrial Rate 96 BPM  
 P-R Interval 124 ms QRS Duration 72 ms Q-T Interval 360 ms QTC Calculation (Bezet) 454 ms Calculated P Axis 68 degrees Calculated R Axis 32 degrees Calculated T Axis 74 degrees Diagnosis Sinus rhythm with premature ventricular complexes or fusion complexes Possible Left atrial enlargement Borderline ECG When compared with ECG of 08-AUG-2018 18:16, 
fusion complexes are now present 
premature ventricular complexes are now present CBC WITH AUTOMATED DIFF Collection Time: 11/03/18  9:22 PM  
Result Value Ref Range WBC 12.7 (H) 3.6 - 11.0 K/uL  
 RBC 5.55 (H) 3.80 - 5.20 M/uL  
 HGB 15.1 11.5 - 16.0 g/dL HCT 42.8 35.0 - 47.0 % MCV 77.1 (L) 80.0 - 99.0 FL  
 MCH 27.2 26.0 - 34.0 PG  
 MCHC 35.3 30.0 - 36.5 g/dL  
 RDW 15.7 (H) 11.5 - 14.5 % PLATELET 632 456 - 124 K/uL MPV 12.1 8.9 - 12.9 FL  
 NRBC 0.0 0  WBC ABSOLUTE NRBC 0.00 0.00 - 0.01 K/uL NEUTROPHILS 82 (H) 32 - 75 % LYMPHOCYTES 12 12 - 49 % MONOCYTES 6 5 - 13 % EOSINOPHILS 0 0 - 7 % BASOPHILS 0 0 - 1 % IMMATURE GRANULOCYTES 0 0.0 - 0.5 % ABS. NEUTROPHILS 10.4 (H) 1.8 - 8.0 K/UL  
 ABS. LYMPHOCYTES 1.5 0.8 - 3.5 K/UL  
 ABS. MONOCYTES 0.7 0.0 - 1.0 K/UL  
 ABS. EOSINOPHILS 0.0 0.0 - 0.4 K/UL  
 ABS. BASOPHILS 0.1 0.0 - 0.1 K/UL  
 ABS. IMM. GRANS. 0.1 (H) 0.00 - 0.04 K/UL  
 DF AUTOMATED METABOLIC PANEL, COMPREHENSIVE Collection Time: 11/03/18  9:22 PM  
Result Value Ref Range  Sodium 133 (L) 136 - 145 mmol/L  
 Potassium 4.7 3.5 - 5.1 mmol/L Chloride 97 97 - 108 mmol/L  
 CO2 10 (LL) 21 - 32 mmol/L Anion gap 26 (H) 5 - 15 mmol/L Glucose 444 (H) 65 - 100 mg/dL BUN 12 6 - 20 MG/DL Creatinine 1.21 (H) 0.55 - 1.02 MG/DL  
 BUN/Creatinine ratio 10 (L) 12 - 20 GFR est AA 59 (L) >60 ml/min/1.73m2 GFR est non-AA 49 (L) >60 ml/min/1.73m2 Calcium 8.4 (L) 8.5 - 10.1 MG/DL Bilirubin, total 0.8 0.2 - 1.0 MG/DL  
 ALT (SGPT) 34 12 - 78 U/L  
 AST (SGOT) 14 (L) 15 - 37 U/L Alk. phosphatase 112 45 - 117 U/L Protein, total 7.7 6.4 - 8.2 g/dL Albumin 3.4 (L) 3.5 - 5.0 g/dL Globulin 4.3 (H) 2.0 - 4.0 g/dL A-G Ratio 0.8 (L) 1.1 - 2.2    
TROPONIN I Collection Time: 11/03/18  9:22 PM  
Result Value Ref Range Troponin-I, Qt. <0.05 <0.05 ng/mL CK W/ CKMB & INDEX Collection Time: 11/03/18  9:22 PM  
Result Value Ref Range CK 65 26 - 192 U/L  
 CK - MB 3.1 <3.6 NG/ML  
 CK-MB Index 4.8 (H) 0 - 2.5 NT-PRO BNP Collection Time: 11/03/18  9:22 PM  
Result Value Ref Range NT pro- 0 - 125 PG/ML  
BLOOD GAS, ARTERIAL Collection Time: 11/03/18 10:15 PM  
Result Value Ref Range pH 7.26 (L) 7.35 - 7.45    
 PCO2 13 (L) 35.0 - 45.0 mmHg PO2 108 (H) 80 - 100 mmHg O2 SAT 97 92 - 97 % BICARBONATE 6 (L) 22 - 26 mmol/L  
 BASE EXCESS 18.5 mmol/L  
 O2 METHOD ROOM AIR    
 FIO2 21 % Sample source ARTERIAL    
 SITE RIGHT BRACHIAL TOSHIA'S TEST YEAST    
GLUCOSE, POC Collection Time: 11/03/18 11:42 PM  
Result Value Ref Range Glucose (POC) 414 (H) 65 - 100 mg/dL Performed by Sherri Fong (Tech) Radiologic Studies -  
XR CHEST PORT Final Result CT Results  (Last 48 hours) None CXR Results  (Last 48 hours) 11/03/18 2118  XR CHEST PORT Final result Impression:  IMPRESSION:  
   
No acute process on portable chest. No change. Narrative:  EXAM:  XR CHEST PORT INDICATION:  Shortness of breath, chest tightness, cocaine use today. COMPARISON: Chest views on 2/19/2016. TECHNIQUE: Semiupright portable chest AP view FINDINGS: Cardiac monitoring wires overlie the thorax. The cardiomediastinal and  
hilar contours are within normal limits. The pulmonary vasculature is within  
normal limits. The lungs and pleural spaces are clear. The visualized bones and upper abdomen  
are age-appropriate. Medical Decision Making I am the first provider for this patient. I reviewed the vital signs, available nursing notes, past medical history, past surgical history, family history and social history. Vital Signs-Reviewed the patient's vital signs. Records Reviewed: Nursing Notes, Old Medical Records and Previous Laboratory Studies Disposition: 
Admit Patient is being admitted to the hospital.  The results of their tests and reasons for their admission have been discussed with them and/or available family. They convey agreement and understanding for the need to be admitted and for their admission diagnosis. Consultation has been made with the inpatient physician specialist for hospitalization. LABORATORY TESTS: 
Recent Results (from the past 12 hour(s)) EKG, 12 LEAD, INITIAL Collection Time: 11/03/18  8:40 PM  
Result Value Ref Range Ventricular Rate 96 BPM  
 Atrial Rate 96 BPM  
 P-R Interval 124 ms QRS Duration 72 ms Q-T Interval 360 ms QTC Calculation (Bezet) 454 ms Calculated P Axis 68 degrees Calculated R Axis 32 degrees Calculated T Axis 74 degrees Diagnosis Sinus rhythm with premature ventricular complexes or fusion complexes Possible Left atrial enlargement Borderline ECG When compared with ECG of 08-AUG-2018 18:16, 
fusion complexes are now present 
premature ventricular complexes are now present CBC WITH AUTOMATED DIFF Collection Time: 11/03/18  9:22 PM  
Result Value Ref Range  WBC 12.7 (H) 3.6 - 11.0 K/uL  
 RBC 5.55 (H) 3.80 - 5.20 M/uL  
 HGB 15.1 11.5 - 16.0 g/dL HCT 42.8 35.0 - 47.0 % MCV 77.1 (L) 80.0 - 99.0 FL  
 MCH 27.2 26.0 - 34.0 PG  
 MCHC 35.3 30.0 - 36.5 g/dL  
 RDW 15.7 (H) 11.5 - 14.5 % PLATELET 819 564 - 236 K/uL MPV 12.1 8.9 - 12.9 FL  
 NRBC 0.0 0  WBC ABSOLUTE NRBC 0.00 0.00 - 0.01 K/uL NEUTROPHILS 82 (H) 32 - 75 % LYMPHOCYTES 12 12 - 49 % MONOCYTES 6 5 - 13 % EOSINOPHILS 0 0 - 7 % BASOPHILS 0 0 - 1 % IMMATURE GRANULOCYTES 0 0.0 - 0.5 % ABS. NEUTROPHILS 10.4 (H) 1.8 - 8.0 K/UL  
 ABS. LYMPHOCYTES 1.5 0.8 - 3.5 K/UL  
 ABS. MONOCYTES 0.7 0.0 - 1.0 K/UL  
 ABS. EOSINOPHILS 0.0 0.0 - 0.4 K/UL  
 ABS. BASOPHILS 0.1 0.0 - 0.1 K/UL  
 ABS. IMM. GRANS. 0.1 (H) 0.00 - 0.04 K/UL  
 DF AUTOMATED METABOLIC PANEL, COMPREHENSIVE Collection Time: 11/03/18  9:22 PM  
Result Value Ref Range Sodium 133 (L) 136 - 145 mmol/L Potassium 4.7 3.5 - 5.1 mmol/L Chloride 97 97 - 108 mmol/L  
 CO2 10 (LL) 21 - 32 mmol/L Anion gap 26 (H) 5 - 15 mmol/L Glucose 444 (H) 65 - 100 mg/dL BUN 12 6 - 20 MG/DL Creatinine 1.21 (H) 0.55 - 1.02 MG/DL  
 BUN/Creatinine ratio 10 (L) 12 - 20 GFR est AA 59 (L) >60 ml/min/1.73m2 GFR est non-AA 49 (L) >60 ml/min/1.73m2 Calcium 8.4 (L) 8.5 - 10.1 MG/DL Bilirubin, total 0.8 0.2 - 1.0 MG/DL  
 ALT (SGPT) 34 12 - 78 U/L  
 AST (SGOT) 14 (L) 15 - 37 U/L Alk. phosphatase 112 45 - 117 U/L Protein, total 7.7 6.4 - 8.2 g/dL Albumin 3.4 (L) 3.5 - 5.0 g/dL Globulin 4.3 (H) 2.0 - 4.0 g/dL A-G Ratio 0.8 (L) 1.1 - 2.2    
TROPONIN I Collection Time: 11/03/18  9:22 PM  
Result Value Ref Range Troponin-I, Qt. <0.05 <0.05 ng/mL CK W/ CKMB & INDEX Collection Time: 11/03/18  9:22 PM  
Result Value Ref Range CK 65 26 - 192 U/L  
 CK - MB 3.1 <3.6 NG/ML  
 CK-MB Index 4.8 (H) 0 - 2.5 NT-PRO BNP Collection Time: 11/03/18  9:22 PM  
Result Value Ref Range  NT pro- 0 - 125 PG/ML BLOOD GAS, ARTERIAL Collection Time: 11/03/18 10:15 PM  
Result Value Ref Range pH 7.26 (L) 7.35 - 7.45    
 PCO2 13 (L) 35.0 - 45.0 mmHg PO2 108 (H) 80 - 100 mmHg O2 SAT 97 92 - 97 % BICARBONATE 6 (L) 22 - 26 mmol/L  
 BASE EXCESS 18.5 mmol/L  
 O2 METHOD ROOM AIR    
 FIO2 21 % Sample source ARTERIAL    
 SITE RIGHT BRACHIAL TOSHIA'S TEST YEAST    
GLUCOSE, POC Collection Time: 11/03/18 11:42 PM  
Result Value Ref Range Glucose (POC) 414 (H) 65 - 100 mg/dL Performed by Daija Russell (Tech) IMAGING RESULTS: 
XR CHEST PORT Final Result Xr Chest [facility] Result Date: 11/3/2018 EXAM:  XR CHEST PORT INDICATION:  Shortness of breath, chest tightness, cocaine use today. COMPARISON: Chest views on 2/19/2016. TECHNIQUE: Semiupright portable chest AP view FINDINGS: Cardiac monitoring wires overlie the thorax. The cardiomediastinal and hilar contours are within normal limits. The pulmonary vasculature is within normal limits. The lungs and pleural spaces are clear. The visualized bones and upper abdomen are age-appropriate. IMPRESSION: No acute process on portable chest. No change. MEDICATIONS GIVEN: 
Medications  
sodium chloride 0.9 % bolus infusion 1,000 mL (0 mL IntraVENous IV Completed 11/3/18 7849) sodium chloride 0.9 % bolus infusion 1,000 mL (1,000 mL IntraVENous New Bag 11/3/18 2849) insulin lispro (HUMALOG) injection (not administered) glucose chewable tablet 16 g (not administered) dextrose (D50W) injection syrg 12.5-25 g (not administered) glucagon (GLUCAGEN) injection 1 mg (not administered)  
insulin regular (NOVOLIN R, HUMULIN R) 100 Units in 0.9% sodium chloride 100 mL infusion (not administered) 0.9% sodium chloride infusion (not administered)  
sodium chloride (NS) flush 5-10 mL (not administered)  
sodium chloride (NS) flush 5-10 mL (not administered)  
acetaminophen (TYLENOL) tablet 650 mg (not administered)  
ondansetron (ZOFRAN) injection 4 mg (not administered)  
bisacodyl (DULCOLAX) tablet 5 mg (not administered)  
enoxaparin (LOVENOX) injection 40 mg (not administered)  
benztropine (COGENTIN) tablet 1 mg (not administered)  
albuterol (PROVENTIL HFA, VENTOLIN HFA, PROAIR HFA) inhaler 2 Puff (not administered) divalproex DR (DEPAKOTE) tablet 500 mg (not administered)  
haloperidol (HALDOL) tablet 5 mg (not administered)  
meloxicam (MOBIC) tablet 15 mg (not administered)  
sertraline (ZOLOFT) tablet 100 mg (not administered)  
hydrALAZINE (APRESOLINE) 20 mg/mL injection 10 mg (not administered)  
insulin regular (NOVOLIN R, HUMULIN R) injection 10 Units (10 Units IntraVENous Given 11/3/18 8893) IMPRESSION: 
1. Diabetic ketoacidosis without coma associated with type 1 diabetes mellitus ([facility] [address] 2. Substance abuse ([facility] [facility] PLAN: 
1. Admit to Dr Yesi Bonner hospitalist 
 
 
 
 
Provider Notes (Medical Decision Making): DDX substance abuse [facility] DKA Procedures: 
Procedures Diagnosis Clinical Impression: 1. Diabetic ketoacidosis without coma associated with type 1 diabetes mellitus ([facility] [address] 2. Substance abuse ([facility]

## 2018-11-04 NOTE — H&P
Hospitalist Admission Note NAME: Macho Hoskins :  1975 MRN:  [id number] Date/Time:  11/3/2018 11:30 PM 
 
Patient PCP: None 
______________________________________________________________________ Given the patient's current clinical presentation, I have a high level of concern for decompensation if discharged from the emergency department. Complex decision making was performed, which includes reviewing the patient's available past medical records, laboratory results, and x-ray films. My assessment of this patient's clinical condition and my plan of care is as follows. Assessment / Plan: 
DKA in setting of uncontrolled DM1:  Recent admission for same 10/20/18, left AMA. AG 26, bicarb 10 on labs. ABG tonight 7.. 
- insulin gtt - IV fluids 
- NPO except sips until DKA resolved 
- holding home lantus/regular until DKA resolved - check HgA1c in AM 
FABIENNE: baseline Cr 0.6-0.8 
- UA ordered, still has not been collected. Needs f/u - IV fluids as above Asthma without acute exacerbation: Pt presented to ER with complaints of chest pain and shortness of breath after using crack cocaine this morning - CXR with no acute process on portable chest. No change. - con't albuterol INH prn 
Schizoaffective d/o, anxiety and depression with polysubstance abuse and drug-seeking behavior:  recent admission to inpatient psych ; Pt says not taking any of her psych meds. - admits to cocaine use today 
- restart depakote, cogentin, haldol and zoloft that she is suppose to be taking - psychiatry consulted Hypertension: not on meds at home, but has been present on multiple admissions - prn hydralazine for now, consider chronic med if she is able to be compliant 
  
Code Status: full Surrogate Decision Maker: son DVT Prophylaxis: lovenox 
  
Baseline: homeless Subjective: CHIEF COMPLAINT: shortness of breath HISTORY OF PRESENT ILLNESS:    
Radha Burch is a 44 y.o.  [facility] [place] female who presents with above. Pt very lethargic and has to be awakened to answer questions, so history is limited. Pt recently admitted 2 weeks ago with DKA, left hospital AMA after being cleared by psychiatry. Pt says that she has been living with a friend recently. She has not been taking any of her medications. She has been using drugs daily. She was snorting cocaine yesterday and started to have SOB and chest pain. These sx are improved now. She denies recent fever, cough or URI sx. No change in po intake or nausea. She denies diarrhea or constipation. We were asked to admit for work up and evaluation of the above problems. Past Medical History:  
Diagnosis Date  Alcohol abuse, in remission   
 quit 17 days ago  Asthma   
 does not use inhalers  Borderline personality disorder ([address]  Diabetes ([address]  GERD (gastroesophageal reflux disease)  Hypertension  Other ill-defined conditions(799.89)   
 kidney stones ,passed one  Other ill-defined conditions(799.89) sickle cell trait  Other ill-defined conditions(799.89)   
 increased cholesterol  Pancreatitis  Psychiatric disorder   
 schizophrenia, bipolar, depression, anxiety Past Surgical History:  
Procedure Laterality Date  ABDOMEN SURGERY PROC UNLISTED  3/12/14 CHOLECYSTECTOMY LAPAROSCOPIC    
 HX GASTRIC BYPASS  HX GYN  11/8/2012  
 c section x2  HX OTHER SURGICAL  3/13/14 ENDOSCOPIC RETROGRADE CHOLANGIOPANCREATOGRAPHY  HX OTHER SURGICAL  8/4/14  
 endoscopic stent placed to bile duct  HX TUBAL LIGATION  2012 Social History Tobacco Use  Smoking status: Current Every Day Smoker Packs/day: 0.50 Years: 14.00 Pack years: 7.00 Types: Cigarettes  Smokeless tobacco: Never Used  Tobacco comment: cigarettes Substance Use Topics  Alcohol use: Yes Alcohol/week: 0.6 oz Types: 1 Cans of beer per week   Comment: occasionally, last had \"i had one beer\" today Family History Problem Relation Age of Onset  Heart Disease Mother  Diabetes Mother  Hypertension Mother  Hypertension Maternal Grandmother Allergies Allergen Reactions  Dilaudid [Hydromorphone (Bulk)] Itching  Ibuprofen Not Reported This Time Prior to Admission medications Medication Sig Start Date End Date Taking? Authorizing Provider  
haloperidol decanoate (HALDOL DECANOATE) 100 mg/mL injection 100 mg by IntraMUSCular route every twenty-eight (28) days. Provider, Historical  
metFORMIN (GLUCOPHAGE) 1,000 mg tablet Take 1,000 mg by mouth. Provider, Historical  
meloxicam (MOBIC) 15 mg tablet Take 15 mg by mouth daily as needed for Pain. Provider, Historical  
insulin regular (HUMULIN R REGULAR U-100 INSULN) 100 unit/mL injection by SubCUTAneous route. Sliding scale    Provider, Historical  
insulin aspart U-100 (NOVOLOG FLEXPEN U-100 INSULIN) 100 unit/mL inpn by SubCUTAneous route three (3) times daily as needed. Sliding scale    Provider, Historical  
sertraline (ZOLOFT) 100 mg tablet Take 100 mg by mouth daily. Provider, Historical  
benztropine (COGENTIN) 1 mg tablet Take 1 Tab by mouth two (2) times a day. Indications: drug-induced extrapyramidal reaction Patient taking differently: Take 1 mg by mouth every twelve (12) hours. 8/14/18   Wiliam Damico MD  
Blood-Glucose Meter monitoring kit Monitor BG 4 times daily. Dx uncontrolled type 2 DM E11.65  Indications: Diabetes Mellitus 8/14/18   Wiliam Damico MD  
divalproex DR (DEPAKOTE) 500 mg tablet Take 1 Tab by mouth two (2) times a day. Indications: Schizoaffective disorder Patient taking differently: Take 500 mg by mouth every twelve (12) hours. 8/14/18   Wiliam Damico MD  
haloperidol (HALDOL) 5 mg tablet Take 1 Tab by mouth every twelve (12) hours.  Indications: Schizoaffective disorder 8/14/18   Wiliam Damico MD albuterol (PROVENTIL HFA, VENTOLIN HFA, PROAIR HFA) 90 mcg/actuation inhaler Take 1 Puff by inhalation every four (4) hours as needed for Wheezing. Provider, Historical  
insulin glargine (LANTUS) 100 unit/mL injection 30 Units by SubCUTAneous route nightly. Indications: type 1 diabetes mellitus Patient taking differently: 30 Units by SubCUTAneous route daily. Indications: type 1 diabetes mellitus 11/17/17   Charlie Cross., PAEstebanC  
 
 
REVIEW OF SYSTEMS:    
I am not able to complete the review of systems because: The patient is intubated and sedated The patient has altered mental status due to his acute medical problems The patient has baseline aphasia from prior stroke(s) The patient has baseline dementia and is not reliable historian The patient is in acute medical distress and unable to provide information Total of 12 systems reviewed as follows:   
   POSITIVE= underlined text  Negative = text not underlined General:  fever, chills, sweats, generalized weakness, weight loss/gain,  
   loss of appetite Eyes:    blurred vision, eye pain, loss of vision, double vision ENT:    rhinorrhea, pharyngitis Respiratory:   cough, sputum production, SOB, BARRY, wheezing, pleuritic pain  
Cardiology:   chest pain, palpitations, orthopnea, PND, edema, syncope Gastrointestinal:  abdominal pain , N/V, diarrhea, dysphagia, constipation, bleeding Genitourinary:  frequency, urgency, dysuria, hematuria, incontinence Muskuloskeletal :  arthralgia, myalgia, back pain Hematology:  easy bruising, nose or gum bleeding, lymphadenopathy Dermatological: rash, ulceration, pruritis, color change / jaundice Endocrine:   hot flashes or polydipsia Neurological:  headache, dizziness, confusion, focal weakness, paresthesia, Speech difficulties, memory loss, gait difficulty Psychological: Feelings of anxiety, depression, agitation Objective: VITALS:   
Visit Vitals /80 Pulse (!) 104 Temp 98.5 °F (36.9 °C) Resp 22 Ht 5' 6\" (1.676 m) Wt 65 kg (143 lb 6.4 oz) SpO2 100% BMI 23.15 kg/m² PHYSICAL EXAM: 
 
General:    Arousable, cooperative with simple commands, no distress, appears stated age. HEENT: Atraumatic, dentition fair Neck:  Symmetrical 
Lungs:   Clear to auscultation bilaterally. No Wheezing or Rhonchi. No rales. Chest wall:  No Accessory muscle use. Heart:   Regular  rhythm,  No  murmur   No edema Abdomen:   Not distended Extremities: No cyanosis Skin:     Not pale. Not Jaundiced  No rashes Psych:  Poor insight. Not depressed. Not anxious or agitated. Neurologic: EOMs intact. No facial asymmetry. No aphasia or slurred speech. Alert and oriented X 3.  
 
_______________________________________________________________________ Care Plan discussed with: 
  Comments Patient x Family RN x Care Manager Consultant:     
_______________________________________________________________________ Expected  Disposition:  
Home with Family x HH/PT/OT/RN   
SNF/LTC   
[facility]   
________________________________________________________________________ TOTAL TIME:  40 Minutes Critical Care Provided     Minutes non procedure based Comments  
 x Reviewed previous records  
>50% of visit spent in counseling and coordination of care x Discussion with patient and/or family and questions answered 
  
 
________________________________________________________________________ Signed: Rita South MD 
 
Procedures: see electronic medical records for all procedures/Xrays and details which were not copied into this note but were reviewed prior to creation of Plan. LAB DATA REVIEWED:   
Recent Results (from the past 24 hour(s)) EKG, 12 LEAD, INITIAL Collection Time: 11/03/18  8:40 PM  
Result Value Ref Range Ventricular Rate 96 BPM  
 Atrial Rate 96 BPM  
 P-R Interval 124 ms QRS Duration 72 ms Q-T Interval 360 ms  QTC Calculation (Bezet) 454 ms Calculated P Axis 68 degrees Calculated R Axis 32 degrees Calculated T Axis 74 degrees Diagnosis Sinus rhythm with premature ventricular complexes or fusion complexes Possible Left atrial enlargement Borderline ECG When compared with ECG of 08-AUG-2018 18:16, 
fusion complexes are now present 
premature ventricular complexes are now present CBC WITH AUTOMATED DIFF Collection Time: 11/03/18  9:22 PM  
Result Value Ref Range WBC 12.7 (H) 3.6 - 11.0 K/uL  
 RBC 5.55 (H) 3.80 - 5.20 M/uL  
 HGB 15.1 11.5 - 16.0 g/dL HCT 42.8 35.0 - 47.0 % MCV 77.1 (L) 80.0 - 99.0 FL  
 MCH 27.2 26.0 - 34.0 PG  
 MCHC 35.3 30.0 - 36.5 g/dL  
 RDW 15.7 (H) 11.5 - 14.5 % PLATELET 769 464 - 134 K/uL MPV 12.1 8.9 - 12.9 FL  
 NRBC 0.0 0  WBC ABSOLUTE NRBC 0.00 0.00 - 0.01 K/uL NEUTROPHILS 82 (H) 32 - 75 % LYMPHOCYTES 12 12 - 49 % MONOCYTES 6 5 - 13 % EOSINOPHILS 0 0 - 7 % BASOPHILS 0 0 - 1 % IMMATURE GRANULOCYTES 0 0.0 - 0.5 % ABS. NEUTROPHILS 10.4 (H) 1.8 - 8.0 K/UL  
 ABS. LYMPHOCYTES 1.5 0.8 - 3.5 K/UL  
 ABS. MONOCYTES 0.7 0.0 - 1.0 K/UL  
 ABS. EOSINOPHILS 0.0 0.0 - 0.4 K/UL  
 ABS. BASOPHILS 0.1 0.0 - 0.1 K/UL  
 ABS. IMM. GRANS. 0.1 (H) 0.00 - 0.04 K/UL  
 DF AUTOMATED METABOLIC PANEL, COMPREHENSIVE Collection Time: 11/03/18  9:22 PM  
Result Value Ref Range Sodium 133 (L) 136 - 145 mmol/L Potassium 4.7 3.5 - 5.1 mmol/L Chloride 97 97 - 108 mmol/L  
 CO2 10 (LL) 21 - 32 mmol/L Anion gap 26 (H) 5 - 15 mmol/L Glucose 444 (H) 65 - 100 mg/dL BUN 12 6 - 20 MG/DL Creatinine 1.21 (H) 0.55 - 1.02 MG/DL  
 BUN/Creatinine ratio 10 (L) 12 - 20 GFR est AA 59 (L) >60 ml/min/1.73m2 GFR est non-AA 49 (L) >60 ml/min/1.73m2 Calcium 8.4 (L) 8.5 - 10.1 MG/DL Bilirubin, total 0.8 0.2 - 1.0 MG/DL  
 ALT (SGPT) 34 12 - 78 U/L  
 AST (SGOT) 14 (L) 15 - 37 U/L Alk. phosphatase 112 45 - 117 U/L  Protein, total 7.7 6.4 - 8.2 g/dL Albumin 3.4 (L) 3.5 - 5.0 g/dL Globulin 4.3 (H) 2.0 - 4.0 g/dL A-G Ratio 0.8 (L) 1.1 - 2.2    
TROPONIN I Collection Time: 11/03/18  9:22 PM  
Result Value Ref Range Troponin-I, Qt. <0.05 <0.05 ng/mL CK W/ CKMB & INDEX Collection Time: 11/03/18  9:22 PM  
Result Value Ref Range CK 65 26 - 192 U/L  
 CK - MB 3.1 <3.6 NG/ML  
 CK-MB Index 4.8 (H) 0 - 2.5 NT-PRO BNP Collection Time: 11/03/18  9:22 PM  
Result Value Ref Range NT pro- 0 - 125 PG/ML  
BLOOD GAS, ARTERIAL Collection Time: 11/03/18 10:15 PM  
Result Value Ref Range pH 7.26 (L) 7.35 - 7.45    
 PCO2 13 (L) 35.0 - 45.0 mmHg PO2 108 (H) 80 - 100 mmHg O2 SAT 97 92 - 97 % BICARBONATE 6 (L) 22 - 26 mmol/L  
 BASE EXCESS 18.5 mmol/L  
 O2 METHOD ROOM AIR    
 FIO2 21 % Sample source ARTERIAL    
 SITE RIGHT BRACHIAL TOSHIA'S TEST YEAST

## 2018-11-04 NOTE — ED NOTES
Pt left ED via stretcher on cardiac monitor en route to PCU. Pt accompanied by RN, in no acute distress, and updated on plan of care.

## 2018-11-04 NOTE — ED NOTES
TRANSFER - OUT REPORT: 
 
Verbal report given to Regan Gilbert RN (name) on Laine Raza  being transferred to Med Surg (unit) for routine progression of care Report consisted of patients Situation, Background, Assessment and  
Recommendations(SBAR). Information from the following report(s) SBAR, Kardex, ED Summary, Procedure Summary, MAR and Recent Results was reviewed with the receiving nurse. Lines:  
Peripheral IV 11/03/18 Right Antecubital (Active) Site Assessment Clean, dry, & intact 11/3/2018 10:33 PM  
Phlebitis Assessment 0 11/3/2018 10:33 PM  
Infiltration Assessment 0 11/3/2018 10:33 PM  
Dressing Status Clean, dry, & intact 11/3/2018 10:33 PM  
Dressing Type Transparent;Tape 11/3/2018 10:33 PM  
Hub Color/Line Status Pink;Patent; Flushed; Infusing 11/3/2018 10:33 PM  
  
 
Opportunity for questions and clarification was provided. Patient transported with: 
 Monitor Registered Nurse

## 2018-11-05 VITALS
OXYGEN SATURATION: 100 % | BODY MASS INDEX: 20.27 KG/M2 | DIASTOLIC BLOOD PRESSURE: 78 MMHG | RESPIRATION RATE: 12 BRPM | SYSTOLIC BLOOD PRESSURE: 128 MMHG | HEART RATE: 75 BPM | HEIGHT: 66 IN | TEMPERATURE: 97.5 F | WEIGHT: 126.1 LBS

## 2018-11-05 LAB
ANION GAP SERPL CALC-SCNC: 11 MMOL/L (ref 5–15)
BASOPHILS # BLD: 0 K/UL (ref 0–0.1)
BASOPHILS NFR BLD: 1 % (ref 0–1)
BUN SERPL-MCNC: 5 MG/DL (ref 6–20)
BUN/CREAT SERPL: 8 (ref 12–20)
CALCIUM SERPL-MCNC: 8.2 MG/DL (ref 8.5–10.1)
CHLORIDE SERPL-SCNC: 109 MMOL/L (ref 97–108)
CO2 SERPL-SCNC: 18 MMOL/L (ref 21–32)
CREAT SERPL-MCNC: 0.63 MG/DL (ref 0.55–1.02)
DIFFERENTIAL METHOD BLD: ABNORMAL
EOSINOPHIL # BLD: 0.1 K/UL (ref 0–0.4)
EOSINOPHIL NFR BLD: 1 % (ref 0–7)
ERYTHROCYTE [DISTWIDTH] IN BLOOD BY AUTOMATED COUNT: 15.6 % (ref 11.5–14.5)
GLUCOSE BLD STRIP.AUTO-MCNC: 305 MG/DL (ref 65–100)
GLUCOSE BLD STRIP.AUTO-MCNC: 323 MG/DL (ref 65–100)
GLUCOSE SERPL-MCNC: 260 MG/DL (ref 65–100)
HCT VFR BLD AUTO: 35.2 % (ref 35–47)
HGB BLD-MCNC: 12.6 G/DL (ref 11.5–16)
IMM GRANULOCYTES # BLD: 0 K/UL (ref 0–0.04)
IMM GRANULOCYTES NFR BLD AUTO: 0 % (ref 0–0.5)
LYMPHOCYTES # BLD: 2 K/UL (ref 0.8–3.5)
LYMPHOCYTES NFR BLD: 34 % (ref 12–49)
MAGNESIUM SERPL-MCNC: 1.7 MG/DL (ref 1.6–2.4)
MCH RBC QN AUTO: 27 PG (ref 26–34)
MCHC RBC AUTO-ENTMCNC: 35.8 G/DL (ref 30–36.5)
MCV RBC AUTO: 75.4 FL (ref 80–99)
MONOCYTES # BLD: 0.7 K/UL (ref 0–1)
MONOCYTES NFR BLD: 12 % (ref 5–13)
NEUTS SEG # BLD: 3 K/UL (ref 1.8–8)
NEUTS SEG NFR BLD: 52 % (ref 32–75)
NRBC # BLD: 0 K/UL (ref 0–0.01)
NRBC BLD-RTO: 0 PER 100 WBC
PHOSPHATE SERPL-MCNC: 2.9 MG/DL (ref 2.6–4.7)
PLATELET # BLD AUTO: 224 K/UL (ref 150–400)
PMV BLD AUTO: 11.9 FL (ref 8.9–12.9)
POTASSIUM SERPL-SCNC: 3.6 MMOL/L (ref 3.5–5.1)
RBC # BLD AUTO: 4.67 M/UL (ref 3.8–5.2)
SERVICE CMNT-IMP: ABNORMAL
SERVICE CMNT-IMP: ABNORMAL
SODIUM SERPL-SCNC: 138 MMOL/L (ref 136–145)
WBC # BLD AUTO: 5.9 K/UL (ref 3.6–11)

## 2018-11-05 PROCEDURE — 82962 GLUCOSE BLOOD TEST: CPT

## 2018-11-05 PROCEDURE — 74011250636 HC RX REV CODE- 250/636: Performed by: HOSPITALIST

## 2018-11-05 PROCEDURE — 74011250637 HC RX REV CODE- 250/637: Performed by: HOSPITALIST

## 2018-11-05 PROCEDURE — 80048 BASIC METABOLIC PNL TOTAL CA: CPT | Performed by: HOSPITALIST

## 2018-11-05 PROCEDURE — 36415 COLL VENOUS BLD VENIPUNCTURE: CPT | Performed by: HOSPITALIST

## 2018-11-05 PROCEDURE — 74011636637 HC RX REV CODE- 636/637: Performed by: HOSPITALIST

## 2018-11-05 PROCEDURE — 84100 ASSAY OF PHOSPHORUS: CPT | Performed by: HOSPITALIST

## 2018-11-05 PROCEDURE — 85025 COMPLETE CBC W/AUTO DIFF WBC: CPT | Performed by: HOSPITALIST

## 2018-11-05 PROCEDURE — 74011250637 HC RX REV CODE- 250/637: Performed by: INTERNAL MEDICINE

## 2018-11-05 PROCEDURE — 83735 ASSAY OF MAGNESIUM: CPT | Performed by: HOSPITALIST

## 2018-11-05 PROCEDURE — 74011250636 HC RX REV CODE- 250/636: Performed by: INTERNAL MEDICINE

## 2018-11-05 RX ORDER — INSULIN GLARGINE 100 [IU]/ML
30 INJECTION, SOLUTION SUBCUTANEOUS DAILY
Status: DISCONTINUED | OUTPATIENT
Start: 2018-11-05 | End: 2018-11-05 | Stop reason: HOSPADM

## 2018-11-05 RX ORDER — IBUPROFEN 200 MG
1 TABLET ORAL DAILY
Status: DISCONTINUED | OUTPATIENT
Start: 2018-11-05 | End: 2018-11-05 | Stop reason: HOSPADM

## 2018-11-05 RX ORDER — INSULIN LISPRO 100 [IU]/ML
INJECTION, SOLUTION INTRAVENOUS; SUBCUTANEOUS
Status: DISCONTINUED | OUTPATIENT
Start: 2018-11-05 | End: 2018-11-05 | Stop reason: HOSPADM

## 2018-11-05 RX ORDER — INSULIN LISPRO 100 [IU]/ML
INJECTION, SOLUTION INTRAVENOUS; SUBCUTANEOUS
Status: DISCONTINUED
Start: 2018-11-05 | End: 2018-11-05 | Stop reason: HOSPADM

## 2018-11-05 RX ADMIN — INSULIN LISPRO 4 UNITS: 100 INJECTION, SOLUTION INTRAVENOUS; SUBCUTANEOUS at 00:49

## 2018-11-05 RX ADMIN — SODIUM CHLORIDE 100 ML/HR: 900 INJECTION, SOLUTION INTRAVENOUS at 04:18

## 2018-11-05 RX ADMIN — INSULIN LISPRO 7 UNITS: 100 INJECTION, SOLUTION INTRAVENOUS; SUBCUTANEOUS at 09:00

## 2018-11-05 RX ADMIN — AMLODIPINE BESYLATE 5 MG: 5 TABLET ORAL at 08:59

## 2018-11-05 RX ADMIN — HALOPERIDOL 5 MG: 5 TABLET ORAL at 08:59

## 2018-11-05 RX ADMIN — ENOXAPARIN SODIUM 40 MG: 40 INJECTION, SOLUTION INTRAVENOUS; SUBCUTANEOUS at 08:59

## 2018-11-05 NOTE — DIABETES MGMT
DTC Consult Note Recommendations/ Comments: Pt reporting that she wants to go home and will leave AMA. Pt discussed during rounds, lantus 30 this am, lispro with meals before meals consider 4 units. Consult received for:  [x]             Assessment of home management 
              [x]      Medication Recommendations []             Meter/monitoring 
   []             Insulin instruction []             New diagnosis [x]             Outpatient education []             Insulin pump patient []             Insulin infusion 
   []             DKA/HHS Chart reviewed and initial evaluation complete on [address]. Patient is a 44 y.o. female with known DM on Lantus 30 units at night, reports that she uses Humulin R around 8 units \"usually\" for her sliding scale. Educator asked pt if she takes Novolog or humalog for meal-time insulin needs (as this is listed on her pta med list) and pt reported \"yes\" - educator asked pt how much of this insulin she takes and pt reported \"I don't know\". Pt shared that she has not taken her insulin in about a week due to \"drugs\". Pt also has not been checking BG. Pt reports that she has her insulin, meter, and testing strips at home; and that she will take her insulin and check her blood sugars after she goes home. Educator expressed concern that pt may not take her insulin or check her BG at home - pt reported, again, that she will do these things. Assessed and instructed patient on the following:  
·  blood sugar goals, illness management, referred to Diabetes Educator and site rotation · Visit today emphasized importance in taking insulin as directed, discussed that without insulin pt's BG will continue to rise and she will need tx again. Educator emphasized importance in checking BG for safety as well as for dosing (as pt has a sliding scale).   
· Educator suggested to pt that she receive education in the oupt DM treatment center and pt reported that this was okay. Educator notified pt that an appointment will be scheduled. Encouraged the following:  
·  regular blood sugar monitoring: before meals Provided patient with the following: [x]             Survival skills education materials []             Insulin education materials []             CHO counting education materials []             Outpatient DTC contact number 
             []             Glucometer Discussed with patient and/or family need for follow up appointment for diabetes management after discharge. A1c:  
Lab Results Component Value Date/Time Hemoglobin A1c 15.0 (H) 11/04/2018 02:50 AM  
 
 
Recent Glucose Results:  
Lab Results Component Value Date/Time  (H) 11/05/2018 04:20 AM  
  (H) 11/04/2018 02:08 PM  
 GLUCPOC 305 (H) 11/05/2018 07:43 AM  
 GLUCPOC 323 (H) 11/05/2018 12:42 AM  
 GLUCPOC 430 (H) 11/04/2018 10:00 PM  
  
 
Lab Results Component Value Date/Time Creatinine 0.63 11/05/2018 04:20 AM  
 
Estimated Creatinine Clearance: 105 mL/min (based on SCr of 0.63 mg/dL). Active Orders Diet DIET DIABETIC CONSISTENT CARB Regular PO intake: No data found. Will continue to follow as needed. Thank you. Yury Mercado [facility] Office: [phone]

## 2018-11-05 NOTE — CONSULTS
PSYCHIATRIC CONSULTATION                         IDENTIFICATION:    Patient Name  Marlena Rodriguez   Date of Birth 1975   [facility] [id number]   Medical Record Number  [id number]      Age  44 y.o. PCP None   Admit date:  11/3/2018    Room Number  PCU4/01  @ [facility]   Date of Service  11/5/2018            HISTORY         REASON FOR HOSPITALIZATION/CONSULTATION:  A psychiatric consultation was requested by Subhash Cueva MD to evaluate or provide advice/opinion related to her psychotropic medications. CC:\"I ran out my medications\"   HISTORY OF PRESENT ILLNESS:    The patient, Marlena Rodriguez, is a 44 y.o. BLACK OR  female with a past psychiatric history significant for Schizophrenia vs schizoaffective disorder and non-compliant with treatment and medicatons, who was admitted to the medical floor for the treatment of DKA (diabetic k[facility]acidoses) [facility] Patient reports/evidences the following emotional symptoms: vague psychotic symptoms mostly paranoia,but denies auditory hallucincations. The above symptoms have been present for years. Last time discharge from [facility] on 8/14/18. She was suppose to take her medications Haldol ,Depakote and follow at [facility]. She reported that she ran out her medications and did not went for follow-up. She is been off from medications for at least a month. She also give the vague history of psychosis,paranoia . At this time she is not suicidal.She appears sleepy and slow . ALLERGIES:  Allergies   Allergen Reactions    Dilaudid [Hydromorphone (Bulk)] Itching    Ibuprofen Not Reported This Time      MEDICATIONS PRIOR TO ADMISSION:  Medications Prior to Admission   Medication Sig    haloperidol decanoate (HALDOL DECANOATE) 100 mg/mL injection 100 mg by IntraMUSCular route every twenty-eight (28) days.  metFORMIN (GLUCOPHAGE) 1,000 mg tablet Take 1,000 mg by mouth.  meloxicam (MOBIC) 15 mg tablet Take 15 mg by mouth daily as needed for Pain.     insulin regular (HUMULIN R REGULAR U-100 INSULN) 100 unit/mL injection by SubCUTAneous route. Sliding scale    insulin aspart U-100 (NOVOLOG FLEXPEN U-100 INSULIN) 100 unit/mL inpn by SubCUTAneous route three (3) times daily as needed. Sliding scale    sertraline (ZOLOFT) 100 mg tablet Take 100 mg by mouth daily.  benztropine (COGENTIN) 1 mg tablet Take 1 Tab by mouth two (2) times a day. Indications: drug-induced extrapyramidal reaction (Patient taking differently: Take 1 mg by mouth every twelve (12) hours.)    Blood-Glucose Meter monitoring kit Monitor BG 4 times daily. Dx uncontrolled type 2 DM E11.65  Indications: Diabetes Mellitus    divalproex DR (DEPAKOTE) 500 mg tablet Take 1 Tab by mouth two (2) times a day. Indications: Schizoaffective disorder (Patient taking differently: Take 500 mg by mouth every twelve (12) hours.)    haloperidol (HALDOL) 5 mg tablet Take 1 Tab by mouth every twelve (12) hours. Indications: Schizoaffective disorder    albuterol (PROVENTIL HFA, VENTOLIN HFA, PROAIR HFA) 90 mcg/actuation inhaler Take 1 Puff by inhalation every four (4) hours as needed for Wheezing.  insulin glargine (LANTUS) 100 unit/mL injection 30 Units by SubCUTAneous route nightly. Indications: type 1 diabetes mellitus (Patient taking differently: 30 Units by SubCUTAneous route daily.  Indications: type 1 diabetes mellitus)      PAST MEDICAL HISTORY:  Past Medical History:   Diagnosis Date    Alcohol abuse, in remission     quit 17 days ago    Asthma     does not use inhalers    Borderline personality disorder ([address]     [facility] ([facility]     GERD (gastroesophageal reflux disease)     Hypertension     Other ill-defined conditions(799.89)     kidney stones ,passed one    Other ill-defined conditions(799.89)     sickle cell trait    Other ill-defined conditions(799.89)     increased cholesterol    Pancreatitis     Psychiatric disorder     schizophrenia, bipolar, depression, anxiety      Past Surgical History:   Procedure Laterality Date    ABDOMEN SURGERY PROC UNLISTED  3/12/14    CHOLECYSTECTOMY LAPAROSCOPIC      HX GASTRIC BYPASS      HX GYN  11/8/2012    c section x2    HX OTHER SURGICAL  3/13/14    ENDOSCOPIC RETROGRADE CHOLANGIOPANCREATOGRAPHY     HX OTHER SURGICAL  8/4/14    endoscopic stent placed to bile duct    HX TUBAL LIGATION  2012      SOCIAL HISTORY:   Social History     Socioeconomic History    Marital status: SINGLE     Spouse name: Not on file    Number of children: Not on file    Years of education: Not on file    Highest education level: Not on file   Social Needs    Financial resource strain: Not on file    Food insecurity - worry: Not on file    Food insecurity - inability: Not on file   [employer] needs - medical: Not on file   [employer] needs - non-medical: Not on file   Occupational History    Not on file   Tobacco Use    Smoking status: Current Every Day Smoker     Packs/day: 0.50     Years: 14.00     Pack years: 7.00     Types: Cigarettes    Smokeless tobacco: Never Used    Tobacco comment: cigarettes   Substance and Sexual Activity    Alcohol use: Yes     Alcohol/week: 0.6 oz     Types: 1 Cans of beer per week     Comment: occasionally, last had \"i had one beer\" today    Drug use: Yes     Types: Cocaine     Comment: every day    Sexual activity: Yes     Partners: Female     Birth control/protection: Surgical   Other Topics Concern    Not on file   Social History Narrative    Not on file      FAMILY HISTORY: History reviewed. No pertinent family history. Family History   Problem Relation Age of Onset    Heart Disease Mother     Diabetes Mother     Hypertension Mother     Hypertension Maternal Grandmother        REVIEW of SYSTEMS:   Psychological ROS: positive for - history of psychosis  Pertinent items are noted in the History of Present Illness. All other Systems reviewed and are considered negative.            Isiah MENTAL STATUS EXAM (MSE):    MSE FINDINGS ARE WITHIN NORMAL LIMITS (WNL) UNLESS OTHERWISE STATED BELOW. Orientation oriented to time, place and person   Vital Signs (BP,Pulse, Temp) See below (reviewed 11/5/2018); vital signs are WNL if not listed below. Gait and Station Stable, no ataxia   Abnormal Muscular Movements/Tone/Behavior No EPS, no Tardive Dyskinesia, no abnormal muscular movements; wnl tone   Relations cooperative   General Appearance:  age appropriate and wearing hospital gown   Language No aphasia or dysarthria   Speech:  normal pitch and normal volume   Thought Processes logical, wnl rate of thoughts, good abstract reasoning and computation   Thought Associations logical and within normal limits,does not appear psychotic at this time   Thought Content free of delusions and free of hallucinations   Suicidal Ideations none   Homicidal Ideations none   Mood:  euthymic   Affect:  constricted and mood-congruent   Memory recent  adequate   Memory remote:  adequate   Concentration/Attention:  adequate   Fund of Knowledge average   Insight:  poor   Reliability poor   Judgment:  poor            VITALS:     Patient Vitals for the past 24 hrs:   Temp Pulse Resp BP SpO2   11/05/18 0400 97.5 °F (36.4 °C) 75 16 116/68 100 %   11/04/18 2330 98.2 °F (36.8 °C) 85 20 126/83 100 %   11/04/18 2100  91 22  99 %   11/04/18 2000 98.2 °F (36.8 °C) 90 17 132/69 100 %   11/04/18 1900  90 16  100 %   11/04/18 1800  90 16 141/68 100 %   11/04/18 1700  87 13  100 %   11/04/18 1600  83 14 129/64 100 %   11/04/18 1540 97.5 °F (36.4 °C) 92 18 145/68 100 %   11/04/18 1500  84 15  100 %   11/04/18 1148 98.5 °F (36.9 °C) 89 13 144/79 100 %              DATA     LABORATORY DATA:  Labs Reviewed   CBC WITH AUTOMATED DIFF - Abnormal; Notable for the following components:       Result Value    WBC 12.7 (*)     RBC 5.55 (*)     MCV 77.1 (*)     RDW 15.7 (*)     NEUTROPHILS 82 (*)     ABS. NEUTROPHILS 10.4 (*)     ABS. IMMAgustín Farrar Sovereign. 0.1 (*)     All other components within normal limits   METABOLIC PANEL, COMPREHENSIVE - Abnormal; Notable for the following components:    Sodium 133 (*)     CO2 10 (*)     Anion gap 26 (*)     Glucose 444 (*)     Creatinine 1.21 (*)     BUN/Creatinine ratio 10 (*)     GFR est AA 59 (*)     GFR est non-AA 49 (*)     Calcium 8.4 (*)     AST (SGOT) 14 (*)     Albumin 3.4 (*)     Globulin 4.3 (*)     A-G Ratio 0.8 (*)     All other components within normal limits   CK W/ CKMB & INDEX - Abnormal; Notable for the following components:    CK-MB Index 4.8 (*)     All other components within normal limits   BLOOD GAS, ARTERIAL - Abnormal; Notable for the following components:    pH 7.26 (*)     PCO2 13 (*)     PO2 108 (*)     BICARBONATE 6 (*)     All other components within normal limits   HEMOGLOBIN A1C WITH EAG - Abnormal; Notable for the following components:    Hemoglobin A1c 15.0 (*)     All other components within normal limits   METABOLIC PANEL, BASIC - Abnormal; Notable for the following components:    CO2 15 (*)     Glucose 281 (*)     Creatinine 1.16 (*)     BUN/Creatinine ratio 10 (*)     GFR est non-AA 51 (*)     Calcium 7.9 (*)     All other components within normal limits   PHOSPHORUS - Abnormal; Notable for the following components:    Phosphorus 2.4 (*)     All other components within normal limits   METABOLIC PANEL, BASIC - Abnormal; Notable for the following components:    Chloride 109 (*)     CO2 16 (*)     Glucose 107 (*)     BUN/Creatinine ratio 10 (*)     Calcium 8.0 (*)     All other components within normal limits   PHOSPHORUS - Abnormal; Notable for the following components:    Phosphorus 2.5 (*)     All other components within normal limits   METABOLIC PANEL, BASIC - Abnormal; Notable for the following components:    Potassium 3.4 (*)     CO2 17 (*)     Glucose 165 (*)     BUN/Creatinine ratio 8 (*)     Calcium 7.8 (*)     All other components within normal limits   METABOLIC PANEL, BASIC - Abnormal; Notable for the following components:    Chloride 109 (*)     CO2 18 (*)     Glucose 260 (*)     BUN 5 (*)     BUN/Creatinine ratio 8 (*)     Calcium 8.2 (*)     All other components within normal limits   CBC WITH AUTOMATED DIFF - Abnormal; Notable for the following components:    MCV 75.4 (*)     RDW 15.6 (*)     All other components within normal limits   GLUCOSE, POC - Abnormal; Notable for the following components:    Glucose (POC) 414 (*)     All other components within normal limits   GLUCOSE, POC - Abnormal; Notable for the following components:    Glucose (POC) 439 (*)     All other components within normal limits   GLUCOSE, POC - Abnormal; Notable for the following components:    Glucose (POC) 429 (*)     All other components within normal limits   GLUCOSE, POC - Abnormal; Notable for the following components:    Glucose (POC) 415 (*)     All other components within normal limits   GLUCOSE, POC - Abnormal; Notable for the following components:    Glucose (POC) 352 (*)     All other components within normal limits   GLUCOSE, POC - Abnormal; Notable for the following components:    Glucose (POC) 290 (*)     All other components within normal limits   GLUCOSE, POC - Abnormal; Notable for the following components:    Glucose (POC) 293 (*)     All other components within normal limits   GLUCOSE, POC - Abnormal; Notable for the following components:    Glucose (POC) 175 (*)     All other components within normal limits   GLUCOSE, POC - Abnormal; Notable for the following components:    Glucose (POC) 116 (*)     All other components within normal limits   GLUCOSE, POC - Abnormal; Notable for the following components:    Glucose (POC) 101 (*)     All other components within normal limits   GLUCOSE, POC - Abnormal; Notable for the following components:    Glucose (POC) 112 (*)     All other components within normal limits   GLUCOSE, POC - Abnormal; Notable for the following components:    Glucose (POC) 113 (*)     All other components within normal limits   GLUCOSE, POC - Abnormal; Notable for the following components:    Glucose (POC) 149 (*)     All other components within normal limits   GLUCOSE, POC - Abnormal; Notable for the following components:    Glucose (POC) 172 (*)     All other components within normal limits   GLUCOSE, POC - Abnormal; Notable for the following components:    Glucose (POC) 173 (*)     All other components within normal limits   GLUCOSE, POC - Abnormal; Notable for the following components:    Glucose (POC) 196 (*)     All other components within normal limits   GLUCOSE, POC - Abnormal; Notable for the following components:    Glucose (POC) 196 (*)     All other components within normal limits   GLUCOSE, POC - Abnormal; Notable for the following components:    Glucose (POC) 430 (*)     All other components within normal limits   GLUCOSE, POC - Abnormal; Notable for the following components:    Glucose (POC) 323 (*)     All other components within normal limits   GLUCOSE, POC - Abnormal; Notable for the following components:    Glucose (POC) 305 (*)     All other components within normal limits   TROPONIN I   NT-PRO BNP   MAGNESIUM   GLUCOSTABILIZER   GLUCOSTABILIZER   GLUCOSTABILIZER   MAGNESIUM   GLUCOSTABILIZER   GLUCOSTABILIZER   GLUCOSTABILIZER   PHOSPHORUS   GLUCOSTABILIZER   GLUCOSTABILIZER   GLUCOSTABILIZER   MAGNESIUM   GLUCOSTABILIZER   GLUCOSTABILIZER   MAGNESIUM   GLUCOSTABILIZER   GLUCOSTABILIZER   GLUCOSTABILIZER   GLUCOSTABILIZER   MAGNESIUM   PHOSPHORUS   PHOSPHORUS   MAGNESIUM   METABOLIC PANEL, BASIC   MAGNESIUM   METABOLIC PANEL, BASIC   METABOLIC PANEL, BASIC   MAGNESIUM   METABOLIC PANEL, BASIC     Admission on 11/03/2018   No results displayed because visit has over 200 results.            RADIOLOGY REPORTS:  Results from Hospital Encounter encounter on 11/03/18   XR CHEST PORT    Narrative EXAM:  XR CHEST PORT    INDICATION:  Shortness of breath, chest tightness, cocaine use today. COMPARISON: Chest views on 2/19/2016. TECHNIQUE: Semiupright portable chest AP view    FINDINGS: Cardiac monitoring wires overlie the thorax. The cardiomediastinal and  hilar contours are within normal limits. The pulmonary vasculature is within  normal limits. The lungs and pleural spaces are clear. The visualized bones and upper abdomen  are age-appropriate. Impression IMPRESSION:    No acute process on portable chest. No change. Xr Foot Lt Min 3 V    Result Date: 8/13/2018  EXAM:  XR FOOT LT MIN 3 V INDICATION:   Left toe pain and lesion. COMPARISON:  None. FINDINGS:  Three views of the left foot demonstrate no fracture or other acute osseous or articular abnormality. The soft tissues are within normal limits. IMPRESSION:  No acute abnormality. Xr Chest Port    Result Date: 11/3/2018  EXAM:  XR CHEST PORT INDICATION:  Shortness of breath, chest tightness, cocaine use today. COMPARISON: Chest views on 2/19/2016. TECHNIQUE: Semiupright portable chest AP view FINDINGS: Cardiac monitoring wires overlie the thorax. The cardiomediastinal and hilar contours are within normal limits. The pulmonary vasculature is within normal limits. The lungs and pleural spaces are clear. The visualized bones and upper abdomen are age-appropriate. IMPRESSION: No acute process on portable chest. No change.               MEDICATIONS       ALL MEDICATIONS  Current Facility-Administered Medications   Medication Dose Route Frequency    insulin lispro (HUMALOG) injection   SubCUTAneous AC&HS    insulin lispro (HUMALOG) 100 unit/mL injection        influenza vaccine 2018-19 (6 mos+)(PF) (FLUARIX QUAD/FLULAVAL QUAD) injection 0.5 mL  0.5 mL IntraMUSCular PRIOR TO DISCHARGE    amLODIPine (NORVASC) tablet 5 mg  5 mg Oral DAILY    0.9% sodium chloride infusion  100 mL/hr IntraVENous CONTINUOUS    glucose chewable tablet 16 g  4 Tab Oral PRN    dextrose (D50W) injection syrg 12.5-25 g  25-50 mL IntraVENous PRN    glucagon (GLUCAGEN) injection 1 mg  1 mg IntraMUSCular PRN    sodium chloride (NS) flush 5-10 mL  5-10 mL IntraVENous Q8H    sodium chloride (NS) flush 5-10 mL  5-10 mL IntraVENous PRN    acetaminophen (TYLENOL) tablet 650 mg  650 mg Oral Q4H PRN    ondansetron (ZOFRAN) injection 4 mg  4 mg IntraVENous Q4H PRN    bisacodyl (DULCOLAX) tablet 5 mg  5 mg Oral DAILY PRN    enoxaparin (LOVENOX) injection 40 mg  40 mg SubCUTAneous DAILY    albuterol (PROVENTIL HFA, VENTOLIN HFA, PROAIR HFA) inhaler 2 Puff  2 Puff Inhalation Q4H PRN    haloperidol (HALDOL) tablet 5 mg  5 mg Oral Q12H    meloxicam (MOBIC) tablet 15 mg  15 mg Oral DAILY PRN    hydrALAZINE (APRESOLINE) 20 mg/mL injection 10 mg  10 mg IntraVENous Q6H PRN      SCHEDULED MEDICATIONS  Current Facility-Administered Medications   Medication Dose Route Frequency    insulin lispro (HUMALOG) injection   SubCUTAneous AC&HS    insulin lispro (HUMALOG) 100 unit/mL injection        influenza vaccine 2018-19 (6 mos+)(PF) (FLUARIX QUAD/FLULAVAL QUAD) injection 0.5 mL  0.5 mL IntraMUSCular PRIOR TO DISCHARGE    amLODIPine (NORVASC) tablet 5 mg  5 mg Oral DAILY    0.9% sodium chloride infusion  100 mL/hr IntraVENous CONTINUOUS    sodium chloride (NS) flush 5-10 mL  5-10 mL IntraVENous Q8H    enoxaparin (LOVENOX) injection 40 mg  40 mg SubCUTAneous DAILY    haloperidol (HALDOL) tablet 5 mg  5 mg Oral Q12H                ASSESSMENT & PLAN        The patient Mariah Santillan is a 44 y.o.  female who presents at this time for treatment of the following diagnoses:  Patient Active Hospital Problem List:   [facility] (10[facility]20/2018)  Treatment and management as per internist.       Paranoid schizophrenia ([facility] (4/25/2016) VS Schizoaffective disorder    Assessment:Historica diagnosis of Schizophrenia,currntly not acutely psychotic,As per patient she  is not taken her medications for month. Has a history of noncompliance with medications. She denies suicidal thoughts,plans or intent,she denies auditory or visual hallucinations. Last time she was discharge from [facility]18 she was suppose to be on Haldol and Depakote. She reported taht she is not taking medications for last 4 weeks and did not follow [facility]                My recommendation is  To continue Haldol 5 mg po bid,and hold on to Depakote,refer her back to [facility] for follow-up upon discharge. If she becomes unstable or behavioral issues please call psychiatry to evaluate her. Thanks for asking for consult. Case discussed with DR. Jessy Crawford        Thank you very much for the opportunity to participate in the care of your patient. I will continue to follow up with patient as deemed appropriate. A coordinated, multidisplinary treatment team round was conducted with the patient; that includes the nurse, unit pharmcist,  and writer all present. The following regarding medications was addressed during rounds with patient:   the risks and benefits of the proposed medication. The patient was given the opportunity to ask questions. Informed consent given to the use of the above medications. I will continue to adjust psychiatric and non-psychiatric medications (see above \"medication\" section and orders section for details) as deemed appropriate & based upon diagnoses and response to treatment. I have reviewed admission (and previous/old) labs and medical tests in the EHR and or transferring hospital documents. I will continue to order blood tests/labs and diagnostic tests as deemed appropriate and review results as they become available (see orders for details). I have reviewed old psychiatric and medical records available in the EHR. I Will order additional psychiatric records from other institutions to further elucidate the nature of patient's psychopathology and review once available.     I will gather additional collateral information from friends, family and o/p treatment team to further elucidate the nature of patient's psychopathology and baselline level of psychiatric functioning.                      SIGNED:    Carmen Gresham MD  11/5/2018

## 2018-11-05 NOTE — PROGRESS NOTES
Pt found to have been smoking in bath room, Dr. Sj Elizabeth and security made aware. Pt offered nicotine patch.

## 2018-11-05 NOTE — PROGRESS NOTES
**Consult Information** 
Member Facility: [address] [place] [address] Evelina MRN: [id number] Consult ID: [id number] Facility Time Zone: ET 
Date and Time of Consult: 11/04/2018 10:10:52 PM 
Requesting Clinician: Kae Yen Patient Name: Cecilia Zaldivar YOB: 1975 Gender: Female **Clinical Note** Clinical Note: Patient's blood sugar is 430 this evening. One time dose of 6 units regular IV insulin was ordered. Sliding [facility]. Also ordered one time dose of 40 meq oral KCL as previous BMP showed mild hypokalemia. Repeat POC blood glucose in 2 hours.

## 2018-11-05 NOTE — DISCHARGE SUMMARY
Hospitalist Discharge Summary     Patient ID:  Niecy Fall  [id number]  89 y.o.  1975    PCP on record: None    Admit date: 11/3/2018  Discharge date and time: 11/5/2018      DISCHARGE DIAGNOSIS:    DKA in setting of uncontrolled DM1 secondary to non compliance  Anion gap metabolic acidosis due to above  Chest pain and SOB  likely secondary to cocaine use and compensation for metabolic acidosis  Elevated creatinine, doesn't meet criteria for FABIENNE  Asthma without acute exacerbation   Schizoaffective disorder with anxiety and depression  with polysubstance abuse and drug-seeking behavior  Hypertension    CONSULTATIONS:  IP CONSULT TO PSYCHIATRY    Excerpted HPI from H&P of Lisa Linda MD:  Admitted by telehospitalist last night, seen and examined by me 0745 am today. Elian Chow is a 44 y.o. is a  female who presented last night for above symptoms. Pt is knonwn to me from last admission when she left AMA because she could not get 'ice cream\". Pt is very sleepy and lethargic and poor historian. Says currently she is living with a friend, admits to non compliance with medications and recent use of cocaine. Chest pain and SOB resolved.  She denies recent fever, cough or URI symptoms diarrhea or constipation. In ER initial eval she was found in DKA with high anion gap, elevated bicarb, low ph. CXR neg for acute process, troponin neg.      We were asked to admit for work up and evaluation of the above problems. ______________________________________________________________________  DISCHARGE SUMMARY/HOSPITAL COURSE:  for full details see H&P, daily progress notes, labs, consult notes. Pt smoked cigarettes in hospital. Spent extensive time to educate and . Nicotine patch offered. She wants to leave AMA. Her bicarb is till low. Risks explained. Voice understanding. Says she has her inuslin at home, she is just not taking it.  Says she doesn't need any prescriptions. Left AMA. For hospital course please refer to HPI and progress note.     _______________________________________________________________________  Patient seen and examined by me on discharge day. Pertinent Findings:  As per progress note  _________________________________________________________  DISCHARGE MEDICATIONS:   Current Discharge Medication List          My Recommended Diet, Activity, Wound Care, and follow-up labs are listed in the patient's Discharge Insturctions which I have personally completed and reviewed.     ______________________________________________________________________    Risk of deterioration: High because she has high risk life style and non compliant with meds    Condition at Discharge:  Stable  ______________________________________________________________________    Disposition  AMA  ______________________________________________________________________    Care Plan discussed with:   Patient, RN, Care Manager    ______________________________________________________________________    Code Status: Full Code  ______________________________________________________________________      Follow up with:   PCP : None  Follow-up Information    None             Total time in minutes spent coordinating this discharge (includes going over instructions, follow-up, prescriptions, and preparing report for sign off to her PCP) :  30 minutes    Signed:  Biju Villa MD

## 2018-11-05 NOTE — PROGRESS NOTES
Hospitalist Progress Note NAME: Macho Hoskins :  1975 MRN:  [id number] Assessment / Plan: 
 
Type 1 DM poorly controlled with hyperglycemia  
related to medication non compliance 
patient education with reinforcement of patient compliance 
-A1C 15.0 Anion gap metabolic acidosis due to above Non compliant with medication, pt known form last admission AG 26, bicarb 10 on labs.   
ABG on admission ph 7.26, bicarb 
-anion gap closed, off insulin, bicarb still low but improving 
-on SQ insulin, and tolerating po diet. -down grade to medical status 
  
Chest pain and SOB 
likeley secondary to cocaine use and compensation for metabolic acidosis -CXR with no acute process on portable chest. No change. 
-trop <0.05 
-resolved 
  
Elevated creatinine, doesn't meet criteria for FABIENNE 
likely volmue depleted Monitor renal function on IVF 
  
Asthma without acute exacerbation: POA 
- continue albuterol prn 
  
Schizoaffective disorder with anxiety and depression: POA 
with polysubstance abuse and drug-seeking behavior Non compliant with psychiatry meds 
-psych consulted in house to review meds 
  
Hypertension: POA 
SBP was in 180 on presentation in setting of cocaine use  
-not on meds at home, will start on CCB Code Status: full  
Surrogate Decision Maker: son DVT Prophylaxis: lovenox Baseline: homeless Subjective: Chief Complaint / Reason for Physician Visit \"doing better, when is breakfast coming\". Discussed with RN events overnight. Review of Systems: 
Symptom Y/N Comments  Symptom Y/N Comments Fever/Chills n   Chest Pain n   
Poor Appetite n   Edema n   
Cough n   Abdominal Pain n   
Sputum n   Joint Pain n   
SOB/BARRY n   Pruritis/Rash Nausea/vomit n   Tolerating PT/OT y Diarrhea n   Tolerating Diet y Constipation    Other Could NOT obtain due to:   
 
Objective: VITALS:  
Last 24hrs VS reviewed since prior progress note.  Most recent are: 
Patient Vitals for the past 24 hrs: 
 Temp Pulse Resp BP SpO2  
11/05/18 0700 97.5 °F (36.4 °C) 75 12 128/78 100 % 11/05/18 0400 97.5 °F (36.4 °C) 75 16 116/68 100 % 11/04/18 2330 98.2 °F (36.8 °C) 85 20 126/83 100 % 11/04/18 2100  91 22  99 % 11/04/18 2000 98.2 °F (36.8 °C) 90 17 132/69 100 % 11/04/18 1900  90 16  100 % 11/04/18 1800  90 16 141/68 100 % 11/04/18 1700  87 13  100 % 11/04/18 1600  83 14 129/64 100 % 11/04/18 1540 97.5 °F (36.4 °C) 92 18 145/68 100 % 11/04/18 1500  84 15  100 % 11/04/18 1148 98.5 °F (36.9 °C) 89 13 144/79 100 % Intake/Output Summary (Last 24 hours) at 11/5/2018 4403 Last data filed at 11/5/2018 4720 Gross per 24 hour Intake 1000 ml Output  Net 1000 ml PHYSICAL EXAM: 
General: WD, WN. Alert, cooperative, no acute distress   
EENT:  EOMI. Anicteric sclerae. MMM Resp:  CTA bilaterally, no wheezing or rales. No accessory muscle use CV:  Regular  rhythm,  No edema GI:  Soft, Non distended, Non tender.  +Bowel sounds Neurologic:  Alert and oriented X 3, normal speech, Psych:   Not anxious nor agitated Skin:  No rashes. No jaundice Reviewed most current lab test results and cultures  YES Reviewed most current radiology test results   YES Review and summation of old records today    NO Reviewed patient's current orders and MAR    YES 
PMH/SH reviewed - no change compared to H&P 
________________________________________________________________________ Care Plan discussed with: 
  Comments Patient x Family RN x Care Manager Consultant Multidiciplinary team rounds were held today with , nursing, pharmacist and clinical coordinator. Patient's plan of care was discussed; medications were reviewed and discharge planning was addressed. ________________________________________________________________________ Total NON critical care TIME:  25  Minutes Total CRITICAL CARE TIME Spent: Minutes non procedure based Comments >50% of visit spent in counseling and coordination of care    
________________________________________________________________________ Robert Gonzalez MD  
 
Procedures: see electronic medical records for all procedures/Xrays and details which were not copied into this note but were reviewed prior to creation of Plan. LABS: 
I reviewed today's most current labs and imaging studies. Pertinent labs include: 
Recent Labs 11/05/18 
0420 11/03/18 
2122 WBC 5.9 12.7* HGB 12.6 15.1 HCT 35.2 42.8  293 Recent Labs 11/05/18 
5706 11/04/18 
1408 11/04/18 
0919  11/03/18 
2122  137 138   < > 133* K 3.6 3.4* 3.5   < > 4.7 * 106 109*   < > 97  
CO2 18* 17* 16*   < > 10* * 165* 107*   < > 444* BUN 5* 7 9   < > 12 CREA 0.63 0.86 0.91   < > 1.21* CA 8.2* 7.8* 8.0*   < > 8.4* MG 1.7 1.6 1.7   < > 1.9 PHOS 2.9 2.5* 2.7   < >  --   
ALB  --   --   --   --  3.4* TBILI  --   --   --   --  0.8 SGOT  --   --   --   --  14* ALT  --   --   --   --  34  
 < > = values in this interval not displayed.   
 
 
Signed: Robert Gonzalez MD

## 2018-11-05 NOTE — PROGRESS NOTES
Bedside and Verbal shift change report given to Gideon Rodriguez (oncoming nurse) by Cindy Bell (offgoing nurse). Report included the following information SBAR, Kardex, ED Summary, MAR, Accordion, Recent Results and Cardiac Rhythm sinus tachycardia. Patient was on insulin drip for the majority of the shift. Lantus 15units was given at 1537 and the insulin drip was completed at 1738. Patient ate dinner. Limits were established with patient, who continually asks for food and ice cream.  Explained to the patient the relationship between what she eats and her blood sugar levels (even if the ice cream is sugar free). [address] rounds performed. This writer Fuad Villalobos) is the RN currently responsible for the care of this patient 1600 This writer Fuad Villalobos) is the RN currently responsible for the care of this patient. [address] nurse report given to [facility] Bedside and Verbal shift change report given to Fritz Mclaughlin (oncoming nurse) by Gideon Rodriguez (offgoing nurse). Report included the following information SBAR, Kardex, MAR, Accordion, Recent Results and Cardiac Rhythm nsr.

## 2018-11-05 NOTE — PROGRESS NOTES
Problem: Diabetes Self-Management Goal: *Disease process and treatment process Define diabetes and identify own type of diabetes; list 3 options for treating diabetes. Outcome: Progressing Towards Goal 
Discusssed diabetes and importance of checking blood glucose at home,  food options,  And treatment options

## 2018-11-05 NOTE — PROGRESS NOTES
Problem: Falls - Risk of 
Goal: *Absence of Falls Document Jaime Hernandezh Fall Risk and appropriate interventions in the flowsheet. Outcome: Progressing Towards Goal 
Fall Risk Interventions: 
  
 
  
 
Medication Interventions: Patient to call before getting OOB History of Falls Interventions: Bed/chair exit alarm, Consult care management for discharge planning, Room close to nurse's station

## 2018-11-05 NOTE — CDMP QUERY
There is noted documentation of: \" DKA in setting of uncontrolled DM1: POA\"  Could you further specify the Uncontrolled DM1 ? 
 
=> Type 1 DM poorly controlled with hyperglycemia related to medication non compliance, A1C 15.0; patient education with reinforcement of patient compliance 
=> Other explanation of clinical findings 
=> Clinically Undetermined (no explanation for clinical findings) The medical record reflects the following clinical findings, treatment, and risk factors. Risk Factors:  Type 1 DM; non compliance with medications Clinical Indicators:  11/4 A1C 15.0 est average glucose 384 Treatment: Insulin drip, patient re-education with reinforcement of compliance with medications Please clarify and document your clinical opinion in the progress notes and discharge summary including the definitive and/or presumptive diagnosis, (suspected or probable), related to the above clinical findings. Please include clinical findings supporting your diagnosis. Thank you, 
Irene Martins, MSN, [address] [facility], [address] [place] [address]

## 2018-11-18 ENCOUNTER — HOSPITAL ENCOUNTER (EMERGENCY)
Age: 43
Discharge: HOME | End: 2018-11-18
Attending: EMERGENCY MEDICINE
Payer: MEDICARE

## 2018-11-18 VITALS
DIASTOLIC BLOOD PRESSURE: 92 MMHG | WEIGHT: 125 LBS | BODY MASS INDEX: 20.18 KG/M2 | HEART RATE: 91 BPM | SYSTOLIC BLOOD PRESSURE: 176 MMHG | TEMPERATURE: 97.6 F | RESPIRATION RATE: 15 BRPM | OXYGEN SATURATION: 98 %

## 2018-11-18 DIAGNOSIS — Z91.14 NONCOMPLIANCE WITH MEDICATION REGIMEN: ICD-10-CM

## 2018-11-18 DIAGNOSIS — Z59.00 HOMELESSNESS: ICD-10-CM

## 2018-11-18 DIAGNOSIS — R45.851 SUICIDAL IDEATION: ICD-10-CM

## 2018-11-18 DIAGNOSIS — F20.9 SCHIZOPHRENIA, UNSPECIFIED TYPE (HCC): ICD-10-CM

## 2018-11-18 DIAGNOSIS — F14.10 COCAINE ABUSE (HCC): ICD-10-CM

## 2018-11-18 DIAGNOSIS — R73.9 HYPERGLYCEMIA: Primary | ICD-10-CM

## 2018-11-18 DIAGNOSIS — I10 ESSENTIAL HYPERTENSION: ICD-10-CM

## 2018-11-18 LAB
ALBUMIN SERPL-MCNC: 3.4 G/DL (ref 3.5–5)
ALBUMIN/GLOB SERPL: 0.8 {RATIO} (ref 1.1–2.2)
ALP SERPL-CCNC: 112 U/L (ref 45–117)
ALT SERPL-CCNC: 32 U/L (ref 12–78)
AMPHET UR QL SCN: NEGATIVE
ANION GAP SERPL CALC-SCNC: 18 MMOL/L (ref 5–15)
APPEARANCE UR: CLEAR
AST SERPL-CCNC: 12 U/L (ref 15–37)
ATRIAL RATE: 87 BPM
BACTERIA URNS QL MICRO: NEGATIVE /HPF
BARBITURATES UR QL SCN: NEGATIVE
BASOPHILS # BLD: 0 K/UL (ref 0–0.1)
BASOPHILS NFR BLD: 0 % (ref 0–1)
BENZODIAZ UR QL: NEGATIVE
BILIRUB SERPL-MCNC: 0.5 MG/DL (ref 0.2–1)
BILIRUB UR QL: NEGATIVE
BUN SERPL-MCNC: 9 MG/DL (ref 6–20)
BUN/CREAT SERPL: 9 (ref 12–20)
CALCIUM SERPL-MCNC: 9.1 MG/DL (ref 8.5–10.1)
CALCULATED P AXIS, ECG09: 65 DEGREES
CALCULATED R AXIS, ECG10: 23 DEGREES
CALCULATED T AXIS, ECG11: 63 DEGREES
CANNABINOIDS UR QL SCN: NEGATIVE
CHLORIDE SERPL-SCNC: 95 MMOL/L (ref 97–108)
CK MB CFR SERPL CALC: 3.2 % (ref 0–2.5)
CK MB SERPL-MCNC: 1.4 NG/ML (ref 5–25)
CK SERPL-CCNC: 44 U/L (ref 26–192)
CO2 SERPL-SCNC: 22 MMOL/L (ref 21–32)
COCAINE UR QL SCN: POSITIVE
COLOR UR: ABNORMAL
CREAT SERPL-MCNC: 0.99 MG/DL (ref 0.55–1.02)
DIAGNOSIS, 93000: NORMAL
DIFFERENTIAL METHOD BLD: ABNORMAL
DRUG SCRN COMMENT,DRGCM: ABNORMAL
EOSINOPHIL # BLD: 0.1 K/UL (ref 0–0.4)
EOSINOPHIL NFR BLD: 1 % (ref 0–7)
EPITH CASTS URNS QL MICRO: ABNORMAL /LPF
ERYTHROCYTE [DISTWIDTH] IN BLOOD BY AUTOMATED COUNT: 16.6 % (ref 11.5–14.5)
ETHANOL SERPL-MCNC: 24 MG/DL
GLOBULIN SER CALC-MCNC: 4.5 G/DL (ref 2–4)
GLUCOSE BLD STRIP.AUTO-MCNC: 292 MG/DL (ref 65–100)
GLUCOSE BLD STRIP.AUTO-MCNC: 434 MG/DL (ref 65–100)
GLUCOSE SERPL-MCNC: 405 MG/DL (ref 65–100)
GLUCOSE UR STRIP.AUTO-MCNC: >1000 MG/DL
HCG UR QL: NEGATIVE
HCT VFR BLD AUTO: 39.7 % (ref 35–47)
HGB BLD-MCNC: 14.6 G/DL (ref 11.5–16)
HGB UR QL STRIP: NEGATIVE
IMM GRANULOCYTES # BLD: 0 K/UL (ref 0–0.04)
IMM GRANULOCYTES NFR BLD AUTO: 0 % (ref 0–0.5)
KETONES UR QL STRIP.AUTO: >80 MG/DL
LEUKOCYTE ESTERASE UR QL STRIP.AUTO: ABNORMAL
LYMPHOCYTES # BLD: 2 K/UL (ref 0.8–3.5)
LYMPHOCYTES NFR BLD: 17 % (ref 12–49)
MCH RBC QN AUTO: 27.2 PG (ref 26–34)
MCHC RBC AUTO-ENTMCNC: 36.8 G/DL (ref 30–36.5)
MCV RBC AUTO: 74.1 FL (ref 80–99)
METHADONE UR QL: NEGATIVE
MONOCYTES # BLD: 0.9 K/UL (ref 0–1)
MONOCYTES NFR BLD: 8 % (ref 5–13)
NEUTS SEG # BLD: 8.9 K/UL (ref 1.8–8)
NEUTS SEG NFR BLD: 74 % (ref 32–75)
NITRITE UR QL STRIP.AUTO: NEGATIVE
NRBC # BLD: 0 K/UL (ref 0–0.01)
NRBC BLD-RTO: 0 PER 100 WBC
OPIATES UR QL: NEGATIVE
P-R INTERVAL, ECG05: 124 MS
PCP UR QL: NEGATIVE
PH UR STRIP: 5.5 [PH] (ref 5–8)
PLATELET # BLD AUTO: 193 K/UL (ref 150–400)
POTASSIUM SERPL-SCNC: 3.6 MMOL/L (ref 3.5–5.1)
PROT SERPL-MCNC: 7.9 G/DL (ref 6.4–8.2)
PROT UR STRIP-MCNC: NEGATIVE MG/DL
Q-T INTERVAL, ECG07: 368 MS
QRS DURATION, ECG06: 76 MS
QTC CALCULATION (BEZET), ECG08: 442 MS
RBC # BLD AUTO: 5.36 M/UL (ref 3.8–5.2)
RBC #/AREA URNS HPF: ABNORMAL /HPF (ref 0–5)
SERVICE CMNT-IMP: ABNORMAL
SERVICE CMNT-IMP: ABNORMAL
SODIUM SERPL-SCNC: 135 MMOL/L (ref 136–145)
SP GR UR REFRACTOMETRY: 1.02 (ref 1–1.03)
TROPONIN I SERPL-MCNC: <0.05 NG/ML
UA: UC IF INDICATED,UAUC: ABNORMAL
UROBILINOGEN UR QL STRIP.AUTO: 0.2 EU/DL (ref 0.2–1)
VENTRICULAR RATE, ECG03: 87 BPM
WBC # BLD AUTO: 12 K/UL (ref 3.6–11)
WBC URNS QL MICRO: ABNORMAL /HPF (ref 0–4)
YEAST BUDDING URNS QL: PRESENT

## 2018-11-18 PROCEDURE — 74011000250 HC RX REV CODE- 250: Performed by: EMERGENCY MEDICINE

## 2018-11-18 PROCEDURE — 96361 HYDRATE IV INFUSION ADD-ON: CPT

## 2018-11-18 PROCEDURE — 93005 ELECTROCARDIOGRAM TRACING: CPT

## 2018-11-18 PROCEDURE — 80307 DRUG TEST PRSMV CHEM ANLYZR: CPT

## 2018-11-18 PROCEDURE — 74011250637 HC RX REV CODE- 250/637: Performed by: EMERGENCY MEDICINE

## 2018-11-18 PROCEDURE — 74011636637 HC RX REV CODE- 636/637: Performed by: EMERGENCY MEDICINE

## 2018-11-18 PROCEDURE — 36415 COLL VENOUS BLD VENIPUNCTURE: CPT

## 2018-11-18 PROCEDURE — 82962 GLUCOSE BLOOD TEST: CPT

## 2018-11-18 PROCEDURE — 85025 COMPLETE CBC W/AUTO DIFF WBC: CPT

## 2018-11-18 PROCEDURE — 82550 ASSAY OF CK (CPK): CPT

## 2018-11-18 PROCEDURE — 84484 ASSAY OF TROPONIN QUANT: CPT

## 2018-11-18 PROCEDURE — 74011250636 HC RX REV CODE- 250/636: Performed by: EMERGENCY MEDICINE

## 2018-11-18 PROCEDURE — 81025 URINE PREGNANCY TEST: CPT

## 2018-11-18 PROCEDURE — 80053 COMPREHEN METABOLIC PANEL: CPT

## 2018-11-18 PROCEDURE — 99285 EMERGENCY DEPT VISIT HI MDM: CPT

## 2018-11-18 PROCEDURE — 81001 URINALYSIS AUTO W/SCOPE: CPT

## 2018-11-18 PROCEDURE — 90791 PSYCH DIAGNOSTIC EVALUATION: CPT

## 2018-11-18 PROCEDURE — 96374 THER/PROPH/DIAG INJ IV PUSH: CPT

## 2018-11-18 RX ORDER — INSULIN GLARGINE 100 [IU]/ML
30 INJECTION, SOLUTION SUBCUTANEOUS DAILY
Qty: 1 VIAL | Refills: 1 | Status: SHIPPED | OUTPATIENT
Start: 2018-11-18 | End: 2018-11-23

## 2018-11-18 RX ADMIN — HUMAN INSULIN 10 UNITS: 100 INJECTION, SOLUTION SUBCUTANEOUS at 05:20

## 2018-11-18 RX ADMIN — SODIUM CHLORIDE 1000 ML: 900 INJECTION, SOLUTION INTRAVENOUS at 03:43

## 2018-11-18 RX ADMIN — SODIUM CHLORIDE 1000 ML: 900 INJECTION, SOLUTION INTRAVENOUS at 05:20

## 2018-11-18 RX ADMIN — LIDOCAINE HYDROCHLORIDE 40 ML: 20 SOLUTION ORAL; TOPICAL at 05:20

## 2018-11-18 SDOH — ECONOMIC STABILITY - HOUSING INSECURITY: HOMELESSNESS UNSPECIFIED: Z59.00

## 2018-11-18 NOTE — ED NOTES
Pt presents to ED ambulatory complaining of hyperglycemia. Pt. Also reports doing crack cocaine aprox 5 hrs ago. Triage nurse reports 400cc emesis episode while being triaged. Pts speech slow and slightly slurred. Pt. oriented x 4, RR even and unlabored, skin is warm and dry. Assessment completed and pt updated on plan of care. Safety measures in place, call light within reach. Emergency Department Nursing Plan of Care The Nursing Plan of Care is developed from the Nursing assessment and Emergency Department Attending provider initial evaluation. The plan of care may be reviewed in the ED Provider note. The Plan of Care was developed with the following considerations:  
Patient / Family readiness to learn indicated by:verbalized understanding Persons(s) to be included in education: patient Barriers to Learning/Limitations:No 
 
Signed Mabel Enriquez 11/18/2018   3:01 AM

## 2018-11-18 NOTE — ED TRIAGE NOTES
Patient presents to ED with c/o high blood sugar and shortness of breath. Patient states that she has not been taking her medication for diabetes due to her not having any. Patient speaking in full and complete sentences without difficulty. Patient is a every day drug user, state \"that she last smoked crack cocaine about 5 hours ago. \" ETOH positive. EMS states that blood sugar was 419 when checked. Patient had one episode of emesis, about 400cc of stomach contents. When ambulating to room patient states \"that she needs a bag lunch, ginger ale and warm blanket\". Informed patient that MD need to assess her.

## 2018-11-18 NOTE — BSMART NOTE
Comprehensive Assessment Form Part 1 Section I - Disposition Axis I - Substance Induced Mood d/o (alcohol and cocaine) Alcohol Intoxication Schizophrenia VS Schizoaffective d/o by hx Axis II - Borderline Personality d/o Axis III - diabetic ketoacidoses by hx, diabetes, pancreatitis, asthma by hx Axis IV - treatment noncompliant by hx [place] V - 50 The Medical Doctor to Psychiatrist conference was not completed. Medical doctor is in agreement with psychiatrist disposition because this counselor conveyed to ED physician the recommendation of the on-call psychiatrist and they concurred. The plan is to discharge to [facility] ([facility]) and follow up with Patrica George on Monday. The on-call Psychiatrist consulted was Dr. Fabricio Feliz. The admitting Psychiatrist will be Dr. Cindy Collins. The admitting Diagnosis is n/a. The Payor source is [employer] [address] H[employer]. Section II - Integrated Summary Summary: This counselor asked ED DR permission to tele psych, with which he agreed. Patient is a 43 yo black female who arrives at ED via EMS with chief complaint of high blood sugar and shortness of breath. Patient reports not taking her medication for diabetes due to her not having any left. Per triage, patient is speaking in full and complete sentences without difficulty. Patient is an every day drug user, stating \"she last smoked crack cocaine about 5 hours PTA. \" She has also been drinking alcohol with BAL 24 at 0342. After patient's blood sugar level was resolved she stated that she was suicidal, hungry, and homeless. She says \"I'm tired of living but I'm not going to kill myself. \"   
Patient had last psych admission to [facility] on 8/8 - 8/14/18 and was discharged with instructions to follow up with Patrica George. She had a psych consult on 11/5/18 by Dr Fabricio Feliz who recommended discharge and follow up with Patrica George.  A psych consult was ordered on 10/21/18 but patient went AMA prior to assessment. Patient did not follow up with Teresa Mejias in either case. Patient admits she needs to follow up with Teresa Mejias and says she is ready to do so this time. Patient states she has a place to stay at a friend's house named Nathalia which is 4 blocks away from hospital at [address]. She prefers to walk to this address upon discharge. Patient denies homicidal ideation, denies auditory/visual hallucinations, is not delusional, and is oriented X4. Patient's drug screen is positive for cocaine, and pregnancy test is negative. The plan is to discharge to friends house ([facility]) and follow up with Teresa Mejias on Monday. This counselor asked if a bus ticket could be provided for transportation to [facility]. The patient has demonstrated mental capacity to provide informed consent. The information is given by the patient and past medical records. The Chief Complaint is high blood sugar and shortness of breath and SI The Precipitant Factors are homeless, treatment noncompliant, substance abuse, lack of structure, lack of support. Previous Hospitalizations: psych admission to [facility] on 8/8/18 The patient has not previously been in restraints. Current Psychiatrist and/or  is n/a. Lethality Assessment: 
 
The potential for suicide noted by the following: previous history of attempts which occurred X7 in the past, per patient in the form(s) of attempted hanging, ideation and current substance abuse. The potential for homicide is not noted. The patient has not been a perpetrator of sexual or physical abuse. There are not pending charges. The patient is not felt to be at risk for self harm or harm to others. The attending nurse was advised no further monitoring is necessary at this time. . 
 
Section III - Psychosocial 
The patient's overall mood and attitude is sleepy and frustrated. Feelings of helplessness and hopelessness are not observed. Generalized anxiety is not observed. Panic is not observed. Phobias are not observed. Obsessive compulsive tendencies are not observed. Section IV - Mental Status Exam 
The patient's appearance shows no evidence of impairment. The patient's behavior shows no evidence of impairment. The patient is oriented to time, place, person and situation. The patient's speech is soft. The patient's mood is sad. The range of affect is constricted. The patient's thought content demonstrates no evidence of impairment. The thought process shows no evidence of impairment. The patient's perception shows no evidence of impairment. The patient's memory shows no evidence of impairment. The patient's appetite shows no evidence of impairment. The patient's sleep shows no evidence of impairment. The patient's insight is blaming. The patient's judgement shows no evidence of impairment. Section V - Substance Abuse The patient is using substances. The patient is using alcohol for greater than 10 years with last use on 11/18/18 and cocaine by inhalation for greater than 10 years with last use on 11/18/18. The patient has experienced the following withdrawal symptoms: N/A. Section VI - Living Arrangements The patient is single. The patient lives with friend/[place]. The patient has 7 children ages 8-35. The patient does plan to return home upon discharge. The patient does not have legal issues pending. The patient's source of income comes from disability and social security. Taoist and cultural practices have not been voiced at this time. The patient's greatest support comes from no one and this person will not be involved with the treatment. The patient has been in an event described as horrible or outside the realm of ordinary life experience either currently or in the past. 
The patient has not been a victim of sexual/physical abuse. Section VII - Other Areas of Clinical Concern The highest grade achieved is 8th with the overall quality of school experience being described as poor. The patient is currently disabled and speaks [place] as a primary language. The patient has no communication impairments affecting communication. The patient's preference for learning can be described as: learns best by oral information.   The patient's hearing is normal.  The patient's vision is normal. 
 
 
Beata Noble LPC

## 2018-11-18 NOTE — ED NOTES
Patient educated on discharge instructions and 0 prescriptions . Patient verbalized understanding of education. Patient given discharge instructions and 0 prescriptions. Patient ambulated out of ED. No acute distress noted. Informed patient  To follow up with [facility] on Monday.

## 2018-11-18 NOTE — ED NOTES
This RN assumes role as preceptor to [employer] [facility]. This RN reviews all assessments, charting, medication administration, and skills performed by the preceptee.

## 2018-11-18 NOTE — ED NOTES
Informed patient that we need a urine sample. Patient states that she does not have to pee. Informed patient that I will be back to check in a few. MD goodman

## 2018-11-22 ENCOUNTER — APPOINTMENT (OUTPATIENT)
Dept: CT IMAGING | Age: 43
DRG: 639 | End: 2018-11-22
Attending: EMERGENCY MEDICINE
Payer: MEDICARE

## 2018-11-22 ENCOUNTER — HOSPITAL ENCOUNTER (INPATIENT)
Age: 43
LOS: 1 days | Discharge: HOME | DRG: 639 | End: 2018-11-23
Attending: EMERGENCY MEDICINE | Admitting: INTERNAL MEDICINE
Payer: MEDICARE

## 2018-11-22 ENCOUNTER — APPOINTMENT (OUTPATIENT)
Dept: GENERAL RADIOLOGY | Age: 43
DRG: 639 | End: 2018-11-22
Attending: EMERGENCY MEDICINE
Payer: MEDICARE

## 2018-11-22 DIAGNOSIS — N17.9 ACUTE RENAL FAILURE, UNSPECIFIED ACUTE RENAL FAILURE TYPE (HCC): ICD-10-CM

## 2018-11-22 DIAGNOSIS — E10.10 DIABETIC KETOACIDOSIS WITHOUT COMA ASSOCIATED WITH TYPE 1 DIABETES MELLITUS (HCC): Primary | ICD-10-CM

## 2018-11-22 LAB
ADMINISTERED INITIALS, ADMINIT: NORMAL
ALBUMIN SERPL-MCNC: 3.6 G/DL (ref 3.5–5)
ALBUMIN/GLOB SERPL: 0.8 {RATIO} (ref 1.1–2.2)
ALP SERPL-CCNC: 112 U/L (ref 45–117)
ALT SERPL-CCNC: 22 U/L (ref 12–78)
AMPHET UR QL SCN: NEGATIVE
ANION GAP SERPL CALC-SCNC: 10 MMOL/L (ref 5–15)
ANION GAP SERPL CALC-SCNC: 13 MMOL/L (ref 5–15)
ANION GAP SERPL CALC-SCNC: 20 MMOL/L (ref 5–15)
ANION GAP SERPL CALC-SCNC: 32 MMOL/L (ref 5–15)
ANION GAP SERPL CALC-SCNC: 37 MMOL/L (ref 5–15)
AST SERPL-CCNC: 12 U/L (ref 15–37)
BARBITURATES UR QL SCN: NEGATIVE
BASE DEFICIT BLDV-SCNC: 23.2 MMOL/L
BASOPHILS # BLD: 0 K/UL (ref 0–0.1)
BASOPHILS NFR BLD: 0 % (ref 0–1)
BDY SITE: ABNORMAL
BENZODIAZ UR QL: NEGATIVE
BILIRUB SERPL-MCNC: 0.6 MG/DL (ref 0.2–1)
BUN SERPL-MCNC: 10 MG/DL (ref 6–20)
BUN SERPL-MCNC: 12 MG/DL (ref 6–20)
BUN SERPL-MCNC: 15 MG/DL (ref 6–20)
BUN SERPL-MCNC: 17 MG/DL (ref 6–20)
BUN SERPL-MCNC: 8 MG/DL (ref 6–20)
BUN/CREAT SERPL: 10 (ref 12–20)
BUN/CREAT SERPL: 11 (ref 12–20)
BUN/CREAT SERPL: 12 (ref 12–20)
BUN/CREAT SERPL: 9 (ref 12–20)
BUN/CREAT SERPL: 9 (ref 12–20)
CALCIUM SERPL-MCNC: 8.2 MG/DL (ref 8.5–10.1)
CALCIUM SERPL-MCNC: 8.3 MG/DL (ref 8.5–10.1)
CALCIUM SERPL-MCNC: 8.6 MG/DL (ref 8.5–10.1)
CALCIUM SERPL-MCNC: 8.6 MG/DL (ref 8.5–10.1)
CALCIUM SERPL-MCNC: 9.6 MG/DL (ref 8.5–10.1)
CANNABINOIDS UR QL SCN: NEGATIVE
CHLORIDE SERPL-SCNC: 101 MMOL/L (ref 97–108)
CHLORIDE SERPL-SCNC: 105 MMOL/L (ref 97–108)
CHLORIDE SERPL-SCNC: 106 MMOL/L (ref 97–108)
CHLORIDE SERPL-SCNC: 108 MMOL/L (ref 97–108)
CHLORIDE SERPL-SCNC: 91 MMOL/L (ref 97–108)
CO2 SERPL-SCNC: 16 MMOL/L (ref 21–32)
CO2 SERPL-SCNC: 21 MMOL/L (ref 21–32)
CO2 SERPL-SCNC: 24 MMOL/L (ref 21–32)
CO2 SERPL-SCNC: 6 MMOL/L (ref 21–32)
CO2 SERPL-SCNC: 7 MMOL/L (ref 21–32)
COCAINE UR QL SCN: POSITIVE
COMMENT, HOLDF: NORMAL
CREAT SERPL-MCNC: 0.91 MG/DL (ref 0.55–1.02)
CREAT SERPL-MCNC: 1.09 MG/DL (ref 0.55–1.02)
CREAT SERPL-MCNC: 1.14 MG/DL (ref 0.55–1.02)
CREAT SERPL-MCNC: 1.3 MG/DL (ref 0.55–1.02)
CREAT SERPL-MCNC: 1.71 MG/DL (ref 0.55–1.02)
D50 ADMINISTERED, D50ADM: 0 ML
D50 ADMINISTERED, D50ADM: 9 ML
D50 ORDER, D50ORD: 0 ML
D50 ORDER, D50ORD: 9 ML
DIFFERENTIAL METHOD BLD: ABNORMAL
DRUG SCRN COMMENT,DRGCM: ABNORMAL
EOSINOPHIL # BLD: 0 K/UL (ref 0–0.4)
EOSINOPHIL NFR BLD: 0 % (ref 0–7)
ERYTHROCYTE [DISTWIDTH] IN BLOOD BY AUTOMATED COUNT: 16.9 % (ref 11.5–14.5)
EST. AVERAGE GLUCOSE BLD GHB EST-MCNC: ABNORMAL MG/DL
GLOBULIN SER CALC-MCNC: 4.6 G/DL (ref 2–4)
GLSCOM COMMENTS: NORMAL
GLUCOSE BLD STRIP.AUTO-MCNC: 150 MG/DL (ref 65–100)
GLUCOSE BLD STRIP.AUTO-MCNC: 195 MG/DL (ref 65–100)
GLUCOSE BLD STRIP.AUTO-MCNC: 196 MG/DL (ref 65–100)
GLUCOSE BLD STRIP.AUTO-MCNC: 198 MG/DL (ref 65–100)
GLUCOSE BLD STRIP.AUTO-MCNC: 210 MG/DL (ref 65–100)
GLUCOSE BLD STRIP.AUTO-MCNC: 255 MG/DL (ref 65–100)
GLUCOSE BLD STRIP.AUTO-MCNC: 347 MG/DL (ref 65–100)
GLUCOSE BLD STRIP.AUTO-MCNC: 351 MG/DL (ref 65–100)
GLUCOSE BLD STRIP.AUTO-MCNC: 361 MG/DL (ref 65–100)
GLUCOSE BLD STRIP.AUTO-MCNC: 421 MG/DL (ref 65–100)
GLUCOSE BLD STRIP.AUTO-MCNC: 485 MG/DL (ref 65–100)
GLUCOSE BLD STRIP.AUTO-MCNC: 498 MG/DL (ref 65–100)
GLUCOSE BLD STRIP.AUTO-MCNC: 78 MG/DL (ref 65–100)
GLUCOSE BLD STRIP.AUTO-MCNC: 97 MG/DL (ref 65–100)
GLUCOSE BLD STRIP.AUTO-MCNC: >600 MG/DL (ref 65–100)
GLUCOSE BLD STRIP.AUTO-MCNC: >600 MG/DL (ref 65–100)
GLUCOSE SERPL-MCNC: 187 MG/DL (ref 65–100)
GLUCOSE SERPL-MCNC: 249 MG/DL (ref 65–100)
GLUCOSE SERPL-MCNC: 491 MG/DL (ref 65–100)
GLUCOSE SERPL-MCNC: 773 MG/DL (ref 65–100)
GLUCOSE SERPL-MCNC: 91 MG/DL (ref 65–100)
GLUCOSE, GLC: 150 MG/DL
GLUCOSE, GLC: 195 MG/DL
GLUCOSE, GLC: 196 MG/DL
GLUCOSE, GLC: 198 MG/DL
GLUCOSE, GLC: 210 MG/DL
GLUCOSE, GLC: 255 MG/DL
GLUCOSE, GLC: 347 MG/DL
GLUCOSE, GLC: 351 MG/DL
GLUCOSE, GLC: 421 MG/DL
GLUCOSE, GLC: 498 MG/DL
GLUCOSE, GLC: 78 MG/DL
GLUCOSE, GLC: 97 MG/DL
HBA1C MFR BLD: 15.7 % (ref 4.2–6.3)
HCG UR QL: NEGATIVE
HCO3 BLDV-SCNC: 6 MMOL/L (ref 23–28)
HCT VFR BLD AUTO: 42.4 % (ref 35–47)
HGB BLD-MCNC: 14.6 G/DL (ref 11.5–16)
HIGH TARGET, HITG: 250 MG/DL
IMM GRANULOCYTES # BLD: 0 K/UL
IMM GRANULOCYTES NFR BLD AUTO: 0 %
INSULIN ADMINSTERED, INSADM: 0 UNITS/HOUR
INSULIN ADMINSTERED, INSADM: 1.8 UNITS/HOUR
INSULIN ADMINSTERED, INSADM: 10.8 UNITS/HOUR
INSULIN ADMINSTERED, INSADM: 11.6 UNITS/HOUR
INSULIN ADMINSTERED, INSADM: 11.7 UNITS/HOUR
INSULIN ADMINSTERED, INSADM: 14.4 UNITS/HOUR
INSULIN ADMINSTERED, INSADM: 5.4 UNITS/HOUR
INSULIN ADMINSTERED, INSADM: 8.1 UNITS/HOUR
INSULIN ADMINSTERED, INSADM: 8.2 UNITS/HOUR
INSULIN ADMINSTERED, INSADM: 8.3 UNITS/HOUR
INSULIN ADMINSTERED, INSADM: 8.8 UNITS/HOUR
INSULIN ADMINSTERED, INSADM: 9 UNITS/HOUR
INSULIN ORDER, INSORD: 0 UNITS/HOUR
INSULIN ORDER, INSORD: 1.8 UNITS/HOUR
INSULIN ORDER, INSORD: 10.8 UNITS/HOUR
INSULIN ORDER, INSORD: 11.6 UNITS/HOUR
INSULIN ORDER, INSORD: 11.7 UNITS/HOUR
INSULIN ORDER, INSORD: 14.4 UNITS/HOUR
INSULIN ORDER, INSORD: 5.4 UNITS/HOUR
INSULIN ORDER, INSORD: 8.1 UNITS/HOUR
INSULIN ORDER, INSORD: 8.2 UNITS/HOUR
INSULIN ORDER, INSORD: 8.3 UNITS/HOUR
INSULIN ORDER, INSORD: 8.8 UNITS/HOUR
INSULIN ORDER, INSORD: 9 UNITS/HOUR
LIPASE SERPL-CCNC: 48 U/L (ref 73–393)
LOW TARGET, LOT: 150 MG/DL
LYMPHOCYTES # BLD: 1.2 K/UL (ref 0.8–3.5)
LYMPHOCYTES NFR BLD: 7 % (ref 12–49)
MAGNESIUM SERPL-MCNC: 1.6 MG/DL (ref 1.6–2.4)
MAGNESIUM SERPL-MCNC: 1.7 MG/DL (ref 1.6–2.4)
MAGNESIUM SERPL-MCNC: 1.7 MG/DL (ref 1.6–2.4)
MAGNESIUM SERPL-MCNC: 1.9 MG/DL (ref 1.6–2.4)
MCH RBC QN AUTO: 26.8 PG (ref 26–34)
MCHC RBC AUTO-ENTMCNC: 34.4 G/DL (ref 30–36.5)
MCV RBC AUTO: 77.8 FL (ref 80–99)
METHADONE UR QL: NEGATIVE
MINUTES UNTIL NEXT BG, NBG: 120 MIN
MINUTES UNTIL NEXT BG, NBG: 15 MIN
MINUTES UNTIL NEXT BG, NBG: 60 MIN
MONOCYTES # BLD: 1 K/UL (ref 0–1)
MONOCYTES NFR BLD: 6 % (ref 5–13)
MULTIPLIER, MUL: 0.02
MULTIPLIER, MUL: 0.03
MULTIPLIER, MUL: 0.04
MULTIPLIER, MUL: 0.04
MULTIPLIER, MUL: 0.05
MULTIPLIER, MUL: 0.05
MULTIPLIER, MUL: 0.06
NEUTS BAND NFR BLD MANUAL: 8 % (ref 0–6)
NEUTS SEG # BLD: 14.8 K/UL (ref 1.8–8)
NEUTS SEG NFR BLD: 79 % (ref 32–75)
NRBC # BLD: 0.03 K/UL (ref 0–0.01)
NRBC BLD-RTO: 0.2 PER 100 WBC
OPIATES UR QL: NEGATIVE
ORDER INITIALS, ORDINIT: NORMAL
PCO2 BLDV: 20 MMHG (ref 41–51)
PCP UR QL: NEGATIVE
PH BLDV: 7.06 [PH] (ref 7.32–7.42)
PHOSPHATE SERPL-MCNC: 3.1 MG/DL (ref 2.6–4.7)
PHOSPHATE SERPL-MCNC: 5.5 MG/DL (ref 2.6–4.7)
PLATELET # BLD AUTO: 267 K/UL (ref 150–400)
PMV BLD AUTO: 12.7 FL (ref 8.9–12.9)
PO2 BLDV: 45 MMHG (ref 25–40)
POTASSIUM SERPL-SCNC: 3.1 MMOL/L (ref 3.5–5.1)
POTASSIUM SERPL-SCNC: 3.5 MMOL/L (ref 3.5–5.1)
POTASSIUM SERPL-SCNC: 3.7 MMOL/L (ref 3.5–5.1)
POTASSIUM SERPL-SCNC: 3.8 MMOL/L (ref 3.5–5.1)
POTASSIUM SERPL-SCNC: 4.9 MMOL/L (ref 3.5–5.1)
PROT SERPL-MCNC: 8.2 G/DL (ref 6.4–8.2)
RBC # BLD AUTO: 5.45 M/UL (ref 3.8–5.2)
RBC MORPH BLD: ABNORMAL
SAMPLES BEING HELD,HOLD: NORMAL
SAO2 % BLDV: 63 % (ref 65–88)
SAO2% DEVICE SAO2% SENSOR NAME: ABNORMAL
SERVICE CMNT-IMP: ABNORMAL
SERVICE CMNT-IMP: NORMAL
SERVICE CMNT-IMP: NORMAL
SODIUM SERPL-SCNC: 134 MMOL/L (ref 136–145)
SODIUM SERPL-SCNC: 140 MMOL/L (ref 136–145)
SODIUM SERPL-SCNC: 140 MMOL/L (ref 136–145)
SODIUM SERPL-SCNC: 141 MMOL/L (ref 136–145)
SODIUM SERPL-SCNC: 142 MMOL/L (ref 136–145)
SPECIMEN SITE: ABNORMAL
TROPONIN I SERPL-MCNC: <0.05 NG/ML
WBC # BLD AUTO: 17 K/UL (ref 3.6–11)

## 2018-11-22 PROCEDURE — 36415 COLL VENOUS BLD VENIPUNCTURE: CPT

## 2018-11-22 PROCEDURE — 74176 CT ABD & PELVIS W/O CONTRAST: CPT

## 2018-11-22 PROCEDURE — 99285 EMERGENCY DEPT VISIT HI MDM: CPT

## 2018-11-22 PROCEDURE — 93005 ELECTROCARDIOGRAM TRACING: CPT

## 2018-11-22 PROCEDURE — 82962 GLUCOSE BLOOD TEST: CPT

## 2018-11-22 PROCEDURE — 80048 BASIC METABOLIC PNL TOTAL CA: CPT

## 2018-11-22 PROCEDURE — 65270000029 HC RM PRIVATE

## 2018-11-22 PROCEDURE — 84100 ASSAY OF PHOSPHORUS: CPT

## 2018-11-22 PROCEDURE — 83690 ASSAY OF LIPASE: CPT

## 2018-11-22 PROCEDURE — 82803 BLOOD GASES ANY COMBINATION: CPT

## 2018-11-22 PROCEDURE — 83036 HEMOGLOBIN GLYCOSYLATED A1C: CPT

## 2018-11-22 PROCEDURE — 80307 DRUG TEST PRSMV CHEM ANLYZR: CPT

## 2018-11-22 PROCEDURE — 83735 ASSAY OF MAGNESIUM: CPT

## 2018-11-22 PROCEDURE — 80053 COMPREHEN METABOLIC PANEL: CPT

## 2018-11-22 PROCEDURE — 74011000250 HC RX REV CODE- 250: Performed by: EMERGENCY MEDICINE

## 2018-11-22 PROCEDURE — 74011250636 HC RX REV CODE- 250/636: Performed by: INTERNAL MEDICINE

## 2018-11-22 PROCEDURE — 74011636637 HC RX REV CODE- 636/637: Performed by: EMERGENCY MEDICINE

## 2018-11-22 PROCEDURE — 74011000258 HC RX REV CODE- 258: Performed by: EMERGENCY MEDICINE

## 2018-11-22 PROCEDURE — 84484 ASSAY OF TROPONIN QUANT: CPT

## 2018-11-22 PROCEDURE — 71045 X-RAY EXAM CHEST 1 VIEW: CPT

## 2018-11-22 PROCEDURE — 85025 COMPLETE CBC W/AUTO DIFF WBC: CPT

## 2018-11-22 PROCEDURE — 96361 HYDRATE IV INFUSION ADD-ON: CPT

## 2018-11-22 PROCEDURE — 74011250637 HC RX REV CODE- 250/637: Performed by: INTERNAL MEDICINE

## 2018-11-22 PROCEDURE — 81025 URINE PREGNANCY TEST: CPT

## 2018-11-22 PROCEDURE — 96375 TX/PRO/DX INJ NEW DRUG ADDON: CPT

## 2018-11-22 PROCEDURE — 96374 THER/PROPH/DIAG INJ IV PUSH: CPT

## 2018-11-22 PROCEDURE — 74011250637 HC RX REV CODE- 250/637: Performed by: EMERGENCY MEDICINE

## 2018-11-22 PROCEDURE — 74011000250 HC RX REV CODE- 250: Performed by: INTERNAL MEDICINE

## 2018-11-22 PROCEDURE — 74011250636 HC RX REV CODE- 250/636: Performed by: EMERGENCY MEDICINE

## 2018-11-22 RX ORDER — POTASSIUM CHLORIDE 7.45 MG/ML
10 INJECTION INTRAVENOUS
Status: DISCONTINUED | OUTPATIENT
Start: 2018-11-22 | End: 2018-11-22 | Stop reason: ALTCHOICE

## 2018-11-22 RX ORDER — DEXTROSE, SODIUM CHLORIDE, AND POTASSIUM CHLORIDE 5; .45; .3 G/100ML; G/100ML; G/100ML
INJECTION INTRAVENOUS CONTINUOUS
Status: DISCONTINUED | OUTPATIENT
Start: 2018-11-22 | End: 2018-11-23

## 2018-11-22 RX ORDER — AMLODIPINE BESYLATE 5 MG/1
5 TABLET ORAL DAILY
Status: DISCONTINUED | OUTPATIENT
Start: 2018-11-23 | End: 2018-11-23 | Stop reason: HOSPADM

## 2018-11-22 RX ORDER — ONDANSETRON 2 MG/ML
4 INJECTION INTRAMUSCULAR; INTRAVENOUS
Status: COMPLETED | OUTPATIENT
Start: 2018-11-22 | End: 2018-11-22

## 2018-11-22 RX ORDER — MAGNESIUM SULFATE 100 %
4 CRYSTALS MISCELLANEOUS AS NEEDED
Status: DISCONTINUED | OUTPATIENT
Start: 2018-11-22 | End: 2018-11-23 | Stop reason: HOSPADM

## 2018-11-22 RX ORDER — ENOXAPARIN SODIUM 100 MG/ML
40 INJECTION SUBCUTANEOUS EVERY 24 HOURS
Status: DISCONTINUED | OUTPATIENT
Start: 2018-11-22 | End: 2018-11-23 | Stop reason: ALTCHOICE

## 2018-11-22 RX ORDER — FAMOTIDINE 10 MG/ML
20 INJECTION INTRAVENOUS
Status: COMPLETED | OUTPATIENT
Start: 2018-11-22 | End: 2018-11-22

## 2018-11-22 RX ORDER — POTASSIUM CHLORIDE 1.5 G/1.77G
40 POWDER, FOR SOLUTION ORAL
Status: COMPLETED | OUTPATIENT
Start: 2018-11-22 | End: 2018-11-22

## 2018-11-22 RX ORDER — FLUCONAZOLE 100 MG/1
150 TABLET ORAL
Status: COMPLETED | OUTPATIENT
Start: 2018-11-22 | End: 2018-11-22

## 2018-11-22 RX ORDER — SODIUM CHLORIDE AND POTASSIUM CHLORIDE .9; .15 G/100ML; G/100ML
SOLUTION INTRAVENOUS CONTINUOUS
Status: DISCONTINUED | OUTPATIENT
Start: 2018-11-22 | End: 2018-11-22

## 2018-11-22 RX ORDER — POTASSIUM CHLORIDE, DEXTROSE MONOHYDRATE AND SODIUM CHLORIDE 300; 5; 900 MG/100ML; G/100ML; MG/100ML
INJECTION, SOLUTION INTRAVENOUS CONTINUOUS
Status: DISCONTINUED | OUTPATIENT
Start: 2018-11-22 | End: 2018-11-22 | Stop reason: RX

## 2018-11-22 RX ORDER — DEXTROSE 50 % IN WATER (D50W) INTRAVENOUS SYRINGE
25-50 AS NEEDED
Status: DISCONTINUED | OUTPATIENT
Start: 2018-11-22 | End: 2018-11-23 | Stop reason: HOSPADM

## 2018-11-22 RX ORDER — LABETALOL HYDROCHLORIDE 5 MG/ML
20 INJECTION, SOLUTION INTRAVENOUS AS NEEDED
Status: DISCONTINUED | OUTPATIENT
Start: 2018-11-22 | End: 2018-11-23 | Stop reason: HOSPADM

## 2018-11-22 RX ORDER — INSULIN LISPRO 100 [IU]/ML
INJECTION, SOLUTION INTRAVENOUS; SUBCUTANEOUS
Status: DISCONTINUED | OUTPATIENT
Start: 2018-11-22 | End: 2018-11-22

## 2018-11-22 RX ADMIN — HUMAN INSULIN 10 UNITS: 100 INJECTION, SOLUTION SUBCUTANEOUS at 08:16

## 2018-11-22 RX ADMIN — POTASSIUM CHLORIDE 40 MEQ: 1.5 POWDER, FOR SOLUTION ORAL at 16:21

## 2018-11-22 RX ADMIN — DEXTROSE MONOHYDRATE, SODIUM CHLORIDE, AND POTASSIUM CHLORIDE: 50; 4.5; 2.98 INJECTION, SOLUTION INTRAVENOUS at 16:21

## 2018-11-22 RX ADMIN — SODIUM CHLORIDE AND POTASSIUM CHLORIDE: .9; .15 SOLUTION INTRAVENOUS at 14:17

## 2018-11-22 RX ADMIN — DEXTROSE MONOHYDRATE 9 ML: 25 INJECTION, SOLUTION INTRAVENOUS at 23:53

## 2018-11-22 RX ADMIN — FLUCONAZOLE 150 MG: 100 TABLET ORAL at 17:57

## 2018-11-22 RX ADMIN — SODIUM CHLORIDE 8.8 UNITS/HR: 900 INJECTION, SOLUTION INTRAVENOUS at 10:10

## 2018-11-22 RX ADMIN — SODIUM CHLORIDE 8.1 UNITS/HR: 900 INJECTION, SOLUTION INTRAVENOUS at 18:55

## 2018-11-22 RX ADMIN — SODIUM CHLORIDE 1000 ML: 900 INJECTION, SOLUTION INTRAVENOUS at 08:53

## 2018-11-22 RX ADMIN — SODIUM CHLORIDE 1000 ML: 900 INJECTION, SOLUTION INTRAVENOUS at 07:41

## 2018-11-22 RX ADMIN — ENOXAPARIN SODIUM 40 MG: 100 INJECTION SUBCUTANEOUS at 17:57

## 2018-11-22 RX ADMIN — DEXTROSE MONOHYDRATE, SODIUM CHLORIDE, AND POTASSIUM CHLORIDE: 50; 4.5; 2.98 INJECTION, SOLUTION INTRAVENOUS at 23:53

## 2018-11-22 RX ADMIN — LIDOCAINE HYDROCHLORIDE 40 ML: 20 SOLUTION ORAL; TOPICAL at 07:41

## 2018-11-22 RX ADMIN — WATER: 1000 INJECTION, SOLUTION INTRAVENOUS at 14:57

## 2018-11-22 RX ADMIN — FAMOTIDINE 20 MG: 10 INJECTION INTRAVENOUS at 07:41

## 2018-11-22 RX ADMIN — ONDANSETRON 4 MG: 2 INJECTION INTRAMUSCULAR; INTRAVENOUS at 07:41

## 2018-11-22 NOTE — ED NOTES
Pt sleeping but easily arousable. Updated on plan of care. No si/s of acute distress. Call bell within reach.

## 2018-11-22 NOTE — ED NOTES
Patient here via EMS with c/o \"heart burn\", chest pain, and SOB. Patient reports that symptoms started 3-4 hours ago. Patient reports hx of drug abuse, states use of cocaine prior to symptoms. Patient denies fevers, injury, or trauma. Patient reports nausea and vomiting, states concern for pink tinge to emesis, watery. Patient reports noncompliance with medications at least since her last visit almost 3 weeks ago. Emergency Department Nursing Plan of Care The Nursing Plan of Care is developed from the Nursing assessment and Emergency Department Attending provider initial evaluation. The plan of care may be reviewed in the ED Provider note. The Plan of Care was developed with the following considerations:  
Patient / Family readiness to learn indicated by:verbalized understanding Persons(s) to be included in education: patient Barriers to Learning/Limitations:No 
 
Signed 707 JORDAN Allan RN   
11/22/2018   7:28 AM

## 2018-11-22 NOTE — ED TRIAGE NOTES
Pt in ED w/ complaint of mid CP, SOB, and nausea & vomiting. Pt admits to crack cocaine prior to her symptoms beginning. Pt states, \"I have heartburn. \"

## 2018-11-22 NOTE — H&P
Hospitalist Admission Note NAME: Macho Hoskins :  1975 MRN:  [id number] Room Number: DL41/00  @ [facility]  
 
Date/Time:  2018 9:28 AM 
 
Patient PCP: None 
______________________________________________________________________ Given the patient's current clinical presentation, I have a high level of concern for decompensation if discharged from the emergency department. Complex decision making was performed, which includes reviewing the patient's available past medical records, laboratory results, and x-ray films. My assessment of this patient's clinical condition and my plan of care is as follows. Assessment / Plan: 
 
DKA in setting of uncontrolled DM1: POA Anion gap metabolic acidosis due to above Non compliant with medication, multiple admissions,Hx leaving AMA AG 37 , bicarb 6, Ph 7.   
- admitted per DKA protocol  
- NPO for now, IV fluids,on insulin gtt,bicarb gtt Hba1c 15.7 
bmp every 4 hours and replace electrolytes as needed Chest pain and SOB 
likeley secondary to cocaine use and compensation for metabolic acidosis -CXR with no acute process on portable chest. No change. 
-trop <0.05 
-monitor on tele Elevated creatinine, doesn't meet criteria for FABIENNE 
likely volmue depleted Monitor renal function on IVF Asthma without acute exacerbation: POA 
- continue albuterol prn Schizoaffective disorder with anxiety and depression: POA 
with polysubstance abuse and drug-seeking behavior Non compliant with psychiatry meds Hypertension: POA 
SBP was in 180 on presentation in setting of cocaine use  
-not on meds at home, will start on CCB PRN Labetalol Tobacco dependence Nicotine patch offered Patient was counseled extensively on the need to abstain from tobacco, its addictive tendencies, its deleterious effects on the lungs as well as its financial sequelae Body mass index is 19.85 kg/m².  
Code Status: full Surrogate Decision Maker:son DVT Prophylaxis: Lovenox GI Prophylaxis: not indicated Baseline: non compliant,homeless Subjective: CHIEF COMPLAINT: chest pain,SOB HISTORY OF PRESENT ILLNESS:    
Adrián Pinon is a 44 y.o.  female with PMH of IDDM,Asthma, HTN,Cocaine abuse who presents to ED with c/o above. Patient presents with chest pain,hortness of breath and nausea ,vomiting which started early morning today.  She has had multiple admissions in the past for DKA secondary to noncompliance and has a history of leaving AMA.  In ED, she was found to be in DKA with blood sugar level 773, bicarb 6 and pH 7.  She has been initiated on insulin drip and received fluid boluses.  She will be admitted to stepdown for management of DKA. Off note, patient also has a history of snorting cocaine.  UDS was positive for cocaine. We were asked to admit for work up and evaluation of the above problems. Past Medical History:  
Diagnosis Date  Alcohol abuse, in remission   
 quit 17 days ago  Asthma   
 does not use inhalers  Borderline personality disorder ([address]  Diabetes ([address]  GERD (gastroesophageal reflux disease)  Hypertension  Other ill-defined conditions(799.89)   
 kidney stones ,passed one  Other ill-defined conditions(799.89) sickle cell trait  Other ill-defined conditions(799.89)   
 increased cholesterol  Pancreatitis  Psychiatric disorder   
 schizophrenia, bipolar, depression, anxiety Past Surgical History:  
Procedure Laterality Date  ABDOMEN SURGERY PROC UNLISTED  3/12/14 CHOLECYSTECTOMY LAPAROSCOPIC    
 HX GASTRIC BYPASS  HX GYN  11/8/2012  
 c section x2  HX OTHER SURGICAL  3/13/14 ENDOSCOPIC RETROGRADE CHOLANGIOPANCREATOGRAPHY  HX OTHER SURGICAL  8/4/14  
 endoscopic stent placed to bile duct  HX TUBAL LIGATION  2012 Social History Tobacco Use  Smoking status: Current Every Day Smoker Packs/day: 0.50 Years: 14.00 Pack years: 7.00 Types: Cigarettes  Smokeless tobacco: Never Used  Tobacco comment: cigarettes Substance Use Topics  Alcohol use: Yes Alcohol/week: 0.6 oz Types: 1 Cans of beer per week Comment: occasionally, last had \"i had one beer\" today Family History Problem Relation Age of Onset  Heart Disease Mother  Diabetes Mother  Hypertension Mother  Hypertension Maternal Grandmother Allergies Allergen Reactions  Dilaudid [Hydromorphone (Bulk)] Itching  Ibuprofen Not Reported This Time Prior to Admission medications Medication Sig Start Date End Date Taking? Authorizing Provider  
insulin glargine (LANTUS) 100 unit/mL injection 30 Units by SubCUTAneous route daily. 11/18/18   Tammy Beltran MD  
insulin regular (HUMULIN R REGULAR U-100 INSULN) 100 unit/mL injection Sliding scale 11/18/18   Tammy Beltran MD  
haloperidol decanoate (HALDOL DECANOATE) 100 mg/mL injection 100 mg by IntraMUSCular route every twenty-eight (28) days. Provider, Historical  
metFORMIN (GLUCOPHAGE) 1,000 mg tablet Take 1,000 mg by mouth. Provider, Historical  
meloxicam (MOBIC) 15 mg tablet Take 15 mg by mouth daily as needed for Pain. Provider, Historical  
insulin aspart U-100 (NOVOLOG FLEXPEN U-100 INSULIN) 100 unit/mL inpn by SubCUTAneous route three (3) times daily as needed. Sliding scale    Provider, Historical  
sertraline (ZOLOFT) 100 mg tablet Take 100 mg by mouth daily. Provider, Historical  
benztropine (COGENTIN) 1 mg tablet Take 1 Tab by mouth two (2) times a day. Indications: drug-induced extrapyramidal reaction Patient taking differently: Take 1 mg by mouth every twelve (12) hours. 8/14/18   Bhavesh Okeefe MD  
Blood-Glucose Meter monitoring kit Monitor BG 4 times daily.   Dx uncontrolled type 2 DM E11.65  Indications: Diabetes Mellitus 8/14/18   Bhavesh Okeefe MD  
divalproex DR (DEPAKOTE) 500 mg tablet Take 1 Tab by mouth two (2) times a day. Indications: Schizoaffective disorder Patient taking differently: Take 500 mg by mouth every twelve (12) hours. 8/14/18   Tommy Cain MD  
haloperidol (HALDOL) 5 mg tablet Take 1 Tab by mouth every twelve (12) hours. Indications: Schizoaffective disorder 8/14/18   Tommy Cain MD  
albuterol (PROVENTIL HFA, VENTOLIN HFA, PROAIR HFA) 90 mcg/actuation inhaler Take 1 Puff by inhalation every four (4) hours as needed for Wheezing. Provider, Historical  
 
 
REVIEW OF SYSTEMS:    
I am not able to complete the review of systems because: The patient is intubated and sedated The patient has altered mental status due to his acute medical problems The patient has baseline aphasia from prior stroke(s) The patient has baseline dementia and is not reliable historian The patient is in acute medical distress and unable to provide information Total of 12 systems reviewed as follows:   
   POSITIVE= underlined text  Negative = text not underlined General:  fever, chills, sweats, generalized weakness, weight loss/gain,  
   loss of appetite Eyes:    blurred vision, eye pain, loss of vision, double vision ENT:    rhinorrhea, pharyngitis Respiratory:   cough, sputum production, SOB, BARRY, wheezing, pleuritic pain  
Cardiology:   chest pain, palpitations, orthopnea, PND, edema, syncope Gastrointestinal:  abdominal pain , N/V, diarrhea, dysphagia, constipation, bleeding Genitourinary:  frequency, urgency, dysuria, hematuria, incontinence Muskuloskeletal :  arthralgia, myalgia, back pain Hematology:  easy bruising, nose or gum bleeding, lymphadenopathy Dermatological: rash, ulceration, pruritis, color change / jaundice Endocrine:   hot flashes or polydipsia Neurological:  headache, dizziness, confusion, focal weakness, paresthesia, Speech difficulties, memory loss, gait difficulty Psychological: Feelings of anxiety, depression, agitation Objective: VITALS:   
Visit Vitals BP (!) 177/91 Pulse 100 Temp 97.4 °F (36.3 °C) Resp 20 Ht 5' 6\" (1.676 m) Wt 55.8 kg (123 lb) SpO2 100% BMI 19.85 kg/m² PHYSICAL EXAM: 
 
 
______________________________________________________________________ Care Plan discussed with:  Patient/Family and Nurse Expected  Disposition:  Home w/Family 
________________________________________________________________________ TOTAL TIME:  80 Minutes Critical Care Provided     Minutes non procedure based Comments Reviewed previous records  
>50% of visit spent in counseling and coordination of care  Discussion with patient and/or family and questions answered 
  
 
________________________________________________________________________ Signed: Darwin Clifford MD 
 
Procedures: see electronic medical records for all procedures/Xrays and details which were not copied into this note but were reviewed prior to creation of Plan. LAB DATA REVIEWED:   
Recent Results (from the past 24 hour(s)) EKG, 12 LEAD, INITIAL Collection Time: 11/22/18  7:01 AM  
Result Value Ref Range Ventricular Rate 98 BPM  
 Atrial Rate 98 BPM  
 P-R Interval 108 ms QRS Duration 72 ms Q-T Interval 368 ms QTC Calculation (Bezet) 469 ms Calculated P Axis 69 degrees Calculated R Axis 47 degrees Calculated T Axis 77 degrees Diagnosis Sinus rhythm with short NH Possible Left atrial enlargement Nonspecific ST and T wave abnormality Abnormal ECG When compared with ECG of 18-NOV-2018 03:22, No significant change was found CBC WITH AUTOMATED DIFF Collection Time: 11/22/18  7:07 AM  
Result Value Ref Range WBC 17.0 (H) 3.6 - 11.0 K/uL  
 RBC 5.45 (H) 3.80 - 5.20 M/uL  
 HGB 14.6 11.5 - 16.0 g/dL HCT 42.4 35.0 - 47.0 % MCV 77.8 (L) 80.0 - 99.0 FL  
 MCH 26.8 26.0 - 34.0 PG  
 MCHC 34.4 30.0 - 36.5 g/dL  
 RDW 16.9 (H) 11.5 - 14.5 % PLATELET 276 163 - 594 K/uL MPV 12.7 8.9 - 12.9 FL  
 NRBC 0.2 (H) 0  WBC ABSOLUTE NRBC 0.03 (H) 0.00 - 0.01 K/uL NEUTROPHILS 79 (H) 32 - 75 % BAND NEUTROPHILS 8 (H) 0 - 6 % LYMPHOCYTES 7 (L) 12 - 49 % MONOCYTES 6 5 - 13 % EOSINOPHILS 0 0 - 7 % BASOPHILS 0 0 - 1 % IMMATURE GRANULOCYTES 0 %  
 ABS. NEUTROPHILS 14.8 (H) 1.8 - 8.0 K/UL  
 ABS. LYMPHOCYTES 1.2 0.8 - 3.5 K/UL  
 ABS. MONOCYTES 1.0 0.0 - 1.0 K/UL  
 ABS. EOSINOPHILS 0.0 0.0 - 0.4 K/UL  
 ABS. BASOPHILS 0.0 0.0 - 0.1 K/UL  
 ABS. IMM. GRANS. 0.0 K/UL  
 DF MANUAL    
 RBC COMMENTS NORMOCYTIC, NORMOCHROMIC METABOLIC PANEL, COMPREHENSIVE Collection Time: 11/22/18  7:07 AM  
Result Value Ref Range Sodium 134 (L) 136 - 145 mmol/L Potassium 4.9 3.5 - 5.1 mmol/L Chloride 91 (L) 97 - 108 mmol/L  
 CO2 6 (LL) 21 - 32 mmol/L Anion gap 37 (H) 5 - 15 mmol/L Glucose 773 (HH) 65 - 100 mg/dL BUN 17 6 - 20 MG/DL Creatinine 1.71 (H) 0.55 - 1.02 MG/DL  
 BUN/Creatinine ratio 10 (L) 12 - 20 GFR est AA 40 (L) >60 ml/min/1.73m2  GFR est non-AA 33 (L) >60 ml/min/1.73m2 Calcium 9.6 8.5 - 10.1 MG/DL Bilirubin, total 0.6 0.2 - 1.0 MG/DL  
 ALT (SGPT) 22 12 - 78 U/L  
 AST (SGOT) 12 (L) 15 - 37 U/L Alk. phosphatase 112 45 - 117 U/L Protein, total 8.2 6.4 - 8.2 g/dL Albumin 3.6 3.5 - 5.0 g/dL Globulin 4.6 (H) 2.0 - 4.0 g/dL A-G Ratio 0.8 (L) 1.1 - 2.2 LIPASE Collection Time: 11/22/18  7:07 AM  
Result Value Ref Range Lipase 48 (L) 73 - 393 U/L  
SAMPLES BEING HELD Collection Time: 11/22/18  7:07 AM  
Result Value Ref Range SAMPLES BEING HELD 1SST,1BLUE,1RED COMMENT Add-on orders for these samples will be processed based on acceptable specimen integrity and analyte stability, which may vary by analyte. TROPONIN I Collection Time: 11/22/18  7:07 AM  
Result Value Ref Range Troponin-I, Qt. <0.05 <0.05 ng/mL VENOUS BLOOD GAS Collection Time: 11/22/18  8:48 AM  
Result Value Ref Range VENOUS PH 7.06 (LL) 7.32 - 7.42    
 VENOUS PCO2 20 (L) 41 - 51 mmHg VENOUS PO2 45 (H) 25 - 40 mmHg VENOUS O2 SATURATION 63 (L) 65 - 88 % VENOUS BICARBONATE 6 (L) 23 - 28 mmol/L  
 VENOUS BASE DEFICIT 23.2 mmol/L  
 O2 METHOD ROOM AIR Sample source VENOUS    
 SITE OTHER Critical value read back KEISHA ARANA RN   
GLUCOSE, POC Collection Time: 11/22/18  9:06 AM  
Result Value Ref Range Glucose (POC) >600 (HH) 65 - 100 mg/dL Performed by [place]oda [place] (Tech) GLUCOSE, POC Collection Time: 11/22/18  9:07 AM  
Result Value Ref Range Glucose (POC) >600 (HH) 65 - 100 mg/dL Performed by [place]oda [place] (Tech) DRUG SCREEN, URINE Collection Time: 11/22/18  9:34 AM  
Result Value Ref Range AMPHETAMINES NEGATIVE  NEG    
 BARBITURATES NEGATIVE  NEG BENZODIAZEPINES NEGATIVE  NEG    
 COCAINE POSITIVE (A) NEG METHADONE NEGATIVE  NEG    
 OPIATES NEGATIVE  NEG    
 PCP(PHENCYCLIDINE) NEGATIVE  NEG    
 THC (TH-CANNABINOL) NEGATIVE  NEG Drug screen comment (NOTE) METABOLIC PANEL, BASIC Collection Time: 11/22/18  9:55 AM  
Result Value Ref Range Sodium 140 136 - 145 mmol/L Potassium 3.7 3.5 - 5.1 mmol/L Chloride 101 97 - 108 mmol/L  
 CO2 7 (LL) 21 - 32 mmol/L Anion gap 32 (H) 5 - 15 mmol/L Glucose 491 (H) 65 - 100 mg/dL BUN 15 6 - 20 MG/DL Creatinine 1.30 (H) 0.55 - 1.02 MG/DL  
 BUN/Creatinine ratio 12 12 - 20 GFR est AA 54 (L) >60 ml/min/1.73m2 GFR est non-AA 45 (L) >60 ml/min/1.73m2 Calcium 8.6 8.5 - 10.1 MG/DL MAGNESIUM Collection Time: 11/22/18  9:55 AM  
Result Value Ref Range Magnesium 1.9 1.6 - 2.4 mg/dL PHOSPHORUS Collection Time: 11/22/18  9:55 AM  
Result Value Ref Range Phosphorus 5.5 (H) 2.6 - 4.7 MG/DL  
HEMOGLOBIN A1C WITH EAG Collection Time: 11/22/18  9:55 AM  
Result Value Ref Range Hemoglobin A1c 15.7 (H) 4.2 - 6.3 % Est. average glucose Cannot be calculated mg/dL HCG URINE, QL. - POC Collection Time: 11/22/18 10:00 AM  
Result Value Ref Range Pregnancy test,urine (POC) NEGATIVE  NEG    
GLUCOSE, POC Collection Time: 11/22/18 10:02 AM  
Result Value Ref Range Glucose (POC) 485 (H) 65 - 100 mg/dL Performed by Cheron Socks" [facility] GLUCOSE, POC Collection Time: 11/22/18 10:03 AM  
Result Value Ref Range Glucose (POC) 498 (H) 65 - 100 mg/dL Performed by Cheron Socks" Julane Apple Dyane Heller Collection Time: 11/22/18 10:09 AM  
Result Value Ref Range Glucose 498 mg/dL Insulin order 8.8 units/hour Insulin adminstered 8.8 units/hour Multiplier 0.020 Low target 150 mg/dL High target 250 mg/dL D50 order 0.0 ml  
 D50 administered 0.00 ml Minutes until next BG 60 min Order initials ALW Administered initials [id number] Comments GLUCOSE, POC Collection Time: 11/22/18 11:15 AM  
Result Value Ref Range Glucose (POC) 421 (H) 65 - 100 mg/dL Performed by James  Collection Time: 11/22/18 11:16 AM  
Result Value Ref Range Glucose 421 mg/dL Insulin order 10.8 units/hour Insulin adminstered 10.8 units/hour Multiplier 0.030 Low target 150 mg/dL High target 250 mg/dL D50 order 0.0 ml  
 D50 administered 0.00 ml Minutes until next BG 60 min Order initials ALW Administered initials KLG   
 [facility] Comments GLUCOSE, POC Collection Time: 11/22/18 12:17 PM  
Result Value Ref Range Glucose (POC) 351 (H) 65 - 100 mg/dL Performed by Juancarlos Lynn (Tech) GLUCOSE, POC Collection Time: 11/22/18 12:18 PM  
Result Value Ref Range Glucose (POC) 361 (H) 65 - 100 mg/dL Performed by Juancarlos Lynn (Tech) [place] Collection Time: 11/22/18 12:18 PM  
Result Value Ref Range Glucose 351 mg/dL Insulin order 11.6 units/hour Insulin adminstered 11.6 units/hour Multiplier 0.040 Low target 150 mg/dL High target 250 mg/dL D50 order 0.0 ml  
 D50 administered 0.00 ml Minutes until next BG 60 min Order initials SRB Administered initials srb [facility] Comments

## 2018-11-22 NOTE — ED PROVIDER NOTES
EMERGENCY DEPARTMENT HISTORY AND PHYSICAL EXAM 
 
 
Date: 11/22/2018 Patient Name: Clair Hermosillo History of Presenting Illness Chief Complaint Patient presents with  Chest Pain  
  x 3-4 hrs w/ SOB and nausea & vomiting History Provided By: Patient HPI: Clair Hermosillo, 44 y.o. female with PMHx significant for alcohol abuse, asthma, borderline personality disorder, DM, GERD, HTN, kidney stones, sickle cell trait, schizophrenia, bipolar, depression and anxiety who presents ambulatory to the ED with cc of constant mid-sternal non-radiating CP that onset \"a couple hours\" PTA. She reports associated SOB, heart burn, and hematemesis noting 4 episode of vomiting. Pt denies use of medication to modify her symptoms. She states that she used crack cocaine prior to the onset of her symptoms. Pt reports that she is not compliant with her DM and anti-hypertensive medications. She notes that her last dose of medications was on 11/18/2018. Per chart review pt was seen at [facility] on 11/18/2018 for steve blood sugar. She reports a PSHx significant for cholecystectomy. Pt denies any recent EtOH use. She specifically denies any diarrhea, fevers, chills, leg swelling, or HA. There are no other complaints, changes, or physical findings at this time. PCP: None Current Facility-Administered Medications Medication Dose Route Frequency Provider Last Rate Last Dose  sodium chloride 0.9 % bolus infusion 1,000 mL  1,000 mL IntraVENous ONCE Call, Tatiana March MD 1,000 mL/hr at 11/22/18 0853 1,000 mL at 11/22/18 4005  insulin lispro (HUMALOG) injection   SubCUTAneous TIDAC Call, Tatiana March MD      
 glucose chewable tablet 16 g  4 Tab Oral PRN Call, Tatiana March MD      
 dextrose (D50W) injection syrg 12.5-25 g  25-50 mL IntraVENous PRN Call, Tatiana March MD      
 glucagon [facility]) injection 1 mg  1 mg IntraMUSCular PRN Call, Tatiana March MD      
 insulin regular (NOVOLIN R, HUMULIN R) 100 Units in 0.9% sodium chloride 100 mL infusion  0-50 Units/hr IntraVENous TITRATE Tex Schneider MD      
 
Current Outpatient Medications Medication Sig Dispense Refill  insulin glargine (LANTUS) 100 unit/mL injection 30 Units by SubCUTAneous route daily. 1 Vial 1  
 insulin regular (HUMULIN R REGULAR U-100 INSULN) 100 unit/mL injection Sliding scale 1 Vial 1  
 haloperidol decanoate (HALDOL DECANOATE) 100 mg/mL injection 100 mg by IntraMUSCular route every twenty-eight (28) days.  metFORMIN (GLUCOPHAGE) 1,000 mg tablet Take 1,000 mg by mouth.  meloxicam (MOBIC) 15 mg tablet Take 15 mg by mouth daily as needed for Pain.  insulin aspart U-100 (NOVOLOG FLEXPEN U-100 INSULIN) 100 unit/mL inpn by SubCUTAneous route three (3) times daily as needed. Sliding scale  sertraline (ZOLOFT) 100 mg tablet Take 100 mg by mouth daily.  benztropine (COGENTIN) 1 mg tablet Take 1 Tab by mouth two (2) times a day. Indications: drug-induced extrapyramidal reaction (Patient taking differently: Take 1 mg by mouth every twelve (12) hours.) 28 Tab 0  Blood-Glucose Meter monitoring kit Monitor BG 4 times daily. Dx uncontrolled type 2 DM E11.65  Indications: Diabetes Mellitus 1 Kit 0  
 divalproex DR (DEPAKOTE) 500 mg tablet Take 1 Tab by mouth two (2) times a day. Indications: Schizoaffective disorder (Patient taking differently: Take 500 mg by mouth every twelve (12) hours.) 28 Tab 0  
 haloperidol (HALDOL) 5 mg tablet Take 1 Tab by mouth every twelve (12) hours. Indications: Schizoaffective disorder 28 Tab 0  
 albuterol (PROVENTIL HFA, VENTOLIN HFA, PROAIR HFA) 90 mcg/actuation inhaler Take 1 Puff by inhalation every four (4) hours as needed for Wheezing. Past History Past Medical History: 
Past Medical History:  
Diagnosis Date  Alcohol abuse, in remission   
 quit 17 days ago  Asthma   
 does not use inhalers  Borderline personality disorder ([facility] [address]  Diabetes ([facility] [address]  GERD (gastroesophageal reflux disease)  Hypertension  Other ill-defined conditions(799.89)   
 kidney stones ,passed one  Other ill-defined conditions(799.89) sickle cell trait  Other ill-defined conditions(799.89)   
 increased cholesterol  Pancreatitis  Psychiatric disorder   
 schizophrenia, bipolar, depression, anxiety Past Surgical History: 
Past Surgical History:  
Procedure Laterality Date  ABDOMEN SURGERY PROC UNLISTED  3/12/14 CHOLECYSTECTOMY LAPAROSCOPIC    
 HX GASTRIC BYPASS  HX GYN  11/8/2012  
 c section x2  HX OTHER SURGICAL  3/13/14 ENDOSCOPIC RETROGRADE CHOLANGIOPANCREATOGRAPHY  HX OTHER SURGICAL  8/4/14  
 endoscopic stent placed to bile duct  HX TUBAL LIGATION  2012 Family History: 
Family History Problem Relation Age of Onset  Heart Disease Mother  Diabetes Mother  Hypertension Mother  Hypertension Maternal Grandmother Social History: 
Social History Tobacco Use  Smoking status: Current Every Day Smoker Packs/day: 0.50 Years: 14.00 Pack years: 7.00 Types: Cigarettes  Smokeless tobacco: Never Used  Tobacco comment: cigarettes Substance Use Topics  Alcohol use: Yes Alcohol/week: 0.6 oz Types: 1 Cans of beer per week Comment: occasionally, last had \"i had one beer\" today  Drug use: Yes Types: Cocaine Comment: every day Allergies: Allergies Allergen Reactions  Dilaudid [Hydromorphone (Bulk)] Itching  Ibuprofen Not Reported This Time Review of Systems Review of Systems Constitutional: Negative for chills and fever. HENT: Negative for congestion, rhinorrhea, sneezing and sore throat. Respiratory: Positive for shortness of breath. Cardiovascular: Positive for chest pain (mid-sternal). Negative for leg swelling.  
     + heart burn Gastrointestinal: Negative for abdominal pain, diarrhea and nausea.   
     + hematemesis Musculoskeletal: Negative for back pain, myalgias and neck stiffness. Skin: Negative for rash. Neurological: Negative for dizziness, weakness and headaches. All other systems reviewed and are negative. Physical Exam  
Physical Exam  
Constitutional: She is oriented to person, place, and time. She appears well-developed and well-nourished. She appears ill. Not in extremitas HENT:  
Head: Normocephalic and atraumatic. Mouth/Throat: Oropharynx is clear and moist.  
Eyes: Conjunctivae and EOM are normal.  
Neck: Normal range of motion and full passive range of motion without pain. Neck supple. Cardiovascular: Normal rate, regular rhythm, S1 normal, S2 normal, normal heart sounds, intact distal pulses and normal pulses. No murmur heard. Pulmonary/Chest: Effort normal. No respiratory distress. She has no wheezes. Course breath sounds bilaterally but good air movement Abdominal: Soft. Normal appearance. She exhibits distension (slight). Bowel sounds are increased. There is tenderness (diffuse). There is no rebound and no guarding. No peritonitis Musculoskeletal: Normal range of motion. No lower extremity edema. No calf tenderness Neurological: She is alert and oriented to person, place, and time. She has normal strength. Skin: Skin is warm, dry and intact. No rash noted. Psychiatric: She has a normal mood and affect. Her speech is normal and behavior is normal. Judgment and thought content normal.  
Nursing note and vitals reviewed. Diagnostic Study Results Labs - Recent Results (from the past 12 hour(s)) EKG, 12 LEAD, INITIAL Collection Time: 11/22/18  7:01 AM  
Result Value Ref Range Ventricular Rate 98 BPM  
 Atrial Rate 98 BPM  
 P-R Interval 108 ms QRS Duration 72 ms Q-T Interval 368 ms QTC Calculation (Bezet) 469 ms Calculated P Axis 69 degrees Calculated R Axis 47 degrees Calculated T Axis 77 degrees Diagnosis   Sinus rhythm with short [place] Possible Left atrial enlargement Nonspecific ST and T wave abnormality Abnormal ECG When compared with ECG of 18-NOV-2018 03:22, No significant change was found CBC WITH AUTOMATED DIFF Collection Time: 11/22/18  7:07 AM  
Result Value Ref Range WBC 17.0 (H) 3.6 - 11.0 K/uL  
 RBC 5.45 (H) 3.80 - 5.20 M/uL  
 HGB 14.6 11.5 - 16.0 g/dL HCT 42.4 35.0 - 47.0 % MCV 77.8 (L) 80.0 - 99.0 FL  
 MCH 26.8 26.0 - 34.0 PG  
 MCHC 34.4 30.0 - 36.5 g/dL  
 RDW 16.9 (H) 11.5 - 14.5 % PLATELET 199 373 - 340 K/uL MPV 12.7 8.9 - 12.9 FL  
 NRBC 0.2 (H) 0  WBC ABSOLUTE NRBC 0.03 (H) 0.00 - 0.01 K/uL NEUTROPHILS 79 (H) 32 - 75 % BAND NEUTROPHILS 8 (H) 0 - 6 % LYMPHOCYTES 7 (L) 12 - 49 % MONOCYTES 6 5 - 13 % EOSINOPHILS 0 0 - 7 % BASOPHILS 0 0 - 1 % IMMATURE GRANULOCYTES 0 %  
 ABS. NEUTROPHILS 14.8 (H) 1.8 - 8.0 K/UL  
 ABS. LYMPHOCYTES 1.2 0.8 - 3.5 K/UL  
 ABS. MONOCYTES 1.0 0.0 - 1.0 K/UL  
 ABS. EOSINOPHILS 0.0 0.0 - 0.4 K/UL  
 ABS. BASOPHILS 0.0 0.0 - 0.1 K/UL  
 ABS. IMM. GRANS. 0.0 K/UL  
 DF MANUAL    
 RBC COMMENTS NORMOCYTIC, NORMOCHROMIC METABOLIC PANEL, COMPREHENSIVE Collection Time: 11/22/18  7:07 AM  
Result Value Ref Range Sodium 134 (L) 136 - 145 mmol/L Potassium 4.9 3.5 - 5.1 mmol/L Chloride 91 (L) 97 - 108 mmol/L  
 CO2 6 (LL) 21 - 32 mmol/L Anion gap 37 (H) 5 - 15 mmol/L Glucose 773 (HH) 65 - 100 mg/dL BUN 17 6 - 20 MG/DL Creatinine 1.71 (H) 0.55 - 1.02 MG/DL  
 BUN/Creatinine ratio 10 (L) 12 - 20 GFR est AA 40 (L) >60 ml/min/1.73m2 GFR est non-AA 33 (L) >60 ml/min/1.73m2 Calcium 9.6 8.5 - 10.1 MG/DL Bilirubin, total 0.6 0.2 - 1.0 MG/DL  
 ALT (SGPT) 22 12 - 78 U/L  
 AST (SGOT) 12 (L) 15 - 37 U/L Alk. phosphatase 112 45 - 117 U/L Protein, total 8.2 6.4 - 8.2 g/dL Albumin 3.6 3.5 - 5.0 g/dL Globulin 4.6 (H) 2.0 - 4.0 g/dL A-G Ratio 0.8 (L) 1.1 - 2.2 LIPASE  Collection Time: 11/22/18  7:07 AM  
Result Value Ref Range Lipase 48 (L) 73 - 393 U/L  
SAMPLES BEING HELD Collection Time: 11/22/18  7:07 AM  
Result Value Ref Range SAMPLES BEING HELD 1SST,1BLUE,1RED COMMENT Add-on orders for these samples will be processed based on acceptable specimen integrity and analyte stability, which may vary by analyte. TROPONIN I Collection Time: 11/22/18  7:07 AM  
Result Value Ref Range Troponin-I, Qt. <0.05 <0.05 ng/mL VENOUS BLOOD GAS Collection Time: 11/22/18  8:48 AM  
Result Value Ref Range VENOUS PH 7.06 (LL) 7.32 - 7.42    
 VENOUS PCO2 20 (L) 41 - 51 mmHg VENOUS PO2 45 (H) 25 - 40 mmHg VENOUS O2 SATURATION 63 (L) 65 - 88 % VENOUS BICARBONATE 6 (L) 23 - 28 mmol/L  
 VENOUS BASE DEFICIT 23.2 mmol/L  
 O2 METHOD ROOM AIR Sample source VENOUS    
 SITE OTHER Critical value read back KEISHA ARANA RN   
GLUCOSE, POC Collection Time: 11/22/18  9:06 AM  
Result Value Ref Range Glucose (POC) >600 (HH) 65 - 100 mg/dL Performed by [employer] (Tech) GLUCOSE, POC Collection Time: 11/22/18  9:07 AM  
Result Value Ref Range Glucose (POC) >600 (HH) 65 - 100 mg/dL Performed by Romayne Peers (Tech) Radiologic Studies -  
 
CT Results  (Last 48 hours)  
          
 11/22/18 0837  CT ABD PELV WO CONT Final result Impression:  IMPRESSION:  
1. Fluid-filled distention of the stomach. Thickening of the wall of the distal  
esophagus and probable fluid in the distal esophagus. 2. No evidence of small bowel obstruction. Fecal retention in the colon. 3. Chronic pancreatitis. Narrative:  EXAM:  CT ABD PELV WO CONT INDICATION: Abdominal pain  , heartburn, chest pain, shortness of breath with  
acute onset 3 to 4 hours ago, nausea and vomiting COMPARISON: CT of 12/15/2014 CONTRAST:  None. TECHNIQUE:   
Thin axial images were obtained through the abdomen and pelvis. Coronal and  
sagittal reconstructions were generated. Oral contrast was not administered. CT  
dose reduction was achieved through use of a standardized protocol tailored for  
this examination and automatic exposure control for dose modulation. The absence of intravenous contrast material reduces the sensitivity for  
evaluation of the solid parenchymal organs of the abdomen. FINDINGS:   
LUNG BASES: Clear. INCIDENTALLY IMAGED HEART AND MEDIASTINUM: Unremarkable. LIVER: No mass or biliary dilatation. GALLBLADDER: Surgically absent. SPLEEN: Normal size. No focal lesions. PANCREAS: Atrophic pancreas with multiple calcifications, consistent with  
chronic pancreatitis. ADRENALS: Unremarkable. KIDNEYS/URETERS: No mass, calculus, or hydronephrosis. STOMACH: Very distended with fluid. Fluid also noted in the distal esophagus. Generalized thickening of the wall of the distal esophagus. SMALL BOWEL: No dilatation or wall thickening. COLON: Fecal retention APPENDIX: Not clearly seen due to lack of fat and due to colonic distention with  
stool. No secondary signs of acute appendicitis. PERITONEUM: No ascites or pneumoperitoneum. RETROPERITONEUM: No lymphadenopathy or aortic aneurysm. REPRODUCTIVE ORGANS: Unremarkable. URINARY BLADDER: Very distended. No focal wall thickening or calcification. BONES: L5-S1 degenerative disc disease. L4-5 facet hypertrophy with minimal  
degenerative anterolisthesis. ADDITIONAL COMMENTS: N/A  
   
  
  
 
CXR Results  (Last 48 hours)  
          
 11/22/18 0748  XR CHEST PORT Final result Impression:  IMPRESSION:   
   
No acute process. Narrative:  EXAM:  XR CHEST PORT INDICATION:  cp  
   
COMPARISON:  None. FINDINGS:  
   
A portable AP radiograph of the chest was obtained at 7:41 hours. The lungs are  
clear. The cardiac and mediastinal contours and pulmonary vascularity are  
normal.  The bones and soft tissues are unremarkable.   There has been no change  
since the prior study. Medical Decision Making I am the first provider for this patient. I reviewed the vital signs, available nursing notes, past medical history, past surgical history, family history and social history. Vital Signs-Reviewed the patient's vital signs. Patient Vitals for the past 12 hrs: 
 Pulse Resp BP SpO2  
11/22/18 0800   (!) 177/93 100 % 11/22/18 0649 100 26 (!) 178/92 100 % EKG interpretation: (Preliminary) 1090 Rhythm: normal sinus rhythm; and regular . Rate (approx.): 98; Axis: normal; MD interval: Short; QRS interval: normal ; ST/T wave: non-specific St and T wave changes; Other findings: abnormal ekg. Partially obscured by respiratory artifact. Written by CAROLINE Abraham, as dictated by Lori Looney MD. Records Reviewed: Nursing Notes and Old Medical Records Provider Notes (Medical Decision Making): DDx: ACS, GERD, gastritis, sandra buitrago tare, hyperglycemia, DKA/HHNK, UTI, pancreatitis, polysubstance abuse. ED Course:  
Initial assessment performed. The patients presenting problems have been discussed, and they are in agreement with the care plan formulated and outlined with them. I have encouraged them to ask questions as they arise throughout their visit. 7:20 AM 
Negative pregnancy test on 11/18/18 Written by CAROLINE Abraham, as dictated by Kenan Jaffe MD 
 
7:53 AM 
Labs reviewed. Pt's glucose is 773. Written by CAROLINE Abraham, as dictated by Kenan Jaffe MD 
 
CONSULT NOTE:  
8:17 AM 
Kenan Jaffe MD spoke with Donis Nugent MD, Specialty: Hospitalist 
Discussed pt's hx, disposition, and available diagnostic and imaging results. Reviewed care plans. Consultant will evaluate pt for admission. Written by CAROLINE Abraham, as dictated by Kenan Jaffe MD. 
 
PROGRESS NOTE: 
8:55 AM 
Pt has been re-evaluated. Pt states that she feels better.  
Written by CAROLINE Abraham, as dictated by Duy Garzon MD Wy[place] 
 
Critical Care Time: CRITICAL CARE NOTE : 
 
8:40 AM 
 
 
IMPENDING DETERIORATION -Cardiovascular, CNS, Metabolic and Renal 
 
ASSOCIATED RISK FACTORS - Dysrhythmia, Metabolic changes, Dehydration, Vascular Compromise and CNS Decompensation MANAGEMENT- Bedside Assessment and Supervision of Care INTERPRETATION -  Blood Gases and ECG, XR, CT 
 
INTERVENTIONS - Metobolic interventions CASE REVIEW - Hospitalist and Nursing TREATMENT RESPONSE -Improved PERFORMED BY - Self NOTES   : 
 
 
I have spent 30 minutes of critical care time involved in lab review, consultations with specialist, family decision- making, bedside attention and documentation. During this entire length of time I was immediately available to the patient . Alice Schneider MD 
 
Disposition: 
 
ADMIT NOTE: 
8:38 AM 
Patient is being admitted to the hospital.  The results of their tests and reasons for their admission have been discussed with them and/or available family. They convey agreement and understanding for the need to be admitted and for their admission diagnosis. Consultation has been made with the inpatient physician specialist for hospitalization. PLAN: 
1. Admit to hospitalist 
 
Diagnosis Clinical Impression: 1. Diabetic ketoacidosis without coma associated with type 1 diabetes mellitus ([address] 2. Acute renal failure, unspecified acute renal failure type ([address] Attestations: This note is prepared by Juni Tillman, acting as Scribe for The ServiceMaster MD Jeremiah. Alice Schneider MD: The scribe's documentation has been prepared under my direction and personally reviewed by me in its entirety. I confirm that the note above accurately reflects all work, treatment, procedures, and medical decision making performed by me.

## 2018-11-22 NOTE — PROGRESS NOTES
[facility] PHARMACY DAILY ANTICOAGULANT ASSESSMENT Estimated body mass index is 18.9 kg/m² as calculated from the following: 
  Height as of this encounter: 167.6 cm (66\"). Weight as of this encounter: 53.1 kg (117 lb 1.6 oz). Estimated Creatinine Clearance: 53.9 mL/min (A) (based on SCr of 1.14 mg/dL (H)). Lab Results Component Value Date/Time Creatinine (POC) 0.6 09/12/2017 02:01 PM  
 Creatinine 1.14 (H) 11/22/2018 02:22 PM  
 
Lab Results Component Value Date/Time Hemoglobin (POC) 16.0 09/12/2017 02:01 PM  
 HGB 14.6 11/22/2018 07:07 AM  
 
Lab Results Component Value Date/Time PLATELET 971 12/04/8041 07:07 AM  
 
Assessment: hgb, plt OK, SCr elevated but decreasing; dosed for CrCl >30 mL/min & BMI <40. Thank you, Coby Meyers, PHARMD, BCPS Contact: [phone]

## 2018-11-22 NOTE — ROUTINE PROCESS
TRANSFER - OUT REPORT: 
 
TELEPHONEVerbal report given to [facility] CUBA FUNK RN (name) on Cynthia Parents  being transferred to ORTH/PCU ROOM 4 (unit) for routine progression of care Report consisted of patients Situation, Background, Assessment and  
Recommendations(SBAR). Information from the following report(s) SBAR, Kardex, ED Summary, Intake/Output, MAR and Recent Results was reviewed with the receiving nurse. Lines:  
Peripheral IV 11/22/18 Right Antecubital (Active) Site Assessment Clean, dry, & intact 11/22/2018  7:11 AM  
Phlebitis Assessment 0 11/22/2018  7:11 AM  
Infiltration Assessment 0 11/22/2018  7:11 AM  
Dressing Status Clean, dry, & intact 11/22/2018  7:11 AM  
Dressing Type Transparent;Tape 11/22/2018  7:11 AM  
Hub Color/Line Status Pink 11/22/2018  7:11 AM  
Alcohol Cap Used No 11/22/2018  7:11 AM  
   
Peripheral IV 11/22/18 Left Antecubital (Active) Site Assessment Clean, dry, & intact 11/22/2018  8:54 AM  
Phlebitis Assessment 0 11/22/2018  8:54 AM  
Infiltration Assessment 0 11/22/2018  8:54 AM  
Dressing Status Clean, dry, & intact 11/22/2018  8:54 AM  
Dressing Type Transparent;Tape 11/22/2018  8:54 AM  
Hub Color/Line Status Pink 11/22/2018  8:54 AM  
Action Taken Catheter retaped 11/22/2018  8:54 AM  
  
 
Opportunity for questions and clarification was provided. Patient transported with: 
 Monitor Registered Nurse

## 2018-11-23 VITALS
HEART RATE: 90 BPM | OXYGEN SATURATION: 97 % | HEIGHT: 66 IN | WEIGHT: 117.1 LBS | TEMPERATURE: 98.5 F | DIASTOLIC BLOOD PRESSURE: 105 MMHG | RESPIRATION RATE: 14 BRPM | SYSTOLIC BLOOD PRESSURE: 172 MMHG | BODY MASS INDEX: 18.82 KG/M2

## 2018-11-23 LAB
ADMINISTERED INITIALS, ADMINIT: NORMAL
ANION GAP SERPL CALC-SCNC: 10 MMOL/L (ref 5–15)
ANION GAP SERPL CALC-SCNC: 13 MMOL/L (ref 5–15)
BUN SERPL-MCNC: 6 MG/DL (ref 6–20)
BUN SERPL-MCNC: 7 MG/DL (ref 6–20)
BUN/CREAT SERPL: 8 (ref 12–20)
BUN/CREAT SERPL: 9 (ref 12–20)
CALCIUM SERPL-MCNC: 8.1 MG/DL (ref 8.5–10.1)
CALCIUM SERPL-MCNC: 8.2 MG/DL (ref 8.5–10.1)
CHLORIDE SERPL-SCNC: 106 MMOL/L (ref 97–108)
CHLORIDE SERPL-SCNC: 107 MMOL/L (ref 97–108)
CO2 SERPL-SCNC: 21 MMOL/L (ref 21–32)
CO2 SERPL-SCNC: 24 MMOL/L (ref 21–32)
CREAT SERPL-MCNC: 0.78 MG/DL (ref 0.55–1.02)
CREAT SERPL-MCNC: 0.79 MG/DL (ref 0.55–1.02)
D50 ADMINISTERED, D50ADM: 0 ML
D50 ORDER, D50ORD: 0 ML
GLSCOM COMMENTS: NORMAL
GLUCOSE BLD STRIP.AUTO-MCNC: 108 MG/DL (ref 65–100)
GLUCOSE BLD STRIP.AUTO-MCNC: 108 MG/DL (ref 65–100)
GLUCOSE BLD STRIP.AUTO-MCNC: 122 MG/DL (ref 65–100)
GLUCOSE BLD STRIP.AUTO-MCNC: 130 MG/DL (ref 65–100)
GLUCOSE BLD STRIP.AUTO-MCNC: 162 MG/DL (ref 65–100)
GLUCOSE BLD STRIP.AUTO-MCNC: 201 MG/DL (ref 65–100)
GLUCOSE BLD STRIP.AUTO-MCNC: 242 MG/DL (ref 65–100)
GLUCOSE BLD STRIP.AUTO-MCNC: 251 MG/DL (ref 65–100)
GLUCOSE BLD STRIP.AUTO-MCNC: 349 MG/DL (ref 65–100)
GLUCOSE BLD STRIP.AUTO-MCNC: 389 MG/DL (ref 65–100)
GLUCOSE BLD STRIP.AUTO-MCNC: 404 MG/DL (ref 65–100)
GLUCOSE SERPL-MCNC: 110 MG/DL (ref 65–100)
GLUCOSE SERPL-MCNC: 191 MG/DL (ref 65–100)
GLUCOSE, GLC: 108 MG/DL
GLUCOSE, GLC: 108 MG/DL
GLUCOSE, GLC: 122 MG/DL
GLUCOSE, GLC: 130 MG/DL
GLUCOSE, GLC: 160 MG/DL
GLUCOSE, GLC: 201 MG/DL
GLUCOSE, GLC: 242 MG/DL
GLUCOSE, GLC: 251 MG/DL
GLUCOSE, GLC: 404 MG/DL
HIGH TARGET, HITG: 250 MG/DL
INSULIN ADMINSTERED, INSADM: 0 UNITS/HOUR
INSULIN ADMINSTERED, INSADM: 0.1 UNITS/HOUR
INSULIN ADMINSTERED, INSADM: 0.5 UNITS/HOUR
INSULIN ADMINSTERED, INSADM: 0.9 UNITS/HOUR
INSULIN ADMINSTERED, INSADM: 1.3 UNITS/HOUR
INSULIN ADMINSTERED, INSADM: 1.9 UNITS/HOUR
INSULIN ADMINSTERED, INSADM: 6.9 UNITS/HOUR
INSULIN ORDER, INSORD: 0 UNITS/HOUR
INSULIN ORDER, INSORD: 0.1 UNITS/HOUR
INSULIN ORDER, INSORD: 0.5 UNITS/HOUR
INSULIN ORDER, INSORD: 0.9 UNITS/HOUR
INSULIN ORDER, INSORD: 1.3 UNITS/HOUR
INSULIN ORDER, INSORD: 1.9 UNITS/HOUR
INSULIN ORDER, INSORD: 6.9 UNITS/HOUR
LOW TARGET, LOT: 150 MG/DL
MAGNESIUM SERPL-MCNC: 1.7 MG/DL (ref 1.6–2.4)
MAGNESIUM SERPL-MCNC: 1.7 MG/DL (ref 1.6–2.4)
MINUTES UNTIL NEXT BG, NBG: 60 MIN
MULTIPLIER, MUL: 0
MULTIPLIER, MUL: 0.01
MULTIPLIER, MUL: 0.01
MULTIPLIER, MUL: 0.02
MULTIPLIER, MUL: 0.02
MULTIPLIER, MUL: 0.03
ORDER INITIALS, ORDINIT: NORMAL
POTASSIUM SERPL-SCNC: 3.6 MMOL/L (ref 3.5–5.1)
POTASSIUM SERPL-SCNC: 3.9 MMOL/L (ref 3.5–5.1)
SERVICE CMNT-IMP: ABNORMAL
SODIUM SERPL-SCNC: 140 MMOL/L (ref 136–145)
SODIUM SERPL-SCNC: 141 MMOL/L (ref 136–145)

## 2018-11-23 PROCEDURE — 90686 IIV4 VACC NO PRSV 0.5 ML IM: CPT | Performed by: INTERNAL MEDICINE

## 2018-11-23 PROCEDURE — 74011250637 HC RX REV CODE- 250/637: Performed by: INTERNAL MEDICINE

## 2018-11-23 PROCEDURE — 74011000258 HC RX REV CODE- 258: Performed by: EMERGENCY MEDICINE

## 2018-11-23 PROCEDURE — 74011250636 HC RX REV CODE- 250/636: Performed by: INTERNAL MEDICINE

## 2018-11-23 PROCEDURE — 74011636637 HC RX REV CODE- 636/637: Performed by: EMERGENCY MEDICINE

## 2018-11-23 PROCEDURE — 80048 BASIC METABOLIC PNL TOTAL CA: CPT

## 2018-11-23 PROCEDURE — 90471 IMMUNIZATION ADMIN: CPT

## 2018-11-23 PROCEDURE — 83735 ASSAY OF MAGNESIUM: CPT

## 2018-11-23 PROCEDURE — 36415 COLL VENOUS BLD VENIPUNCTURE: CPT

## 2018-11-23 PROCEDURE — 82962 GLUCOSE BLOOD TEST: CPT

## 2018-11-23 PROCEDURE — 74011636637 HC RX REV CODE- 636/637: Performed by: INTERNAL MEDICINE

## 2018-11-23 RX ORDER — AMLODIPINE BESYLATE 5 MG/1
5 TABLET ORAL DAILY
Qty: 30 TAB | Refills: 0 | Status: SHIPPED | OUTPATIENT
Start: 2018-11-24 | End: 2018-12-24

## 2018-11-23 RX ORDER — INSULIN LISPRO 100 [IU]/ML
10 INJECTION, SOLUTION INTRAVENOUS; SUBCUTANEOUS ONCE
Status: DISCONTINUED | OUTPATIENT
Start: 2018-11-23 | End: 2018-11-23 | Stop reason: HOSPADM

## 2018-11-23 RX ORDER — INSULIN GLARGINE 100 [IU]/ML
30 INJECTION, SOLUTION SUBCUTANEOUS DAILY
Status: DISCONTINUED | OUTPATIENT
Start: 2018-11-23 | End: 2018-11-23 | Stop reason: HOSPADM

## 2018-11-23 RX ORDER — HEPARIN SODIUM 5000 [USP'U]/ML
5000 INJECTION, SOLUTION INTRAVENOUS; SUBCUTANEOUS EVERY 12 HOURS
Status: DISCONTINUED | OUTPATIENT
Start: 2018-11-23 | End: 2018-11-23 | Stop reason: HOSPADM

## 2018-11-23 RX ADMIN — SODIUM CHLORIDE 6.9 UNITS/HR: 900 INJECTION, SOLUTION INTRAVENOUS at 10:46

## 2018-11-23 RX ADMIN — AMLODIPINE BESYLATE 5 MG: 5 TABLET ORAL at 09:06

## 2018-11-23 RX ADMIN — INFLUENZA VIRUS VACCINE 0.5 ML: 15; 15; 15; 15 SUSPENSION INTRAMUSCULAR at 13:36

## 2018-11-23 RX ADMIN — SODIUM CHLORIDE 1.9 UNITS/HR: 900 INJECTION, SOLUTION INTRAVENOUS at 09:05

## 2018-11-23 RX ADMIN — INSULIN GLARGINE 30 UNITS: 100 INJECTION, SOLUTION SUBCUTANEOUS at 08:58

## 2018-11-23 NOTE — PROGRESS NOTES
Bedside shift change report given to Soledad (oncoming nurse) by Olivia Park (offgoing nurse). Report included the following information SBAR, Kardex, Intake/Output and MAR.

## 2018-11-23 NOTE — PROGRESS NOTES
Bedside report given to oncoming RNLuz Elena. Pt asleep in bed. Call bell remains next to pt. HR NSR on monitor.

## 2018-11-23 NOTE — PROGRESS NOTES
Bedside report received from previous RN, Claire Ellison. Pt asleep in bed. HR on monitor NSR. Insulin continuing to infuse. Will reassess per protocol.

## 2018-11-23 NOTE — DISCHARGE SUMMARY
Hospitalist Discharge Summary     Patient ID:  Ebony Bullock  [id number]  53 y.o.  1975    PCP on record: None    Admit date: 11/22/2018  Discharge date and time: 11/23/2018      Admission Diagnoses: DKA (diabetic ketoacidoses) [facility])    Discharge Diagnoses:    Principal Problem:    DKA (diabetic ketoacidoses) ([address] (10/20/2018)           Hospital Course:   DKA in setting of uncontrolled DM1: POA resolved  poorly controlled type DM1, d/t non compliance with insulin  Anion gap metabolic acidosis due to above-resolved   Non compliant with medication, multiple admissions,Hx leaving AMA   DKA protocol followed   Hba1c 15.7  Due to financial barriers ,paitnet will be given RX for NPH 70/30 U BID  Significant time spent on counseling patient on the need to be compliant with diabetic medications, including risks of damage to renal, cardiac and nervous systems.     Chest pain and SOB-RESOLVED  likeley secondary to cocaine use and compensation for metabolic acidosis  -CXR with no acute process on portable chest. No change.  -trop <0.05  Patient was counseled extensively on the need to abstain from using illicit drugs, its addictive tendencies, its deleterious effects on the brain and other organs as well as its financial and social sequelae.     Asthma without acute exacerbation: POA  - continue albuterol prn     Schizoaffective disorder with anxiety and depression: POA  with polysubstance abuse and drug-seeking behavior  Non compliant with psychiatry meds     Hypertension: POA  SBP was in 180 on presentation in setting of cocaine use   -not on meds at home, will start on CCB  PRN Labetalol     Tobacco dependence   Nicotine patch offered  Patient was counseled extensively on the need to abstain from tobacco, its addictive tendencies, its deleterious effects on the lungs as well as its financial sequelae      CONSULTATIONS:  None    Excerpted HPI from H&P of Jennifer Ku MD:  Octavio Wagner is a 44 y.o.  [facility] American female with PMH of IDDM,Asthma, HTN,Cocaine abuse who presents to ED with c/o above. Patient presents with chest pain,hortness of breath and nausea ,vomiting which started early morning today.  She has had multiple admissions in the past for DKA secondary to noncompliance and has a history of leaving AMA.  In ED, she was found to be in DKA with blood sugar level 773, bicarb 6 and pH 7.  She has been initiated on insulin drip and received fluid boluses.  She will be admitted to [facility] for management of DKA. Off note, patient also has a history of snorting cocaine.  UDS was positive for cocaine. ______________________________________________________________________  DISCHARGE SUMMARY/HOSPITAL COURSE:  for full details see H&P, daily progress notes, labs, consult notes. _______________________________________________________________________  Patient seen and examined by me on discharge day. Pertinent Findings:  Gen:    Not in distress  Chest: Clear lungs  CVS:   Regular rhythm. No edema  Abd:  Soft, not distended, not tender  Neuro:  Alert with good insight. Oriented to person, place, and time   _______________________________________________________________________  DISCHARGE MEDICATIONS:   Current Discharge Medication List      START taking these medications    Details   amLODIPine (NORVASC) 5 mg tablet Take 1 Tab by mouth daily for 30 days. Qty: 30 Tab, Refills: 0      insulin NPH/insulin regular (NOVOLIN 70/30, HUMULIN 70/30) 100 unit/mL (70-30) injection 15 Units by SubCUTAneous route two (2) times a day. Qty: 10 mL, Refills: 6         CONTINUE these medications which have NOT CHANGED    Details   haloperidol decanoate (HALDOL DECANOATE) 100 mg/mL injection 100 mg by IntraMUSCular route every twenty-eight (28) days. meloxicam (MOBIC) 15 mg tablet Take 15 mg by mouth daily as needed for Pain. sertraline (ZOLOFT) 100 mg tablet Take 100 mg by mouth daily. benztropine (COGENTIN) 1 mg tablet Take 1 Tab by mouth two (2) times a day. Indications: drug-induced extrapyramidal reaction  Qty: 28 Tab, Refills: 0      divalproex DR (DEPAKOTE) 500 mg tablet Take 1 Tab by mouth two (2) times a day. Indications: Schizoaffective disorder  Qty: 28 Tab, Refills: 0      haloperidol (HALDOL) 5 mg tablet Take 1 Tab by mouth every twelve (12) hours. Indications: Schizoaffective disorder  Qty: 28 Tab, Refills: 0      albuterol (PROVENTIL HFA, VENTOLIN HFA, PROAIR HFA) 90 mcg/actuation inhaler Take 1 Puff by inhalation every four (4) hours as needed for Wheezing. STOP taking these medications       insulin glargine (LANTUS) 100 unit/mL injection Comments:   Reason for Stopping:         insulin regular (HUMULIN R REGULAR U-100 INSULN) 100 unit/mL injection Comments:   Reason for Stopping:         metFORMIN (GLUCOPHAGE) 1,000 mg tablet Comments:   Reason for Stopping:         insulin aspart U-100 (NOVOLOG FLEXPEN U-100 INSULIN) 100 unit/mL inpn Comments:   Reason for Stopping:               My Recommended Diet, Activity, Wound Care, and follow-up labs are listed in the patient's Discharge Insturctions which I have personally completed and reviewed. _______________________________________________________________________  DISPOSITION:     Home with Family: x   Home with HH/PT/OT/RN:    SNF/LTC:    [place]:    OTHER:        Condition at Discharge:  Stable  _______________________________________________________________________  Follow up with:   PCP : None  Follow-up Information     Follow up With Specialties Details Why [facility]  On 11/26/2018 Appointment is scheduled for 8:15 am. Please arrive at 8 am to complete paper work. Please bring photo id, insurance card, and list of any medications.   [address]    [address] [facility] [place]    Please call Mono Hardin to get a ride set up to bring to appoitment   [phone]    None    None ([phone]) Patient stated that they have no PCP                Total time in minutes spent coordinating this discharge (includes going over instructions, follow-up, prescriptions, and preparing report for sign off to her PCP) :  30 minutes    Signed:  Alysha Powers MD

## 2018-11-23 NOTE — PROGRESS NOTES
Problem: Falls - Risk of 
Goal: *Absence of Falls Document April Rosa Maria Fall Risk and appropriate interventions in the flowsheet. Outcome: Progressing Towards Goal 
Fall Risk Interventions: 
  
 
  
 
Medication Interventions: Patient to call before getting OOB

## 2018-11-23 NOTE — PROGRESS NOTES
CM reviewed chart. CM scheduled follow-up PCP appointment- Cameron Rivera at [address] on 11/26/18 at 8am. CM made pt aware of appointment. CM scheduled transportation  for pt on Monday- round trip to and from PCP appointment. CM also scheduled pt discharge ride to her house at 1:30 pm.  
 
CM discussed and reviewed transportation to and from PCP follow-up and discharge transportation. Pt gave verbal understanding. [facility] [place] MSW, QMHP

## 2018-11-23 NOTE — PROGRESS NOTES
[facility] Dosing Services Automatic adjustment of enoxaparin Dr. Sky Evans Indication: VTE prophylaxis Wt Readings from Last 1 Encounters:  
11/22/18 53.1 kg (117 lb 1.6 oz) Ht Readings from Last 1 Encounters:  
11/22/18 167.6 cm (66\") Pharmacist made change to enoxaparin therapy based on weight < 60 kg Order changed to heparin 5000 units SUBCUT every 12 hours. Previous Dose Enoxaparin 40 mg SUBCUT every 24 hours Creatinine Clearance Estimated Creatinine Clearance: 77.8 mL/min (based on SCr of 0.79 mg/dL). Creatinine Lab Results Component Value Date/Time Creatinine 0.79 11/23/2018 05:46 AM  
 Creatinine (POC) 0.6 09/12/2017 02:01 PM  
   
Platelet Lab Results Component Value Date/Time PLATELET 950 37/96/3794 07:07 AM  
  
H/H Lab Results Component Value Date/Time HGB 14.6 11/22/2018 07:07 AM  
  
 
Epidural Catheter? No 
Other anticoagulants: None Relevant drug interactions: None - Pharmacy to automatically make dose adjustment for renal dysfunction (creatinine clearance less than 30 mL/min) - Pharmacy to automatically make dose adjustment for obesity (BMI greater than 40) or low weight (heparin for wt < 60 kg) - Pharmacy to make dose rounding adjustments per [facility] dose adjustment scale. Pharmacy will monitor patients progress, make dose adjustment as needed per changing renal function, and communicate further recommendations regarding patients anticoagulation therapy with prescriber. Thank you, Josr Sanders, PharmD, BCPS 
[phone]

## 2018-11-23 NOTE — DIABETES MGMT
DTC Consult Note Recommendations/ Comments:  If appropriate, please consider: 1. continuing insulin gtt until anion gap is less than 12 on two consecutive BMPs and BG is less than 200 mg/dl and transition per hospital guidelines. 2.Please consider adding CHO coverage while pt is on insulin gtt. 3. Consider transitioning pt on basal home regimen of Lantus 30 units as well as lispro with meals 4 units. Patient will require basal insulin 2 hours prior to discontinuing the drip. Pt was seen by DTC earlier this month for Assessment of Home Management - see note from 11/8/18 by Margaret Gonzalez RD for additional details: Patient is a 44 y.o. female with known DM on Lantus 30 units at night, reported that she uses Humulin R around 8 units \"usually\" for her sliding scale. Educator asked pt if she takes Novolog or humalog for meal-time insulin needs (as this is listed on her pta med list) and pt reported \"yes\" - educator asked pt how much of this insulin she takes and pt reported \"I don't know\". Educator expressed concern that pt may not take her insulin or check her BG at home - pt reported, again, that she will do these things. Current hospital DM medication: pt currently on insulin gtt running at 0.1 units/hr since 0655 hrs this am. 
 
Consult received for:  [x]             Assessment of home management 
              [x]      Medication Recommendations []             Meter/monitoring 
   []             Insulin instruction []             New diagnosis []             Outpatient education []             Insulin pump patient []             Insulin infusion 
   []             DKA/HHS Chart reviewed and initial evaluation complete on [address]. A1c:  
Lab Results Component Value Date/Time Hemoglobin A1c 15.7 (H) 11/22/2018 09:55 AM  
 
 
Recent Glucose Results:  
Lab Results Component Value Date/Time   (H) 11/23/2018 05:46 AM  
  (H) 11/23/2018 02:16 AM  
 GLU 91 11/22/2018 10:06 PM  
 GLUCPOC 251 (H) 11/23/2018 08:04 AM  
 GLUCPOC 242 (H) 11/23/2018 06:54 AM  
 GLUCPOC 201 (H) 11/23/2018 05:50 AM  
  
 
Lab Results Component Value Date/Time Creatinine 0.79 11/23/2018 05:46 AM  
 
Estimated Creatinine Clearance: 77.8 mL/min (based on SCr of 0.79 mg/dL). Active Orders Diet DIET DIABETIC CONSISTENT CARB Regular PO intake: No data found. Will continue to follow as needed. Thank you. Katharina Banks, [facility] Office: [phone]

## 2018-11-25 LAB
ATRIAL RATE: 98 BPM
CALCULATED P AXIS, ECG09: 69 DEGREES
CALCULATED R AXIS, ECG10: 47 DEGREES
CALCULATED T AXIS, ECG11: 77 DEGREES
DIAGNOSIS, 93000: NORMAL
P-R INTERVAL, ECG05: 108 MS
Q-T INTERVAL, ECG07: 368 MS
QRS DURATION, ECG06: 72 MS
QTC CALCULATION (BEZET), ECG08: 469 MS
VENTRICULAR RATE, ECG03: 98 BPM

## 2018-11-26 ENCOUNTER — TELEPHONE (OUTPATIENT)
Dept: CASE MANAGEMENT | Age: 43
End: 2018-11-26

## 2018-11-26 NOTE — TELEPHONE ENCOUNTER
CM called patient for the purpose of follow up to her inpatient admission. The call was answered by voice mail on which CM left a message for patient to return the call.

## 2018-11-30 ENCOUNTER — TELEPHONE (OUTPATIENT)
Dept: CASE MANAGEMENT | Age: 43
End: 2018-11-30

## 2018-11-30 NOTE — TELEPHONE ENCOUNTER
Case Management Follow-up call  CM reviewed chart. CM called pt to discuss discharge plan. Pt did not answer the phone, CM unable to leave a message-vm full.        [facility] [place] MSW, QMHP

## 2018-12-10 ENCOUNTER — HOSPITAL ENCOUNTER (INPATIENT)
Age: 43
LOS: 1 days | Discharge: HOME | DRG: 638 | End: 2018-12-11
Attending: EMERGENCY MEDICINE | Admitting: INTERNAL MEDICINE
Payer: MEDICARE

## 2018-12-10 DIAGNOSIS — E10.10 DIABETIC KETOACIDOSIS WITHOUT COMA ASSOCIATED WITH TYPE 1 DIABETES MELLITUS (HCC): Primary | ICD-10-CM

## 2018-12-10 LAB
ADMINISTERED INITIALS, ADMINIT: NORMAL
ALBUMIN SERPL-MCNC: 2.6 G/DL (ref 3.5–5)
ALBUMIN/GLOB SERPL: 0.6 {RATIO} (ref 1.1–2.2)
ALP SERPL-CCNC: 91 U/L (ref 45–117)
ALT SERPL-CCNC: 16 U/L (ref 12–78)
ANION GAP SERPL CALC-SCNC: 14 MMOL/L (ref 5–15)
ANION GAP SERPL CALC-SCNC: 25 MMOL/L (ref 5–15)
APPEARANCE UR: CLEAR
ARTERIAL PATENCY WRIST A: ABNORMAL
AST SERPL-CCNC: 11 U/L (ref 15–37)
BACTERIA URNS QL MICRO: NEGATIVE /HPF
BASE DEFICIT BLDA-SCNC: 12.9 MMOL/L
BASOPHILS # BLD: 0 K/UL (ref 0–0.1)
BASOPHILS NFR BLD: 0 % (ref 0–1)
BDY SITE: ABNORMAL
BILIRUB SERPL-MCNC: 0.6 MG/DL (ref 0.2–1)
BILIRUB UR QL: NEGATIVE
BUN SERPL-MCNC: 15 MG/DL (ref 6–20)
BUN SERPL-MCNC: 8 MG/DL (ref 6–20)
BUN/CREAT SERPL: 10 (ref 12–20)
BUN/CREAT SERPL: 14 (ref 12–20)
CALCIUM SERPL-MCNC: 7.7 MG/DL (ref 8.5–10.1)
CALCIUM SERPL-MCNC: 8.2 MG/DL (ref 8.5–10.1)
CHLORIDE SERPL-SCNC: 106 MMOL/L (ref 97–108)
CHLORIDE SERPL-SCNC: 96 MMOL/L (ref 97–108)
CO2 SERPL-SCNC: 12 MMOL/L (ref 21–32)
CO2 SERPL-SCNC: 20 MMOL/L (ref 21–32)
COLOR UR: ABNORMAL
CREAT SERPL-MCNC: 0.83 MG/DL (ref 0.55–1.02)
CREAT SERPL-MCNC: 1.11 MG/DL (ref 0.55–1.02)
D50 ADMINISTERED, D50ADM: 0 ML
D50 ORDER, D50ORD: 0 ML
DIFFERENTIAL METHOD BLD: ABNORMAL
EOSINOPHIL # BLD: 0 K/UL (ref 0–0.4)
EOSINOPHIL NFR BLD: 0 % (ref 0–7)
EPITH CASTS URNS QL MICRO: ABNORMAL /LPF
ERYTHROCYTE [DISTWIDTH] IN BLOOD BY AUTOMATED COUNT: 16.4 % (ref 11.5–14.5)
ETHANOL SERPL-MCNC: <10 MG/DL
GLOBULIN SER CALC-MCNC: 4.4 G/DL (ref 2–4)
GLSCOM COMMENTS: NORMAL
GLUCOSE BLD STRIP.AUTO-MCNC: 183 MG/DL (ref 65–100)
GLUCOSE BLD STRIP.AUTO-MCNC: 208 MG/DL (ref 65–100)
GLUCOSE BLD STRIP.AUTO-MCNC: 273 MG/DL (ref 65–100)
GLUCOSE BLD STRIP.AUTO-MCNC: 351 MG/DL (ref 65–100)
GLUCOSE BLD STRIP.AUTO-MCNC: 447 MG/DL (ref 65–100)
GLUCOSE BLD STRIP.AUTO-MCNC: 524 MG/DL (ref 65–100)
GLUCOSE BLD STRIP.AUTO-MCNC: 561 MG/DL (ref 65–100)
GLUCOSE BLD STRIP.AUTO-MCNC: 577 MG/DL (ref 65–100)
GLUCOSE BLD STRIP.AUTO-MCNC: 582 MG/DL (ref 65–100)
GLUCOSE BLD STRIP.AUTO-MCNC: 89 MG/DL (ref 65–100)
GLUCOSE BLD STRIP.AUTO-MCNC: >600 MG/DL (ref 65–100)
GLUCOSE BLD STRIP.AUTO-MCNC: >600 MG/DL (ref 65–100)
GLUCOSE SERPL-MCNC: 303 MG/DL (ref 65–100)
GLUCOSE SERPL-MCNC: 617 MG/DL (ref 65–100)
GLUCOSE UR STRIP.AUTO-MCNC: >1000 MG/DL
GLUCOSE, GLC: 183 MG/DL
GLUCOSE, GLC: 208 MG/DL
GLUCOSE, GLC: 273 MG/DL
GLUCOSE, GLC: 351 MG/DL
GLUCOSE, GLC: 447 MG/DL
GLUCOSE, GLC: 524 MG/DL
GLUCOSE, GLC: 89 MG/DL
HCO3 BLDA-SCNC: 12 MMOL/L (ref 22–26)
HCT VFR BLD AUTO: 39 % (ref 35–47)
HGB BLD-MCNC: 13.3 G/DL (ref 11.5–16)
HGB UR QL STRIP: NEGATIVE
HIGH TARGET, HITG: 250 MG/DL
IMM GRANULOCYTES # BLD: 0 K/UL (ref 0–0.04)
IMM GRANULOCYTES NFR BLD AUTO: 0 % (ref 0–0.5)
INSULIN ADMINSTERED, INSADM: 1.2 UNITS/HOUR
INSULIN ADMINSTERED, INSADM: 10.7 UNITS/HOUR
INSULIN ADMINSTERED, INSADM: 11.6 UNITS/HOUR
INSULIN ADMINSTERED, INSADM: 11.6 UNITS/HOUR
INSULIN ADMINSTERED, INSADM: 6.2 UNITS/HOUR
INSULIN ADMINSTERED, INSADM: 7.4 UNITS/HOUR
INSULIN ADMINSTERED, INSADM: 9.3 UNITS/HOUR
INSULIN ORDER, INSORD: 1.2 UNITS/HOUR
INSULIN ORDER, INSORD: 10.7 UNITS/HOUR
INSULIN ORDER, INSORD: 11.6 UNITS/HOUR
INSULIN ORDER, INSORD: 11.6 UNITS/HOUR
INSULIN ORDER, INSORD: 6.2 UNITS/HOUR
INSULIN ORDER, INSORD: 7.4 UNITS/HOUR
INSULIN ORDER, INSORD: 9.3 UNITS/HOUR
KETONES SERPL QL: ABNORMAL
KETONES UR QL STRIP.AUTO: >80 MG/DL
LEUKOCYTE ESTERASE UR QL STRIP.AUTO: NEGATIVE
LOW TARGET, LOT: 150 MG/DL
LYMPHOCYTES # BLD: 0.6 K/UL (ref 0.8–3.5)
LYMPHOCYTES NFR BLD: 12 % (ref 12–49)
MAGNESIUM SERPL-MCNC: 1.5 MG/DL (ref 1.6–2.4)
MCH RBC QN AUTO: 26.4 PG (ref 26–34)
MCHC RBC AUTO-ENTMCNC: 34.1 G/DL (ref 30–36.5)
MCV RBC AUTO: 77.4 FL (ref 80–99)
MINUTES UNTIL NEXT BG, NBG: 60 MIN
MONOCYTES # BLD: 0.5 K/UL (ref 0–1)
MONOCYTES NFR BLD: 10 % (ref 5–13)
MULTIPLIER, MUL: 0.02
MULTIPLIER, MUL: 0.03
MULTIPLIER, MUL: 0.04
MULTIPLIER, MUL: 0.04
MULTIPLIER, MUL: 0.05
NEUTS SEG # BLD: 3.9 K/UL (ref 1.8–8)
NEUTS SEG NFR BLD: 78 % (ref 32–75)
NITRITE UR QL STRIP.AUTO: NEGATIVE
NRBC # BLD: 0 K/UL (ref 0–0.01)
NRBC BLD-RTO: 0 PER 100 WBC
ORDER INITIALS, ORDINIT: NORMAL
PCO2 BLDA: 24 MMHG (ref 35–45)
PH BLDA: 7.3 [PH] (ref 7.35–7.45)
PH UR STRIP: 5.5 [PH] (ref 5–8)
PHOSPHATE SERPL-MCNC: 2.8 MG/DL (ref 2.6–4.7)
PHOSPHATE SERPL-MCNC: 4.2 MG/DL (ref 2.6–4.7)
PLATELET # BLD AUTO: 217 K/UL (ref 150–400)
PMV BLD AUTO: 11.6 FL (ref 8.9–12.9)
PO2 BLDA: 118 MMHG (ref 80–100)
POTASSIUM SERPL-SCNC: 3.1 MMOL/L (ref 3.5–5.1)
POTASSIUM SERPL-SCNC: 4 MMOL/L (ref 3.5–5.1)
PROT SERPL-MCNC: 7 G/DL (ref 6.4–8.2)
PROT UR STRIP-MCNC: NEGATIVE MG/DL
RBC # BLD AUTO: 5.04 M/UL (ref 3.8–5.2)
RBC #/AREA URNS HPF: ABNORMAL /HPF (ref 0–5)
RBC MORPH BLD: ABNORMAL
RBC MORPH BLD: ABNORMAL
SAO2 % BLD: 98 % (ref 92–97)
SAO2% DEVICE SAO2% SENSOR NAME: ABNORMAL
SERVICE CMNT-IMP: ABNORMAL
SERVICE CMNT-IMP: NORMAL
SODIUM SERPL-SCNC: 133 MMOL/L (ref 136–145)
SODIUM SERPL-SCNC: 140 MMOL/L (ref 136–145)
SP GR UR REFRACTOMETRY: 1.01 (ref 1–1.03)
SPECIMEN SITE: ABNORMAL
UA: UC IF INDICATED,UAUC: ABNORMAL
UROBILINOGEN UR QL STRIP.AUTO: 0.2 EU/DL (ref 0.2–1)
WBC # BLD AUTO: 5 K/UL (ref 3.6–11)
WBC URNS QL MICRO: ABNORMAL /HPF (ref 0–4)

## 2018-12-10 PROCEDURE — 74011250637 HC RX REV CODE- 250/637: Performed by: PHYSICIAN ASSISTANT

## 2018-12-10 PROCEDURE — 82009 KETONE BODYS QUAL: CPT

## 2018-12-10 PROCEDURE — 74011250636 HC RX REV CODE- 250/636: Performed by: INTERNAL MEDICINE

## 2018-12-10 PROCEDURE — 74011636637 HC RX REV CODE- 636/637: Performed by: PHYSICIAN ASSISTANT

## 2018-12-10 PROCEDURE — 74011000258 HC RX REV CODE- 258: Performed by: PHYSICIAN ASSISTANT

## 2018-12-10 PROCEDURE — 74011250636 HC RX REV CODE- 250/636: Performed by: PHYSICIAN ASSISTANT

## 2018-12-10 PROCEDURE — 65660000001 HC RM ICU INTERMED STEPDOWN

## 2018-12-10 PROCEDURE — 84100 ASSAY OF PHOSPHORUS: CPT

## 2018-12-10 PROCEDURE — 90471 IMMUNIZATION ADMIN: CPT

## 2018-12-10 PROCEDURE — 83036 HEMOGLOBIN GLYCOSYLATED A1C: CPT

## 2018-12-10 PROCEDURE — 82803 BLOOD GASES ANY COMBINATION: CPT

## 2018-12-10 PROCEDURE — 96361 HYDRATE IV INFUSION ADD-ON: CPT

## 2018-12-10 PROCEDURE — 36600 WITHDRAWAL OF ARTERIAL BLOOD: CPT

## 2018-12-10 PROCEDURE — 90715 TDAP VACCINE 7 YRS/> IM: CPT | Performed by: PHYSICIAN ASSISTANT

## 2018-12-10 PROCEDURE — 96374 THER/PROPH/DIAG INJ IV PUSH: CPT

## 2018-12-10 PROCEDURE — 82962 GLUCOSE BLOOD TEST: CPT

## 2018-12-10 PROCEDURE — 85025 COMPLETE CBC W/AUTO DIFF WBC: CPT

## 2018-12-10 PROCEDURE — 83735 ASSAY OF MAGNESIUM: CPT

## 2018-12-10 PROCEDURE — 96376 TX/PRO/DX INJ SAME DRUG ADON: CPT

## 2018-12-10 PROCEDURE — 80307 DRUG TEST PRSMV CHEM ANLYZR: CPT

## 2018-12-10 PROCEDURE — 99285 EMERGENCY DEPT VISIT HI MDM: CPT

## 2018-12-10 PROCEDURE — 81001 URINALYSIS AUTO W/SCOPE: CPT

## 2018-12-10 PROCEDURE — 80053 COMPREHEN METABOLIC PANEL: CPT

## 2018-12-10 PROCEDURE — 77030037875 HC DRSG MEPILEX <16IN BORD MOLN -A

## 2018-12-10 PROCEDURE — 74011250637 HC RX REV CODE- 250/637: Performed by: INTERNAL MEDICINE

## 2018-12-10 PROCEDURE — 36415 COLL VENOUS BLD VENIPUNCTURE: CPT

## 2018-12-10 RX ORDER — SODIUM CHLORIDE 0.9 % (FLUSH) 0.9 %
5-10 SYRINGE (ML) INJECTION EVERY 8 HOURS
Status: DISCONTINUED | OUTPATIENT
Start: 2018-12-10 | End: 2018-12-11 | Stop reason: HOSPADM

## 2018-12-10 RX ORDER — POTASSIUM CHLORIDE 750 MG/1
20 TABLET, FILM COATED, EXTENDED RELEASE ORAL
Status: COMPLETED | OUTPATIENT
Start: 2018-12-10 | End: 2018-12-10

## 2018-12-10 RX ORDER — DEXTROSE, SODIUM CHLORIDE, AND POTASSIUM CHLORIDE 5; .45; .15 G/100ML; G/100ML; G/100ML
75 INJECTION INTRAVENOUS CONTINUOUS
Status: DISCONTINUED | OUTPATIENT
Start: 2018-12-10 | End: 2018-12-11

## 2018-12-10 RX ORDER — SODIUM CHLORIDE AND POTASSIUM CHLORIDE .9; .15 G/100ML; G/100ML
SOLUTION INTRAVENOUS CONTINUOUS
Status: DISCONTINUED | OUTPATIENT
Start: 2018-12-10 | End: 2018-12-10

## 2018-12-10 RX ORDER — DEXTROSE 50 % IN WATER (D50W) INTRAVENOUS SYRINGE
25-50 AS NEEDED
Status: DISCONTINUED | OUTPATIENT
Start: 2018-12-10 | End: 2018-12-11 | Stop reason: HOSPADM

## 2018-12-10 RX ORDER — DIVALPROEX SODIUM 500 MG/1
500 TABLET, DELAYED RELEASE ORAL 2 TIMES DAILY
Status: DISCONTINUED | OUTPATIENT
Start: 2018-12-11 | End: 2018-12-11 | Stop reason: HOSPADM

## 2018-12-10 RX ORDER — BENZTROPINE MESYLATE 1 MG/1
1 TABLET ORAL 2 TIMES DAILY
Status: DISCONTINUED | OUTPATIENT
Start: 2018-12-11 | End: 2018-12-11 | Stop reason: HOSPADM

## 2018-12-10 RX ORDER — AMLODIPINE BESYLATE 5 MG/1
5 TABLET ORAL DAILY
Status: DISCONTINUED | OUTPATIENT
Start: 2018-12-11 | End: 2018-12-11 | Stop reason: HOSPADM

## 2018-12-10 RX ORDER — MAGNESIUM SULFATE 100 %
4 CRYSTALS MISCELLANEOUS AS NEEDED
Status: DISCONTINUED | OUTPATIENT
Start: 2018-12-10 | End: 2018-12-11 | Stop reason: HOSPADM

## 2018-12-10 RX ORDER — ACETAMINOPHEN 325 MG/1
650 TABLET ORAL
Status: COMPLETED | OUTPATIENT
Start: 2018-12-10 | End: 2018-12-10

## 2018-12-10 RX ORDER — SODIUM CHLORIDE 0.9 % (FLUSH) 0.9 %
5-10 SYRINGE (ML) INJECTION AS NEEDED
Status: DISCONTINUED | OUTPATIENT
Start: 2018-12-10 | End: 2018-12-11 | Stop reason: HOSPADM

## 2018-12-10 RX ORDER — INSULIN LISPRO 100 [IU]/ML
INJECTION, SOLUTION INTRAVENOUS; SUBCUTANEOUS
Status: DISCONTINUED | OUTPATIENT
Start: 2018-12-10 | End: 2018-12-11 | Stop reason: HOSPADM

## 2018-12-10 RX ORDER — HALOPERIDOL 5 MG/1
5 TABLET ORAL EVERY 12 HOURS
Status: DISCONTINUED | OUTPATIENT
Start: 2018-12-11 | End: 2018-12-11 | Stop reason: HOSPADM

## 2018-12-10 RX ORDER — MAGNESIUM SULFATE HEPTAHYDRATE 40 MG/ML
2 INJECTION, SOLUTION INTRAVENOUS ONCE
Status: COMPLETED | OUTPATIENT
Start: 2018-12-10 | End: 2018-12-10

## 2018-12-10 RX ADMIN — MAGNESIUM SULFATE IN WATER 2 G: 40 INJECTION, SOLUTION INTRAVENOUS at 22:17

## 2018-12-10 RX ADMIN — SODIUM CHLORIDE 9.3 UNITS/HR: 9 INJECTION, SOLUTION INTRAVENOUS at 16:56

## 2018-12-10 RX ADMIN — SODIUM CHLORIDE 1000 ML: 900 INJECTION, SOLUTION INTRAVENOUS at 14:51

## 2018-12-10 RX ADMIN — HUMAN INSULIN 10 UNITS: 100 INJECTION, SOLUTION SUBCUTANEOUS at 14:50

## 2018-12-10 RX ADMIN — POTASSIUM CHLORIDE 20 MEQ: 750 TABLET, FILM COATED, EXTENDED RELEASE ORAL at 22:20

## 2018-12-10 RX ADMIN — SODIUM CHLORIDE 1000 ML: 900 INJECTION, SOLUTION INTRAVENOUS at 16:31

## 2018-12-10 RX ADMIN — DEXTROSE MONOHYDRATE, SODIUM CHLORIDE, AND POTASSIUM CHLORIDE 75 ML/HR: 50; 4.5; 1.49 INJECTION, SOLUTION INTRAVENOUS at 22:16

## 2018-12-10 RX ADMIN — TETANUS TOXOID, REDUCED DIPHTHERIA TOXOID AND ACELLULAR PERTUSSIS VACCINE, ADSORBED 0.5 ML: 5; 2.5; 8; 8; 2.5 SUSPENSION INTRAMUSCULAR at 15:14

## 2018-12-10 RX ADMIN — SODIUM CHLORIDE AND POTASSIUM CHLORIDE: .9; .15 SOLUTION INTRAVENOUS at 20:19

## 2018-12-10 RX ADMIN — HUMAN INSULIN 10 UNITS: 100 INJECTION, SOLUTION SUBCUTANEOUS at 15:45

## 2018-12-10 RX ADMIN — ACETAMINOPHEN 650 MG: 325 TABLET, FILM COATED ORAL at 15:14

## 2018-12-10 NOTE — ED NOTES
TRANSFER - OUT REPORT: 
 
Verbal report given to Reuben (name) on Dillan Barney  being transferred to PCU 2 (unit) for routine progression of care Report consisted of patients Situation, Background, Assessment and  
Recommendations(SBAR). Information from the following report(s) SBAR, ED Summary, Procedure Summary, MAR and Recent Results was reviewed with the receiving nurse. Lines:  
Peripheral IV 12/10/18 Right Antecubital (Active) Site Assessment Clean, dry, & intact 12/10/2018  2:31 PM  
Phlebitis Assessment 0 12/10/2018  2:31 PM  
Infiltration Assessment 0 12/10/2018  2:31 PM  
Dressing Status Clean, dry, & intact 12/10/2018  2:31 PM  
Dressing Type Transparent 12/10/2018  2:31 PM  
Hub Color/Line Status Patent; Flushed;Green 12/10/2018  2:31 PM  
  
 
Opportunity for questions and clarification was provided. Patient transported with: 
 Registered Nurse

## 2018-12-10 NOTE — H&P
Hospitalist Admission Note NAME: Macho Hoskins :  1975 MRN:  [id number] Room Number: OI44/21  @ [facility]  
 
Date/Time:  12/10/2018 4:36 PM 
 
Patient PCP: None 
______________________________________________________________________ Given the patient's current clinical presentation, I have a high level of concern for decompensation if discharged from the emergency department. Complex decision making was performed, which includes reviewing the patient's available past medical records, laboratory results, and x-ray films. My assessment of this patient's clinical condition and my plan of care is as follows. Assessment / Plan: 
 
DKA in setting of uncontrolled DM1: POA Anion gap metabolic acidosis due to above Non compliant with medication, multiple admissions,Hx leaving AMA ,AG 25 , bicarb 12, . Large serum ketones 
- admitted per DKA protocol  
- NPO for now, IV fluids,on insulin gtt Hba1c 15.7 
bmp every 4 hours and replace electrolytes as needed 
  
FABIENNE,Prerenal 
likely volmue depleted Monitor renal function on IVF 
  
Asthma without acute exacerbation: POA 
- continue albuterol prn 
  
Schizoaffective disorder with anxiety and depression: POA 
with polysubstance abuse and drug-seeking behavior Non compliant with psychiatry meds Restart Depakote,cogentin,Haldol 
  
Hypertension: POA 
start on CCB PRN Labetalol 
  
Tobacco dependence Nicotine patch offered Patient was counseled extensively on the need to abstain from tobacco, its addictive tendencies, its deleterious effects on the lungs as well as its financial sequelae 
  
Body mass index is 18.24 kg/m². Code Status: full Surrogate Decision Maker:son 
  
DVT Prophylaxis: Lovenox GI Prophylaxis: not indicated 
  
Baseline: non compliant,homeless Subjective: CHIEF COMPLAINT: sob HISTORY OF PRESENT ILLNESS:    
Mami Branch is a 44 y.o.    female with PMH of IDDM,Asthma, HTN,Cocaine abuse who presents to ED with c/o above. Patient presents with chest pain,shortness of breath since today morning.  She has had multiple admissions in the past for DKA secondary to noncompliance and has a history of leaving AMA.  In ED, she was found to be in DKA with blood sugar level 612 , bicarb 12,serum ketones+. She has been initiated on insulin drip and received fluid boluses. She will be admitted to stepdown for management of DKA. Off note,patient endorses snorting cocaine.  Has not taken NPH insulin which was RX to her last discharge(2 weeks ago). We were asked to admit for work up and evaluation of the above problems. Past Medical History:  
Diagnosis Date  Alcohol abuse, in remission   
 quit 17 days ago  Asthma   
 does not use inhalers  Borderline personality disorder ([facility] [address]  Diabetes ([facility]  GERD (gastroesophageal reflux disease)  Hypertension  Other ill-defined conditions(799.89)   
 kidney stones ,passed one  Other ill-defined conditions(799.89) sickle cell trait  Other ill-defined conditions(799.89)   
 increased cholesterol  Pancreatitis  Psychiatric disorder   
 schizophrenia, bipolar, depression, anxiety Past Surgical History:  
Procedure Laterality Date  ABDOMEN SURGERY PROC UNLISTED  3/12/14 CHOLECYSTECTOMY LAPAROSCOPIC    
 HX GASTRIC BYPASS  HX GYN  11/8/2012  
 c section x2  HX OTHER SURGICAL  3/13/14 ENDOSCOPIC RETROGRADE CHOLANGIOPANCREATOGRAPHY  HX OTHER SURGICAL  8/4/14  
 endoscopic stent placed to bile duct  HX TUBAL LIGATION  2012 Social History Tobacco Use  Smoking status: Current Every Day Smoker Packs/day: 0.50 Years: 14.00 Pack years: 7.00 Types: Cigarettes  Smokeless tobacco: Never Used  Tobacco comment: cigarettes Substance Use Topics  Alcohol use: Yes Alcohol/week: 0.6 oz Types: 1 Cans of beer per week   Comment: occasionally, last had \"i had one beer\" today Family History Problem Relation Age of Onset  Heart Disease Mother  Diabetes Mother  Hypertension Mother  Hypertension Maternal Grandmother Allergies Allergen Reactions  Dilaudid [Hydromorphone (Bulk)] Itching  Ibuprofen Not Reported This Time Prior to Admission medications Medication Sig Start Date End Date Taking? Authorizing Provider  
amLODIPine (NORVASC) 5 mg tablet Take 1 Tab by mouth daily for 30 days. 11/24/18 12/24/18  Isma Garcia MD  
insulin NPH/insulin regular (NOVOLIN 70/30, HUMULIN 70/30) 100 unit/mL (70-30) injection 15 Units by SubCUTAneous route two (2) times a day. 11/23/18   Isma Garcia MD  
haloperidol decanoate (HALDOL DECANOATE) 100 mg/mL injection 100 mg by IntraMUSCular route every twenty-eight (28) days. Provider, Historical  
meloxicam (MOBIC) 15 mg tablet Take 15 mg by mouth daily as needed for Pain. Provider, Historical  
sertraline (ZOLOFT) 100 mg tablet Take 100 mg by mouth daily. Provider, Historical  
benztropine (COGENTIN) 1 mg tablet Take 1 Tab by mouth two (2) times a day. Indications: drug-induced extrapyramidal reaction Patient taking differently: Take 1 mg by mouth every twelve (12) hours. 8/14/18   Shalonda Omalley MD  
divalproex DR (DEPAKOTE) 500 mg tablet Take 1 Tab by mouth two (2) times a day. Indications: Schizoaffective disorder Patient taking differently: Take 500 mg by mouth every twelve (12) hours. 8/14/18   Shalonda Omalley MD  
haloperidol (HALDOL) 5 mg tablet Take 1 Tab by mouth every twelve (12) hours. Indications: Schizoaffective disorder 8/14/18   Shalonda Omalley MD  
albuterol (PROVENTIL HFA, VENTOLIN HFA, PROAIR HFA) 90 mcg/actuation inhaler Take 1 Puff by inhalation every four (4) hours as needed for Wheezing. Provider, Historical  
 
 
REVIEW OF SYSTEMS:    
I am not able to complete the review of systems because:  The patient is intubated and sedated The patient has altered mental status due to his acute medical problems The patient has baseline aphasia from prior stroke(s) The patient has baseline dementia and is not reliable historian The patient is in acute medical distress and unable to provide information Total of 12 systems reviewed as follows:   
   POSITIVE= underlined text  Negative = text not underlined General:  fever, chills, sweats, generalized weakness, weight loss/gain,  
   loss of appetite Eyes:    blurred vision, eye pain, loss of vision, double vision ENT:    rhinorrhea, pharyngitis Respiratory:   cough, sputum production, SOB, BARRY, wheezing, pleuritic pain  
Cardiology:   chest pain, palpitations, orthopnea, PND, edema, syncope Gastrointestinal:  abdominal pain , N/V, diarrhea, dysphagia, constipation, bleeding Genitourinary:  frequency, urgency, dysuria, hematuria, incontinence Muskuloskeletal :  arthralgia, myalgia, back pain Hematology:  easy bruising, nose or gum bleeding, lymphadenopathy Dermatological: rash, ulceration, pruritis, color change / jaundice Endocrine:   hot flashes or polydipsia Neurological:  headache, dizziness, confusion, focal weakness, paresthesia, Speech difficulties, memory loss, gait difficulty Psychological: Feelings of anxiety, depression, agitation Objective: VITALS:   
Visit Vitals /76 (BP 1 Location: Left arm, BP Patient Position: At rest) Pulse 86 Temp 97.8 °F (36.6 °C) Resp 14 Ht 5' 6\" (1.676 m) Wt 51.3 kg (113 lb) SpO2 100% BMI 18.24 kg/m² PHYSICAL EXAM: 
 
 
______________________________________________________________________ Care Plan discussed with:  Patient/Family, Nurse and  Expected  Disposition:  Home w/Family 
________________________________________________________________________ TOTAL TIME:  60 Minutes Critical Care Provided     Minutes non procedure based Comments Reviewed previous records  
>50% of visit spent in counseling and coordination of care  Discussion with patient and/or family and questions answered 
  
 
________________________________________________________________________ Signed: Shilo Valles MD 
 
Procedures: see electronic medical records for all procedures/Xrays and details which were not copied into this note but were reviewed prior to creation of Plan. LAB DATA REVIEWED:   
Recent Results (from the past 24 hour(s)) GLUCOSE, POC Collection Time: 12/10/18  2:25 PM  
Result Value Ref Range Glucose (POC) >600 (HH) 65 - 100 mg/dL Performed by Jeff Dorman GLUCOSE, POC Collection Time: 12/10/18  2:26 PM  
Result Value Ref Range Glucose (POC) >600 (HH) 65 - 100 mg/dL Performed by Jeff Dorman ETHYL ALCOHOL Collection Time: 12/10/18  2:38 PM  
Result Value Ref Range ALCOHOL(ETHYL),SERUM <10 <10 MG/DL  
CBC WITH AUTOMATED DIFF Collection Time: 12/10/18  2:40 PM  
Result Value Ref Range  WBC 5.0 3.6 - 11.0 K/uL  
 RBC 5.04 3.80 - 5.20 M/uL  
 HGB 13.3 11.5 - 16.0 g/dL HCT 39.0 35.0 - 47.0 % MCV 77.4 (L) 80.0 - 99.0 FL  
 MCH 26.4 26.0 - 34.0 PG  
 MCHC 34.1 30.0 - 36.5 g/dL  
 RDW 16.4 (H) 11.5 - 14.5 % PLATELET 132 218 - 123 K/uL MPV 11.6 8.9 - 12.9 FL  
 NRBC 0.0 0  WBC ABSOLUTE NRBC 0.00 0.00 - 0.01 K/uL NEUTROPHILS 78 (H) 32 - 75 % LYMPHOCYTES 12 12 - 49 % MONOCYTES 10 5 - 13 % EOSINOPHILS 0 0 - 7 % BASOPHILS 0 0 - 1 % IMMATURE GRANULOCYTES 0 0.0 - 0.5 % ABS. NEUTROPHILS 3.9 1.8 - 8.0 K/UL  
 ABS. LYMPHOCYTES 0.6 (L) 0.8 - 3.5 K/UL  
 ABS. MONOCYTES 0.5 0.0 - 1.0 K/UL  
 ABS. EOSINOPHILS 0.0 0.0 - 0.4 K/UL  
 ABS. BASOPHILS 0.0 0.0 - 0.1 K/UL  
 ABS. IMM. GRANS. 0.0 0.00 - 0.04 K/UL  
 DF SMEAR SCANNED    
 RBC COMMENTS ANISOCYTOSIS 1+ 
    
 RBC COMMENTS REGINALD CELLS 2+ METABOLIC PANEL, COMPREHENSIVE Collection Time: 12/10/18  2:40 PM  
Result Value Ref Range Sodium 133 (L) 136 - 145 mmol/L Potassium 4.0 3.5 - 5.1 mmol/L Chloride 96 (L) 97 - 108 mmol/L  
 CO2 12 (LL) 21 - 32 mmol/L Anion gap 25 (H) 5 - 15 mmol/L Glucose 617 (HH) 65 - 100 mg/dL BUN 15 6 - 20 MG/DL Creatinine 1.11 (H) 0.55 - 1.02 MG/DL  
 BUN/Creatinine ratio 14 12 - 20 GFR est AA >60 >60 ml/min/1.73m2 GFR est non-AA 54 (L) >60 ml/min/1.73m2 Calcium 8.2 (L) 8.5 - 10.1 MG/DL Bilirubin, total 0.6 0.2 - 1.0 MG/DL  
 ALT (SGPT) 16 12 - 78 U/L  
 AST (SGOT) 11 (L) 15 - 37 U/L Alk. phosphatase 91 45 - 117 U/L Protein, total 7.0 6.4 - 8.2 g/dL Albumin 2.6 (L) 3.5 - 5.0 g/dL Globulin 4.4 (H) 2.0 - 4.0 g/dL A-G Ratio 0.6 (L) 1.1 - 2.2 ACETONE/KETONE, QL Collection Time: 12/10/18  3:23 PM  
Result Value Ref Range Acetone/Ketone serum, QL. LARGE (A) NEG       
GLUCOSE, POC Collection Time: 12/10/18  3:31 PM  
Result Value Ref Range Glucose (POC) 582 (H) 65 - 100 mg/dL Performed by Lobito Vivas GLUCOSE, POC Collection Time: 12/10/18  3:32 PM  
Result Value Ref Range  Glucose (POC) 577 (H) 65 - 100 mg/dL Performed by Penny Lala BLOOD GAS, ARTERIAL Collection Time: 12/10/18  3:57 PM  
Result Value Ref Range pH 7.30 (L) 7.35 - 7.45    
 PCO2 24 (L) 35.0 - 45.0 mmHg PO2 118 (H) 80 - 100 mmHg O2 SAT 98 (H) 92 - 97 % BICARBONATE 12 (L) 22 - 26 mmol/L  
 BASE DEFICIT 12.9 mmol/L  
 O2 METHOD ROOM AIR Sample source ARTERIAL    
 SITE LEFT BRACHIAL TOSHIA'S TEST N/A    
GLUCOSE, POC Collection Time: 12/10/18  4:27 PM  
Result Value Ref Range Glucose (POC) 561 (H) 65 - 100 mg/dL Performed by Penny Lala GLUCOSE, POC Collection Time: 12/10/18  4:29 PM  
Result Value Ref Range Glucose (POC) 524 (H) 65 - 100 mg/dL Performed by Penny Lala

## 2018-12-10 NOTE — PROGRESS NOTES
CM reviewed chart. CM met with pt. Pt verified her demographics. Pt updated her phone number to [phone]. Pt lives with 3 sons- [facility] 32, Maryam Anderson 25, and [address] [place] [address] 19. Pt stated her sons do not have a number to be contacted at. CM educated pt on the importance of taking care of her health, making sure she follows up, and attending her PCP appointments. CM explained to pt she could get [facility], but will need to see a PCP and follow-up with her appointments in order for orders to be signed off on.  
 
CM and pt discussed follow-up with [facility]. CM scheduled pt follow-up with  in the room with pt. [facility] spoke with pt ans confirmed they could come on tomorrow. CM will arrange pt with transportation back home. Care Management Interventions PCP Verified by CM: No 
Mode of Transport at Discharge: Other (see comment)([facility] will follow-up with pt) Transition of Care Consult (CM Consult): Discharge Planning Current Support Network: Relative's Home(pt lives with family friend and her three sons ) Confirm Follow Up Transport: Other (see comment) Date of previous inpatient admission/ ED visit? 11/23/18 What brought the patient back to ED? High blood sugar Did patient decline recommended services during last admission/ ED visit (if yes, what)? Pt did not follow-up with any pf her appointments as asked. Has patient seen a provider since their last inpatient admission/ED visit (if yes, when)? No  
 
CM Interventions: 
From previous inpatient admission/ED visit: discharge plan-follow up PCP From current inpatient admission/ED visit: discharge plan- Follow-up with [facility] on 12/11/18 Yappe MSW, [facility] Yappe MSW, [facility]

## 2018-12-10 NOTE — ED NOTES
Emergency Department Nursing Plan of Care The Nursing Plan of Care is developed from the Nursing assessment and Emergency Department Attending provider initial evaluation. The plan of care may be reviewed in the ED Provider note. The Plan of Care was developed with the following considerations:  
Patient / Family readiness to learn indicated by:verbalized understanding Persons(s) to be included in education: patient Barriers to Learning/Limitations:No  
 
Signed Katharyn Kanner 12/10/2018   2:33 PM 
 
See nursing assessment Patient is alert and oriented x 4 and in no acute distress at this time. Respirations are at a regular rate, depth, and pattern. Patient updated on plan of care and has no questions or concerns at this time.

## 2018-12-11 VITALS
HEIGHT: 66 IN | BODY MASS INDEX: 18.16 KG/M2 | HEART RATE: 71 BPM | WEIGHT: 113 LBS | RESPIRATION RATE: 11 BRPM | TEMPERATURE: 97.6 F | DIASTOLIC BLOOD PRESSURE: 59 MMHG | SYSTOLIC BLOOD PRESSURE: 127 MMHG | OXYGEN SATURATION: 99 %

## 2018-12-11 LAB
ADMINISTERED INITIALS, ADMINIT: NORMAL
ANION GAP SERPL CALC-SCNC: 8 MMOL/L (ref 5–15)
ANION GAP SERPL CALC-SCNC: 9 MMOL/L (ref 5–15)
BUN SERPL-MCNC: 5 MG/DL (ref 6–20)
BUN SERPL-MCNC: 6 MG/DL (ref 6–20)
BUN/CREAT SERPL: 8 (ref 12–20)
BUN/CREAT SERPL: 8 (ref 12–20)
CALCIUM SERPL-MCNC: 8 MG/DL (ref 8.5–10.1)
CALCIUM SERPL-MCNC: 8.1 MG/DL (ref 8.5–10.1)
CHLORIDE SERPL-SCNC: 108 MMOL/L (ref 97–108)
CHLORIDE SERPL-SCNC: 109 MMOL/L (ref 97–108)
CO2 SERPL-SCNC: 22 MMOL/L (ref 21–32)
CO2 SERPL-SCNC: 25 MMOL/L (ref 21–32)
CREAT SERPL-MCNC: 0.6 MG/DL (ref 0.55–1.02)
CREAT SERPL-MCNC: 0.71 MG/DL (ref 0.55–1.02)
D50 ADMINISTERED, D50ADM: 0 ML
D50 ADMINISTERED, D50ADM: 10 ML
D50 ORDER, D50ORD: 0 ML
D50 ORDER, D50ORD: 10 ML
ERYTHROCYTE [DISTWIDTH] IN BLOOD BY AUTOMATED COUNT: 15.9 % (ref 11.5–14.5)
EST. AVERAGE GLUCOSE BLD GHB EST-MCNC: ABNORMAL MG/DL
GLSCOM COMMENTS: NORMAL
GLUCOSE BLD STRIP.AUTO-MCNC: 104 MG/DL (ref 65–100)
GLUCOSE BLD STRIP.AUTO-MCNC: 122 MG/DL (ref 65–100)
GLUCOSE BLD STRIP.AUTO-MCNC: 152 MG/DL (ref 65–100)
GLUCOSE BLD STRIP.AUTO-MCNC: 182 MG/DL (ref 65–100)
GLUCOSE BLD STRIP.AUTO-MCNC: 193 MG/DL (ref 65–100)
GLUCOSE BLD STRIP.AUTO-MCNC: 226 MG/DL (ref 65–100)
GLUCOSE BLD STRIP.AUTO-MCNC: 524 MG/DL (ref 65–100)
GLUCOSE BLD STRIP.AUTO-MCNC: 75 MG/DL (ref 65–100)
GLUCOSE BLD STRIP.AUTO-MCNC: 86 MG/DL (ref 65–100)
GLUCOSE BLD STRIP.AUTO-MCNC: 96 MG/DL (ref 65–100)
GLUCOSE SERPL-MCNC: 160 MG/DL (ref 65–100)
GLUCOSE SERPL-MCNC: 70 MG/DL (ref 65–100)
GLUCOSE, GLC: 104 MG/DL
GLUCOSE, GLC: 122 MG/DL
GLUCOSE, GLC: 152 MG/DL
GLUCOSE, GLC: 182 MG/DL
GLUCOSE, GLC: 193 MG/DL
GLUCOSE, GLC: 226 MG/DL
GLUCOSE, GLC: 524 MG/DL
GLUCOSE, GLC: 75 MG/DL
GLUCOSE, GLC: 86 MG/DL
GLUCOSE, GLC: 96 MG/DL
HBA1C MFR BLD: 15.8 % (ref 4.2–6.3)
HCT VFR BLD AUTO: 35.6 % (ref 35–47)
HGB BLD-MCNC: 12.7 G/DL (ref 11.5–16)
HIGH TARGET, HITG: 250 MG/DL
INSULIN ADMINSTERED, INSADM: 0 UNITS/HOUR
INSULIN ADMINSTERED, INSADM: 0.1 UNITS/HOUR
INSULIN ADMINSTERED, INSADM: 0.4 UNITS/HOUR
INSULIN ADMINSTERED, INSADM: 0.5 UNITS/HOUR
INSULIN ADMINSTERED, INSADM: 4.6 UNITS/HOUR
INSULIN ORDER, INSORD: 0 UNITS/HOUR
INSULIN ORDER, INSORD: 0.1 UNITS/HOUR
INSULIN ORDER, INSORD: 0.4 UNITS/HOUR
INSULIN ORDER, INSORD: 0.5 UNITS/HOUR
INSULIN ORDER, INSORD: 4.6 UNITS/HOUR
LOW TARGET, LOT: 150 MG/DL
MAGNESIUM SERPL-MCNC: 2.3 MG/DL (ref 1.6–2.4)
MCH RBC QN AUTO: 27 PG (ref 26–34)
MCHC RBC AUTO-ENTMCNC: 35.7 G/DL (ref 30–36.5)
MCV RBC AUTO: 75.6 FL (ref 80–99)
MINUTES UNTIL NEXT BG, NBG: 15 MIN
MINUTES UNTIL NEXT BG, NBG: 60 MIN
MULTIPLIER, MUL: 0
MULTIPLIER, MUL: 0.01
MULTIPLIER, MUL: 0.01
MULTIPLIER, MUL: 0.02
MULTIPLIER, MUL: 0.03
NRBC # BLD: 0 K/UL (ref 0–0.01)
NRBC BLD-RTO: 0 PER 100 WBC
ORDER INITIALS, ORDINIT: NORMAL
PLATELET # BLD AUTO: 217 K/UL (ref 150–400)
PMV BLD AUTO: 11.5 FL (ref 8.9–12.9)
POTASSIUM SERPL-SCNC: 3.2 MMOL/L (ref 3.5–5.1)
POTASSIUM SERPL-SCNC: 4.2 MMOL/L (ref 3.5–5.1)
RBC # BLD AUTO: 4.71 M/UL (ref 3.8–5.2)
SERVICE CMNT-IMP: ABNORMAL
SERVICE CMNT-IMP: NORMAL
SODIUM SERPL-SCNC: 139 MMOL/L (ref 136–145)
SODIUM SERPL-SCNC: 142 MMOL/L (ref 136–145)
WBC # BLD AUTO: 6.4 K/UL (ref 3.6–11)

## 2018-12-11 PROCEDURE — 82962 GLUCOSE BLOOD TEST: CPT

## 2018-12-11 PROCEDURE — 74011250637 HC RX REV CODE- 250/637: Performed by: INTERNAL MEDICINE

## 2018-12-11 PROCEDURE — 74011000250 HC RX REV CODE- 250: Performed by: PHYSICIAN ASSISTANT

## 2018-12-11 PROCEDURE — 74011636637 HC RX REV CODE- 636/637: Performed by: INTERNAL MEDICINE

## 2018-12-11 PROCEDURE — 80048 BASIC METABOLIC PNL TOTAL CA: CPT

## 2018-12-11 PROCEDURE — 83735 ASSAY OF MAGNESIUM: CPT

## 2018-12-11 PROCEDURE — 85027 COMPLETE CBC AUTOMATED: CPT

## 2018-12-11 PROCEDURE — 36415 COLL VENOUS BLD VENIPUNCTURE: CPT

## 2018-12-11 RX ORDER — ACETAMINOPHEN 325 MG/1
650 TABLET ORAL
Status: DISCONTINUED | OUTPATIENT
Start: 2018-12-11 | End: 2018-12-11 | Stop reason: HOSPADM

## 2018-12-11 RX ORDER — POTASSIUM CHLORIDE 750 MG/1
40 TABLET, FILM COATED, EXTENDED RELEASE ORAL
Status: COMPLETED | OUTPATIENT
Start: 2018-12-11 | End: 2018-12-11

## 2018-12-11 RX ORDER — INSULIN LISPRO 100 [IU]/ML
12 INJECTION, SOLUTION INTRAVENOUS; SUBCUTANEOUS ONCE
Status: COMPLETED | OUTPATIENT
Start: 2018-12-11 | End: 2018-12-11

## 2018-12-11 RX ORDER — ONDANSETRON 2 MG/ML
4 INJECTION INTRAMUSCULAR; INTRAVENOUS
Status: DISCONTINUED | OUTPATIENT
Start: 2018-12-11 | End: 2018-12-11 | Stop reason: HOSPADM

## 2018-12-11 RX ADMIN — AMLODIPINE BESYLATE 5 MG: 5 TABLET ORAL at 09:41

## 2018-12-11 RX ADMIN — DEXTROSE MONOHYDRATE 10 ML: 25 INJECTION, SOLUTION INTRAVENOUS at 00:48

## 2018-12-11 RX ADMIN — HUMAN INSULIN 15 UNITS: 100 INJECTION, SUSPENSION SUBCUTANEOUS at 09:37

## 2018-12-11 RX ADMIN — POTASSIUM CHLORIDE 40 MEQ: 750 TABLET, FILM COATED, EXTENDED RELEASE ORAL at 01:55

## 2018-12-11 RX ADMIN — DIVALPROEX SODIUM 500 MG: 500 TABLET, DELAYED RELEASE ORAL at 09:41

## 2018-12-11 RX ADMIN — HALOPERIDOL 5 MG: 5 TABLET ORAL at 00:10

## 2018-12-11 RX ADMIN — HALOPERIDOL 5 MG: 5 TABLET ORAL at 09:41

## 2018-12-11 RX ADMIN — INSULIN LISPRO 12 UNITS: 100 INJECTION, SOLUTION INTRAVENOUS; SUBCUTANEOUS at 10:59

## 2018-12-11 RX ADMIN — BENZTROPINE MESYLATE 1 MG: 1 TABLET ORAL at 09:41

## 2018-12-11 NOTE — PROGRESS NOTES
Spiritual Care Partner Volunteer visited patient in PCU on December 11, 2018. Latesha Leblanc   Documented by:    NIKOLE Powell, [facility], Chaplain ROWDY HOOD [facility] Paging Service  [phone])

## 2018-12-11 NOTE — PROGRESS NOTES
Problem: DKA: Day 1 Goal: *Potassium normalizing Outcome: Not Progressing Towards Goal 
Last K+ 3.2. PO and IVF replacement. Will recheck at 0600. Problem: Falls - Risk of 
Goal: *Absence of Falls Document Juancho Mckenna Fall Risk and appropriate interventions in the flowsheet. Outcome: Progressing Towards Goal 
Fall Risk Interventions:

## 2018-12-11 NOTE — DISCHARGE SUMMARY
Hospitalist Discharge Summary     Patient ID:  Sanna Grijalva  [id number]  77 y.o.  1975    PCP on record: None    Admit date: 12/10/2018  Discharge date and time: 12/11/2018      Admission Diagnoses: DKA (diabetic ketoacidoses) [facility])    Discharge Diagnoses:    Principal Problem:    DKA (diabetic ketoacidoses) ([address] (10/20/2018)           Hospital Course:   DKA in setting of uncontrolled DM1: POA-resolved  Anion gap metabolic acidosis due to above-resolved  Non compliant with medication, multiple admissions,Hx leaving AMA  DKA protocol followed   Hba1c 15.7  Counseled the importance to take her insulin as prescribed. RX for NPH again sent      FABIENNE,Prerenal-resolved with fluids     Asthma without acute exacerbation: POA  - continue albuterol prn     Schizoaffective disorder with anxiety and depression: POA  with polysubstance abuse and drug-seeking behavior  Non compliant with psychiatry meds  Restart Depakote,cogentin,Haldol     Hypertension: POA  start on CCB  PRN Labetalol     Tobacco dependence   Nicotine patch offered  Patient was counseled extensively on the need to abstain from tobacco, its addictive tendencies, its deleterious effects on the lungs as well as its financial sequelae     Body mass index is 18.24 kg/m². Underweight in the setting of BMI 18.24 kg; ADA diet as tolerated    CONSULTATIONS:  None    Excerpted HPI from H&P of Elo Danielson MD:  Meggan Isaac is a 44 y.o.  female with PMH of IDDM,Asthma, HTN,Cocaine abuse who presents to ED with c/o above. Patient presents with chest pain,shortness of breath since today morning. She has had multiple admissions in the past for DKA secondary to noncompliance and has a history of leaving AMA. In ED, she was found to be in DKA with blood sugar level 612 , bicarb 12,serum ketones+. She has been initiated on insulin drip and received fluid boluses. She will be admitted to stepdown for management of DKA.   Off note,patient endorses snorting cocaine. Has not taken NPH insulin which was RX to her last discharge(2 weeks ago). ______________________________________________________________________  DISCHARGE SUMMARY/HOSPITAL COURSE:  for full details see H&P, daily progress notes, labs, consult notes. _______________________________________________________________________  Patient seen and examined by me on discharge day. Pertinent Findings:  Gen:    Not in distress  Chest: Clear lungs  CVS:   Regular rhythm. No edema  Abd:  Soft, not distended, not tender  Neuro:  Alert with good insight. Oriented to person, place, and time   _______________________________________________________________________  DISCHARGE MEDICATIONS:   Current Discharge Medication List      CONTINUE these medications which have CHANGED    Details   insulin NPH/insulin regular (NOVOLIN 70/30, HUMULIN 70/30) 100 unit/mL (70-30) injection 15 Units by SubCUTAneous route two (2) times a day. Qty: 10 mL, Refills: 6         CONTINUE these medications which have NOT CHANGED    Details   amLODIPine (NORVASC) 5 mg tablet Take 1 Tab by mouth daily for 30 days. Qty: 30 Tab, Refills: 0      haloperidol decanoate (HALDOL DECANOATE) 100 mg/mL injection 100 mg by IntraMUSCular route every twenty-eight (28) days. meloxicam (MOBIC) 15 mg tablet Take 15 mg by mouth daily as needed for Pain. sertraline (ZOLOFT) 100 mg tablet Take 100 mg by mouth daily. benztropine (COGENTIN) 1 mg tablet Take 1 Tab by mouth two (2) times a day. Indications: drug-induced extrapyramidal reaction  Qty: 28 Tab, Refills: 0      divalproex DR (DEPAKOTE) 500 mg tablet Take 1 Tab by mouth two (2) times a day. Indications: Schizoaffective disorder  Qty: 28 Tab, Refills: 0      haloperidol (HALDOL) 5 mg tablet Take 1 Tab by mouth every twelve (12) hours.  Indications: Schizoaffective disorder  Qty: 28 Tab, Refills: 0      albuterol (PROVENTIL HFA, VENTOLIN HFA, PROAIR HFA) 90 mcg/actuation inhaler Take 1 Puff by inhalation every four (4) hours as needed for Wheezing. My Recommended Diet, Activity, Wound Care, and follow-up labs are listed in the patient's Discharge Insturctions which I have personally completed and reviewed. _______________________________________________________________________  DISPOSITION:     Home with Family: x   Home with HH/PT/OT/RN:    SNF/LTC:    [place]:    OTHER:        Condition at Discharge:  Stable  _______________________________________________________________________  Follow up with:   PCP : None  Follow-up Information     Follow up With Specialties Details Why Mike Horn   On 12/11/2018 [facility] will call to schedule a time for tomorrow's appointment.   [facility]   [phone]    None    None ([phone]) Patient stated that they have no PCP                Total time in minutes spent coordinating this discharge (includes going over instructions, follow-up, prescriptions, and preparing report for sign off to her PCP) :  30 minutes    Signed:  Kamari Mosqueda MD

## 2018-12-11 NOTE — PROGRESS NOTES
1929: Bedside shift change report given to Jeff Ramsay (oncoming nurse) by Cristian Segovia (offgoing nurse). Report included the following information SBAR, Kardex, ED Summary, Intake/Output, MAR, Recent Results and Cardiac Rhythm SR.  
2146: Message sent re: . Need order for D5 to be added to current IVF. K+3.1. Pt request ice chips. 2153: Spoke with Dr. Otis Epstein re: above requests. New orders placed in computer by Dr. Otis Epstein. 2254: Attempted to obtain UDS. Pt stated she had BM so she took specimen hat out of toilet. Will try to obtain UDS next time she voids. 0113: Message sent re: K+ 3.2. 
0120: Spoke with Dr. Otis Epstein re: K+ 3.2. Dr. Otis Epstein will put orders in computer. 0730: Bedside shift change report given to Srinivas Manzo (oncoming nurse) by Jeff Ramsay (offgoing nurse).  Report included the following information SBAR, Kardex, ED Summary, Intake/Output, MAR, Recent Results and Cardiac Rhythm SR.

## 2018-12-11 NOTE — PROGRESS NOTES
Reason for Readmission:     See H&P and ER CM note. Patient has been in the ER x2 in the past 6 months and 4 times inpatient in the past 6 months. RRAT Score and Risk Level:     24      Level of Readmission:     High      Care Conference scheduled:   Not at this time. Discharged the next day. Referral to community [facility]       Resources/supports as identified by patient/family:     Weak family support. Have been unable to reach any family. Top Challenges facing patient (as identified by patient/family and CM): Finances/Medication cost?     Patient has Medicare Advantage Plan- with Medicaid HMO secondary. She has Medication benefits with 0-min co-payment. She said she did not  her medications last time due to not having funds. We had her insulin filled in the "[employer] [facility] today. Transportation        Patient has Medicaid with transportation benefits for medical appointments. Patient states she walks a great deal- mostly everywhere she goes     Support system or lack thereof? Living arrangements? Patient states she lives with a family friend. This  Friend is name Mr. Avalos Summerfield [phone]. -this is the phone she uses- permission to call and leave messages for her. Patient lives with a    There is a son listed [phone]. Self-care/ADLs/Cognition? Patient is self-care for ADLs. Current Advanced Directive/Advance Care Plan:   Not at this time. Plan for utilizing home health:   Patient has not kept a provider appointment in a while - will not have anyone to sign off on her orders until see by PCP. She has did not keep the appointment from last admit. Appointment now -earliest available January 03, @ 11;00AM.              Likelihood of additional readmission:   High- due to dx and lack of follow through              Transition of Care Plan:    Based on readmission, the patient's previous Plan of Care   has been evaluated and/or modified.  The current Transition of Care Plan is:           Discussed in rounds with MD and team this am. Met with patient in the room to review plan of care. Patient kept asking for ice cream. Concern about her ability to comprehend and follow- through. Patient said she was followed by [address] [place] [address]. Called and left a message for MS. Clara Elizabeth  [phone]. She indicates the Psych doctor is Dr. Ealine Etienne. Was unable to speak to her. Called the  , Ms. Madison Dick  [phone].- left a message. She called back. Discussed services patient has. She indicates patient no longer followed by [facility] since they have been unable to reach patient. Asked about [facility]. She had this services in the past but was released since patient was not available to provide services. After much discussion she agreed to call the [facility] to see if they will accept patient back for a client. Patient will need close supervision and monitoring for her medications. .    To call [facility] to see if they will see patient in the next  48 hours. CM will do follow-up calls tomorrow. Addendum:  Talked to Hubert London [phone]. They plans to call patient in the am to arrange a visit. Explained patient has no phone but can be reach at the friend's # and also her roommate. They will need to talk to patient- asked them to leave # so patient is able to call back and ask to speak to her.

## 2018-12-11 NOTE — CDMP QUERY
Please clarify if this patient is (was) being treated/managed for:  
 
=> Underweight in the setting of BMI 18.24 kg; ADA diet as tolerated 
=> Failure to thrive in the setting of BMI 18.24 kg; ADA diet as tolerated 
=> Other explanation of clinical findings 
=> Clinically Undetermined (no explanation for clinical findings) The medical record reflects the following clinical findings, treatment, and risk factors. Risk Factors:  Polysubstance abuse; noncompliance Clinical Indicators:  BMI 18.24 KG  WT: 113 lb HT: 5'6\" Treatment: ADA diet as tolerated Please clarify and document your clinical opinion in the progress notes and discharge summary including the definitive and/or presumptive diagnosis, (suspected or probable), related to the above clinical findings. Please include clinical findings supporting your diagnosis. Thank you, 
Ace Mckeon, ROXIE, [facility], [address] [place] [address]

## 2018-12-11 NOTE — PROGRESS NOTES
Bedside shift change report given to Harper Rivera (oncoming nurse) by Sara Gonzales (offgoing nurse). Report included the following information SBAR, Kardex, Intake/Output and MAR.

## 2018-12-11 NOTE — PROGRESS NOTES
Patient standing in hallway staring & walking towards the food cart that is being used for dirty trays. The patient was asked if needed something. The patient asked for a tray off the cart, she was told that those trays were dirty, there were no uneaten food trays on there. The patient started pointing to a tray & stated \"all the food isn't eating off that one\". The patient was told that she could not have it because someone else had eaten it & it had been in someone else's room. The patient is asking for more ice cream & ginger-davion. Her nurse was asked if she could have these items, she was told no. The patient had ice cream, a meal tray & multiple diet pepsi's. The patient stated \"well take this IV out I want to go home\". The patient was told that she should stay to continue to get help. The patient stated \"ya'll won't feed me anymore, I'll eat at home\". The patient was told that she ate her meal tray, ice cream & soda. The patient is wanting more, she was educated about her blood sugar's. The patient stated \"I have insulin\". The patients peripheral IV was removed. The patient is requesting insulin syringes for home use. Case Management & the patients nurse Garrett was notified. She was provided this item & discharged. The patient requested to walk.

## 2018-12-11 NOTE — PROGRESS NOTES
Problem: Diabetes Self-Management  Goal: *Incorporating nutritional management into lifestyle  Describe effect of type, amount and timing of food on blood glucose; list 3 methods for planning meals. Outcome: Not Progressing Towards Goal  Patient states she understands how diet effects BG. Patient does not demonstrate understanding.

## 2018-12-11 NOTE — DISCHARGE INSTRUCTIONS

## 2018-12-11 NOTE — PROGRESS NOTES
IDR: Patient treatment plan discuss. Patient did not pick-up medication from last admission. States she did not have the funds. Patient is Inpatient has Medicare [address] and Medicaid. IMM notification sign patient charts. [address] [place] [address] 
[phone]

## 2018-12-13 ENCOUNTER — TELEPHONE (OUTPATIENT)
Dept: CASE MANAGEMENT | Age: 43
End: 2018-12-13

## 2018-12-13 NOTE — TELEPHONE ENCOUNTER
Care Management Follow-up call  CM reviewed chart. CM called pt to discuss discharge plan. Pt phone kept going straight to , unable to leave a message at this time.        [facility] Italo MSW, [facility]

## 2018-12-28 ENCOUNTER — HOSPITAL ENCOUNTER (EMERGENCY)
Age: 43
Discharge: HOME | DRG: 202 | End: 2018-12-28
Attending: EMERGENCY MEDICINE | Admitting: EMERGENCY MEDICINE
Payer: MEDICARE

## 2018-12-28 ENCOUNTER — APPOINTMENT (OUTPATIENT)
Dept: GENERAL RADIOLOGY | Age: 43
DRG: 202 | End: 2018-12-28
Attending: EMERGENCY MEDICINE
Payer: MEDICARE

## 2018-12-28 ENCOUNTER — APPOINTMENT (OUTPATIENT)
Dept: CT IMAGING | Age: 43
DRG: 202 | End: 2018-12-28
Attending: EMERGENCY MEDICINE
Payer: MEDICARE

## 2018-12-28 ENCOUNTER — HOSPITAL ENCOUNTER (INPATIENT)
Age: 43
LOS: 2 days | Discharge: LEFT BEFORE DISCHARGE | DRG: 202 | End: 2018-12-30
Attending: EMERGENCY MEDICINE | Admitting: NEUROMUSCULOSKELETAL MEDICINE & OMM
Payer: MEDICARE

## 2018-12-28 VITALS
RESPIRATION RATE: 22 BRPM | BODY MASS INDEX: 25.51 KG/M2 | TEMPERATURE: 98.3 F | OXYGEN SATURATION: 97 % | SYSTOLIC BLOOD PRESSURE: 172 MMHG | HEART RATE: 99 BPM | HEIGHT: 66 IN | DIASTOLIC BLOOD PRESSURE: 80 MMHG | WEIGHT: 158.73 LBS

## 2018-12-28 DIAGNOSIS — K20.90 ESOPHAGITIS: ICD-10-CM

## 2018-12-28 DIAGNOSIS — J06.9 UPPER RESPIRATORY TRACT INFECTION, UNSPECIFIED TYPE: Primary | ICD-10-CM

## 2018-12-28 DIAGNOSIS — J45.41 MODERATE PERSISTENT ASTHMA WITH ACUTE EXACERBATION: Primary | ICD-10-CM

## 2018-12-28 DIAGNOSIS — R07.9 CHEST PAIN, UNSPECIFIED TYPE: ICD-10-CM

## 2018-12-28 DIAGNOSIS — J90 PLEURAL EFFUSION, BILATERAL: ICD-10-CM

## 2018-12-28 PROBLEM — J45.909 ASTHMA: Status: ACTIVE | Noted: 2018-12-28

## 2018-12-28 LAB
AMPHET UR QL SCN: NEGATIVE
ANION GAP SERPL CALC-SCNC: 10 MMOL/L (ref 5–15)
APPEARANCE UR: CLEAR
ARTERIAL PATENCY WRIST A: YES
BARBITURATES UR QL SCN: NEGATIVE
BASE EXCESS BLDA CALC-SCNC: 0.2 MMOL/L
BASOPHILS # BLD: 0 K/UL (ref 0–0.1)
BASOPHILS NFR BLD: 0 % (ref 0–1)
BDY SITE: ABNORMAL
BENZODIAZ UR QL: NEGATIVE
BILIRUB UR QL: NEGATIVE
BNP SERPL-MCNC: 527 PG/ML (ref 0–125)
BUN SERPL-MCNC: 7 MG/DL (ref 6–20)
BUN/CREAT SERPL: 10 (ref 12–20)
CALCIUM SERPL-MCNC: 8.1 MG/DL (ref 8.5–10.1)
CANNABINOIDS UR QL SCN: NEGATIVE
CHLORIDE SERPL-SCNC: 102 MMOL/L (ref 97–108)
CO2 SERPL-SCNC: 26 MMOL/L (ref 21–32)
COCAINE UR QL SCN: POSITIVE
COLOR UR: ABNORMAL
CREAT SERPL-MCNC: 0.69 MG/DL (ref 0.55–1.02)
DIFFERENTIAL METHOD BLD: ABNORMAL
DRUG SCRN COMMENT,DRGCM: ABNORMAL
EOSINOPHIL # BLD: 0.2 K/UL (ref 0–0.4)
EOSINOPHIL NFR BLD: 2 % (ref 0–7)
ERYTHROCYTE [DISTWIDTH] IN BLOOD BY AUTOMATED COUNT: 17.2 % (ref 11.5–14.5)
ETHANOL SERPL-MCNC: <10 MG/DL
GLUCOSE BLD STRIP.AUTO-MCNC: 166 MG/DL (ref 65–100)
GLUCOSE BLD STRIP.AUTO-MCNC: 248 MG/DL (ref 65–100)
GLUCOSE BLD STRIP.AUTO-MCNC: 297 MG/DL (ref 65–100)
GLUCOSE BLD STRIP.AUTO-MCNC: 374 MG/DL (ref 65–100)
GLUCOSE BLD STRIP.AUTO-MCNC: 402 MG/DL (ref 65–100)
GLUCOSE SERPL-MCNC: 239 MG/DL (ref 65–100)
GLUCOSE UR STRIP.AUTO-MCNC: 500 MG/DL
HCO3 BLDA-SCNC: 25 MMOL/L (ref 22–26)
HCT VFR BLD AUTO: 30.9 % (ref 35–47)
HGB BLD-MCNC: 10.5 G/DL (ref 11.5–16)
HGB UR QL STRIP: NEGATIVE
IMM GRANULOCYTES # BLD: 0 K/UL (ref 0–0.04)
IMM GRANULOCYTES NFR BLD AUTO: 0 % (ref 0–0.5)
KETONES UR QL STRIP.AUTO: NEGATIVE MG/DL
LEUKOCYTE ESTERASE UR QL STRIP.AUTO: NEGATIVE
LYMPHOCYTES # BLD: 2.5 K/UL (ref 0.8–3.5)
LYMPHOCYTES NFR BLD: 25 % (ref 12–49)
MAGNESIUM SERPL-MCNC: 1.7 MG/DL (ref 1.6–2.4)
MCH RBC QN AUTO: 26.9 PG (ref 26–34)
MCHC RBC AUTO-ENTMCNC: 34 G/DL (ref 30–36.5)
MCV RBC AUTO: 79 FL (ref 80–99)
METHADONE UR QL: NEGATIVE
MONOCYTES # BLD: 0.9 K/UL (ref 0–1)
MONOCYTES NFR BLD: 9 % (ref 5–13)
NEUTS SEG # BLD: 6.5 K/UL (ref 1.8–8)
NEUTS SEG NFR BLD: 64 % (ref 32–75)
NITRITE UR QL STRIP.AUTO: NEGATIVE
NRBC # BLD: 0 K/UL (ref 0–0.01)
NRBC BLD-RTO: 0 PER 100 WBC
OPIATES UR QL: NEGATIVE
PCO2 BLDA: 42 MMHG (ref 35–45)
PCP UR QL: NEGATIVE
PH BLDA: 7.39 [PH] (ref 7.35–7.45)
PH UR STRIP: 6 [PH] (ref 5–8)
PLATELET # BLD AUTO: 281 K/UL (ref 150–400)
PMV BLD AUTO: 11.1 FL (ref 8.9–12.9)
PO2 BLDA: 106 MMHG (ref 80–100)
POTASSIUM SERPL-SCNC: 3.2 MMOL/L (ref 3.5–5.1)
PROT UR STRIP-MCNC: NEGATIVE MG/DL
RBC # BLD AUTO: 3.91 M/UL (ref 3.8–5.2)
SAO2 % BLD: 98 % (ref 92–97)
SAO2% DEVICE SAO2% SENSOR NAME: ABNORMAL
SERVICE CMNT-IMP: ABNORMAL
SODIUM SERPL-SCNC: 138 MMOL/L (ref 136–145)
SP GR UR REFRACTOMETRY: <1.005 (ref 1–1.03)
SPECIMEN SITE: ABNORMAL
TROPONIN I BLD-MCNC: <0.04 NG/ML (ref 0–0.08)
UROBILINOGEN UR QL STRIP.AUTO: 0.2 EU/DL (ref 0.2–1)
WBC # BLD AUTO: 10.1 K/UL (ref 3.6–11)

## 2018-12-28 PROCEDURE — 71275 CT ANGIOGRAPHY CHEST: CPT

## 2018-12-28 PROCEDURE — 74011250636 HC RX REV CODE- 250/636: Performed by: EMERGENCY MEDICINE

## 2018-12-28 PROCEDURE — 85025 COMPLETE CBC W/AUTO DIFF WBC: CPT

## 2018-12-28 PROCEDURE — 82803 BLOOD GASES ANY COMBINATION: CPT

## 2018-12-28 PROCEDURE — 74011250637 HC RX REV CODE- 250/637: Performed by: EMERGENCY MEDICINE

## 2018-12-28 PROCEDURE — 94660 CPAP INITIATION&MGMT: CPT

## 2018-12-28 PROCEDURE — 5A09457 ASSISTANCE WITH RESPIRATORY VENTILATION, 24-96 CONSECUTIVE HOURS, CONTINUOUS POSITIVE AIRWAY PRESSURE: ICD-10-PCS | Performed by: NEUROMUSCULOSKELETAL MEDICINE & OMM

## 2018-12-28 PROCEDURE — 74011000258 HC RX REV CODE- 258: Performed by: NEUROMUSCULOSKELETAL MEDICINE & OMM

## 2018-12-28 PROCEDURE — 77030029684 HC NEB SM VOL KT MONA -A

## 2018-12-28 PROCEDURE — 94640 AIRWAY INHALATION TREATMENT: CPT

## 2018-12-28 PROCEDURE — 84484 ASSAY OF TROPONIN QUANT: CPT

## 2018-12-28 PROCEDURE — 65660000001 HC RM ICU INTERMED STEPDOWN

## 2018-12-28 PROCEDURE — 74011636320 HC RX REV CODE- 636/320

## 2018-12-28 PROCEDURE — 36415 COLL VENOUS BLD VENIPUNCTURE: CPT

## 2018-12-28 PROCEDURE — 99284 EMERGENCY DEPT VISIT MOD MDM: CPT

## 2018-12-28 PROCEDURE — 80307 DRUG TEST PRSMV CHEM ANLYZR: CPT

## 2018-12-28 PROCEDURE — 74011000250 HC RX REV CODE- 250: Performed by: NEUROMUSCULOSKELETAL MEDICINE & OMM

## 2018-12-28 PROCEDURE — 74011636637 HC RX REV CODE- 636/637: Performed by: NEUROMUSCULOSKELETAL MEDICINE & OMM

## 2018-12-28 PROCEDURE — 74011250637 HC RX REV CODE- 250/637: Performed by: NEUROMUSCULOSKELETAL MEDICINE & OMM

## 2018-12-28 PROCEDURE — 83735 ASSAY OF MAGNESIUM: CPT

## 2018-12-28 PROCEDURE — 96360 HYDRATION IV INFUSION INIT: CPT

## 2018-12-28 PROCEDURE — 71046 X-RAY EXAM CHEST 2 VIEWS: CPT

## 2018-12-28 PROCEDURE — 93005 ELECTROCARDIOGRAM TRACING: CPT

## 2018-12-28 PROCEDURE — 36600 WITHDRAWAL OF ARTERIAL BLOOD: CPT

## 2018-12-28 PROCEDURE — 83880 ASSAY OF NATRIURETIC PEPTIDE: CPT

## 2018-12-28 PROCEDURE — 80048 BASIC METABOLIC PNL TOTAL CA: CPT

## 2018-12-28 PROCEDURE — 77010033678 HC OXYGEN DAILY

## 2018-12-28 PROCEDURE — 74011250636 HC RX REV CODE- 250/636: Performed by: NEUROMUSCULOSKELETAL MEDICINE & OMM

## 2018-12-28 PROCEDURE — 99285 EMERGENCY DEPT VISIT HI MDM: CPT

## 2018-12-28 PROCEDURE — 81003 URINALYSIS AUTO W/O SCOPE: CPT

## 2018-12-28 PROCEDURE — 82962 GLUCOSE BLOOD TEST: CPT

## 2018-12-28 PROCEDURE — 74011000250 HC RX REV CODE- 250: Performed by: EMERGENCY MEDICINE

## 2018-12-28 RX ORDER — AZITHROMYCIN 250 MG/1
500 TABLET, FILM COATED ORAL DAILY
Status: DISCONTINUED | OUTPATIENT
Start: 2018-12-28 | End: 2018-12-30 | Stop reason: HOSPADM

## 2018-12-28 RX ORDER — LORAZEPAM 2 MG/ML
2 INJECTION INTRAMUSCULAR
Status: DISCONTINUED | OUTPATIENT
Start: 2018-12-28 | End: 2018-12-30 | Stop reason: HOSPADM

## 2018-12-28 RX ORDER — SERTRALINE HYDROCHLORIDE 50 MG/1
100 TABLET, FILM COATED ORAL DAILY
Status: DISCONTINUED | OUTPATIENT
Start: 2018-12-29 | End: 2018-12-28

## 2018-12-28 RX ORDER — HALOPERIDOL 5 MG/1
5 TABLET ORAL EVERY 12 HOURS
Status: DISCONTINUED | OUTPATIENT
Start: 2018-12-28 | End: 2018-12-28

## 2018-12-28 RX ORDER — FOLIC ACID 1 MG/1
1 TABLET ORAL DAILY
Status: DISCONTINUED | OUTPATIENT
Start: 2018-12-29 | End: 2018-12-30 | Stop reason: HOSPADM

## 2018-12-28 RX ORDER — IPRATROPIUM BROMIDE AND ALBUTEROL SULFATE 2.5; .5 MG/3ML; MG/3ML
SOLUTION RESPIRATORY (INHALATION)
Status: DISCONTINUED
Start: 2018-12-28 | End: 2018-12-28 | Stop reason: HOSPADM

## 2018-12-28 RX ORDER — ACETAMINOPHEN 325 MG/1
650 TABLET ORAL
Status: DISCONTINUED | OUTPATIENT
Start: 2018-12-28 | End: 2018-12-30 | Stop reason: HOSPADM

## 2018-12-28 RX ORDER — ZOLPIDEM TARTRATE 5 MG/1
5 TABLET ORAL
Status: DISCONTINUED | OUTPATIENT
Start: 2018-12-28 | End: 2018-12-30 | Stop reason: HOSPADM

## 2018-12-28 RX ORDER — ACETAMINOPHEN 500 MG
1000 TABLET ORAL ONCE
Status: COMPLETED | OUTPATIENT
Start: 2018-12-28 | End: 2018-12-28

## 2018-12-28 RX ORDER — IBUPROFEN 200 MG
1 TABLET ORAL DAILY
Status: DISCONTINUED | OUTPATIENT
Start: 2018-12-29 | End: 2018-12-30 | Stop reason: HOSPADM

## 2018-12-28 RX ORDER — MAGNESIUM SULFATE 100 %
4 CRYSTALS MISCELLANEOUS AS NEEDED
Status: DISCONTINUED | OUTPATIENT
Start: 2018-12-28 | End: 2018-12-30 | Stop reason: HOSPADM

## 2018-12-28 RX ORDER — BENZTROPINE MESYLATE 1 MG/1
1 TABLET ORAL 2 TIMES DAILY
Status: DISCONTINUED | OUTPATIENT
Start: 2018-12-28 | End: 2018-12-28

## 2018-12-28 RX ORDER — INSULIN GLARGINE 100 [IU]/ML
18 INJECTION, SOLUTION SUBCUTANEOUS
Status: DISCONTINUED | OUTPATIENT
Start: 2018-12-28 | End: 2018-12-29

## 2018-12-28 RX ORDER — BUDESONIDE 0.5 MG/2ML
500 INHALANT ORAL 2 TIMES DAILY
Status: DISCONTINUED | OUTPATIENT
Start: 2018-12-28 | End: 2018-12-28 | Stop reason: SDUPTHER

## 2018-12-28 RX ORDER — CODEINE PHOSPHATE AND GUAIFENESIN 10; 100 MG/5ML; MG/5ML
5 SOLUTION ORAL
Qty: 120 ML | Refills: 0 | Status: SHIPPED | OUTPATIENT
Start: 2018-12-28 | End: 2018-12-28

## 2018-12-28 RX ORDER — BUDESONIDE 0.5 MG/2ML
500 INHALANT ORAL
Status: DISCONTINUED | OUTPATIENT
Start: 2018-12-28 | End: 2018-12-30 | Stop reason: HOSPADM

## 2018-12-28 RX ORDER — ONDANSETRON 2 MG/ML
4 INJECTION INTRAMUSCULAR; INTRAVENOUS
Status: DISCONTINUED | OUTPATIENT
Start: 2018-12-28 | End: 2018-12-30 | Stop reason: HOSPADM

## 2018-12-28 RX ORDER — DIVALPROEX SODIUM 500 MG/1
500 TABLET, DELAYED RELEASE ORAL 2 TIMES DAILY
Status: DISCONTINUED | OUTPATIENT
Start: 2018-12-28 | End: 2018-12-28

## 2018-12-28 RX ORDER — IPRATROPIUM BROMIDE AND ALBUTEROL SULFATE 2.5; .5 MG/3ML; MG/3ML
3 SOLUTION RESPIRATORY (INHALATION)
Status: DISCONTINUED | OUTPATIENT
Start: 2018-12-28 | End: 2018-12-30 | Stop reason: HOSPADM

## 2018-12-28 RX ORDER — BENZONATATE 100 MG/1
100 CAPSULE ORAL
Status: DISCONTINUED | OUTPATIENT
Start: 2018-12-28 | End: 2018-12-30 | Stop reason: HOSPADM

## 2018-12-28 RX ORDER — INSULIN LISPRO 100 [IU]/ML
2 INJECTION, SOLUTION INTRAVENOUS; SUBCUTANEOUS
Status: DISCONTINUED | OUTPATIENT
Start: 2018-12-28 | End: 2018-12-29

## 2018-12-28 RX ORDER — FLUTICASONE PROPIONATE AND SALMETEROL 500; 50 UG/1; UG/1
1 POWDER RESPIRATORY (INHALATION)
Status: DISCONTINUED | OUTPATIENT
Start: 2018-12-28 | End: 2018-12-30 | Stop reason: HOSPADM

## 2018-12-28 RX ORDER — GUAIFENESIN 600 MG/1
600 TABLET, EXTENDED RELEASE ORAL EVERY 12 HOURS
Status: DISCONTINUED | OUTPATIENT
Start: 2018-12-28 | End: 2018-12-30 | Stop reason: HOSPADM

## 2018-12-28 RX ORDER — ALBUTEROL SULFATE 2.5 MG/.5ML
10 SOLUTION RESPIRATORY (INHALATION)
Status: ACTIVE | OUTPATIENT
Start: 2018-12-28 | End: 2018-12-29

## 2018-12-28 RX ORDER — ALBUTEROL SULFATE 2.5 MG/.5ML
2.5 SOLUTION RESPIRATORY (INHALATION)
Status: DISCONTINUED | OUTPATIENT
Start: 2018-12-28 | End: 2018-12-30 | Stop reason: HOSPADM

## 2018-12-28 RX ORDER — LANOLIN ALCOHOL/MO/W.PET/CERES
100 CREAM (GRAM) TOPICAL DAILY
Status: DISCONTINUED | OUTPATIENT
Start: 2018-12-29 | End: 2018-12-30 | Stop reason: HOSPADM

## 2018-12-28 RX ORDER — INSULIN LISPRO 100 [IU]/ML
INJECTION, SOLUTION INTRAVENOUS; SUBCUTANEOUS
Status: DISCONTINUED | OUTPATIENT
Start: 2018-12-28 | End: 2018-12-29

## 2018-12-28 RX ORDER — DEXTROSE 50 % IN WATER (D50W) INTRAVENOUS SYRINGE
12.5-25 AS NEEDED
Status: DISCONTINUED | OUTPATIENT
Start: 2018-12-28 | End: 2018-12-30 | Stop reason: HOSPADM

## 2018-12-28 RX ORDER — INSULIN GLARGINE 100 [IU]/ML
INJECTION, SOLUTION SUBCUTANEOUS
COMMUNITY
End: 2018-12-28

## 2018-12-28 RX ORDER — HYDRALAZINE HYDROCHLORIDE 20 MG/ML
20 INJECTION INTRAMUSCULAR; INTRAVENOUS
Status: DISCONTINUED | OUTPATIENT
Start: 2018-12-28 | End: 2018-12-30 | Stop reason: HOSPADM

## 2018-12-28 RX ORDER — GUAIFENESIN 100 MG/5ML
200 SOLUTION ORAL
Status: COMPLETED | OUTPATIENT
Start: 2018-12-28 | End: 2018-12-28

## 2018-12-28 RX ORDER — IPRATROPIUM BROMIDE AND ALBUTEROL SULFATE 2.5; .5 MG/3ML; MG/3ML
3 SOLUTION RESPIRATORY (INHALATION)
Status: COMPLETED | OUTPATIENT
Start: 2018-12-28 | End: 2018-12-28

## 2018-12-28 RX ADMIN — CEFTRIAXONE SODIUM 1 G: 1 INJECTION, POWDER, FOR SOLUTION INTRAMUSCULAR; INTRAVENOUS at 15:41

## 2018-12-28 RX ADMIN — METHYLPREDNISOLONE SODIUM SUCCINATE 125 MG: 125 INJECTION, POWDER, FOR SOLUTION INTRAMUSCULAR; INTRAVENOUS at 18:03

## 2018-12-28 RX ADMIN — IPRATROPIUM BROMIDE AND ALBUTEROL SULFATE 3 ML: .5; 3 SOLUTION RESPIRATORY (INHALATION) at 16:54

## 2018-12-28 RX ADMIN — IOPAMIDOL 100 ML: 755 INJECTION, SOLUTION INTRAVENOUS at 03:42

## 2018-12-28 RX ADMIN — INSULIN LISPRO 2 UNITS: 100 INJECTION, SOLUTION INTRAVENOUS; SUBCUTANEOUS at 18:04

## 2018-12-28 RX ADMIN — INSULIN LISPRO 7 UNITS: 100 INJECTION, SOLUTION INTRAVENOUS; SUBCUTANEOUS at 17:44

## 2018-12-28 RX ADMIN — SODIUM CHLORIDE 1000 ML: 900 INJECTION, SOLUTION INTRAVENOUS at 01:21

## 2018-12-28 RX ADMIN — ACETAMINOPHEN 1000 MG: 500 TABLET, FILM COATED ORAL at 01:18

## 2018-12-28 RX ADMIN — GUAIFENESIN 600 MG: 600 TABLET, EXTENDED RELEASE ORAL at 20:12

## 2018-12-28 RX ADMIN — INSULIN GLARGINE 18 UNITS: 100 INJECTION, SOLUTION SUBCUTANEOUS at 21:56

## 2018-12-28 RX ADMIN — BENZONATATE 100 MG: 100 CAPSULE ORAL at 15:37

## 2018-12-28 RX ADMIN — HYDRALAZINE HYDROCHLORIDE 20 MG: 20 INJECTION INTRAMUSCULAR; INTRAVENOUS at 18:10

## 2018-12-28 RX ADMIN — GUAIFENESIN 200 MG: 200 SOLUTION ORAL at 03:53

## 2018-12-28 RX ADMIN — IPRATROPIUM BROMIDE AND ALBUTEROL SULFATE 3 ML: .5; 3 SOLUTION RESPIRATORY (INHALATION) at 01:36

## 2018-12-28 RX ADMIN — INSULIN LISPRO 3 UNITS: 100 INJECTION, SOLUTION INTRAVENOUS; SUBCUTANEOUS at 21:56

## 2018-12-28 RX ADMIN — AZITHROMYCIN 500 MG: 500 TABLET, FILM COATED ORAL at 15:37

## 2018-12-28 RX ADMIN — LORAZEPAM 2 MG: 2 INJECTION, SOLUTION INTRAMUSCULAR; INTRAVENOUS at 19:21

## 2018-12-28 RX ADMIN — BUDESONIDE 500 MCG: 0.5 INHALANT RESPIRATORY (INHALATION) at 19:47

## 2018-12-28 RX ADMIN — IPRATROPIUM BROMIDE AND ALBUTEROL SULFATE 3 ML: .5; 3 SOLUTION RESPIRATORY (INHALATION) at 19:47

## 2018-12-28 RX ADMIN — FLUTICASONE PROPIONATE AND SALMETEROL 1 PUFF: 50; 500 POWDER RESPIRATORY (INHALATION) at 20:12

## 2018-12-28 RX ADMIN — ZOLPIDEM TARTRATE 5 MG: 5 TABLET ORAL at 20:12

## 2018-12-28 NOTE — ED PROVIDER NOTES
EMERGENCY DEPARTMENT HISTORY AND PHYSICAL EXAM 
 
 
Date: 12/28/2018 Patient Name: aLmont Pickard History of Presenting Illness Chief Complaint Patient presents with  Shortness of Breath History Provided By: Patient HPI: Lamont Pickard, 44 y.o. female with PMHx significant for asthma, HTN, DM, substance abuse, alcohol abuse, schizophrenia, bipolar, presents via EMS transport to the ED with cc of progressively worsening acute on chronic SOB, wheezing, cough, and chest tightness x 2 days. EMS reports giving the pt 10 mg Decadron PTA and she arrives to ED on a breathing tx. Of note pt was seen this morning for the same and dx'd with a URI, esophagitis, and mild BL pleural effusions. Pt states that her sx's have gotten worse today and has not found relief from the medications she was d/c with. She specifically denies any fever, chills, CP, HA, N/V/D, abdominal pain, changes in PO intake, dysuria, hematuria, or rash. There are no other complaints, changes, or physical findings at this time. Social Hx: + EtOH; + Smoker (1/2 ppd); + Illicit Drugs (cocaine, last use yesterday) PCP: Armando Sorenson NP Current Facility-Administered Medications Medication Dose Route Frequency Provider Last Rate Last Dose  albuterol CONCENTRATE 2.5mg/0.5 mL neb soln  10 mg Nebulization NOW Tomás Bender, DO      
 albuterol-ipratropium (DUO-NEB) 2.5 MG-0.5 MG/3 ML  3 mL Nebulization Q4H RT Tomás Bender, DO      
 budesonide (PULMICORT) 500 mcg/2 ml nebulizer suspension  500 mcg Nebulization BID Tomás Bender, DO      
 fluticasone-salmeterol (ADVAIR) 500mcg-50mcg/puff  1 Puff Inhalation BID RT Tomás Bender, DO      
 methylPREDNISolone (PF) (SOLU-MEDROL) injection 125 mg  125 mg IntraVENous Q6H Tomás Bender, DO      
 azithromycin (ZITHROMAX) tablet 500 mg  500 mg Oral DAILY Tomás Bender, DO      
 cefTRIAXone (ROCEPHIN) 1 g in 0.9% sodium chloride (MBP/ADV) 50 mL  1 g IntraVENous Q12H Tomás Bender, DO      
 acetaminophen (TYLENOL) tablet 650 mg  650 mg Oral Q4H PRN Tomás Bender, DO      
 ondansetron (ZOFRAN) 4 mg in 0.9% sodium chloride 50 mL IVPB  4 mg IntraVENous Q6H PRN Tomás Bender, DO      
 zolpidem (AMBIEN) tablet 5 mg  5 mg Oral QHS PRN Tomás Bender, DO      
 [START ON 12/29/2018] nicotine (NICODERM CQ) 21 mg/24 hr patch 1 Patch  1 Patch TransDERmal DAILY Tomás Bender, DO      
 benzonatate (TESSALON) capsule 100 mg  100 mg Oral TID PRN Tomás Bender, DO      
 guaiFENesin ER (MUCINEX) tablet 600 mg  600 mg Oral Q12H Tomás Bender, DO      
 
Current Outpatient Medications Medication Sig Dispense Refill  insulin glargine (LANTUS U-100 INSULIN) 100 unit/mL injection by SubCUTAneous route nightly.  insulin NPH/insulin regular (NOVOLIN 70/30, HUMULIN 70/30) 100 unit/mL (70-30) injection 15 Units by SubCUTAneous route two (2) times a day. 10 mL 6  
 albuterol (PROVENTIL HFA, VENTOLIN HFA, PROAIR HFA) 90 mcg/actuation inhaler Take 1 Puff by inhalation every four (4) hours as needed for Wheezing.  haloperidol decanoate (HALDOL DECANOATE) 100 mg/mL injection 100 mg by IntraMUSCular route every twenty-eight (28) days.  meloxicam (MOBIC) 15 mg tablet Take 15 mg by mouth daily as needed for Pain.  sertraline (ZOLOFT) 100 mg tablet Take 100 mg by mouth daily.  benztropine (COGENTIN) 1 mg tablet Take 1 Tab by mouth two (2) times a day. Indications: drug-induced extrapyramidal reaction (Patient taking differently: Take 1 mg by mouth every twelve (12) hours.) 28 Tab 0  
 divalproex DR (DEPAKOTE) 500 mg tablet Take 1 Tab by mouth two (2) times a day. Indications: Schizoaffective disorder (Patient taking differently: Take 500 mg by mouth every twelve (12) hours.) 28 Tab 0  
 haloperidol (HALDOL) 5 mg tablet Take 1 Tab by mouth every twelve (12) hours. Indications: Schizoaffective disorder 28 Tab 0 Past History Past Medical History: 
Past Medical History:  
Diagnosis Date  Alcohol abuse, in remission   
 quit 17 days ago  Asthma   
 does not use inhalers  Borderline personality disorder ([facility] [address]  Diabetes ([facility] [address]  GERD (gastroesophageal reflux disease)  Hypertension  Other ill-defined conditions(799.89)   
 kidney stones ,passed one  Other ill-defined conditions(799.89) sickle cell trait  Other ill-defined conditions(799.89)   
 increased cholesterol  Pancreatitis  Psychiatric disorder   
 schizophrenia, bipolar, depression, anxiety Past Surgical History: 
Past Surgical History:  
Procedure Laterality Date  ABDOMEN SURGERY PROC UNLISTED  3/12/14 CHOLECYSTECTOMY LAPAROSCOPIC    
 HX GASTRIC BYPASS  HX GYN  11/8/2012  
 c section x2  HX OTHER SURGICAL  3/13/14 ENDOSCOPIC RETROGRADE CHOLANGIOPANCREATOGRAPHY  HX OTHER SURGICAL  8/4/14  
 endoscopic stent placed to bile duct  HX TUBAL LIGATION  2012 Family History: 
Family History Problem Relation Age of Onset  Heart Disease Mother  Diabetes Mother  Hypertension Mother  Hypertension Maternal Grandmother Social History: 
Social History Tobacco Use  Smoking status: Current Every Day Smoker Packs/day: 0.50 Years: 14.00 Pack years: 7.00 Types: Cigarettes  Smokeless tobacco: Never Used  Tobacco comment: cigarettes Substance Use Topics  Alcohol use: Yes Alcohol/week: 0.6 oz Types: 1 Cans of beer per week Comment: occasionally, last had \"i had one beer\" today  Drug use: Yes Types: Cocaine Comment: yesterday 12/27/18 Allergies: Allergies Allergen Reactions  Dilaudid [Hydromorphone (Bulk)] Itching  Ibuprofen Not Reported This Time Review of Systems Review of Systems Constitutional: Negative for fever. HENT: Negative for sore throat. Eyes: Negative for photophobia and redness.   
Respiratory: Positive for cough, chest tightness, shortness of breath and wheezing. Cardiovascular: Negative for chest pain and leg swelling. Gastrointestinal: Negative for abdominal pain, blood in stool, nausea and vomiting. Genitourinary: Negative for difficulty urinating, dysuria, hematuria, menstrual problem and vaginal bleeding. Musculoskeletal: Negative for back pain and joint swelling. Neurological: Negative for dizziness, seizures, syncope, speech difficulty, weakness, numbness and headaches. Hematological: Negative for adenopathy. Psychiatric/Behavioral: Negative for agitation, confusion and suicidal ideas. The patient is not nervous/anxious. Physical Exam  
Physical Exam  
Constitutional: She is oriented to person, place, and time. She appears well-developed and well-nourished. HENT:  
Head: Normocephalic and atraumatic. Mouth/Throat: Oropharynx is clear and moist.  
Eyes: Conjunctivae and EOM are normal.  
Neck: Normal range of motion and full passive range of motion without pain. Neck supple. Cardiovascular: Normal rate, regular rhythm, S1 normal, S2 normal, normal heart sounds, intact distal pulses and normal pulses. No murmur heard. Pulmonary/Chest: Effort normal. No respiratory distress (mild). She has wheezes. She has rhonchi. Abdominal: Soft. Normal appearance and bowel sounds are normal. She exhibits no distension. There is no tenderness. There is no rebound. Musculoskeletal: Normal range of motion. Neurological: She is alert and oriented to person, place, and time. She has normal strength. Skin: Skin is warm, dry and intact. No rash noted. Psychiatric: She has a normal mood and affect. Her speech is normal and behavior is normal. Judgment and thought content normal.  
Nursing note and vitals reviewed. Diagnostic Study Results Labs - No results found for this or any previous visit (from the past 12 hour(s)). Radiologic Studies - No orders to display CT Results (Last 48 hours) 12/28/18 0342  CTA CHEST W OR W WO CONT Final result Impression:  IMPRESSION:   
There is no pulmonary embolism. There is no aortic aneurysm or dissection. Small bilateral pleural effusions. Small to moderate hiatal hernia. Esophageal wall thickening is circumferential.  
This may be related to esophagitis. Nonspecific finding. Further clinical  
correlation recommended. Scattered groundglass opacity with bronchial wall thickening most prominent in  
the right middle lobe. May be related to infectious/inflammatory, reactive  
airways process. Follow-up chest CT in 4-6 weeks to evaluate for resolution  
recommended. Narrative:  CLINICAL HISTORY: Chest pain, dyspnea INDICATION: Chest pain, dyspnea COMPARISON: 2008 TECHNIQUE: CT of the chest with  IV contrast , Isovue-370 is performed. Axial  
images from the thoracic inlet to the level of the upper abdomen are obtained. Manual post-processing of the images and coronal reformatting is also performed. CT dose reduction was achieved through use of a standardized protocol tailored  
for this examination and automatic exposure control for dose modulation. Multiplanar reformatted imaging was performed. Sagittal and coronal reformatting. 3-D Postprocessing of imaging was performed. 3-D MIP reconstructed images were obtained in the coronal plane. FINDINGS:   
There is no pulmonary embolism. There is no aortic aneurysm or dissection. There are small bilateral pleural effusions. The pulmonary artery is prominent  
consistent with pulmonary arterial hypertension. Thoracic aortic ectasia. There is a small-to-moderate hiatal hernia. Distal esophageal wall thickening. Minimal scattered groundglass opacities most predominantly in the right middle  
lobe but also within the upper lobes and lower lobes bilaterally. Postcholecystectomy. There is no pleural or pericardial effusion.  There is no  
mediastinal, axillary or hilar lymphadenopathy. The aorta is normal in course  
and caliber. The proximal pulmonary arteries are without evidence of filling  
defects. No lytic or blastic lesions are identified. The remainder of the upper  
abdomen visualized is unremarkable. CXR Results  (Last 48 hours) 12/28/18 0134  XR CHEST PA LAT Final result Impression:  IMPRESSION: No acute intrathoracic disease. Narrative:  CLINICAL HISTORY: Chest pain INDICATION: Chest pain left-sided COMPARISON: 11/22/2018 FINDINGS:   
PA and lateral views of the chest are obtained. The cardiopericardial silhouette is within normal limits. There is no pleural  
effusion, pneumothorax or focal consolidation present. Medical Decision Making I am the first provider for this patient. I reviewed the vital signs, available nursing notes, past medical history, past surgical history, family history and social history. Vital Signs-Reviewed the patient's vital signs. Patient Vitals for the past 12 hrs: 
 Temp Pulse Resp BP SpO2  
12/28/18 1442 98.4 °F (36.9 °C) (!) 103 22 186/72 97 % Pulse Oximetry Analysis - 97% on RA Cardiac Monitor:  
Rate: 103 bpm 
Rhythm: Sinus Tachycardia Records Reviewed: Nursing Notes, Old Medical Records, Previous electrocardiograms, Ambulance Run Sheet, Previous Radiology Studies and Previous Laboratory Studies Provider Notes (Medical Decision Making): DDx: asthma exacerbationg, PNA, respiratory distress, pleural effusions ED Course:  
Initial assessment performed. The patients presenting problems have been discussed, and they are in agreement with the care plan formulated and outlined with them. I have encouraged them to ask questions as they arise throughout their visit. CONSULT NOTE:  
2:46 Stacey Rosa MD, spoke with Ryan Miner DO, Specialty: Hospitalist 
Discussed pt's hx, disposition, and available diagnostic and imaging results. Reviewed care plans. Consultant will evaluate pt for admission. Written by Terrell Rubio. Miguel A Courtney, ED Scribe, as dictated by Beatris James MD 
 
Critical Care Time: CRITICAL CARE NOTE : 
2:47 PM 
 
IMPENDING DETERIORATION -Airway and Respiratory ASSOCIATED RISK FACTORS - Hypoxia and Dysrhythmia MANAGEMENT- Bedside Assessment and Supervision of Care INTERPRETATION -  None, pt was seen in ED this morning. INTERVENTIONS - hemodynamic mngmt CASE REVIEW - Hospitalist and Nursing TREATMENT RESPONSE -Stable PERFORMED BY - Self NOTES   : 
I have spent 30 minutes of critical care time involved in lab review, consultations with specialist, family decision- making, bedside attention and documentation. During this entire length of time I was immediately available to the patient . Beatris James MD 
 
 
Disposition: 
Admit Note: 
3:08 PM 
Pt is being admitted by Beata Bowens DO. The results of their tests and reason(s) for their admission have been discussed with pt and/or available family. They convey agreement and understanding for the need to be admitted and for admission diagnosis. PLAN: 
1. Admit to Hospitalist 
Diagnosis Clinical Impression: 1. Moderate persistent asthma with acute exacerbation This note is prepared by Terrell Rubio. Miguel A Courtney, acting as Scribe for Beatris James MD, Beatris James MD: The scribe's documentation has been prepared under my direction and personally reviewed by me in its entirety. I confirm that the note above accurately reflects all work, treatment, procedures, and medical decision making performed by me. This note will not be viewable in [address]

## 2018-12-28 NOTE — H&P
History and Physical 
 
Subjective:  
 
Robert Hernandez is a 44 y.o.  female who presents with dyspnea. Onset of symptoms was gradual with gradually worsening course since 2 days ago. The pain is located chest. Patient describes the pain as continuous and rated as moderate. Pain has been associated with cough. Patient denies headache. Symptoms are aggravated by tobacco use. Symptoms improve with nebulizer. Previous studies include cxr and ct scan with no pneumonia. Past Medical History:  
Diagnosis Date  Alcohol abuse, in remission   
 quit 17 days ago  Asthma   
 does not use inhalers  Borderline personality disorder ([address]  Diabetes ([address]  GERD (gastroesophageal reflux disease)  Hypertension  Other ill-defined conditions(799.89)   
 kidney stones ,passed one  Other ill-defined conditions(799.89) sickle cell trait  Other ill-defined conditions(799.89)   
 increased cholesterol  Pancreatitis  Psychiatric disorder   
 schizophrenia, bipolar, depression, anxiety Past Surgical History:  
Procedure Laterality Date  ABDOMEN SURGERY PROC UNLISTED  3/12/14 CHOLECYSTECTOMY LAPAROSCOPIC    
 HX GASTRIC BYPASS  HX GYN  11/8/2012  
 c section x2  HX OTHER SURGICAL  3/13/14 ENDOSCOPIC RETROGRADE CHOLANGIOPANCREATOGRAPHY  HX OTHER SURGICAL  8/4/14  
 endoscopic stent placed to bile duct  HX TUBAL LIGATION  2012 Family History Problem Relation Age of Onset  Heart Disease Mother  Diabetes Mother  Hypertension Mother  Hypertension Maternal Grandmother Social History Tobacco Use  Smoking status: Current Every Day Smoker Packs/day: 0.50 Years: 14.00 Pack years: 7.00 Types: Cigarettes  Smokeless tobacco: Never Used  Tobacco comment: cigarettes Substance Use Topics  Alcohol use: Yes Alcohol/week: 0.6 oz Types: 1 Cans of beer per week   Comment: occasionally, last had \"i had one beer\" today Prior to Admission medications Medication Sig Start Date End Date Taking? Authorizing Provider  
insulin glargine (LANTUS U-100 INSULIN) 100 unit/mL injection by SubCUTAneous route nightly. Yes Other, MD Marcel  
insulin NPH/insulin regular (NOVOLIN 70/30, HUMULIN 70/30) 100 unit/mL (70-30) injection 15 Units by SubCUTAneous route two (2) times a day. 12/11/18  Yes Gladis Gold MD  
albuterol (PROVENTIL HFA, VENTOLIN HFA, PROAIR HFA) 90 mcg/actuation inhaler Take 1 Puff by inhalation every four (4) hours as needed for Wheezing. Yes Provider, Historical  
haloperidol decanoate (HALDOL DECANOATE) 100 mg/mL injection 100 mg by IntraMUSCular route every twenty-eight (28) days. Provider, Historical  
meloxicam (MOBIC) 15 mg tablet Take 15 mg by mouth daily as needed for Pain. Provider, Historical  
sertraline (ZOLOFT) 100 mg tablet Take 100 mg by mouth daily. Provider, Historical  
benztropine (COGENTIN) 1 mg tablet Take 1 Tab by mouth two (2) times a day. Indications: drug-induced extrapyramidal reaction Patient taking differently: Take 1 mg by mouth every twelve (12) hours. 8/14/18   Bhavna Calvillo MD  
divalproex DR (DEPAKOTE) 500 mg tablet Take 1 Tab by mouth two (2) times a day. Indications: Schizoaffective disorder Patient taking differently: Take 500 mg by mouth every twelve (12) hours. 8/14/18   Bhavna Calvillo MD  
haloperidol (HALDOL) 5 mg tablet Take 1 Tab by mouth every twelve (12) hours. Indications: Schizoaffective disorder 8/14/18   Bhavna Calvillo MD  
 
Allergies Allergen Reactions  Dilaudid [Hydromorphone (Bulk)] Itching  Ibuprofen Not Reported This Time Review of Systems: A comprehensive review of systems was negative except for that written in the History of Present Illness. Objective: Intake and Output:   
No intake/output data recorded. No intake/output data recorded.  
 
Physical Exam:  
Visit Vitals /72 (BP 1 Location: Right arm, BP Patient Position: At rest) Pulse (!) 103 Temp 98.4 °F (36.9 °C) Resp 22 Ht 5' 6\" (1.676 m) Wt 71.7 kg (158 lb) LMP  (LMP Unknown) Comment: Pt reports she has not had a cycle in 4 months SpO2 97% BMI 25.50 kg/m² General:  Alert, cooperative, no distress, appears stated age. Head:  Normocephalic, without obvious abnormality, atraumatic. Eyes:  Conjunctivae/corneas clear. PERRL, EOMs intact. Fundi benign. Ears:  Normal TMs and external ear canals both ears. Nose: Nares normal. Septum midline. Mucosa normal. No drainage or sinus tenderness. Throat: Lips, mucosa, and tongue normal. Teeth and gums normal.  
Neck: Supple, symmetrical, trachea midline, no adenopathy, thyroid: no enlargement/tenderness/nodules, no carotid bruit and no JVD. Back:   Symmetric, no curvature. ROM normal. No CVA tenderness. Lungs:   Clear to auscultation bilaterally. Chest wall:  No tenderness or deformity. Heart:  Regular rate and rhythm, S1, S2 normal, no murmur, click, rub or gallop. Breast Exam:  No tenderness, masses, or nipple abnormality. Abdomen:   Soft, non-tender. Bowel sounds normal. No masses,  No organomegaly. Genitalia:  Normal female without lesion, discharge or tenderness. Rectal:  Normal tone,  no masses or tenderness Guaiac negative stool. Extremities: Extremities normal, atraumatic, no cyanosis or edema. Pulses: 2+ and symmetric all extremities. Skin: Skin color, texture, turgor normal. No rashes or lesions. Lymph nodes: Cervical, supraclavicular, and axillary nodes normal.  
Neurologic: CNII-XII intact. Normal strength, sensation and reflexes throughout. Data Review:  
Recent Results (from the past 24 hour(s)) GLUCOSE, POC Collection Time: 12/28/18  1:10 AM  
Result Value Ref Range Glucose (POC) 248 (H) 65 - 100 mg/dL Performed by Angel Cruz (PCT) POC TROPONIN-I  Collection Time: 12/28/18  1:14 AM  
Result Value Ref Range Troponin-I (POC) <0.04 0.00 - 0.08 ng/mL NT-PRO BNP Collection Time: 12/28/18  1:15 AM  
Result Value Ref Range NT pro- (H) 0 - 183 PG/ML  
METABOLIC PANEL, BASIC Collection Time: 12/28/18  1:15 AM  
Result Value Ref Range Sodium 138 136 - 145 mmol/L Potassium 3.2 (L) 3.5 - 5.1 mmol/L Chloride 102 97 - 108 mmol/L  
 CO2 26 21 - 32 mmol/L Anion gap 10 5 - 15 mmol/L Glucose 239 (H) 65 - 100 mg/dL BUN 7 6 - 20 MG/DL Creatinine 0.69 0.55 - 1.02 MG/DL  
 BUN/Creatinine ratio 10 (L) 12 - 20 GFR est AA >60 >60 ml/min/1.73m2 GFR est non-AA >60 >60 ml/min/1.73m2 Calcium 8.1 (L) 8.5 - 10.1 MG/DL  
CBC WITH AUTOMATED DIFF Collection Time: 12/28/18  1:15 AM  
Result Value Ref Range WBC 10.1 3.6 - 11.0 K/uL  
 RBC 3.91 3.80 - 5.20 M/uL  
 HGB 10.5 (L) 11.5 - 16.0 g/dL HCT 30.9 (L) 35.0 - 47.0 % MCV 79.0 (L) 80.0 - 99.0 FL  
 MCH 26.9 26.0 - 34.0 PG  
 MCHC 34.0 30.0 - 36.5 g/dL  
 RDW 17.2 (H) 11.5 - 14.5 % PLATELET 315 311 - 211 K/uL MPV 11.1 8.9 - 12.9 FL  
 NRBC 0.0 0  WBC ABSOLUTE NRBC 0.00 0.00 - 0.01 K/uL NEUTROPHILS 64 32 - 75 % LYMPHOCYTES 25 12 - 49 % MONOCYTES 9 5 - 13 % EOSINOPHILS 2 0 - 7 % BASOPHILS 0 0 - 1 % IMMATURE GRANULOCYTES 0 0.0 - 0.5 % ABS. NEUTROPHILS 6.5 1.8 - 8.0 K/UL  
 ABS. LYMPHOCYTES 2.5 0.8 - 3.5 K/UL  
 ABS. MONOCYTES 0.9 0.0 - 1.0 K/UL  
 ABS. EOSINOPHILS 0.2 0.0 - 0.4 K/UL  
 ABS. BASOPHILS 0.0 0.0 - 0.1 K/UL  
 ABS. IMM. GRANS. 0.0 0.00 - 0.04 K/UL  
 DF AUTOMATED MAGNESIUM Collection Time: 12/28/18  1:15 AM  
Result Value Ref Range Magnesium 1.7 1.6 - 2.4 mg/dL ETHYL ALCOHOL Collection Time: 12/28/18  1:15 AM  
Result Value Ref Range ALCOHOL(ETHYL),SERUM <10 <10 MG/DL  
GLUCOSE, POC Collection Time: 12/28/18  2:56 AM  
Result Value Ref Range Glucose (POC) 166 (H) 65 - 100 mg/dL Performed by [facility] URINALYSIS W/ RFLX MICROSCOPIC  
 Collection Time: 12/28/18  2:57 AM  
Result Value Ref Range Color YELLOW/STRAW Appearance CLEAR CLEAR Specific gravity <1.005 1.003 - 1.030  
 pH (UA) 6.0 5.0 - 8.0 Protein NEGATIVE  NEG mg/dL Glucose 500 (A) NEG mg/dL Ketone NEGATIVE  NEG mg/dL Bilirubin NEGATIVE  NEG Blood NEGATIVE  NEG Urobilinogen 0.2 0.2 - 1.0 EU/dL Nitrites NEGATIVE  NEG Leukocyte Esterase NEGATIVE  NEG    
DRUG SCREEN, URINE Collection Time: 12/28/18  2:57 AM  
Result Value Ref Range AMPHETAMINES NEGATIVE  NEG    
 BARBITURATES NEGATIVE  NEG BENZODIAZEPINES NEGATIVE  NEG    
 COCAINE POSITIVE (A) NEG METHADONE NEGATIVE  NEG    
 OPIATES NEGATIVE  NEG    
 PCP(PHENCYCLIDINE) NEGATIVE  NEG    
 THC (TH-CANNABINOL) NEGATIVE  NEG Drug screen comment (NOTE) Chest x-ray was negative for infiltrate, effusion, pneumothorax, or wide mediastinum. Assessment:  
 
Active Problems: 
  Asthma (12/28/2018) 
 
htn, etoh and substance abuse, dm, bipolar and schizo Plan:  
 
Admit to [facility] on continuous neb x 1 hr and then q4hr 
pulmicort bid 
[facility] bid Place on azith and rocephin for bronchitis. Place on ciwa for etoh use. Check uds. Place on iss and diabetic diet. Full code Signed By: Serjio Mullen, DO   
 December 28, 2018

## 2018-12-28 NOTE — PROGRESS NOTES
Pt presented to the ED lobby at 11 am asking for assistance with her medication. CM provided pt with [address] card for her couch medication and offered a ride, but pt stated she did not have the $9 for her medication. Pt verified her same address and phone number on her file. CM spoke with [facility] staff who stated they have been trying to get in contact with the pt but no answer on her phone. Addendum:  
Pt arrived via EMS. Pt having difficulty breathing. Per Dr. Valentino Bourne, pt will be admitted. JEN called [facility] pt CM from insurance. Was told pt does have insurance coverage. Pt was given several same day tranportation to the pharmacy to help pt get her medication and to her doctors visits. Pt has not followed up with Azeb Slater, or utilized her resources. [facility] MSW, QMHP

## 2018-12-28 NOTE — DISCHARGE INSTRUCTIONS
Chest Pain: Care Instructions  Your Care Instructions    There are many things that can cause chest pain. Some are not serious and will get better on their own in a few days. But some kinds of chest pain need more testing and treatment. Your doctor may have recommended a follow-up visit in the next 8 to 12 hours. If you are not getting better, you may need more tests or treatment. Even though your doctor has released you, you still need to watch for any problems. The doctor carefully checked you, but sometimes problems can develop later. If you have new symptoms or if your symptoms do not get better, get medical care right away. If you have worse or different chest pain or pressure that lasts more than 5 minutes or you passed out (lost consciousness), call 911 or seek other emergency help right away. A medical visit is only one step in your treatment. Even if you feel better, you still need to do what your doctor recommends, such as going to all suggested follow-up appointments and taking medicines exactly as directed. This will help you recover and help prevent future problems. How can you care for yourself at home? · Rest until you feel better. · Take your medicine exactly as prescribed. Call your doctor if you think you are having a problem with your medicine. · Do not drive after taking a prescription pain medicine. When should you call for help? Call 911 if:    · You passed out (lost consciousness).     · You have severe difficulty breathing.     · You have symptoms of a heart attack. These may include:  ? Chest pain or pressure, or a strange feeling in your chest.  ? Sweating. ? Shortness of breath. ? Nausea or vomiting. ? Pain, pressure, or a strange feeling in your back, neck, jaw, or upper belly or in one or both shoulders or arms. ? Lightheadedness or sudden weakness. ? A fast or irregular heartbeat.   After you call 911, the  may tell you to chew 1 adult-strength or 2 to 4 low-dose aspirin. Wait for an ambulance. Do not try to drive yourself.    Call your doctor today if:    · You have any trouble breathing.     · Your chest pain gets worse.     · You are dizzy or lightheaded, or you feel like you may faint.     · You are not getting better as expected.     · You are having new or different chest pain. Where can you learn more? Go to http://miryam-polo.info/. Enter A120 in the search box to learn more about \"Chest Pain: Care Instructions. \"  Current as of: November 20, 2017  Content Version: 11.8  © 6658-9030 [employer]. Care instructions adapted under license by [employer] (which disclaims liability or warranty for this information). If you have questions about a medical condition or this instruction, always ask your healthcare professional. [address] any warranty or liability for your use of this information. Upper Respiratory Infection (Cold): Care Instructions  Your Care Instructions    An upper respiratory infection, or URI, is an infection of the nose, sinuses, or throat. URIs are spread by coughs, sneezes, and direct contact. The common cold is the most frequent kind of URI. The flu and sinus infections are other kinds of URIs. Almost all URIs are caused by viruses. Antibiotics won't cure them. But you can treat most infections with home care. This may include drinking lots of fluids and taking over-the-counter pain medicine. You will probably feel better in 4 to 10 days. The doctor has checked you carefully, but problems can develop later. If you notice any problems or new symptoms, get medical treatment right away. Follow-up care is a key part of your treatment and safety. Be sure to make and go to all appointments, and call your doctor if you are having problems. It's also a good idea to know your test results and keep a list of the medicines you take.   How can you care for yourself at home?  · To prevent dehydration, drink plenty of fluids, enough so that your urine is light yellow or clear like water. Choose water and other caffeine-free clear liquids until you feel better. If you have kidney, heart, or liver disease and have to limit fluids, talk with your doctor before you increase the amount of fluids you drink. · Take an over-the-counter pain medicine, such as acetaminophen (Tylenol), ibuprofen (Advil, Motrin), or naproxen (Aleve). Read and follow all instructions on the label. · Before you use cough and cold medicines, check the label. These medicines may not be safe for young children or for people with certain health problems. · Be careful when taking over-the-counter cold or flu medicines and Tylenol at the same time. Many of these medicines have acetaminophen, which is Tylenol. Read the labels to make sure that you are not taking more than the recommended dose. Too much acetaminophen (Tylenol) can be harmful. · Get plenty of rest.  · Do not smoke or allow others to smoke around you. If you need help quitting, talk to your doctor about stop-smoking programs and medicines. These can increase your chances of quitting for good. When should you call for help? Call 911 anytime you think you may need emergency care. For example, call if:    · You have severe trouble breathing.    Call your doctor now or seek immediate medical care if:    · You seem to be getting much sicker.     · You have new or worse trouble breathing.     · You have a new or higher fever.     · You have a new rash.    Watch closely for changes in your health, and be sure to contact your doctor if:    · You have a new symptom, such as a sore throat, an earache, or sinus pain.     · You cough more deeply or more often, especially if you notice more mucus or a change in the color of your mucus.     · You do not get better as expected. Where can you learn more?   Go to http://miryam-polo.info/. Enter K430 in the search box to learn more about \"Upper Respiratory Infection (Cold): Care Instructions. \"  Current as of: December 6, 2017  Content Version: 11.8  © 4172-8691 [employer]. Care instructions adapted under license by [facility] (which disclaims liability or warranty for this information). If you have questions about a medical condition or this instruction, always ask your healthcare professional. [address] any warranty or liability for your use of this information. Esophagitis: Care Instructions  Your Care Instructions    Esophagitis (say \"ih-sof-uh-JY-tus\") is irritation of the esophagus, the tube that carries food from your throat to your stomach. Acid reflux is the most common cause of this condition. When you have reflux, stomach acid and juices flow upward. This can cause pain or a burning feeling in your chest. You may have a sore throat. It may be hard to swallow. Other causes of this condition include some medicines and supplements. Allergies or an infection can also cause it. Your doctor will ask about your symptoms and past health. He or she might do tests to find the cause of your symptoms. Treatment depends on what is causing the problem. Treatment might include changing your diet or taking medicine to relieve your symptoms. It might also include changing a medicine that is causing your symptoms. If you have reflux, medicine that reduces the stomach acid helps your body heal. It might take 1 to 3 weeks to heal.  Follow-up care is a key part of your treatment and safety. Be sure to make and go to all appointments, and call your doctor if you are having problems. It's also a good idea to know your test results and keep a list of the medicines you take. How can you care for yourself at home? · If you have acid reflux, your doctor may recommend that you:  ?  Eat several small meals instead of two or three large meals. After you eat, wait 2 to 3 hours before you lie down. ? Avoid chocolate, mint, alcohol, and spicy foods. ? Don't smoke or use smokeless tobacco. Smoking can make this condition worse. If you need help quitting, talk to your doctor about stop-smoking programs and medicines. These can increase your chances of quitting for good. ? Raise the head of your bed 6 to 8 inches if you have symptoms at night. ? Lose weight if you are overweight. ? Take an over-the-counter antacid, such as Maalox, Mylanta, or Tums. Be careful when you take over-the-counter antacid medicines. Many of these medicines have aspirin in them. Read the label to make sure that you are not taking more than the recommended dose. Too much aspirin can be harmful. ? Take stronger acid reducers. Examples are famotidine (such as Pepcid), omeprazole (such as Prilosec), and ranitidine (such as Zantac). · If your condition is caused by infection, allergy, or other problems, use the medicine or treatments that your doctor recommends. · Be safe with medicines. Take your medicines exactly as prescribed. Call your doctor if you think you are having a problem with your medicine. When should you call for help? Call your doctor now or seek immediate medical care if:    · You have new or worse belly pain.     · You are vomiting.    Watch closely for changes in your health, and be sure to contact your doctor if:    · You have new or worse symptoms of reflux.     · You have trouble or pain swallowing.     · You are losing weight.     · You do not get better as expected. Where can you learn more? Go to http://miryam-polo.info/. Enter S319 in the search box to learn more about \"Esophagitis: Care Instructions. \"  Current as of: March 28, 2018  Content Version: 11.8  © 6646-0548 [employer].  Care instructions adapted under license by [employer] (which disclaims liability or warranty for this information). If you have questions about a medical condition or this instruction, always ask your healthcare professional. [place] [address] any warranty or liability for your use of this information.

## 2018-12-28 NOTE — ED NOTES
Discharge instructions were given to the patient by Iva Brooks RN. The patient left the Emergency Department ambulatory, alert and oriented and in no acute distress with 1 prescription. The patient was encouraged to call or return to the ED for worsening issues or problems and was encouraged to schedule a follow up appointment for continuing care. The patient verbalized understanding of discharge instructions and prescriptions, all questions were answered. The patient has no further concerns at this time.

## 2018-12-28 NOTE — ED NOTES
Pt brought in via EMS with worsening shortness of breath; pt was seen last night and discharged with CHF diagnosis, pt came to ED to speak with  late morning and no respiratory distress was noted; pt brought in with mild shortness of breath but speaking in complete sentences, wheezing and rhonci noted bilaterally; bilateral pitting edema noted,  pt received a duoneb and dexamethasone in ambulance, reports feeling better upon arrival and persistently requesting a diet soda and something to eat Emergency Department Nursing Plan of Care The Nursing Plan of Care is developed from the Nursing assessment and Emergency Department Attending provider initial evaluation. The plan of care may be reviewed in the ED Provider note. The Plan of Care was developed with the following considerations:  
Patient / Family readiness to learn indicated by:verbalized understanding Persons(s) to be included in education: patient Barriers to Learning/Limitations:No 
 
Signed Santa Mcrae RN   
12/28/2018   2:53 PM

## 2018-12-28 NOTE — ED NOTES
[address]  [address] [facility] [address] [facility] [address] [facility]  Authorization for Surgical Operation and Procedure     Date:___________                                                                                                         Time:__________ Pt to CT for CTA via wheelchair accompanied by technician. occur: fever and allergic reactions, hemolytic reactions, transmission of diseases such as Hepatitis, AIDS and Cytomegalovirus (CMV) and fluid overload.   In the event that I wish to have an autologous transfusion of my own blood, or a directed donor transf applicable recovery period ends for purposes of reinstating the DNAR order.   10. Patients having a sterilization procedure: I understand that if the procedure is successful the results will be permanent and it will therefore be impossible for me to insemin medicine and do additional procedures as necessary. Some examples are: Starting or using an “IV” to give me medicine, fluids or blood during my procedure, and having a breathing tube placed to help me breathe when I’m asleep (intubation).  In the event that but potential complications include headache, bleeding, infection, seizure, irregular heart rhythms, and nerve injury.     I can change my mind about having anesthesia services at any time before I get the medicine.    ______________________________________

## 2018-12-28 NOTE — PROGRESS NOTES
[facility]: MED RECONCILIATION Comments/Recommendations:  
 Likely medication non-compliance  When asked if patient was taking several medications, she responded \"I need some\"  Only recent medication refill at [place] [address] was for insulin NPH/insulin regular 70/30 Medications removed: · Albuterol inhaler · Benztropine 1 mg PO every 12 hours · Divalproex  mg PO every 12 hours · Haloperidol 5 mg PO every 12 hours · Haloperidol decanoate 1000 mg IM every 28 days - patient reports last received in September · Insulin glargine · Meloxicam 15 mg PO daily as needed for pain · Sertraline 100 mg PO daily Information obtained from: Patient, [place] [address], [address] [employer] Allergies: Dilaudid [hydromorphone (bulk)] and Ibuprofen Prior to Admission Medications:  
Prior to Admission Medications Prescriptions Last Dose Informant Patient Reported? Taking?  
insulin NPH/insulin regular (NOVOLIN 70/30, HUMULIN 70/30) 100 unit/mL (70-30) injection 12/27/2018 at Unknown time Self No Yes Sig: 15 Units by SubCUTAneous route two (2) times a day. Thank you, David Otoole, PharmD, BCPS 
[phone]

## 2018-12-28 NOTE — ED NOTES
Pt returned from CT. Pt resting contently in room, in no acute distress, and updated on plan of care.

## 2018-12-28 NOTE — ED NOTES
TRANSFER - OUT REPORT: 
 
Verbal report given to Kim Gay RN (name) on [address]  being transferred to [facility] (unit) for routine progression of care Report consisted of patients Situation, Background, Assessment and  
Recommendations(SBAR). Information from the following report(s) SBAR, ED Summary, Procedure Summary, MAR, Recent Results and Cardiac Rhythm NSR/ST was reviewed with the receiving nurse. Lines:  
Peripheral IV 12/28/18 Left Antecubital (Active) Site Assessment Clean, dry, & intact 12/28/2018  2:37 PM  
Phlebitis Assessment 0 12/28/2018  2:37 PM  
Infiltration Assessment 0 12/28/2018  2:37 PM  
Dressing Status Clean, dry, & intact 12/28/2018  2:37 PM  
Hub Color/Line Status Green 12/28/2018  2:37 PM  
Alcohol Cap Used No 12/28/2018  2:37 PM  
  
 
Opportunity for questions and clarification was provided. Patient transported with: 
 Monitor Registered Nurse

## 2018-12-28 NOTE — ED NOTES
Assisted patient up to bedside commode; bed zeroed and pt weighed and pharmacy notified of pt's new weight

## 2018-12-28 NOTE — ED TRIAGE NOTES
Pt short of breath, and has some cold symptoms. Pt states she feels like this when she is ketoacidotic.

## 2018-12-28 NOTE — ED PROVIDER NOTES
EMERGENCY DEPARTMENT HISTORY AND PHYSICAL EXAM 
 
 
Date: 2018 Patient Name: Hung Melton History of Presenting Illness Chief Complaint Patient presents with  Cold Symptoms  Shortness of Breath History Provided By: Patient HPI: Hung Melton, 44 y.o. female with PMHx significant for asthma, DM, HTN, GERD, high cholesterol, pancreatitis, gastric bypass, cholecystectomy, tubal ligation, , and alcohol abuse, presents via EMS to the ED with cc of mild, constant, progressively worsening, left sided chest tightness beginning 5 hours ago along with associated shortness of breath, dry cough, and bilateral leg swelling. Pt reports worsening pain with deep breaths, noting \"it feels like her heart is moving\". She states it does not feel consistent with asthma exacerbations she has had in the past. She additionally expresses a concern that she may be in DKA. She endorses an Albuterol inhaler with no relief. She notes she does not see a cardiologist regularly. She denies any other alleviating or exacerbating factors. She denies any recent hx of long travels or trauma. Pt specifically denies fever, chills, congestion, abd pain, nausea, vomiting, or diarrhea. Chief Complaint: Chest tightness Duration: 5 Hours Timing:  Constant, Progressive and Worsening Location: left chest 
Quality: Tightness Severity: Mild Modifying Factors: None Associated Symptoms: SOB, cough, leg swelling There are no other complaints, changes, or physical findings at this time. No current facility-administered medications on file prior to encounter. Current Outpatient Medications on File Prior to Encounter Medication Sig Dispense Refill  insulin glargine (LANTUS U-100 INSULIN) 100 unit/mL injection by SubCUTAneous route nightly.  insulin NPH/insulin regular (NOVOLIN 70/30, HUMULIN 70/30) 100 unit/mL (70-30) injection 15 Units by SubCUTAneous route two (2) times a day. 10 mL 6  divalproex DR (DEPAKOTE) 500 mg tablet Take 1 Tab by mouth two (2) times a day. Indications: Schizoaffective disorder (Patient taking differently: Take 500 mg by mouth every twelve (12) hours.) 28 Tab 0  
 albuterol (PROVENTIL HFA, VENTOLIN HFA, PROAIR HFA) 90 mcg/actuation inhaler Take 1 Puff by inhalation every four (4) hours as needed for Wheezing.  haloperidol decanoate (HALDOL DECANOATE) 100 mg/mL injection 100 mg by IntraMUSCular route every twenty-eight (28) days.  meloxicam (MOBIC) 15 mg tablet Take 15 mg by mouth daily as needed for Pain.  sertraline (ZOLOFT) 100 mg tablet Take 100 mg by mouth daily.  benztropine (COGENTIN) 1 mg tablet Take 1 Tab by mouth two (2) times a day. Indications: drug-induced extrapyramidal reaction (Patient taking differently: Take 1 mg by mouth every twelve (12) hours.) 28 Tab 0  
 haloperidol (HALDOL) 5 mg tablet Take 1 Tab by mouth every twelve (12) hours. Indications: Schizoaffective disorder 28 Tab 0 Past History Past Medical History: 
Past Medical History:  
Diagnosis Date  Alcohol abuse, in remission   
 quit 17 days ago  Asthma   
 does not use inhalers  Borderline personality disorder ([address]  Diabetes ([facility] [address] [facility]  GERD (gastroesophageal reflux disease)  Hypertension  Other ill-defined conditions(799.89)   
 kidney stones ,passed one  Other ill-defined conditions(799.89) sickle cell trait  Other ill-defined conditions(799.89)   
 increased cholesterol  Pancreatitis  Psychiatric disorder   
 schizophrenia, bipolar, depression, anxiety Past Surgical History: 
Past Surgical History:  
Procedure Laterality Date  ABDOMEN SURGERY PROC UNLISTED  3/12/14 CHOLECYSTECTOMY LAPAROSCOPIC    
 HX GASTRIC BYPASS  HX GYN  11/8/2012  
 c section x2  HX OTHER SURGICAL  3/13/14 ENDOSCOPIC RETROGRADE CHOLANGIOPANCREATOGRAPHY  HX OTHER SURGICAL  8/4/14  
 endoscopic stent placed to bile duct  HX TUBAL LIGATION  2012 Family History: 
Family History Problem Relation Age of Onset  Heart Disease Mother  Diabetes Mother  Hypertension Mother  Hypertension Maternal Grandmother Social History: 
Social History Tobacco Use  Smoking status: Current Every Day Smoker Packs/day: 0.50 Years: 14.00 Pack years: 7.00 Types: Cigarettes  Smokeless tobacco: Never Used  Tobacco comment: cigarettes Substance Use Topics  Alcohol use: Yes Alcohol/week: 0.6 oz Types: 1 Cans of beer per week Comment: occasionally, last had \"i had one beer\" today  Drug use: Yes Types: Cocaine Comment: yesterday 12/27/18 Allergies: Allergies Allergen Reactions  Dilaudid [Hydromorphone (Bulk)] Itching  Ibuprofen Not Reported This Time Review of Systems Review of Systems Constitutional: Negative for chills and fever. HENT: Negative for congestion, rhinorrhea, sneezing and sore throat. Respiratory: Positive for cough, chest tightness (+left) and shortness of breath. Cardiovascular: Positive for leg swelling. Negative for chest pain. Gastrointestinal: Negative for abdominal pain, nausea and vomiting. Musculoskeletal: Negative for back pain, myalgias and neck stiffness. Skin: Negative for rash. Neurological: Negative for dizziness, weakness and headaches. All other systems reviewed and are negative. Physical Exam  
Physical Exam  
Constitutional: She is oriented to person, place, and time. She appears well-developed and well-nourished. HENT:  
Head: Normocephalic and atraumatic. Rhinorrhea Eyes: Conjunctivae and EOM are normal.  
Neck: Neck supple. Cardiovascular: Regular rhythm and intact distal pulses. Tachycardia present. Pulmonary/Chest: Effort normal. No respiratory distress. She has wheezes (trace bilaterally). Bronchospastic cough Abdominal: Soft. There is no tenderness.   
Musculoskeletal: Normal range of motion. She exhibits no deformity. Neurological: She is alert and oriented to person, place, and time. Skin: Skin is warm and dry. Psychiatric: She has a normal mood and affect. Her behavior is normal. Thought content normal.  
Vitals reviewed. Diagnostic Study Results Labs - Recent Results (from the past 12 hour(s)) GLUCOSE, POC Collection Time: 12/28/18  1:10 AM  
Result Value Ref Range Glucose (POC) 248 (H) 65 - 100 mg/dL Performed by Pablo Lal (PCT) POC TROPONIN-I Collection Time: 12/28/18  1:14 AM  
Result Value Ref Range Troponin-I (POC) <0.04 0.00 - 0.08 ng/mL NT-PRO BNP Collection Time: 12/28/18  1:15 AM  
Result Value Ref Range NT pro- (H) 0 - 025 PG/ML  
METABOLIC PANEL, BASIC Collection Time: 12/28/18  1:15 AM  
Result Value Ref Range Sodium 138 136 - 145 mmol/L Potassium 3.2 (L) 3.5 - 5.1 mmol/L Chloride 102 97 - 108 mmol/L  
 CO2 26 21 - 32 mmol/L Anion gap 10 5 - 15 mmol/L Glucose 239 (H) 65 - 100 mg/dL BUN 7 6 - 20 MG/DL Creatinine 0.69 0.55 - 1.02 MG/DL  
 BUN/Creatinine ratio 10 (L) 12 - 20 GFR est AA >60 >60 ml/min/1.73m2 GFR est non-AA >60 >60 ml/min/1.73m2 Calcium 8.1 (L) 8.5 - 10.1 MG/DL  
CBC WITH AUTOMATED DIFF Collection Time: 12/28/18  1:15 AM  
Result Value Ref Range WBC 10.1 3.6 - 11.0 K/uL  
 RBC 3.91 3.80 - 5.20 M/uL  
 HGB 10.5 (L) 11.5 - 16.0 g/dL HCT 30.9 (L) 35.0 - 47.0 % MCV 79.0 (L) 80.0 - 99.0 FL  
 MCH 26.9 26.0 - 34.0 PG  
 MCHC 34.0 30.0 - 36.5 g/dL  
 RDW 17.2 (H) 11.5 - 14.5 % PLATELET 876 009 - 175 K/uL MPV 11.1 8.9 - 12.9 FL  
 NRBC 0.0 0  WBC ABSOLUTE NRBC 0.00 0.00 - 0.01 K/uL NEUTROPHILS 64 32 - 75 % LYMPHOCYTES 25 12 - 49 % MONOCYTES 9 5 - 13 % EOSINOPHILS 2 0 - 7 % BASOPHILS 0 0 - 1 % IMMATURE GRANULOCYTES 0 0.0 - 0.5 % ABS. NEUTROPHILS 6.5 1.8 - 8.0 K/UL  
 ABS. LYMPHOCYTES 2.5 0.8 - 3.5 K/UL  
 ABS.  MONOCYTES 0.9 0.0 - 1.0 K/UL  
 ABS. EOSINOPHILS 0.2 0.0 - 0.4 K/UL  
 ABS. BASOPHILS 0.0 0.0 - 0.1 K/UL  
 ABS. IMM. GRANS. 0.0 0.00 - 0.04 K/UL  
 DF AUTOMATED MAGNESIUM Collection Time: 12/28/18  1:15 AM  
Result Value Ref Range Magnesium 1.7 1.6 - 2.4 mg/dL ETHYL ALCOHOL Collection Time: 12/28/18  1:15 AM  
Result Value Ref Range ALCOHOL(ETHYL),SERUM <10 <10 MG/DL  
GLUCOSE, POC Collection Time: 12/28/18  2:56 AM  
Result Value Ref Range Glucose (POC) 166 (H) 65 - 100 mg/dL Performed by [employer] URINALYSIS W/ RFLX MICROSCOPIC Collection Time: 12/28/18  2:57 AM  
Result Value Ref Range Color YELLOW/STRAW Appearance CLEAR CLEAR Specific gravity <1.005 1.003 - 1.030  
 pH (UA) 6.0 5.0 - 8.0 Protein NEGATIVE  NEG mg/dL Glucose 500 (A) NEG mg/dL Ketone NEGATIVE  NEG mg/dL Bilirubin NEGATIVE  NEG Blood NEGATIVE  NEG Urobilinogen 0.2 0.2 - 1.0 EU/dL Nitrites NEGATIVE  NEG Leukocyte Esterase NEGATIVE  NEG    
DRUG SCREEN, URINE Collection Time: 12/28/18  2:57 AM  
Result Value Ref Range AMPHETAMINES NEGATIVE  NEG    
 BARBITURATES NEGATIVE  NEG BENZODIAZEPINES NEGATIVE  NEG    
 COCAINE POSITIVE (A) NEG METHADONE NEGATIVE  NEG    
 OPIATES NEGATIVE  NEG    
 PCP(PHENCYCLIDINE) NEGATIVE  NEG    
 THC (TH-CANNABINOL) NEGATIVE  NEG Drug screen comment (NOTE) Radiologic Studies -  
CTA CHEST W OR W WO CONT Final Result IMPRESSION:   
There is no pulmonary embolism. There is no aortic aneurysm or dissection. Small bilateral pleural effusions. Small to moderate hiatal hernia. Esophageal wall thickening is circumferential.  
This may be related to esophagitis. Nonspecific finding. Further clinical  
correlation recommended. Scattered groundglass opacity with bronchial wall thickening most prominent in  
the right middle lobe. May be related to infectious/inflammatory, reactive  
airways process.  Follow-up chest CT in 4-6 weeks to evaluate for resolution  
recommended. XR CHEST PA LAT Final Result IMPRESSION: No acute intrathoracic disease. CT Results  (Last 48 hours) 12/28/18 0342  CTA CHEST W OR W WO CONT Final result Impression:  IMPRESSION:   
There is no pulmonary embolism. There is no aortic aneurysm or dissection. Small bilateral pleural effusions. Small to moderate hiatal hernia. Esophageal wall thickening is circumferential.  
This may be related to esophagitis. Nonspecific finding. Further clinical  
correlation recommended. Scattered groundglass opacity with bronchial wall thickening most prominent in  
the right middle lobe. May be related to infectious/inflammatory, reactive  
airways process. Follow-up chest CT in 4-6 weeks to evaluate for resolution  
recommended. Narrative:  CLINICAL HISTORY: Chest pain, dyspnea INDICATION: Chest pain, dyspnea COMPARISON: 2008 TECHNIQUE: CT of the chest with  IV contrast , Isovue-370 is performed. Axial  
images from the thoracic inlet to the level of the upper abdomen are obtained. Manual post-processing of the images and coronal reformatting is also performed. CT dose reduction was achieved through use of a standardized protocol tailored  
for this examination and automatic exposure control for dose modulation. Multiplanar reformatted imaging was performed. Sagittal and coronal reformatting. 3-D Postprocessing of imaging was performed. 3-D MIP reconstructed images were obtained in the coronal plane. FINDINGS:   
There is no pulmonary embolism. There is no aortic aneurysm or dissection. There are small bilateral pleural effusions. The pulmonary artery is prominent  
consistent with pulmonary arterial hypertension. Thoracic aortic ectasia. There is a small-to-moderate hiatal hernia. Distal esophageal wall thickening.   
Minimal scattered groundglass opacities most predominantly in the right middle  
lobe but also within the upper lobes and lower lobes bilaterally. Postcholecystectomy. There is no pleural or pericardial effusion. There is no  
mediastinal, axillary or hilar lymphadenopathy. The aorta is normal in course  
and caliber. The proximal pulmonary arteries are without evidence of filling  
defects. No lytic or blastic lesions are identified. The remainder of the upper  
abdomen visualized is unremarkable. CXR Results  (Last 48 hours) 12/28/18 0134  XR CHEST PA LAT Final result Impression:  IMPRESSION: No acute intrathoracic disease. Narrative:  CLINICAL HISTORY: Chest pain INDICATION: Chest pain left-sided COMPARISON: 11/22/2018 FINDINGS:   
PA and lateral views of the chest are obtained. The cardiopericardial silhouette is within normal limits. There is no pleural  
effusion, pneumothorax or focal consolidation present. Medical Decision Making I am the first provider for this patient. I reviewed the vital signs, available nursing notes, past medical history, past surgical history, family history and social history. Vital Signs-Reviewed the patient's vital signs. Patient Vitals for the past 12 hrs: 
 Temp Pulse Resp BP SpO2  
12/28/18 0430  99 22 172/80 97 % 12/28/18 0357  98 20  98 % 12/28/18 0356    167/78   
12/28/18 0317  99 23  99 % 12/28/18 0316  (!) 101 20 154/68 99 % 12/28/18 0230  (!) 101 20 161/78 97 % 12/28/18 0200  99 23 170/85 100 % 12/28/18 0143  97 25 163/89 100 % 12/28/18 0036 98.3 °F (36.8 °C) (!) 103 22 167/80 96 % EKG interpretation: (Preliminary) 0107 Rhythm: normal sinus rhythm; and regular . Rate (approx.): 98; Axis: normal; AK interval: normal; QRS interval: normal ; ST/T wave: normal; Other findings: Prolonged QT, . Written by CAROLINE Amato, as dictated by Mirza Wise MD. Records Reviewed: Nursing Notes, Old Medical Records, Previous electrocardiograms, Ambulance Run Sheet, Previous Radiology Studies and Previous Laboratory Studies Provider Notes (Medical Decision Making): DDx: URI, pneumonia, pleurisy, PE, ACS, pericarditis, mild carditis, reflux, GERD, DKA, cocaine chest pain, hyperglycemia, dehydration, electrolyte abnormality, CHF, asthma exacerbation ED Course:  
Initial assessment performed. The patients presenting problems have been discussed, and they are in agreement with the care plan formulated and outlined with them. I have encouraged them to ask questions as they arise throughout their visit. ED Course as of Dec 28 0648 Fri Dec 28, 2018  
0248 Patient re-assessed. Still tachy. Still c/o 6/10 CP. Given the pleuritic quality of her pain and her elevated bnp along with persistent tachycardia after fluids, will order CTA chest to r/o pe. [SS] 0345 Requesting cough med 
  [SS] ED Course User Index 
[SS] Pk Bolaños MD  
 
 
 
 
Disposition: 
DISCHARGE NOTE 
home The patient has been re-evaluated and is ready for discharge. Reviewed available results with patient and family. Counseled pt and family on diagnosis and care plan. Pt and family have expressed understanding, and all questions have been answered. Pt and family agree with plan and agree to F/U as recommended, or return to the ED if their sxs worsen. Discharge instructions have been provided and explained to the pt and their family, along with reasons to return to the ED. Written by Pasha Dickerson, ED Scribe, as dictated by Gracie Ceron MD. 
 
PLAN: 
1. Discharge Medication List as of 12/28/2018  4:45 AM  
  
CONTINUE these medications which have NOT CHANGED  Details  
insulin glargine (LANTUS U-100 INSULIN) 100 unit/mL injection by SubCUTAneous route nightly., Historical Med  
  
insulin NPH/insulin regular (NOVOLIN 70/30, HUMULIN 70/30) 100 unit/mL (70-30) injection 15 Units by SubCUTAneous route two (2) times a day., Normal, Disp-10 mL, R-6  
  
divalproex DR (DEPAKOTE) 500 mg tablet Take 1 Tab by mouth two (2) times a day. Indications: Schizoaffective disorder, Print, Disp-28 Tab, R-0  
  
albuterol (PROVENTIL HFA, VENTOLIN HFA, PROAIR HFA) 90 mcg/actuation inhaler Take 1 Puff by inhalation every four (4) hours as needed for Wheezing., Historical Med  
  
haloperidol decanoate (HALDOL DECANOATE) 100 mg/mL injection 100 mg by IntraMUSCular route every twenty-eight (28) days. , Historical Med  
  
meloxicam (MOBIC) 15 mg tablet Take 15 mg by mouth daily as needed for Pain., Historical Med  
  
sertraline (ZOLOFT) 100 mg tablet Take 100 mg by mouth daily. , Historical Med  
  
benztropine (COGENTIN) 1 mg tablet Take 1 Tab by mouth two (2) times a day. Indications: drug-induced extrapyramidal reaction, Print, Disp-28 Tab, R-0  
  
haloperidol (HALDOL) 5 mg tablet Take 1 Tab by mouth every twelve (12) hours. Indications: Schizoaffective disorder, Print, Disp-28 Tab, R-0  
  
  
 
2. Follow-up Information Follow up With Specialties Details Why Contact Info Mindy Viera NP Nurse Practitioner Schedule an appointment as soon as possible for a visit  [address] [facility] [address] [facility] 
[phone] [facility] EMERGENCY DEPT Emergency Medicine  As needed, If symptoms worsen [address] Return to ED if worse Diagnosis Clinical Impression: 1. Upper respiratory tract infection, unspecified type 2. Esophagitis 3. Pleural effusion, bilateral   
4. Chest pain, unspecified type Attestations: This note is prepared by Maurisio Villeda, acting as Scribe for Junior Mccarthy MD. 
 
Junior Mccarthy MD: The scribe's documentation has been prepared under my direction and personally reviewed by me in its entirety. I confirm that the note above accurately reflects all work, treatment, procedures, and medical decision making performed by me. This note will not be viewable in [address]

## 2018-12-28 NOTE — ED NOTES
Pt presents to ED ambulatory complaining of SOB and cold symtoms. Pt reporting left-sided chest tightness x 5 hours. Pt reporting non-productive cough with cold symptoms x a few days. Pt reporting bilateral leg and feet swelling. Pt reports hx of asthma and diabetes. Pt noted to be wheezing. Pt denies any fevers or N/V. Pt is alert and oriented x 4, RR even and unlabored, skin is warm and dry. Assessment completed and pt updated on plan of care. Emergency Department Nursing Plan of Care       The Nursing Plan of Care is developed from the Nursing assessment and Emergency Department Attending provider initial evaluation. The plan of care may be reviewed in the ED Provider note.     The Plan of Care was developed with the following considerations:   Patient / Family readiness to learn indicated by:verbalized understanding  Persons(s) to be included in education: patient  Barriers to Learning/Limitations:No    Signed     Dashawn Allred    12/28/2018   12:51 AM

## 2018-12-28 NOTE — ED NOTES
Pt reports she does not need to urinate at this time. Pt given water and call bell and instructed to ring out when she needs to use the rest room.

## 2018-12-29 LAB
ADMINISTERED INITIALS, ADMINIT: NORMAL
ANION GAP SERPL CALC-SCNC: 11 MMOL/L (ref 5–15)
ATRIAL RATE: 98 BPM
BUN SERPL-MCNC: 16 MG/DL (ref 6–20)
BUN/CREAT SERPL: 15 (ref 12–20)
CALCIUM SERPL-MCNC: 8.5 MG/DL (ref 8.5–10.1)
CALCULATED P AXIS, ECG09: 86 DEGREES
CALCULATED R AXIS, ECG10: 64 DEGREES
CALCULATED T AXIS, ECG11: 73 DEGREES
CHLORIDE SERPL-SCNC: 101 MMOL/L (ref 97–108)
CO2 SERPL-SCNC: 24 MMOL/L (ref 21–32)
CREAT SERPL-MCNC: 1.07 MG/DL (ref 0.55–1.02)
D50 ADMINISTERED, D50ADM: 0 ML
D50 ORDER, D50ORD: 0 ML
DIAGNOSIS, 93000: NORMAL
GLSCOM COMMENTS: NORMAL
GLUCOSE BLD STRIP.AUTO-MCNC: 157 MG/DL (ref 65–100)
GLUCOSE BLD STRIP.AUTO-MCNC: 169 MG/DL (ref 65–100)
GLUCOSE BLD STRIP.AUTO-MCNC: 282 MG/DL (ref 65–100)
GLUCOSE BLD STRIP.AUTO-MCNC: 339 MG/DL (ref 65–100)
GLUCOSE BLD STRIP.AUTO-MCNC: 351 MG/DL (ref 65–100)
GLUCOSE BLD STRIP.AUTO-MCNC: 387 MG/DL (ref 65–100)
GLUCOSE BLD STRIP.AUTO-MCNC: 400 MG/DL (ref 65–100)
GLUCOSE SERPL-MCNC: 430 MG/DL (ref 65–100)
GLUCOSE, GLC: 157 MG/DL
GLUCOSE, GLC: 169 MG/DL
GLUCOSE, GLC: 282 MG/DL
GLUCOSE, GLC: 351 MG/DL
HIGH TARGET, HITG: 300 MG/DL
INSULIN ADMINSTERED, INSADM: 0 UNITS/HOUR
INSULIN ADMINSTERED, INSADM: 1.1 UNITS/HOUR
INSULIN ADMINSTERED, INSADM: 4.4 UNITS/HOUR
INSULIN ADMINSTERED, INSADM: 5.8 UNITS/HOUR
INSULIN ORDER, INSORD: 0 UNITS/HOUR
INSULIN ORDER, INSORD: 1.1 UNITS/HOUR
INSULIN ORDER, INSORD: 4.4 UNITS/HOUR
INSULIN ORDER, INSORD: 5.8 UNITS/HOUR
LOW TARGET, LOT: 200 MG/DL
MINUTES UNTIL NEXT BG, NBG: 60 MIN
MULTIPLIER, MUL: 0
MULTIPLIER, MUL: 0.01
MULTIPLIER, MUL: 0.02
MULTIPLIER, MUL: 0.02
ORDER INITIALS, ORDINIT: NORMAL
P-R INTERVAL, ECG05: 134 MS
POTASSIUM SERPL-SCNC: 4.4 MMOL/L (ref 3.5–5.1)
Q-T INTERVAL, ECG07: 380 MS
QRS DURATION, ECG06: 70 MS
QTC CALCULATION (BEZET), ECG08: 485 MS
SERVICE CMNT-IMP: ABNORMAL
SODIUM SERPL-SCNC: 136 MMOL/L (ref 136–145)
VENTRICULAR RATE, ECG03: 98 BPM

## 2018-12-29 PROCEDURE — 82962 GLUCOSE BLOOD TEST: CPT

## 2018-12-29 PROCEDURE — 94660 CPAP INITIATION&MGMT: CPT

## 2018-12-29 PROCEDURE — 36415 COLL VENOUS BLD VENIPUNCTURE: CPT

## 2018-12-29 PROCEDURE — 94640 AIRWAY INHALATION TREATMENT: CPT

## 2018-12-29 PROCEDURE — 74011000250 HC RX REV CODE- 250: Performed by: NEUROMUSCULOSKELETAL MEDICINE & OMM

## 2018-12-29 PROCEDURE — 74011636637 HC RX REV CODE- 636/637: Performed by: NEUROMUSCULOSKELETAL MEDICINE & OMM

## 2018-12-29 PROCEDURE — 77010033678 HC OXYGEN DAILY

## 2018-12-29 PROCEDURE — 80048 BASIC METABOLIC PNL TOTAL CA: CPT

## 2018-12-29 PROCEDURE — 65660000001 HC RM ICU INTERMED STEPDOWN

## 2018-12-29 PROCEDURE — 74011250636 HC RX REV CODE- 250/636: Performed by: NEUROMUSCULOSKELETAL MEDICINE & OMM

## 2018-12-29 PROCEDURE — 74011250637 HC RX REV CODE- 250/637: Performed by: NEUROMUSCULOSKELETAL MEDICINE & OMM

## 2018-12-29 PROCEDURE — 74011000258 HC RX REV CODE- 258: Performed by: NEUROMUSCULOSKELETAL MEDICINE & OMM

## 2018-12-29 PROCEDURE — 77030029684 HC NEB SM VOL KT MONA -A

## 2018-12-29 PROCEDURE — 74011250636 HC RX REV CODE- 250/636: Performed by: INTERNAL MEDICINE

## 2018-12-29 RX ORDER — ENOXAPARIN SODIUM 100 MG/ML
40 INJECTION SUBCUTANEOUS EVERY 24 HOURS
Status: DISCONTINUED | OUTPATIENT
Start: 2018-12-29 | End: 2018-12-30 | Stop reason: HOSPADM

## 2018-12-29 RX ORDER — INSULIN GLARGINE 100 [IU]/ML
8 INJECTION, SOLUTION SUBCUTANEOUS ONCE
Status: COMPLETED | OUTPATIENT
Start: 2018-12-29 | End: 2018-12-29

## 2018-12-29 RX ORDER — INSULIN GLARGINE 100 [IU]/ML
25 INJECTION, SOLUTION SUBCUTANEOUS
Status: DISCONTINUED | OUTPATIENT
Start: 2018-12-29 | End: 2018-12-29

## 2018-12-29 RX ORDER — SODIUM CHLORIDE 9 MG/ML
75 INJECTION, SOLUTION INTRAVENOUS CONTINUOUS
Status: DISCONTINUED | OUTPATIENT
Start: 2018-12-29 | End: 2018-12-30

## 2018-12-29 RX ADMIN — FOLIC ACID 1 MG: 1 TABLET ORAL at 08:35

## 2018-12-29 RX ADMIN — METHYLPREDNISOLONE SODIUM SUCCINATE 125 MG: 125 INJECTION, POWDER, FOR SOLUTION INTRAMUSCULAR; INTRAVENOUS at 05:30

## 2018-12-29 RX ADMIN — SODIUM CHLORIDE 5.8 UNITS/HR: 900 INJECTION, SOLUTION INTRAVENOUS at 20:29

## 2018-12-29 RX ADMIN — FLUTICASONE PROPIONATE AND SALMETEROL 1 PUFF: 50; 500 POWDER RESPIRATORY (INHALATION) at 20:42

## 2018-12-29 RX ADMIN — BUDESONIDE 500 MCG: 0.5 INHALANT RESPIRATORY (INHALATION) at 08:08

## 2018-12-29 RX ADMIN — IPRATROPIUM BROMIDE AND ALBUTEROL SULFATE 3 ML: .5; 3 SOLUTION RESPIRATORY (INHALATION) at 11:52

## 2018-12-29 RX ADMIN — BUDESONIDE 500 MCG: 0.5 INHALANT RESPIRATORY (INHALATION) at 19:47

## 2018-12-29 RX ADMIN — CEFTRIAXONE SODIUM 1 G: 1 INJECTION, POWDER, FOR SOLUTION INTRAMUSCULAR; INTRAVENOUS at 15:13

## 2018-12-29 RX ADMIN — INSULIN GLARGINE 8 UNITS: 100 INJECTION, SOLUTION SUBCUTANEOUS at 12:20

## 2018-12-29 RX ADMIN — FLUTICASONE PROPIONATE AND SALMETEROL 1 PUFF: 50; 500 POWDER RESPIRATORY (INHALATION) at 08:41

## 2018-12-29 RX ADMIN — MULTIPLE VITAMINS W/ MINERALS TAB 1 TABLET: TAB at 08:36

## 2018-12-29 RX ADMIN — INSULIN LISPRO 7 UNITS: 100 INJECTION, SOLUTION INTRAVENOUS; SUBCUTANEOUS at 08:23

## 2018-12-29 RX ADMIN — ENOXAPARIN SODIUM 40 MG: 40 INJECTION, SOLUTION INTRAVENOUS; SUBCUTANEOUS at 12:20

## 2018-12-29 RX ADMIN — METHYLPREDNISOLONE SODIUM SUCCINATE 80 MG: 125 INJECTION, POWDER, FOR SOLUTION INTRAMUSCULAR; INTRAVENOUS at 23:32

## 2018-12-29 RX ADMIN — LORAZEPAM 2 MG: 2 INJECTION, SOLUTION INTRAMUSCULAR; INTRAVENOUS at 00:22

## 2018-12-29 RX ADMIN — CEFTRIAXONE SODIUM 1 G: 1 INJECTION, POWDER, FOR SOLUTION INTRAMUSCULAR; INTRAVENOUS at 03:37

## 2018-12-29 RX ADMIN — INSULIN GLARGINE 25 UNITS: 100 INJECTION, SOLUTION SUBCUTANEOUS at 17:38

## 2018-12-29 RX ADMIN — INSULIN LISPRO 2 UNITS: 100 INJECTION, SOLUTION INTRAVENOUS; SUBCUTANEOUS at 08:24

## 2018-12-29 RX ADMIN — METHYLPREDNISOLONE SODIUM SUCCINATE 125 MG: 125 INJECTION, POWDER, FOR SOLUTION INTRAMUSCULAR; INTRAVENOUS at 12:20

## 2018-12-29 RX ADMIN — INSULIN LISPRO 12 UNITS: 100 INJECTION, SOLUTION INTRAVENOUS; SUBCUTANEOUS at 17:37

## 2018-12-29 RX ADMIN — GUAIFENESIN 600 MG: 600 TABLET, EXTENDED RELEASE ORAL at 20:42

## 2018-12-29 RX ADMIN — IPRATROPIUM BROMIDE AND ALBUTEROL SULFATE 3 ML: .5; 3 SOLUTION RESPIRATORY (INHALATION) at 00:32

## 2018-12-29 RX ADMIN — IPRATROPIUM BROMIDE AND ALBUTEROL SULFATE 3 ML: .5; 3 SOLUTION RESPIRATORY (INHALATION) at 08:08

## 2018-12-29 RX ADMIN — Medication 100 MG: at 08:35

## 2018-12-29 RX ADMIN — METHYLPREDNISOLONE SODIUM SUCCINATE 125 MG: 125 INJECTION, POWDER, FOR SOLUTION INTRAMUSCULAR; INTRAVENOUS at 00:09

## 2018-12-29 RX ADMIN — IPRATROPIUM BROMIDE AND ALBUTEROL SULFATE 3 ML: .5; 3 SOLUTION RESPIRATORY (INHALATION) at 16:39

## 2018-12-29 RX ADMIN — SODIUM CHLORIDE 75 ML/HR: 900 INJECTION, SOLUTION INTRAVENOUS at 20:29

## 2018-12-29 RX ADMIN — IPRATROPIUM BROMIDE AND ALBUTEROL SULFATE 3 ML: .5; 3 SOLUTION RESPIRATORY (INHALATION) at 19:47

## 2018-12-29 RX ADMIN — METHYLPREDNISOLONE SODIUM SUCCINATE 125 MG: 125 INJECTION, POWDER, FOR SOLUTION INTRAMUSCULAR; INTRAVENOUS at 17:41

## 2018-12-29 RX ADMIN — IPRATROPIUM BROMIDE AND ALBUTEROL SULFATE 3 ML: .5; 3 SOLUTION RESPIRATORY (INHALATION) at 04:07

## 2018-12-29 RX ADMIN — AZITHROMYCIN 500 MG: 500 TABLET, FILM COATED ORAL at 08:35

## 2018-12-29 RX ADMIN — ZOLPIDEM TARTRATE 5 MG: 5 TABLET ORAL at 20:42

## 2018-12-29 RX ADMIN — HUMAN INSULIN 6 UNITS: 100 INJECTION, SOLUTION SUBCUTANEOUS at 20:29

## 2018-12-29 RX ADMIN — INSULIN LISPRO 8 UNITS: 100 INJECTION, SOLUTION INTRAVENOUS; SUBCUTANEOUS at 12:19

## 2018-12-29 RX ADMIN — INSULIN LISPRO 2 UNITS: 100 INJECTION, SOLUTION INTRAVENOUS; SUBCUTANEOUS at 12:19

## 2018-12-29 RX ADMIN — GUAIFENESIN 600 MG: 600 TABLET, EXTENDED RELEASE ORAL at 08:35

## 2018-12-29 NOTE — PROGRESS NOTES
Bedside and Verbal shift change report given to Shelby Guallpa (oncoming nurse) by Soledad (offgoing nurse). Report included the following information SBAR, Kardex, ED Summary, MAR, Accordion, Recent Results and Cardiac Rhythm nsr to sinus tachycardia. 3338 patient in no acute distress, complains of hunger. CPAP mask removed. Advised patient that after breakfast, we will plan to wash up and change her gown and sheets. 0830 Patient holding saturations in high 90s with 2L nasal cannula applied. Patient requesting a snack. Reminded patient that she just had just consumed a full breakfast tray and two cups of coffee, and that her blood glucose was in the 300s before she started eating. Advised patient that she may have a snack later between breakfast and lunch, but that it was just too soon. Reminded the patient of her previous experiences with DKA on several occasions this year. Diet Pepsi was given to patient per her request. 
 
8140 Patient states \"I'm not ready to wash up right now\" Will offer hygiene later. 1135- Patient blood sugar 387. Per Dr. Meredith Ibarra give a total of 10units insulin lispro. 1240- Patient requests a snack. Explained to patient that she had just consumed a full lunch along with sugar-free cookies and ice cream, and that her blood sugar has been trending up. \"I want to leave if I can't eat. \"  Advised the patient that I would go get the [facility] paper and notify the doctor. \"I will stay. \"   
 
5:20 PM 
Called and spoke with Dr. Meredith Ibarra. Patient's blood sugar is 400. The following orders were received: give 12units lispro now and give evening Lantus dose early. 1750 Patient has eaten dinner and is requesting a snack. Reminded patient that her blood sugar was 400 before dinner. Advised the patient that she cannot have a snack at this time. Patient states that she would like to leave. Advised the patient that I will get the [facility] paper and call the doctor.   Patient states that she will stay. 
 
 
6:10 PM 
Patient called, stating that she wants to hang herself because I won't give her any more food. Dr. Michelle Rasheed consulted. Patient will be placed 1:1 with a sitter for suicide precautions. Insulin drip will be ordered for this patient ([facility] protocol; patient will still be able to have meals). [address] [facility] and spoke with Dr. Michelle Rasheed. Per the insulin drip order, the medication should not be started unless the patient's potassium is 3.3 or greater. The patient's last potassium was 3.2 (drawn on 12/28/2018). Stat potassium ordered. Also spoke with nurse supervisor--pharmacy is closed for the evening and we do not have the insulin drip. Potassium result will be obtained before we mix (and initiate) a bag of insulin for this patient. 1915- Bedside and Verbal shift change report given to Soledad (oncoming nurse) by Kasey Pablo (offgoing nurse). Report included the following information SBAR, Kardex, Intake/Output, MAR, Accordion, Recent Results and Cardiac Rhythm nsr to sinus tachycardia. 1930 Approached patient with PCT to change her sheets, as they were soiled with blood and sweat. Patient was resistant to linen change. It was discovered that she was hiding cough drops in the bed with her and eating them. Advised the patient that she should not be eating the cough drops because they can increase her sugar. Patient began screaming \"I'm [place] [employer]\" as we removed the cough drops from the bed. Asked the patient if she would like to leave. \"No.\" Advised the patient that she would need to try and calm herself and stop screaming. Patient complied. Patient kept 2 cough drops in her hand, which she then placed into her mouth (still wrapped) and proceeded to eat them. Sheets were changed and the patient was returned to the bed. 2018- Assisted MATHEW Rowe to mix insulin drip (regular insulin 100 units was injected into a 100mL bag of Normal Saline)

## 2018-12-29 NOTE — PROGRESS NOTES
Problem: Falls - Risk of 
Goal: *Absence of Falls Document Frandy Alejandra Fall Risk and appropriate interventions in the flowsheet. Outcome: Progressing Towards Goal 
Fall Risk Interventions: 
  
 
  
 
Medication Interventions: Teach patient to arise slowly, Patient to call before getting OOB History of Falls Interventions: Door open when patient unattended

## 2018-12-29 NOTE — PROGRESS NOTES
Hospitalist Progress Note Subjective:  
Daily Progress Note: 12/29/2018 10:49 AM 
 
Dyspnea improved from yesterday but still significant Current Facility-Administered Medications Medication Dose Route Frequency  enoxaparin (LOVENOX) injection 40 mg  40 mg SubCUTAneous Q24H  
 albuterol-ipratropium (DUO-NEB) 2.5 MG-0.5 MG/3 ML  3 mL Nebulization Q4H RT  
 fluticasone-salmeterol (ADVAIR) 500mcg-50mcg/puff  1 Puff Inhalation BID RT  
 methylPREDNISolone (PF) (SOLU-MEDROL) injection 125 mg  125 mg IntraVENous Q6H  
 azithromycin (ZITHROMAX) tablet 500 mg  500 mg Oral DAILY  cefTRIAXone (ROCEPHIN) 1 g in 0.9% sodium chloride (MBP/ADV) 50 mL  1 g IntraVENous Q12H  
 acetaminophen (TYLENOL) tablet 650 mg  650 mg Oral Q4H PRN  
 zolpidem (AMBIEN) tablet 5 mg  5 mg Oral QHS PRN  
 nicotine (NICODERM CQ) 21 mg/24 hr patch 1 Patch  1 Patch TransDERmal DAILY  benzonatate (TESSALON) capsule 100 mg  100 mg Oral TID PRN  
 guaiFENesin ER (MUCINEX) tablet 600 mg  600 mg Oral Q12H  
 insulin lispro (HUMALOG) injection   SubCUTAneous AC&HS  
 glucose chewable tablet 16 g  4 Tab Oral PRN  
 dextrose (D50W) injection syrg 12.5-25 g  12.5-25 g IntraVENous PRN  
 glucagon (GLUCAGEN) injection 1 mg  1 mg IntraMUSCular PRN  
 LORazepam (ATIVAN) injection 2 mg  2 mg IntraVENous N7T PRN  
 folic acid (FOLVITE) tablet 1 mg  1 mg Oral DAILY  thiamine HCL (B-1) tablet 100 mg  100 mg Oral DAILY  multivitamin, tx-iron-ca-min (THERA-M w/ IRON) tablet 1 Tab  1 Tab Oral DAILY  albuterol CONCENTRATE 2.5mg/0.5 mL neb soln  2.5 mg Nebulization Q2H PRN  
 hydrALAZINE (APRESOLINE) 20 mg/mL injection 20 mg  20 mg IntraVENous Q6H PRN  
 budesonide (PULMICORT) 500 mcg/2 ml nebulizer suspension  500 mcg Nebulization BID RT  
 ondansetron (ZOFRAN) injection 4 mg  4 mg IntraVENous Q6H PRN  
 insulin glargine (LANTUS) injection 18 Units  18 Units SubCUTAneous QHS  insulin lispro (HUMALOG) injection 2 Units  2 Units SubCUTAneous TIDAC Review of Systems A comprehensive review of systems was negative except for that written in the HPI. Objective:  
 
Visit Vitals /79 Pulse (!) 104 Temp 98.1 °F (36.7 °C) Resp 19 Ht 5' 7\" (1.702 m) Wt 68.5 kg (151 lb 1.6 oz) LMP  (LMP Unknown) Comment: Pt reports she has not had a cycle in 4 months SpO2 95% BMI 23.67 kg/m² O2 Flow Rate (L/min): 2 l/min O2 Device: Aerosol mask Temp (24hrs), Av.5 °F (36.9 °C), Min:98 °F (36.7 °C), Max:98.8 °F (37.1 °C) No intake/output data recorded.  1901 -  0700 In: 340 [P.O.:240; I.V.:100] Out: - Visit Vitals /79 Pulse (!) 104 Temp 98.1 °F (36.7 °C) Resp 19 Ht 5' 7\" (1.702 m) Wt 68.5 kg (151 lb 1.6 oz) LMP  (LMP Unknown) Comment: Pt reports she has not had a cycle in 4 months SpO2 95% BMI 23.67 kg/m² General:  Alert, cooperative, no distress, appears stated age. Head:  Normocephalic, without obvious abnormality, atraumatic. Eyes:  Conjunctivae/corneas clear. PERRL, EOMs intact. Fundi benign. Ears:  Normal TMs and external ear canals both ears. Nose: Nares normal. Septum midline. Mucosa normal. No drainage or sinus tenderness. Throat: Lips, mucosa, and tongue normal. Teeth and gums normal.  
Neck: Supple, symmetrical, trachea midline, no adenopathy, thyroid: no enlargement/tenderness/nodules, no carotid bruit and no JVD. Back:   Symmetric, no curvature. ROM normal. No CVA tenderness. Lungs:   Clear to auscultation bilaterally. Chest wall:  No tenderness or deformity. Heart:  Regular rate and rhythm, S1, S2 normal, no murmur, click, rub or gallop. Breast Exam:  No tenderness, masses, or nipple abnormality. Abdomen:   Soft, non-tender. Bowel sounds normal. No masses,  No organomegaly. Genitalia:  Normal female without lesion, discharge or tenderness. Rectal:  Normal tone,  no masses or tenderness Guaiac negative stool.   
Extremities: Extremities normal, atraumatic, no cyanosis or edema. Pulses: 2+ and symmetric all extremities. Skin: Skin color, texture, turgor normal. No rashes or lesions. Lymph nodes: Cervical, supraclavicular, and axillary nodes normal.  
Neurologic: CNII-XII intact. Normal strength, sensation and reflexes throughout. Additional comments:I reviewed the patient's new clinical lab test results. abd wnl. Data Review Recent Results (from the past 24 hour(s)) GLUCOSE, POC Collection Time: 12/28/18  4:46 PM  
Result Value Ref Range Glucose (POC) 374 (H) 65 - 100 mg/dL Performed by Pavithra Ortiz BLOOD GAS, ARTERIAL Collection Time: 12/28/18  5:00 PM  
Result Value Ref Range pH 7.39 7.35 - 7.45    
 PCO2 42 35.0 - 45.0 mmHg PO2 106 (H) 80 - 100 mmHg O2 SAT 98 (H) 92 - 97 % BICARBONATE 25 22 - 26 mmol/L  
 BASE EXCESS 0.2 mmol/L  
 O2 METHOD ROOM AIR Sample source ARTERIAL    
 SITE RIGHT BRACHIAL TOSHIA'S TEST YES    
GLUCOSE, POC Collection Time: 12/28/18  6:29 PM  
Result Value Ref Range Glucose (POC) 402 (H) 65 - 100 mg/dL Performed by Tari Coyne (PCT) GLUCOSE, POC Collection Time: 12/28/18  9:28 PM  
Result Value Ref Range Glucose (POC) 297 (H) 65 - 100 mg/dL Performed by Onelia Kennedy (PCT) GLUCOSE, POC Collection Time: 12/29/18  8:00 AM  
Result Value Ref Range Glucose (POC) 339 (H) 65 - 100 mg/dL Performed by Manual Olney (PCT) Assessment/Plan: Active Problems: 
  Asthma (12/28/2018) 
 
htn, etoh and substance abuse, dm, bipolar and schizo 
 
cont salumedrol at 125mg iv q6 until wheezing subsides. Cont advair and pulmicort as pt is still significantly wheezing 
cpap qhs. Check cbc and bmp tomorrow. Cont iss and increas we lantus to 25 units tonight. Care Plan discussed with: Patient/Family Total time spent with patient/care team: 30 minutes. Signed By: Garrick Lynn DO   
 December 29, 2018

## 2018-12-29 NOTE — PROGRESS NOTES
12/28/18 1911 CIWA/ETOH Withdrawal Scale Nausea and Vomiting 0 Tactile Disturbances 0 Tremor 0 Auditory Disturbances 0 Paroxysmal Sweats 4 Visual Disturbances 0 Anxiety 5 Headache, Fullness in Head 0 Agitation 1 Orientation and Clouding of Sensorium 0  
CIWA-Ar Total 10 Lorazepam given for CIWA of 10

## 2018-12-29 NOTE — PROGRESS NOTES
Bedside verbal report received from previous RN, Mariusz Lira. Pt resting quietly in bed. CPAP remains in place with SaO2 >96%. Call bell remains next to pt.

## 2018-12-29 NOTE — PROGRESS NOTES
Nursing staff contacted me (nursing supervisor) to report that the patient is threaten to hand herself because she wants something else to eat. Patients nurse is in room & physician has been notified. The patient has been educated about her blood sugar being elevated at this time & why she can not eat.

## 2018-12-29 NOTE — PROGRESS NOTES
1900 C/o Shortness of breath after ambulating to BR. Became veryh anxious and slightly diaphoretic. RR 28, but remainder of VS stable. Breath sounds unchanged. CIWA scale increased to 10 thus medicated per CIWA scale  Placed on ordered CPAP.

## 2018-12-30 VITALS
WEIGHT: 151.1 LBS | RESPIRATION RATE: 17 BRPM | BODY MASS INDEX: 23.72 KG/M2 | TEMPERATURE: 99.1 F | OXYGEN SATURATION: 100 % | SYSTOLIC BLOOD PRESSURE: 148 MMHG | HEIGHT: 67 IN | HEART RATE: 91 BPM | DIASTOLIC BLOOD PRESSURE: 76 MMHG

## 2018-12-30 LAB
ADMINISTERED INITIALS, ADMINIT: NORMAL
ANION GAP SERPL CALC-SCNC: 8 MMOL/L (ref 5–15)
BUN SERPL-MCNC: 15 MG/DL (ref 6–20)
BUN/CREAT SERPL: 24 (ref 12–20)
CALCIUM SERPL-MCNC: 8.6 MG/DL (ref 8.5–10.1)
CHLORIDE SERPL-SCNC: 105 MMOL/L (ref 97–108)
CO2 SERPL-SCNC: 27 MMOL/L (ref 21–32)
CREAT SERPL-MCNC: 0.62 MG/DL (ref 0.55–1.02)
D50 ADMINISTERED, D50ADM: 0 ML
D50 ORDER, D50ORD: 0 ML
ERYTHROCYTE [DISTWIDTH] IN BLOOD BY AUTOMATED COUNT: 16.9 % (ref 11.5–14.5)
GLSCOM COMMENTS: NORMAL
GLUCOSE BLD STRIP.AUTO-MCNC: 151 MG/DL (ref 65–100)
GLUCOSE BLD STRIP.AUTO-MCNC: 189 MG/DL (ref 65–100)
GLUCOSE BLD STRIP.AUTO-MCNC: 202 MG/DL (ref 65–100)
GLUCOSE BLD STRIP.AUTO-MCNC: 213 MG/DL (ref 65–100)
GLUCOSE BLD STRIP.AUTO-MCNC: 240 MG/DL (ref 65–100)
GLUCOSE BLD STRIP.AUTO-MCNC: 266 MG/DL (ref 65–100)
GLUCOSE BLD STRIP.AUTO-MCNC: 273 MG/DL (ref 65–100)
GLUCOSE BLD STRIP.AUTO-MCNC: 282 MG/DL (ref 65–100)
GLUCOSE BLD STRIP.AUTO-MCNC: 352 MG/DL (ref 65–100)
GLUCOSE BLD STRIP.AUTO-MCNC: 354 MG/DL (ref 65–100)
GLUCOSE BLD STRIP.AUTO-MCNC: 400 MG/DL (ref 65–100)
GLUCOSE BLD STRIP.AUTO-MCNC: 429 MG/DL (ref 65–100)
GLUCOSE SERPL-MCNC: 260 MG/DL (ref 65–100)
GLUCOSE, GLC: 151 MG/DL
GLUCOSE, GLC: 189 MG/DL
GLUCOSE, GLC: 202 MG/DL
GLUCOSE, GLC: 213 MG/DL
GLUCOSE, GLC: 240 MG/DL
GLUCOSE, GLC: 266 MG/DL
GLUCOSE, GLC: 273 MG/DL
GLUCOSE, GLC: 282 MG/DL
GLUCOSE, GLC: 352 MG/DL
GLUCOSE, GLC: 354 MG/DL
GLUCOSE, GLC: 400 MG/DL
GLUCOSE, GLC: 429 MG/DL
HCT VFR BLD AUTO: 30.9 % (ref 35–47)
HGB BLD-MCNC: 10.7 G/DL (ref 11.5–16)
HIGH TARGET, HITG: 300 MG/DL
INSULIN ADMINSTERED, INSADM: 0 UNITS/HOUR
INSULIN ADMINSTERED, INSADM: 0 UNITS/HOUR
INSULIN ADMINSTERED, INSADM: 0.1 UNITS/HOUR
INSULIN ADMINSTERED, INSADM: 3.4 UNITS/HOUR
INSULIN ADMINSTERED, INSADM: 7.4 UNITS/HOUR
INSULIN ADMINSTERED, INSADM: 8.8 UNITS/HOUR
INSULIN ORDER, INSORD: 0 UNITS/HOUR
INSULIN ORDER, INSORD: 0 UNITS/HOUR
INSULIN ORDER, INSORD: 0.1 UNITS/HOUR
INSULIN ORDER, INSORD: 3.4 UNITS/HOUR
INSULIN ORDER, INSORD: 7.4 UNITS/HOUR
INSULIN ORDER, INSORD: 8.8 UNITS/HOUR
LOW TARGET, LOT: 200 MG/DL
MAGNESIUM SERPL-MCNC: 1.9 MG/DL (ref 1.6–2.4)
MCH RBC QN AUTO: 27 PG (ref 26–34)
MCHC RBC AUTO-ENTMCNC: 34.6 G/DL (ref 30–36.5)
MCV RBC AUTO: 77.8 FL (ref 80–99)
MINUTES UNTIL NEXT BG, NBG: 60 MIN
MULTIPLIER, MUL: 0
MULTIPLIER, MUL: 0.01
MULTIPLIER, MUL: 0.02
MULTIPLIER, MUL: 0.03
NRBC # BLD: 0 K/UL (ref 0–0.01)
NRBC BLD-RTO: 0 PER 100 WBC
ORDER INITIALS, ORDINIT: NORMAL
PLATELET # BLD AUTO: 254 K/UL (ref 150–400)
PMV BLD AUTO: 11.3 FL (ref 8.9–12.9)
POTASSIUM SERPL-SCNC: 3.6 MMOL/L (ref 3.5–5.1)
RBC # BLD AUTO: 3.97 M/UL (ref 3.8–5.2)
SERVICE CMNT-IMP: ABNORMAL
SODIUM SERPL-SCNC: 140 MMOL/L (ref 136–145)
WBC # BLD AUTO: 11.7 K/UL (ref 3.6–11)

## 2018-12-30 PROCEDURE — 74011250636 HC RX REV CODE- 250/636: Performed by: NEUROMUSCULOSKELETAL MEDICINE & OMM

## 2018-12-30 PROCEDURE — 77010033678 HC OXYGEN DAILY

## 2018-12-30 PROCEDURE — 85027 COMPLETE CBC AUTOMATED: CPT

## 2018-12-30 PROCEDURE — 83735 ASSAY OF MAGNESIUM: CPT

## 2018-12-30 PROCEDURE — 94640 AIRWAY INHALATION TREATMENT: CPT

## 2018-12-30 PROCEDURE — 36415 COLL VENOUS BLD VENIPUNCTURE: CPT

## 2018-12-30 PROCEDURE — 74011000258 HC RX REV CODE- 258: Performed by: NEUROMUSCULOSKELETAL MEDICINE & OMM

## 2018-12-30 PROCEDURE — 74011250637 HC RX REV CODE- 250/637: Performed by: NEUROMUSCULOSKELETAL MEDICINE & OMM

## 2018-12-30 PROCEDURE — 74011636637 HC RX REV CODE- 636/637: Performed by: NEUROMUSCULOSKELETAL MEDICINE & OMM

## 2018-12-30 PROCEDURE — 74011000250 HC RX REV CODE- 250: Performed by: NEUROMUSCULOSKELETAL MEDICINE & OMM

## 2018-12-30 PROCEDURE — 80048 BASIC METABOLIC PNL TOTAL CA: CPT

## 2018-12-30 PROCEDURE — 82962 GLUCOSE BLOOD TEST: CPT

## 2018-12-30 PROCEDURE — 94660 CPAP INITIATION&MGMT: CPT

## 2018-12-30 RX ORDER — DIVALPROEX SODIUM 500 MG/1
500 TABLET, DELAYED RELEASE ORAL EVERY 12 HOURS
Status: DISCONTINUED | OUTPATIENT
Start: 2018-12-30 | End: 2018-12-30 | Stop reason: HOSPADM

## 2018-12-30 RX ORDER — HALOPERIDOL 5 MG/1
5 TABLET ORAL EVERY 12 HOURS
Status: DISCONTINUED | OUTPATIENT
Start: 2018-12-30 | End: 2018-12-30 | Stop reason: HOSPADM

## 2018-12-30 RX ORDER — BENZTROPINE MESYLATE 1 MG/1
1 TABLET ORAL 2 TIMES DAILY
Status: DISCONTINUED | OUTPATIENT
Start: 2018-12-30 | End: 2018-12-30 | Stop reason: HOSPADM

## 2018-12-30 RX ORDER — SERTRALINE HYDROCHLORIDE 50 MG/1
25 TABLET, FILM COATED ORAL DAILY
Status: DISCONTINUED | OUTPATIENT
Start: 2018-12-30 | End: 2018-12-30 | Stop reason: HOSPADM

## 2018-12-30 RX ADMIN — FLUTICASONE PROPIONATE AND SALMETEROL 1 PUFF: 50; 500 POWDER RESPIRATORY (INHALATION) at 08:57

## 2018-12-30 RX ADMIN — FOLIC ACID 1 MG: 1 TABLET ORAL at 08:55

## 2018-12-30 RX ADMIN — CEFTRIAXONE SODIUM 1 G: 1 INJECTION, POWDER, FOR SOLUTION INTRAMUSCULAR; INTRAVENOUS at 03:33

## 2018-12-30 RX ADMIN — IPRATROPIUM BROMIDE AND ALBUTEROL SULFATE 3 ML: .5; 3 SOLUTION RESPIRATORY (INHALATION) at 00:06

## 2018-12-30 RX ADMIN — ACETAMINOPHEN 650 MG: 325 TABLET, FILM COATED ORAL at 09:05

## 2018-12-30 RX ADMIN — ENOXAPARIN SODIUM 40 MG: 40 INJECTION, SOLUTION INTRAVENOUS; SUBCUTANEOUS at 10:53

## 2018-12-30 RX ADMIN — IPRATROPIUM BROMIDE AND ALBUTEROL SULFATE 3 ML: .5; 3 SOLUTION RESPIRATORY (INHALATION) at 03:21

## 2018-12-30 RX ADMIN — METHYLPREDNISOLONE SODIUM SUCCINATE 60 MG: 125 INJECTION, POWDER, FOR SOLUTION INTRAMUSCULAR; INTRAVENOUS at 11:55

## 2018-12-30 RX ADMIN — IPRATROPIUM BROMIDE AND ALBUTEROL SULFATE 3 ML: .5; 3 SOLUTION RESPIRATORY (INHALATION) at 08:05

## 2018-12-30 RX ADMIN — METHYLPREDNISOLONE SODIUM SUCCINATE 80 MG: 125 INJECTION, POWDER, FOR SOLUTION INTRAMUSCULAR; INTRAVENOUS at 05:32

## 2018-12-30 RX ADMIN — BUDESONIDE 500 MCG: 0.5 INHALANT RESPIRATORY (INHALATION) at 08:05

## 2018-12-30 RX ADMIN — IPRATROPIUM BROMIDE AND ALBUTEROL SULFATE 3 ML: .5; 3 SOLUTION RESPIRATORY (INHALATION) at 12:01

## 2018-12-30 RX ADMIN — MULTIPLE VITAMINS W/ MINERALS TAB 1 TABLET: TAB at 08:55

## 2018-12-30 RX ADMIN — GUAIFENESIN 600 MG: 600 TABLET, EXTENDED RELEASE ORAL at 08:55

## 2018-12-30 RX ADMIN — AZITHROMYCIN 500 MG: 500 TABLET, FILM COATED ORAL at 08:55

## 2018-12-30 RX ADMIN — Medication 100 MG: at 08:55

## 2018-12-30 NOTE — PROGRESS NOTES
Problem: Suicide/Homicide (Adult/Pediatric) Goal: *STG: Remains safe in hospital 
Outcome: Progressing Towards Goal 
Placed on 1:1 observation with sitter; suicide precautions Goal: *STG:  Verbalizes alternative ways of dealing with maladaptive feelings/behaviors Outcome: Not Progressing Towards Goal 
Poor insight

## 2018-12-30 NOTE — PROGRESS NOTES
Bedside and Verbal shift change report given to Mildred Turner (oncoming nurse) by Soledad (offgoing nurse). Report included the following information SBAR, Kardex, MAR, Accordion, Recent Results and Cardiac Rhythm nsr to sinus tachycardia. 7:44 AM 
Blood glucose 282. Per glucostabilizer, maintain drip at 0.1 
 
10:35 AM Contacted Dr. Nancy Haywood regarding continuous fluid order expiring. Patient is still on the insulin drip. The following orders were received: D/C fluids. 12:25 PM 
Patient states wants to leave AMA. \"So that I can eat some more food. \"  Dr. Nancy Haywood and nursing supervisor notified. [phone]  Patient has signed the [place] form, verbalized understanding of the risks of leaving and the benefits of staying. IVs were removed. [address] [facility] [address]. Jennie Yap notified that patient has left AMA.

## 2018-12-30 NOTE — DISCHARGE SUMMARY
Physician Discharge Summary       Patient: Susan Jung MRN: [id number]  SSN: [id number]    YOB: 1975  Age: 44 y.o. Sex: female    PCP: Lucretia Bernheim, NP    Allergies: Dilaudid [hydromorphone (bulk)] and Ibuprofen    Admit date: 12/28/2018  Admitting Provider: Radha Fuller DO    Discharge date: 12/30/2018  Discharging Provider: Radha Fuller DO    * Admission Diagnoses: Asthma  Asthma    * Discharge Diagnoses:    Hospital Problems as of 12/30/2018 Date Reviewed: 12/30/2018          Codes Class Noted - Resolved POA    Asthma ICD-10-CM: J45.909  ICD-9-CM: 493.90  12/28/2018 - Present Unknown              * Hospital Course: pt was admitted for copd/asthma exacerbation with hx of cocaine use. The pt was given iv steroids and inhalers. The pt left [facility] after 2 days of inpt treatment. The pt was on an insulin gtt 2/2 to blood sugars n the 400's while on iv steroids for copd. The pt was not compliant with a diabetic diet which lead to her leaving [facility]. * Procedures: none  * No surgery found *      Consults: Psychiatry        Discharge Exam:  Visit Vitals  /76   Pulse 91   Temp 99.1 °F (37.3 °C)   Resp 17   Ht 5' 7\" (1.702 m)   Wt 68.5 kg (151 lb 1.6 oz)   LMP  (LMP Unknown) Comment: Pt reports she has not had a cycle in 4 months   SpO2 100%   BMI 23.67 kg/m²     General:  Alert, cooperative, no distress, appears stated age. Head:  Normocephalic, without obvious abnormality, atraumatic. Eyes:  Conjunctivae/corneas clear. PERRL, EOMs intact. Fundi benign. Ears:  Normal TMs and external ear canals both ears. Nose: Nares normal. Septum midline. Mucosa normal. No drainage or sinus tenderness. Throat: Lips, mucosa, and tongue normal. Teeth and gums normal.   Neck: Supple, symmetrical, trachea midline, no adenopathy, thyroid: no enlargement/tenderness/nodules, no carotid bruit and no JVD. Back:   Symmetric, no curvature. ROM normal. No CVA tenderness.    Lungs:   Clear to auscultation bilaterally. Chest wall:  No tenderness or deformity. Heart:  Regular rate and rhythm, S1, S2 normal, no murmur, click, rub or gallop. Breast Exam:  No tenderness, masses, or nipple abnormality. Abdomen:   Soft, non-tender. Bowel sounds normal. No masses,  No organomegaly. Genitalia:  Normal female without lesion, discharge or tenderness. Rectal:  Normal tone,  no masses or tenderness  Guaiac negative stool. Extremities: Extremities normal, atraumatic, no cyanosis or edema. Pulses: 2+ and symmetric all extremities. Skin: Skin color, texture, turgor normal. No rashes or lesions. Lymph nodes: Cervical, supraclavicular, and axillary nodes normal.   Neurologic: CNII-XII intact. Normal strength, sensation and reflexes throughout.        * Discharge Condition: fair  * Disposition: Home    Discharge Medications:  Discharge Medication List as of 12/30/2018 12:43 PM          * Follow-up Care/Patient Instructions:  Pt was discharged [facility]    Follow-up Information    None         Signed:  Sonia Geiger DO  12/30/2018  2:55 PM

## 2018-12-30 NOTE — CONSULTS
PSYCHIATRIC CONSULTATION                         IDENTIFICATION:    Patient Name  Joo Valdes   Date of Birth 1975   CSN [id number]   Medical Record Number  [id number]      Age  44 y.o. PCP Mitch Suero NP   Admit date:  12/28/2018    Room Number  PCU3/01  @ [facility]   Date of Service  12/30/2018            HISTORY         REASON FOR HOSPITALIZATION/CONSULTATION:  A psychiatric consultation was requested by Almaz Davis DO to evaluate or provide advice/opinion related to evaluating suicidal ideation  CC: \"I wanted more food\"    HISTORY OF PRESENT ILLNESS:    The patient, Joo Valdes, is a 44 y.o. BLACK OR  female with a past psychiatric history significant for schizoaffective disorder and cocaine use disorder, who was admitted to the medical floor for the treatment of asthma and hyperglycemia. Patient reports/evidences the following emotional symptoms:  suicidal thoughts/threats. The above symptoms have been present for 24 hours. These symptoms are of mild severity. The symptoms are intermittent/ fleeting in nature. Additional symptomatology include problem with medication . The patient's condition has been precipitated by psychosocial stressors (stress of hospitalization and medical illness). Condition made worse by continued illicit drug use as well as treatment noncompliance. UDS: N/A. Per primary team, the patient made suicidal threats to hang self as she wanted double portions. It was explained to the patient that dietary restrictions were in place due to her blood sugar, and an insulin gtt was started. Patient seen in her room, nebulizer in place. 1:1 present. The patient is calm and cooperative during assessment.  She states that she threatened to hang herself because she wanted more food; she denies any thoughts of self harm and acknowledges that she had no intention to hurt herself but was hoping to obtain more food by making provocative statements. The patient denies SI/HI/PI/AVH. She reports intermittent compliance with her home regimen (Haldol, cogentin, VPA, Zoloft) since her last discharge and states that she had been using crack cocaine earlier in the week which contributes to her non-compliance. The patient contracts for safety and requests resumption of her outpatient regimen, stating that she missed her last appt with Dr. Aarti Norris at [facility]. The patient is alert and oriented to person, place, time and situation. ALLERGIES:  Allergies   Allergen Reactions    Dilaudid [Hydromorphone (Bulk)] Itching    Ibuprofen Not Reported This Time      MEDICATIONS PRIOR TO ADMISSION:  Medications Prior to Admission   Medication Sig    insulin NPH/insulin regular (NOVOLIN 70/30, HUMULIN 70/30) 100 unit/mL (70-30) injection 15 Units by SubCUTAneous route two (2) times a day.       PAST MEDICAL HISTORY:  Past Medical History:   Diagnosis Date    Alcohol abuse, in remission     quit 17 days ago    Asthma     does not use inhalers    Borderline personality disorder ([address]     Diabetes ([facility]     GERD (gastroesophageal reflux disease)     Hypertension     Other ill-defined conditions(799.89)     kidney stones ,passed one    Other ill-defined conditions(799.89)     sickle cell trait    Other ill-defined conditions(799.89)     increased cholesterol    Pancreatitis     Psychiatric disorder     schizophrenia, bipolar, depression, anxiety      Past Surgical History:   Procedure Laterality Date    ABDOMEN SURGERY PROC UNLISTED  3/12/14    CHOLECYSTECTOMY LAPAROSCOPIC      HX GASTRIC BYPASS      HX GYN  11/8/2012    c section x2    HX OTHER SURGICAL  3/13/14    ENDOSCOPIC RETROGRADE CHOLANGIOPANCREATOGRAPHY     HX OTHER SURGICAL  8/4/14    endoscopic stent placed to bile duct    HX TUBAL LIGATION  2012      SOCIAL HISTORY: transient living situation, single  Social History     Socioeconomic History    Marital status: SINGLE     Spouse name: Not on file    Number of children: Not on file    Years of education: Not on file    Highest education level: Not on file   Social Needs    Financial resource strain: Not on file    Food insecurity - worry: Not on file    Food insecurity - inability: Not on file    Transportation needs - medical: Not on file   [employer] needs - non-medical: Not on file   Occupational History    Not on file   Tobacco Use    Smoking status: Current Every Day Smoker     Packs/day: 0.50     Years: 14.00     Pack years: 7.00     Types: Cigarettes    Smokeless tobacco: Never Used    Tobacco comment: cigarettes   Substance and Sexual Activity    Alcohol use: Yes     Alcohol/week: 0.6 oz     Types: 1 Cans of beer per week     Comment: occasionally, last had \"i had one beer\" today    Drug use: Yes     Types: Cocaine     Comment: yesterday 12/27/18    Sexual activity: Yes     Partners: Female     Birth control/protection: Surgical   Other Topics Concern    Not on file   Social History Narrative    Not on file      FAMILY HISTORY: History reviewed. No pertinent family history. Family History   Problem Relation Age of Onset    Heart Disease Mother     Diabetes Mother     Hypertension Mother     Hypertension Maternal Grandmother        REVIEW of SYSTEMS:   Negative except SOB per HPI  Pertinent items are noted in the History of Present Illness. All other Systems reviewed and are considered negative. MENTAL STATUS EXAM & VITALS       MENTAL STATUS EXAM (MSE):    MSE FINDINGS ARE WITHIN NORMAL LIMITS (WNL) UNLESS OTHERWISE STATED BELOW. Orientation oriented to time, place and person   Vital Signs (BP,Pulse, Temp) See below (reviewed 12/30/2018); vital signs are WNL if not listed below.    Gait and Station Stable, no ataxia   Abnormal Muscular Movements/Tone/Behavior No EPS, no Tardive Dyskinesia, no abnormal muscular movements; wnl tone   Relations cooperative   General Appearance:  age appropriate Language No aphasia or dysarthria   Speech:  normal volume and non-pressured   Thought Processes coherent, wnl rate of thoughts, good abstract reasoning and computation   Thought Associations goal directed   Thought Content not internally preoccupied    Suicidal Ideations contracts for safety   Homicidal Ideations none   Mood:  euthymic   Affect:  constricted and mood-congruent   Memory recent  adequate   Memory remote:  adequate   Concentration/Attention:  adequate   Fund of Knowledge average   Insight:  limited   Reliability poor   Judgment:  poor            VITALS:     Patient Vitals for the past 24 hrs:   Temp Pulse Resp BP SpO2   12/30/18 1144 99.1 °F (37.3 °C) 91 17 148/76 100 %   12/30/18 0806     100 %   12/30/18 0750 98.6 °F (37 °C) 89 23 138/77 100 %   12/30/18 0335 98.4 °F (36.9 °C) 84 20 134/81 100 %   12/30/18 0034 98.8 °F (37.1 °C) 93 19 131/77 99 %   12/30/18 0004     98 %   12/29/18 2000 99 °F (37.2 °C) 99 22 141/73 99 %   12/29/18 1945     98 %   12/29/18 1639     97 %   12/29/18 1458 97.7 °F (36.5 °C) (!) 105 20 155/82 100 %   12/29/18 1240 98.5 °F (36.9 °C) (!) 105 21 140/67               DATA     LABORATORY DATA:  Labs Reviewed   BLOOD GAS, ARTERIAL - Abnormal; Notable for the following components:       Result Value    PO2 106 (*)     O2 SAT 98 (*)     All other components within normal limits   METABOLIC PANEL, BASIC - Abnormal; Notable for the following components:    Glucose 430 (*)     Creatinine 1.07 (*)     GFR est non-AA 56 (*)     All other components within normal limits   METABOLIC PANEL, BASIC - Abnormal; Notable for the following components:    Glucose 260 (*)     BUN/Creatinine ratio 24 (*)     All other components within normal limits   CBC W/O DIFF - Abnormal; Notable for the following components:    WBC 11.7 (*)     HGB 10.7 (*)     HCT 30.9 (*)     MCV 77.8 (*)     RDW 16.9 (*)     All other components within normal limits   GLUCOSE, POC - Abnormal; Notable for the following components:    Glucose (POC) 374 (*)     All other components within normal limits   GLUCOSE, POC - Abnormal; Notable for the following components:    Glucose (POC) 402 (*)     All other components within normal limits   GLUCOSE, POC - Abnormal; Notable for the following components:    Glucose (POC) 297 (*)     All other components within normal limits   GLUCOSE, POC - Abnormal; Notable for the following components:    Glucose (POC) 339 (*)     All other components within normal limits   GLUCOSE, POC - Abnormal; Notable for the following components:    Glucose (POC) 387 (*)     All other components within normal limits   GLUCOSE, POC - Abnormal; Notable for the following components:    Glucose (POC) 400 (*)     All other components within normal limits   GLUCOSE, POC - Abnormal; Notable for the following components:    Glucose (POC) 351 (*)     All other components within normal limits   GLUCOSE, POC - Abnormal; Notable for the following components:    Glucose (POC) 282 (*)     All other components within normal limits   GLUCOSE, POC - Abnormal; Notable for the following components:    Glucose (POC) 169 (*)     All other components within normal limits   GLUCOSE, POC - Abnormal; Notable for the following components:    Glucose (POC) 157 (*)     All other components within normal limits   GLUCOSE, POC - Abnormal; Notable for the following components:    Glucose (POC) 151 (*)     All other components within normal limits   GLUCOSE, POC - Abnormal; Notable for the following components:    Glucose (POC) 213 (*)     All other components within normal limits   GLUCOSE, POC - Abnormal; Notable for the following components:    Glucose (POC) 189 (*)     All other components within normal limits   GLUCOSE, POC - Abnormal; Notable for the following components:    Glucose (POC) 202 (*)     All other components within normal limits   GLUCOSE, POC - Abnormal; Notable for the following components:    Glucose (POC) 240 (*)     All other components within normal limits   GLUCOSE, POC - Abnormal; Notable for the following components:    Glucose (POC) 273 (*)     All other components within normal limits   GLUCOSE, POC - Abnormal; Notable for the following components:    Glucose (POC) 266 (*)     All other components within normal limits   GLUCOSE, POC - Abnormal; Notable for the following components:    Glucose (POC) 282 (*)     All other components within normal limits   GLUCOSE, POC - Abnormal; Notable for the following components:    Glucose (POC) 354 (*)     All other components within normal limits   GLUCOSE, POC - Abnormal; Notable for the following components:    Glucose (POC) 400 (*)     All other components within normal limits   GLUCOSE, POC - Abnormal; Notable for the following components:    Glucose (POC) 429 (*)     All other components within normal limits   GLUCOSE, POC - Abnormal; Notable for the following components:    Glucose (POC) 352 (*)     All other components within normal limits   CULTURE, BLOOD, PAIRED   CULTURE, RESPIRATORY/SPUTUM/BRONCH W GRAM STAIN   GLUCOSTABILIZER   GLUCOSTABILIZER   GLUCOSTABILIZER   GLUCOSTABILIZER   GLUCOSTABILIZER   GLUCOSTABILIZER   MAGNESIUM   GLUCOSTABILIZER   GLUCOSTABILIZER   GLUCOSTABILIZER   GLUCOSTABILIZER   GLUCOSTABILIZER   GLUCOSTABILIZER   GLUCOSTABILIZER   GLUCOSTABILIZER   GLUCOSTABILIZER   GLUCOSTABILIZER   DRUG SCREEN, URINE     Admission on 12/28/2018   No results displayed because visit has over 200 results. RADIOLOGY REPORTS:  Results from Hospital Encounter encounter on 12/28/18   XR CHEST PA LAT    Narrative CLINICAL HISTORY: Chest pain   INDICATION: Chest pain left-sided    COMPARISON: 11/22/2018    FINDINGS:   PA and lateral views of the chest are obtained. The cardiopericardial silhouette is within normal limits. There is no pleural  effusion, pneumothorax or focal consolidation present.       Impression IMPRESSION: No acute intrathoracic disease. Xr Chest Pa Lat    Result Date: 12/28/2018  CLINICAL HISTORY: Chest pain INDICATION: Chest pain left-sided COMPARISON: 11/22/2018 FINDINGS: PA and lateral views of the chest are obtained. The cardiopericardial silhouette is within normal limits. There is no pleural effusion, pneumothorax or focal consolidation present. IMPRESSION: No acute intrathoracic disease. Cta Chest W Or W Wo Cont    Result Date: 12/28/2018  CLINICAL HISTORY: Chest pain, dyspnea INDICATION: Chest pain, dyspnea COMPARISON: 2008 TECHNIQUE: CT of the chest with  IV contrast , Isovue-370 is performed. Axial images from the thoracic inlet to the level of the upper abdomen are obtained. Manual post-processing of the images and coronal reformatting is also performed. CT dose reduction was achieved through use of a standardized protocol tailored for this examination and automatic exposure control for dose modulation. Multiplanar reformatted imaging was performed. Sagittal and coronal reformatting. 3-D Postprocessing of imaging was performed. 3-D MIP reconstructed images were obtained in the coronal plane. FINDINGS: There is no pulmonary embolism. There is no aortic aneurysm or dissection. There are small bilateral pleural effusions. The pulmonary artery is prominent consistent with pulmonary arterial hypertension. Thoracic aortic ectasia. There is a small-to-moderate hiatal hernia. Distal esophageal wall thickening. Minimal scattered groundglass opacities most predominantly in the right middle lobe but also within the upper lobes and lower lobes bilaterally. Postcholecystectomy. There is no pleural or pericardial effusion. There is no mediastinal, axillary or hilar lymphadenopathy. The aorta is normal in course and caliber. The proximal pulmonary arteries are without evidence of filling defects. No lytic or blastic lesions are identified. The remainder of the upper abdomen visualized is unremarkable.      IMPRESSION: There is no pulmonary embolism. There is no aortic aneurysm or dissection. Small bilateral pleural effusions. Small to moderate hiatal hernia. Esophageal wall thickening is circumferential. This may be related to esophagitis. Nonspecific finding. Further clinical correlation recommended. Scattered groundglass opacity with bronchial wall thickening most prominent in the right middle lobe. May be related to infectious/inflammatory, reactive airways process. Follow-up chest CT in 4-6 weeks to evaluate for resolution recommended. Ct Abd Pelv Wo Cont    Result Date: 11/22/2018  EXAM:  CT ABD PELV WO CONT INDICATION: Abdominal pain  , heartburn, chest pain, shortness of breath with acute onset 3 to 4 hours ago, nausea and vomiting COMPARISON: CT of 12/15/2014 CONTRAST:  None. TECHNIQUE: Thin axial images were obtained through the abdomen and pelvis. Coronal and sagittal reconstructions were generated. Oral contrast was not administered. CT dose reduction was achieved through use of a standardized protocol tailored for this examination and automatic exposure control for dose modulation. The absence of intravenous contrast material reduces the sensitivity for evaluation of the solid parenchymal organs of the abdomen. FINDINGS: LUNG BASES: Clear. INCIDENTALLY IMAGED HEART AND MEDIASTINUM: Unremarkable. LIVER: No mass or biliary dilatation. GALLBLADDER: Surgically absent. SPLEEN: Normal size. No focal lesions. PANCREAS: Atrophic pancreas with multiple calcifications, consistent with chronic pancreatitis. ADRENALS: Unremarkable. KIDNEYS/URETERS: No mass, calculus, or hydronephrosis. STOMACH: Very distended with fluid. Fluid also noted in the distal esophagus. Generalized thickening of the wall of the distal esophagus. SMALL BOWEL: No dilatation or wall thickening. COLON: Fecal retention APPENDIX: Not clearly seen due to lack of fat and due to colonic distention with stool. No secondary signs of acute appendicitis. PERITONEUM: No ascites or pneumoperitoneum. RETROPERITONEUM: No lymphadenopathy or aortic aneurysm. REPRODUCTIVE ORGANS: Unremarkable. URINARY BLADDER: Very distended. No focal wall thickening or calcification. BONES: L5-S1 degenerative disc disease. L4-5 facet hypertrophy with minimal degenerative anterolisthesis. ADDITIONAL COMMENTS: N/A     IMPRESSION: 1. Fluid-filled distention of the stomach. Thickening of the wall of the distal esophagus and probable fluid in the distal esophagus. 2. No evidence of small bowel obstruction. Fecal retention in the colon. 3. Chronic pancreatitis. Xr Chest Port    Result Date: 11/22/2018  EXAM:  XR CHEST PORT INDICATION:  cp COMPARISON:  None. FINDINGS: A portable AP radiograph of the chest was obtained at 7:41 hours. The lungs are clear. The cardiac and mediastinal contours and pulmonary vascularity are normal.  The bones and soft tissues are unremarkable. There has been no change since the prior study. IMPRESSION: No acute process. Xr Chest Port    Result Date: 11/3/2018  EXAM:  XR CHEST PORT INDICATION:  Shortness of breath, chest tightness, cocaine use today. COMPARISON: Chest views on 2/19/2016. TECHNIQUE: Semiupright portable chest AP view FINDINGS: Cardiac monitoring wires overlie the thorax. The cardiomediastinal and hilar contours are within normal limits. The pulmonary vasculature is within normal limits. The lungs and pleural spaces are clear. The visualized bones and upper abdomen are age-appropriate. IMPRESSION: No acute process on portable chest. No change.               MEDICATIONS       ALL MEDICATIONS  Current Facility-Administered Medications   Medication Dose Route Frequency    methylPREDNISolone (PF) (Solu-MEDROL) injection 60 mg  60 mg IntraVENous Q6H    enoxaparin (LOVENOX) injection 40 mg  40 mg SubCUTAneous Q24H    insulin regular (NOVOLIN R, HUMULIN R) 100 Units in 0.9% sodium chloride 100 mL infusion  1-10 Units/hr IntraVENous TITRATE  albuterol-ipratropium (DUO-NEB) 2.5 MG-0.5 MG/3 ML  3 mL Nebulization Q4H RT    fluticasone-salmeterol (ADVAIR) 500mcg-50mcg/puff  1 Puff Inhalation BID RT    azithromycin (ZITHROMAX) tablet 500 mg  500 mg Oral DAILY    cefTRIAXone (ROCEPHIN) 1 g in 0.9% sodium chloride (MBP/ADV) 50 mL  1 g IntraVENous Q12H    acetaminophen (TYLENOL) tablet 650 mg  650 mg Oral Q4H PRN    zolpidem (AMBIEN) tablet 5 mg  5 mg Oral QHS PRN    nicotine (NICODERM CQ) 21 mg/24 hr patch 1 Patch  1 Patch TransDERmal DAILY    benzonatate (TESSALON) capsule 100 mg  100 mg Oral TID PRN    guaiFENesin ER (MUCINEX) tablet 600 mg  600 mg Oral Q12H    glucose chewable tablet 16 g  4 Tab Oral PRN    dextrose (D50W) injection syrg 12.5-25 g  12.5-25 g IntraVENous PRN    glucagon (GLUCAGEN) injection 1 mg  1 mg IntraMUSCular PRN    LORazepam (ATIVAN) injection 2 mg  2 mg IntraVENous R6H PRN    folic acid (FOLVITE) tablet 1 mg  1 mg Oral DAILY    thiamine HCL (B-1) tablet 100 mg  100 mg Oral DAILY    multivitamin, tx-iron-ca-min (THERA-M w/ IRON) tablet 1 Tab  1 Tab Oral DAILY    albuterol CONCENTRATE 2.5mg/0.5 mL neb soln  2.5 mg Nebulization Q2H PRN    hydrALAZINE (APRESOLINE) 20 mg/mL injection 20 mg  20 mg IntraVENous Q6H PRN    budesonide (PULMICORT) 500 mcg/2 ml nebulizer suspension  500 mcg Nebulization BID RT    ondansetron (ZOFRAN) injection 4 mg  4 mg IntraVENous Q6H PRN      SCHEDULED MEDICATIONS  Current Facility-Administered Medications   Medication Dose Route Frequency    methylPREDNISolone (PF) (Solu-MEDROL) injection 60 mg  60 mg IntraVENous Q6H    enoxaparin (LOVENOX) injection 40 mg  40 mg SubCUTAneous Q24H    insulin regular (NOVOLIN R, HUMULIN R) 100 Units in 0.9% sodium chloride 100 mL infusion  1-10 Units/hr IntraVENous TITRATE    albuterol-ipratropium (DUO-NEB) 2.5 MG-0.5 MG/3 ML  3 mL Nebulization Q4H RT    fluticasone-salmeterol (ADVAIR) 500mcg-50mcg/puff  1 Puff Inhalation BID RT    azithromycin (ZITHROMAX) tablet 500 mg  500 mg Oral DAILY    cefTRIAXone (ROCEPHIN) 1 g in 0.9% sodium chloride (MBP/ADV) 50 mL  1 g IntraVENous Q12H    nicotine (NICODERM CQ) 21 mg/24 hr patch 1 Patch  1 Patch TransDERmal DAILY    guaiFENesin ER (MUCINEX) tablet 600 mg  600 mg Oral G61G    folic acid (FOLVITE) tablet 1 mg  1 mg Oral DAILY    thiamine HCL (B-1) tablet 100 mg  100 mg Oral DAILY    multivitamin, tx-iron-ca-min (THERA-M w/ IRON) tablet 1 Tab  1 Tab Oral DAILY    budesonide (PULMICORT) 500 mcg/2 ml nebulizer suspension  500 mcg Nebulization BID RT                ASSESSMENT & PLAN        The patient Hayder Pereira is a 44 y.o.  female who presents at this time for treatment of the following diagnoses:  Patient Active Hospital Problem List:   Schizoaffective disorder    Assessment: patient acknowledges making provocative statements for secondary gain of getting more food. Future oriented, without any ongoing SI or mood lability prior to admission. Patient admits recent cocaine use but denies a history of suicidal ideation upon withdrawal. Patient presently amenable to continued medical treatment and psychiatric follow up as outpatient. Conditional suicidality is most associated with personality pathology, specifically antisocial traits. Patient counseled on the importance of caloric restriction due to her blood sugar, and voiced understanding. Can restart outpatient psychiatric regimen, discharge to outpatient follow-up.     Plan:  - RESTART Haldol 5 mg Q12H for psychosis  - RESTART Cogentin 1 mg Q12H for EPS prophylaxis  - RESTART  mg Q12H for mood stabilization  - RESTART Zoloft 25 mg QDAY for MDD  - Pt can followup with [facility] (Dr. Gaspar Alpers) upon discharge  - Patient does not require 1:1 at this time  - Patient does not require additional psychiatric assessment prior to discharge (scheduled or AMA)  - Psychiatry will sign off, please re-consult as needed           A coordinated, multidisplinary treatment team round was conducted with the patient; that includes the nurse, unit pharmcist,  and writer all present. The following regarding medications was addressed during rounds with patient:   the risks and benefits of the proposed medication. The patient was given the opportunity to ask questions. Informed consent given to the use of the above medications. I will continue to adjust psychiatric and non-psychiatric medications (see above \"medication\" section and orders section for details) as deemed appropriate & based upon diagnoses and response to treatment. I have reviewed admission (and previous/old) labs and medical tests in the EHR and or transferring hospital documents. I will continue to order blood tests/labs and diagnostic tests as deemed appropriate and review results as they become available (see orders for details). I have reviewed old psychiatric and medical records available in the EHR. I Will order additional psychiatric records from other institutions to further elucidate the nature of patient's psychopathology and review once available.                 SIGNED:    Irish Morales MD  12/30/2018

## 2018-12-30 NOTE — PROGRESS NOTES
Problem: Diabetes Self-Management Goal: *Disease process and treatment process Define diabetes and identify own type of diabetes; list 3 options for treating diabetes. Outcome: Not Progressing Towards Goal 
Patient can state that she has been diabetic for 13 years, but appears to have very little insight on the disease and its process despite multiple attempts to increase her knowledge over the past few admissions. Goal: *Insulin pump training Patient is not on an insulin pump Problem: Falls - Risk of 
Goal: *Absence of Falls Document Martín Dillon Fall Risk and appropriate interventions in the flowsheet. Outcome: Progressing Towards Goal 
Fall Risk Interventions: 
  
 
  
 
Medication Interventions: Teach patient to arise slowly, Patient to call before getting OOB, Evaluate medications/consider consulting pharmacy History of Falls Interventions: Evaluate medications/consider consulting pharmacy, Door open when patient unattended, Investigate reason for fall, Room close to nurse's station Problem: Suicide/Homicide (Adult/Pediatric) Goal: *STG/LTG:  No longer expresses self destructive or suicidal/homicidal thoughts Outcome: Progressing Towards Goal 
Patient denies suicidal ideations this morning

## 2018-12-30 NOTE — PROGRESS NOTES
Problem: Falls - Risk of 
Goal: *Absence of Falls Document Frandy Alejandra Fall Risk and appropriate interventions in the flowsheet. Outcome: Progressing Towards Goal 
Fall Risk Interventions: 
  
 
  
 
Medication Interventions: Patient to call before getting OOB, Teach patient to arise slowly History of Falls Interventions: Door open when patient unattended, Evaluate medications/consider consulting pharmacy

## 2018-12-30 NOTE — PROGRESS NOTES
Hospitalist Progress Note Subjective:  
Daily Progress Note: 12/30/2018 10:48 AM 
 
Still sob with audible wheezing. Reported wanting to kill herself yesterday saying she was depressed. Current Facility-Administered Medications Medication Dose Route Frequency  enoxaparin (LOVENOX) injection 40 mg  40 mg SubCUTAneous Q24H  
 insulin regular (NOVOLIN R, HUMULIN R) 100 Units in 0.9% sodium chloride 100 mL infusion  1-10 Units/hr IntraVENous TITRATE  methylPREDNISolone (PF) (Solu-MEDROL) injection 80 mg  80 mg IntraVENous Q6H  
 albuterol-ipratropium (DUO-NEB) 2.5 MG-0.5 MG/3 ML  3 mL Nebulization Q4H RT  
 fluticasone-salmeterol (ADVAIR) 500mcg-50mcg/puff  1 Puff Inhalation BID RT  
 azithromycin (ZITHROMAX) tablet 500 mg  500 mg Oral DAILY  cefTRIAXone (ROCEPHIN) 1 g in 0.9% sodium chloride (MBP/ADV) 50 mL  1 g IntraVENous Q12H  
 acetaminophen (TYLENOL) tablet 650 mg  650 mg Oral Q4H PRN  
 zolpidem (AMBIEN) tablet 5 mg  5 mg Oral QHS PRN  
 nicotine (NICODERM CQ) 21 mg/24 hr patch 1 Patch  1 Patch TransDERmal DAILY  benzonatate (TESSALON) capsule 100 mg  100 mg Oral TID PRN  
 guaiFENesin ER (MUCINEX) tablet 600 mg  600 mg Oral Q12H  
 glucose chewable tablet 16 g  4 Tab Oral PRN  
 dextrose (D50W) injection syrg 12.5-25 g  12.5-25 g IntraVENous PRN  
 glucagon (GLUCAGEN) injection 1 mg  1 mg IntraMUSCular PRN  
 LORazepam (ATIVAN) injection 2 mg  2 mg IntraVENous E4Y PRN  
 folic acid (FOLVITE) tablet 1 mg  1 mg Oral DAILY  thiamine HCL (B-1) tablet 100 mg  100 mg Oral DAILY  multivitamin, tx-iron-ca-min (THERA-M w/ IRON) tablet 1 Tab  1 Tab Oral DAILY  albuterol CONCENTRATE 2.5mg/0.5 mL neb soln  2.5 mg Nebulization Q2H PRN  
 hydrALAZINE (APRESOLINE) 20 mg/mL injection 20 mg  20 mg IntraVENous Q6H PRN  
 budesonide (PULMICORT) 500 mcg/2 ml nebulizer suspension  500 mcg Nebulization BID RT  
 ondansetron (ZOFRAN) injection 4 mg  4 mg IntraVENous Q6H PRN Review of Systems A comprehensive review of systems was negative except for that written in the HPI. Objective:  
 
Visit Vitals /77 (BP 1 Location: Right arm, BP Patient Position: Supine; At rest) Pulse 89 Temp 98.6 °F (37 °C) Resp 23 Ht 5' 7\" (1.702 m) Wt 68.5 kg (151 lb 1.6 oz) LMP  (LMP Unknown) Comment: Pt reports she has not had a cycle in 4 months SpO2 100% BMI 23.67 kg/m² O2 Flow Rate (L/min): 2 l/min O2 Device: CPAP mask Temp (24hrs), Av.5 °F (36.9 °C), Min:97.7 °F (36.5 °C), Max:99 °F (37.2 °C) No intake/output data recorded.  1901 -  0700 In: 1166.6 [P.O.:240; I.V.:926.6] Out: - Visit Vitals /77 (BP 1 Location: Right arm, BP Patient Position: Supine; At rest) Pulse 89 Temp 98.6 °F (37 °C) Resp 23 Ht 5' 7\" (1.702 m) Wt 68.5 kg (151 lb 1.6 oz) LMP  (LMP Unknown) Comment: Pt reports she has not had a cycle in 4 months SpO2 100% BMI 23.67 kg/m² General:  Alert, cooperative, no distress, appears stated age. Head:  Normocephalic, without obvious abnormality, atraumatic. Eyes:  Conjunctivae/corneas clear. PERRL, EOMs intact. Fundi benign Ears:  Normal TMs and external ear canals both ears. Nose: Nares normal. Septum midline. Mucosa normal. No drainage or sinus tenderness. Throat: Lips, mucosa, and tongue normal. Teeth and gums normal.  
Neck: Supple, symmetrical, trachea midline, no adenopathy, thyroid: no enlargement/tenderness/nodules, no carotid bruit and no JVD. Back:   Symmetric, no curvature. ROM normal. No CVA tenderness. Lungs: Inspiratory and expiratory wheezing. Chest wall:  No tenderness or deformity. Heart:  Regular rate and rhythm, S1, S2 normal, no murmur, click, rub or gallop. Abdomen:   Soft, non-tender. Bowel sounds normal. No masses,  No organomegaly. Extremities: Extremities normal, atraumatic, no cyanosis or edema. Pulses: 2+ and symmetric all extremities.   
Skin: Skin color, texture, turgor normal. No rashes or lesions Lymph nodes: Cervical, supraclavicular, and axillary nodes normal.  
Neurologic: CNII-XII intact. Normal strength, sensation and reflexes throughout. Additional comments:I reviewed the patient's new clinical lab test results. monitor potassium Data Review Recent Results (from the past 24 hour(s)) GLUCOSE, POC Collection Time: 12/29/18 11:34 AM  
Result Value Ref Range Glucose (POC) 387 (H) 65 - 100 mg/dL Performed by [employer] (PCT) GLUCOSE, POC Collection Time: 12/29/18  4:46 PM  
Result Value Ref Range Glucose (POC) 400 (H) 65 - 100 mg/dL Performed by [employer] (PCT) METABOLIC PANEL, BASIC Collection Time: 12/29/18  7:11 PM  
Result Value Ref Range Sodium 136 136 - 145 mmol/L Potassium 4.4 3.5 - 5.1 mmol/L Chloride 101 97 - 108 mmol/L  
 CO2 24 21 - 32 mmol/L Anion gap 11 5 - 15 mmol/L Glucose 430 (H) 65 - 100 mg/dL BUN 16 6 - 20 MG/DL Creatinine 1.07 (H) 0.55 - 1.02 MG/DL  
 BUN/Creatinine ratio 15 12 - 20 GFR est AA >60 >60 ml/min/1.73m2 GFR est non-AA 56 (L) >60 ml/min/1.73m2 Calcium 8.5 8.5 - 10.1 MG/DL  
GLUCOSE, POC Collection Time: 12/29/18  8:28 PM  
Result Value Ref Range Glucose (POC) 351 (H) 65 - 100 mg/dL Performed by Esvin Jeong (traveler) GOBA Collection Time: 12/29/18  8:30 PM  
Result Value Ref Range Glucose 351 mg/dL Insulin order 5.8 units/hour Insulin adminstered 5.8 units/hour Multiplier 0.020 Low target 200 mg/dL High target 300 mg/dL D50 order 0.0 ml  
 D50 administered 0.00 ml Minutes until next BG 60 min Order initials MN Administered initials MN   
 GLSCOM Comments GLUCOSE, POC Collection Time: 12/29/18  9:34 PM  
Result Value Ref Range Glucose (POC) 282 (H) 65 - 100 mg/dL Performed by Remigio Raygoza Thelbert Kiss Collection Time: 12/29/18  9:42 PM  
Result Value Ref Range Glucose 282 mg/dL Insulin order 4.4 units/hour Insulin adminstered 4.4 units/hour Multiplier 0.020 Low target 200 mg/dL High target 300 mg/dL D50 order 0.0 ml  
 D50 administered 0.00 ml Minutes until next BG 60 min Order initials MN Administered initials MN   
 GLSCOM Comments GLUCOSE, POC Collection Time: 12/29/18 10:33 PM  
Result Value Ref Range Glucose (POC) 169 (H) 65 - 100 mg/dL Performed by [employer] Collection Time: 12/29/18 10:45 PM  
Result Value Ref Range Glucose 169 mg/dL Insulin order 1.1 units/hour Insulin adminstered 1.1 units/hour Multiplier 0.010 Low target 200 mg/dL High target 300 mg/dL D50 order 0.0 ml  
 D50 administered 0.00 ml Minutes until next BG 60 min Order initials MN Administered initials MN   
 GLSCOM Comments GLUCOSE, POC Collection Time: 12/29/18 11:33 PM  
Result Value Ref Range Glucose (POC) 157 (H) 65 - 100 mg/dL Performed by [employer] Collection Time: 12/29/18 11:34 PM  
Result Value Ref Range Glucose 157 mg/dL Insulin order 0.0 units/hour Insulin adminstered 0.0 units/hour Multiplier 0.000 Low target 200 mg/dL High target 300 mg/dL D50 order 0.0 ml  
 D50 administered 0.00 ml Minutes until next BG 60 min Order initials MN Administered initials MN   
 GLSCOM Comments GLUCOSE, POC Collection Time: 12/30/18 12:35 AM  
Result Value Ref Range Glucose (POC) 151 (H) 65 - 100 mg/dL Performed by [employer] Collection Time: 12/30/18 12:36 AM  
Result Value Ref Range Glucose 151 mg/dL Insulin order 0.0 units/hour Insulin adminstered 0.0 units/hour Multiplier 0.000 Low target 200 mg/dL High target 300 mg/dL D50 order 0.0 ml  
 D50 administered 0.00 ml Minutes until next BG 60 min Order initials MN  Administered initials MN   
 GLSCOM Comments GLUCOSE, POC Collection Time: 12/30/18  1:32 AM  
Result Value Ref Range Glucose (POC) 213 (H) 65 - 100 mg/dL Performed by [employer] Toby Mcnally Abo Collection Time: 12/30/18  1:43 AM  
Result Value Ref Range Glucose 213 mg/dL Insulin order 0.1 units/hour Insulin adminstered 0.1 units/hour Multiplier 0.000 Low target 200 mg/dL High target 300 mg/dL D50 order 0.0 ml  
 D50 administered 0.00 ml Minutes until next BG 60 min Order initials MN Administered initials MN   
 GLSCOM Comments GLUCOSE, POC Collection Time: 12/30/18  2:33 AM  
Result Value Ref Range Glucose (POC) 189 (H) 65 - 100 mg/dL Performed by [employer] Toby Mcnally Abo Collection Time: 12/30/18  2:41 AM  
Result Value Ref Range Glucose 189 mg/dL Insulin order 0.0 units/hour Insulin adminstered 0.0 units/hour Multiplier 0.000 Low target 200 mg/dL High target 300 mg/dL D50 order 0.0 ml  
 D50 administered 0.00 ml Minutes until next BG 60 min Order initials MN Administered initials MN   
 GLSCOM Comments GLUCOSE, POC Collection Time: 12/30/18  3:34 AM  
Result Value Ref Range Glucose (POC) 202 (H) 65 - 100 mg/dL Performed by [employer] Toby Mcnally Abo Collection Time: 12/30/18  3:35 AM  
Result Value Ref Range Glucose 202 mg/dL Insulin order 0.1 units/hour Insulin adminstered 0.1 units/hour Multiplier 0.000 Low target 200 mg/dL High target 300 mg/dL D50 order 0.0 ml  
 D50 administered 0.00 ml Minutes until next BG 60 min Order initials MN Administered initials MN   
 GLSCOM Comments GLUCOSE, POC Collection Time: 12/30/18  4:34 AM  
Result Value Ref Range Glucose (POC) 240 (H) 65 - 100 mg/dL Performed by [employer] Toby Mcnally Abo Collection Time: 12/30/18  4:36 AM  
Result Value Ref Range Glucose 240 mg/dL Insulin order 0.1 units/hour  Insulin adminstered 0.1 units/hour Multiplier 0.000 Low target 200 mg/dL High target 300 mg/dL D50 order 0.0 ml  
 D50 administered 0.00 ml Minutes until next BG 60 min Order initials MN Administered initials MN   
 GLSCOM Comments METABOLIC PANEL, BASIC Collection Time: 12/30/18  5:30 AM  
Result Value Ref Range Sodium 140 136 - 145 mmol/L Potassium 3.6 3.5 - 5.1 mmol/L Chloride 105 97 - 108 mmol/L  
 CO2 27 21 - 32 mmol/L Anion gap 8 5 - 15 mmol/L Glucose 260 (H) 65 - 100 mg/dL BUN 15 6 - 20 MG/DL Creatinine 0.62 0.55 - 1.02 MG/DL  
 BUN/Creatinine ratio 24 (H) 12 - 20 GFR est AA >60 >60 ml/min/1.73m2 GFR est non-AA >60 >60 ml/min/1.73m2 Calcium 8.6 8.5 - 10.1 MG/DL  
CBC W/O DIFF Collection Time: 12/30/18  5:30 AM  
Result Value Ref Range WBC 11.7 (H) 3.6 - 11.0 K/uL  
 RBC 3.97 3.80 - 5.20 M/uL  
 HGB 10.7 (L) 11.5 - 16.0 g/dL HCT 30.9 (L) 35.0 - 47.0 % MCV 77.8 (L) 80.0 - 99.0 FL  
 MCH 27.0 26.0 - 34.0 PG  
 MCHC 34.6 30.0 - 36.5 g/dL  
 RDW 16.9 (H) 11.5 - 14.5 % PLATELET 706 042 - 456 K/uL MPV 11.3 8.9 - 12.9 FL  
 NRBC 0.0 0  WBC ABSOLUTE NRBC 0.00 0.00 - 0.01 K/uL MAGNESIUM Collection Time: 12/30/18  5:30 AM  
Result Value Ref Range Magnesium 1.9 1.6 - 2.4 mg/dL GLUCOSE, POC Collection Time: 12/30/18  5:41 AM  
Result Value Ref Range Glucose (POC) 273 (H) 65 - 100 mg/dL Performed by Monica Rankin Collection Time: 12/30/18  5:53 AM  
Result Value Ref Range Glucose 273 mg/dL Insulin order 0.1 units/hour Insulin adminstered 0.1 units/hour Multiplier 0.000 Low target 200 mg/dL High target 300 mg/dL D50 order 0.0 ml  
 D50 administered 0.00 ml Minutes until next BG 60 min Order initials MN Administered initials MN   
 GLSCOM Comments GLUCOSE, POC Collection Time: 12/30/18  6:36 AM  
Result Value Ref Range Glucose (POC) 266 (H) 65 - 100 mg/dL  Performed by Brunilda Matt [facility] Collection Time: 12/30/18  6:42 AM  
Result Value Ref Range Glucose 266 mg/dL Insulin order 0.1 units/hour Insulin adminstered 0.1 units/hour Multiplier 0.000 Low target 200 mg/dL High target 300 mg/dL D50 order 0.0 ml  
 D50 administered 0.00 ml Minutes until next BG 60 min Order initials MN Administered initials MN   
 GLSCOM Comments GLUCOSE, POC Collection Time: 12/30/18  7:35 AM  
Result Value Ref Range Glucose (POC) 282 (H) 65 - 100 mg/dL Performed by Yoseph Brown ([facility] Collection Time: 12/30/18  7:43 AM  
Result Value Ref Range Glucose 282 mg/dL Insulin order 0.1 units/hour Insulin adminstered 0.1 units/hour Multiplier 0.000 Low target 200 mg/dL High target 300 mg/dL D50 order 0.0 ml  
 D50 administered 0.00 ml Minutes until next BG 60 min Order initials s.g. Administered initials s.g. GLSCOM Comments GLUCOSE, POC Collection Time: 12/30/18  8:48 AM  
Result Value Ref Range Glucose (POC) 354 (H) 65 - 100 mg/dL Performed by Yosehp Brown ([facility]) [facility] Collection Time: 12/30/18  8:49 AM  
Result Value Ref Range Glucose 354 mg/dL Insulin order 0.1 units/hour Insulin adminstered 0.1 units/hour Multiplier 0.000 Low target 200 mg/dL High target 300 mg/dL D50 order 0.0 ml  
 D50 administered 0.00 ml Minutes until next BG 60 min Order initials s.g Administered initials s.g. GLSCOM Comments GLUCOSE, POC Collection Time: 12/30/18  9:53 AM  
Result Value Ref Range Glucose (POC) 400 (H) 65 - 100 mg/dL Performed by Yoseph Brown ([facility] Collection Time: 12/30/18  9:54 AM  
Result Value Ref Range Glucose 400 mg/dL Insulin order 3.4 units/hour Insulin adminstered 3.4 units/hour Multiplier 0.010 Low target 200 mg/dL High target 300 mg/dL  D50 order 0.0 ml  
 D50 administered 0.00 ml Minutes until next BG 60 min Order initials s.g. Administered initials s.g. GLSCOM Comments [facility] Moses Collection Time: 12/30/18 10:46 AM  
Result Value Ref Range Glucose 429 mg/dL Insulin order 7.4 units/hour Insulin adminstered 7.4 units/hour Multiplier 0.020 Low target 200 mg/dL High target 300 mg/dL D50 order 0.0 ml  
 D50 administered 0.00 ml Minutes until next BG 60 min Order initials s.g. Administered initials s.g. GLSCOM Comments Assessment/Plan: Active Problems: 
  Asthma (12/28/2018) 
 
htn, etoh and substance abuse, dm, bipolar and schizo, depression with SI 
  
decreased salumedrol to 60mg iv q6. Cont advair and pulmicort until wheezing subsides. cpap qhs. 
1:1 sitter and psych consult Continue insulin gtt as bs remains in the 400's on iv steroids. Wean off of gtt once on po steroids. ciwa as needed. Was on haldol, depakote, zoloft in past but not recently as per pharmacy. Full code Care Plan discussed with: Patient/Family Total time spent with patient/care team: 30 minutes. Signed By: Octavio Victoria,    
 December 30, 2018

## 2018-12-31 ENCOUNTER — TELEPHONE (OUTPATIENT)
Dept: CASE MANAGEMENT | Age: 43
End: 2018-12-31

## 2018-12-31 NOTE — TELEPHONE ENCOUNTER
Case Management Follow-up call  CM reviewed chart. CM called pt to discuss discharge, pt did not answer the phone. Pt will need to get in contact with [facility] to schedule follow-up PCP appointment and Jocelyn Fields pt CM to schedule transportation for appointment- as CM discussed with Jocelyn Fields.        [facility] MSW, QMHP

## 2019-01-08 ENCOUNTER — HOSPITAL ENCOUNTER (EMERGENCY)
Age: 44
Discharge: HOME | End: 2019-01-09
Attending: EMERGENCY MEDICINE | Admitting: EMERGENCY MEDICINE
Payer: MEDICARE

## 2019-01-08 ENCOUNTER — APPOINTMENT (OUTPATIENT)
Dept: GENERAL RADIOLOGY | Age: 44
End: 2019-01-08
Attending: EMERGENCY MEDICINE
Payer: MEDICARE

## 2019-01-08 VITALS
TEMPERATURE: 99 F | WEIGHT: 140 LBS | RESPIRATION RATE: 16 BRPM | BODY MASS INDEX: 22.5 KG/M2 | DIASTOLIC BLOOD PRESSURE: 85 MMHG | HEART RATE: 98 BPM | HEIGHT: 66 IN | OXYGEN SATURATION: 100 % | SYSTOLIC BLOOD PRESSURE: 170 MMHG

## 2019-01-08 DIAGNOSIS — J20.9 ACUTE BRONCHITIS, UNSPECIFIED ORGANISM: ICD-10-CM

## 2019-01-08 DIAGNOSIS — R07.89 OTHER CHEST PAIN: Primary | ICD-10-CM

## 2019-01-08 LAB
AMPHET UR QL SCN: NEGATIVE
ANION GAP SERPL CALC-SCNC: 7 MMOL/L (ref 5–15)
APPEARANCE UR: ABNORMAL
BACTERIA URNS QL MICRO: NEGATIVE /HPF
BARBITURATES UR QL SCN: NEGATIVE
BASOPHILS # BLD: 0 K/UL (ref 0–0.1)
BASOPHILS NFR BLD: 0 % (ref 0–1)
BENZODIAZ UR QL: NEGATIVE
BILIRUB UR QL: NEGATIVE
BUN SERPL-MCNC: 14 MG/DL (ref 6–20)
BUN/CREAT SERPL: 18 (ref 12–20)
CALCIUM SERPL-MCNC: 8.1 MG/DL (ref 8.5–10.1)
CANNABINOIDS UR QL SCN: NEGATIVE
CHLORIDE SERPL-SCNC: 102 MMOL/L (ref 97–108)
CK MB CFR SERPL CALC: 2 % (ref 0–2.5)
CK MB SERPL-MCNC: 1.2 NG/ML (ref 5–25)
CK SERPL-CCNC: 61 U/L (ref 26–192)
CO2 SERPL-SCNC: 28 MMOL/L (ref 21–32)
COCAINE UR QL SCN: POSITIVE
COLOR UR: ABNORMAL
CREAT SERPL-MCNC: 0.76 MG/DL (ref 0.55–1.02)
DIFFERENTIAL METHOD BLD: ABNORMAL
DRUG SCRN COMMENT,DRGCM: ABNORMAL
EOSINOPHIL # BLD: 0.1 K/UL (ref 0–0.4)
EOSINOPHIL NFR BLD: 2 % (ref 0–7)
EPITH CASTS URNS QL MICRO: ABNORMAL /LPF
ERYTHROCYTE [DISTWIDTH] IN BLOOD BY AUTOMATED COUNT: 17.1 % (ref 11.5–14.5)
ETHANOL SERPL-MCNC: <10 MG/DL
GLUCOSE SERPL-MCNC: 207 MG/DL (ref 65–100)
GLUCOSE UR STRIP.AUTO-MCNC: >1000 MG/DL
HCG UR QL: NEGATIVE
HCT VFR BLD AUTO: 33.9 % (ref 35–47)
HGB BLD-MCNC: 12 G/DL (ref 11.5–16)
HGB UR QL STRIP: ABNORMAL
IMM GRANULOCYTES # BLD: 0 K/UL (ref 0–0.04)
IMM GRANULOCYTES NFR BLD AUTO: 0 % (ref 0–0.5)
KETONES UR QL STRIP.AUTO: NEGATIVE MG/DL
LEUKOCYTE ESTERASE UR QL STRIP.AUTO: ABNORMAL
LYMPHOCYTES # BLD: 2.2 K/UL (ref 0.8–3.5)
LYMPHOCYTES NFR BLD: 38 % (ref 12–49)
MCH RBC QN AUTO: 26.5 PG (ref 26–34)
MCHC RBC AUTO-ENTMCNC: 35.4 G/DL (ref 30–36.5)
MCV RBC AUTO: 75 FL (ref 80–99)
METHADONE UR QL: NEGATIVE
MONOCYTES # BLD: 0.6 K/UL (ref 0–1)
MONOCYTES NFR BLD: 10 % (ref 5–13)
NEUTS SEG # BLD: 2.9 K/UL (ref 1.8–8)
NEUTS SEG NFR BLD: 50 % (ref 32–75)
NITRITE UR QL STRIP.AUTO: NEGATIVE
NRBC # BLD: 0 K/UL (ref 0–0.01)
NRBC BLD-RTO: 0 PER 100 WBC
OPIATES UR QL: NEGATIVE
PCP UR QL: NEGATIVE
PH UR STRIP: 6.5 [PH] (ref 5–8)
PLATELET # BLD AUTO: 258 K/UL (ref 150–400)
PMV BLD AUTO: 10.3 FL (ref 8.9–12.9)
POTASSIUM SERPL-SCNC: 4.3 MMOL/L (ref 3.5–5.1)
PROT UR STRIP-MCNC: NEGATIVE MG/DL
RBC # BLD AUTO: 4.52 M/UL (ref 3.8–5.2)
RBC #/AREA URNS HPF: ABNORMAL /HPF (ref 0–5)
SODIUM SERPL-SCNC: 137 MMOL/L (ref 136–145)
SP GR UR REFRACTOMETRY: 1.01 (ref 1–1.03)
TROPONIN I SERPL-MCNC: <0.05 NG/ML
UA: UC IF INDICATED,UAUC: ABNORMAL
UROBILINOGEN UR QL STRIP.AUTO: 0.2 EU/DL (ref 0.2–1)
WBC # BLD AUTO: 5.8 K/UL (ref 3.6–11)
WBC URNS QL MICRO: ABNORMAL /HPF (ref 0–4)

## 2019-01-08 PROCEDURE — 85025 COMPLETE CBC W/AUTO DIFF WBC: CPT

## 2019-01-08 PROCEDURE — 36415 COLL VENOUS BLD VENIPUNCTURE: CPT

## 2019-01-08 PROCEDURE — 94640 AIRWAY INHALATION TREATMENT: CPT

## 2019-01-08 PROCEDURE — 80048 BASIC METABOLIC PNL TOTAL CA: CPT

## 2019-01-08 PROCEDURE — 81025 URINE PREGNANCY TEST: CPT

## 2019-01-08 PROCEDURE — 93005 ELECTROCARDIOGRAM TRACING: CPT

## 2019-01-08 PROCEDURE — 80307 DRUG TEST PRSMV CHEM ANLYZR: CPT

## 2019-01-08 PROCEDURE — 99285 EMERGENCY DEPT VISIT HI MDM: CPT

## 2019-01-08 PROCEDURE — 82550 ASSAY OF CK (CPK): CPT

## 2019-01-08 PROCEDURE — 74011636637 HC RX REV CODE- 636/637: Performed by: EMERGENCY MEDICINE

## 2019-01-08 PROCEDURE — 81001 URINALYSIS AUTO W/SCOPE: CPT

## 2019-01-08 PROCEDURE — 74011000250 HC RX REV CODE- 250: Performed by: EMERGENCY MEDICINE

## 2019-01-08 PROCEDURE — 74011250637 HC RX REV CODE- 250/637: Performed by: EMERGENCY MEDICINE

## 2019-01-08 PROCEDURE — 84484 ASSAY OF TROPONIN QUANT: CPT

## 2019-01-08 PROCEDURE — 77030029684 HC NEB SM VOL KT MONA -A

## 2019-01-08 PROCEDURE — 71046 X-RAY EXAM CHEST 2 VIEWS: CPT

## 2019-01-08 RX ORDER — GUAIFENESIN 100 MG/5ML
325 LIQUID (ML) ORAL
Status: COMPLETED | OUTPATIENT
Start: 2019-01-08 | End: 2019-01-08

## 2019-01-08 RX ORDER — PREDNISONE 20 MG/1
60 TABLET ORAL DAILY
Qty: 15 TAB | Refills: 0 | Status: SHIPPED | OUTPATIENT
Start: 2019-01-08 | End: 2019-01-13

## 2019-01-08 RX ORDER — DOXYCYCLINE HYCLATE 100 MG
100 TABLET ORAL 2 TIMES DAILY
Qty: 20 TAB | Refills: 0 | Status: SHIPPED | OUTPATIENT
Start: 2019-01-08 | End: 2019-01-18

## 2019-01-08 RX ORDER — PREDNISONE 20 MG/1
60 TABLET ORAL
Status: COMPLETED | OUTPATIENT
Start: 2019-01-08 | End: 2019-01-08

## 2019-01-08 RX ORDER — IPRATROPIUM BROMIDE AND ALBUTEROL SULFATE 2.5; .5 MG/3ML; MG/3ML
3 SOLUTION RESPIRATORY (INHALATION) ONCE
Status: COMPLETED | OUTPATIENT
Start: 2019-01-08 | End: 2019-01-08

## 2019-01-08 RX ORDER — ALBUTEROL SULFATE 90 UG/1
2 AEROSOL, METERED RESPIRATORY (INHALATION)
Qty: 1 INHALER | Refills: 0 | Status: SHIPPED | OUTPATIENT
Start: 2019-01-08 | End: 2019-02-13

## 2019-01-08 RX ORDER — BENZONATATE 100 MG/1
200 CAPSULE ORAL
Qty: 20 CAP | Refills: 0 | Status: SHIPPED | OUTPATIENT
Start: 2019-01-08 | End: 2019-01-15

## 2019-01-08 RX ADMIN — ASPIRIN 81 MG 324 MG: 81 TABLET ORAL at 21:53

## 2019-01-08 RX ADMIN — NITROGLYCERIN 0.5 INCH: 20 OINTMENT TOPICAL at 21:52

## 2019-01-08 RX ADMIN — IPRATROPIUM BROMIDE AND ALBUTEROL SULFATE 3 ML: .5; 3 SOLUTION RESPIRATORY (INHALATION) at 21:53

## 2019-01-08 RX ADMIN — PREDNISONE 60 MG: 20 TABLET ORAL at 23:27

## 2019-01-09 LAB
CK MB CFR SERPL CALC: 2.2 % (ref 0–2.5)
CK MB SERPL-MCNC: 1.3 NG/ML (ref 5–25)
CK SERPL-CCNC: 60 U/L (ref 26–192)
TROPONIN I SERPL-MCNC: <0.05 NG/ML

## 2019-01-09 NOTE — DISCHARGE INSTRUCTIONS
Patient Education        Bronchitis: Care Instructions  Your Care Instructions    Bronchitis is inflammation of the bronchial tubes, which carry air to the lungs. The tubes swell and produce mucus, or phlegm. The mucus and inflamed bronchial tubes make you cough. You may have trouble breathing. Most cases of bronchitis are caused by viruses like those that cause colds. Antibiotics usually do not help and they may be harmful. Bronchitis usually develops rapidly and lasts about 2 to 3 weeks in otherwise healthy people. Follow-up care is a key part of your treatment and safety. Be sure to make and go to all appointments, and call your doctor if you are having problems. It's also a good idea to know your test results and keep a list of the medicines you take. How can you care for yourself at home? · Take all medicines exactly as prescribed. Call your doctor if you think you are having a problem with your medicine. · Get some extra rest.  · Take an over-the-counter pain medicine, such as acetaminophen (Tylenol), ibuprofen (Advil, Motrin), or naproxen (Aleve) to reduce fever and relieve body aches. Read and follow all instructions on the label. · Do not take two or more pain medicines at the same time unless the doctor told you to. Many pain medicines have acetaminophen, which is Tylenol. Too much acetaminophen (Tylenol) can be harmful. · Take an over-the-counter cough medicine that contains dextromethorphan to help quiet a dry, hacking cough so that you can sleep. Avoid cough medicines that have more than one active ingredient. Read and follow all instructions on the label. · Breathe moist air from a humidifier, hot shower, or sink filled with hot water. The heat and moisture will thin mucus so you can cough it out. · Do not smoke. Smoking can make bronchitis worse. If you need help quitting, talk to your doctor about stop-smoking programs and medicines.  These can increase your chances of quitting for good.  When should you call for help? Call 911 anytime you think you may need emergency care. For example, call if:    · You have severe trouble breathing.    Call your doctor now or seek immediate medical care if:    · You have new or worse trouble breathing.     · You cough up dark brown or bloody mucus (sputum).     · You have a new or higher fever.     · You have a new rash.    Watch closely for changes in your health, and be sure to contact your doctor if:    · You cough more deeply or more often, especially if you notice more mucus or a change in the color of your mucus.     · You are not getting better as expected. Where can you learn more? Go to http://miryamREDWAVE ENERGYpolo.info/. Enter H333 in the search box to learn more about \"Bronchitis: Care Instructions. \"  Current as of: December 6, 2017  Content Version: 11.8  © 3093-5966 [employer]. Care instructions adapted under license by [employer] (which disclaims liability or warranty for this information). If you have questions about a medical condition or this instruction, always ask your healthcare professional. [place] [address] any warranty or liability for your use of this information. Patient Education        Musculoskeletal Chest Pain: Care Instructions  Your Care Instructions    Chest pain is not always a sign that something is wrong with your heart or that you have another serious problem. The doctor thinks your chest pain is caused by strained muscles or ligaments, inflamed chest cartilage, or another problem in your chest, rather than by your heart. You may need more tests to find the cause of your chest pain. Follow-up care is a key part of your treatment and safety. Be sure to make and go to all appointments, and call your doctor if you are having problems. It's also a good idea to know your test results and keep a list of the medicines you take.   How can you care for yourself at home?  · Take pain medicines exactly as directed. ? If the doctor gave you a prescription medicine for pain, take it as prescribed. ? If you are not taking a prescription pain medicine, ask your doctor if you can take an over-the-counter medicine. · Rest and protect the sore area. · Stop, change, or take a break from any activity that may be causing your pain or soreness. · Put ice or a cold pack on the sore area for 10 to 20 minutes at a time. Try to do this every 1 to 2 hours for the next 3 days (when you are awake) or until the swelling goes down. Put a thin cloth between the ice and your skin. · After 2 or 3 days, apply a heating pad set on low or a warm cloth to the area that hurts. Some doctors suggest that you go back and forth between hot and cold. · Do not wrap or tape your ribs for support. This may cause you to take smaller breaths, which could increase your risk of lung problems. · Mentholated creams such as Bengay or Icy Hot may soothe sore muscles. Follow the instructions on the package. · Follow your doctor's instructions for exercising. · Gentle stretching and massage may help you get better faster. Stretch slowly to the point just before pain begins, and hold the stretch for at least 15 to 30 seconds. Do this 3 or 4 times a day. Stretch just after you have applied heat. · As your pain gets better, slowly return to your normal activities. Any increased pain may be a sign that you need to rest a while longer. When should you call for help? Call 911 anytime you think you may need emergency care. For example, call if:    · You have chest pain or pressure. This may occur with:  ? Sweating. ? Shortness of breath. ? Nausea or vomiting. ? Pain that spreads from the chest to the neck, jaw, or one or both shoulders or arms. ? Dizziness or lightheadedness. ? A fast or uneven pulse. After calling 911, chew 1 adult-strength aspirin. Wait for an ambulance.  Do not try to drive yourself.     · You have sudden chest pain and shortness of breath, or you cough up blood.    Call your doctor now or seek immediate medical care if:    · You have any trouble breathing.     · Your chest pain gets worse.     · Your chest pain occurs consistently with exercise and is relieved by rest.    Watch closely for changes in your health, and be sure to contact your doctor if:    · Your chest pain does not get better after 1 week. Where can you learn more? Go to http://miryam-polo.info/. Enter V293 in the search box to learn more about \"Musculoskeletal Chest Pain: Care Instructions. \"  Current as of: November 20, 2017  Content Version: 11.8  © 7205-5218 [employer]. Care instructions adapted under license by [email or web address] (which disclaims liability or warranty for this information). If you have questions about a medical condition or this instruction, always ask your healthcare professional. [place] [address] any warranty or liability for your use of this information.

## 2019-01-09 NOTE — ED NOTES
Patient  given copy of dc instructions and 4 script(s) called into pharmacy Patient  verbalized understanding of instructions and script (s). Patient given a current medication reconciliation form and verbalized understanding of their medications. Patient verbalized understanding of the importance of discussing medications with  his or her physician or clinic they will be following up with. Patient alert and oriented and in no acute distress. Patient discharged home ambulatory.

## 2019-01-09 NOTE — ED PROVIDER NOTES
EMERGENCY DEPARTMENT HISTORY AND PHYSICAL EXAM 
 
 
Date: 1/8/2019 Patient Name: Blanca Ingram History of Presenting Illness Chief Complaint Patient presents with  Drug Problem  Alcohol Problem History Provided By: Patient HPI: Blanca Ingram, 44 y.o. female with PMHx significant for DM, HTN, GERD, asthma, bipolar disorder, anxiety, schizophrenia, HLD, substance abuse presents via EMS to the ED with cc of substernal chest tightness this evening. Patient states her chest pain is a 7/10 currently. She states she has similar feelings of chest tightness when she is feeling anxious. She also reports chronic SOB, productive cough with white phlegm, and leg swelling. She denies any exacerbating/relieving factors, NV, fever, chills, or dizziness. She states she has not taken any medications for her symptoms. She states she uses crack cocaine every day but has not used today and reports drinking 1 beer today and states she would like to get help for her substance abuse. There are no other complaints, changes, or physical findings at this time. PCP: Julian Stephens NP No current facility-administered medications on file prior to encounter. Current Outpatient Medications on File Prior to Encounter Medication Sig Dispense Refill  insulin NPH/insulin regular (NOVOLIN 70/30, HUMULIN 70/30) 100 unit/mL (70-30) injection 15 Units by SubCUTAneous route two (2) times a day. 10 mL 6 Past History Past Medical History: 
Past Medical History:  
Diagnosis Date  Alcohol abuse, in remission   
 quit 17 days ago  Asthma   
 does not use inhalers  Borderline personality disorder ([facility] [address]  Diabetes ([facility] [address]  GERD (gastroesophageal reflux disease)  Hypertension  Other ill-defined conditions(799.89)   
 kidney stones ,passed one  Other ill-defined conditions(799.89) sickle cell trait  Other ill-defined conditions(799.89)   
 increased cholesterol  Pancreatitis  Psychiatric disorder   
 schizophrenia, bipolar, depression, anxiety Past Surgical History: 
Past Surgical History:  
Procedure Laterality Date  ABDOMEN SURGERY PROC UNLISTED  3/12/14 CHOLECYSTECTOMY LAPAROSCOPIC    
 HX GASTRIC BYPASS  HX GYN  11/8/2012  
 c section x2  HX OTHER SURGICAL  3/13/14 ENDOSCOPIC RETROGRADE CHOLANGIOPANCREATOGRAPHY  HX OTHER SURGICAL  8/4/14  
 endoscopic stent placed to bile duct  HX TUBAL LIGATION  2012 Family History: 
Family History Problem Relation Age of Onset  Heart Disease Mother  Diabetes Mother  Hypertension Mother  Hypertension Maternal Grandmother Social History: 
Social History Tobacco Use  Smoking status: Current Every Day Smoker Packs/day: 0.50 Years: 14.00 Pack years: 7.00 Types: Cigarettes  Smokeless tobacco: Never Used  Tobacco comment: cigarettes Substance Use Topics  Alcohol use: Yes Alcohol/week: 0.6 oz Types: 1 Cans of beer per week Comment: occasionally, last had \"i had one beer\" today  Drug use: Yes Types: Cocaine Comment: yesterday 12/27/18 Allergies: Allergies Allergen Reactions  Dilaudid [Hydromorphone (Bulk)] Itching  Ibuprofen Not Reported This Time Review of Systems Review of Systems Constitutional: Negative for chills and fever. HENT: Negative for congestion and rhinorrhea. Eyes: Negative for discharge and redness. Respiratory: Positive for cough, chest tightness and shortness of breath. Cardiovascular: Positive for chest pain and leg swelling. Gastrointestinal: Negative for abdominal distention, abdominal pain, constipation, diarrhea and nausea. Genitourinary: Negative for decreased urine volume and urgency. Musculoskeletal: Negative for arthralgias and back pain. Skin: Negative for rash. Neurological: Negative for dizziness, weakness, numbness and headaches. Psychiatric/Behavioral: Negative for confusion and decreased concentration. All other systems reviewed and are negative. Physical Exam  
Physical Exam  
Constitutional: She is oriented to person, place, and time. She appears well-developed and well-nourished. No distress. HENT:  
Head: Normocephalic. Mouth/Throat: Oropharynx is clear and moist.  
Eyes: EOM are normal.  
Neck: Normal range of motion. Neck supple. Cardiovascular: Normal rate, regular rhythm, normal heart sounds and intact distal pulses. Pulmonary/Chest: Effort normal. No respiratory distress. Coarse breath sounds bilaterally with occasional expiratory wheezing. Anterior chest wall tenderness Abdominal: Soft. Bowel sounds are normal. There is tenderness in the epigastric area. There is no rebound. Musculoskeletal: Normal range of motion. She exhibits no edema. Neurological: She is alert and oriented to person, place, and time. Skin: Skin is warm and dry. Psychiatric: She has a normal mood and affect. Diagnostic Study Results Labs - No results found for this or any previous visit (from the past 12 hour(s)). Radiologic Studies -  
XR CHEST PA LAT Final Result IMPRESSION:  
1. No radiographic evidence of acute cardiopulmonary disease. CT Results  (Last 48 hours) None CXR Results  (Last 48 hours) 01/08/19 2142  XR CHEST PA LAT Final result Impression:  IMPRESSION:  
1. No radiographic evidence of acute cardiopulmonary disease. Narrative:  INDICATION: . cp Additional history: Alcohol and crack user that once detox COMPARISON: Previous chest xray, 12/28/2018. Speedy Daily FINDINGS: PA and lateral view of the chest.   
.  
Lines/tubes/surgical: Cardiac monitor leads overly the patient. Heart/mediastinum: Unremarkable. Lungs/pleura:  No focal consolidation or mass. No visualized pleural effusion or  
pneumothorax.    
Additional Comments: Surgical clips in the upper abdomen. .  
  
  
 
 
 
Medical Decision Making I am the first provider for this patient. I reviewed the vital signs, available nursing notes, past medical history, past surgical history, family history and social history. Vital Signs-Reviewed the patient's vital signs. No data found. Pulse Oximetry Analysis - 100% on RA Cardiac Monitor:  
Rate: 98 bpm 
Rhythm: Normal Sinus Rhythm EKG interpretation: (Preliminary) 2227 Rhythm: normal sinus rhythm; and regular . Rate (approx.): 99; Axis: normal; IL interval: normal; QRS interval: normal ; ST/T wave: normal; Other findings: . Written by Erika Mckeon, ED Scribe, as dictated by Yoel Schneider MD. 
 
EKG interpretation: [id number] Rhythm: normal sinus rhythm; and regular . Rate (approx.): 97; Axis: normal; IL interval: normal; QRS interval: normal ; ST/T wave: normal. 
Written by Gwen Alva. Tracy Logan, ED Scribe, as dictated by Jackie Shelton MD 
 
Records Reviewed: Nursing Notes, Old Medical Records and Ambulance Run Sheet Provider Notes (Medical Decision Making): DDx: ACS, arrhythmia, musculoskeletal pain, endocarditis, GI disease, pneumonia or pneumothorax, electrolyte abnormality, substance abuse ED Course:  
Initial assessment performed. The patients presenting problems have been discussed, and they are in agreement with the care plan formulated and outlined with them. I have encouraged them to ask questions as they arise throughout their visit. 10:50 PM 
Patient states pain at rest has improved, still has anterior chest wall tenderness to palpation Breath sounds have cleared after duoneb treatment and patient states she feels much better. Suspect this is a musculoskeletal issue due to coughing from bronchitis however due to patient's substance abuse history need to rule out ACS 
 
 SIGN OUT: 
11:00 PM 
Patient's presentation, labs/imaging and plan of care was reviewed with Jackie Shelton MD as part of sign out.   They will review repeat troponin as part of the plan discussed with the patient. Lisa Otero M.D. assistance in completion of this plan is greatly appreciated but it should be noted that I will be the provider of record for this patient. Trini Evans. MD [place] 
 
 
Critical Care Time:  
0 minutes Disposition: 
DISCHARGE NOTE: 
12:11 AM 
The patient is ready for discharge. The patients signs, symptoms, diagnosis, and instructions for discharge have been discussed and the pt has conveyed their understanding. The patient is to follow up as recommended or return to the ER should their symptoms worsen. Plan has been discussed and patient has conveyed their agreement. PLAN: 
1. Discharge Medication List as of 1/9/2019 12:11 AM  
  
START taking these medications Details  
predniSONE (DELTASONE) 20 mg tablet Take 60 mg by mouth daily for 5 days. , Normal, Disp-15 Tab, R-0  
  
benzonatate (TESSALON PERLES) 100 mg capsule Take 2 Caps by mouth three (3) times daily as needed for Cough for up to 7 days. , Normal, Disp-20 Cap, R-0  
  
albuterol (PROVENTIL HFA, VENTOLIN HFA, PROAIR HFA) 90 mcg/actuation inhaler Take 2 Puffs by inhalation every four (4) hours as needed for Wheezing., Normal, Disp-1 Inhaler, R-0  
  
doxycycline (VIBRA-TABS) 100 mg tablet Take 1 Tab by mouth two (2) times a day for 10 days. , Normal, Disp-20 Tab, R-0  
  
  
CONTINUE these medications which have NOT CHANGED Details  
insulin NPH/insulin regular (NOVOLIN 70/30, HUMULIN 70/30) 100 unit/mL (70-30) injection 15 Units by SubCUTAneous route two (2) times a day., Normal, Disp-10 mL, R-6  
  
  
 
2. Follow-up Information Follow up With Specialties Details Why Contact Sara Wells NP Nurse Practitioner In 2 days  [address] [facility] 
[phone] Return to ED if worse Diagnosis Clinical Impression: 1. Other chest pain 2. Acute bronchitis, unspecified organism Attestations:  
 
This note is prepared by Adolph Low, acting as Scribe for Yoel Greer. MD Yoel Schneider. MD [place]: The scribe's documentation has been prepared under my direction and personally reviewed by me in its entirety. I confirm that the note above accurately reflects all work, treatment, procedures, and medical decision making performed by me.

## 2019-01-09 NOTE — ED NOTES
Pt arrived to ED  with c/o chest pain and dyspnea x 1 day. Shelda Friday has stopped smoking cocaine x 1 day. Pt is in no acute distress. Will continue to monitor. See nursing assessment. Safety precautions in place; call light within reach. Emergency Department Nursing Plan of Care The Nursing Plan of Care is developed from the Nursing assessment and Emergency Department Attending provider initial evaluation. The plan of care may be reviewed in the ED Provider note. The Plan of Care was developed with the following considerations:  
Patient / Family readiness to learn indicated by:verbalized understanding Persons(s) to be included in education: patient Barriers to Learning/Limitations:No 
 
Signed Gisele Boas, RN   
1/8/2019   10:40 PM

## 2019-01-09 NOTE — ED NOTES
Provided pt with bag lunch and diet ginger ale. and positioned for comfort. Dr. Schneider authorized

## 2019-01-10 LAB
ATRIAL RATE: 97 BPM
ATRIAL RATE: 99 BPM
CALCULATED P AXIS, ECG09: 71 DEGREES
CALCULATED P AXIS, ECG09: 72 DEGREES
CALCULATED R AXIS, ECG10: 33 DEGREES
CALCULATED R AXIS, ECG10: 42 DEGREES
CALCULATED T AXIS, ECG11: 75 DEGREES
CALCULATED T AXIS, ECG11: 79 DEGREES
DIAGNOSIS, 93000: NORMAL
DIAGNOSIS, 93000: NORMAL
P-R INTERVAL, ECG05: 128 MS
P-R INTERVAL, ECG05: 134 MS
Q-T INTERVAL, ECG07: 358 MS
Q-T INTERVAL, ECG07: 374 MS
QRS DURATION, ECG06: 68 MS
QRS DURATION, ECG06: 72 MS
QTC CALCULATION (BEZET), ECG08: 459 MS
QTC CALCULATION (BEZET), ECG08: 474 MS
VENTRICULAR RATE, ECG03: 97 BPM
VENTRICULAR RATE, ECG03: 99 BPM

## 2019-01-12 ENCOUNTER — HOSPITAL ENCOUNTER (EMERGENCY)
Age: 44
Discharge: HOME | End: 2019-01-13
Attending: EMERGENCY MEDICINE
Payer: MEDICARE

## 2019-01-12 DIAGNOSIS — R07.9 RECURRENT CHEST PAIN: Primary | ICD-10-CM

## 2019-01-12 DIAGNOSIS — F14.90 COCAINE USE: ICD-10-CM

## 2019-01-12 DIAGNOSIS — F10.90 ACUTE ALCOHOL USE: ICD-10-CM

## 2019-01-12 PROCEDURE — 85379 FIBRIN DEGRADATION QUANT: CPT

## 2019-01-12 PROCEDURE — 99284 EMERGENCY DEPT VISIT MOD MDM: CPT

## 2019-01-12 PROCEDURE — 83880 ASSAY OF NATRIURETIC PEPTIDE: CPT

## 2019-01-12 PROCEDURE — 81001 URINALYSIS AUTO W/SCOPE: CPT

## 2019-01-12 PROCEDURE — 80307 DRUG TEST PRSMV CHEM ANLYZR: CPT

## 2019-01-12 PROCEDURE — 85025 COMPLETE CBC W/AUTO DIFF WBC: CPT

## 2019-01-12 PROCEDURE — 36415 COLL VENOUS BLD VENIPUNCTURE: CPT

## 2019-01-12 PROCEDURE — 93005 ELECTROCARDIOGRAM TRACING: CPT

## 2019-01-12 PROCEDURE — 80053 COMPREHEN METABOLIC PANEL: CPT

## 2019-01-13 ENCOUNTER — APPOINTMENT (OUTPATIENT)
Dept: GENERAL RADIOLOGY | Age: 44
End: 2019-01-13
Attending: EMERGENCY MEDICINE
Payer: MEDICARE

## 2019-01-13 VITALS
HEIGHT: 66 IN | TEMPERATURE: 98.7 F | RESPIRATION RATE: 20 BRPM | BODY MASS INDEX: 22.5 KG/M2 | SYSTOLIC BLOOD PRESSURE: 141 MMHG | DIASTOLIC BLOOD PRESSURE: 78 MMHG | WEIGHT: 140 LBS | OXYGEN SATURATION: 98 % | HEART RATE: 94 BPM

## 2019-01-13 VITALS
HEART RATE: 92 BPM | DIASTOLIC BLOOD PRESSURE: 87 MMHG | BODY MASS INDEX: 22.6 KG/M2 | TEMPERATURE: 98.3 F | SYSTOLIC BLOOD PRESSURE: 154 MMHG | WEIGHT: 139.99 LBS | RESPIRATION RATE: 16 BRPM | OXYGEN SATURATION: 100 %

## 2019-01-13 DIAGNOSIS — R07.89 CHEST WALL PAIN: Primary | ICD-10-CM

## 2019-01-13 DIAGNOSIS — E16.2 HYPOGLYCEMIA: ICD-10-CM

## 2019-01-13 LAB
ALBUMIN SERPL-MCNC: 2.6 G/DL (ref 3.5–5)
ALBUMIN/GLOB SERPL: 0.6 {RATIO} (ref 1.1–2.2)
ALP SERPL-CCNC: 138 U/L (ref 45–117)
ALT SERPL-CCNC: 70 U/L (ref 12–78)
AMPHET UR QL SCN: NEGATIVE
ANION GAP BLD CALC-SCNC: 17 MMOL/L (ref 10–20)
ANION GAP SERPL CALC-SCNC: 7 MMOL/L (ref 5–15)
APPEARANCE UR: CLEAR
AST SERPL-CCNC: 66 U/L (ref 15–37)
BACTERIA URNS QL MICRO: NEGATIVE /HPF
BARBITURATES UR QL SCN: NEGATIVE
BASOPHILS # BLD: 0 K/UL (ref 0–0.1)
BASOPHILS NFR BLD: 0 % (ref 0–1)
BENZODIAZ UR QL: NEGATIVE
BILIRUB SERPL-MCNC: 0.6 MG/DL (ref 0.2–1)
BILIRUB UR QL: NEGATIVE
BNP SERPL-MCNC: 208 PG/ML (ref 0–125)
BUN BLD-MCNC: 7 MG/DL (ref 9–20)
BUN SERPL-MCNC: 9 MG/DL (ref 6–20)
BUN/CREAT SERPL: 15 (ref 12–20)
CA-I BLD-MCNC: 1.14 MMOL/L (ref 1.12–1.32)
CALCIUM SERPL-MCNC: 7.8 MG/DL (ref 8.5–10.1)
CANNABINOIDS UR QL SCN: NEGATIVE
CHLORIDE BLD-SCNC: 101 MMOL/L (ref 98–107)
CHLORIDE SERPL-SCNC: 105 MMOL/L (ref 97–108)
CO2 BLD-SCNC: 27 MMOL/L (ref 21–32)
CO2 SERPL-SCNC: 28 MMOL/L (ref 21–32)
COCAINE UR QL SCN: POSITIVE
COLOR UR: ABNORMAL
CREAT BLD-MCNC: 0.4 MG/DL (ref 0.6–1.3)
CREAT SERPL-MCNC: 0.59 MG/DL (ref 0.55–1.02)
D DIMER PPP FEU-MCNC: 0.57 MG/L FEU (ref 0–0.65)
DIFFERENTIAL METHOD BLD: ABNORMAL
DRUG SCRN COMMENT,DRGCM: ABNORMAL
EOSINOPHIL # BLD: 0.2 K/UL (ref 0–0.4)
EOSINOPHIL NFR BLD: 3 % (ref 0–7)
EPITH CASTS URNS QL MICRO: ABNORMAL /LPF
ERYTHROCYTE [DISTWIDTH] IN BLOOD BY AUTOMATED COUNT: 17.9 % (ref 11.5–14.5)
ETHANOL SERPL-MCNC: 79 MG/DL
GLOBULIN SER CALC-MCNC: 4.1 G/DL (ref 2–4)
GLUCOSE BLD STRIP.AUTO-MCNC: 54 MG/DL (ref 65–100)
GLUCOSE BLD STRIP.AUTO-MCNC: 56 MG/DL (ref 65–100)
GLUCOSE BLD STRIP.AUTO-MCNC: 89 MG/DL (ref 65–100)
GLUCOSE BLD-MCNC: 51 MG/DL (ref 65–100)
GLUCOSE SERPL-MCNC: 82 MG/DL (ref 65–100)
GLUCOSE UR STRIP.AUTO-MCNC: NEGATIVE MG/DL
HCT VFR BLD AUTO: 33.5 % (ref 35–47)
HCT VFR BLD CALC: 39 % (ref 35–47)
HGB BLD-MCNC: 11.7 G/DL (ref 11.5–16)
HGB UR QL STRIP: NEGATIVE
HYALINE CASTS URNS QL MICRO: ABNORMAL /LPF (ref 0–5)
IMM GRANULOCYTES # BLD AUTO: 0 K/UL (ref 0–0.04)
IMM GRANULOCYTES NFR BLD AUTO: 0 % (ref 0–0.5)
KETONES UR QL STRIP.AUTO: ABNORMAL MG/DL
LEUKOCYTE ESTERASE UR QL STRIP.AUTO: NEGATIVE
LYMPHOCYTES # BLD: 2.4 K/UL (ref 0.8–3.5)
LYMPHOCYTES NFR BLD: 44 % (ref 12–49)
MCH RBC QN AUTO: 26.7 PG (ref 26–34)
MCHC RBC AUTO-ENTMCNC: 34.9 G/DL (ref 30–36.5)
MCV RBC AUTO: 76.3 FL (ref 80–99)
METHADONE UR QL: NEGATIVE
MONOCYTES # BLD: 0.7 K/UL (ref 0–1)
MONOCYTES NFR BLD: 12 % (ref 5–13)
NEUTS SEG # BLD: 2.2 K/UL (ref 1.8–8)
NEUTS SEG NFR BLD: 40 % (ref 32–75)
NITRITE UR QL STRIP.AUTO: NEGATIVE
NRBC # BLD: 0 K/UL (ref 0–0.01)
NRBC BLD-RTO: 0 PER 100 WBC
OPIATES UR QL: NEGATIVE
PCP UR QL: NEGATIVE
PH UR STRIP: 6 [PH] (ref 5–8)
PLATELET # BLD AUTO: 203 K/UL (ref 150–400)
PMV BLD AUTO: 11.5 FL (ref 8.9–12.9)
POTASSIUM BLD-SCNC: 3.9 MMOL/L (ref 3.5–5.1)
POTASSIUM SERPL-SCNC: 5.2 MMOL/L (ref 3.5–5.1)
PROT SERPL-MCNC: 6.7 G/DL (ref 6.4–8.2)
PROT UR STRIP-MCNC: NEGATIVE MG/DL
RBC # BLD AUTO: 4.39 M/UL (ref 3.8–5.2)
RBC #/AREA URNS HPF: ABNORMAL /HPF (ref 0–5)
SERVICE CMNT-IMP: ABNORMAL
SERVICE CMNT-IMP: NORMAL
SODIUM BLD-SCNC: 140 MMOL/L (ref 136–145)
SODIUM SERPL-SCNC: 140 MMOL/L (ref 136–145)
SP GR UR REFRACTOMETRY: 1.03 (ref 1–1.03)
TROPONIN I SERPL-MCNC: <0.05 NG/ML
UA: UC IF INDICATED,UAUC: ABNORMAL
UROBILINOGEN UR QL STRIP.AUTO: 0.2 EU/DL (ref 0.2–1)
WBC # BLD AUTO: 5.5 K/UL (ref 3.6–11)
WBC URNS QL MICRO: ABNORMAL /HPF (ref 0–4)

## 2019-01-13 PROCEDURE — 74011000250 HC RX REV CODE- 250: Performed by: EMERGENCY MEDICINE

## 2019-01-13 PROCEDURE — 84484 ASSAY OF TROPONIN QUANT: CPT

## 2019-01-13 PROCEDURE — 99285 EMERGENCY DEPT VISIT HI MDM: CPT

## 2019-01-13 PROCEDURE — 36415 COLL VENOUS BLD VENIPUNCTURE: CPT

## 2019-01-13 PROCEDURE — 80047 BASIC METABLC PNL IONIZED CA: CPT

## 2019-01-13 PROCEDURE — 74011250636 HC RX REV CODE- 250/636: Performed by: EMERGENCY MEDICINE

## 2019-01-13 PROCEDURE — 71046 X-RAY EXAM CHEST 2 VIEWS: CPT

## 2019-01-13 PROCEDURE — 93005 ELECTROCARDIOGRAM TRACING: CPT

## 2019-01-13 PROCEDURE — 96361 HYDRATE IV INFUSION ADD-ON: CPT

## 2019-01-13 PROCEDURE — 96375 TX/PRO/DX INJ NEW DRUG ADDON: CPT

## 2019-01-13 PROCEDURE — 96374 THER/PROPH/DIAG INJ IV PUSH: CPT

## 2019-01-13 PROCEDURE — 74011250637 HC RX REV CODE- 250/637: Performed by: EMERGENCY MEDICINE

## 2019-01-13 PROCEDURE — 82962 GLUCOSE BLOOD TEST: CPT

## 2019-01-13 RX ORDER — MORPHINE SULFATE 4 MG/ML
2 INJECTION INTRAVENOUS
Status: COMPLETED | OUTPATIENT
Start: 2019-01-13 | End: 2019-01-13

## 2019-01-13 RX ORDER — ACETAMINOPHEN 325 MG/1
650 TABLET ORAL
Status: COMPLETED | OUTPATIENT
Start: 2019-01-13 | End: 2019-01-13

## 2019-01-13 RX ORDER — SODIUM CHLORIDE 0.9 % (FLUSH) 0.9 %
5-40 SYRINGE (ML) INJECTION EVERY 8 HOURS
Status: DISCONTINUED | OUTPATIENT
Start: 2019-01-13 | End: 2019-01-13 | Stop reason: HOSPADM

## 2019-01-13 RX ORDER — DEXTROSE 50 % IN WATER (D50W) INTRAVENOUS SYRINGE
25
Status: COMPLETED | OUTPATIENT
Start: 2019-01-13 | End: 2019-01-13

## 2019-01-13 RX ORDER — DIPHENHYDRAMINE HYDROCHLORIDE 50 MG/ML
25 INJECTION, SOLUTION INTRAMUSCULAR; INTRAVENOUS
Status: COMPLETED | OUTPATIENT
Start: 2019-01-13 | End: 2019-01-13

## 2019-01-13 RX ORDER — DEXTROMETHORPHAN HYDROBROMIDE, GUAIFENESIN 5; 100 MG/5ML; MG/5ML
650 LIQUID ORAL EVERY 8 HOURS
Qty: 15 TAB | Refills: 0 | Status: SHIPPED | OUTPATIENT
Start: 2019-01-13 | End: 2019-01-24

## 2019-01-13 RX ORDER — FAMOTIDINE 10 MG/ML
20 INJECTION INTRAVENOUS
Status: COMPLETED | OUTPATIENT
Start: 2019-01-13 | End: 2019-01-13

## 2019-01-13 RX ADMIN — FAMOTIDINE 20 MG: 10 INJECTION INTRAVENOUS at 09:34

## 2019-01-13 RX ADMIN — Medication 10 ML: at 09:21

## 2019-01-13 RX ADMIN — MORPHINE SULFATE 2 MG: 4 INJECTION, SOLUTION INTRAMUSCULAR; INTRAVENOUS at 09:31

## 2019-01-13 RX ADMIN — SODIUM CHLORIDE 1000 ML: 900 INJECTION, SOLUTION INTRAVENOUS at 09:21

## 2019-01-13 RX ADMIN — DIPHENHYDRAMINE HYDROCHLORIDE 25 MG: 50 INJECTION INTRAMUSCULAR; INTRAVENOUS at 09:21

## 2019-01-13 RX ADMIN — DEXTROSE MONOHYDRATE 12.5 G: 25 INJECTION, SOLUTION INTRAVENOUS at 09:54

## 2019-01-13 RX ADMIN — ACETAMINOPHEN 650 MG: 325 TABLET ORAL at 00:17

## 2019-01-13 NOTE — ED NOTES
Patients glucose still low. MD aware. Patient given orange juice and MD reports that he will order medications.

## 2019-01-13 NOTE — ED NOTES
Patient presents to the ED with c/o mid sternal chest pain intermittently x2 weeks. Pt reports that the pain does not radiates and is aggravated after eating food. Pt denies any alleviating factors. Pt reports the last time she used cocaine was six days ago. Pt reports multiple ED visits for this complaint and reports being at [facility] yesterday. Pt is alert and oriented. Pt skin is warm and dry. Pt is ambulatory independently. Pt is in no apparent distress. Emergency Department Nursing Plan of Care The Nursing Plan of Care is developed from the Nursing assessment and Emergency Department Attending provider initial evaluation. The plan of care may be reviewed in the ED Provider note. The Plan of Care was developed with the following considerations:  
Patient / Family readiness to learn indicated by:verbalized understanding Persons(s) to be included in education: patient Barriers to Learning/Limitations:No 
 
Signed Braulio Choi 1/13/2019   9:25 AM

## 2019-01-13 NOTE — ED TRIAGE NOTES
Pt arrived via EMS from home c/o midsternal CP that started a couple of weeks ago. Pt seen at multiple facilities for same complaint. Pt has been free of crack cocaine for 5 days. Per medics, pt non compliant with prescribed meds.

## 2019-01-13 NOTE — ED NOTES
Patient's blood glucose was low on blood work so patient was given orange juice. Pt is speaking in full sentences. Pt skin is warm and dry. Will recheck glucose in 15 minutes.

## 2019-01-13 NOTE — DISCHARGE INSTRUCTIONS
Patient Education        Musculoskeletal Chest Pain: Care Instructions  Your Care Instructions    Chest pain is not always a sign that something is wrong with your heart or that you have another serious problem. The doctor thinks your chest pain is caused by strained muscles or ligaments, inflamed chest cartilage, or another problem in your chest, rather than by your heart. You may need more tests to find the cause of your chest pain. Follow-up care is a key part of your treatment and safety. Be sure to make and go to all appointments, and call your doctor if you are having problems. It's also a good idea to know your test results and keep a list of the medicines you take. How can you care for yourself at home? · Take pain medicines exactly as directed. ? If the doctor gave you a prescription medicine for pain, take it as prescribed. ? If you are not taking a prescription pain medicine, ask your doctor if you can take an over-the-counter medicine. · Rest and protect the sore area. · Stop, change, or take a break from any activity that may be causing your pain or soreness. · Put ice or a cold pack on the sore area for 10 to 20 minutes at a time. Try to do this every 1 to 2 hours for the next 3 days (when you are awake) or until the swelling goes down. Put a thin cloth between the ice and your skin. · After 2 or 3 days, apply a heating pad set on low or a warm cloth to the area that hurts. Some doctors suggest that you go back and forth between hot and cold. · Do not wrap or tape your ribs for support. This may cause you to take smaller breaths, which could increase your risk of lung problems. · Mentholated creams such as Bengay or Icy Hot may soothe sore muscles. Follow the instructions on the package. · Follow your doctor's instructions for exercising. · Gentle stretching and massage may help you get better faster.  Stretch slowly to the point just before pain begins, and hold the stretch for at least 15 to 30 seconds. Do this 3 or 4 times a day. Stretch just after you have applied heat. · As your pain gets better, slowly return to your normal activities. Any increased pain may be a sign that you need to rest a while longer. When should you call for help? Call 911 anytime you think you may need emergency care. For example, call if:    · You have chest pain or pressure. This may occur with:  ? Sweating. ? Shortness of breath. ? Nausea or vomiting. ? Pain that spreads from the chest to the neck, jaw, or one or both shoulders or arms. ? Dizziness or lightheadedness. ? A fast or uneven pulse. After calling 911, chew 1 adult-strength aspirin. Wait for an ambulance. Do not try to drive yourself.     · You have sudden chest pain and shortness of breath, or you cough up blood.    Call your doctor now or seek immediate medical care if:    · You have any trouble breathing.     · Your chest pain gets worse.     · Your chest pain occurs consistently with exercise and is relieved by rest.    Watch closely for changes in your health, and be sure to contact your doctor if:    · Your chest pain does not get better after 1 week. Where can you learn more? Go to http://miryam-polo.info/. Enter V293 in the search box to learn more about \"Musculoskeletal Chest Pain: Care Instructions. \"  Current as of: November 20, 2017  Content Version: 11.8  © 6338-9702 [employer]. Care instructions adapted under license by [employer] (which disclaims liability or warranty for this information). If you have questions about a medical condition or this instruction, always ask your healthcare professional. [place] [address] any warranty or liability for your use of this information. Patient Education        Hypoglycemia: Care Instructions  Your Care Instructions    Hypoglycemia means that your blood sugar is low and your body is not getting enough fuel.  Some people get low blood sugar from not eating often enough. Some medicines to treat diabetes can cause low blood sugar. People who have had surgery on their stomachs or intestines may get hypoglycemia. Problems with the pancreas, kidneys, or liver also can cause low blood sugar. A snack or drink with sugar in it will raise your blood sugar and should ease your symptoms right away. Your doctor may recommend that you change or stop your medicines until you can get your blood sugar levels under control. In the long run, you may need to change your diet and eating habits so that you get enough fuel for your body throughout the day. Follow-up care is a key part of your treatment and safety. Be sure to make and go to all appointments, and call your doctor if you are having problems. It's also a good idea to know your test results and keep a list of the medicines you take. How can you care for yourself at home? · Learn to recognize the early signs of low blood sugar. Signs include:  ? Nausea. ? Hunger. ? Feeling nervous, irritable, or shaky. ? Cold, clammy, wet skin. ? Sweating (when you are not exercising). ? A fast heartbeat.  ? Numbness or tingling of the fingertips or lips. · If you feel an episode of low blood sugar coming on, drink fruit juice or sugared (not diet) soda, or eat sugar in the form of candy, cubes, or tablets. [employer] are another [employer]. · Eat small, frequent meals so that you do not get too hungry between meals. · Balance extra exercise with eating more. · Keep a written record of your low blood sugar episodes, including when you last ate and what you ate, so that you can learn what causes your blood sugar to drop. · Make sure your family, friends, and coworkers know the symptoms of low blood sugar and know what to do to get your sugar level up. · Wear medical alert jewelry that lists your condition. You can buy this at most drugstores. When should you call for help?   Call 911 anytime you think you may need emergency care. For example, call if:    · You passed out (lost consciousness).     · You are confused or cannot think clearly.     · Your blood sugar is very high or very low.    Watch closely for changes in your health, and be sure to contact your doctor if:    · Your blood sugar stays outside the level your doctor set for you.     · You have any problems. Where can you learn more? Go to http://miryam-polo.info/. Enter B095 in the search box to learn more about \"Hypoglycemia: Care Instructions. \"  Current as of: December 7, 2017  Content Version: 11.8  © 6239-4796 [employer]. Care instructions adapted under license by [employer] (which disclaims liability or warranty for this information). If you have questions about a medical condition or this instruction, always ask your healthcare professional. [address] any warranty or liability for your use of this information.

## 2019-01-13 NOTE — ED NOTES

## 2019-01-13 NOTE — DISCHARGE INSTRUCTIONS

## 2019-01-13 NOTE — ED TRIAGE NOTES
Patient presents to ED ambulatory with c/o mid sternal chest pain with a burning sensation for two weeks. States that she has been to multiple ER for same issue and no one is trying to help her. Patient states that she has not used cocaine for 6 days.

## 2019-01-13 NOTE — ED PROVIDER NOTES
EMERGENCY DEPARTMENT HISTORY AND PHYSICAL EXAM 
 
 
Date: 1/13/2019 Patient Name: Taurus Grajeda History of Presenting Illness Chief Complaint Patient presents with  Chest Pain History Provided By: Patient and medical records HPI: Taurus Grajeda, 44 y.o. female with PMHx significant for HTN, IDDM, asthma, personality disorder, GERD, alcohol abuse, drug abuse, schizophrenia, bipolar, presents ambulatory to the ED with c/o continued intermittent substernal CP ongoing for the past couple of weeks. She describes pain as burning, tightness, and pressure. Pt currently rates her pain subjectively 10/10. She states pain is worse after eating. She endorses cocaine use 6 days ago and alcohol yesterday. Per chart review, pt was seen last night and 1/8/19 in the ED for the same complaint. During both visits, she tested positive for cocaine. Pt states she \"they didn't do anything about [her] pain\" during the last ED visits. Pt denies a hx of MI. She states she has not had a menstrual period since September. Pt specifically denies any recent N/V, fevers, chills, leg pain, or any other complaints. There are no other complaints, changes, or physical findings at this time. PCP: Melvin Amador NP Current Facility-Administered Medications on File Prior to Encounter Medication Dose Route Frequency Provider Last Rate Last Dose  [COMPLETED] acetaminophen (TYLENOL) tablet 650 mg  650 mg Oral NOW Austin Mackey MD   650 mg at 01/13/19 0017 Current Outpatient Medications on File Prior to Encounter Medication Sig Dispense Refill  insulin NPH/insulin regular (NOVOLIN 70/30, HUMULIN 70/30) 100 unit/mL (70-30) injection 15 Units by SubCUTAneous route two (2) times a day. 10 mL 6  
 acetaminophen (TYLENOL 8 HOUR) 650 mg TbER Take 1 Tab by mouth every eight (8) hours. 15 Tab 0  predniSONE (DELTASONE) 20 mg tablet Take 60 mg by mouth daily for 5 days.  15 Tab 0  
 benzonatate (TESSALON PERLES) 100 mg capsule Take 2 Caps by mouth three (3) times daily as needed for Cough for up to 7 days. 20 Cap 0  
 albuterol (PROVENTIL HFA, VENTOLIN HFA, PROAIR HFA) 90 mcg/actuation inhaler Take 2 Puffs by inhalation every four (4) hours as needed for Wheezing. 1 Inhaler 0  
 doxycycline (VIBRA-TABS) 100 mg tablet Take 1 Tab by mouth two (2) times a day for 10 days. 20 Tab 0 Past History Past Medical History: 
Past Medical History:  
Diagnosis Date  Alcohol abuse, in remission   
 quit 17 days ago  Asthma   
 does not use inhalers  Borderline personality disorder ([facility] [address]  Diabetes ([facility] [address]  GERD (gastroesophageal reflux disease)  Hypertension  Other ill-defined conditions(799.89)   
 kidney stones ,passed one  Other ill-defined conditions(799.89) sickle cell trait  Other ill-defined conditions(799.89)   
 increased cholesterol  Pancreatitis  Psychiatric disorder   
 schizophrenia, bipolar, depression, anxiety Past Surgical History: 
Past Surgical History:  
Procedure Laterality Date  ABDOMEN SURGERY PROC UNLISTED  3/12/14 CHOLECYSTECTOMY LAPAROSCOPIC    
 HX GASTRIC BYPASS  HX GYN  11/8/2012  
 c section x2  HX OTHER SURGICAL  3/13/14 ENDOSCOPIC RETROGRADE CHOLANGIOPANCREATOGRAPHY  HX OTHER SURGICAL  8/4/14  
 endoscopic stent placed to bile duct  HX TUBAL LIGATION  2012 Family History: 
Family History Problem Relation Age of Onset  Heart Disease Mother  Diabetes Mother  Hypertension Mother  Hypertension Maternal Grandmother Social History: 
Social History Tobacco Use  Smoking status: Current Every Day Smoker Packs/day: 0.50 Years: 14.00 Pack years: 7.00 Types: Cigarettes  Smokeless tobacco: Never Used  Tobacco comment: cigarettes Substance Use Topics  Alcohol use: Yes Alcohol/week: 0.6 oz Types: 1 Cans of beer per week   Comment: occasionally, last had \"i had one beer\" today  Drug use: Yes Types: Cocaine Comment: yesterday 12/27/18 Allergies: Allergies Allergen Reactions  Dilaudid [Hydromorphone (Bulk)] Itching  Ibuprofen Not Reported This Time Review of Systems Review of Systems Constitutional: Negative for fever. HENT: Negative for sore throat. Eyes: Negative for photophobia and redness. Respiratory: Negative for shortness of breath and wheezing. Cardiovascular: Positive for chest pain. Negative for leg swelling. Gastrointestinal: Negative for abdominal pain, blood in stool, nausea and vomiting. Genitourinary: Negative for difficulty urinating, dysuria, hematuria, menstrual problem and vaginal bleeding. Musculoskeletal: Negative for back pain and joint swelling. Neurological: Negative for dizziness, seizures, syncope, speech difficulty, weakness, numbness and headaches. Hematological: Negative for adenopathy. Psychiatric/Behavioral: Negative for agitation, confusion and suicidal ideas. The patient is not nervous/anxious. Physical Exam  
Physical Exam  
Constitutional: She is oriented to person, place, and time. She appears well-developed and well-nourished. No distress. HENT:  
Head: Normocephalic and atraumatic. Mouth/Throat: Oropharynx is clear and moist. No oropharyngeal exudate. Eyes: Conjunctivae and EOM are normal. Pupils are equal, round, and reactive to light. Left eye exhibits no discharge. Neck: Normal range of motion. Neck supple. No JVD present. Cardiovascular: Normal rate, regular rhythm, normal heart sounds and intact distal pulses. Pulmonary/Chest: Effort normal and breath sounds normal. No respiratory distress. She has no wheezes. Abdominal: Soft. Bowel sounds are normal. She exhibits no distension. There is no tenderness. There is no rebound and no guarding. Musculoskeletal: Normal range of motion. She exhibits no edema or tenderness. Lymphadenopathy: She has no cervical adenopathy. Neurological: She is alert and oriented to person, place, and time. She has normal reflexes. No cranial nerve deficit. Skin: Skin is warm and dry. No rash noted. Psychiatric: She has a normal mood and affect. Her behavior is normal.  
Nursing note and vitals reviewed. Diagnostic Study Results Labs - Recent Results (from the past 12 hour(s)) CBC WITH AUTOMATED DIFF Collection Time: 01/12/19 11:59 PM  
Result Value Ref Range WBC 5.5 3.6 - 11.0 K/uL  
 RBC 4.39 3.80 - 5.20 M/uL  
 HGB 11.7 11.5 - 16.0 g/dL HCT 33.5 (L) 35.0 - 47.0 % MCV 76.3 (L) 80.0 - 99.0 FL  
 MCH 26.7 26.0 - 34.0 PG  
 MCHC 34.9 30.0 - 36.5 g/dL  
 RDW 17.9 (H) 11.5 - 14.5 % PLATELET 881 917 - 450 K/uL MPV 11.5 8.9 - 12.9 FL  
 NRBC 0.0 0  WBC ABSOLUTE NRBC 0.00 0.00 - 0.01 K/uL NEUTROPHILS 40 32 - 75 % LYMPHOCYTES 44 12 - 49 % MONOCYTES 12 5 - 13 % EOSINOPHILS 3 0 - 7 % BASOPHILS 0 0 - 1 % IMMATURE GRANULOCYTES 0 0.0 - 0.5 % ABS. NEUTROPHILS 2.2 1.8 - 8.0 K/UL  
 ABS. LYMPHOCYTES 2.4 0.8 - 3.5 K/UL  
 ABS. MONOCYTES 0.7 0.0 - 1.0 K/UL  
 ABS. EOSINOPHILS 0.2 0.0 - 0.4 K/UL  
 ABS. BASOPHILS 0.0 0.0 - 0.1 K/UL  
 ABS. IMM. GRANS. 0.0 0.00 - 0.04 K/UL  
 DF AUTOMATED METABOLIC PANEL, COMPREHENSIVE Collection Time: 01/12/19 11:59 PM  
Result Value Ref Range Sodium 140 136 - 145 mmol/L Potassium 5.2 (H) 3.5 - 5.1 mmol/L Chloride 105 97 - 108 mmol/L  
 CO2 28 21 - 32 mmol/L Anion gap 7 5 - 15 mmol/L Glucose 82 65 - 100 mg/dL BUN 9 6 - 20 MG/DL Creatinine 0.59 0.55 - 1.02 MG/DL  
 BUN/Creatinine ratio 15 12 - 20 GFR est AA >60 >60 ml/min/1.73m2 GFR est non-AA >60 >60 ml/min/1.73m2 Calcium 7.8 (L) 8.5 - 10.1 MG/DL Bilirubin, total 0.6 0.2 - 1.0 MG/DL  
 ALT (SGPT) 70 12 - 78 U/L  
 AST (SGOT) 66 (H) 15 - 37 U/L Alk. phosphatase 138 (H) 45 - 117 U/L Protein, total 6.7 6.4 - 8.2 g/dL  Albumin 2.6 (L) 3.5 - 5.0 g/dL Globulin 4.1 (H) 2.0 - 4.0 g/dL A-G Ratio 0.6 (L) 1.1 - 2.2 NT-PRO BNP Collection Time: 01/12/19 11:59 PM  
Result Value Ref Range NT pro- (H) 0 - 125 PG/ML  
D DIMER Collection Time: 01/12/19 11:59 PM  
Result Value Ref Range D-dimer 0.57 0.00 - 0.65 mg/L FEU  
URINALYSIS W/ REFLEX CULTURE Collection Time: 01/12/19 11:59 PM  
Result Value Ref Range Color YELLOW/STRAW Appearance CLEAR CLEAR Specific gravity 1.029 1.003 - 1.030    
 pH (UA) 6.0 5.0 - 8.0 Protein NEGATIVE  NEG mg/dL Glucose NEGATIVE  NEG mg/dL Ketone TRACE (A) NEG mg/dL Bilirubin NEGATIVE  NEG Blood NEGATIVE  NEG Urobilinogen 0.2 0.2 - 1.0 EU/dL Nitrites NEGATIVE  NEG Leukocyte Esterase NEGATIVE  NEG    
 WBC 0-4 0 - 4 /hpf  
 RBC 0-5 0 - 5 /hpf Epithelial cells FEW FEW /lpf Bacteria NEGATIVE  NEG /hpf  
 UA:UC IF INDICATED CULTURE NOT INDICATED BY UA RESULT CNI Hyaline cast 0-2 0 - 5 /lpf ETHYL ALCOHOL Collection Time: 01/12/19 11:59 PM  
Result Value Ref Range ALCOHOL(ETHYL),SERUM 79 (H) <10 MG/DL  
DRUG SCREEN, URINE Collection Time: 01/12/19 11:59 PM  
Result Value Ref Range AMPHETAMINES NEGATIVE  NEG    
 BARBITURATES NEGATIVE  NEG BENZODIAZEPINES NEGATIVE  NEG    
 COCAINE POSITIVE (A) NEG METHADONE NEGATIVE  NEG    
 OPIATES NEGATIVE  NEG    
 PCP(PHENCYCLIDINE) NEGATIVE  NEG    
 THC (TH-CANNABINOL) NEGATIVE  NEG Drug screen comment (NOTE) TROPONIN I Collection Time: 01/13/19  9:19 AM  
Result Value Ref Range Troponin-I, Qt. <0.05 <0.05 ng/mL POC CHEM8 Collection Time: 01/13/19  9:21 AM  
Result Value Ref Range Calcium, ionized (POC) 1.14 1.12 - 1.32 mmol/L Sodium (POC) 140 136 - 145 mmol/L Potassium (POC) 3.9 3.5 - 5.1 mmol/L Chloride (POC) 101 98 - 107 mmol/L  
 CO2 (POC) 27 21 - 32 mmol/L Anion gap (POC) 17 10 - 20 mmol/L Glucose (POC) 51 (L) 65 - 100 mg/dL  BUN (POC) 7 (L) 9 - 20 mg/dL Creatinine (POC) 0.4 (L) 0.6 - 1.3 mg/dL GFRAA, POC >60 >60 ml/min/1.73m2 GFRNA, POC >60 >60 ml/min/1.73m2 Hematocrit (POC) 39 35.0 - 47.0 % Comment Notified RN or MD immediately by  GLUCOSE, POC Collection Time: 01/13/19  9:48 AM  
Result Value Ref Range Glucose (POC) 56 (L) 65 - 100 mg/dL Performed by Magdalene Manning   
GLUCOSE, POC Collection Time: 01/13/19  9:50 AM  
Result Value Ref Range Glucose (POC) 54 (L) 65 - 100 mg/dL Performed by Magdalene Manning Radiologic Studies - No orders to display CT Results  (Last 48 hours) None CXR Results  (Last 48 hours) 01/13/19 0019  XR CHEST PA LAT Final result Impression:  IMPRESSION:  
No acute process. Narrative:  INDICATION:   cp  
   
COMPARISON: January 8, 2019 FINDINGS:  
   
Frontal and lateral views of the chest demonstrate a normal cardiomediastinal  
silhouette. The lungs are adequately expanded. There is no edema, effusion,  
consolidation, or pneumothorax. The osseous structures are unremarkable. Medical Decision Making I am the first provider for this patient. I reviewed the vital signs, available nursing notes, past medical history, past surgical history, family history and social history. Vital Signs-Reviewed the patient's vital signs. Patient Vitals for the past 12 hrs: 
 Temp Pulse Resp BP SpO2  
01/13/19 0908 98.3 °F (36.8 °C) 95 16 (!) 164/93 100 % Pulse Oximetry Analysis - 100% on RA Cardiac Monitor:  
Rate: 94 bpm 
Rhythm: Normal Sinus Rhythm EKG interpretation: (Preliminary) 94 Rhythm: normal sinus rhythm; and regular . Rate (approx.): 94; Axis: normal; P wave: normal; QRS interval: normal ; ST/T wave: normal. Other findings: prolonged QT, possible L atrial enlargement Written by CAROLINE Hall, as dictated by Vish Vera MD. Records Reviewed: Nursing Notes, Old Medical Records, Previous Radiology Studies and Previous Laboratory Studies Provider Notes (Medical Decision Making): DDx: chest wall pain, costochondritis, cocaine induced CP, ACS 
 
ED Course:  
Initial assessment performed. The patients presenting problems have been discussed, and they are in agreement with the care plan formulated and outlined with them. I have encouraged them to ask questions as they arise throughout their visit. 9:57 AM 
Reassessed pt. She states she is feeling better after treatment in the ED. Will discharge home Discharge Note: 
9:59 AM 
The patient has been re-evaluated and is ready for discharge. Reviewed available results with patient. Counseled patient on diagnosis and care plan. Patient has expressed understanding, and all questions have been answered. Patient agrees with plan and agrees to follow up as recommended, or to return to the ED if their symptoms worsen. Discharge instructions have been provided and explained to the patient, along with reasons to return to the ED. PLAN: 
1. Current Discharge Medication List  
  
 
2. Follow-up Information Follow up With Specialties Details Why Contact Info Bina Banuelosr, NP Nurse Practitioner In 1 week  [address] [facility] [address] [facility] 
[phone] Return to ED if worse Diagnosis Clinical Impression: 1. Chest wall pain 2. Hypoglycemia Attestations: This note is prepared by Susan Moss, acting as Scribe for Mary Ordoñez MD 
 
The scribe's documentation has been prepared under my direction and personally reviewed by me in its entirety. I confirm that the note above accurately reflects all work, treatment, procedures, and medical decision making performed by me. Mary Ordoñez MD 
 
 
 
This note will not be viewable in [address]

## 2019-01-13 NOTE — ED PROVIDER NOTES
EMERGENCY DEPARTMENT HISTORY AND PHYSICAL EXAM  
     
 
Date: 1/12/2019 Patient Name: Gabi Hamm History of Presenting Illness Chief Complaint Patient presents with  Chest Pain History Provided By: Patient HPI: Gabi Hamm is a 44 y.o. female, pmhx DM, HTN, GERD, who presents via EMS to the ED c/o mild to moderate, achy chest pain x a few weeks that worsened tonight. Pt reports associated bilateral leg swelling x a few days. She denies any specific exacerbation of symptoms tonight. She states she last used crack cocaine 5 days ago and denies hx of IV drug use. She notes she has a PCP appointment 1/24. Pt reports tobacco use and occasional alcohol use. Pt specifically denies any NV, SOB, leg pain. She specifically denies any recent fevers, chills, nausea, vomiting, diarrhea, abd pain, SOB, urinary sxs, changes in BM, or headache. PCP: Marina Barajas NP Allergies: dilaudid, ibuprofen PMHx: Significant for DM, HTN, former alcoholic, GERD, asthma, sickle cell trait, schizophrenia, bipolar, depression, anxiety PSHx: Significant for cholecystectomy, c section, gastric bypass Social Hx: tobacco (+), EtOH (+), Illicit Drugs (crack cocaine) There are no other complaints, changes, or physical findings at this time. Current Outpatient Medications Medication Sig Dispense Refill  acetaminophen (TYLENOL 8 HOUR) 650 mg TbER Take 1 Tab by mouth every eight (8) hours. 15 Tab 0  predniSONE (DELTASONE) 20 mg tablet Take 60 mg by mouth daily for 5 days. 15 Tab 0  
 benzonatate (TESSALON PERLES) 100 mg capsule Take 2 Caps by mouth three (3) times daily as needed for Cough for up to 7 days. 20 Cap 0  
 albuterol (PROVENTIL HFA, VENTOLIN HFA, PROAIR HFA) 90 mcg/actuation inhaler Take 2 Puffs by inhalation every four (4) hours as needed for Wheezing. 1 Inhaler 0  
 doxycycline (VIBRA-TABS) 100 mg tablet Take 1 Tab by mouth two (2) times a day for 10 days.  20 Tab 0  insulin NPH/insulin regular (NOVOLIN 70/30, HUMULIN 70/30) 100 unit/mL (70-30) injection 15 Units by SubCUTAneous route two (2) times a day. 10 mL 6 Past History Past Medical History: 
Past Medical History:  
Diagnosis Date  Alcohol abuse, in remission   
 quit 17 days ago  Asthma   
 does not use inhalers  Borderline personality disorder ([facility] [address]  Diabetes ([facility] [address]  GERD (gastroesophageal reflux disease)  Hypertension  Other ill-defined conditions(799.89)   
 kidney stones ,passed one  Other ill-defined conditions(799.89) sickle cell trait  Other ill-defined conditions(799.89)   
 increased cholesterol  Pancreatitis  Psychiatric disorder   
 schizophrenia, bipolar, depression, anxiety Past Surgical History: 
Past Surgical History:  
Procedure Laterality Date  ABDOMEN SURGERY PROC UNLISTED  3/12/14 CHOLECYSTECTOMY LAPAROSCOPIC    
 HX GASTRIC BYPASS  HX GYN  11/8/2012  
 c section x2  HX OTHER SURGICAL  3/13/14 ENDOSCOPIC RETROGRADE CHOLANGIOPANCREATOGRAPHY  HX OTHER SURGICAL  8/4/14  
 endoscopic stent placed to bile duct  HX TUBAL LIGATION  2012 Family History: 
Family History Problem Relation Age of Onset  Heart Disease Mother  Diabetes Mother  Hypertension Mother  Hypertension Maternal Grandmother Social History: 
Social History Tobacco Use  Smoking status: Current Every Day Smoker Packs/day: 0.50 Years: 14.00 Pack years: 7.00 Types: Cigarettes  Smokeless tobacco: Never Used  Tobacco comment: cigarettes Substance Use Topics  Alcohol use: Yes Alcohol/week: 0.6 oz Types: 1 Cans of beer per week Comment: occasionally, last had \"i had one beer\" today  Drug use: Yes Types: Cocaine Comment: yesterday 12/27/18 Allergies: Allergies Allergen Reactions  Dilaudid [Hydromorphone (Bulk)] Itching  Ibuprofen Not Reported This Time Review of Systems Review of Systems Constitutional: Negative. Negative for fever. Eyes: Negative. Respiratory: Negative. Negative for shortness of breath. Cardiovascular: Positive for chest pain and leg swelling. Gastrointestinal: Negative for abdominal pain, nausea and vomiting. Endocrine: Negative. Genitourinary: Negative. Negative for difficulty urinating, dysuria and hematuria. Musculoskeletal: Negative. Skin: Negative. Allergic/Immunologic: Negative. Neurological: Negative. Psychiatric/Behavioral: Negative for suicidal ideas. All other systems reviewed and are negative. Physical Exam  
Physical Exam  
Constitutional: She is oriented to person, place, and time. She appears well-developed and well-nourished. No distress. HENT:  
Head: Normocephalic and atraumatic. Nose: Nose normal.  
Eyes: Conjunctivae and EOM are normal. No scleral icterus. Neck: Normal range of motion. No tracheal deviation present. Cardiovascular: Normal rate, regular rhythm, normal heart sounds and intact distal pulses. Exam reveals no friction rub. No murmur heard. Pulmonary/Chest: Effort normal and breath sounds normal. No stridor. No respiratory distress. She has no wheezes. She has no rales. Pt able to speak in full, unlabored sentences. Abdominal: Soft. Bowel sounds are normal. She exhibits no distension. There is no tenderness. There is no rebound. Musculoskeletal: Normal range of motion. She exhibits no tenderness. Neurological: She is alert and oriented to person, place, and time. No cranial nerve deficit. Skin: Skin is warm and dry. No rash noted. She is not diaphoretic. Psychiatric: She has a normal mood and affect. Her behavior is normal. Judgment and thought content normal. Her speech is slurred. Cognition and memory are normal.  
Nursing note and vitals reviewed. Diagnostic Study Results Labs - Recent Results (from the past 12 hour(s)) CBC WITH AUTOMATED DIFF  
 Collection Time: 01/12/19 11:59 PM  
Result Value Ref Range WBC 5.5 3.6 - 11.0 K/uL  
 RBC 4.39 3.80 - 5.20 M/uL  
 HGB 11.7 11.5 - 16.0 g/dL HCT 33.5 (L) 35.0 - 47.0 % MCV 76.3 (L) 80.0 - 99.0 FL  
 MCH 26.7 26.0 - 34.0 PG  
 MCHC 34.9 30.0 - 36.5 g/dL  
 RDW 17.9 (H) 11.5 - 14.5 % PLATELET 927 691 - 484 K/uL MPV 11.5 8.9 - 12.9 FL  
 NRBC 0.0 0  WBC ABSOLUTE NRBC 0.00 0.00 - 0.01 K/uL NEUTROPHILS 40 32 - 75 % LYMPHOCYTES 44 12 - 49 % MONOCYTES 12 5 - 13 % EOSINOPHILS 3 0 - 7 % BASOPHILS 0 0 - 1 % IMMATURE GRANULOCYTES 0 0.0 - 0.5 % ABS. NEUTROPHILS 2.2 1.8 - 8.0 K/UL  
 ABS. LYMPHOCYTES 2.4 0.8 - 3.5 K/UL  
 ABS. MONOCYTES 0.7 0.0 - 1.0 K/UL  
 ABS. EOSINOPHILS 0.2 0.0 - 0.4 K/UL  
 ABS. BASOPHILS 0.0 0.0 - 0.1 K/UL  
 ABS. IMM. GRANS. 0.0 0.00 - 0.04 K/UL  
 DF AUTOMATED METABOLIC PANEL, COMPREHENSIVE Collection Time: 01/12/19 11:59 PM  
Result Value Ref Range Sodium 140 136 - 145 mmol/L Potassium 5.2 (H) 3.5 - 5.1 mmol/L Chloride 105 97 - 108 mmol/L  
 CO2 28 21 - 32 mmol/L Anion gap 7 5 - 15 mmol/L Glucose 82 65 - 100 mg/dL BUN 9 6 - 20 MG/DL Creatinine 0.59 0.55 - 1.02 MG/DL  
 BUN/Creatinine ratio 15 12 - 20 GFR est AA >60 >60 ml/min/1.73m2 GFR est non-AA >60 >60 ml/min/1.73m2 Calcium 7.8 (L) 8.5 - 10.1 MG/DL Bilirubin, total 0.6 0.2 - 1.0 MG/DL  
 ALT (SGPT) 70 12 - 78 U/L  
 AST (SGOT) 66 (H) 15 - 37 U/L Alk. phosphatase 138 (H) 45 - 117 U/L Protein, total 6.7 6.4 - 8.2 g/dL Albumin 2.6 (L) 3.5 - 5.0 g/dL Globulin 4.1 (H) 2.0 - 4.0 g/dL A-G Ratio 0.6 (L) 1.1 - 2.2 NT-PRO BNP Collection Time: 01/12/19 11:59 PM  
Result Value Ref Range NT pro- (H) 0 - 125 PG/ML  
D DIMER Collection Time: 01/12/19 11:59 PM  
Result Value Ref Range D-dimer 0.57 0.00 - 0.65 mg/L FEU  
URINALYSIS W/ REFLEX CULTURE Collection Time: 01/12/19 11:59 PM  
Result Value Ref Range  Color YELLOW/STRAW Appearance CLEAR CLEAR Specific gravity 1.029 1.003 - 1.030    
 pH (UA) 6.0 5.0 - 8.0 Protein NEGATIVE  NEG mg/dL Glucose NEGATIVE  NEG mg/dL Ketone TRACE (A) NEG mg/dL Bilirubin NEGATIVE  NEG Blood NEGATIVE  NEG Urobilinogen 0.2 0.2 - 1.0 EU/dL Nitrites NEGATIVE  NEG Leukocyte Esterase NEGATIVE  NEG    
 WBC 0-4 0 - 4 /hpf  
 RBC 0-5 0 - 5 /hpf Epithelial cells FEW FEW /lpf Bacteria NEGATIVE  NEG /hpf  
 UA:UC IF INDICATED CULTURE NOT INDICATED BY UA RESULT CNI Hyaline cast 0-2 0 - 5 /lpf ETHYL ALCOHOL Collection Time: 01/12/19 11:59 PM  
Result Value Ref Range ALCOHOL(ETHYL),SERUM 79 (H) <10 MG/DL  
DRUG SCREEN, URINE Collection Time: 01/12/19 11:59 PM  
Result Value Ref Range AMPHETAMINES NEGATIVE  NEG    
 BARBITURATES NEGATIVE  NEG BENZODIAZEPINES NEGATIVE  NEG    
 COCAINE POSITIVE (A) NEG METHADONE NEGATIVE  NEG    
 OPIATES NEGATIVE  NEG    
 PCP(PHENCYCLIDINE) NEGATIVE  NEG    
 THC (TH-CANNABINOL) NEGATIVE  NEG Drug screen comment (NOTE) Radiologic Studies -  
XR CHEST PA LAT Final Result IMPRESSION:  
No acute process. CT Results  (Last 48 hours) None CXR Results  (Last 48 hours) 01/13/19 0019  XR CHEST PA LAT Final result Impression:  IMPRESSION:  
No acute process. Narrative:  INDICATION:   cp  
   
COMPARISON: January 8, 2019 FINDINGS:  
   
Frontal and lateral views of the chest demonstrate a normal cardiomediastinal  
silhouette. The lungs are adequately expanded. There is no edema, effusion,  
consolidation, or pneumothorax. The osseous structures are unremarkable. Medical Decision Making I am the first provider for this patient. I reviewed the vital signs, available nursing notes, past medical history, past surgical history, family history and social history. Vital Signs-Reviewed the patient's vital signs.  
Patient Vitals for the past 12 hrs: 
 Temp Pulse Resp BP SpO2  
01/13/19 0045  94 20 141/78 98 % 01/13/19 0030  92 19 137/81 98 % 01/13/19 0012     98 % 01/12/19 2352 98.7 °F (37.1 °C) 92 18 130/79 98 % Pulse Oximetry Analysis - 100% on RA Cardiac Monitor:  
Rate: 92 bpm 
 
Records Reviewed: Nursing Notes, Old Medical Records, Previous electrocardiograms, Previous Radiology Studies and Previous Laboratory Studies Provider Notes (Medical Decision Making): DDX: 
Cocaine induced cp, acs, arrhythmia, PE, pericarditis Plan: 
Ekg, labs, cxr, analgesic, d dimer, chart review Impression: 
Atypical recurrent chest pain, cocaine abuse ED Course:  
Initial assessment performed. The patients presenting problems have been discussed, and they are in agreement with the care plan formulated and outlined with them. I have encouraged them to ask questions as they arise throughout their visit. I reviewed our electronic medical record system for any past medical records that were available that may contribute to the patients current condition, the nursing notes and and vital signs from today's visit Nursing notes will be reviewed as they become available in realtime while the pt has been in the ED. Odin Woodward MD 
 
I have spent 3-5 minutes discussing the medical risks of prolonged smoking habits and advised the patient of the benefits of the cessation of smoking, providing specific suggestions on how to quit. Odin Woodward MD 
 
EKG interpretation 9178: NSR, nl Axis, rate 92; , QRS 68, QTc 489; no acute ischemia; Odin Woodward MD 
 
I personally reviewed pt's imaging. Official read by radiology noted above. Odin Woodward MD 
 
PROGRESS NOTE: 
12:45 AM 
Per chart review, Pt had negative CTA in December 28th, 2018. Given this and ddimer results today, will defer repeat cta/ additional imaging tonight.   
Written by Fern Mccoy, as dictated by Odin Woodward MD 
 
12:55 AM 
Progress note: Pt noted to be feeling better, ready for discharge. Discussed lab and imaging findings with pt and/or family, specifically noting negative CXR. Pt will follow up as instructed. All questions have been answered, pt voiced understanding and agreement with plan. Specific return precautions provided in addition to instructions for pt to return to the ED immediately should sx worsen at any time. Jose Vazquez MD 
 
 
Critical Care Time:  
 
none Diagnosis Clinical Impression: 1. Recurrent chest pain 2. Cocaine use 3. Acute alcohol use PLAN: 
1. Current Discharge Medication List  
  
START taking these medications Details  
acetaminophen (TYLENOL 8 HOUR) 650 mg TbER Take 1 Tab by mouth every eight (8) hours. Qty: 15 Tab, Refills: 0  
  
  
 
2. Follow-up Information Follow up With Specialties Details Why Contact Info Armando Sorenson NP Nurse Practitioner Schedule an appointment as soon as possible for a visit in 2 days  [address] [facility] 
[phone] Ji Segal MD Cardiology Schedule an appointment as soon as possible for a visit in 2 days  [address] [facility] [address] 
[phone] Return to ED if worse Disposition: 
 
DISCHARGE 
12:59 AM 
The patient has been re-evaluated and is ready for discharge. Reviewed available results with patient. Counseled pt on diagnosis and care plan. Pt has expressed understanding, and all questions have been answered. Pt agrees with plan and agrees to follow up as recommended, or return to the ED if their symptoms worsen. Discharge instructions have been provided and explained to the pt, along with reasons to return to the ED. Attestations: This note is prepared by Tylor Amin, acting as Scribe for MD Jose Watkins MD : The scribe's documentation has been prepared under my direction and personally reviewed by me in its entirety.  I confirm that the note above accurately reflects all work, treatment, procedures, and medical decision making performed by me. This note will not be viewable in [address]

## 2019-01-13 NOTE — ED NOTES
Pt resting with eyes closed. RR equal and unlabored. VSS. Call bell within reach. Will continue to monitor.

## 2019-01-14 LAB
ATRIAL RATE: 92 BPM
ATRIAL RATE: 94 BPM
CALCULATED P AXIS, ECG09: 61 DEGREES
CALCULATED P AXIS, ECG09: 69 DEGREES
CALCULATED R AXIS, ECG10: 10 DEGREES
CALCULATED R AXIS, ECG10: 18 DEGREES
CALCULATED T AXIS, ECG11: 56 DEGREES
CALCULATED T AXIS, ECG11: 64 DEGREES
DIAGNOSIS, 93000: NORMAL
DIAGNOSIS, 93000: NORMAL
P-R INTERVAL, ECG05: 128 MS
P-R INTERVAL, ECG05: 136 MS
Q-T INTERVAL, ECG07: 396 MS
Q-T INTERVAL, ECG07: 404 MS
QRS DURATION, ECG06: 68 MS
QRS DURATION, ECG06: 72 MS
QTC CALCULATION (BEZET), ECG08: 489 MS
QTC CALCULATION (BEZET), ECG08: 505 MS
VENTRICULAR RATE, ECG03: 92 BPM
VENTRICULAR RATE, ECG03: 94 BPM

## 2019-01-19 ENCOUNTER — HOSPITAL ENCOUNTER (EMERGENCY)
Age: 44
Discharge: HOME | End: 2019-01-20
Attending: INTERNAL MEDICINE | Admitting: INTERNAL MEDICINE
Payer: MEDICARE

## 2019-01-19 DIAGNOSIS — R07.89 OTHER CHEST PAIN: Primary | ICD-10-CM

## 2019-01-19 PROCEDURE — 99285 EMERGENCY DEPT VISIT HI MDM: CPT

## 2019-01-20 ENCOUNTER — APPOINTMENT (OUTPATIENT)
Dept: GENERAL RADIOLOGY | Age: 44
End: 2019-01-20
Attending: INTERNAL MEDICINE
Payer: MEDICARE

## 2019-01-20 VITALS
TEMPERATURE: 98.4 F | RESPIRATION RATE: 14 BRPM | WEIGHT: 153.9 LBS | SYSTOLIC BLOOD PRESSURE: 147 MMHG | DIASTOLIC BLOOD PRESSURE: 80 MMHG | BODY MASS INDEX: 24.84 KG/M2 | OXYGEN SATURATION: 100 % | HEART RATE: 81 BPM

## 2019-01-20 LAB
ALBUMIN SERPL-MCNC: 2.9 G/DL (ref 3.5–5)
ALBUMIN/GLOB SERPL: 0.7 {RATIO} (ref 1.1–2.2)
ALP SERPL-CCNC: 110 U/L (ref 45–117)
ALT SERPL-CCNC: 32 U/L (ref 12–78)
AMPHET UR QL SCN: NEGATIVE
ANION GAP SERPL CALC-SCNC: 5 MMOL/L (ref 5–15)
APPEARANCE UR: CLEAR
AST SERPL-CCNC: 19 U/L (ref 15–37)
ATRIAL RATE: 83 BPM
BARBITURATES UR QL SCN: NEGATIVE
BASOPHILS # BLD: 0 K/UL (ref 0–0.1)
BASOPHILS NFR BLD: 1 % (ref 0–1)
BENZODIAZ UR QL: NEGATIVE
BILIRUB SERPL-MCNC: 0.3 MG/DL (ref 0.2–1)
BILIRUB UR QL: NEGATIVE
BNP SERPL-MCNC: 100 PG/ML (ref 0–125)
BUN SERPL-MCNC: 8 MG/DL (ref 6–20)
BUN/CREAT SERPL: 14 (ref 12–20)
CALCIUM SERPL-MCNC: 7.9 MG/DL (ref 8.5–10.1)
CALCULATED P AXIS, ECG09: 63 DEGREES
CALCULATED R AXIS, ECG10: 11 DEGREES
CALCULATED T AXIS, ECG11: 67 DEGREES
CANNABINOIDS UR QL SCN: NEGATIVE
CHLORIDE SERPL-SCNC: 104 MMOL/L (ref 97–108)
CK SERPL-CCNC: 67 U/L (ref 26–192)
CO2 SERPL-SCNC: 30 MMOL/L (ref 21–32)
COCAINE UR QL SCN: NEGATIVE
COLOR UR: ABNORMAL
CREAT SERPL-MCNC: 0.58 MG/DL (ref 0.55–1.02)
DIAGNOSIS, 93000: NORMAL
DIFFERENTIAL METHOD BLD: ABNORMAL
DRUG SCRN COMMENT,DRGCM: ABNORMAL
EOSINOPHIL # BLD: 0.2 K/UL (ref 0–0.4)
EOSINOPHIL NFR BLD: 4 % (ref 0–7)
ERYTHROCYTE [DISTWIDTH] IN BLOOD BY AUTOMATED COUNT: 17.2 % (ref 11.5–14.5)
ETHANOL SERPL-MCNC: 33 MG/DL
GLOBULIN SER CALC-MCNC: 4.1 G/DL (ref 2–4)
GLUCOSE BLD STRIP.AUTO-MCNC: 280 MG/DL (ref 65–100)
GLUCOSE BLD STRIP.AUTO-MCNC: 282 MG/DL (ref 65–100)
GLUCOSE SERPL-MCNC: 260 MG/DL (ref 65–100)
GLUCOSE UR STRIP.AUTO-MCNC: >1000 MG/DL
HCT VFR BLD AUTO: 36.1 % (ref 35–47)
HGB BLD-MCNC: 12.2 G/DL (ref 11.5–16)
HGB UR QL STRIP: NEGATIVE
IMM GRANULOCYTES # BLD AUTO: 0 K/UL (ref 0–0.04)
IMM GRANULOCYTES NFR BLD AUTO: 0 % (ref 0–0.5)
KETONES UR QL STRIP.AUTO: NEGATIVE MG/DL
LEUKOCYTE ESTERASE UR QL STRIP.AUTO: NEGATIVE
LYMPHOCYTES # BLD: 1.3 K/UL (ref 0.8–3.5)
LYMPHOCYTES NFR BLD: 30 % (ref 12–49)
MCH RBC QN AUTO: 26.2 PG (ref 26–34)
MCHC RBC AUTO-ENTMCNC: 33.8 G/DL (ref 30–36.5)
MCV RBC AUTO: 77.6 FL (ref 80–99)
METHADONE UR QL: NEGATIVE
MONOCYTES # BLD: 0.4 K/UL (ref 0–1)
MONOCYTES NFR BLD: 10 % (ref 5–13)
NEUTS SEG # BLD: 2.3 K/UL (ref 1.8–8)
NEUTS SEG NFR BLD: 56 % (ref 32–75)
NITRITE UR QL STRIP.AUTO: NEGATIVE
NRBC # BLD: 0 K/UL (ref 0–0.01)
NRBC BLD-RTO: 0 PER 100 WBC
OPIATES UR QL: POSITIVE
P-R INTERVAL, ECG05: 142 MS
PCP UR QL: NEGATIVE
PH UR STRIP: 5.5 [PH] (ref 5–8)
PLATELET # BLD AUTO: 187 K/UL (ref 150–400)
PMV BLD AUTO: 11.3 FL (ref 8.9–12.9)
POTASSIUM SERPL-SCNC: 4.2 MMOL/L (ref 3.5–5.1)
PROT SERPL-MCNC: 7 G/DL (ref 6.4–8.2)
PROT UR STRIP-MCNC: NEGATIVE MG/DL
Q-T INTERVAL, ECG07: 398 MS
QRS DURATION, ECG06: 80 MS
QTC CALCULATION (BEZET), ECG08: 467 MS
RBC # BLD AUTO: 4.65 M/UL (ref 3.8–5.2)
SERVICE CMNT-IMP: ABNORMAL
SERVICE CMNT-IMP: ABNORMAL
SODIUM SERPL-SCNC: 139 MMOL/L (ref 136–145)
SP GR UR REFRACTOMETRY: >1.03 (ref 1–1.03)
TROPONIN I SERPL-MCNC: <0.05 NG/ML
UROBILINOGEN UR QL STRIP.AUTO: 0.2 EU/DL (ref 0.2–1)
VENTRICULAR RATE, ECG03: 83 BPM
WBC # BLD AUTO: 4.1 K/UL (ref 3.6–11)

## 2019-01-20 PROCEDURE — 36415 COLL VENOUS BLD VENIPUNCTURE: CPT

## 2019-01-20 PROCEDURE — 80053 COMPREHEN METABOLIC PANEL: CPT

## 2019-01-20 PROCEDURE — 82962 GLUCOSE BLOOD TEST: CPT

## 2019-01-20 PROCEDURE — 84484 ASSAY OF TROPONIN QUANT: CPT

## 2019-01-20 PROCEDURE — 85025 COMPLETE CBC W/AUTO DIFF WBC: CPT

## 2019-01-20 PROCEDURE — 83880 ASSAY OF NATRIURETIC PEPTIDE: CPT

## 2019-01-20 PROCEDURE — 77030029684 HC NEB SM VOL KT MONA -A

## 2019-01-20 PROCEDURE — 71045 X-RAY EXAM CHEST 1 VIEW: CPT

## 2019-01-20 PROCEDURE — 93005 ELECTROCARDIOGRAM TRACING: CPT

## 2019-01-20 PROCEDURE — 74011000250 HC RX REV CODE- 250: Performed by: INTERNAL MEDICINE

## 2019-01-20 PROCEDURE — 74011250637 HC RX REV CODE- 250/637: Performed by: INTERNAL MEDICINE

## 2019-01-20 PROCEDURE — 96375 TX/PRO/DX INJ NEW DRUG ADDON: CPT

## 2019-01-20 PROCEDURE — 81003 URINALYSIS AUTO W/O SCOPE: CPT

## 2019-01-20 PROCEDURE — 96374 THER/PROPH/DIAG INJ IV PUSH: CPT

## 2019-01-20 PROCEDURE — 80307 DRUG TEST PRSMV CHEM ANLYZR: CPT

## 2019-01-20 PROCEDURE — 94640 AIRWAY INHALATION TREATMENT: CPT

## 2019-01-20 PROCEDURE — 74011250636 HC RX REV CODE- 250/636: Performed by: INTERNAL MEDICINE

## 2019-01-20 PROCEDURE — 82550 ASSAY OF CK (CPK): CPT

## 2019-01-20 RX ORDER — FAMOTIDINE 10 MG/ML
20 INJECTION INTRAVENOUS
Status: COMPLETED | OUTPATIENT
Start: 2019-01-20 | End: 2019-01-20

## 2019-01-20 RX ORDER — NITROGLYCERIN 0.4 MG/1
0.4 TABLET SUBLINGUAL
Qty: 1 BOTTLE | Refills: 0 | Status: SHIPPED | OUTPATIENT
Start: 2019-01-20 | End: 2019-02-16

## 2019-01-20 RX ORDER — RANITIDINE 150 MG/1
150 TABLET, FILM COATED ORAL 2 TIMES DAILY
Qty: 20 TAB | Refills: 0 | Status: SHIPPED | OUTPATIENT
Start: 2019-01-20 | End: 2019-01-30

## 2019-01-20 RX ORDER — ONDANSETRON 2 MG/ML
4 INJECTION INTRAMUSCULAR; INTRAVENOUS
Status: COMPLETED | OUTPATIENT
Start: 2019-01-20 | End: 2019-01-20

## 2019-01-20 RX ORDER — IPRATROPIUM BROMIDE AND ALBUTEROL SULFATE 2.5; .5 MG/3ML; MG/3ML
3 SOLUTION RESPIRATORY (INHALATION)
Status: COMPLETED | OUTPATIENT
Start: 2019-01-20 | End: 2019-01-20

## 2019-01-20 RX ADMIN — ONDANSETRON 4 MG: 2 SOLUTION INTRAMUSCULAR; INTRAVENOUS at 00:52

## 2019-01-20 RX ADMIN — LIDOCAINE HYDROCHLORIDE 40 ML: 20 SOLUTION ORAL; TOPICAL at 00:51

## 2019-01-20 RX ADMIN — FAMOTIDINE 20 MG: 10 INJECTION INTRAVENOUS at 00:52

## 2019-01-20 RX ADMIN — IPRATROPIUM BROMIDE AND ALBUTEROL SULFATE 3 ML: .5; 3 SOLUTION RESPIRATORY (INHALATION) at 00:37

## 2019-01-20 NOTE — ED NOTES
..Discharge summary and discharge medications reviewed with patient and appropriate educational materials and side effects teaching were provided. patient  Given 2 paper prescriptions and 0 electronic prescriptions sent to pt's listed pharmacy. Patient verbalized understanding of the importance of discussing medications with his or her physician or clinic they will be following up with. No si/s of acute distress prior to discharge. Patient offered wheelchair from treatment area to hospital entrance, patient declined wheelchair. Offered to assist patient with setting up ride home; patient declined and states she is going to walk home as she lives nearby. Discharged home.

## 2019-01-20 NOTE — ED TRIAGE NOTES
Patient with hx of diabetes and HTN presents to ED via EMS for c/o chest pain that is \"sharp\" and intermittent for several days. Patient states she was seen at [facility] ED for same recently. Patient has abdominal distention with ascites; 2+ pitting edema noted to TERRI lower extremities. Patient admits daily ETOH abuse and recent crack cocaine use.

## 2019-01-20 NOTE — ED NOTES
Emergency Department Nursing Plan of Care The Nursing Plan of Care is developed from the Nursing assessment and Emergency Department Attending provider initial evaluation. The plan of care may be reviewed in the ED Provider note. The Plan of Care was developed with the following considerations:  
Patient / Family readiness to learn indicated by:verbalized understanding Persons(s) to be included in education: patient Barriers to Learning/Limitations:No 
 
Signed 5[address] Chaka Bashir RN   
1/20/2019   1:09 AM

## 2019-01-20 NOTE — ED NOTES
Patient reports feeling \"a little better\" after receiving medications for c/o chest pain. Resting in bed. Call bell within reach.

## 2019-01-20 NOTE — ED PROVIDER NOTES
EMERGENCY DEPARTMENT HISTORY AND PHYSICAL EXAM 
 
 
Date: 1/19/2019 Patient Name: Evonne Parker History of Presenting Illness Chief Complaint Patient presents with  Chest Pain History Provided By: Patient HPI: Evonne Parker, 44 y.o. female with PMHx significant for DM, HTN, borderline personality disorder, sickle cell trait, schizophrenia, bipolar, depression, anxiety, presents via EMS to the ED with cc of new onset moderate midsternal chest pain, ongoing for several minutes. Pt's discloses to [facility] for similar sxs and was seen at [address] [place] [address] on 1/13/19 for CP. She denies any alleviating or exacerbating factors. Pt specifically denies any HA, SOB, nausea, vomiting, fevers, chills, abdominal pain, cough, urinary sxs or diarrhea. ROS and HPI limited due to pt being poor historian. PCP: Tony Gómez NP 
SHx: (+)smoker: 0.5 packs/day, (+)EtOH use:3.6 oz/wk, (+)cocaine use No current facility-administered medications on file prior to encounter. Current Outpatient Medications on File Prior to Encounter Medication Sig Dispense Refill  acetaminophen (TYLENOL 8 HOUR) 650 mg TbER Take 1 Tab by mouth every eight (8) hours. 15 Tab 0  
 albuterol (PROVENTIL HFA, VENTOLIN HFA, PROAIR HFA) 90 mcg/actuation inhaler Take 2 Puffs by inhalation every four (4) hours as needed for Wheezing. 1 Inhaler 0  
 insulin NPH/insulin regular (NOVOLIN 70/30, HUMULIN 70/30) 100 unit/mL (70-30) injection 15 Units by SubCUTAneous route two (2) times a day. 10 mL 6 Past History Past Medical History: 
Past Medical History:  
Diagnosis Date  Alcohol abuse, in remission   
 quit 17 days ago  Asthma   
 does not use inhalers  Borderline personality disorder ([address]  Diabetes ([facility] [address]  GERD (gastroesophageal reflux disease)  Hypertension  Other ill-defined conditions(799.89)   
 kidney stones ,passed one  Other ill-defined conditions(799.89) sickle cell trait  Other ill-defined conditions(799.89)   
 increased cholesterol  Pancreatitis  Psychiatric disorder   
 schizophrenia, bipolar, depression, anxiety Past Surgical History: 
Past Surgical History:  
Procedure Laterality Date  ABDOMEN SURGERY PROC UNLISTED  3/12/14 CHOLECYSTECTOMY LAPAROSCOPIC    
 HX GASTRIC BYPASS  HX GYN  11/8/2012  
 c section x2  HX OTHER SURGICAL  3/13/14 ENDOSCOPIC RETROGRADE CHOLANGIOPANCREATOGRAPHY  HX OTHER SURGICAL  8/4/14  
 endoscopic stent placed to bile duct  HX TUBAL LIGATION  2012 Family History: 
Family History Problem Relation Age of Onset  Heart Disease Mother  Diabetes Mother  Hypertension Mother  Hypertension Maternal Grandmother Social History: 
Social History Tobacco Use  Smoking status: Current Every Day Smoker Packs/day: 0.50 Years: 14.00 Pack years: 7.00 Types: Cigarettes  Smokeless tobacco: Never Used  Tobacco comment: cigarettes Substance Use Topics  Alcohol use: Yes Alcohol/week: 3.6 oz Types: 6 Cans of beer per week  Drug use: Yes Types: Cocaine, Opiates Comment: yesterday 12/27/18 Allergies: Allergies Allergen Reactions  Dilaudid [Hydromorphone (Bulk)] Itching  Ibuprofen Not Reported This Time Review of Systems Review of Systems Constitutional: Negative for chills and fever. HENT: Negative for congestion, rhinorrhea, sneezing and sore throat. Eyes: Negative for redness and visual disturbance. Respiratory: Negative for cough and shortness of breath. Cardiovascular: Positive for chest pain (mid). Negative for leg swelling. Gastrointestinal: Negative for abdominal pain, constipation, diarrhea, nausea and vomiting. Genitourinary: Negative for difficulty urinating and frequency. Musculoskeletal: Negative for back pain, myalgias and neck stiffness. Skin: Negative for rash.   
Neurological: Negative for dizziness, syncope, weakness, light-headedness, numbness and headaches. Hematological: Negative for adenopathy. All other systems reviewed and are negative. Physical Exam  
Physical Exam  
Constitutional: She is oriented to person, place, and time. She appears well-developed and well-nourished. Disheveled Smells of alcohol HENT:  
Head: Normocephalic and atraumatic. Mouth/Throat: Oropharynx is clear and moist.  
Eyes: Conjunctivae and EOM are normal.  
Neck: Normal range of motion and full passive range of motion without pain. Neck supple. Cardiovascular: Normal rate, regular rhythm, S1 normal, S2 normal, normal heart sounds, intact distal pulses and normal pulses. No murmur heard. Pulmonary/Chest: Effort normal. No respiratory distress. She has wheezes. Abdominal: Soft. Normal appearance and bowel sounds are normal. She exhibits no distension. There is no tenderness. There is no rebound. abdominal ascites Musculoskeletal: Normal range of motion. 2+ pitting edema of BL LE 
  
Neurological: She is alert and oriented to person, place, and time. She has normal strength.  
garbled speech due to intoxication Skin: Skin is warm, dry and intact. No rash noted. Psychiatric: She has a normal mood and affect. Her speech is normal and behavior is normal. Judgment and thought content normal.  
Nursing note and vitals reviewed. Diagnostic Study Results Labs - Recent Results (from the past 12 hour(s)) EKG, 12 LEAD, INITIAL Collection Time: 01/20/19 12:13 AM  
Result Value Ref Range Ventricular Rate 83 BPM  
 Atrial Rate 83 BPM  
 P-R Interval 142 ms QRS Duration 80 ms  
 Q-T Interval 398 ms QTC Calculation (Bezet) 467 ms Calculated P Axis 63 degrees Calculated R Axis 11 degrees Calculated T Axis 67 degrees Diagnosis Normal sinus rhythm Normal ECG When compared with ECG of 13-JAN-2019 09:07, No significant change was found CBC WITH AUTOMATED DIFF Collection Time: 01/20/19 12:30 AM  
Result Value Ref Range WBC 4.1 3.6 - 11.0 K/uL  
 RBC 4.65 3.80 - 5.20 M/uL  
 HGB 12.2 11.5 - 16.0 g/dL HCT 36.1 35.0 - 47.0 % MCV 77.6 (L) 80.0 - 99.0 FL  
 MCH 26.2 26.0 - 34.0 PG  
 MCHC 33.8 30.0 - 36.5 g/dL  
 RDW 17.2 (H) 11.5 - 14.5 % PLATELET 647 087 - 451 K/uL MPV 11.3 8.9 - 12.9 FL  
 NRBC 0.0 0  WBC ABSOLUTE NRBC 0.00 0.00 - 0.01 K/uL NEUTROPHILS 56 32 - 75 % LYMPHOCYTES 30 12 - 49 % MONOCYTES 10 5 - 13 % EOSINOPHILS 4 0 - 7 % BASOPHILS 1 0 - 1 % IMMATURE GRANULOCYTES 0 0.0 - 0.5 % ABS. NEUTROPHILS 2.3 1.8 - 8.0 K/UL  
 ABS. LYMPHOCYTES 1.3 0.8 - 3.5 K/UL  
 ABS. MONOCYTES 0.4 0.0 - 1.0 K/UL  
 ABS. EOSINOPHILS 0.2 0.0 - 0.4 K/UL  
 ABS. BASOPHILS 0.0 0.0 - 0.1 K/UL  
 ABS. IMM. GRANS. 0.0 0.00 - 0.04 K/UL  
 DF AUTOMATED    
CK W/ REFLX CKMB Collection Time: 01/20/19 12:30 AM  
Result Value Ref Range CK 67 26 - 192 U/L  
TROPONIN I Collection Time: 01/20/19 12:30 AM  
Result Value Ref Range Troponin-I, Qt. <0.05 <0.05 ng/mL URINALYSIS W/ RFLX MICROSCOPIC Collection Time: 01/20/19 12:30 AM  
Result Value Ref Range Color YELLOW/STRAW Appearance CLEAR CLEAR Specific gravity >1.030 (H) 1.003 - 1.030  
 pH (UA) 5.5 5.0 - 8.0 Protein NEGATIVE  NEG mg/dL Glucose >1,000 (A) NEG mg/dL Ketone NEGATIVE  NEG mg/dL Bilirubin NEGATIVE  NEG Blood NEGATIVE  NEG Urobilinogen 0.2 0.2 - 1.0 EU/dL Nitrites NEGATIVE  NEG Leukocyte Esterase NEGATIVE  NEG    
ETHYL ALCOHOL Collection Time: 01/20/19 12:30 AM  
Result Value Ref Range ALCOHOL(ETHYL),SERUM 33 (H) <18 MG/DL  
METABOLIC PANEL, COMPREHENSIVE Collection Time: 01/20/19 12:30 AM  
Result Value Ref Range Sodium 139 136 - 145 mmol/L Potassium 4.2 3.5 - 5.1 mmol/L Chloride 104 97 - 108 mmol/L  
 CO2 30 21 - 32 mmol/L Anion gap 5 5 - 15 mmol/L Glucose 260 (H) 65 - 100 mg/dL  BUN 8 6 - 20 MG/DL Creatinine 0.58 0.55 - 1.02 MG/DL  
 BUN/Creatinine ratio 14 12 - 20 GFR est AA >60 >60 ml/min/1.73m2 GFR est non-AA >60 >60 ml/min/1.73m2 Calcium 7.9 (L) 8.5 - 10.1 MG/DL Bilirubin, total 0.3 0.2 - 1.0 MG/DL  
 ALT (SGPT) 32 12 - 78 U/L  
 AST (SGOT) 19 15 - 37 U/L Alk. phosphatase 110 45 - 117 U/L Protein, total 7.0 6.4 - 8.2 g/dL Albumin 2.9 (L) 3.5 - 5.0 g/dL Globulin 4.1 (H) 2.0 - 4.0 g/dL A-G Ratio 0.7 (L) 1.1 - 2.2 NT-PRO BNP Collection Time: 01/20/19 12:30 AM  
Result Value Ref Range NT pro- 0 - 125 PG/ML  
DRUG SCREEN, URINE Collection Time: 01/20/19 12:30 AM  
Result Value Ref Range AMPHETAMINES NEGATIVE  NEG    
 BARBITURATES NEGATIVE  NEG BENZODIAZEPINES NEGATIVE  NEG    
 COCAINE NEGATIVE  NEG METHADONE NEGATIVE  NEG    
 OPIATES POSITIVE (A) NEG    
 PCP(PHENCYCLIDINE) NEGATIVE  NEG    
 THC (TH-CANNABINOL) NEGATIVE  NEG Drug screen comment (NOTE) GLUCOSE, POC Collection Time: 01/20/19 12:31 AM  
Result Value Ref Range Glucose (POC) 282 (H) 65 - 100 mg/dL Performed by Warren ANNA   
GLUCOSE, POC Collection Time: 01/20/19 12:32 AM  
Result Value Ref Range Glucose (POC) 280 (H) 65 - 100 mg/dL Performed by Mariaa Dao Radiologic Studies - CXR Results  (Last 48 hours) 01/20/19 0047  XR CHEST PORT Final result Impression:  IMPRESSION:  
   
No acute process on portable chest. No change given difference in technique. Narrative:  EXAM:  Portable chest view INDICATION: Sharp chest pain for several days. COMPARISON: Chest views on 1/13/2019. TECHNIQUE: Erect portable chest radiograph FINDINGS: Cardiac size is within normal limits. Aortic arch is not enlarged. Pulmonary vasculature is within normal limits. Left upper lobe calcified  
granulomas are still present. No focal airspace opacity or pulmonary edema. No  
pneumothorax.  Bones are within normal limits. Medical Decision Making I am the first provider for this patient. I reviewed the vital signs, available nursing notes, past medical history, past surgical history, family history and social history. Vital Signs-Reviewed the patient's vital signs. Patient Vitals for the past 12 hrs: 
 Temp Pulse Resp BP SpO2  
01/20/19 0101     100 % 01/20/19 0100    147/80 100 % 01/20/19 0058    154/86   
01/20/19 0018 98.4 °F (36.9 °C) 81 14 159/63 96 % Pulse Oximetry Analysis - 100% on RA Records Reviewed: Nursing Notes, Old Medical Records and Previous Laboratory Studies Provider Notes (Medical Decision Making): DDx: Cocaine abuse, alcohol abuse, esophageal spasm, malingering ED Course:  
Initial assessment performed. The patients presenting problems have been discussed, and they are in agreement with the care plan formulated and outlined with them. I have encouraged them to ask questions as they arise throughout their visit. Critical Care Time: 0 Disposition: 
Discharge Note: 
1:22 AM 
The pt is ready for discharge. The pt's signs, symptoms, diagnosis, and discharge instructions have been discussed and pt has conveyed their understanding. The pt is to follow up as recommended or return to ER should their symptoms worsen. Plan has been discussed and pt is in agreement. PLAN: 
1. Discharge Medication List as of 1/20/2019  1:22 AM  
  
START taking these medications Details  
nitroglycerin (NITROSTAT) 0.4 mg SL tablet Take 1 Tab by mouth every five (5) minutes as needed for Chest Pain. Sit/Lay down then put one tab under the tongue every 5 minutes as needed for chest pain for 3 doses, Print, Disp-1 Bottle, R-0  
  
raNITIdine (ZANTAC) 150 mg tablet Take 1 Tab by mouth two (2) times a day for 10 days. , Print, Disp-20 Tab, R-0  
  
  
CONTINUE these medications which have NOT CHANGED  Details  
insulin NPH/insulin regular (NOVOLIN 70/30, HUMULIN 70/30) 100 unit/mL (70-30) injection 15 Units by SubCUTAneous route two (2) times a day., Normal, Disp-10 mL, R-6  
  
acetaminophen (TYLENOL 8 HOUR) 650 mg TbER Take 1 Tab by mouth every eight (8) hours. , Normal, Disp-15 Tab, R-0  
  
albuterol (PROVENTIL HFA, VENTOLIN HFA, PROAIR HFA) 90 mcg/actuation inhaler Take 2 Puffs by inhalation every four (4) hours as needed for Wheezing., Normal, Disp-1 Inhaler, R-0  
  
  
STOP taking these medications  
  
 doxycycline (VIBRA-TABS) 100 mg tablet Comments:  
Reason for Stoppin.  
Follow-up Information Follow up With Specialties Details Why Contact Info Yusuf Spence MD Cardiology In 2 days If symptoms worsen [address] [facility] [address] 
[phone] Return to ED if worse Diagnosis Clinical Impression: 1. Other chest pain Attestations: This note is prepared by Monica Cheng, acting as a Scribe for Darryl Helms MD. Darryl Helms MD: The scribe's documentation has been prepared under my direction and personally reviewed by me in its entirety. I confirm that the notes above accurately reflects all work, treatment, procedures, and medical decision making performed by me. This note will not be viewable in [address]

## 2019-01-22 ENCOUNTER — APPOINTMENT (OUTPATIENT)
Dept: GENERAL RADIOLOGY | Age: 44
End: 2019-01-22
Attending: EMERGENCY MEDICINE
Payer: MEDICARE

## 2019-01-22 ENCOUNTER — HOSPITAL ENCOUNTER (EMERGENCY)
Age: 44
Discharge: HOME | End: 2019-01-22
Attending: EMERGENCY MEDICINE
Payer: MEDICARE

## 2019-01-22 VITALS
DIASTOLIC BLOOD PRESSURE: 85 MMHG | BODY MASS INDEX: 23.46 KG/M2 | OXYGEN SATURATION: 100 % | RESPIRATION RATE: 22 BRPM | TEMPERATURE: 99.1 F | WEIGHT: 146 LBS | HEIGHT: 66 IN | HEART RATE: 91 BPM | SYSTOLIC BLOOD PRESSURE: 152 MMHG

## 2019-01-22 DIAGNOSIS — R07.89 CHEST WALL PAIN: Primary | ICD-10-CM

## 2019-01-22 DIAGNOSIS — R73.9 HYPERGLYCEMIA: ICD-10-CM

## 2019-01-22 LAB
ALBUMIN SERPL-MCNC: 2.9 G/DL (ref 3.5–5)
ALBUMIN/GLOB SERPL: 0.7 {RATIO} (ref 1.1–2.2)
ALP SERPL-CCNC: 116 U/L (ref 45–117)
ALT SERPL-CCNC: 29 U/L (ref 12–78)
ANION GAP SERPL CALC-SCNC: 7 MMOL/L (ref 5–15)
AST SERPL-CCNC: 22 U/L (ref 15–37)
BASOPHILS # BLD: 0 K/UL (ref 0–0.1)
BASOPHILS NFR BLD: 0 % (ref 0–1)
BILIRUB SERPL-MCNC: 0.5 MG/DL (ref 0.2–1)
BUN SERPL-MCNC: 6 MG/DL (ref 6–20)
BUN/CREAT SERPL: 8 (ref 12–20)
CALCIUM SERPL-MCNC: 8.1 MG/DL (ref 8.5–10.1)
CHLORIDE SERPL-SCNC: 99 MMOL/L (ref 97–108)
CO2 SERPL-SCNC: 30 MMOL/L (ref 21–32)
COMMENT, HOLDF: NORMAL
CREAT SERPL-MCNC: 0.77 MG/DL (ref 0.55–1.02)
DIFFERENTIAL METHOD BLD: ABNORMAL
EOSINOPHIL # BLD: 0.1 K/UL (ref 0–0.4)
EOSINOPHIL NFR BLD: 2 % (ref 0–7)
ERYTHROCYTE [DISTWIDTH] IN BLOOD BY AUTOMATED COUNT: 16.7 % (ref 11.5–14.5)
GLOBULIN SER CALC-MCNC: 4 G/DL (ref 2–4)
GLUCOSE BLD STRIP.AUTO-MCNC: 107 MG/DL (ref 65–100)
GLUCOSE BLD STRIP.AUTO-MCNC: 65 MG/DL (ref 65–100)
GLUCOSE BLD STRIP.AUTO-MCNC: 73 MG/DL (ref 65–100)
GLUCOSE SERPL-MCNC: 442 MG/DL (ref 65–100)
HCT VFR BLD AUTO: 40.4 % (ref 35–47)
HGB BLD-MCNC: 13.3 G/DL (ref 11.5–16)
IMM GRANULOCYTES # BLD AUTO: 0 K/UL (ref 0–0.04)
IMM GRANULOCYTES NFR BLD AUTO: 0 % (ref 0–0.5)
LYMPHOCYTES # BLD: 1.8 K/UL (ref 0.8–3.5)
LYMPHOCYTES NFR BLD: 38 % (ref 12–49)
MCH RBC QN AUTO: 26.1 PG (ref 26–34)
MCHC RBC AUTO-ENTMCNC: 32.9 G/DL (ref 30–36.5)
MCV RBC AUTO: 79.2 FL (ref 80–99)
MONOCYTES # BLD: 0.5 K/UL (ref 0–1)
MONOCYTES NFR BLD: 10 % (ref 5–13)
NEUTS SEG # BLD: 2.3 K/UL (ref 1.8–8)
NEUTS SEG NFR BLD: 49 % (ref 32–75)
NRBC # BLD: 0 K/UL (ref 0–0.01)
NRBC BLD-RTO: 0 PER 100 WBC
PLATELET # BLD AUTO: 205 K/UL (ref 150–400)
PMV BLD AUTO: 11.8 FL (ref 8.9–12.9)
POTASSIUM SERPL-SCNC: 4.3 MMOL/L (ref 3.5–5.1)
PROT SERPL-MCNC: 6.9 G/DL (ref 6.4–8.2)
RBC # BLD AUTO: 5.1 M/UL (ref 3.8–5.2)
SAMPLES BEING HELD,HOLD: NORMAL
SERVICE CMNT-IMP: ABNORMAL
SERVICE CMNT-IMP: NORMAL
SERVICE CMNT-IMP: NORMAL
SODIUM SERPL-SCNC: 136 MMOL/L (ref 136–145)
TROPONIN I SERPL-MCNC: <0.05 NG/ML
WBC # BLD AUTO: 4.6 K/UL (ref 3.6–11)

## 2019-01-22 PROCEDURE — 74011250637 HC RX REV CODE- 250/637: Performed by: EMERGENCY MEDICINE

## 2019-01-22 PROCEDURE — 94762 N-INVAS EAR/PLS OXIMTRY CONT: CPT

## 2019-01-22 PROCEDURE — 74011636637 HC RX REV CODE- 636/637: Performed by: EMERGENCY MEDICINE

## 2019-01-22 PROCEDURE — 93005 ELECTROCARDIOGRAM TRACING: CPT

## 2019-01-22 PROCEDURE — 74011250636 HC RX REV CODE- 250/636: Performed by: EMERGENCY MEDICINE

## 2019-01-22 PROCEDURE — 99285 EMERGENCY DEPT VISIT HI MDM: CPT

## 2019-01-22 PROCEDURE — 82962 GLUCOSE BLOOD TEST: CPT

## 2019-01-22 PROCEDURE — 85025 COMPLETE CBC W/AUTO DIFF WBC: CPT

## 2019-01-22 PROCEDURE — 80053 COMPREHEN METABOLIC PANEL: CPT

## 2019-01-22 PROCEDURE — 84484 ASSAY OF TROPONIN QUANT: CPT

## 2019-01-22 PROCEDURE — 36415 COLL VENOUS BLD VENIPUNCTURE: CPT

## 2019-01-22 PROCEDURE — 96361 HYDRATE IV INFUSION ADD-ON: CPT

## 2019-01-22 PROCEDURE — 96375 TX/PRO/DX INJ NEW DRUG ADDON: CPT

## 2019-01-22 PROCEDURE — 96374 THER/PROPH/DIAG INJ IV PUSH: CPT

## 2019-01-22 PROCEDURE — 71046 X-RAY EXAM CHEST 2 VIEWS: CPT

## 2019-01-22 RX ORDER — KETOROLAC TROMETHAMINE 30 MG/ML
30 INJECTION, SOLUTION INTRAMUSCULAR; INTRAVENOUS
Status: COMPLETED | OUTPATIENT
Start: 2019-01-22 | End: 2019-01-22

## 2019-01-22 RX ORDER — OXYCODONE AND ACETAMINOPHEN 5; 325 MG/1; MG/1
1 TABLET ORAL
Status: COMPLETED | OUTPATIENT
Start: 2019-01-22 | End: 2019-01-22

## 2019-01-22 RX ORDER — NAPROXEN 500 MG/1
500 TABLET ORAL 2 TIMES DAILY WITH MEALS
Qty: 20 TAB | Refills: 0 | Status: SHIPPED | OUTPATIENT
Start: 2019-01-22 | End: 2019-01-24

## 2019-01-22 RX ADMIN — OXYCODONE AND ACETAMINOPHEN 1 TABLET: 5; 325 TABLET ORAL at 16:39

## 2019-01-22 RX ADMIN — HUMAN INSULIN 10 UNITS: 100 INJECTION, SOLUTION SUBCUTANEOUS at 17:07

## 2019-01-22 RX ADMIN — SODIUM CHLORIDE 1000 ML: 900 INJECTION, SOLUTION INTRAVENOUS at 16:41

## 2019-01-22 RX ADMIN — KETOROLAC TROMETHAMINE 30 MG: 30 INJECTION, SOLUTION INTRAMUSCULAR at 16:39

## 2019-01-22 NOTE — ED NOTES
1816: Checked patients BG - 65. Provided patient with 8oz of OJ, and 2 packs thad cracker w/ PB. Updated MD. Will recheck in 15mins. 1829: BG recheck - 73. Provided patient with 4oz OJ and 1 pack of thad crackers and PB. 
 
1857: Patient  - updated MD.  
 
1927: Patient verbalizes understanding of discharge instructions. Opportunity for questions provided. Patient in no apparent distress, VSS. Patient ambulatory upon discharge. Visit Vitals /85 (BP 1 Location: Right arm, BP Patient Position: At rest;Sitting) Pulse 91 Temp 99.1 °F (37.3 °C) Resp 22 Ht 5' 6\" (1.676 m) Wt 66.2 kg (146 lb) SpO2 100% BMI 23.57 kg/m²

## 2019-01-22 NOTE — DISCHARGE INSTRUCTIONS

## 2019-01-22 NOTE — ED PROVIDER NOTES
44 y.o. female with past medical history significant for DM, HTN, pancreatitis, EtOH abuse, GERD, sickle cell trait who presents via EMS with chief complaint of chest pain. Pt c/o constant CP for the past 3 weeks that acutely worsened today. The pain is focalized to her medial chest and does not radiate. Her chest is tender to palpation. Associated sx include cough, SOB. Pt reports that her sx are worse with exertion. Pt took ASA and nitro PTA without relief. Pt takes lisinopril daily with which she is compliant. Pt has no cardiac hx but has an appointment with Dr. Valeriy Vieira on 1/31. Pt has been seen twice in the past week for the same sx at [facility] with nl work ups. Pt has was recently admitted to [address] [place] [address]. Pt endorses hx of crack cocaine use, with none in the past 2 weeks. There are no other acute medical concerns at this time. Social hx: +tobacco smoker (1 pack/day), denies EtOH consumption, former crack cocaine use PCP: Mindy Viera NP Note written by Deepthi Merritt, as dictated by Keesha Lugo MD 4:11 PM 
 
 
 
The history is provided by the patient. No  was used. Past Medical History:  
Diagnosis Date  Alcohol abuse, in remission   
 quit 17 days ago  Asthma   
 does not use inhalers  Borderline personality disorder ([facility] [address]  Diabetes ([facility] [address]  GERD (gastroesophageal reflux disease)  Hypertension  Other ill-defined conditions(799.89)   
 kidney stones ,passed one  Other ill-defined conditions(799.89) sickle cell trait  Other ill-defined conditions(799.89)   
 increased cholesterol  Pancreatitis  Psychiatric disorder   
 schizophrenia, bipolar, depression, anxiety Past Surgical History:  
Procedure Laterality Date  ABDOMEN SURGERY PROC UNLISTED  3/12/14 CHOLECYSTECTOMY LAPAROSCOPIC    
 HX GASTRIC BYPASS  HX GYN  11/8/2012  
 c section x2  HX OTHER SURGICAL  3/13/14  ENDOSCOPIC RETROGRADE CHOLANGIOPANCREATOGRAPHY  HX OTHER SURGICAL  8/4/14  
 endoscopic stent placed to bile duct  HX TUBAL LIGATION  2012 Family History:  
Problem Relation Age of Onset  Heart Disease Mother  Diabetes Mother  Hypertension Mother  Hypertension Maternal Grandmother Social History Socioeconomic History  Marital status: SINGLE Spouse name: Not on file  Number of children: Not on file  Years of education: Not on file  Highest education level: Not on file Social Needs  Financial resource strain: Not on file  Food insecurity - worry: Not on file  Food insecurity - inability: Not on file  Transportation needs - medical: Not on file  Transportation needs - non-medical: Not on file Occupational History  Not on file Tobacco Use  Smoking status: Current Every Day Smoker Packs/day: 0.50 Years: 14.00 Pack years: 7.00 Types: Cigarettes  Smokeless tobacco: Never Used  Tobacco comment: cigarettes Substance and Sexual Activity  Alcohol use: No  
  Alcohol/week: 3.6 oz Types: 6 Cans of beer per week Frequency: Never  Drug use: Yes Types: Cocaine Comment: yesterday 12/27/18  Sexual activity: Yes  
  Partners: Female Birth control/protection: Surgical  
Other Topics Concern  Not on file Social History Narrative  Not on file ALLERGIES: Dilaudid [hydromorphone (bulk)] and Ibuprofen Review of Systems Constitutional: Negative for fever. HENT: Negative for facial swelling and nosebleeds. Eyes: Negative for pain. Respiratory: Positive for cough and shortness of breath. Negative for chest tightness. Cardiovascular: Positive for chest pain. Negative for leg swelling. Gastrointestinal: Negative for abdominal pain, diarrhea and vomiting. Endocrine: Negative for polyuria. Genitourinary: Negative for difficulty urinating and flank pain.   
Musculoskeletal: Negative for arthralgias and back pain. Skin: Negative for color change. Allergic/Immunologic: Negative for immunocompromised state. Neurological: Negative for dizziness and headaches. Hematological: Does not bruise/bleed easily. Psychiatric/Behavioral: Negative for agitation. All other systems reviewed and are negative. Vitals:  
 01/22/19 1531 01/22/19 1557 BP:  153/89 Pulse: 88 83 Resp:  16 Temp:  98.6 °F (37 °C) SpO2: 99% 100% Weight:  66.2 kg (146 lb) Height:  5' 6\" (1.676 m) Physical Exam  
Constitutional: She is oriented to person, place, and time. She appears well-developed and well-nourished. HENT:  
Head: Normocephalic and atraumatic. Right Ear: External ear normal.  
Left Ear: External ear normal.  
Nose: Nose normal.  
Mouth/Throat: Oropharynx is clear and moist.  
Eyes: EOM are normal. Pupils are equal, round, and reactive to light. No scleral icterus. Neck: Normal range of motion. Neck supple. No JVD present. No tracheal deviation present. No thyromegaly present. Cardiovascular: Normal rate, regular rhythm, normal heart sounds, intact distal pulses and normal pulses. Exam reveals no friction rub. No murmur heard. Pulmonary/Chest: Effort normal and breath sounds normal. No stridor. No respiratory distress. She has no wheezes. She has no rales. She exhibits tenderness (mid chest). Abdominal: Soft. Bowel sounds are normal. She exhibits no distension. There is no tenderness. There is no rebound and no guarding. Musculoskeletal: Normal range of motion. She exhibits no edema or tenderness. Lymphadenopathy:  
  She has no cervical adenopathy. Neurological: She is alert and oriented to person, place, and time. She has normal reflexes. No cranial nerve deficit. Coordination normal.  
Skin: Skin is warm and dry. No rash noted. No erythema. Psychiatric: She has a normal mood and affect.  Her behavior is normal. Judgment and thought content normal.  
Nursing note and vitals reviewed. Note written by Deepthi Angel, as dictated by Coral Ibarra MD 4:19 PM 
 
  
 
MDM Number of Diagnoses or Management Options Diagnosis management comments: 43 yo AAF w/ h/o cocaine abuse, presents w/ CP for 3 weeks. Pt has been seen at [address] [place] [address] and [facility] twice. Will check labs and CXR. Pt has tenderness over chest wall. If labs are WNL, pt can f/u w cardiology as scheduled. Pt agrees. Amount and/or Complexity of Data Reviewed Clinical lab tests: ordered and reviewed Tests in the radiology section of CPT®: ordered and reviewed Tests in the medicine section of CPT®: ordered and reviewed Decide to obtain previous medical records or to obtain history from someone other than the patient: yes Obtain history from someone other than the patient: yes Review and summarize past medical records: yes (ED notes reviewed from [facility] 2 days ago) Independent visualization of images, tracings, or specimens: yes Procedures ED EKG interpretation: 
Rhythm: normal sinus rhythm; and regular . Rate (approx.): 86; Axis: normal; ST/T wave: normal. 
Note written by Deepthi Angel, as dictated by Coral Ibarra MD 4:20 PM 
 
4:48 PM 
Trop is negative Glucose is elevated. Will give IVF and Insulin and reassess and recheck shortly. CXR shows no acute process 5:08 PM 
I have independently reviewed the patient's xray and have compared my findings with the interpretation from the radiologist.     
 
Pt needs PCP and cardiology f/u closely Good return precautions given to patient. Close follow up with PCP recommended. Patient and/or family voices understanding of this plan. Discharge instructions were explained by me and all concerns were addressed.

## 2019-01-22 NOTE — ED TRIAGE NOTES
Triage note: Patient c/o chest pain constant beginning three weeks ago. Evaluated for chest pain one week ago at [address] [place] [address]. Patient also seen at [facility] 3-4 days ago for the same. Patient reports she is 15 days sober of cocaine.

## 2019-01-23 LAB
ATRIAL RATE: 86 BPM
CALCULATED P AXIS, ECG09: 69 DEGREES
CALCULATED R AXIS, ECG10: 5 DEGREES
CALCULATED T AXIS, ECG11: 60 DEGREES
DIAGNOSIS, 93000: NORMAL
P-R INTERVAL, ECG05: 132 MS
Q-T INTERVAL, ECG07: 394 MS
QRS DURATION, ECG06: 72 MS
QTC CALCULATION (BEZET), ECG08: 471 MS
VENTRICULAR RATE, ECG03: 86 BPM

## 2019-01-23 NOTE — PROGRESS NOTES
Date of previous inpatient admission/ ED visit? 1/19/19 - 1/20/19 [facility] ED 
1/13/19  [facility] 
1/12/19 - 1/13/19 ED [facility] ED 
1/8/19 - 1/9/19  [facility] ED What brought the patient back to ED? Chest pain Did patient decline recommended services during last admission/ ED visit (if yes, what)? No 
 
Has patient seen a provider since their last inpatient admission/ED visit (if yes, when)? Yes 
 
CM Interventions: 
 
Care Management Interventions PCP Verified by CM: Yes(Dr. Meliza Mccray) Palliative Care Criteria Met (RRAT>21 & CHF Dx)?: No 
Transition of Care Consult (CM Consult): Discharge Planning(Potential Readmission RRAT 25/5/8  ) MyChart Signup: No 
Discharge Durable Medical Equipment: No 
Health Maintenance Reviewed: Yes Physical Therapy Consult: No 
Occupational Therapy Consult: No 
Speech Therapy Consult: No 
Current Support Network: Other(Lives with sons, question if support system adequate. Multiple ED visits) Plan discussed with Pt/Family/Caregiver: Yes(Met with patient to discuss follow up.  ) Discharge Location Discharge Placement: Home [facility], RN, BSN, [facility] 
ED Care Management [phone]

## 2019-01-24 ENCOUNTER — APPOINTMENT (OUTPATIENT)
Dept: CT IMAGING | Age: 44
End: 2019-01-24
Attending: EMERGENCY MEDICINE
Payer: MEDICARE

## 2019-01-24 ENCOUNTER — HOSPITAL ENCOUNTER (EMERGENCY)
Age: 44
Discharge: HOME | End: 2019-01-25
Attending: EMERGENCY MEDICINE | Admitting: EMERGENCY MEDICINE
Payer: MEDICARE

## 2019-01-24 DIAGNOSIS — E16.2 HYPOGLYCEMIA: ICD-10-CM

## 2019-01-24 DIAGNOSIS — K59.00 CONSTIPATION, UNSPECIFIED CONSTIPATION TYPE: ICD-10-CM

## 2019-01-24 DIAGNOSIS — Z91.14 H/O MEDICATION NONCOMPLIANCE: ICD-10-CM

## 2019-01-24 DIAGNOSIS — F20.9 SCHIZOPHRENIA, UNSPECIFIED TYPE (HCC): Primary | ICD-10-CM

## 2019-01-24 DIAGNOSIS — R07.9 CHRONIC CHEST PAIN: ICD-10-CM

## 2019-01-24 DIAGNOSIS — G89.29 CHRONIC CHEST PAIN: ICD-10-CM

## 2019-01-24 LAB
ALBUMIN SERPL-MCNC: 2.8 G/DL (ref 3.5–5)
ALBUMIN/GLOB SERPL: 0.8 {RATIO} (ref 1.1–2.2)
ALP SERPL-CCNC: 93 U/L (ref 45–117)
ALT SERPL-CCNC: 33 U/L (ref 12–78)
AMPHET UR QL SCN: NEGATIVE
ANION GAP SERPL CALC-SCNC: 7 MMOL/L (ref 5–15)
APPEARANCE UR: CLEAR
AST SERPL-CCNC: 22 U/L (ref 15–37)
BARBITURATES UR QL SCN: NEGATIVE
BASOPHILS # BLD: 0 K/UL (ref 0–0.1)
BASOPHILS NFR BLD: 0 % (ref 0–1)
BENZODIAZ UR QL: NEGATIVE
BILIRUB SERPL-MCNC: 0.3 MG/DL (ref 0.2–1)
BILIRUB UR QL: NEGATIVE
BUN SERPL-MCNC: 5 MG/DL (ref 6–20)
BUN/CREAT SERPL: 8 (ref 12–20)
CALCIUM SERPL-MCNC: 8 MG/DL (ref 8.5–10.1)
CANNABINOIDS UR QL SCN: NEGATIVE
CHLORIDE SERPL-SCNC: 101 MMOL/L (ref 97–108)
CO2 SERPL-SCNC: 27 MMOL/L (ref 21–32)
COCAINE UR QL SCN: NEGATIVE
COLOR UR: ABNORMAL
CREAT SERPL-MCNC: 0.62 MG/DL (ref 0.55–1.02)
DIFFERENTIAL METHOD BLD: ABNORMAL
DRUG SCRN COMMENT,DRGCM: NORMAL
EOSINOPHIL # BLD: 0.1 K/UL (ref 0–0.4)
EOSINOPHIL NFR BLD: 2 % (ref 0–7)
ERYTHROCYTE [DISTWIDTH] IN BLOOD BY AUTOMATED COUNT: 16.3 % (ref 11.5–14.5)
ETHANOL SERPL-MCNC: <10 MG/DL
GLOBULIN SER CALC-MCNC: 3.4 G/DL (ref 2–4)
GLUCOSE BLD STRIP.AUTO-MCNC: 126 MG/DL (ref 65–100)
GLUCOSE BLD STRIP.AUTO-MCNC: 44 MG/DL (ref 65–100)
GLUCOSE SERPL-MCNC: 312 MG/DL (ref 65–100)
GLUCOSE UR STRIP.AUTO-MCNC: >1000 MG/DL
HCG UR QL: NEGATIVE
HCT VFR BLD AUTO: 33.9 % (ref 35–47)
HGB BLD-MCNC: 11.6 G/DL (ref 11.5–16)
HGB UR QL STRIP: NEGATIVE
IMM GRANULOCYTES # BLD AUTO: 0 K/UL (ref 0–0.04)
IMM GRANULOCYTES NFR BLD AUTO: 0 % (ref 0–0.5)
KETONES UR QL STRIP.AUTO: NEGATIVE MG/DL
LEUKOCYTE ESTERASE UR QL STRIP.AUTO: NEGATIVE
LIPASE SERPL-CCNC: 41 U/L (ref 73–393)
LYMPHOCYTES # BLD: 2.2 K/UL (ref 0.8–3.5)
LYMPHOCYTES NFR BLD: 43 % (ref 12–49)
MCH RBC QN AUTO: 26 PG (ref 26–34)
MCHC RBC AUTO-ENTMCNC: 34.2 G/DL (ref 30–36.5)
MCV RBC AUTO: 75.8 FL (ref 80–99)
METHADONE UR QL: NEGATIVE
MONOCYTES # BLD: 0.6 K/UL (ref 0–1)
MONOCYTES NFR BLD: 12 % (ref 5–13)
NEUTS SEG # BLD: 2.2 K/UL (ref 1.8–8)
NEUTS SEG NFR BLD: 43 % (ref 32–75)
NITRITE UR QL STRIP.AUTO: NEGATIVE
NRBC # BLD: 0 K/UL (ref 0–0.01)
NRBC BLD-RTO: 0 PER 100 WBC
OPIATES UR QL: NEGATIVE
PCP UR QL: NEGATIVE
PH UR STRIP: 5.5 [PH] (ref 5–8)
PLATELET # BLD AUTO: 179 K/UL (ref 150–400)
PMV BLD AUTO: 10.7 FL (ref 8.9–12.9)
POTASSIUM SERPL-SCNC: 3.6 MMOL/L (ref 3.5–5.1)
PROT SERPL-MCNC: 6.2 G/DL (ref 6.4–8.2)
PROT UR STRIP-MCNC: NEGATIVE MG/DL
RBC # BLD AUTO: 4.47 M/UL (ref 3.8–5.2)
SERVICE CMNT-IMP: ABNORMAL
SERVICE CMNT-IMP: ABNORMAL
SODIUM SERPL-SCNC: 135 MMOL/L (ref 136–145)
SP GR UR REFRACTOMETRY: 1.01 (ref 1–1.03)
TROPONIN I BLD-MCNC: <0.04 NG/ML (ref 0–0.08)
UROBILINOGEN UR QL STRIP.AUTO: 0.2 EU/DL (ref 0.2–1)
WBC # BLD AUTO: 5.1 K/UL (ref 3.6–11)

## 2019-01-24 PROCEDURE — 81025 URINE PREGNANCY TEST: CPT

## 2019-01-24 PROCEDURE — 84484 ASSAY OF TROPONIN QUANT: CPT

## 2019-01-24 PROCEDURE — 83690 ASSAY OF LIPASE: CPT

## 2019-01-24 PROCEDURE — 74177 CT ABD & PELVIS W/CONTRAST: CPT

## 2019-01-24 PROCEDURE — 74011250637 HC RX REV CODE- 250/637: Performed by: EMERGENCY MEDICINE

## 2019-01-24 PROCEDURE — 99285 EMERGENCY DEPT VISIT HI MDM: CPT

## 2019-01-24 PROCEDURE — 96374 THER/PROPH/DIAG INJ IV PUSH: CPT

## 2019-01-24 PROCEDURE — 36415 COLL VENOUS BLD VENIPUNCTURE: CPT

## 2019-01-24 PROCEDURE — 85025 COMPLETE CBC W/AUTO DIFF WBC: CPT

## 2019-01-24 PROCEDURE — 82962 GLUCOSE BLOOD TEST: CPT

## 2019-01-24 PROCEDURE — 74011000250 HC RX REV CODE- 250: Performed by: EMERGENCY MEDICINE

## 2019-01-24 PROCEDURE — 81003 URINALYSIS AUTO W/O SCOPE: CPT

## 2019-01-24 PROCEDURE — 74011636320 HC RX REV CODE- 636/320: Performed by: EMERGENCY MEDICINE

## 2019-01-24 PROCEDURE — 90791 PSYCH DIAGNOSTIC EVALUATION: CPT

## 2019-01-24 PROCEDURE — 93005 ELECTROCARDIOGRAM TRACING: CPT

## 2019-01-24 PROCEDURE — 80053 COMPREHEN METABOLIC PANEL: CPT

## 2019-01-24 PROCEDURE — 80307 DRUG TEST PRSMV CHEM ANLYZR: CPT

## 2019-01-24 RX ORDER — DEXTROSE 50 % IN WATER (D50W) INTRAVENOUS SYRINGE
25
Status: COMPLETED | OUTPATIENT
Start: 2019-01-24 | End: 2019-01-24

## 2019-01-24 RX ORDER — ACETAMINOPHEN 500 MG
1000 TABLET ORAL ONCE
Status: COMPLETED | OUTPATIENT
Start: 2019-01-24 | End: 2019-01-24

## 2019-01-24 RX ORDER — SODIUM CHLORIDE 0.9 % (FLUSH) 0.9 %
10 SYRINGE (ML) INJECTION
Status: COMPLETED | OUTPATIENT
Start: 2019-01-24 | End: 2019-01-24

## 2019-01-24 RX ADMIN — IOPAMIDOL 100 ML: 755 INJECTION, SOLUTION INTRAVENOUS at 21:50

## 2019-01-24 RX ADMIN — Medication 10 ML: at 21:50

## 2019-01-24 RX ADMIN — LIDOCAINE HYDROCHLORIDE 40 ML: 20 SOLUTION ORAL; TOPICAL at 20:15

## 2019-01-24 RX ADMIN — ACETAMINOPHEN 1000 MG: 500 TABLET, FILM COATED ORAL at 21:06

## 2019-01-24 RX ADMIN — DEXTROSE MONOHYDRATE 25 G: 25 INJECTION, SOLUTION INTRAVENOUS at 23:19

## 2019-01-24 NOTE — ED TRIAGE NOTES
Reports to be off her psych meds and feels like she is paraoid. Also reports chest pain, burning in chest and shortness of breath.   No thoughts of HI or SI

## 2019-01-25 ENCOUNTER — TELEPHONE (OUTPATIENT)
Dept: CASE MANAGEMENT | Age: 44
End: 2019-01-25

## 2019-01-25 VITALS
BODY MASS INDEX: 22.5 KG/M2 | SYSTOLIC BLOOD PRESSURE: 140 MMHG | TEMPERATURE: 98 F | WEIGHT: 140 LBS | HEIGHT: 66 IN | HEART RATE: 87 BPM | DIASTOLIC BLOOD PRESSURE: 64 MMHG | RESPIRATION RATE: 16 BRPM | OXYGEN SATURATION: 100 %

## 2019-01-25 RX ORDER — BENZTROPINE MESYLATE 0.5 MG/1
1 TABLET ORAL EVERY 12 HOURS
Qty: 120 TAB | Refills: 0 | Status: ON HOLD | OUTPATIENT
Start: 2019-01-25 | End: 2019-02-26

## 2019-01-25 RX ORDER — VALPROIC ACID 250 MG/1
500 CAPSULE, LIQUID FILLED ORAL EVERY 12 HOURS
Qty: 120 CAP | Refills: 0 | Status: ON HOLD | OUTPATIENT
Start: 2019-01-25 | End: 2019-02-26

## 2019-01-25 RX ORDER — HALOPERIDOL 5 MG/1
5 TABLET ORAL EVERY 12 HOURS
Qty: 60 TAB | Refills: 0 | Status: SHIPPED | OUTPATIENT
Start: 2019-01-25 | End: 2019-03-04

## 2019-01-25 RX ORDER — SERTRALINE HYDROCHLORIDE 25 MG/1
25 TABLET, FILM COATED ORAL DAILY
Qty: 30 TAB | Refills: 0 | Status: ON HOLD | OUTPATIENT
Start: 2019-01-25 | End: 2019-02-26

## 2019-01-25 RX ORDER — MAGNESIUM CITRATE
SOLUTION, ORAL ORAL
Qty: 1 BOTTLE | Refills: 0 | Status: SHIPPED | OUTPATIENT
Start: 2019-01-25 | End: 2019-02-13

## 2019-01-25 NOTE — ED PROVIDER NOTES
EMERGENCY DEPARTMENT HISTORY AND PHYSICAL EXAM 
 
 
Date: 1/24/2019 Patient Name: Alejandro Srivastava History of Presenting Illness Chief Complaint Patient presents with  Mental Health Problem  Chest Pain History Provided By: Patient HPI: Alejandro Srivastava, 44 y.o. female with PMHx significant for DM, HTN, pancreatitis, borderline personality disorder, EtOH abuse, GERD, asthma, kidney stones, sickle cell trait, HLD, schizophrenia, bipolar, depression, anxiety, presents ambulatory to the ED with cc of moderate paranoia that started several days ago. PT states she feels like \"someone's watching me\". She reports abd distension, CP (burning), and some occasional SOB. Pt states she has not been hearing voices or seeing things. She states she has been abusing drugs her \"whole life\", but states she has not used for the past 17 days. She notes she has been off her psych meds for the past 3 months. She states she normally takes \"depicote, Haldol, zoloft\". PT notes she saw her therapist in September/October of 2018. Pt states she wants to talk to a psychiatrist and needs a medication refill. Pt states she has taken no meds to try and relieve her Sx. She denies any modifying factors to her Sx. She states she has been eating and drinking normally. Pt specifically denies SI, HI, AVH, nausea, vomiting, palpitations, fever, and chills. There are no other complaints, changes, or physical findings at this time. Social History: +tobacco (0.5 packs a day), -EtOH, Illicit Drugs (cocaine in the past, states she has not used any illicit drugs in the past 17 days) PCP: Chiquita Cornelius NP Current Outpatient Medications Medication Sig Dispense Refill  sodium phosphate (ENEMA DISPOSABLE) 19-7 gram/118 mL enema Insert 1 Enema into rectum as needed. For constipation 133 mL 0  
 magnesium citrate solution Drink 1/2 bottle as needed for constipation. May repeat once if no results.  1 Bottle 0  
 haloperidol (HALDOL) 5 mg tablet Take 1 Tab by mouth every twelve (12) hours. 60 Tab 0  
 benztropine (COGENTIN) 0.5 mg tablet Take 2 Tabs by mouth every twelve (12) hours for 30 days. 120 Tab 0  
 valproic acid (DEPAKENE) 250 mg capsule Take 2 Caps by mouth every twelve (12) hours for 30 days. 120 Cap 0  
 sertraline (ZOLOFT) 25 mg tablet Take 1 Tab by mouth daily for 30 days. 30 Tab 0  
 insulin NPH/insulin regular (NOVOLIN 70/30, HUMULIN 70/30) 100 unit/mL (70-30) injection 15 Units by SubCUTAneous route two (2) times a day. 10 mL 6  
 divalproex sodium (DEPAKOTE PO) Take  by mouth.  HALOPERIDOL PO Take  by mouth.  sertraline HCl (ZOLOFT PO) Take  by mouth.  nitroglycerin (NITROSTAT) 0.4 mg SL tablet Take 1 Tab by mouth every five (5) minutes as needed for Chest Pain. Sit/Lay down then put one tab under the tongue every 5 minutes as needed for chest pain for 3 doses 1 Bottle 0  
 raNITIdine (ZANTAC) 150 mg tablet Take 1 Tab by mouth two (2) times a day for 10 days. 20 Tab 0  
 albuterol (PROVENTIL HFA, VENTOLIN HFA, PROAIR HFA) 90 mcg/actuation inhaler Take 2 Puffs by inhalation every four (4) hours as needed for Wheezing. 1 Inhaler 0 Past History Past Medical History: 
Past Medical History:  
Diagnosis Date  Alcohol abuse, in remission   
 quit 17 days ago  Asthma   
 does not use inhalers  Borderline personality disorder ([facility] [address]  Diabetes ([facility] [address]  GERD (gastroesophageal reflux disease)  Hypertension  Other ill-defined conditions(799.89)   
 kidney stones ,passed one  Other ill-defined conditions(799.89) sickle cell trait  Other ill-defined conditions(799.89)   
 increased cholesterol  Pancreatitis  Psychiatric disorder   
 schizophrenia, bipolar, depression, anxiety Past Surgical History: 
Past Surgical History:  
Procedure Laterality Date  ABDOMEN SURGERY PROC UNLISTED  3/12/14  CHOLECYSTECTOMY LAPAROSCOPIC    
 HX GASTRIC BYPASS  HX GYN  11/8/2012  
 c section x2  HX OTHER SURGICAL  3/13/14 ENDOSCOPIC RETROGRADE CHOLANGIOPANCREATOGRAPHY  HX OTHER SURGICAL  8/4/14  
 endoscopic stent placed to bile duct  HX TUBAL LIGATION  2012 Family History: 
Family History Problem Relation Age of Onset  Heart Disease Mother  Diabetes Mother  Hypertension Mother  Hypertension Maternal Grandmother Social History: 
Social History Tobacco Use  Smoking status: Current Every Day Smoker Packs/day: 0.50 Years: 14.00 Pack years: 7.00 Types: Cigarettes  Smokeless tobacco: Never Used  Tobacco comment: cigarettes Substance Use Topics  Alcohol use: No  
  Alcohol/week: 3.6 oz Types: 6 Cans of beer per week Frequency: Never  Drug use: Yes Types: Cocaine Comment: yesterday 12/27/18 Allergies: Allergies Allergen Reactions  Dilaudid [Hydromorphone (Bulk)] Itching  Ibuprofen Not Reported This Time Review of Systems Review of Systems Constitutional: Negative. Negative for chills, fever and unexpected weight change. HENT: Negative. Negative for congestion and trouble swallowing. Eyes: Negative for discharge. Respiratory: Positive for shortness of breath. Negative for cough and chest tightness. Cardiovascular: Positive for chest pain. Gastrointestinal: Positive for abdominal distention. Negative for abdominal pain, constipation, diarrhea and nausea. Endocrine: Negative. Genitourinary: Negative. Negative for difficulty urinating, dysuria, frequency and urgency. Musculoskeletal: Negative. Negative for arthralgias and myalgias. Skin: Negative. Negative for color change. Allergic/Immunologic: Negative. Neurological: Negative. Negative for dizziness, speech difficulty and headaches. Hematological: Negative. Psychiatric/Behavioral: Negative for agitation, confusion and suicidal ideas.   
     Denies SI, denies HI, denies AVH, reports paranoia All other systems reviewed and are negative. Physical Exam  
Physical Exam  
Constitutional: She is oriented to person, place, and time. She appears well-developed and well-nourished. HENT:  
Head: Normocephalic and atraumatic. Eyes: Conjunctivae and EOM are normal.  
Neck: Neck supple. Cardiovascular: Normal rate, regular rhythm and intact distal pulses. Pulmonary/Chest: Effort normal. No respiratory distress. No dyspnea Abdominal: Soft. She exhibits distension. There is no tenderness. palpably enlarged liver Musculoskeletal: Normal range of motion. She exhibits no deformity. No pedal edema Neurological: She is alert and oriented to person, place, and time. Skin: Skin is warm and dry. Psychiatric: She has a normal mood and affect. Her behavior is normal. Thought content normal.  
No SI, no HI, Pt is actively paranoid Vitals reviewed. Diagnostic Study Results Labs - Recent Results (from the past 12 hour(s)) EKG, 12 LEAD, INITIAL Collection Time: 01/24/19  7:54 PM  
Result Value Ref Range Ventricular Rate 85 BPM  
 Atrial Rate 85 BPM  
 P-R Interval 156 ms QRS Duration 66 ms  
 Q-T Interval 390 ms QTC Calculation (Bezet) 464 ms Calculated P Axis 78 degrees Calculated R Axis 43 degrees Calculated T Axis 82 degrees Diagnosis Normal sinus rhythm Possible Left atrial enlargement Borderline ECG When compared with ECG of 22-JAN-2019 15:39, No significant change was found METABOLIC PANEL, COMPREHENSIVE Collection Time: 01/24/19  8:19 PM  
Result Value Ref Range Sodium 135 (L) 136 - 145 mmol/L Potassium 3.6 3.5 - 5.1 mmol/L Chloride 101 97 - 108 mmol/L  
 CO2 27 21 - 32 mmol/L Anion gap 7 5 - 15 mmol/L Glucose 312 (H) 65 - 100 mg/dL BUN 5 (L) 6 - 20 MG/DL Creatinine 0.62 0.55 - 1.02 MG/DL  
 BUN/Creatinine ratio 8 (L) 12 - 20 GFR est AA >60 >60 ml/min/1.73m2  GFR est non-AA >60 >60 ml/min/1.73m2 Calcium 8.0 (L) 8.5 - 10.1 MG/DL Bilirubin, total 0.3 0.2 - 1.0 MG/DL  
 ALT (SGPT) 33 12 - 78 U/L  
 AST (SGOT) 22 15 - 37 U/L Alk. phosphatase 93 45 - 117 U/L Protein, total 6.2 (L) 6.4 - 8.2 g/dL Albumin 2.8 (L) 3.5 - 5.0 g/dL Globulin 3.4 2.0 - 4.0 g/dL A-G Ratio 0.8 (L) 1.1 - 2.2 LIPASE Collection Time: 01/24/19  8:19 PM  
Result Value Ref Range Lipase 41 (L) 73 - 393 U/L  
CBC WITH AUTOMATED DIFF Collection Time: 01/24/19  8:19 PM  
Result Value Ref Range WBC 5.1 3.6 - 11.0 K/uL  
 RBC 4.47 3.80 - 5.20 M/uL  
 HGB 11.6 11.5 - 16.0 g/dL HCT 33.9 (L) 35.0 - 47.0 % MCV 75.8 (L) 80.0 - 99.0 FL  
 MCH 26.0 26.0 - 34.0 PG  
 MCHC 34.2 30.0 - 36.5 g/dL  
 RDW 16.3 (H) 11.5 - 14.5 % PLATELET 585 887 - 766 K/uL MPV 10.7 8.9 - 12.9 FL  
 NRBC 0.0 0  WBC ABSOLUTE NRBC 0.00 0.00 - 0.01 K/uL NEUTROPHILS 43 32 - 75 % LYMPHOCYTES 43 12 - 49 % MONOCYTES 12 5 - 13 % EOSINOPHILS 2 0 - 7 % BASOPHILS 0 0 - 1 % IMMATURE GRANULOCYTES 0 0.0 - 0.5 % ABS. NEUTROPHILS 2.2 1.8 - 8.0 K/UL  
 ABS. LYMPHOCYTES 2.2 0.8 - 3.5 K/UL  
 ABS. MONOCYTES 0.6 0.0 - 1.0 K/UL  
 ABS. EOSINOPHILS 0.1 0.0 - 0.4 K/UL  
 ABS. BASOPHILS 0.0 0.0 - 0.1 K/UL  
 ABS. IMM. GRANS. 0.0 0.00 - 0.04 K/UL  
 DF AUTOMATED URINALYSIS W/ RFLX MICROSCOPIC Collection Time: 01/24/19  8:19 PM  
Result Value Ref Range Color YELLOW/STRAW Appearance CLEAR CLEAR Specific gravity 1.010 1.003 - 1.030    
 pH (UA) 5.5 5.0 - 8.0 Protein NEGATIVE  NEG mg/dL Glucose >1,000 (A) NEG mg/dL Ketone NEGATIVE  NEG mg/dL Bilirubin NEGATIVE  NEG Blood NEGATIVE  NEG Urobilinogen 0.2 0.2 - 1.0 EU/dL Nitrites NEGATIVE  NEG Leukocyte Esterase NEGATIVE  NEG    
ETHYL ALCOHOL Collection Time: 01/24/19  8:19 PM  
Result Value Ref Range ALCOHOL(ETHYL),SERUM <10 <10 MG/DL  
DRUG SCREEN, URINE  Collection Time: 01/24/19  8:19 PM  
Result Value Ref Range AMPHETAMINES NEGATIVE  NEG    
 BARBITURATES NEGATIVE  NEG BENZODIAZEPINES NEGATIVE  NEG    
 COCAINE NEGATIVE  NEG METHADONE NEGATIVE  NEG    
 OPIATES NEGATIVE  NEG    
 PCP(PHENCYCLIDINE) NEGATIVE  NEG    
 THC (TH-CANNABINOL) NEGATIVE  NEG Drug screen comment (NOTE) HCG URINE, QL. - POC Collection Time: 01/24/19  8:21 PM  
Result Value Ref Range Pregnancy test,urine (POC) NEGATIVE  NEG    
GLUCOSE, POC Collection Time: 01/24/19 11:10 PM  
Result Value Ref Range Glucose (POC) 44 (LL) 65 - 100 mg/dL Performed by Farrah Ibarra POC TROPONIN-I Collection Time: 01/24/19 11:11 PM  
Result Value Ref Range Troponin-I (POC) <0.04 0.00 - 0.08 ng/mL GLUCOSE, POC Collection Time: 01/24/19 11:42 PM  
Result Value Ref Range Glucose (POC) 126 (H) 65 - 100 mg/dL Performed by Christine Mccullough (Tech) Radiologic Studies -  
CT ABD PELV W CONT Final Result IMPRESSION:  
  
1. Previously described chronic pancreatitis. 2. Status post cholecystectomy. 3. Large amount of retained fecal material throughout the colon. 4. Small amount of free fluid in the cul-de-sac which may conceivably be  
physiologic. 5. Generalized subcutaneous edema throughout the abdomen and pelvis. CT Results  (Last 48 hours) 01/24/19 2149  CT ABD PELV W CONT Final result Impression:  IMPRESSION:  
   
1. Previously described chronic pancreatitis. 2. Status post cholecystectomy. 3. Large amount of retained fecal material throughout the colon. 4. Small amount of free fluid in the cul-de-sac which may conceivably be  
physiologic. 5. Generalized subcutaneous edema throughout the abdomen and pelvis. Narrative:  EXAM: CT ABD PELV W CONT INDICATION: Ascites . Distended abdomen, history of liver disease. Midsternal  
chest pain for 3 weeks. Shortness of breath. COMPARISON: 11/22/2018 CONTRAST: 100 mL of Isovue-370.   
   
TECHNIQUE: Following the uneventful intravenous administration of contrast, thin axial  
images were obtained through the abdomen and pelvis. Coronal and sagittal  
reconstructions were generated. Oral contrast was not administered. CT dose  
reduction was achieved through use of a standardized protocol tailored for this  
examination and automatic exposure control for dose modulation. FINDINGS:   
LUNG BASES: Clear. INCIDENTALLY IMAGED HEART AND MEDIASTINUM: Unremarkable. LIVER: No mass or biliary dilatation. GALLBLADDER: Surgically absent. SPLEEN: No mass. PANCREAS: The pancreas is atrophic with coarse calcifications consistent with  
chronic pancreatitis as previously reported. ADRENALS: Unremarkable. KIDNEYS: No mass, calculus, or hydronephrosis. STOMACH: Stomach wall appears thickened, likely related to nondistention. SMALL BOWEL: Mild prominence of small bowel caliber without debbi distention. COLON: No dilatation or wall thickening. Large amount of retained fecal material  
throughout the colon. APPENDIX: Not well seen, but no acute appendiceal abnormality is suspected. PERITONEUM: Small amount of free fluid in the cul-de-sac. RETROPERITONEUM: No lymphadenopathy or aortic aneurysm. REPRODUCTIVE ORGANS: Unremarkable for age. URINARY BLADDER: No mass or calculus. BONES: No destructive bone lesion. ADDITIONAL COMMENTS: Generalized subcutaneous edema throughout the abdomen and  
pelvis. Medical Decision Making I am the first provider for this patient. I reviewed the vital signs, available nursing notes, past medical history, past surgical history, family history and social history. Vital Signs-Reviewed the patient's vital signs. Patient Vitals for the past 12 hrs: 
 Temp Pulse Resp BP SpO2  
01/25/19 0100    140/64 100 % 01/24/19 2323    (!) 181/93   
01/24/19 2027    (!) 171/92   
01/24/19 1928    (!) 177/96   
01/24/19 1853 98 °F (36.7 °C) 87 16 (!) 178/101 100 % Pulse Oximetry Analysis - 100% on RA Cardiac Monitor:  
Rate: 87 bpm 
Rhythm: Normal Sinus Rhythm EKG interpretation: (Preliminary) 1954 Rhythm: normal sinus rhythm; and regular . Rate (approx.): 85; Axis: normal; ID interval: normal; QRS interval: normal ; ST/T wave: normal; Other findings: possible left atrial enlargement, borderline ECG, no ischemia. Written by Candido Meckel, ED Scribe, as dictated by Jovan Hathaway MD. Records Reviewed: Nursing Notes, Old Medical Records, Previous Radiology Studies and Previous Laboratory Studies Provider Notes (Medical Decision Making): DDx: Acute psychosis, chronic CP, acuities, possible hepatitis/worsening cirrhosis. ED Course:  
Initial assessment performed. The patients presenting problems have been discussed, and they are in agreement with the care plan formulated and outlined with them. I have encouraged them to ask questions as they arise throughout their visit. Tobacco Counseling Discussed the risks of smoking and the benefits of smoking cessation as well as the long term sequelae of smoking with the patient. The patient verbalized their understanding. Counseled patient for approximately 3 - 5 minutes. Progress Note: 
8:15 PM 
Victorino Hill with [employer] will come and speak with the patient. Progress Note: 
ED Course as of Jan 25 0110 Thu Jan 24, 2019  
2321 Clear for d/c from psych standpoint. Lidia Anne from [facility] provided me the list of her medications upon 12/28/18 discharge from psychiatric admission. Patient began c/o diaphoresis and weakness and informed RN that she had taken her insulin just before coming to ED. Accucheck is 44, so we are correcting her hyperglycemia before d/c.  [SS] ED Course User Index 
[SS] Frances Mosley MD  
 
Progress Note: 
1:02 AM 
Pt's blood sugar is 126 currently. She is up and moving around with NAD.  Will work on discharging this patient with psych follow-up, and psych prescriptions. Prescribed psych drugs are as per her last discharge. Medications  
mylanta/viscous lidocaine (GI COCKTAIL) (40 mL Oral Given 1/24/19 2015)  
acetaminophen (TYLENOL) tablet 1,000 mg (1,000 mg Oral Given 1/24/19 2106)  
sodium chloride (NS) flush 10 mL (10 mL IntraVENous Given 1/24/19 2150) iopamidol (ISOVUE-370) 76 % injection 100 mL (100 mL IntraVENous Given 1/24/19 2150) dextrose (D50W) injection syrg 25 g (25 g IntraVENous Given 1/24/19 2319) Critical Care Time:  
0 minutes Disposition: 
Discharge Note: 
1:07 AM 
The pt is ready for discharge. The pt's signs, symptoms, diagnosis, and discharge instructions have been discussed and pt has conveyed their understanding. The pt is to follow up as recommended or return to ER should their symptoms worsen. Plan has been discussed and pt is in agreement. PLAN: 
1. Current Discharge Medication List  
  
START taking these medications Details  
sodium phosphate (ENEMA DISPOSABLE) 19-7 gram/118 mL enema Insert 1 Enema into rectum as needed. For constipation 
Qty: 133 mL, Refills: 0  
  
magnesium citrate solution Drink 1/2 bottle as needed for constipation. May repeat once if no results. Qty: 1 Bottle, Refills: 0  
  
!! haloperidol (HALDOL) 5 mg tablet Take 1 Tab by mouth every twelve (12) hours. Qty: 60 Tab, Refills: 0  
  
benztropine (COGENTIN) 0.5 mg tablet Take 2 Tabs by mouth every twelve (12) hours for 30 days. Qty: 120 Tab, Refills: 0  
  
valproic acid (DEPAKENE) 250 mg capsule Take 2 Caps by mouth every twelve (12) hours for 30 days. Qty: 120 Cap, Refills: 0  
  
!! sertraline (ZOLOFT) 25 mg tablet Take 1 Tab by mouth daily for 30 days. Qty: 30 Tab, Refills: 0  
  
 !! - Potential duplicate medications found. Please discuss with provider. CONTINUE these medications which have NOT CHANGED Details  
!! HALOPERIDOL PO Take  by mouth. !! sertraline HCl (ZOLOFT PO) Take  by mouth.   
  
 !! - Potential duplicate medications found. Please discuss with provider. 2.  
Follow-up Information Follow up With Specialties Details Why Contact Info [facility]  Schedule an appointment as soon as possible for a visit  [address] [facility] 
[phone] [facility] EMERGENCY DEPT Emergency Medicine  As needed, If symptoms worsen [address] Marcey Cabot, MD Cardiology  as previously scheduled [address] [facility] 
[phone] Gillian Boyer NP Nurse Practitioner Schedule an appointment as soon as possible for a visit  [address] [facility] [address] [facility] 
[phone] Return to ED if worse Diagnosis Clinical Impression: 1. Schizophrenia, unspecified type ([address] 2. H/O medication noncompliance 3. Constipation, unspecified constipation type 4. Chronic chest pain 5. Hypoglycemia Attestations: This note is prepared by Tyra Pearson. Marcia Mahajan, acting as Scribe for Franco Rizzo MD. 
 
Franco Rizzo MD: The scribe's documentation has been prepared under my direction and personally reviewed by me in its entirety. I confirm that the note above accurately reflects all work, treatment, procedures, and medical decision making performed by me. This note will not be viewable in [address]

## 2019-01-25 NOTE — ED NOTES
Discharge instructions were given to the patient by Trever White RN. The patient left the Emergency Department ambulatory, alert and oriented and in no acute distress with 7 prescriptions. The patient was encouraged to call or return to the ED for worsening issues or problems and was encouraged to schedule a follow up appointment for continuing care. The patient verbalized understanding of discharge instructions and prescriptions, all questions were answered. The patient has no further concerns at this time.

## 2019-01-25 NOTE — ED NOTES
Pt presents ambulatory to ED complaining of paranoid feelings Renae Berg some one is watching me\"). Pt has not taken her psych mediations in 3 months. Pt also has a distended abdomen and reports a hx of liver issues. She does currently reports mid sternal chest pain for 3 weeks. Pt says she does not have SI or HI at the moment. Pt is alert and oriented x 4, RR even and unlabored, skin is warm and dry. Assesment completed and pt updated on plan of care. Emergency Department Nursing Plan of Care The Nursing Plan of Care is developed from the Nursing assessment and Emergency Department Attending provider initial evaluation. The plan of care may be reviewed in the ED Provider note. The Plan of Care was developed with the following considerations:  
Patient / Family readiness to learn indicated by:verbalized understanding Persons(s) to be included in education: patient Barriers to Learning/Limitations:No 
 
Signed Claude Diallo RN   
1/24/2019   7:29 PM

## 2019-01-25 NOTE — DISCHARGE INSTRUCTIONS
Patient Education        Schizophrenia: Care Instructions  Your Care Instructions  Schizophrenia is a disease that makes it hard to think clearly, manage emotions, and interact with other people. It can cause:  · Delusions. These are beliefs that are not real.  · Hallucinations. These are things that you may see or hear that are not really there. · Paranoia. This is the belief that others are lying, cheating, using you, or trying to harm you. The disease may change your ability to enjoy life, express emotions, or function. At times, you may hear voices, behave strangely, have trouble speaking or understanding speech, or keep to yourself. The goal of treatment is to lower your stress and help your brain function normally. You may need lifelong treatment with medicines and counseling to keep your schizophrenia under control. When schizophrenia is not treated, the risks are higher for suicide, a hospital stay, and other problems. Early treatment called coordinated specialty care [facility]) may help a person who is having his or her first episode of psychotic thoughts. Ask your doctor about [address]. Follow-up care is a key part of your treatment and safety. Be sure to make and go to all appointments, and call your doctor if you are having problems. It's also a good idea to know your test results and keep a list of the medicines you take. How can you care for yourself at home? · Be safe with medicines. Take your medicines exactly as prescribed. Call your doctor if you think you are having a problem with your medicine. When you feel good, you may think that you do not need your medicines. But it is important to keep taking them as scheduled. · Go to your counseling sessions. Call and talk with your counselor if you can't attend or if you don't think the sessions are helping. Do not just stop going. · Eat a healthy diet. Talk with a dietitian about what type of diet may be best for you.   · Do not use alcohol or illegal drugs.  · Keep the numbers for these national suicide hotlines: 0-150-602-TALK (5-875.110.9179) and 5-975-VQZRMGS (2-847.883.2282). If you or someone you know talks about suicide or feeling hopeless, get help right away. What should you do if someone in your family has schizophrenia? · Learn about the disease and how it may get worse over time. · Remind your family member that you love him or her. · Make a plan with all family members about how to take care of your loved one when his or her symptoms are bad. · Talk about your fears and concerns and those of other family members. Seek counseling if needed. · Do not focus attention only on the person who is in treatment. · Remind yourself that it will take time for changes to occur. · Do not blame yourself for the disease. · Know your legal rights and the legal rights of your family member. · Take care of yourself. Stay involved with your own interests, such as your career, hobbies, and friends. Use exercise, positive self-talk, relaxation, and deep breathing to help lower your stress. · Give yourself time to grieve. You may need to deal with emotions such as anger, fear, and frustration. After you work through your feelings, you will be better able to care for yourself and your family. · If you are having a hard time with your feelings and your interactions with your family member, talk with a counselor. When should you call for help? Call 911 anytime you think you may need emergency care.  For example, call if:    · You are thinking about suicide or are threatening suicide.     · You feel like hurting yourself or someone else.    Call your doctor now or seek immediate medical care if:    · You hear voices.     · You think someone is trying to harm you.     · You cannot concentrate or are easily confused.    Watch closely for changes in your health, and be sure to contact your doctor if:    · You are having trouble taking care of yourself.     · You cannot attend your counseling sessions. Where can you learn more? Go to http://miryam-polo.info/. Enter J269 in the search box to learn more about \"Schizophrenia: Care Instructions. \"  Current as of: September 11, 2018  Content Version: 11.9  © 3222-2184 [employer]. Care instructions adapted under license by [employer] (which disclaims liability or warranty for this information). If you have questions about a medical condition or this instruction, always ask your healthcare professional. [place] [address] any warranty or liability for your use of this information. Patient Education        Constipation: Care Instructions  Your Care Instructions    Constipation means that you have a hard time passing stools (bowel movements). People pass stools from 3 times a day to once every 3 days. What is normal for you may be different. Constipation may occur with pain in the rectum and cramping. The pain may get worse when you try to pass stools. Sometimes there are small amounts of bright red blood on toilet paper or the surface of stools. This is because of enlarged veins near the rectum (hemorrhoids). A few changes in your diet and lifestyle may help you avoid ongoing constipation. Your doctor may also prescribe medicine to help loosen your stool. Some medicines can cause constipation. These include pain medicines and antidepressants. Tell your doctor about all the medicines you take. Your doctor may want to make a medicine change to ease your symptoms. Follow-up care is a key part of your treatment and safety. Be sure to make and go to all appointments, and call your doctor if you are having problems. It's also a good idea to know your test results and keep a list of the medicines you take. How can you care for yourself at home? · Drink plenty of fluids, enough so that your urine is light yellow or clear like water.  If you have kidney, heart, or liver disease and have to limit fluids, talk with your doctor before you increase the amount of fluids you drink. · Include high-fiber foods in your diet each day. These include fruits, vegetables, beans, and whole grains. · Get at least 30 minutes of exercise on most days of the week. Walking is a good choice. You also may want to do other activities, such as running, swimming, cycling, or playing tennis or team sports. · Take a fiber supplement, such as Citrucel or Metamucil, every day. Read and follow all instructions on the label. · Schedule time each day for a bowel movement. A daily routine may help. Take your time having your bowel movement. · Support your feet with a small step stool when you sit on the toilet. This helps flex your hips and places your pelvis in a squatting position. · Your doctor may recommend an over-the-counter laxative to relieve your constipation. Examples are Milk of Magnesia and MiraLax. Read and follow all instructions on the label. Do not use laxatives on a long-term basis. When should you call for help? Call your doctor now or seek immediate medical care if:    · You have new or worse belly pain.     · You have new or worse nausea or vomiting.     · You have blood in your stools.    Watch closely for changes in your health, and be sure to contact your doctor if:    · Your constipation is getting worse.     · You do not get better as expected. Where can you learn more? Go to http://miryam-polo.info/. Enter [id number] in the search box to learn more about \"Constipation: Care Instructions. \"  Current as of: September 23, 2018  Content Version: 11.9  © 8427-2239 [employer]. Care instructions adapted under license by [employer] (which disclaims liability or warranty for this information).  If you have questions about a medical condition or this instruction, always ask your healthcare professional. Dulce Latif any warranty or liability for your use of this information. Patient Education        Chest Pain: Care Instructions  Your Care Instructions    There are many things that can cause chest pain. Some are not serious and will get better on their own in a few days. But some kinds of chest pain need more testing and treatment. Your doctor may have recommended a follow-up visit in the next 8 to 12 hours. If you are not getting better, you may need more tests or treatment. Even though your doctor has released you, you still need to watch for any problems. The doctor carefully checked you, but sometimes problems can develop later. If you have new symptoms or if your symptoms do not get better, get medical care right away. If you have worse or different chest pain or pressure that lasts more than 5 minutes or you passed out (lost consciousness), call 911 or seek other emergency help right away. A medical visit is only one step in your treatment. Even if you feel better, you still need to do what your doctor recommends, such as going to all suggested follow-up appointments and taking medicines exactly as directed. This will help you recover and help prevent future problems. How can you care for yourself at home? · Rest until you feel better. · Take your medicine exactly as prescribed. Call your doctor if you think you are having a problem with your medicine. · Do not drive after taking a prescription pain medicine. When should you call for help? Call 911 if:    · You passed out (lost consciousness).     · You have severe difficulty breathing.     · You have symptoms of a heart attack. These may include:  ? Chest pain or pressure, or a strange feeling in your chest.  ? Sweating. ? Shortness of breath. ? Nausea or vomiting. ? Pain, pressure, or a strange feeling in your back, neck, jaw, or upper belly or in one or both shoulders or arms. ? Lightheadedness or sudden weakness.   ? A fast or irregular heartbeat. After you call 911, the  may tell you to chew 1 adult-strength or 2 to 4 low-dose aspirin. Wait for an ambulance. Do not try to drive yourself.    Call your doctor today if:    · You have any trouble breathing.     · Your chest pain gets worse.     · You are dizzy or lightheaded, or you feel like you may faint.     · You are not getting better as expected.     · You are having new or different chest pain. Where can you learn more? Go to http://miryam-polo.info/. Enter A120 in the search box to learn more about \"Chest Pain: Care Instructions. \"  Current as of: September 23, 2018  Content Version: 11.9  © 6651-2938 [employer]. Care instructions adapted under license by [employer] (which disclaims liability or warranty for this information). If you have questions about a medical condition or this instruction, always ask your healthcare professional. [place] [address] any warranty or liability for your use of this information. Patient Education        Hypoglycemia: Care Instructions  Your Care Instructions    Hypoglycemia means that your blood sugar is low and your body is not getting enough fuel. Some people get low blood sugar from not eating often enough. Some medicines to treat diabetes can cause low blood sugar. People who have had surgery on their stomachs or intestines may get hypoglycemia. Problems with the pancreas, kidneys, or liver also can cause low blood sugar. A snack or drink with sugar in it will raise your blood sugar and should ease your symptoms right away. Your doctor may recommend that you change or stop your medicines until you can get your blood sugar levels under control. In the long run, you may need to change your diet and eating habits so that you get enough fuel for your body throughout the day. Follow-up care is a key part of your treatment and safety.  Be sure to make and go to all appointments, and call your doctor if you are having problems. It's also a good idea to know your test results and keep a list of the medicines you take. How can you care for yourself at home? · Learn to recognize the early signs of low blood sugar. Signs include:  ? Nausea. ? Hunger. ? Feeling nervous, irritable, or shaky. ? Cold, clammy, wet skin. ? Sweating (when you are not exercising). ? A fast heartbeat.  ? Numbness or tingling of the fingertips or lips. · If you feel an episode of low blood sugar coming on, drink fruit juice or sugared (not diet) soda, or eat sugar in the form of candy, cubes, or tablets. [employer] are another [employer]. · Eat small, frequent meals so that you do not get too hungry between meals. · Balance extra exercise with eating more. · Keep a written record of your low blood sugar episodes, including when you last ate and what you ate, so that you can learn what causes your blood sugar to drop. · Make sure your family, friends, and coworkers know the symptoms of low blood sugar and know what to do to get your sugar level up. · Wear medical alert jewelry that lists your condition. You can buy this at most drugstores. When should you call for help? Call 911 anytime you think you may need emergency care. For example, call if:    · You passed out (lost consciousness).     · You are confused or cannot think clearly.     · Your blood sugar is very high or very low.    Watch closely for changes in your health, and be sure to contact your doctor if:    · Your blood sugar stays outside the level your doctor set for you.     · You have any problems. Where can you learn more? Go to http://miryam-polo.info/. Enter K084 in the search box to learn more about \"Hypoglycemia: Care Instructions. \"  Current as of: July 25, 2018  Content Version: 11.9  © 6214-3458 [employer].  Care instructions adapted under license by [employer] (which disclaims liability or warranty for this information). If you have questions about a medical condition or this instruction, always ask your healthcare professional. [place] [address] any warranty or liability for your use of this information.

## 2019-01-25 NOTE — BSMART NOTE
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 The Medical Doctor is in agreement with Psychiatrist disposition because of (reason) patient does not meet criteria for admission. The plan is discharge with resources to [facility]. Patient educated on the importance of connecting and maintaining her psychiatry appointments within the community so she can manage her medications. Intake procedure for [facility]HA discussed. The on-call Psychiatrist consulted was Dr. Nancy Tse. (patient was discussed by Yanet Faustin) The admitting Psychiatrist will be Dr. Sonny Downs. The admitting Diagnosis is none. The Payor source is [place] MEDICAID/[facility]. The name of the representative was . This was approved for  days. The authorization number is . Section II - Integrated Summary Summary:  Patient is 44year old female reporting to ED Pt presents ambulatory to ED complaining of paranoid feelings Renae Morena some one is watching me\"). Pt has not taken her psych mediations in 3 months. Pt also has a distended abdomen and reports a hx of liver issues.  She does currently reports mid sternal chest pain for 3 weeks. Pt says she does not have SI or HI at the moment. Pt is alert and oriented x 4, RR even and unlabored, skin is warm and dry. Assessment completed and pt updated on plan of care. At bedside, patient denied suicidal, homicidal thoughts and hallucinations. Patient reported her symptoms started yesterday but she feels like it has progressed today. Patient reported that she thinks that people are looking at her, patient denied seeing people but stated it was just thoughts. Patient stated she feels that everyone is against her. Patient reported that she has been clean from Alcohol and Cocaine 17 days. Patient expressed her housing not going good due her not feeling support there since she has stopped using drugs. Patient reported that she did an application today to seek other housing. Patient reported that she has not been on her medications for the past three months and has not spoke to [facility]. Patient reported that she has not had much of an appetite but she has been eating, patient reported not sleeping well. Patient requesting in ED to get dose of her medication until she can get back with [facility]. The patient has demonstrated mental capacity to provide informed consent. The information is given by the patient and past medical records. The Chief Complaint is paranoid thoughts. The Precipitant Factors are treatment non compliance. Previous Hospitalizations: yes The patient has not previously been in restraints. Current Psychiatrist and/or  is previous [facility] (last seen Sept/Oct 2018). Lethality Assessment: 
 
The potential for suicide noted by the following: not noted . The potential for homicide is not noted. The patient has not been a perpetrator of sexual or physical abuse. There are not pending charges. The patient is not felt to be at risk for self harm or harm to others. The attending nurse was advised not noted.  
 
Section III - Psychosocial The patient's overall mood and attitude is low mood, cooperative and calm. Feelings of helplessness and hopelessness are not observed. Generalized anxiety is not observed. Panic is not observed. Phobias are not observed. Obsessive compulsive tendencies are not observed. Section IV - Mental Status Exam 
The patient's appearance shows no evidence of impairment. The patient's behavior shows no evidence of impairment. The patient is oriented to time, place, person and situation. The patient's speech shows no evidence of impairment. The patient's mood is depressed. The range of affect is flat. The patient's thought content demonstrates paranoia. The thought process shows no evidence of impairment. The patient's perception shows no evidence of impairment. The patient's memory shows no evidence of impairment. The patient's appetite shows no evidence of impairment. The patient's sleep shows no evidence of impairment. The patient's insight shows no evidence of impairment. The patient's judgement shows no evidence of impairment. Section V - Substance Abuse The patient is using substances. The patient is using tobacco by inhalation for greater than 10 years with last use on today, alcohol for greater than 10 years with last use on 17 days ago reported and cocaine by inhalation for greater than 10 years with last use on 17 days ago reported. The patient has experienced the following withdrawal symptoms: N/A. Section VI - Living Arrangements The patient is single. The patient lives family/ friends. The patient has 7 children ages 29,21,19,24,14,15,7 minors have been adopted as reported. The patient does plan to return home upon discharge. The patient does not have legal issues pending. The patient's source of income comes from unknown. Buddhist and cultural practices have not been voiced at this time.  
 
The patient's greatest support comes from no one reported and this person will not be involved with the treatment. The patient has not been in an event described as horrible or outside the realm of ordinary life experience either currently or in the past. 
The patient has not been a victim of sexual/physical abuse. Section VII - Other Areas of Clinical Concern The highest grade achieved is unknown with the overall quality of school experience being described as unknown. The patient is currently unemployed and speaks [place] as a primary language. The patient has no communication impairments affecting communication. The patient's preference for learning can be described as: can read and write adequately.   The patient's hearing is normal.  The patient's vision is normal. 
 
 
Allison Carcamo MA

## 2019-01-25 NOTE — TELEPHONE ENCOUNTER
Received call from the . Patient in lobby Asking for help with her Medications. Multiple times in the ER. Patient needs to establish follow-up providers in the Community. Seen in ER yesterday. Talked to patient. Patient listed with insurance. She was given prescriptions- She agreed to take them to the [facility] to see what is her cost our to pocket. She has Medicare Advantage Plan and Medicaid Plan listed. She has my # to call back. Fax her AVS and Face to the pharmacy. Discussed Psych follow-up. Gave me two different answers. Offer bus ticket to [employer] in.     [facility] [address] MAUREEN RN   [phone]

## 2019-01-25 NOTE — ED NOTES
Pt diaphoretic and reports being dizzy. Pt then told RN that she took 45 units before she left the house. RN asked if she checked her blood glucose before she administered insulin and she said she did not and that she is out of strips at home. Glucose checked and was 44 mg/dL. Notified MD and provided pt with sack lunch.

## 2019-01-26 LAB
ATRIAL RATE: 85 BPM
CALCULATED P AXIS, ECG09: 78 DEGREES
CALCULATED R AXIS, ECG10: 43 DEGREES
CALCULATED T AXIS, ECG11: 82 DEGREES
DIAGNOSIS, 93000: NORMAL
P-R INTERVAL, ECG05: 156 MS
Q-T INTERVAL, ECG07: 390 MS
QRS DURATION, ECG06: 66 MS
QTC CALCULATION (BEZET), ECG08: 464 MS
VENTRICULAR RATE, ECG03: 85 BPM

## 2019-01-27 ENCOUNTER — HOSPITAL ENCOUNTER (EMERGENCY)
Age: 44
Discharge: HOME | End: 2019-01-27
Attending: EMERGENCY MEDICINE
Payer: MEDICARE

## 2019-01-27 ENCOUNTER — APPOINTMENT (OUTPATIENT)
Dept: GENERAL RADIOLOGY | Age: 44
End: 2019-01-27
Attending: PHYSICIAN ASSISTANT
Payer: MEDICARE

## 2019-01-27 VITALS
HEIGHT: 66 IN | BODY MASS INDEX: 22.5 KG/M2 | TEMPERATURE: 97.9 F | WEIGHT: 140 LBS | DIASTOLIC BLOOD PRESSURE: 77 MMHG | RESPIRATION RATE: 21 BRPM | OXYGEN SATURATION: 100 % | HEART RATE: 98 BPM | SYSTOLIC BLOOD PRESSURE: 137 MMHG

## 2019-01-27 DIAGNOSIS — R42 DIZZINESS: Primary | ICD-10-CM

## 2019-01-27 LAB
ALBUMIN SERPL-MCNC: 2.9 G/DL (ref 3.5–5)
ALBUMIN/GLOB SERPL: 0.8 {RATIO} (ref 1.1–2.2)
ALP SERPL-CCNC: 149 U/L (ref 45–117)
ALT SERPL-CCNC: 115 U/L (ref 12–78)
ANION GAP SERPL CALC-SCNC: 10 MMOL/L (ref 5–15)
APPEARANCE UR: CLEAR
AST SERPL-CCNC: 41 U/L (ref 15–37)
BACTERIA URNS QL MICRO: NEGATIVE /HPF
BASOPHILS # BLD: 0 K/UL (ref 0–0.1)
BASOPHILS NFR BLD: 1 % (ref 0–1)
BILIRUB SERPL-MCNC: 0.7 MG/DL (ref 0.2–1)
BILIRUB UR QL: NEGATIVE
BNP SERPL-MCNC: 519 PG/ML (ref 0–125)
BUN SERPL-MCNC: 10 MG/DL (ref 6–20)
BUN/CREAT SERPL: 13 (ref 12–20)
CALCIUM SERPL-MCNC: 8.1 MG/DL (ref 8.5–10.1)
CHLORIDE SERPL-SCNC: 102 MMOL/L (ref 97–108)
CK SERPL-CCNC: 60 U/L (ref 26–192)
CO2 SERPL-SCNC: 30 MMOL/L (ref 21–32)
COLOR UR: ABNORMAL
CREAT SERPL-MCNC: 0.77 MG/DL (ref 0.55–1.02)
DIFFERENTIAL METHOD BLD: ABNORMAL
EOSINOPHIL # BLD: 0.1 K/UL (ref 0–0.4)
EOSINOPHIL NFR BLD: 2 % (ref 0–7)
EPITH CASTS URNS QL MICRO: ABNORMAL /LPF
ERYTHROCYTE [DISTWIDTH] IN BLOOD BY AUTOMATED COUNT: 16.3 % (ref 11.5–14.5)
GLOBULIN SER CALC-MCNC: 3.5 G/DL (ref 2–4)
GLUCOSE BLD STRIP.AUTO-MCNC: 167 MG/DL (ref 65–100)
GLUCOSE BLD STRIP.AUTO-MCNC: 192 MG/DL (ref 65–100)
GLUCOSE SERPL-MCNC: 160 MG/DL (ref 65–100)
GLUCOSE UR STRIP.AUTO-MCNC: NEGATIVE MG/DL
HCG UR QL: NEGATIVE
HCT VFR BLD AUTO: 38 % (ref 35–47)
HGB BLD-MCNC: 13.1 G/DL (ref 11.5–16)
HGB UR QL STRIP: NEGATIVE
IMM GRANULOCYTES # BLD AUTO: 0 K/UL (ref 0–0.04)
IMM GRANULOCYTES NFR BLD AUTO: 0 % (ref 0–0.5)
KETONES UR QL STRIP.AUTO: ABNORMAL MG/DL
LEUKOCYTE ESTERASE UR QL STRIP.AUTO: NEGATIVE
LYMPHOCYTES # BLD: 2.4 K/UL (ref 0.8–3.5)
LYMPHOCYTES NFR BLD: 40 % (ref 12–49)
MCH RBC QN AUTO: 26.3 PG (ref 26–34)
MCHC RBC AUTO-ENTMCNC: 34.5 G/DL (ref 30–36.5)
MCV RBC AUTO: 76.3 FL (ref 80–99)
MONOCYTES # BLD: 0.7 K/UL (ref 0–1)
MONOCYTES NFR BLD: 12 % (ref 5–13)
NEUTS SEG # BLD: 2.7 K/UL (ref 1.8–8)
NEUTS SEG NFR BLD: 45 % (ref 32–75)
NITRITE UR QL STRIP.AUTO: NEGATIVE
NRBC # BLD: 0 K/UL (ref 0–0.01)
NRBC BLD-RTO: 0 PER 100 WBC
PH UR STRIP: 5.5 [PH] (ref 5–8)
PLATELET # BLD AUTO: 174 K/UL (ref 150–400)
POTASSIUM SERPL-SCNC: 3.6 MMOL/L (ref 3.5–5.1)
PROT SERPL-MCNC: 6.4 G/DL (ref 6.4–8.2)
PROT UR STRIP-MCNC: NEGATIVE MG/DL
RBC # BLD AUTO: 4.98 M/UL (ref 3.8–5.2)
RBC #/AREA URNS HPF: ABNORMAL /HPF (ref 0–5)
SERVICE CMNT-IMP: ABNORMAL
SERVICE CMNT-IMP: ABNORMAL
SODIUM SERPL-SCNC: 142 MMOL/L (ref 136–145)
SP GR UR REFRACTOMETRY: >1.03 (ref 1–1.03)
TROPONIN I SERPL-MCNC: <0.05 NG/ML
UA: UC IF INDICATED,UAUC: ABNORMAL
UROBILINOGEN UR QL STRIP.AUTO: 0.2 EU/DL (ref 0.2–1)
WBC # BLD AUTO: 6 K/UL (ref 3.6–11)
WBC URNS QL MICRO: ABNORMAL /HPF (ref 0–4)

## 2019-01-27 PROCEDURE — 83880 ASSAY OF NATRIURETIC PEPTIDE: CPT

## 2019-01-27 PROCEDURE — 81025 URINE PREGNANCY TEST: CPT

## 2019-01-27 PROCEDURE — 82962 GLUCOSE BLOOD TEST: CPT

## 2019-01-27 PROCEDURE — 82550 ASSAY OF CK (CPK): CPT

## 2019-01-27 PROCEDURE — 81001 URINALYSIS AUTO W/SCOPE: CPT

## 2019-01-27 PROCEDURE — 80053 COMPREHEN METABOLIC PANEL: CPT

## 2019-01-27 PROCEDURE — 36415 COLL VENOUS BLD VENIPUNCTURE: CPT

## 2019-01-27 PROCEDURE — 93005 ELECTROCARDIOGRAM TRACING: CPT

## 2019-01-27 PROCEDURE — 71045 X-RAY EXAM CHEST 1 VIEW: CPT

## 2019-01-27 PROCEDURE — 85025 COMPLETE CBC W/AUTO DIFF WBC: CPT

## 2019-01-27 PROCEDURE — 99285 EMERGENCY DEPT VISIT HI MDM: CPT

## 2019-01-27 PROCEDURE — 84484 ASSAY OF TROPONIN QUANT: CPT

## 2019-01-27 RX ORDER — SODIUM CHLORIDE 0.9 % (FLUSH) 0.9 %
5-40 SYRINGE (ML) INJECTION EVERY 8 HOURS
Status: DISCONTINUED | OUTPATIENT
Start: 2019-01-27 | End: 2019-01-27 | Stop reason: HOSPADM

## 2019-01-27 RX ORDER — FAMOTIDINE 10 MG/ML
20 INJECTION INTRAVENOUS
Status: DISCONTINUED | OUTPATIENT
Start: 2019-01-27 | End: 2019-01-27

## 2019-01-27 RX ORDER — SODIUM CHLORIDE 0.9 % (FLUSH) 0.9 %
5-40 SYRINGE (ML) INJECTION AS NEEDED
Status: DISCONTINUED | OUTPATIENT
Start: 2019-01-27 | End: 2019-01-27 | Stop reason: HOSPADM

## 2019-01-27 NOTE — ED NOTES
Pt arrived in ER with multiple c/o light headed,mid chest pain,dizzy,blood sugar high etc.Pt alert,oriented x 4,no s/s of respiratory/cardiac noticed on arrival. 
Emergency Department Nursing Plan of Care The Nursing Plan of Care is developed from the Nursing assessment and Emergency Department Attending provider initial evaluation. The plan of care may be reviewed in the ED Provider note. The Plan of Care was developed with the following considerations:  
Patient / Family readiness to learn indicated by:verbalized understanding Persons(s) to be included in education: patient Barriers to Learning/Limitations:No 
 
Signed Gera Pineda RN   
1/27/2019   2:33 PM

## 2019-01-27 NOTE — ED TRIAGE NOTES
Starting \"a little while ago\" per pt. Pt believes blood sugar may be high. Pt states that she does not have strips at home to check blood sugar. Usually takes Metformin and Insulin.

## 2019-01-27 NOTE — ED PROVIDER NOTES
EMERGENCY DEPARTMENT HISTORY AND PHYSICAL EXAM 
 
 
Date: 1/27/2019 Patient Name: Shante Titus History of Presenting Illness Chief Complaint Patient presents with  Dizziness History Provided By: Patient HPI: Shante Titus, 44 y.o. female with PMHx significant for DM, HTN, chronic pancreatitis, borderline personality disorder, alcohol abuse, substance abuse (clean 20 days per pt), GERD, asthma, tobacco abuse, sickle cell trait, schizophrenia, presents ambulatory to the ED with cc of acute mild episode of dizziness x 1 day. Pt endorses she was cleaning dishes \"earlier today\" when symptoms started. Symptoms have improved since earlier today; especially after eating. Endorses taking her insulin as prescribed today as well as her mental health medications and HTN medications. Endorses substernal aching CP x 3 weeks. Pepcid today w/o relief. Appointment w/ Dr. Kirk Asencio, [facility], in 4 days. Pt has been seen in ED for same/similar symptoms 6x this month. Hx of noncompliance. Pt last seen in ED on 1/24/2019 for schizophrenia, constipation, med noncompliance, chronic chest pain, and hypoglycemia. Evaluated by [facility] and contacted by Care Management 1/25/2019 via telephone for appropriate Rx and follow up. Pt denies syncope today, new chest pain, new SOB, hemoptysis, new cough, abd pain, fever, chills, n/v, headache, vision changes, numbness/tingling, LROM, weakness, fatigue, uri sxs, wheezing. There are no other complaints, changes, or physical findings at this time. PCP: Darin Helm NP No current facility-administered medications on file prior to encounter. Current Outpatient Medications on File Prior to Encounter Medication Sig Dispense Refill  magnesium citrate solution Drink 1/2 bottle as needed for constipation. May repeat once if no results. 1 Bottle 0  
 valproic acid (DEPAKENE) 250 mg capsule Take 2 Caps by mouth every twelve (12) hours for 30 days.  106 Lianne Swanson Cap 0  
 sertraline (ZOLOFT) 25 mg tablet Take 1 Tab by mouth daily for 30 days. 30 Tab 0  
 divalproex sodium (DEPAKOTE PO) Take  by mouth.  HALOPERIDOL PO Take  by mouth.  sertraline HCl (ZOLOFT PO) Take  by mouth.  raNITIdine (ZANTAC) 150 mg tablet Take 1 Tab by mouth two (2) times a day for 10 days. 20 Tab 0  
 sodium phosphate (ENEMA DISPOSABLE) 19-7 gram/118 mL enema Insert 1 Enema into rectum as needed. For constipation 133 mL 0  
 haloperidol (HALDOL) 5 mg tablet Take 1 Tab by mouth every twelve (12) hours. 60 Tab 0  
 benztropine (COGENTIN) 0.5 mg tablet Take 2 Tabs by mouth every twelve (12) hours for 30 days. 120 Tab 0  
 glucose blood VI test strips (FREESTYLE INSULINX TEST STRIPS) strip As needed 100 Strip 0  
 nitroglycerin (NITROSTAT) 0.4 mg SL tablet Take 1 Tab by mouth every five (5) minutes as needed for Chest Pain. Sit/Lay down then put one tab under the tongue every 5 minutes as needed for chest pain for 3 doses 1 Bottle 0  
 albuterol (PROVENTIL HFA, VENTOLIN HFA, PROAIR HFA) 90 mcg/actuation inhaler Take 2 Puffs by inhalation every four (4) hours as needed for Wheezing. 1 Inhaler 0 Past History Past Medical History: 
Past Medical History:  
Diagnosis Date  Alcohol abuse, in remission   
 quit 17 days ago  Asthma   
 does not use inhalers  Borderline personality disorder ([facility] [address]  Diabetes ([facility] [address]  GERD (gastroesophageal reflux disease)  Hypertension  Other ill-defined conditions(799.89)   
 kidney stones ,passed one  Other ill-defined conditions(799.89) sickle cell trait  Other ill-defined conditions(799.89)   
 increased cholesterol  Pancreatitis  Psychiatric disorder   
 schizophrenia, bipolar, depression, anxiety Past Surgical History: 
Past Surgical History:  
Procedure Laterality Date  ABDOMEN SURGERY PROC UNLISTED  3/12/14 CHOLECYSTECTOMY LAPAROSCOPIC    
 HX GASTRIC BYPASS  HX GYN  11/8/2012  
 c section x2  HX OTHER SURGICAL  3/13/14 ENDOSCOPIC RETROGRADE CHOLANGIOPANCREATOGRAPHY  HX OTHER SURGICAL  8/4/14  
 endoscopic stent placed to bile duct  HX TUBAL LIGATION  2012 Family History: 
Family History Problem Relation Age of Onset  Heart Disease Mother  Diabetes Mother  Hypertension Mother  Hypertension Maternal Grandmother Social History: 
Social History Tobacco Use  Smoking status: Current Every Day Smoker Packs/day: 0.50 Years: 14.00 Pack years: 7.00 Types: Cigarettes  Smokeless tobacco: Never Used  Tobacco comment: cigarettes Substance Use Topics  Alcohol use: No  
  Alcohol/week: 3.6 oz Types: 6 Cans of beer per week Frequency: Never  Drug use: Yes Types: Cocaine Comment: yesterday 12/27/18 Allergies: Allergies Allergen Reactions  Dilaudid [Hydromorphone (Bulk)] Itching  Ibuprofen Not Reported This Time Review of Systems Review of Systems Constitutional: Negative for activity change, appetite change, chills, diaphoresis, fatigue and fever. HENT: Negative for dental problem, ear pain, facial swelling, sinus pressure and sore throat. Eyes: Negative for photophobia, pain and visual disturbance. Respiratory: Positive for shortness of breath. Negative for apnea, cough, chest tightness and wheezing. Cardiovascular: Positive for chest pain. Negative for palpitations and leg swelling. Gastrointestinal: Positive for abdominal distention. Negative for abdominal pain, constipation (Pt endorses she has multiple BMs a day. Soft.), diarrhea, nausea and vomiting. Genitourinary: Negative. Negative for decreased urine volume, difficulty urinating and dysuria. Musculoskeletal: Negative. Negative for arthralgias, back pain, myalgias, neck pain and neck stiffness. Skin: Negative. Negative for pallor, rash and wound. Neurological: Positive for dizziness and light-headedness. Negative for tremors, seizures, syncope, facial asymmetry, speech difficulty, weakness, numbness and headaches. Psychiatric/Behavioral: Negative. Negative for dysphoric mood, hallucinations, self-injury, sleep disturbance and suicidal ideas. The patient is not nervous/anxious. Physical Exam  
Physical Exam  
Constitutional: She is oriented to person, place, and time. She appears well-developed and well-nourished. No distress. HENT:  
Head: Normocephalic and atraumatic. Right Ear: Hearing and external ear normal.  
Left Ear: Hearing and external ear normal.  
Nose: Nose normal.  
Eyes: Conjunctivae and EOM are normal. Pupils are equal, round, and reactive to light. Neck: Normal range of motion. Cardiovascular: Normal rate, regular rhythm, normal heart sounds and intact distal pulses. Exam reveals no gallop and no friction rub. No murmur heard. Pulmonary/Chest: Effort normal and breath sounds normal. No respiratory distress. She has no wheezes. She has no rales. Speaking in unlabored clear sentences in NAD. Abdominal: Soft. Bowel sounds are normal. She exhibits distension. There is no tenderness. There is no rebound and no guarding. Musculoskeletal: Normal range of motion. Neurological: She is alert and oriented to person, place, and time. She has normal strength. She is not disoriented. No cranial nerve deficit or sensory deficit. GCS eye subscore is 4. GCS verbal subscore is 5. GCS motor subscore is 6. Skin: Skin is warm and dry. She is not diaphoretic. Psychiatric: She has a normal mood and affect. Her behavior is normal. Judgment and thought content normal.  
Nursing note and vitals reviewed. Diagnostic Study Results Labs - Recent Results (from the past 12 hour(s)) GLUCOSE, POC Collection Time: 01/27/19  2:15 PM  
Result Value Ref Range Glucose (POC) 192 (H) 65 - 100 mg/dL Performed by Guerrero Mae EKG, 12 LEAD, INITIAL  Collection Time: 01/27/19  2:44 PM  
Result Value Ref Range Ventricular Rate 99 BPM  
 Atrial Rate 99 BPM  
 P-R Interval 132 ms QRS Duration 68 ms Q-T Interval 358 ms QTC Calculation (Bezet) 459 ms Calculated P Axis 64 degrees Calculated R Axis 14 degrees Calculated T Axis 70 degrees Diagnosis Normal sinus rhythm Normal ECG When compared with ECG of 24-JAN-2019 19:54, No significant change was found CBC WITH AUTOMATED DIFF Collection Time: 01/27/19  3:03 PM  
Result Value Ref Range WBC 6.0 3.6 - 11.0 K/uL  
 RBC 4.98 3.80 - 5.20 M/uL  
 HGB 13.1 11.5 - 16.0 g/dL HCT 38.0 35.0 - 47.0 % MCV 76.3 (L) 80.0 - 99.0 FL  
 MCH 26.3 26.0 - 34.0 PG  
 MCHC 34.5 30.0 - 36.5 g/dL  
 RDW 16.3 (H) 11.5 - 14.5 % PLATELET 488 690 - 117 K/uL NRBC 0.0 0  WBC ABSOLUTE NRBC 0.00 0.00 - 0.01 K/uL NEUTROPHILS 45 32 - 75 % LYMPHOCYTES 40 12 - 49 % MONOCYTES 12 5 - 13 % EOSINOPHILS 2 0 - 7 % BASOPHILS 1 0 - 1 % IMMATURE GRANULOCYTES 0 0.0 - 0.5 % ABS. NEUTROPHILS 2.7 1.8 - 8.0 K/UL  
 ABS. LYMPHOCYTES 2.4 0.8 - 3.5 K/UL  
 ABS. MONOCYTES 0.7 0.0 - 1.0 K/UL  
 ABS. EOSINOPHILS 0.1 0.0 - 0.4 K/UL  
 ABS. BASOPHILS 0.0 0.0 - 0.1 K/UL  
 ABS. IMM. GRANS. 0.0 0.00 - 0.04 K/UL  
 DF AUTOMATED METABOLIC PANEL, COMPREHENSIVE Collection Time: 01/27/19  3:03 PM  
Result Value Ref Range Sodium 142 136 - 145 mmol/L Potassium 3.6 3.5 - 5.1 mmol/L Chloride 102 97 - 108 mmol/L  
 CO2 30 21 - 32 mmol/L Anion gap 10 5 - 15 mmol/L Glucose 160 (H) 65 - 100 mg/dL BUN 10 6 - 20 MG/DL Creatinine 0.77 0.55 - 1.02 MG/DL  
 BUN/Creatinine ratio 13 12 - 20 GFR est AA >60 >60 ml/min/1.73m2 GFR est non-AA >60 >60 ml/min/1.73m2 Calcium 8.1 (L) 8.5 - 10.1 MG/DL Bilirubin, total 0.7 0.2 - 1.0 MG/DL  
 ALT (SGPT) 115 (H) 12 - 78 U/L  
 AST (SGOT) 41 (H) 15 - 37 U/L Alk. phosphatase 149 (H) 45 - 117 U/L Protein, total 6.4 6.4 - 8.2 g/dL  Albumin 2.9 (L) 3.5 - 5.0 g/dL Globulin 3.5 2.0 - 4.0 g/dL A-G Ratio 0.8 (L) 1.1 - 2.2    
TROPONIN I Collection Time: 01/27/19  3:03 PM  
Result Value Ref Range Troponin-I, Qt. <0.05 <0.05 ng/mL NT-PRO BNP Collection Time: 01/27/19  3:03 PM  
Result Value Ref Range NT pro- (H) 0 - 125 PG/ML  
CK W/ REFLX CKMB Collection Time: 01/27/19  3:03 PM  
Result Value Ref Range CK 60 26 - 192 U/L  
HCG URINE, QL. - POC Collection Time: 01/27/19  3:04 PM  
Result Value Ref Range Pregnancy test,urine (POC) NEGATIVE  NEG    
URINALYSIS W/ REFLEX CULTURE Collection Time: 01/27/19  4:00 PM  
Result Value Ref Range Color YELLOW/STRAW Appearance CLEAR CLEAR Specific gravity >1.030 (H) 1.003 - 1.030  
 pH (UA) 5.5 5.0 - 8.0 Protein NEGATIVE  NEG mg/dL Glucose NEGATIVE  NEG mg/dL Ketone TRACE (A) NEG mg/dL Bilirubin NEGATIVE  NEG Blood NEGATIVE  NEG Urobilinogen 0.2 0.2 - 1.0 EU/dL Nitrites NEGATIVE  NEG Leukocyte Esterase NEGATIVE  NEG    
 WBC 0-4 0 - 4 /hpf  
 RBC 0-5 0 - 5 /hpf Epithelial cells FEW FEW /lpf Bacteria NEGATIVE  NEG /hpf  
 UA:UC IF INDICATED CULTURE NOT INDICATED BY UA RESULT CNI    
GLUCOSE, POC Collection Time: 01/27/19  4:30 PM  
Result Value Ref Range Glucose (POC) 167 (H) 65 - 100 mg/dL Performed by Angel Branham (Tech) Radiologic Studies -  
XR CHEST PORT Final Result IMPRESSION:  
No acute process. CT Results  (Last 48 hours) None CXR Results  (Last 48 hours) 01/27/19 1456  XR CHEST PORT Final result Impression:  IMPRESSION:  
No acute process. Narrative:  INDICATION: chest pain EXAM:  AP CHEST RADIOGRAPH  
   
COMPARISON: January 22, 2019 FINDINGS:  
   
AP portable view of the chest demonstrates a normal cardiomediastinal  
silhouette. The lungs are adequately expanded. There is no edema, effusion,  
consolidation, or pneumothorax.  The osseous structures are unremarkable. Medical Decision Making I am the first provider for this patient. I reviewed the vital signs, available nursing notes, past medical history, past surgical history, family history and social history. Vital Signs-Reviewed the patient's vital signs. Patient Vitals for the past 12 hrs: 
 Temp Pulse Resp BP SpO2  
01/27/19 1600    137/77   
01/27/19 1512  98  129/70 100 % 01/27/19 1505  98 21 126/82 100 % 01/27/19 1504  96 22 134/73 100 % 01/27/19 1503  97 21 129/70 100 % 01/27/19 1412 97.9 °F (36.6 °C) (!) 110 16 142/86 100 % Pulse Oximetry Analysis - 100% on RA 
 
EKG interpretation: (Preliminary) Rhythm: normal sinus rhythm; and regular . Rate (approx.): 99; Axis: normal; TX interval: normal; QRS interval: normal ; ST/T wave: normal; Other findings: normal. 
Written by Arcelia Grey PA-C, as dictated by Monica Pineda MD. Records Reviewed: Nursing Notes, Old Medical Records, Previous electrocardiograms, Previous Radiology Studies and Previous Laboratory Studies Provider Notes (Medical Decision Making): Pt presents with lightheadedness. DDx: hypoglycemia, hyperglycemia, dehydration, anemia, electrolyte abnormality, infection, orthostatic hypotension, medication side effect. Will get orthostatics, labs and EKG PRN. ED Course:  
Initial assessment performed. The patients presenting problems have been discussed, and they are in agreement with the care plan formulated and outlined with them. I have encouraged them to ask questions as they arise throughout their visit. 4:43 PM 
Pt resting comfortably in room in NAD. No new symptoms or complaints at this time. Available labs/ results reviewed with pt. Educated pt to follow up with PCP this week for chronic symptoms and Cardiology in 4 days for scheduled appointment. Critical Care Time:  
0 Disposition: 
4:44 PM 
I have discussed with patient their diagnosis, treatment, and follow up plan. The patient agrees to follow up as outlined in discharge paperwork and also to return to the ED with any worsening. Niurka Castano PA-C 
 
 
PLAN: 
1. Current Discharge Medication List  
  
CONTINUE these medications which have CHANGED Details  
insulin NPH/insulin regular (NOVOLIN 70/30, HUMULIN 70/30) 100 unit/mL (70-30) injection 15 Units by SubCUTAneous route two (2) times a day. Qty: 10 mL, Refills: 0 CONTINUE these medications which have NOT CHANGED Details  
magnesium citrate solution Drink 1/2 bottle as needed for constipation. May repeat once if no results. Qty: 1 Bottle, Refills: 0  
  
valproic acid (DEPAKENE) 250 mg capsule Take 2 Caps by mouth every twelve (12) hours for 30 days. Qty: 120 Cap, Refills: 0  
  
!! sertraline (ZOLOFT) 25 mg tablet Take 1 Tab by mouth daily for 30 days. Qty: 30 Tab, Refills: 0  
  
divalproex sodium (DEPAKOTE PO) Take  by mouth. !! HALOPERIDOL PO Take  by mouth. !! sertraline HCl (ZOLOFT PO) Take  by mouth. raNITIdine (ZANTAC) 150 mg tablet Take 1 Tab by mouth two (2) times a day for 10 days. Qty: 20 Tab, Refills: 0  
  
sodium phosphate (ENEMA DISPOSABLE) 19-7 gram/118 mL enema Insert 1 Enema into rectum as needed. For constipation 
Qty: 133 mL, Refills: 0  
  
!! haloperidol (HALDOL) 5 mg tablet Take 1 Tab by mouth every twelve (12) hours. Qty: 60 Tab, Refills: 0  
  
benztropine (COGENTIN) 0.5 mg tablet Take 2 Tabs by mouth every twelve (12) hours for 30 days. Qty: 120 Tab, Refills: 0  
  
glucose blood VI test strips (FREESTYLE INSULINX TEST STRIPS) strip As needed 
Qty: 100 Strip, Refills: 0  
  
nitroglycerin (NITROSTAT) 0.4 mg SL tablet Take 1 Tab by mouth every five (5) minutes as needed for Chest Pain. Sit/Lay down then put one tab under the tongue every 5 minutes as needed for chest pain for 3 doses Qty: 1 Bottle, Refills: 0  
  
albuterol (PROVENTIL HFA, VENTOLIN HFA, PROAIR HFA) 90 mcg/actuation inhaler Take 2 Puffs by inhalation every four (4) hours as needed for Wheezing. Qty: 1 Inhaler, Refills: 0  
  
 !! - Potential duplicate medications found. Please discuss with provider. 2.  
Follow-up Information Follow up With Specialties Details Why Contact Info Julian Stephens NP Nurse Practitioner Schedule an appointment as soon as possible for a visit in 3 days As needed [id number] [id number] 
[phone] Return to ED if worse Diagnosis Clinical Impression: 1. Dizziness Attestations:

## 2019-01-27 NOTE — DISCHARGE INSTRUCTIONS

## 2019-01-28 LAB
ATRIAL RATE: 99 BPM
CALCULATED P AXIS, ECG09: 64 DEGREES
CALCULATED R AXIS, ECG10: 14 DEGREES
CALCULATED T AXIS, ECG11: 70 DEGREES
DIAGNOSIS, 93000: NORMAL
P-R INTERVAL, ECG05: 132 MS
Q-T INTERVAL, ECG07: 358 MS
QRS DURATION, ECG06: 68 MS
QTC CALCULATION (BEZET), ECG08: 459 MS
VENTRICULAR RATE, ECG03: 99 BPM

## 2019-01-31 ENCOUNTER — OFFICE VISIT (OUTPATIENT)
Dept: CARDIOLOGY CLINIC | Age: 44
End: 2019-01-31

## 2019-01-31 VITALS
OXYGEN SATURATION: 99 % | HEART RATE: 88 BPM | DIASTOLIC BLOOD PRESSURE: 91 MMHG | SYSTOLIC BLOOD PRESSURE: 172 MMHG | WEIGHT: 152 LBS | HEIGHT: 66 IN | RESPIRATION RATE: 20 BRPM | BODY MASS INDEX: 24.43 KG/M2

## 2019-01-31 DIAGNOSIS — R07.9 CHEST PAIN, UNSPECIFIED TYPE: Primary | ICD-10-CM

## 2019-01-31 DIAGNOSIS — R06.01 ORTHOPNEA: ICD-10-CM

## 2019-01-31 DIAGNOSIS — Z72.0 TOBACCO ABUSE: ICD-10-CM

## 2019-01-31 DIAGNOSIS — R09.89 JVD (JUGULAR VENOUS DISTENSION): ICD-10-CM

## 2019-01-31 DIAGNOSIS — I10 ESSENTIAL HYPERTENSION: ICD-10-CM

## 2019-01-31 DIAGNOSIS — R06.02 SOB (SHORTNESS OF BREATH) ON EXERTION: ICD-10-CM

## 2019-01-31 RX ORDER — ATORVASTATIN CALCIUM 40 MG/1
TABLET, FILM COATED ORAL
Refills: 0 | COMMUNITY
Start: 2019-01-17 | End: 2019-02-13

## 2019-01-31 RX ORDER — RANITIDINE 150 MG/1
CAPSULE ORAL
Refills: 0 | COMMUNITY
Start: 2019-01-20 | End: 2019-03-04

## 2019-01-31 RX ORDER — LISINOPRIL 10 MG/1
TABLET ORAL
Refills: 0 | COMMUNITY
Start: 2019-01-17 | End: 2019-01-31

## 2019-01-31 RX ORDER — LOSARTAN POTASSIUM 100 MG/1
100 TABLET ORAL DAILY
Qty: 30 TAB | Refills: 6 | Status: SHIPPED | OUTPATIENT
Start: 2019-01-31 | End: 2019-03-04

## 2019-01-31 RX ORDER — NAPROXEN 500 MG/1
TABLET ORAL
Refills: 0 | COMMUNITY
Start: 2019-01-22 | End: 2019-02-13

## 2019-01-31 RX ORDER — ASPIRIN 81 MG/1
TABLET ORAL
Refills: 0 | COMMUNITY
Start: 2019-01-17

## 2019-01-31 NOTE — PROGRESS NOTES
This is an initial office visit for Ms. Macho Hoskins. She was referred from the [facility] ED for shortness of breath and chest tightness which has been going on for 3 weeks. She thinks it's getting worse. 12 lead EKG today showed massive baseline tremor with probable sinus rhytrhm, left atrial conduction delay, LAD, no other findings. Other than baseline artifact, no change from prior. Stress testing in [facility] 2 weeks ago was inadequate with low achieved heart rate. She thinks there might have been an abnormality at rate of 117. She was taken to cath lab but angiography was ultimately not done. She smokes 10 cig/day, was 20. She is out of Naproxen but she takes it daily. She has had  trouble with constipation but she is not presently using citrate or phosphate laxatives. She coulkd not be pregnant. She is , youngest is 11. Second and fourth ones had \"complications\" (placenta abruptio). Tubal ligation done after last one. GB removed in . C section for # 5-6-7. Most recent chest X ray 2 days ago: On exam, BP is 172/91    ABG last December:    Results for [employer] (MRN [id number]) as of 2019 11:58   Ref. Range 2018 17:00   pH Latest Ref Range: 7.35 - 7.45   7.39   PCO2 Latest Ref Range: 35.0 - 45.0 mmHg 42   PO2 Latest Ref Range: 80 - 100 mmHg 106 (H)   BICARBONATE Latest Ref Range: 22 - 26 mmol/L 25   O2 SAT Latest Ref Range: 92 - 97 % 98 (H)   BASE EXCESS Latest Units: mmol/L 0.2   Sample source Latest Units:   ARTERIAL   SITE Latest Units:   RIGHT BRACHIAL   TOSHIA'S TEST Latest Units:   YES   O2 METHOD Latest Units:   ROOM AIR     Earlier test on 12/10:    Results for [employer] (MRN [id number]) as of 2019 11:58   Ref.  Range 12/10/2018 15:57   pH Latest Ref Range: 7.35 - 7.45   7.30 (L)   PCO2 Latest Ref Range: 35.0 - 45.0 mmHg 24 (L)   PO2 Latest Ref Range: 80 - 100 mmHg 118 (H)   BICARBONATE Latest Ref Range: 22 - 26 mmol/L 12 (L)   O2 SAT Latest Ref Range: 92 - 97 % 98 (H)   BASE DEFICIT Latest Units: mmol/L 12.9   Sample source Latest Units:   ARTERIAL   SITE Latest Units:   LEFT BRACHIAL   TOSHIA'S TEST Latest Units:   N/A   O2 METHOD Latest Units:   ROOM AIR       She is capable of hyperventilation. The second gas coincided with a normal CT angio. Her behavioral health treatment [facility] in [facility] ([facility] On examination, she has significant orthopnea with JVD relieved at 30 degrees. She has a rattling coarse expiratory sound over the central airway, louder when supine. She is awakened byt sharp midsternal (one finger) chest pain. Associated with shortness of breath. There is no jugular vein distention. Peripheral pulses are intact, carotids are silent. There is no abdominal pulsation or renal bruit, liver seems normal.  There is no mass otherwise. Blood pressure is 168/92 on the left, higher on the right. Weight is 152 with BMI of 31.44. Respiratory rate is 20 without distress although she seems anxious. Room air SPO2 is 99%. Twelve-lead EKG shows pronounced baseline tremor artifact to the point where it is difficult to determine the rhythm but it is regular and is probably sinus.   QRS elaboration is normal.    Impression: Severe hypertension    Tobacco abuse    Chest pain that could suggest premature appearance of coronary artery disease    Possible allergy to lisinopril    Plan:  Echocardiography    Lexiscan perfusion testing    Discontinue lisinopril    Losartan 100 mg daily    Follow-up as soon as test results are available to endeavor to reassure the patient    Alta Sever, MD, [facility]

## 2019-01-31 NOTE — PROGRESS NOTES
Chief Complaint   Patient presents with    New Patient     s/p ER    Chest Pain     1. Have you been to the ER, urgent care clinic since your last visit? Hospitalized since your last visit? No    2. Have you seen or consulted any other health care providers outside of the [address] [place] [address] since your last visit? Include any pap smears or colon screening.  No

## 2019-01-31 NOTE — PATIENT INSTRUCTIONS
I will try to obtain the records from [facility] from your recent testing. I have ordered two heart tests to be done here: An ECHOCARDIOGRAM is an ultrasound test that will tell me a great deal about your heart muscle and possible sources for the shortness of breath. The LEXISCAN heart test will tell me more about the circulation to your heart muscle than the exercise test could,     Both can be done here on the same morning. Take all of your medications on the day of the test and have a light breakfast. Please do not eat eggs on the day of the test, and do not have any coffee or other caffeine (cola, chocolate, tea) starting the day before the test. Please insist to the scheduling office that both tests be on the same day, and that they be done here in [facility]    There is a possibility that you are allergic to the LISINOPRIL. I stopped it and I have ordered LOSARTAN 100 mg to be taken once daily.  Again, please take all of your medications on the day of the test.      In advance, HAPPY BIRTHDAY

## 2019-02-13 ENCOUNTER — HOSPITAL ENCOUNTER (EMERGENCY)
Age: 44
Discharge: HOME | End: 2019-02-13
Attending: EMERGENCY MEDICINE
Payer: MEDICARE

## 2019-02-13 ENCOUNTER — APPOINTMENT (OUTPATIENT)
Dept: GENERAL RADIOLOGY | Age: 44
End: 2019-02-13
Attending: PHYSICIAN ASSISTANT
Payer: MEDICARE

## 2019-02-13 VITALS
SYSTOLIC BLOOD PRESSURE: 167 MMHG | DIASTOLIC BLOOD PRESSURE: 87 MMHG | HEIGHT: 66 IN | BODY MASS INDEX: 22.5 KG/M2 | RESPIRATION RATE: 18 BRPM | OXYGEN SATURATION: 97 % | TEMPERATURE: 97.7 F | HEART RATE: 93 BPM | WEIGHT: 140 LBS

## 2019-02-13 DIAGNOSIS — E11.65 UNCONTROLLED TYPE 2 DIABETES MELLITUS WITH HYPERGLYCEMIA (HCC): Primary | ICD-10-CM

## 2019-02-13 DIAGNOSIS — R07.9 CHEST PAIN, UNSPECIFIED TYPE: ICD-10-CM

## 2019-02-13 LAB
ALBUMIN SERPL-MCNC: 3 G/DL (ref 3.5–5)
ALBUMIN/GLOB SERPL: 0.8 {RATIO} (ref 1.1–2.2)
ALP SERPL-CCNC: 125 U/L (ref 45–117)
ALT SERPL-CCNC: 33 U/L (ref 12–78)
ANION GAP SERPL CALC-SCNC: 9 MMOL/L (ref 5–15)
APPEARANCE UR: CLEAR
AST SERPL-CCNC: 15 U/L (ref 15–37)
BACTERIA URNS QL MICRO: NEGATIVE /HPF
BASOPHILS # BLD: 0 K/UL (ref 0–0.1)
BASOPHILS NFR BLD: 1 % (ref 0–1)
BILIRUB SERPL-MCNC: 0.5 MG/DL (ref 0.2–1)
BILIRUB UR QL: NEGATIVE
BUN SERPL-MCNC: 6 MG/DL (ref 6–20)
BUN/CREAT SERPL: 7 (ref 12–20)
CALCIUM SERPL-MCNC: 7.7 MG/DL (ref 8.5–10.1)
CHLORIDE SERPL-SCNC: 100 MMOL/L (ref 97–108)
CK MB CFR SERPL CALC: 2.5 % (ref 0–2.5)
CK MB SERPL-MCNC: 1.6 NG/ML (ref 5–25)
CK SERPL-CCNC: 64 U/L (ref 26–192)
CO2 SERPL-SCNC: 26 MMOL/L (ref 21–32)
COLOR UR: ABNORMAL
CREAT SERPL-MCNC: 0.81 MG/DL (ref 0.55–1.02)
DIFFERENTIAL METHOD BLD: ABNORMAL
EOSINOPHIL # BLD: 0.2 K/UL (ref 0–0.4)
EOSINOPHIL NFR BLD: 3 % (ref 0–7)
EPITH CASTS URNS QL MICRO: ABNORMAL /LPF
ERYTHROCYTE [DISTWIDTH] IN BLOOD BY AUTOMATED COUNT: 15.9 % (ref 11.5–14.5)
GLOBULIN SER CALC-MCNC: 3.7 G/DL (ref 2–4)
GLUCOSE BLD STRIP.AUTO-MCNC: 174 MG/DL (ref 65–100)
GLUCOSE BLD STRIP.AUTO-MCNC: 593 MG/DL (ref 65–100)
GLUCOSE SERPL-MCNC: 590 MG/DL (ref 65–100)
GLUCOSE UR STRIP.AUTO-MCNC: >1000 MG/DL
HCG UR QL: NEGATIVE
HCT VFR BLD AUTO: 37.1 % (ref 35–47)
HGB BLD-MCNC: 13 G/DL (ref 11.5–16)
HGB UR QL STRIP: NEGATIVE
IMM GRANULOCYTES # BLD AUTO: 0 K/UL (ref 0–0.04)
IMM GRANULOCYTES NFR BLD AUTO: 0 % (ref 0–0.5)
KETONES SERPL QL: NEGATIVE
KETONES UR QL STRIP.AUTO: NEGATIVE MG/DL
LEUKOCYTE ESTERASE UR QL STRIP.AUTO: NEGATIVE
LYMPHOCYTES # BLD: 1.9 K/UL (ref 0.8–3.5)
LYMPHOCYTES NFR BLD: 35 % (ref 12–49)
MCH RBC QN AUTO: 25.7 PG (ref 26–34)
MCHC RBC AUTO-ENTMCNC: 35 G/DL (ref 30–36.5)
MCV RBC AUTO: 73.5 FL (ref 80–99)
MONOCYTES # BLD: 0.6 K/UL (ref 0–1)
MONOCYTES NFR BLD: 11 % (ref 5–13)
NEUTS SEG # BLD: 2.8 K/UL (ref 1.8–8)
NEUTS SEG NFR BLD: 51 % (ref 32–75)
NITRITE UR QL STRIP.AUTO: NEGATIVE
NRBC # BLD: 0 K/UL (ref 0–0.01)
NRBC BLD-RTO: 0 PER 100 WBC
PH UR STRIP: 6 [PH] (ref 5–8)
PLATELET # BLD AUTO: 158 K/UL (ref 150–400)
PMV BLD AUTO: 11.9 FL (ref 8.9–12.9)
POTASSIUM SERPL-SCNC: 3.3 MMOL/L (ref 3.5–5.1)
PROT SERPL-MCNC: 6.7 G/DL (ref 6.4–8.2)
PROT UR STRIP-MCNC: NEGATIVE MG/DL
RBC # BLD AUTO: 5.05 M/UL (ref 3.8–5.2)
RBC #/AREA URNS HPF: ABNORMAL /HPF (ref 0–5)
SERVICE CMNT-IMP: ABNORMAL
SERVICE CMNT-IMP: ABNORMAL
SODIUM SERPL-SCNC: 135 MMOL/L (ref 136–145)
SP GR UR REFRACTOMETRY: 1.02 (ref 1–1.03)
TROPONIN I SERPL-MCNC: <0.05 NG/ML
UA: UC IF INDICATED,UAUC: ABNORMAL
UROBILINOGEN UR QL STRIP.AUTO: 0.2 EU/DL (ref 0.2–1)
VALPROATE SERPL-MCNC: 34 UG/ML (ref 50–100)
WBC # BLD AUTO: 5.5 K/UL (ref 3.6–11)
WBC URNS QL MICRO: ABNORMAL /HPF (ref 0–4)

## 2019-02-13 PROCEDURE — 96374 THER/PROPH/DIAG INJ IV PUSH: CPT

## 2019-02-13 PROCEDURE — 71045 X-RAY EXAM CHEST 1 VIEW: CPT

## 2019-02-13 PROCEDURE — 82009 KETONE BODYS QUAL: CPT

## 2019-02-13 PROCEDURE — 82962 GLUCOSE BLOOD TEST: CPT

## 2019-02-13 PROCEDURE — 80164 ASSAY DIPROPYLACETIC ACD TOT: CPT

## 2019-02-13 PROCEDURE — 96361 HYDRATE IV INFUSION ADD-ON: CPT

## 2019-02-13 PROCEDURE — 81001 URINALYSIS AUTO W/SCOPE: CPT

## 2019-02-13 PROCEDURE — 80053 COMPREHEN METABOLIC PANEL: CPT

## 2019-02-13 PROCEDURE — 74011636637 HC RX REV CODE- 636/637: Performed by: PHYSICIAN ASSISTANT

## 2019-02-13 PROCEDURE — 82550 ASSAY OF CK (CPK): CPT

## 2019-02-13 PROCEDURE — 84484 ASSAY OF TROPONIN QUANT: CPT

## 2019-02-13 PROCEDURE — 81025 URINE PREGNANCY TEST: CPT

## 2019-02-13 PROCEDURE — 36415 COLL VENOUS BLD VENIPUNCTURE: CPT

## 2019-02-13 PROCEDURE — 74011250636 HC RX REV CODE- 250/636: Performed by: PHYSICIAN ASSISTANT

## 2019-02-13 PROCEDURE — 85025 COMPLETE CBC W/AUTO DIFF WBC: CPT

## 2019-02-13 PROCEDURE — 93005 ELECTROCARDIOGRAM TRACING: CPT

## 2019-02-13 PROCEDURE — 99284 EMERGENCY DEPT VISIT MOD MDM: CPT

## 2019-02-13 PROCEDURE — 74011250637 HC RX REV CODE- 250/637: Performed by: PHYSICIAN ASSISTANT

## 2019-02-13 RX ORDER — INSULIN PUMP SYRINGE, 3 ML
EACH MISCELLANEOUS
Qty: 1 KIT | Refills: 0 | Status: ON HOLD | OUTPATIENT
Start: 2019-02-13 | End: 2019-02-26

## 2019-02-13 RX ORDER — SODIUM CHLORIDE 0.9 % (FLUSH) 0.9 %
5-40 SYRINGE (ML) INJECTION AS NEEDED
Status: DISCONTINUED | OUTPATIENT
Start: 2019-02-13 | End: 2019-02-13 | Stop reason: HOSPADM

## 2019-02-13 RX ORDER — ALBUTEROL SULFATE 90 UG/1
2 AEROSOL, METERED RESPIRATORY (INHALATION)
Qty: 1 INHALER | Refills: 0 | Status: SHIPPED | OUTPATIENT
Start: 2019-02-13 | End: 2019-02-23

## 2019-02-13 RX ORDER — SODIUM CHLORIDE 0.9 % (FLUSH) 0.9 %
5-40 SYRINGE (ML) INJECTION EVERY 8 HOURS
Status: DISCONTINUED | OUTPATIENT
Start: 2019-02-13 | End: 2019-02-13 | Stop reason: HOSPADM

## 2019-02-13 RX ORDER — POTASSIUM CHLORIDE 1.5 G/1.77G
40 POWDER, FOR SOLUTION ORAL
Status: COMPLETED | OUTPATIENT
Start: 2019-02-13 | End: 2019-02-13

## 2019-02-13 RX ADMIN — Medication 10 ML: at 18:46

## 2019-02-13 RX ADMIN — SODIUM CHLORIDE 1000 ML: 900 INJECTION, SOLUTION INTRAVENOUS at 18:46

## 2019-02-13 RX ADMIN — POTASSIUM CHLORIDE 40 MEQ: 1.5 POWDER, FOR SOLUTION ORAL at 20:13

## 2019-02-13 RX ADMIN — HUMAN INSULIN 10 UNITS: 100 INJECTION, SOLUTION SUBCUTANEOUS at 18:58

## 2019-02-13 NOTE — ED PROVIDER NOTES
EMERGENCY DEPARTMENT HISTORY AND PHYSICAL EXAM 
 
 
Date: 2/13/2019 Patient Name: Bean Garrison History of Presenting Illness Chief Complaint Patient presents with  
 High Blood Sugar  
  pt reported uriary frequency,urgency pt reported may be her sugar high,probably DKA. History Provided By: Patient HPI: Bean Garrison, 38 y.o. female with PMHx significant for HTN, T2DM, GERD, hypercholesterolemia, sickle cell trait, asthma, borderline personality disorder, schizophrenia, anxiety, presents ambulatory to the ED with cc of polyuria, polydipsia, polyphagia, and SOB x 2 days as well as constant, stabbing CP x 1 month. Pt states \"this is how I felt the last time I was in DKA I think I'm in DKA again. She notes that her glucometer broke 3 days ago and has not been able to check her sugar, however states that she has been taking her insulin as rx'd. Pt reports that she has a cardiac stress test scheduled for 2/21 and reports she is followed by Cardiology. She notes that she has been unable to see her PCP in \"months\" because she did not have transportation. Pt states \"I drank a little bit of wine today like a 1/2 pint because it's good for your heart. \" She denies taking any medications for her current sx's. She specifically denies any fever, chills, HA, N/V/D, abdominal pain, dysuria, hematuria, or rash. There are no other complaints, changes, or physical findings at this time. Social Hx: + EtOH; + Smoker (1/2 ppd); + Illicit Drugs (cocaine) PCP: Rehan Flores NP Current Facility-Administered Medications Medication Dose Route Frequency Provider Last Rate Last Dose  sodium chloride (NS) flush 5-40 mL  5-40 mL IntraVENous Q8H Lorne Calderon PA-C      
 sodium chloride (NS) flush 5-40 mL  5-40 mL IntraVENous PRN Anna VARELA PA-C   10 mL at 02/13/19 1844 Current Outpatient Medications Medication Sig Dispense Refill  insulin NPH/insulin regular (NOVOLIN 70/30, HUMULIN 70/30) 100 unit/mL (70-30) injection 15 Units by SubCUTAneous route two (2) times a day. 10 mL 0  
 glucose blood VI test strips (FREESTYLE INSULINX TEST STRIPS) strip As needed 100 Strip 0  
 albuterol (PROVENTIL HFA, VENTOLIN HFA, PROAIR HFA) 90 mcg/actuation inhaler Take 2 Puffs by inhalation every four (4) hours as needed for Wheezing. 1 Inhaler 0  Blood-Glucose Meter monitoring kit Daily as needed for blood glucose monitoring. 1 Kit 0  
 lancets 23 gauge misc Use daily for glucose monitoring as needed. 100 Lancet 0  
 raNITIdine hcl 150 mg capsule TAKE 1 TABLET BY MOUTH TWICE A DAY FOR 10 DAYS  0  
 aspirin delayed-release 81 mg tablet TAKE 1 TABLET BY MOUTH EVERY DAY WITH 6 TO 8 OUNCES OF PLAIN WATER  0  
 losartan (COZAAR) 100 mg tablet Take 1 Tab by mouth daily. 30 Tab 6  sertraline (ZOLOFT) 25 mg tablet Take 1 Tab by mouth daily for 30 days. 30 Tab 0  
 divalproex sodium (DEPAKOTE PO) Take  by mouth.  haloperidol (HALDOL) 5 mg tablet Take 1 Tab by mouth every twelve (12) hours. 60 Tab 0  
 benztropine (COGENTIN) 0.5 mg tablet Take 2 Tabs by mouth every twelve (12) hours for 30 days. 120 Tab 0  
 valproic acid (DEPAKENE) 250 mg capsule Take 2 Caps by mouth every twelve (12) hours for 30 days. 120 Cap 0  
 nitroglycerin (NITROSTAT) 0.4 mg SL tablet Take 1 Tab by mouth every five (5) minutes as needed for Chest Pain. Sit/Lay down then put one tab under the tongue every 5 minutes as needed for chest pain for 3 doses 1 Bottle 0 Past History Past Medical History: 
Past Medical History:  
Diagnosis Date  Alcohol abuse, in remission   
 quit 17 days ago  Asthma   
 does not use inhalers  Borderline personality disorder ([address]  Diabetes ([facility] [address]  GERD (gastroesophageal reflux disease)  Hypertension  Other ill-defined conditions(799.89)   
 kidney stones ,passed one  Other ill-defined conditions(799.89) sickle cell trait  Other ill-defined conditions(799.89)   
 increased cholesterol  Pancreatitis  Psychiatric disorder   
 schizophrenia, bipolar, depression, anxiety Past Surgical History: 
Past Surgical History:  
Procedure Laterality Date  ABDOMEN SURGERY PROC UNLISTED  3/12/14 CHOLECYSTECTOMY LAPAROSCOPIC    
 HX GASTRIC BYPASS  HX GYN  11/8/2012  
 c section x2  HX OTHER SURGICAL  3/13/14 ENDOSCOPIC RETROGRADE CHOLANGIOPANCREATOGRAPHY  HX OTHER SURGICAL  8/4/14  
 endoscopic stent placed to bile duct  HX TUBAL LIGATION  2012 Family History: 
Family History Problem Relation Age of Onset  Heart Disease Mother  Diabetes Mother  Hypertension Mother  Hypertension Maternal Grandmother Social History: 
Social History Tobacco Use  Smoking status: Current Every Day Smoker Packs/day: 0.50 Years: 14.00 Pack years: 7.00 Types: Cigarettes  Smokeless tobacco: Never Used  Tobacco comment: cigarettes Substance Use Topics  Alcohol use: Yes Alcohol/week: 3.6 oz Types: 6 Cans of beer per week  Drug use: Yes Types: Cocaine Comment: yesterday 12/27/18 Allergies: Allergies Allergen Reactions  Dilaudid [Hydromorphone (Bulk)] Itching  Ibuprofen Not Reported This Time Review of Systems Review of Systems Constitutional: Negative. Negative for chills and fever. HENT: Negative. Negative for congestion, facial swelling, rhinorrhea, sore throat, trouble swallowing and voice change. Eyes: Negative. Respiratory: Positive for shortness of breath. Negative for apnea, cough, chest tightness and wheezing. Cardiovascular: Positive for chest pain. Negative for palpitations and leg swelling. Gastrointestinal: Negative. Negative for abdominal distention, abdominal pain, blood in stool, constipation, diarrhea, nausea and vomiting. Endocrine: Positive for polydipsia, polyphagia and polyuria.  Negative for cold intolerance and heat intolerance. Genitourinary: Negative. Negative for difficulty urinating, dysuria, flank pain, frequency, hematuria and urgency. Musculoskeletal: Negative. Negative for arthralgias, back pain, myalgias, neck pain and neck stiffness. Skin: Negative. Negative for color change and rash. Neurological: Negative. Negative for dizziness, syncope, facial asymmetry, speech difficulty, weakness, light-headedness, numbness and headaches. Hematological: Negative. Does not bruise/bleed easily. Psychiatric/Behavioral: Negative. Negative for confusion and self-injury. The patient is not nervous/anxious. Physical Exam  
Physical Exam  
Constitutional: She is oriented to person, place, and time. She appears well-developed and well-nourished. No distress. HENT:  
Head: Normocephalic and atraumatic. Mouth/Throat: Oropharynx is clear and moist. No oropharyngeal exudate. Eyes: Conjunctivae and EOM are normal. Pupils are equal, round, and reactive to light. Neck: Normal range of motion. Cardiovascular: Normal rate, regular rhythm and normal heart sounds. Exam reveals no gallop and no friction rub. No murmur heard. Pulmonary/Chest: Effort normal and breath sounds normal. No respiratory distress. She has no wheezes. She has no rales. She exhibits no tenderness. Abdominal: Soft. Bowel sounds are normal. She exhibits no distension and no mass. There is no tenderness. There is no rebound and no guarding. Musculoskeletal: Normal range of motion. She exhibits no edema, tenderness or deformity. Neurological: She is alert and oriented to person, place, and time. She displays normal reflexes. No cranial nerve deficit or sensory deficit. She exhibits normal muscle tone. Coordination normal. GCS eye subscore is 4. GCS verbal subscore is 5. GCS motor subscore is 6. Gross neuro intact Skin: Skin is warm. No rash noted. She is not diaphoretic. Psychiatric: She has a normal mood and affect.   
Nursing note and vitals reviewed. Diagnostic Study Results Labs - Recent Results (from the past 12 hour(s)) GLUCOSE, POC Collection Time: 02/13/19  6:27 PM  
Result Value Ref Range Glucose (POC) 593 (H) 65 - 100 mg/dL Performed by [facility] CBC WITH AUTOMATED DIFF Collection Time: 02/13/19  6:39 PM  
Result Value Ref Range WBC 5.5 3.6 - 11.0 K/uL  
 RBC 5.05 3.80 - 5.20 M/uL  
 HGB 13.0 11.5 - 16.0 g/dL HCT 37.1 35.0 - 47.0 % MCV 73.5 (L) 80.0 - 99.0 FL  
 MCH 25.7 (L) 26.0 - 34.0 PG  
 MCHC 35.0 30.0 - 36.5 g/dL  
 RDW 15.9 (H) 11.5 - 14.5 % PLATELET 832 494 - 299 K/uL MPV 11.9 8.9 - 12.9 FL  
 NRBC 0.0 0  WBC ABSOLUTE NRBC 0.00 0.00 - 0.01 K/uL NEUTROPHILS 51 32 - 75 % LYMPHOCYTES 35 12 - 49 % MONOCYTES 11 5 - 13 % EOSINOPHILS 3 0 - 7 % BASOPHILS 1 0 - 1 % IMMATURE GRANULOCYTES 0 0.0 - 0.5 % ABS. NEUTROPHILS 2.8 1.8 - 8.0 K/UL  
 ABS. LYMPHOCYTES 1.9 0.8 - 3.5 K/UL  
 ABS. MONOCYTES 0.6 0.0 - 1.0 K/UL  
 ABS. EOSINOPHILS 0.2 0.0 - 0.4 K/UL  
 ABS. BASOPHILS 0.0 0.0 - 0.1 K/UL  
 ABS. IMM. GRANS. 0.0 0.00 - 0.04 K/UL  
 DF AUTOMATED METABOLIC PANEL, COMPREHENSIVE Collection Time: 02/13/19  6:39 PM  
Result Value Ref Range Sodium 135 (L) 136 - 145 mmol/L Potassium 3.3 (L) 3.5 - 5.1 mmol/L Chloride 100 97 - 108 mmol/L  
 CO2 26 21 - 32 mmol/L Anion gap 9 5 - 15 mmol/L Glucose 590 (HH) 65 - 100 mg/dL BUN 6 6 - 20 MG/DL Creatinine 0.81 0.55 - 1.02 MG/DL  
 BUN/Creatinine ratio 7 (L) 12 - 20 GFR est AA >60 >60 ml/min/1.73m2 GFR est non-AA >60 >60 ml/min/1.73m2 Calcium 7.7 (L) 8.5 - 10.1 MG/DL Bilirubin, total 0.5 0.2 - 1.0 MG/DL  
 ALT (SGPT) 33 12 - 78 U/L  
 AST (SGOT) 15 15 - 37 U/L Alk. phosphatase 125 (H) 45 - 117 U/L Protein, total 6.7 6.4 - 8.2 g/dL Albumin 3.0 (L) 3.5 - 5.0 g/dL Globulin 3.7 2.0 - 4.0 g/dL A-G Ratio 0.8 (L) 1.1 - 2.2 CK W/ CKMB & INDEX  Collection Time: 02/13/19  6:39 PM  
Result Value Ref Range CK 64 26 - 192 U/L  
 CK - MB 1.6 <3.6 NG/ML  
 CK-MB Index 2.5 0.0 - 2.5    
TROPONIN I Collection Time: 02/13/19  6:39 PM  
Result Value Ref Range Troponin-I, Qt. <0.05 <0.05 ng/mL VALPROIC ACID Collection Time: 02/13/19  6:39 PM  
Result Value Ref Range Valproic acid 34 (L) 50 - 100 ug/ml ACETONE/KETONE, QL Collection Time: 02/13/19  6:39 PM  
Result Value Ref Range Acetone/Ketone serum, QL. NEGATIVE  NEG       
HCG URINE, QL. - POC Collection Time: 02/13/19  7:23 PM  
Result Value Ref Range Pregnancy test,urine (POC) NEGATIVE  NEG    
EKG, 12 LEAD, INITIAL Collection Time: 02/13/19  7:25 PM  
Result Value Ref Range Ventricular Rate 89 BPM  
 Atrial Rate 89 BPM  
 P-R Interval 138 ms QRS Duration 72 ms Q-T Interval 376 ms QTC Calculation (Bezet) 457 ms Calculated P Axis 57 degrees Calculated R Axis 10 degrees Calculated T Axis 79 degrees Diagnosis Normal sinus rhythm Nonspecific T wave abnormality Abnormal ECG When compared with ECG of 27-JAN-2019 14:44, No significant change was found URINALYSIS W/ REFLEX CULTURE Collection Time: 02/13/19  7:28 PM  
Result Value Ref Range Color YELLOW/STRAW Appearance CLEAR CLEAR Specific gravity 1.025 1.003 - 1.030    
 pH (UA) 6.0 5.0 - 8.0 Protein NEGATIVE  NEG mg/dL Glucose >1,000 (A) NEG mg/dL Ketone NEGATIVE  NEG mg/dL Bilirubin NEGATIVE  NEG Blood NEGATIVE  NEG Urobilinogen 0.2 0.2 - 1.0 EU/dL Nitrites NEGATIVE  NEG Leukocyte Esterase NEGATIVE  NEG    
 WBC 0-4 0 - 4 /hpf  
 RBC 0-5 0 - 5 /hpf Epithelial cells FEW FEW /lpf Bacteria NEGATIVE  NEG /hpf  
 UA:UC IF INDICATED CULTURE NOT INDICATED BY UA RESULT CNI    
GLUCOSE, POC Collection Time: 02/13/19  8:15 PM  
Result Value Ref Range Glucose (POC) 174 (H) 65 - 100 mg/dL Performed by Allie Cuellar Radiologic Studies -  
XR CHEST PORT Final Result IMPRESSION: No Acute Disease. CT Results  (Last 48 hours) None CXR Results  (Last 48 hours) 02/13/19 1844  XR CHEST PORT Final result Impression:  IMPRESSION: No Acute Disease. Narrative:  EXAM: Portable CXR. 1835 hours INDICATION: Chest pain FINDINGS:  
The lungs are clear. Heart is normal in size. There is no overt pulmonary edema. There is no evident pneumothorax, adenopathy or pleural effusion. Medical Decision Making I am the first provider for this patient. I reviewed the vital signs, available nursing notes, past medical history, past surgical history, family history and social history. Vital Signs-Reviewed the patient's vital signs. Patient Vitals for the past 12 hrs: 
 Temp Pulse Resp BP SpO2  
02/13/19 2026  93 18 167/87 97 % 02/13/19 1810 97.7 °F (36.5 °C) 96 18 (!) 177/97 98 % Pulse Oximetry Analysis - 98% on RA Cardiac Monitor:  
Rate: 96 bpm 
Rhythm: Normal Sinus Rhythm EKG interpretation: (Preliminary)1925 Rhythm: normal sinus rhythm; and regular . Rate (approx.): 89; Axis: normal; WA interval: normal; QRS interval: normal ; ST/T wave: ? Poor baseline in V5-V6 vs subtle T wave change; Other findings: minimal changes from previous EKG Written by Anahi Wahl. Cayetano Jo ED Scribe, as dictated by Jairo Eastman. Barak Huertas MD 
 
Records Reviewed: Nursing Notes, Old Medical Records, Previous electrocardiograms, Previous Radiology Studies and Previous Laboratory Studies Provider Notes (Medical Decision Making): DDx: hyperglycemia, DKA, HHS, dehydration, electrolyte abnormality, UTI, ACS Patient presents with hyperglycemia. DDx: uncontrolled diabetes 2/2 noncompliance, infection, stressors. Will obtain labs to r/o DKA or other metabolic anomalies, treat with insulin and fluids.  
 
I have recommended the following steps for improving diabetic care and outcome to her: home glucose monitoring emphasized, glucose meter dispensed to patient and long term diabetic complications discussed. A followup visit will be scheduled in the near future with patients PCP or endocrinologist to review and reinforce the importance of careful diabetic control to improve long term outcomes. ED Course:  
Initial assessment performed. The patients presenting problems have been discussed, and they are in agreement with the care plan formulated and outlined with them. I have encouraged them to ask questions as they arise throughout their visit. 8:22 PM 
Feeling better. BG down. Will send home with prescriptions for insulin and glucometer. Critical Care Time:  
None Disposition: 
Discharge Note: 
8:22 PM 
The patient has been re-evaluated and is ready for discharge. Reviewed available results with patient. Counseled patient/parent/guardian on diagnosis and care plan. Patient has expressed understanding, and all questions have been answered. Patient agrees with plan and agrees to follow up as recommended, or return to the ED if their symptoms worsen. Discharge instructions have been provided and explained to the patient, along with reasons to return to the ED. PLAN: 
1. Discharge Medication List as of 2/13/2019  8:26 PM  
  
START taking these medications Details Blood-Glucose Meter monitoring kit Daily as needed for blood glucose monitoring., Normal, Disp-1 Kit, R-0  
  
lancets 23 gauge misc Use daily for glucose monitoring as needed., Normal, Disp-100 Lancet, R-0  
  
  
CONTINUE these medications which have CHANGED  Details  
insulin NPH/insulin regular (NOVOLIN 70/30, HUMULIN 70/30) 100 unit/mL (70-30) injection 15 Units by SubCUTAneous route two (2) times a day., Normal, Disp-10 mL, R-0  
  
glucose blood VI test strips (FREESTYLE INSULINX TEST STRIPS) strip As needed, Normal, Disp-100 Strip, R-0  
  
albuterol (PROVENTIL HFA, VENTOLIN HFA, PROAIR HFA) 90 mcg/actuation inhaler Take 2 Puffs by inhalation every four (4) hours as needed for Wheezing., Normal, Disp-1 Inhaler, R-0  
  
  
CONTINUE these medications which have NOT CHANGED Details  
raNITIdine hcl 150 mg capsule TAKE 1 TABLET BY MOUTH TWICE A DAY FOR 10 DAYS, Historical Med, R-0  
  
aspirin delayed-release 81 mg tablet TAKE 1 TABLET BY MOUTH EVERY DAY WITH 6 TO 8 OUNCES OF PLAIN WATER, Historical Med, R-0  
  
losartan (COZAAR) 100 mg tablet Take 1 Tab by mouth daily. , Normal, Disp-30 Tab, R-6  
  
sertraline (ZOLOFT) 25 mg tablet Take 1 Tab by mouth daily for 30 days. , Print, Disp-30 Tab, R-0  
  
divalproex sodium (DEPAKOTE PO) Take  by mouth., Historical Med  
  
haloperidol (HALDOL) 5 mg tablet Take 1 Tab by mouth every twelve (12) hours. , Print, Disp-60 Tab, R-0  
  
benztropine (COGENTIN) 0.5 mg tablet Take 2 Tabs by mouth every twelve (12) hours for 30 days. , Print, Disp-120 Tab, R-0  
  
valproic acid (DEPAKENE) 250 mg capsule Take 2 Caps by mouth every twelve (12) hours for 30 days. , Print, Disp-120 Cap, R-0  
  
nitroglycerin (NITROSTAT) 0.4 mg SL tablet Take 1 Tab by mouth every five (5) minutes as needed for Chest Pain. Sit/Lay down then put one tab under the tongue every 5 minutes as needed for chest pain for 3 doses, Print, Disp-1 Bottle, R-0  
  
  
 
2. Follow-up Information Follow up With Specialties Details Why Contact Info Bladimir Arrington NP Nurse Practitioner Schedule an appointment as soon as possible for a visit in 2 days As needed [address] [address] 
[phone] Araceli Mackey MD Cardiology Schedule an appointment as soon as possible for a visit in 2 days As needed [facility] [address] 
[phone] Return to ED if worse Diagnosis Clinical Impression: 1. Uncontrolled type 2 diabetes mellitus with hyperglycemia ([facility] [address] 2. Chest pain, unspecified type This note is prepared by Ascencion Sahni. Pattie Bowser, acting as Scribe for ALEX ABEBE [place] PA-C: The scribe's documentation has been prepared under my direction and personally reviewed by me in its entirety. I confirm that the note above accurately reflects all work, treatment, procedures, and medical decision making performed by me. This note will not be viewable in [address]

## 2019-02-13 NOTE — ED NOTES
Pt sts that she thinks that her blood glucose is elevated Has not  her glucose at home because her glucometer got wet and stopped working about two weeks ago. Pt C/O execessive thirst, polyuria. Pt sts this is how she has felt in the past when her glucose was elevated. Pt is warm and dry. A&O X 4. Pt's speech is slurred. Denies ETOH/drug use. Emergency Department Nursing Plan of Care The Nursing Plan of Care is developed from the Nursing assessment and Emergency Department Attending provider initial evaluation. The plan of care may be reviewed in the ED Provider note. The Plan of Care was developed with the following considerations:  
Patient / Family readiness to learn indicated by:verbalized understanding Persons(s) to be included in education: patient Barriers to Learning/Limitations:No 
 
Signed Naa Jerry RN   
2/13/2019   6:52 PM

## 2019-02-14 NOTE — ED NOTES
Discharge instructions were given to the patient by provider. The patient left the Emergency Department ambulatory, alert and oriented and in no acute distress with 5 prescriptions. The patient was encouraged to call or return to the ED for worsening issues or problems and was encouraged to schedule a follow up appointment for continuing care. The patient verbalized understanding of discharge instructions and prescriptions, all questions were answered. The patient has no further concerns at this time.

## 2019-02-14 NOTE — ED NOTES
Bedside shift change report given to Gnena Aguirre RN 
 (oncoming nurse) by Michael Abdalla RN (offgoing nurse). Report included the following information SBAR.

## 2019-02-14 NOTE — DISCHARGE INSTRUCTIONS
Patient Education        Learning About High Blood Sugar  What is high blood sugar? Your body turns the food you eat into glucose (sugar), which it uses for energy. But if your body isn't able to use the sugar right away, it can build up in your blood and lead to high blood sugar. When the amount of sugar in your blood stays too high for too much of the time, you may have diabetes. Diabetes is a disease that can cause serious health problems. The good news is that lifestyle changes may help you get your blood sugar back to normal and avoid or delay diabetes. What causes high blood sugar? Sugar (glucose) can build up in your blood if you:  · Are overweight. · Have a family history of diabetes. · Take certain medicines, such as steroids. What are the symptoms? Having high blood sugar may not cause any symptoms at all. Or it may make you feel very thirsty or very hungry. You may also urinate more often than usual, have blurry vision, or lose weight without trying. How is high blood sugar treated? You can take steps to lower your blood sugar level if you understand what makes it get higher. Your doctor may want you to learn how to test your blood sugar level at home. Then you can see how illness, stress, or different kinds of food or medicine raise or lower your blood sugar level. Other tests may be needed to see if you have diabetes. How can you prevent high blood sugar? · Watch your weight. If you're overweight, losing just a small amount of weight may help. Reducing fat around your waist is most important. · Limit the amount of calories, sweets, and unhealthy fat you eat. Ask your doctor if a dietitian can help you. A registered dietitian can help you create meal plans that fit your lifestyle. · Get at least 30 minutes of exercise on most days of the week. Exercise helps control your blood sugar. It also helps you maintain a healthy weight. Walking is a good choice.  You also may want to do other activities, such as running, swimming, cycling, or playing tennis or team sports. · If your doctor prescribed medicines, take them exactly as prescribed. Call your doctor if you think you are having a problem with your medicine. You will get more details on the specific medicines your doctor prescribes. Follow-up care is a key part of your treatment and safety. Be sure to make and go to all appointments, and call your doctor if you are having problems. It's also a good idea to know your test results and keep a list of the medicines you take. Where can you learn more? Go to http://miryam-polo.info/. Enter O108 in the search box to learn more about \"Learning About High Blood Sugar. \"  Current as of: July 25, 2018  Content Version: 11.9  © 7222-5529 [employer]. Care instructions adapted under license by [employer] (which disclaims liability or warranty for this information). If you have questions about a medical condition or this instruction, always ask your healthcare professional. [address] any warranty or liability for your use of this information.

## 2019-02-15 ENCOUNTER — HOSPITAL ENCOUNTER (EMERGENCY)
Age: 44
Discharge: HOME | End: 2019-02-16
Attending: EMERGENCY MEDICINE | Admitting: EMERGENCY MEDICINE
Payer: MEDICARE

## 2019-02-15 DIAGNOSIS — R07.9 CHEST PAIN, UNSPECIFIED TYPE: Primary | ICD-10-CM

## 2019-02-15 LAB
ATRIAL RATE: 89 BPM
CALCULATED P AXIS, ECG09: 57 DEGREES
CALCULATED R AXIS, ECG10: 10 DEGREES
CALCULATED T AXIS, ECG11: 79 DEGREES
COMMENT, HOLDF: NORMAL
DIAGNOSIS, 93000: NORMAL
P-R INTERVAL, ECG05: 138 MS
Q-T INTERVAL, ECG07: 376 MS
QRS DURATION, ECG06: 72 MS
QTC CALCULATION (BEZET), ECG08: 457 MS
SAMPLES BEING HELD,HOLD: NORMAL
VENTRICULAR RATE, ECG03: 89 BPM

## 2019-02-15 PROCEDURE — 83690 ASSAY OF LIPASE: CPT

## 2019-02-15 PROCEDURE — 84484 ASSAY OF TROPONIN QUANT: CPT

## 2019-02-15 PROCEDURE — 85027 COMPLETE CBC AUTOMATED: CPT

## 2019-02-15 PROCEDURE — 36415 COLL VENOUS BLD VENIPUNCTURE: CPT

## 2019-02-15 PROCEDURE — 93005 ELECTROCARDIOGRAM TRACING: CPT

## 2019-02-15 PROCEDURE — 80307 DRUG TEST PRSMV CHEM ANLYZR: CPT

## 2019-02-15 PROCEDURE — 80053 COMPREHEN METABOLIC PANEL: CPT

## 2019-02-15 PROCEDURE — 99285 EMERGENCY DEPT VISIT HI MDM: CPT

## 2019-02-16 ENCOUNTER — APPOINTMENT (OUTPATIENT)
Dept: GENERAL RADIOLOGY | Age: 44
End: 2019-02-16
Attending: EMERGENCY MEDICINE
Payer: MEDICARE

## 2019-02-16 VITALS
SYSTOLIC BLOOD PRESSURE: 158 MMHG | HEIGHT: 66 IN | TEMPERATURE: 98.6 F | RESPIRATION RATE: 19 BRPM | OXYGEN SATURATION: 99 % | DIASTOLIC BLOOD PRESSURE: 94 MMHG | HEART RATE: 79 BPM | WEIGHT: 140 LBS | BODY MASS INDEX: 22.5 KG/M2

## 2019-02-16 LAB
ALBUMIN SERPL-MCNC: 2.7 G/DL (ref 3.5–5)
ALBUMIN/GLOB SERPL: 0.8 {RATIO} (ref 1.1–2.2)
ALP SERPL-CCNC: 110 U/L (ref 45–117)
ALT SERPL-CCNC: 28 U/L (ref 12–78)
ANION GAP SERPL CALC-SCNC: 7 MMOL/L (ref 5–15)
AST SERPL-CCNC: 29 U/L (ref 15–37)
ATRIAL RATE: 81 BPM
ATRIAL RATE: 84 BPM
BILIRUB SERPL-MCNC: 0.3 MG/DL (ref 0.2–1)
BUN SERPL-MCNC: 12 MG/DL (ref 6–20)
BUN/CREAT SERPL: 20 (ref 12–20)
CALCIUM SERPL-MCNC: 8.2 MG/DL (ref 8.5–10.1)
CALCULATED P AXIS, ECG09: 58 DEGREES
CALCULATED P AXIS, ECG09: 66 DEGREES
CALCULATED R AXIS, ECG10: 10 DEGREES
CALCULATED R AXIS, ECG10: 25 DEGREES
CALCULATED T AXIS, ECG11: 64 DEGREES
CALCULATED T AXIS, ECG11: 80 DEGREES
CHLORIDE SERPL-SCNC: 105 MMOL/L (ref 97–108)
CO2 SERPL-SCNC: 27 MMOL/L (ref 21–32)
CREAT SERPL-MCNC: 0.59 MG/DL (ref 0.55–1.02)
DIAGNOSIS, 93000: NORMAL
DIAGNOSIS, 93000: NORMAL
ERYTHROCYTE [DISTWIDTH] IN BLOOD BY AUTOMATED COUNT: 16.3 % (ref 11.5–14.5)
ETHANOL SERPL-MCNC: <10 MG/DL
GLOBULIN SER CALC-MCNC: 3.5 G/DL (ref 2–4)
GLUCOSE SERPL-MCNC: 221 MG/DL (ref 65–100)
HCT VFR BLD AUTO: 34.6 % (ref 35–47)
HGB BLD-MCNC: 11.7 G/DL (ref 11.5–16)
LIPASE SERPL-CCNC: 26 U/L (ref 73–393)
MCH RBC QN AUTO: 25.8 PG (ref 26–34)
MCHC RBC AUTO-ENTMCNC: 33.8 G/DL (ref 30–36.5)
MCV RBC AUTO: 76.4 FL (ref 80–99)
NRBC # BLD: 0 K/UL (ref 0–0.01)
NRBC BLD-RTO: 0 PER 100 WBC
P-R INTERVAL, ECG05: 134 MS
P-R INTERVAL, ECG05: 134 MS
PLATELET # BLD AUTO: 165 K/UL (ref 150–400)
PMV BLD AUTO: 12.4 FL (ref 8.9–12.9)
POTASSIUM SERPL-SCNC: 4.4 MMOL/L (ref 3.5–5.1)
PROT SERPL-MCNC: 6.2 G/DL (ref 6.4–8.2)
Q-T INTERVAL, ECG07: 348 MS
Q-T INTERVAL, ECG07: 398 MS
QRS DURATION, ECG06: 70 MS
QRS DURATION, ECG06: 70 MS
QTC CALCULATION (BEZET), ECG08: 411 MS
QTC CALCULATION (BEZET), ECG08: 462 MS
RBC # BLD AUTO: 4.53 M/UL (ref 3.8–5.2)
SODIUM SERPL-SCNC: 139 MMOL/L (ref 136–145)
TROPONIN I SERPL-MCNC: <0.05 NG/ML
TROPONIN I SERPL-MCNC: <0.05 NG/ML
VENTRICULAR RATE, ECG03: 81 BPM
VENTRICULAR RATE, ECG03: 84 BPM
WBC # BLD AUTO: 6.4 K/UL (ref 3.6–11)

## 2019-02-16 PROCEDURE — 93005 ELECTROCARDIOGRAM TRACING: CPT

## 2019-02-16 PROCEDURE — 36415 COLL VENOUS BLD VENIPUNCTURE: CPT

## 2019-02-16 PROCEDURE — 71046 X-RAY EXAM CHEST 2 VIEWS: CPT

## 2019-02-16 PROCEDURE — 84484 ASSAY OF TROPONIN QUANT: CPT

## 2019-02-16 PROCEDURE — 74011250637 HC RX REV CODE- 250/637: Performed by: EMERGENCY MEDICINE

## 2019-02-16 RX ORDER — NITROGLYCERIN 0.4 MG/1
0.4 TABLET SUBLINGUAL
Qty: 1 BOTTLE | Refills: 0 | Status: SHIPPED | OUTPATIENT
Start: 2019-02-16 | End: 2019-02-23

## 2019-02-16 RX ADMIN — NITROGLYCERIN 1 INCH: 20 OINTMENT TOPICAL at 00:15

## 2019-02-16 NOTE — ED PROVIDER NOTES
38 y.o. female with extensive past medical history, please see list, significant for DM, HTN and GERD who presents to the ED via EMS with chief complaint of 10/10 L-sided CP with accompanying SOB radiation of pain to L shoulder and L neck, onset about an hour, while lying down in bed, prompting the call to EMS. Pt received 324 of ASA en route to the ED. Pt denies N/V, cough, and hx of heart dz. She notes that she has had similar sx previously. She states that she has been taking her medication as prescribed and that she has a stress test scheduled for 02/21/19. There are no other acute medical concerns at this time. CHART REVIEW: Review of records shows that this is the pt's 10th ED visit this year, several of which were for chest pain. Positive Tobacco use; Positive EtOH use; Positive Illicit Drug Abuse PCP: Mimi Valencia NP Note written by Deepthi Watson, as dictated by Ally Pascal MD 11:38 PM  
 
 
The history is provided by medical records, the patient and the EMS personnel. No  was used. Past Medical History:  
Diagnosis Date  Alcohol abuse, in remission   
 quit 17 days ago  Asthma   
 does not use inhalers  Borderline personality disorder ([address]  Diabetes ([address]  GERD (gastroesophageal reflux disease)  Hypertension  Other ill-defined conditions(799.89)   
 kidney stones ,passed one  Other ill-defined conditions(799.89) sickle cell trait  Other ill-defined conditions(799.89)   
 increased cholesterol  Pancreatitis  Psychiatric disorder   
 schizophrenia, bipolar, depression, anxiety Past Surgical History:  
Procedure Laterality Date  ABDOMEN SURGERY PROC UNLISTED  3/12/14 CHOLECYSTECTOMY LAPAROSCOPIC    
 HX GASTRIC BYPASS  HX GYN  11/8/2012  
 c section x2  HX OTHER SURGICAL  3/13/14 ENDOSCOPIC RETROGRADE CHOLANGIOPANCREATOGRAPHY  HX OTHER SURGICAL  8/4/14  
 endoscopic stent placed to bile duct  HX TUBAL LIGATION  2012 Family History:  
Problem Relation Age of Onset  Heart Disease Mother  Diabetes Mother  Hypertension Mother  Hypertension Maternal Grandmother Social History Socioeconomic History  Marital status: SINGLE Spouse name: Not on file  Number of children: Not on file  Years of education: Not on file  Highest education level: Not on file Social Needs  Financial resource strain: Not on file  Food insecurity - worry: Not on file  Food insecurity - inability: Not on file  Transportation needs - medical: Not on file  Transportation needs - non-medical: Not on file Occupational History  Not on file Tobacco Use  Smoking status: Current Every Day Smoker Packs/day: 0.50 Years: 14.00 Pack years: 7.00 Types: Cigarettes  Smokeless tobacco: Never Used  Tobacco comment: cigarettes Substance and Sexual Activity  Alcohol use: Yes Alcohol/week: 3.6 oz Types: 6 Cans of beer per week  Drug use: Yes Types: Cocaine Comment: yesterday 12/27/18  Sexual activity: Yes  
  Partners: Female Birth control/protection: Surgical  
Other Topics Concern  Not on file Social History Narrative  Not on file ALLERGIES: Dilaudid [hydromorphone (bulk)] and Ibuprofen Review of Systems Constitutional: Negative for fever. HENT: Negative for facial swelling. Eyes: Negative for visual disturbance. Respiratory: Positive for shortness of breath. Negative for cough and chest tightness. Cardiovascular: Positive for chest pain. Gastrointestinal: Negative for abdominal pain, nausea and vomiting. Genitourinary: Negative for difficulty urinating and dysuria. Musculoskeletal: Negative for arthralgias. Skin: Negative for rash. Neurological: Negative for dizziness and headaches. Hematological: Negative for adenopathy.   
Psychiatric/Behavioral: Negative for suicidal ideas. Vitals:  
 02/15/19 2344 BP: (!) 162/99 Pulse: 84 Resp: 16 Temp: 98.7 °F (37.1 °C) SpO2: 99% Weight: 63.5 kg (140 lb) Height: 5' 6\" (1.676 m) Physical Exam  
Constitutional: She is oriented to person, place, and time. She appears well-developed and well-nourished. No distress. HENT:  
Head: Normocephalic and atraumatic. Mouth/Throat: Oropharynx is clear and moist.  
Eyes: Pupils are equal, round, and reactive to light. No scleral icterus. Neck: Normal range of motion. Neck supple. No thyromegaly present. Cardiovascular: Normal rate, regular rhythm, normal heart sounds and intact distal pulses. No murmur heard. Pulmonary/Chest: Effort normal and breath sounds normal. No respiratory distress. Abdominal: Soft. Bowel sounds are normal. She exhibits no distension. There is no tenderness. Musculoskeletal: Normal range of motion. She exhibits no edema. Neurological: She is alert and oriented to person, place, and time. Skin: Skin is warm and dry. No rash noted. She is not diaphoretic. Nursing note and vitals reviewed. Note written by Deepthi Hickman, as dictated by Wendi Hook MD 11:38 PM  
 
MDM Number of Diagnoses or Management Options Chest pain, unspecified type:  
Diagnosis management comments: A:  45yo F with chronic chest pain and multiple ED visits over past 2 months for same. VS stable. Exam unremarkable. Labs and ECG unremarkable. Repeat ECG and trop neg. VS stable. Repeat exam reassuring. P: 
Stable for discharge Has appt for stress test next week. Can f/u with Dr. Renea Bermudez or [facility] as needed. ED EKG interpretation: 
Rhythm: normal sinus rhythm. Rate (approx.): 81. Axis: normal.  ST segment:  No concerning ST elevations or depressions. This EKG was interpreted by Merlin Frederick, MD,ED Provider. Procedures ED EKG interpretation: 
Rhythm: normal sinus rhythm. Rate (approx.): 84.   Axis: normal.  ST segment:  No concerning ST elevations or depressions. This EKG was interpreted by Nilda Tucker MD,ED Provider. 1:53 AM 
Repeat trop normal. 
Pt stable for discharge. Requesting refill on nitro stating that she lost the medications. Has appt for stress test on 2/21.

## 2019-02-16 NOTE — ED NOTES
Discharge instructions discussed with pt. Pt verbalized understanding. Pt wheeled from department, pt condition stable.

## 2019-02-16 NOTE — DISCHARGE INSTRUCTIONS

## 2019-02-16 NOTE — ED TRIAGE NOTES
Pt arrives via EMS c/o CP x1 hour with SOB. Pt states the CP is midsternal and radiates to her left shoulder/neck. Pt has been seen in the ER for this complaint multiple times over the last few months and has also seen a cardiologist, Dr. Luis Villarreal. Pt received 324 of ASA in route.

## 2019-02-17 ENCOUNTER — HOSPITAL ENCOUNTER (EMERGENCY)
Age: 44
Discharge: HOME | End: 2019-02-17
Attending: EMERGENCY MEDICINE
Payer: MEDICARE

## 2019-02-17 VITALS
SYSTOLIC BLOOD PRESSURE: 150 MMHG | TEMPERATURE: 98 F | BODY MASS INDEX: 25.58 KG/M2 | OXYGEN SATURATION: 99 % | HEIGHT: 66 IN | RESPIRATION RATE: 17 BRPM | DIASTOLIC BLOOD PRESSURE: 82 MMHG | WEIGHT: 159.17 LBS | HEART RATE: 77 BPM

## 2019-02-17 DIAGNOSIS — R07.89 OTHER CHEST PAIN: Primary | ICD-10-CM

## 2019-02-17 LAB
ALBUMIN SERPL-MCNC: 2.8 G/DL (ref 3.5–5)
ALBUMIN/GLOB SERPL: 0.7 {RATIO} (ref 1.1–2.2)
ALP SERPL-CCNC: 111 U/L (ref 45–117)
ALT SERPL-CCNC: 36 U/L (ref 12–78)
AMPHET UR QL SCN: NEGATIVE
AMYLASE SERPL-CCNC: 185 U/L (ref 25–115)
ANION GAP SERPL CALC-SCNC: 6 MMOL/L (ref 5–15)
APAP SERPL-MCNC: <2 UG/ML (ref 10–30)
APPEARANCE UR: CLEAR
AST SERPL-CCNC: 18 U/L (ref 15–37)
ATRIAL RATE: 82 BPM
BARBITURATES UR QL SCN: NEGATIVE
BASOPHILS # BLD: 0 K/UL (ref 0–0.1)
BASOPHILS NFR BLD: 0 % (ref 0–1)
BENZODIAZ UR QL: NEGATIVE
BILIRUB SERPL-MCNC: 0.4 MG/DL (ref 0.2–1)
BILIRUB UR QL: NEGATIVE
BUN SERPL-MCNC: 10 MG/DL (ref 6–20)
BUN/CREAT SERPL: 14 (ref 12–20)
CALCIUM SERPL-MCNC: 7.8 MG/DL (ref 8.5–10.1)
CALCULATED P AXIS, ECG09: 57 DEGREES
CALCULATED R AXIS, ECG10: 9 DEGREES
CALCULATED T AXIS, ECG11: 65 DEGREES
CANNABINOIDS UR QL SCN: NEGATIVE
CHLORIDE SERPL-SCNC: 104 MMOL/L (ref 97–108)
CK MB CFR SERPL CALC: 2.4 % (ref 0–2.5)
CK MB SERPL-MCNC: 1.9 NG/ML (ref 5–25)
CK SERPL-CCNC: 80 U/L (ref 26–192)
CO2 SERPL-SCNC: 29 MMOL/L (ref 21–32)
COCAINE UR QL SCN: NEGATIVE
COLOR UR: NORMAL
COMMENT, HOLDF: NORMAL
CREAT SERPL-MCNC: 0.72 MG/DL (ref 0.55–1.02)
DIAGNOSIS, 93000: NORMAL
DIFFERENTIAL METHOD BLD: ABNORMAL
DRUG SCRN COMMENT,DRGCM: NORMAL
EOSINOPHIL # BLD: 0.2 K/UL (ref 0–0.4)
EOSINOPHIL NFR BLD: 3 % (ref 0–7)
ERYTHROCYTE [DISTWIDTH] IN BLOOD BY AUTOMATED COUNT: 17.1 % (ref 11.5–14.5)
ETHANOL SERPL-MCNC: <10 MG/DL
GLOBULIN SER CALC-MCNC: 3.8 G/DL (ref 2–4)
GLUCOSE SERPL-MCNC: 150 MG/DL (ref 65–100)
GLUCOSE UR STRIP.AUTO-MCNC: NEGATIVE MG/DL
HCT VFR BLD AUTO: 38.7 % (ref 35–47)
HGB BLD-MCNC: 12.8 G/DL (ref 11.5–16)
HGB UR QL STRIP: NEGATIVE
IMM GRANULOCYTES # BLD AUTO: 0 K/UL (ref 0–0.04)
IMM GRANULOCYTES NFR BLD AUTO: 1 % (ref 0–0.5)
KETONES UR QL STRIP.AUTO: NEGATIVE MG/DL
LEUKOCYTE ESTERASE UR QL STRIP.AUTO: NEGATIVE
LIPASE SERPL-CCNC: 23 U/L (ref 73–393)
LYMPHOCYTES # BLD: 1.7 K/UL (ref 0.8–3.5)
LYMPHOCYTES NFR BLD: 27 % (ref 12–49)
MCH RBC QN AUTO: 25.7 PG (ref 26–34)
MCHC RBC AUTO-ENTMCNC: 33.1 G/DL (ref 30–36.5)
MCV RBC AUTO: 77.7 FL (ref 80–99)
METHADONE UR QL: NEGATIVE
MONOCYTES # BLD: 0.6 K/UL (ref 0–1)
MONOCYTES NFR BLD: 10 % (ref 5–13)
NEUTS SEG # BLD: 3.8 K/UL (ref 1.8–8)
NEUTS SEG NFR BLD: 60 % (ref 32–75)
NITRITE UR QL STRIP.AUTO: NEGATIVE
NRBC # BLD: 0 K/UL (ref 0–0.01)
NRBC BLD-RTO: 0 PER 100 WBC
OPIATES UR QL: NEGATIVE
P-R INTERVAL, ECG05: 134 MS
PCP UR QL: NEGATIVE
PH UR STRIP: 5.5 [PH] (ref 5–8)
PLATELET # BLD AUTO: 140 K/UL (ref 150–400)
PMV BLD AUTO: 13 FL (ref 8.9–12.9)
POTASSIUM SERPL-SCNC: 3.6 MMOL/L (ref 3.5–5.1)
PROT SERPL-MCNC: 6.6 G/DL (ref 6.4–8.2)
PROT UR STRIP-MCNC: NEGATIVE MG/DL
Q-T INTERVAL, ECG07: 422 MS
QRS DURATION, ECG06: 74 MS
QTC CALCULATION (BEZET), ECG08: 493 MS
RBC # BLD AUTO: 4.98 M/UL (ref 3.8–5.2)
SALICYLATES SERPL-MCNC: 2.2 MG/DL (ref 2.8–20)
SAMPLES BEING HELD,HOLD: NORMAL
SODIUM SERPL-SCNC: 139 MMOL/L (ref 136–145)
SP GR UR REFRACTOMETRY: 1.01 (ref 1–1.03)
TROPONIN I SERPL-MCNC: <0.05 NG/ML
TROPONIN I SERPL-MCNC: <0.05 NG/ML
UROBILINOGEN UR QL STRIP.AUTO: 0.2 EU/DL (ref 0.2–1)
VENTRICULAR RATE, ECG03: 82 BPM
WBC # BLD AUTO: 6.4 K/UL (ref 3.6–11)

## 2019-02-17 PROCEDURE — 84484 ASSAY OF TROPONIN QUANT: CPT

## 2019-02-17 PROCEDURE — 36415 COLL VENOUS BLD VENIPUNCTURE: CPT

## 2019-02-17 PROCEDURE — 99285 EMERGENCY DEPT VISIT HI MDM: CPT

## 2019-02-17 PROCEDURE — 80053 COMPREHEN METABOLIC PANEL: CPT

## 2019-02-17 PROCEDURE — 83690 ASSAY OF LIPASE: CPT

## 2019-02-17 PROCEDURE — P9612 CATHETERIZE FOR URINE SPEC: HCPCS

## 2019-02-17 PROCEDURE — 82150 ASSAY OF AMYLASE: CPT

## 2019-02-17 PROCEDURE — 85025 COMPLETE CBC W/AUTO DIFF WBC: CPT

## 2019-02-17 PROCEDURE — 82550 ASSAY OF CK (CPK): CPT

## 2019-02-17 PROCEDURE — 77030011943

## 2019-02-17 PROCEDURE — 51798 US URINE CAPACITY MEASURE: CPT

## 2019-02-17 PROCEDURE — 81003 URINALYSIS AUTO W/O SCOPE: CPT

## 2019-02-17 PROCEDURE — 80307 DRUG TEST PRSMV CHEM ANLYZR: CPT

## 2019-02-17 PROCEDURE — 93005 ELECTROCARDIOGRAM TRACING: CPT

## 2019-02-17 PROCEDURE — 75810000287

## 2019-02-17 NOTE — ED TRIAGE NOTES
Pt arrives via EMS from home initial c/o hyperglycemia (186 per EMS). Pt reports she did not take insulin this morning. Pt then c/o mid sternal chest pain radiating into jaw. Pt reports she always has this pain d/t \"a blockage\". Pt lethargic upon arrival. Denies alcohol or drug use. Pt received the following enroute: 
Asa 324 mg 
NS  cc.

## 2019-02-17 NOTE — ED NOTES
ED EKG Interpretation: NSR with rate 82 BPM. Normal axis. J point elevation ST segments. No ectopy. No change in EKG from 2/16/19.  Trinh Rosa MD

## 2019-02-17 NOTE — ED NOTES
Patient left ER room without discharge instructions or papers, left ER, came back and stated \"I was discharged but I still have my IV in\". Patient IV removed, DC paperwork given by staff RN. Primary RN unable to find patient in waiting room to obtain DC VS or arrange ride. Care management states to call if patient returns to ER and will arrange for ride at that time.

## 2019-02-17 NOTE — ED PROVIDER NOTES
38 y.o. female with past medical history significant for diabetes, HTN, pancreatitis, GERD, asthma, schizophrenia, borderline personality disorder, depression, anxiety, alcohol abuse, s/p cholecystectomy, s/p gastric bypass, s/p  x 2, who presents to the ED via EMS, with chief complaint of mid-sternal chest pain and high blood sugar. EMS personnel report that they were called to the patient's home for complaints of high blood sugar. They state that the patient's blood sugar was \"186\" en route and that the patient claims that she did not take her insulin today. Pt now complains of mid-sternal chest pain that she states is \"always\" present, but is worse today. She notes that her pain radiates to her neck, jaw, back and arms. She describes her pain as \"stabbing\" in quality and rates her current pain as 8/10 in intensity. Notes that her pain is due to a \"blockage\", and that the pain never alleviates completely, only decreasing to \"4/10\" in intensity at its best. Pt also complains of diaphoresis. She states that she has been taking NTG over the last two days since she was seen here in the ED on 2/15/19. EMS personnel report that they gave the patient  mg and 300 cc IVF en route. Pt notes that she has an stress test scheduled for 19. Pt specifically denies fever, chills, nausea, vomiting or shortness of breath. There are no other acute medical concerns at this time. Review of medical records indicate that the patient has been seen in the ED 6 times in the past month with similar symptoms. Patient was seen most recently on 2/15/19 at [facility] ED with similar presentation, and on 19 at [facility] for hyperglycemia. Records indicate that she has history of noncompliance, alcohol and illicit drug abuse. Social hx: Positive for Tobacco use (current every day smoker, 0.5 packs/day); Positive for EtOH use (4.8 oz/week); Positive for Illicit Drug use (cocaine) PCP: Kamini Watkins NP Cardiologist: Blanka Smith MD 
 
Note written by Deepthi Dumont, as dictated by Steven Davis MD 9:09 AM 
 
 
The history is provided by the patient and medical records. No  was used. Past Medical History:  
Diagnosis Date  Alcohol abuse, in remission   
 quit 17 days ago  Asthma   
 does not use inhalers  Borderline personality disorder ([address]  Diabetes ([address]  GERD (gastroesophageal reflux disease)  Hypertension  Other ill-defined conditions(799.89)   
 kidney stones ,passed one  Other ill-defined conditions(799.89) sickle cell trait  Other ill-defined conditions(799.89)   
 increased cholesterol  Pancreatitis  Psychiatric disorder   
 schizophrenia, bipolar, depression, anxiety Past Surgical History:  
Procedure Laterality Date  ABDOMEN SURGERY PROC UNLISTED  3/12/14 CHOLECYSTECTOMY LAPAROSCOPIC    
 HX GASTRIC BYPASS  HX GYN  11/8/2012  
 c section x2  HX OTHER SURGICAL  3/13/14 ENDOSCOPIC RETROGRADE CHOLANGIOPANCREATOGRAPHY  HX OTHER SURGICAL  8/4/14  
 endoscopic stent placed to bile duct  HX TUBAL LIGATION  2012 Family History:  
Problem Relation Age of Onset  Heart Disease Mother  Diabetes Mother  Hypertension Mother  Hypertension Maternal Grandmother Social History Socioeconomic History  Marital status: SINGLE Spouse name: Not on file  Number of children: Not on file  Years of education: Not on file  Highest education level: Not on file Social Needs  Financial resource strain: Not on file  Food insecurity - worry: Not on file  Food insecurity - inability: Not on file  Transportation needs - medical: Not on file  Transportation needs - non-medical: Not on file Occupational History  Not on file Tobacco Use  Smoking status: Current Every Day Smoker Packs/day: 0.50 Years: 14.00 Pack years: 7.00   Types: Cigarettes  Smokeless tobacco: Never Used  Tobacco comment: cigarettes Substance and Sexual Activity  Alcohol use: Yes Alcohol/week: 4.8 oz Types: 6 Cans of beer, 2 Glasses of wine per week  Drug use: Yes Types: Cocaine Comment: yesterday 12/27/18  Sexual activity: Yes  
  Partners: Female Birth control/protection: Surgical  
Other Topics Concern  Not on file Social History Narrative  Not on file ALLERGIES: Dilaudid [hydromorphone (bulk)] and Ibuprofen Review of Systems Constitutional: Positive for diaphoresis. Negative for activity change, appetite change, chills, fatigue and fever. HENT: Negative for ear pain, facial swelling, sore throat and trouble swallowing. Eyes: Negative for pain, discharge and visual disturbance. Respiratory: Negative for chest tightness, shortness of breath and wheezing. Cardiovascular: Positive for chest pain (mid-sternal). Negative for palpitations. Gastrointestinal: Negative for abdominal pain, blood in stool, nausea and vomiting. Genitourinary: Negative for difficulty urinating, flank pain and hematuria. Musculoskeletal: Positive for arthralgias (jaw), back pain, myalgias (arms) and neck pain. Negative for joint swelling. Skin: Negative for color change and rash. Neurological: Negative for dizziness, weakness, numbness and headaches. Hematological: Negative for adenopathy. Does not bruise/bleed easily. Psychiatric/Behavioral: Negative for behavioral problems, confusion and sleep disturbance. All other systems reviewed and are negative. Vitals:  
 02/17/19 2566 BP: (!) 167/96 Pulse: 82 Resp: 16 Temp: 98 °F (36.7 °C) SpO2: 96% Weight: 72.2 kg (159 lb 2.8 oz) Height: 5' 6\" (1.676 m) Physical Exam  
Constitutional: She is oriented to person, place, and time. She appears well-developed and well-nourished. No distress. Withdrawn; Somnolent HENT:  
Head: Normocephalic and atraumatic. Nose: Nose normal.  
Mouth/Throat: Oropharynx is clear and moist.  
Eyes: Conjunctivae and EOM are normal. Pupils are equal, round, and reactive to light. No scleral icterus. Neck: Normal range of motion. Neck supple. No JVD present. No tracheal deviation present. No thyromegaly present. No carotid bruits noted. Cardiovascular: Normal rate, regular rhythm, normal heart sounds and intact distal pulses. Exam reveals no gallop and no friction rub. No murmur heard. Pulmonary/Chest: Effort normal and breath sounds normal. No respiratory distress. She has no wheezes. She has no rales. She exhibits tenderness (anteriorly, to palpation). Abdominal: Soft. Bowel sounds are normal. She exhibits no distension and no mass. There is no tenderness. There is no rebound and no guarding. Musculoskeletal: Normal range of motion. She exhibits no edema or tenderness. Lymphadenopathy:  
  She has no cervical adenopathy. Neurological: She is oriented to person, place, and time. She has normal reflexes. No cranial nerve deficit. Coordination normal.  
Skin: Skin is warm and dry. No rash noted. No erythema. Psychiatric: She has a normal mood and affect. Her behavior is normal. Judgment and thought content normal.  
Nursing note and vitals reviewed. Note written by Deepthi Bee, as dictated by Declan Paz MD 9:14 AM 
 
MDM Number of Diagnoses or Management Options Other chest pain: new and requires workup Amount and/or Complexity of Data Reviewed Clinical lab tests: ordered and reviewed Decide to obtain previous medical records or to obtain history from someone other than the patient: yes Review and summarize past medical records: yes Discuss the patient with other providers: yes Risk of Complications, Morbidity, and/or Mortality Presenting problems: moderate Diagnostic procedures: moderate Management options: moderate Patient Progress Patient progress: stable Procedures ED EKG Interpretation: NSR with rate 82 BPM. Normal axis. J point elevation ST segments. No ectopy. No change in EKG from 2/16/19. Kathleen Gutierrez MD 
 
 Initial labs normal. Troponin is normal. A two hour troponin is ordered. EKG is unchanged. Non ischemic 10:58 AM 
Urine just went up a few moments ago. 1 litre of urine in bladder. Patient is somnolent intermittently. Urine is clean. UDC is normal.  
A repeat Troponin is ordered. If negative, the patient will be discharged with chest wall pain and to follow up with own MD this week for further evaluation and care. Symptomatic treatment. CONSULT NOTE: 
12:15 PM Lor Ferris MD spoke with Dr. Myah Garnica MD, Consult for Cardiology. Discussed available diagnostic tests and clinical findings. Dr. Karen Mendoza is fine with discharge and will call the patient tomorrow to see the patient in the office. 12:15 PM 
Patient's results have been reviewed with them. Patient and/or family have verbally conveyed their understanding and agreement of the patient's signs, symptoms, diagnosis, treatment and prognosis and additionally agree to follow up as recommended or return to the Emergency Room should their condition change prior to follow-up. Discharge instructions have also been provided to the patient with some educational information regarding their diagnosis as well a list of reasons why they would want to return to the ER prior to their follow-up appointment should their condition change.

## 2019-02-20 ENCOUNTER — HOSPITAL ENCOUNTER (EMERGENCY)
Age: 44
Discharge: HOME | End: 2019-02-20
Attending: EMERGENCY MEDICINE
Payer: MEDICARE

## 2019-02-20 VITALS
RESPIRATION RATE: 18 BRPM | HEART RATE: 98 BPM | OXYGEN SATURATION: 100 % | HEIGHT: 66 IN | DIASTOLIC BLOOD PRESSURE: 94 MMHG | SYSTOLIC BLOOD PRESSURE: 171 MMHG | WEIGHT: 154 LBS | BODY MASS INDEX: 24.75 KG/M2 | TEMPERATURE: 98.7 F

## 2019-02-20 DIAGNOSIS — E11.65 UNCONTROLLED TYPE 2 DIABETES MELLITUS WITH HYPERGLYCEMIA (HCC): Primary | ICD-10-CM

## 2019-02-20 LAB
ALBUMIN SERPL-MCNC: 3.4 G/DL (ref 3.5–5)
ALBUMIN/GLOB SERPL: 0.8 {RATIO} (ref 1.1–2.2)
ALP SERPL-CCNC: 141 U/L (ref 45–117)
ALT SERPL-CCNC: 44 U/L (ref 12–78)
AMPHET UR QL SCN: NEGATIVE
ANION GAP SERPL CALC-SCNC: 7 MMOL/L (ref 5–15)
APPEARANCE UR: CLEAR
AST SERPL-CCNC: 18 U/L (ref 15–37)
BACTERIA URNS QL MICRO: NEGATIVE /HPF
BARBITURATES UR QL SCN: NEGATIVE
BASE EXCESS BLDV CALC-SCNC: 0.7 MMOL/L
BASOPHILS # BLD: 0 K/UL (ref 0–0.1)
BASOPHILS NFR BLD: 1 % (ref 0–1)
BDY SITE: ABNORMAL
BENZODIAZ UR QL: NEGATIVE
BILIRUB SERPL-MCNC: 0.6 MG/DL (ref 0.2–1)
BILIRUB UR QL: NEGATIVE
BUN SERPL-MCNC: 12 MG/DL (ref 6–20)
BUN/CREAT SERPL: 14 (ref 12–20)
CALCIUM SERPL-MCNC: 8.6 MG/DL (ref 8.5–10.1)
CANNABINOIDS UR QL SCN: NEGATIVE
CHLORIDE SERPL-SCNC: 101 MMOL/L (ref 97–108)
CO2 SERPL-SCNC: 29 MMOL/L (ref 21–32)
COCAINE UR QL SCN: NEGATIVE
COLOR UR: ABNORMAL
CREAT SERPL-MCNC: 0.87 MG/DL (ref 0.55–1.02)
DIFFERENTIAL METHOD BLD: ABNORMAL
DRUG SCRN COMMENT,DRGCM: NORMAL
EOSINOPHIL # BLD: 0.2 K/UL (ref 0–0.4)
EOSINOPHIL NFR BLD: 3 % (ref 0–7)
EPITH CASTS URNS QL MICRO: ABNORMAL /LPF
ERYTHROCYTE [DISTWIDTH] IN BLOOD BY AUTOMATED COUNT: 16.7 % (ref 11.5–14.5)
GLOBULIN SER CALC-MCNC: 4.3 G/DL (ref 2–4)
GLUCOSE BLD STRIP.AUTO-MCNC: 381 MG/DL (ref 65–100)
GLUCOSE BLD STRIP.AUTO-MCNC: 584 MG/DL (ref 65–100)
GLUCOSE BLD STRIP.AUTO-MCNC: >600 MG/DL (ref 65–100)
GLUCOSE SERPL-MCNC: 734 MG/DL (ref 65–100)
GLUCOSE UR STRIP.AUTO-MCNC: >1000 MG/DL
HCG UR QL: NEGATIVE
HCO3 BLDV-SCNC: 28 MMOL/L (ref 23–28)
HCT VFR BLD AUTO: 42.5 % (ref 35–47)
HGB BLD-MCNC: 15 G/DL (ref 11.5–16)
HGB UR QL STRIP: NEGATIVE
IMM GRANULOCYTES # BLD AUTO: 0 K/UL (ref 0–0.04)
IMM GRANULOCYTES NFR BLD AUTO: 0 % (ref 0–0.5)
KETONES UR QL STRIP.AUTO: NEGATIVE MG/DL
LEUKOCYTE ESTERASE UR QL STRIP.AUTO: NEGATIVE
LYMPHOCYTES # BLD: 2.4 K/UL (ref 0.8–3.5)
LYMPHOCYTES NFR BLD: 40 % (ref 12–49)
MAGNESIUM SERPL-MCNC: 2 MG/DL (ref 1.6–2.4)
MCH RBC QN AUTO: 25.9 PG (ref 26–34)
MCHC RBC AUTO-ENTMCNC: 35.3 G/DL (ref 30–36.5)
MCV RBC AUTO: 73.3 FL (ref 80–99)
METHADONE UR QL: NEGATIVE
MONOCYTES # BLD: 0.5 K/UL (ref 0–1)
MONOCYTES NFR BLD: 9 % (ref 5–13)
NEUTS SEG # BLD: 2.8 K/UL (ref 1.8–8)
NEUTS SEG NFR BLD: 47 % (ref 32–75)
NITRITE UR QL STRIP.AUTO: NEGATIVE
NRBC # BLD: 0 K/UL (ref 0–0.01)
NRBC BLD-RTO: 0 PER 100 WBC
OPIATES UR QL: NEGATIVE
PCO2 BLDV: 55 MMHG (ref 41–51)
PCP UR QL: NEGATIVE
PH BLDV: 7.33 [PH] (ref 7.32–7.42)
PH UR STRIP: 5.5 [PH] (ref 5–8)
PHOSPHATE SERPL-MCNC: 4.4 MG/DL (ref 2.6–4.7)
PLATELET # BLD AUTO: 171 K/UL (ref 150–400)
PO2 BLDV: 29 MMHG (ref 25–40)
POTASSIUM SERPL-SCNC: 4.9 MMOL/L (ref 3.5–5.1)
PROT SERPL-MCNC: 7.7 G/DL (ref 6.4–8.2)
PROT UR STRIP-MCNC: NEGATIVE MG/DL
RBC # BLD AUTO: 5.8 M/UL (ref 3.8–5.2)
RBC #/AREA URNS HPF: ABNORMAL /HPF (ref 0–5)
SAO2 % BLDV: 49 % (ref 65–88)
SAO2% DEVICE SAO2% SENSOR NAME: ABNORMAL
SERVICE CMNT-IMP: ABNORMAL
SODIUM SERPL-SCNC: 137 MMOL/L (ref 136–145)
SP GR UR REFRACTOMETRY: 1.01 (ref 1–1.03)
SPECIMEN SITE: ABNORMAL
UA: UC IF INDICATED,UAUC: ABNORMAL
UROBILINOGEN UR QL STRIP.AUTO: 0.2 EU/DL (ref 0.2–1)
WBC # BLD AUTO: 6 K/UL (ref 3.6–11)
WBC URNS QL MICRO: ABNORMAL /HPF (ref 0–4)
YEAST URNS QL MICRO: PRESENT

## 2019-02-20 PROCEDURE — 96376 TX/PRO/DX INJ SAME DRUG ADON: CPT

## 2019-02-20 PROCEDURE — 99285 EMERGENCY DEPT VISIT HI MDM: CPT

## 2019-02-20 PROCEDURE — 74011636637 HC RX REV CODE- 636/637: Performed by: PHYSICIAN ASSISTANT

## 2019-02-20 PROCEDURE — 81001 URINALYSIS AUTO W/SCOPE: CPT

## 2019-02-20 PROCEDURE — 81025 URINE PREGNANCY TEST: CPT

## 2019-02-20 PROCEDURE — 80307 DRUG TEST PRSMV CHEM ANLYZR: CPT

## 2019-02-20 PROCEDURE — 82962 GLUCOSE BLOOD TEST: CPT

## 2019-02-20 PROCEDURE — 83735 ASSAY OF MAGNESIUM: CPT

## 2019-02-20 PROCEDURE — 82803 BLOOD GASES ANY COMBINATION: CPT

## 2019-02-20 PROCEDURE — 36415 COLL VENOUS BLD VENIPUNCTURE: CPT

## 2019-02-20 PROCEDURE — 96374 THER/PROPH/DIAG INJ IV PUSH: CPT

## 2019-02-20 PROCEDURE — 84100 ASSAY OF PHOSPHORUS: CPT

## 2019-02-20 PROCEDURE — 74011250636 HC RX REV CODE- 250/636: Performed by: PHYSICIAN ASSISTANT

## 2019-02-20 PROCEDURE — 80053 COMPREHEN METABOLIC PANEL: CPT

## 2019-02-20 PROCEDURE — 85025 COMPLETE CBC W/AUTO DIFF WBC: CPT

## 2019-02-20 PROCEDURE — 96361 HYDRATE IV INFUSION ADD-ON: CPT

## 2019-02-20 RX ORDER — SODIUM CHLORIDE 0.9 % (FLUSH) 0.9 %
5-40 SYRINGE (ML) INJECTION AS NEEDED
Status: DISCONTINUED | OUTPATIENT
Start: 2019-02-20 | End: 2019-02-20 | Stop reason: HOSPADM

## 2019-02-20 RX ORDER — SODIUM CHLORIDE 0.9 % (FLUSH) 0.9 %
5-40 SYRINGE (ML) INJECTION EVERY 8 HOURS
Status: DISCONTINUED | OUTPATIENT
Start: 2019-02-20 | End: 2019-02-20 | Stop reason: HOSPADM

## 2019-02-20 RX ADMIN — SODIUM CHLORIDE 1000 ML: 900 INJECTION, SOLUTION INTRAVENOUS at 18:19

## 2019-02-20 RX ADMIN — SODIUM CHLORIDE 1000 ML: 900 INJECTION, SOLUTION INTRAVENOUS at 19:25

## 2019-02-20 RX ADMIN — HUMAN INSULIN 10 UNITS: 100 INJECTION, SOLUTION SUBCUTANEOUS at 18:20

## 2019-02-20 RX ADMIN — HUMAN INSULIN 5 UNITS: 100 INJECTION, SOLUTION SUBCUTANEOUS at 19:55

## 2019-02-20 NOTE — ED PROVIDER NOTES
EMERGENCY DEPARTMENT HISTORY AND PHYSICAL EXAM 
 
 
Date: 2/20/2019 Patient Name: Hang Crabtree History of Presenting Illness Chief Complaint Patient presents with  
 High Blood Sugar  
  pt reported she might be have DKA,unable to check BS at home as she doesn't has glucometer. History Provided By: Patient HPI: Hang Crabtree, 38 y.o. female with PMHx significant for DM, BPD, GERD, schizophrenia, bipolar depression, anxiety, GERD, HTN, pancreatitis, presents ambulatory to the ED with cc of polyuria and polydipsia onset 2 days. Pt reports that she last took her insulin yesterday. She states that she thinks she is in DKA because her symptoms are similar to last DKA event. She denies having a glucometer at home. She states that she has ordered it, but is \"waiting till [she] gets paid,\" which she states will be on 3/1. Per chart review, pt was here on 2/13 for same complaint. Pt reports that she has been clean from crack cocaine for 3 wks now. She denies n/v, or dysuria. There are no other complaints, changes, or physical findings at this time. PCP: Other, MD Marcel 
 
Social Hx:  
Social History Tobacco Use Smoking Status Current Every Day Smoker  Packs/day: 0.50  Years: 14.00  
 Pack years: 7.00  Types: Cigarettes Smokeless Tobacco Never Used Tobacco Comment  
 cigarettes  
, Social History Substance and Sexual Activity Alcohol Use Yes  Alcohol/week: 4.8 oz  Types: 6 Cans of beer, 2 Glasses of wine per week  
, Social History Substance and Sexual Activity Drug Use Yes  Types: Cocaine Comment: yesterday 12/27/18 Past History Past Surgical History: 
Past Surgical History:  
Procedure Laterality Date  ABDOMEN SURGERY PROC UNLISTED  3/12/14 CHOLECYSTECTOMY LAPAROSCOPIC    
 HX GASTRIC BYPASS  HX GYN  11/8/2012  
 c section x2  HX OTHER SURGICAL  3/13/14 ENDOSCOPIC RETROGRADE CHOLANGIOPANCREATOGRAPHY  HX OTHER SURGICAL  8/4/14  
 endoscopic stent placed to bile duct  HX TUBAL LIGATION  2012 Family History: 
Family History Problem Relation Age of Onset  Heart Disease Mother  Diabetes Mother  Hypertension Mother  Hypertension Maternal Grandmother Allergies: Allergies Allergen Reactions  Dilaudid [Hydromorphone (Bulk)] Itching  Ibuprofen Not Reported This Time Review of Systems Review of Systems Constitutional: Negative. Negative for activity change, appetite change, chills, fatigue, fever and unexpected weight change. HENT: Negative. Negative for congestion, hearing loss, rhinorrhea, sneezing and voice change. Eyes: Negative. Negative for pain and visual disturbance. Respiratory: Negative. Negative for apnea, cough, choking, chest tightness and shortness of breath. Cardiovascular: Negative. Negative for chest pain and palpitations. Gastrointestinal: Negative. Negative for abdominal distention, abdominal pain, blood in stool, diarrhea, nausea and vomiting. Endocrine: Positive for polydipsia and polyuria. Genitourinary: Negative. Negative for difficulty urinating, flank pain, frequency and urgency. No discharge Musculoskeletal: Negative. Negative for arthralgias, back pain, myalgias and neck stiffness. Skin: Negative. Negative for color change and rash. Neurological: Negative. Negative for dizziness, seizures, syncope, speech difficulty, weakness, numbness and headaches. Hematological: Negative for adenopathy. Psychiatric/Behavioral: Negative. Negative for agitation, behavioral problems, dysphoric mood and suicidal ideas. The patient is not nervous/anxious. Physical Exam  
Physical Exam  
Constitutional: She is oriented to person, place, and time. She appears well-developed and well-nourished. No distress. HENT:  
Head: Normocephalic and atraumatic. Mouth/Throat: Oropharynx is clear and moist. No oropharyngeal exudate. Eyes: Conjunctivae and EOM are normal. Pupils are equal, round, and reactive to light. Right eye exhibits no discharge. Left eye exhibits no discharge. Neck: Normal range of motion. Neck supple. Cardiovascular: Normal rate, regular rhythm and intact distal pulses. Exam reveals no gallop and no friction rub. No murmur heard. Pulmonary/Chest: Effort normal and breath sounds normal. No respiratory distress. She has no wheezes. She has no rales. She exhibits no tenderness. Abdominal: Soft. Bowel sounds are normal. She exhibits no distension and no mass. There is no tenderness. There is no rebound and no guarding. Musculoskeletal: Normal range of motion. She exhibits no edema. Lymphadenopathy:  
  She has no cervical adenopathy. Neurological: She is alert and oriented to person, place, and time. No cranial nerve deficit. Coordination normal.  
Skin: Skin is warm and dry. No rash noted. No erythema. Track marks on BUE Psychiatric: She has a normal mood and affect. Nursing note and vitals reviewed. Diagnostic Study Results Labs - Recent Results (from the past 12 hour(s)) GLUCOSE, POC Collection Time: 02/20/19  5:55 PM  
Result Value Ref Range Glucose (POC) >600 (HH) 65 - 100 mg/dL Performed by René Abdullahi, POC Collection Time: 02/20/19  5:58 PM  
Result Value Ref Range Glucose (POC) >600 (HH) 65 - 100 mg/dL Performed by Hua Breath W/ REFLEX CULTURE Collection Time: 02/20/19  6:09 PM  
Result Value Ref Range Color YELLOW/STRAW Appearance CLEAR CLEAR Specific gravity 1.010 1.003 - 1.030    
 pH (UA) 5.5 5.0 - 8.0 Protein NEGATIVE  NEG mg/dL Glucose >1,000 (A) NEG mg/dL Ketone NEGATIVE  NEG mg/dL Bilirubin NEGATIVE  NEG Blood NEGATIVE  NEG Urobilinogen 0.2 0.2 - 1.0 EU/dL Nitrites NEGATIVE  NEG Leukocyte Esterase NEGATIVE  NEG    
 WBC 0-4 0 - 4 /hpf  
 RBC 0-5 0 - 5 /hpf  Epithelial cells FEW FEW /lpf Bacteria NEGATIVE  NEG /hpf  
 UA:UC IF INDICATED CULTURE NOT INDICATED BY UA RESULT CNI Yeast PRESENT (A) NEG    
METABOLIC PANEL, COMPREHENSIVE Collection Time: 02/20/19  6:09 PM  
Result Value Ref Range Sodium 137 136 - 145 mmol/L Potassium 4.9 3.5 - 5.1 mmol/L Chloride 101 97 - 108 mmol/L  
 CO2 29 21 - 32 mmol/L Anion gap 7 5 - 15 mmol/L Glucose 734 (HH) 65 - 100 mg/dL BUN 12 6 - 20 MG/DL Creatinine 0.87 0.55 - 1.02 MG/DL  
 BUN/Creatinine ratio 14 12 - 20 GFR est AA >60 >60 ml/min/1.73m2 GFR est non-AA >60 >60 ml/min/1.73m2 Calcium 8.6 8.5 - 10.1 MG/DL Bilirubin, total 0.6 0.2 - 1.0 MG/DL  
 ALT (SGPT) 44 12 - 78 U/L  
 AST (SGOT) 18 15 - 37 U/L Alk. phosphatase 141 (H) 45 - 117 U/L Protein, total 7.7 6.4 - 8.2 g/dL Albumin 3.4 (L) 3.5 - 5.0 g/dL Globulin 4.3 (H) 2.0 - 4.0 g/dL A-G Ratio 0.8 (L) 1.1 - 2.2    
CBC WITH AUTOMATED DIFF Collection Time: 02/20/19  6:09 PM  
Result Value Ref Range WBC 6.0 3.6 - 11.0 K/uL  
 RBC 5.80 (H) 3.80 - 5.20 M/uL  
 HGB 15.0 11.5 - 16.0 g/dL HCT 42.5 35.0 - 47.0 % MCV 73.3 (L) 80.0 - 99.0 FL  
 MCH 25.9 (L) 26.0 - 34.0 PG  
 MCHC 35.3 30.0 - 36.5 g/dL  
 RDW 16.7 (H) 11.5 - 14.5 % PLATELET 656 071 - 189 K/uL NRBC 0.0 0  WBC ABSOLUTE NRBC 0.00 0.00 - 0.01 K/uL NEUTROPHILS 47 32 - 75 % LYMPHOCYTES 40 12 - 49 % MONOCYTES 9 5 - 13 % EOSINOPHILS 3 0 - 7 % BASOPHILS 1 0 - 1 % IMMATURE GRANULOCYTES 0 0.0 - 0.5 % ABS. NEUTROPHILS 2.8 1.8 - 8.0 K/UL  
 ABS. LYMPHOCYTES 2.4 0.8 - 3.5 K/UL  
 ABS. MONOCYTES 0.5 0.0 - 1.0 K/UL  
 ABS. EOSINOPHILS 0.2 0.0 - 0.4 K/UL  
 ABS. BASOPHILS 0.0 0.0 - 0.1 K/UL  
 ABS. IMM. GRANS. 0.0 0.00 - 0.04 K/UL  
 DF AUTOMATED MAGNESIUM Collection Time: 02/20/19  6:09 PM  
Result Value Ref Range Magnesium 2.0 1.6 - 2.4 mg/dL PHOSPHORUS Collection Time: 02/20/19  6:09 PM  
Result Value Ref Range  Phosphorus 4.4 2.6 - 4.7 MG/DL VENOUS BLOOD GAS Collection Time: 02/20/19  6:15 PM  
Result Value Ref Range VENOUS PH 7.33 7.32 - 7.42    
 VENOUS PCO2 55 (H) 41 - 51 mmHg VENOUS PO2 29 25 - 40 mmHg VENOUS O2 SATURATION 49 (L) 65 - 88 % VENOUS BICARBONATE 28 23 - 28 mmol/L  
 VENOUS BASE EXCESS 0.7 mmol/L  
 O2 METHOD ROOM AIR Sample source VENOUS    
 SITE OTHER    
DRUG SCREEN, URINE Collection Time: 02/20/19  6:28 PM  
Result Value Ref Range AMPHETAMINES NEGATIVE  NEG    
 BARBITURATES NEGATIVE  NEG BENZODIAZEPINES NEGATIVE  NEG    
 COCAINE NEGATIVE  NEG METHADONE NEGATIVE  NEG    
 OPIATES NEGATIVE  NEG    
 PCP(PHENCYCLIDINE) NEGATIVE  NEG    
 THC (TH-CANNABINOL) NEGATIVE  NEG Drug screen comment (NOTE) HCG URINE, QL. - POC Collection Time: 02/20/19  6:32 PM  
Result Value Ref Range Pregnancy test,urine (POC) NEGATIVE  NEG    
GLUCOSE, POC Collection Time: 02/20/19  7:03 PM  
Result Value Ref Range Glucose (POC) >600 (HH) 65 - 100 mg/dL Performed by Jeaneth Sosa, POC Collection Time: 02/20/19  7:04 PM  
Result Value Ref Range Glucose (POC) 584 (H) 65 - 100 mg/dL Performed by Jeaneth Sosa, POC Collection Time: 02/20/19  8:18 PM  
Result Value Ref Range Glucose (POC) 381 (H) 65 - 100 mg/dL Performed by Hi Johnson (Tech) Radiologic Studies - No orders to display CT Results  (Last 48 hours) None CXR Results  (Last 48 hours) None Medical Decision Making I am the first provider for this patient. I reviewed the vital signs, available nursing notes, past medical history, past surgical history, family history and social history. Vital Signs-Reviewed the patient's vital signs. Patient Vitals for the past 12 hrs: 
 Temp Pulse Resp BP SpO2  
02/20/19 2023  98 18 (!) 171/94 100 % 02/20/19 1958  (!) 102  160/82 100 % 02/20/19 1920  (!) 102  168/86 100 % 02/20/19 1740 98.7 °F (37.1 °C) 95 18 (!) 151/91 98 % Pulse Oximetry Analysis - 98% on RA. Records Reviewed: Nursing Notes, Old Medical Records and Previous Laboratory Studies Provider Notes (Medical Decision Making): DDx hyperglycemia: DM1, DKA, HHNK, pancreatic insufficiency, cirrhosis, medication non complaince ED Course:  
Initial assessment performed. The patients presenting problems have been discussed, and they are in agreement with the care plan formulated and outlined with them. I have encouraged them to ask questions as they arise throughout their visit. Progress Notes: 
6:02 PM 
Case management saw pt. Pt has f/u appt with PCP within the next week. 7:00 PM 
Bg 734. Critical Care Time:  
0 minutes. ED CONSULT NOTE:  
Samanta Woodson PA-C spoke with Dr. Leni Pierce, ED Physician Discussed pt's hx, disposition, and available diagnostic and imaging results. Reviewed care plans. Consultant agrees with plans as outlined. Disposition: 
Discharge Note: 
7:31 PM 
The patient has been re-evaluated and is ready for discharge. Reviewed available results with patient. Counseled patient/parent/guardian on diagnosis and care plan. Patient has expressed understanding, and all questions have been answered. Patient agrees with plan and agrees to follow up as recommended, or return to the ED if their symptoms worsen. Discharge instructions have been provided and explained to the patient, along with reasons to return to the ED. PLAN: 
1. Discharge Medication List as of 2/20/2019  7:42 PM  
  
CONTINUE these medications which have CHANGED Details  
insulin NPH/insulin regular (NOVOLIN 70/30, HUMULIN 70/30) 100 unit/mL (70-30) injection 15 Units by SubCUTAneous route two (2) times a day., Print, Disp-10 mL, R-0  
  
  
CONTINUE these medications which have NOT CHANGED Details  
nitroglycerin (NITROSTAT) 0.4 mg SL tablet Take 1 Tab by mouth every five (5) minutes as needed for Chest Pain.  Sit/Lay down then put one tab under the tongue every 5 minutes as needed for chest pain for 3 doses, Print, Disp-1 Bottle, R-0  
  
glucose blood VI test strips (FREESTYLE INSULINX TEST STRIPS) strip As needed, Normal, Disp-100 Strip, R-0 Blood-Glucose Meter monitoring kit Daily as needed for blood glucose monitoring., Normal, Disp-1 Kit, R-0  
  
albuterol (PROVENTIL HFA, VENTOLIN HFA, PROAIR HFA) 90 mcg/actuation inhaler Take 2 Puffs by inhalation every four (4) hours as needed for Wheezing., Normal, Disp-1 Inhaler, R-0  
  
lancets 23 gauge misc Use daily for glucose monitoring as needed., Normal, Disp-100 Lancet, R-0  
  
raNITIdine hcl 150 mg capsule TAKE 1 TABLET BY MOUTH TWICE A DAY FOR 10 DAYS, Historical Med, R-0  
  
aspirin delayed-release 81 mg tablet TAKE 1 TABLET BY MOUTH EVERY DAY WITH 6 TO 8 OUNCES OF PLAIN WATER, Historical Med, R-0  
  
losartan (COZAAR) 100 mg tablet Take 1 Tab by mouth daily. , Normal, Disp-30 Tab, R-6  
  
haloperidol (HALDOL) 5 mg tablet Take 1 Tab by mouth every twelve (12) hours. , Print, Disp-60 Tab, R-0  
  
benztropine (COGENTIN) 0.5 mg tablet Take 2 Tabs by mouth every twelve (12) hours for 30 days. , Print, Disp-120 Tab, R-0  
  
valproic acid (DEPAKENE) 250 mg capsule Take 2 Caps by mouth every twelve (12) hours for 30 days. , Print, Disp-120 Cap, R-0  
  
sertraline (ZOLOFT) 25 mg tablet Take 1 Tab by mouth daily for 30 days. , Print, Disp-30 Tab, R-0  
  
divalproex sodium (DEPAKOTE PO) Take  by mouth., Historical Med 2. Follow-up Information Follow up With Specialties Details Why Contact Info [facility]  Schedule an appointment as soon as possible for a visit  [address], [address] [facility] [address] 
[phone] [facility] EMERGENCY DEPT Emergency Medicine Go to If symptoms worsen [address] Return to ED if worse Diagnosis Clinical Impression: 1. Uncontrolled type 2 diabetes mellitus with hyperglycemia ([facility] [address] Attestations: This note is prepared by Smith Riggs, acting as scribe for HAYLEY Agarwal PA-C: The scribe's documentation has been prepared under my direction and personally reviewed by me in its entirety. I confirm that the note above accurately reflects all work, treatment, procedures, and medical decision making performed by me.

## 2019-02-20 NOTE — ED NOTES
Noel Rhoades reported that pt could call in the morning-pt given resources and updated for follow-up. Pt reported that it was ok to follow-up tomorrow.

## 2019-02-20 NOTE — ED NOTES
Per pt reports urinary frequency and increases thirst x 2 days. Pt reports compliance with insulin but feels like she's in DKA. Also reports last use of cocaine x 6 days ago. Pt requesting to see a  \"because I need Lam Kishoer.\" Pt denies SI/HI and depression. Emergency Department Nursing Plan of Care The Nursing Plan of Care is developed from the Nursing assessment and Emergency Department Attending provider initial evaluation. The plan of care may be reviewed in the ED Provider note. The Plan of Care was developed with the following considerations:  
Patient / Family readiness to learn indicated by:verbalized understanding Persons(s) to be included in education: patient Barriers to Learning/Limitations:No 
 
Signed Vladimir Blair RN   
2/20/2019   6:36 PM

## 2019-02-20 NOTE — ED NOTES
Per brooke's request, inna paged for pt. Inna service reported that inna Guerra, should be calling ED back shortly.

## 2019-02-21 ENCOUNTER — HOSPITAL ENCOUNTER (OUTPATIENT)
Dept: NUCLEAR MEDICINE | Age: 44
Discharge: HOME | End: 2019-02-21
Attending: INTERNAL MEDICINE
Payer: MEDICARE

## 2019-02-21 ENCOUNTER — HOSPITAL ENCOUNTER (OUTPATIENT)
Dept: NON INVASIVE DIAGNOSTICS | Age: 44
Discharge: HOME | End: 2019-02-21
Attending: INTERNAL MEDICINE
Payer: MEDICARE

## 2019-02-21 VITALS
BODY MASS INDEX: 24.43 KG/M2 | DIASTOLIC BLOOD PRESSURE: 91 MMHG | WEIGHT: 152 LBS | HEIGHT: 66 IN | SYSTOLIC BLOOD PRESSURE: 172 MMHG

## 2019-02-21 DIAGNOSIS — I20.8 OTHER FORMS OF ANGINA PECTORIS (HCC): Primary | ICD-10-CM

## 2019-02-21 DIAGNOSIS — I10 ESSENTIAL HYPERTENSION: ICD-10-CM

## 2019-02-21 DIAGNOSIS — Z72.0 TOBACCO ABUSE: ICD-10-CM

## 2019-02-21 DIAGNOSIS — R07.9 CHEST PAIN, UNSPECIFIED: ICD-10-CM

## 2019-02-21 LAB
STRESS BASELINE DIAS BP: 105 MMHG
STRESS BASELINE HR: 80 BPM
STRESS BASELINE SYS BP: 177 MMHG
STRESS ESTIMATED WORKLOAD: 1 METS
STRESS EXERCISE DUR MIN: NORMAL
STRESS O2 SAT REST: 100 %
STRESS PEAK DIAS BP: 116 MMHG
STRESS PEAK SYS BP: 209 MMHG
STRESS PERCENT HR ACHIEVED: 69 %
STRESS POST PEAK HR: 104 BPM
STRESS RATE PRESSURE PRODUCT: NORMAL BPM*MMHG
STRESS ST DEPRESSION: 0 MM
STRESS ST ELEVATION: 0 MM
STRESS TARGET HR: 150 BPM

## 2019-02-21 PROCEDURE — 93017 CV STRESS TEST TRACING ONLY: CPT

## 2019-02-21 PROCEDURE — 74011250636 HC RX REV CODE- 250/636

## 2019-02-21 RX ORDER — SODIUM CHLORIDE 0.9 % (FLUSH) 0.9 %
10 SYRINGE (ML) INJECTION AS NEEDED
Status: DISCONTINUED | OUTPATIENT
Start: 2019-02-21 | End: 2019-02-25 | Stop reason: HOSPADM

## 2019-02-21 RX ORDER — SODIUM CHLORIDE 0.9 % (FLUSH) 0.9 %
SYRINGE (ML) INJECTION
Status: DISPENSED
Start: 2019-02-21 | End: 2019-02-21

## 2019-02-21 RX ADMIN — REGADENOSON 0.4 MG: 0.08 INJECTION, SOLUTION INTRAVENOUS at 10:55

## 2019-02-21 RX ADMIN — Medication 10 ML: at 10:57

## 2019-02-21 RX ADMIN — Medication 10 ML: at 10:48

## 2019-02-21 NOTE — PROGRESS NOTES
Order for [address] challenge perfusion testing had to be amended to satisfy changing ordering requirements in Epic/[facility]

## 2019-02-21 NOTE — ED NOTES
Per pt, she has not verbally spoken to . Pt was asked if she was given resources to follow up tomorrow and pt stated she did receive resources.

## 2019-02-21 NOTE — PROGRESS NOTES
CM reviewed chart. CM met with pt. Pt concerned she is in DKA. Pt frequently asked for Inna- CM told charge nurse to consult the inna. Pt stated \"I left the drugs. I have been clean for 3 weeks now\" when provider went in the room to complete assessment with pt. Pt was very happy about her accomplishments. Pt CM spoke with pt about her day to day life, pt said she has a friend Mr. John Sotelo who she uses drugs himself. Pt elizabeth said when Mr. John Sotelo engages in drugs, she goes upstairs and locks herself in the room. Pt very adiment about meeting with inna- stated during assessment she wanted [facility] [employer] [facility] and be [facility]. Pt happily stated she has an appointment coming up with PCP in [address] [place] [address] confirmed she has an appointment on 2/26/19 and a cardio appointment with Dr. Briseyda Urbina on 2/21/19. Pt reported getting her own place when she gets her check for March. Pt said she does not want to be around anyone who is using drugs, reports she wants to give her life over to [facility] and live a different life. Pt did not ask for any assistance with medications or transportation during assessment. Pt said she has been taking her medications regularly. Pt admitted she left from admission status last time she was at [facility], but would not leave AMA if she gets admitted again. CM called [facility] to make them aware of Ms. Billings ED visit on 2/20/19 and of her two follow-up appointments she has coming. Care Management Interventions PCP Verified by CM: Yes(has an appointment on 2/26/19) Transition of Care Consult (CM Consult): Discharge Planning Current Support Network: Relative's Home(reports she lives with her two sons and her male friend Mr. John Sotelo ) Confirm Follow Up Transport: Self(pt planning to take public transport ) Discharge Location Discharge Placement: Home Date of previous inpatient admission/ ED visit? 2/17/19 What brought the patient back to ED? High blood sugar Did patient decline recommended services during last admission/ ED visit (if yes, what)? Yes, PCP follow-up and SW follow-up Has patient seen a provider since their last inpatient admission/ED visit (if yes, when)? No  
 
CM Interventions: 
From previous inpatient admission/ED visit: discharge plan From current inpatient admission/ED visit: discharge plan- pt has PCP appointment already on 2/26/19 [facility] [address], [place]

## 2019-02-21 NOTE — ED NOTES
Discharge instructions were given to the patient by BRENNAN Huffman. The patient left the Emergency Department ambulatory, alert and oriented and in no acute distress with 1 prescription. The patient was encouraged to call or return to the ED for worsening issues or problems and was encouraged to schedule a follow up appointment for continuing care. The patient verbalized understanding of discharge instructions and prescriptions, all questions were answered. The patient has no further concerns at this time.

## 2019-02-21 NOTE — ED NOTES
Bedside and Verbal shift change report given to Kasey Escobedo RN (oncoming nurse) by Chacho Lamb RN (offgoing nurse). Report included the following information SBAR, ED Summary, MAR and Recent Results. Assumed pt care at this time. Pt noted to be resting comfortably. Pt updated on plan of care, has no questions or concerns at this time.

## 2019-02-21 NOTE — DISCHARGE INSTRUCTIONS
Patient Education        Counting Carbohydrates: Care Instructions  Your Care Instructions    You don't have to eat special foods when you have diabetes. You just have to be careful to eat healthy foods. Carbohydrates (carbs) raise blood sugar higher and quicker than any other nutrient. Carbs are found in desserts, breads and cereals, and fruit. They're also in starchy vegetables. These include potatoes, corn, and grains such as rice and pasta. Carbs are also in milk and yogurt. The more carbs you eat at one time, the higher your blood sugar will rise. Spreading carbs all through the day helps keep your blood sugar levels within your target range. Counting carbs is one of the best ways to keep your blood sugar under control. If you use insulin, counting carbs helps you match the right amount of insulin to the number of grams of carbs in a meal. Then you can change your diet and insulin dose as needed. Testing your blood sugar several times a day can help you learn how carbs affect your blood sugar. A registered dietitian or certified diabetes educator can help you plan meals and snacks. Follow-up care is a key part of your treatment and safety. Be sure to make and go to all appointments, and call your doctor if you are having problems. It's also a good idea to know your test results and keep a list of the medicines you take. How can you care for yourself at home? Know your daily amount of carbohydrates  Your daily amount depends on several things, such as your weight, how active you are, which diabetes medicines you take, and what your goals are for your blood sugar levels. A registered dietitian or certified diabetes educator can help you plan how many carbs to include in each meal and snack. For most adults, a guideline for the daily amount of carbs is:  · 45 to 60 grams at each meal. That's about the same as 3 to 4 carbohydrate servings. · 15 to 20 grams at each snack.  That's about the same as 1 carbohydrate serving. Count carbs  Counting carbs lets you know how much rapid-acting insulin to take before you eat. If you use an insulin pump, you get a constant rate of insulin during the day. So the pump must be programmed at meals. This gives you extra insulin to cover the rise in blood sugar after meals. If you take insulin:  · Learn your own insulin-to-carb ratio. You and your diabetes health professional will figure out the ratio. You can do this by testing your blood sugar after meals. For example, you may need a certain amount of insulin for every 15 grams of carbs. · Add up the carb grams in a meal. Then you can figure out how many units of insulin to take based on your insulin-to-carb ratio. · Exercise lowers blood sugar. You can use less insulin than you would if you were not doing exercise. Keep in mind that timing matters. If you exercise within 1 hour after a meal, your body may need less insulin for that meal than it would if you exercised 3 hours after the meal. Test your blood sugar to find out how exercise affects your need for insulin. If you do or don't take insulin:  · Look at labels on packaged foods. This can tell you how many carbs are in a serving. You can also use guides from the American Diabetes Association. · Be aware of portions, or serving sizes. If a package has two servings and you eat the whole package, you need to double the number of grams of carbohydrate listed for one serving. · Protein, fat, and fiber do not raise blood sugar as much as carbs do. If you eat a lot of these nutrients in a meal, your blood sugar will rise more slowly than it would otherwise. Eat from all food groups  · Eat at least three meals a day. · Plan meals to include food from all the food groups. The food groups include grains, fruits, dairy, proteins, and vegetables. · Talk to your dietitian or diabetes educator about ways to add limited amounts of sweets into your meal plan.   · If you drink alcohol, talk to your doctor. It may not be recommended when you are taking certain diabetes medicines. Where can you learn more? Go to http://miryam-polo.info/. Enter L802 in the search box to learn more about \"Counting Carbohydrates: Care Instructions. \"  Current as of: July 25, 2018  Content Version: 11.9  © 5179-5008 [employer]. Care instructions adapted under license by [employer] (which disclaims liability or warranty for this information). If you have questions about a medical condition or this instruction, always ask your healthcare professional. [place] [address] any warranty or liability for your use of this information. Patient Education        Type 2 Diabetes: Care Instructions  Your Care Instructions    Type 2 diabetes is a disease that develops when the body's tissues cannot use insulin properly. Over time, the pancreas cannot make enough insulin. Insulin is a hormone that helps the body's cells use sugar (glucose) for energy. It also helps the body store extra sugar in muscle, fat, and liver cells. Without insulin, the sugar cannot get into the cells to do its work. It stays in the blood instead. This can cause high blood sugar levels. A person has diabetes when the blood sugar stays too high too much of the time. Over time, diabetes can lead to diseases of the heart, blood vessels, nerves, kidneys, and eyes. You may be able to control your blood sugar by losing weight, eating a healthy diet, and getting daily exercise. You may also have to take insulin or other diabetes medicine. Follow-up care is a key part of your treatment and safety. Be sure to make and go to all appointments. Call your doctor if you are having problems. It's also a good idea to know your test results and keep a list of the medicines you take. How can you care for yourself at home?   · Keep your blood sugar at a target level (which you set with your doctor). ? Eat a good diet that spreads carbohydrate throughout the day. Carbohydrate--the body's main source of fuel--affects blood sugar more than any other nutrient. Carbohydrate is in fruits, vegetables, milk, and yogurt. It also is in breads, cereals, vegetables such as potatoes and corn, and sugary foods such as candy and cakes. ? Aim for 30 minutes of exercise on most, preferably all, days of the week. Walking is a good choice. You also may want to do other activities, such as running, swimming, cycling, or playing tennis or team sports. If your doctor says it's okay, do muscle-strengthening exercises at least 2 times a week. ? Take your medicines exactly as prescribed. Call your doctor if you think you are having a problem with your medicine. You will get more details on the specific medicines your doctor prescribes. · Check your blood sugar as often as your doctor recommends. It is important to keep track of any symptoms you have, such as low blood sugar. Also tell your doctor if you have any changes in your activities, diet, or insulin use. · Talk to your doctor before you start taking aspirin every day. Aspirin can help certain people lower their risk of a heart attack or stroke. But taking aspirin isn't right for everyone, because it can cause serious bleeding. · Do not smoke. If you need help quitting, talk to your doctor about stop-smoking programs and medicines. These can increase your chances of quitting for good. · Keep your cholesterol and blood pressure at normal levels. You may need to take one or more medicines to reach your goals. Take them exactly as directed. Do not stop or change a medicine without talking to your doctor first.  When should you call for help? Call 911 anytime you think you may need emergency care. For example, call if:    · You passed out (lost consciousness), or you suddenly become very sleepy or confused.  (You may have very low blood sugar.)    Call your doctor now or seek immediate medical care if:    · Your blood sugar is 300 mg/dL or is higher than the level your doctor has set for you.     · You have symptoms of low blood sugar, such as:  ? Sweating. ? Feeling nervous, shaky, and weak. ? Extreme hunger and slight nausea. ? Dizziness and headache.  ? Blurred vision. ? Confusion.    Watch closely for changes in your health, and be sure to contact your doctor if:    · You often have problems controlling your blood sugar.     · You have symptoms of long-term diabetes problems, such as:  ? New vision changes. ? New pain, numbness, or tingling in your hands or feet. ? Skin problems. Where can you learn more? Go to http://miryam-polo.info/. Enter C553 in the search box to learn more about \"Type 2 Diabetes: Care Instructions. \"  Current as of: July 25, 2018  Content Version: 11.9  © 7246-4834 [employer]. Care instructions adapted under license by [employer] (which disclaims liability or warranty for this information). If you have questions about a medical condition or this instruction, always ask your healthcare professional. [place] [address] any warranty or liability for your use of this information. Patient Education        Diabetic Ketoacidosis (DKA): Care Instructions  Your Care Instructions  Diabetic ketoacidosis (DKA) happens when the body does not have enough insulin and can't get the sugar it needs for energy. When the body can't use sugar for energy, it starts to use fat for energy. This process makes fatty acids called ketones. The ketones build up in the blood and change the chemical balance in your body. This problem can be very dangerous and needs to be treated. Without treatment, it can lead to a coma or death. DKA occurs most often in people with type 1 diabetes. But people with type 2 diabetes also can get it. DKA can be caused by many things.  It can happen if you don't take enough insulin. It can also happen if you have an infection or illness like the flu. Sometimes it happens if you are very dehydrated. DKA can only be treated with insulin and fluids. These are often given in a vein (IV). Follow-up care is a key part of your treatment and safety. Be sure to make and go to all appointments, and call your doctor if you are having problems. It's also a good idea to know your test results and keep a list of the medicines you take. How can you care for yourself at home? To reduce your chance of ketoacidosis:  · Take your insulin and other diabetes medicines on time and in the right dose. ? If an infection caused your DKA and your doctor prescribed antibiotics, take them as directed. Do not stop taking them just because you feel better. You need to take the full course of antibiotics. · Test your blood sugar before meals and at bedtime or as often as your doctor advises. This is the best way to know when your blood sugar is high so you can treat it early. Watching for symptoms is not as helpful. This is because you may not have symptoms until your blood sugar is very high. Or you may not notice them. · Teach others at work and at home how to check your blood sugar. Make sure that someone else knows how do it in case you can't. · Wear or carry medical identification at all times. This is very important in case you are too sick or injured to speak for yourself. · Talk to your doctor about when you can start to exercise again. · Eat regular meals that spread your calories and carbohydrate throughout the day. This will help keep your blood sugar steady. · When you are sick:  ? Take your insulin and diabetes medicines. This is important even if you are vomiting and having trouble eating or drinking. Your blood sugar may go up because you are sick. If you are eating less than normal, you may need to change your dose of insulin. Talk with your doctor about a plan when you are well.  Then you will know what to do when you are sick. ? Drink extra fluids to prevent dehydration. These include water, broth, and sugar-free drinks. If you don't drink enough, the insulin from your shot may not get into your blood. So your blood sugar may go up. ? Try to eat as you normally do, with a focus on healthy food choices. ? Check your blood sugar at least every 3 to 4 hours. Check it more often if it's rising fast. If your doctor has told you to take an extra insulin dose for high blood sugar levels (for example, above 240 mg/dL) be sure to take the right amount. If you're not sure how much to take, call your doctor. ? Check your temperature and pulse often. If your temperature goes up, call your doctor. You may be getting worse. ? If you take insulin, check your urine or blood for ketones, especially when you have high blood sugar (for example, above 240 mg/dL). Call your doctor if your ketone level is moderate or high. If you know your blood sugar is high, treat it before it gets worse. · If you missed your usual dose of insulin or other diabetes medicine, take the missed dose or take the amount your doctor told you to take if this happens. · If you and your doctor decide on a dose of extra-fast-acting insulin, give yourself the right dose. If you take insulin and your doctor has not told you how much fast-acting insulin to take based on your blood sugar level, call your doctor. · Drink extra water or sugar-free drinks to prevent dehydration. · Wait 30 minutes after you take extra insulin or missed medicines. Then check your blood sugar again. · If symptoms of high blood sugar get worse or your blood sugar level keeps rising, call your doctor. If you start to feel sleepy or confused, call 911. When should you call for help? Call 911 anytime you think you may need emergency care.  For example, call if:    · You passed out (lost consciousness).     · You are confused or cannot think clearly.     · Your blood sugar is very high or very low.    Watch closely for changes in your health, and be sure to contact your doctor if:    · Your blood sugar stays outside the level your doctor set for you.     · You have any problems. Where can you learn more? Go to http://miryam-polo.info/. Travis Zenia in the search box to learn more about \"Diabetic Ketoacidosis (DKA): Care Instructions. \"  Current as of: July 25, 2018  Content Version: 11.9  © 8293-0580 [employer]. Care instructions adapted under license by [employer] (which disclaims liability or warranty for this information). If you have questions about a medical condition or this instruction, always ask your healthcare professional. [address] any warranty or liability for your use of this information.

## 2019-02-22 ENCOUNTER — HOSPITAL ENCOUNTER (EMERGENCY)
Age: 44
Discharge: HOME | End: 2019-02-23
Attending: EMERGENCY MEDICINE
Payer: MEDICARE

## 2019-02-22 ENCOUNTER — HOSPITAL ENCOUNTER (EMERGENCY)
Age: 44
Discharge: HOME | End: 2019-02-22
Attending: EMERGENCY MEDICINE
Payer: MEDICARE

## 2019-02-22 VITALS
OXYGEN SATURATION: 100 % | WEIGHT: 149 LBS | TEMPERATURE: 98.1 F | HEART RATE: 95 BPM | HEIGHT: 66 IN | DIASTOLIC BLOOD PRESSURE: 82 MMHG | SYSTOLIC BLOOD PRESSURE: 144 MMHG | RESPIRATION RATE: 16 BRPM | BODY MASS INDEX: 23.95 KG/M2

## 2019-02-22 DIAGNOSIS — F10.920 ALCOHOLIC INTOXICATION WITHOUT COMPLICATION (HCC): ICD-10-CM

## 2019-02-22 DIAGNOSIS — R07.9 CHEST PAIN, UNSPECIFIED TYPE: Primary | ICD-10-CM

## 2019-02-22 DIAGNOSIS — R07.9 CHEST PAIN, UNSPECIFIED TYPE: ICD-10-CM

## 2019-02-22 DIAGNOSIS — E11.65 UNCONTROLLED TYPE 2 DIABETES MELLITUS WITH HYPERGLYCEMIA (HCC): Primary | ICD-10-CM

## 2019-02-22 LAB
ALBUMIN SERPL-MCNC: 2.8 G/DL (ref 3.5–5)
ALBUMIN/GLOB SERPL: 0.8 {RATIO} (ref 1.1–2.2)
ALP SERPL-CCNC: 101 U/L (ref 45–117)
ALT SERPL-CCNC: 27 U/L (ref 12–78)
ANION GAP SERPL CALC-SCNC: 7 MMOL/L (ref 5–15)
ANION GAP SERPL CALC-SCNC: 9 MMOL/L (ref 5–15)
APPEARANCE UR: ABNORMAL
AST SERPL-CCNC: 16 U/L (ref 15–37)
ATRIAL RATE: 92 BPM
BACTERIA URNS QL MICRO: ABNORMAL /HPF
BILIRUB SERPL-MCNC: 0.5 MG/DL (ref 0.2–1)
BILIRUB UR QL: NEGATIVE
BUN SERPL-MCNC: 12 MG/DL (ref 6–20)
BUN SERPL-MCNC: 18 MG/DL (ref 6–20)
BUN/CREAT SERPL: 16 (ref 12–20)
BUN/CREAT SERPL: 28 (ref 12–20)
CALCIUM SERPL-MCNC: 7.8 MG/DL (ref 8.5–10.1)
CALCIUM SERPL-MCNC: 8 MG/DL (ref 8.5–10.1)
CALCULATED P AXIS, ECG09: 67 DEGREES
CALCULATED R AXIS, ECG10: 34 DEGREES
CALCULATED T AXIS, ECG11: 85 DEGREES
CHLORIDE SERPL-SCNC: 105 MMOL/L (ref 97–108)
CHLORIDE SERPL-SCNC: 108 MMOL/L (ref 97–108)
CO2 SERPL-SCNC: 27 MMOL/L (ref 21–32)
CO2 SERPL-SCNC: 27 MMOL/L (ref 21–32)
COLOR UR: ABNORMAL
CREAT SERPL-MCNC: 0.65 MG/DL (ref 0.55–1.02)
CREAT SERPL-MCNC: 0.76 MG/DL (ref 0.55–1.02)
DIAGNOSIS, 93000: NORMAL
EPITH CASTS URNS QL MICRO: ABNORMAL /LPF
ERYTHROCYTE [DISTWIDTH] IN BLOOD BY AUTOMATED COUNT: 16.6 % (ref 11.5–14.5)
ETHANOL SERPL-MCNC: 149 MG/DL
GLOBULIN SER CALC-MCNC: 3.4 G/DL (ref 2–4)
GLUCOSE BLD STRIP.AUTO-MCNC: 107 MG/DL (ref 65–100)
GLUCOSE BLD STRIP.AUTO-MCNC: 201 MG/DL (ref 65–100)
GLUCOSE SERPL-MCNC: 193 MG/DL (ref 65–100)
GLUCOSE SERPL-MCNC: 68 MG/DL (ref 65–100)
GLUCOSE UR STRIP.AUTO-MCNC: NEGATIVE MG/DL
HCT VFR BLD AUTO: 35.1 % (ref 35–47)
HGB BLD-MCNC: 12.3 G/DL (ref 11.5–16)
HGB UR QL STRIP: NEGATIVE
KETONES UR QL STRIP.AUTO: NEGATIVE MG/DL
LEUKOCYTE ESTERASE UR QL STRIP.AUTO: ABNORMAL
MCH RBC QN AUTO: 25.7 PG (ref 26–34)
MCHC RBC AUTO-ENTMCNC: 35 G/DL (ref 30–36.5)
MCV RBC AUTO: 73.3 FL (ref 80–99)
NITRITE UR QL STRIP.AUTO: NEGATIVE
NRBC # BLD: 0 K/UL (ref 0–0.01)
NRBC BLD-RTO: 0 PER 100 WBC
P-R INTERVAL, ECG05: 128 MS
PH UR STRIP: 5.5 [PH] (ref 5–8)
PLATELET # BLD AUTO: 175 K/UL (ref 150–400)
PMV BLD AUTO: 12 FL (ref 8.9–12.9)
POTASSIUM SERPL-SCNC: 3.8 MMOL/L (ref 3.5–5.1)
POTASSIUM SERPL-SCNC: 5.3 MMOL/L (ref 3.5–5.1)
PROT SERPL-MCNC: 6.2 G/DL (ref 6.4–8.2)
PROT UR STRIP-MCNC: NEGATIVE MG/DL
Q-T INTERVAL, ECG07: 386 MS
QRS DURATION, ECG06: 74 MS
QTC CALCULATION (BEZET), ECG08: 477 MS
RBC # BLD AUTO: 4.79 M/UL (ref 3.8–5.2)
RBC #/AREA URNS HPF: ABNORMAL /HPF (ref 0–5)
SERVICE CMNT-IMP: ABNORMAL
SERVICE CMNT-IMP: ABNORMAL
SODIUM SERPL-SCNC: 139 MMOL/L (ref 136–145)
SODIUM SERPL-SCNC: 144 MMOL/L (ref 136–145)
SP GR UR REFRACTOMETRY: 1.02 (ref 1–1.03)
TROPONIN I SERPL-MCNC: <0.05 NG/ML
UROBILINOGEN UR QL STRIP.AUTO: 0.2 EU/DL (ref 0.2–1)
VENTRICULAR RATE, ECG03: 92 BPM
WBC # BLD AUTO: 6.7 K/UL (ref 3.6–11)
WBC URNS QL MICRO: ABNORMAL /HPF (ref 0–4)

## 2019-02-22 PROCEDURE — 84484 ASSAY OF TROPONIN QUANT: CPT

## 2019-02-22 PROCEDURE — 74011250637 HC RX REV CODE- 250/637: Performed by: EMERGENCY MEDICINE

## 2019-02-22 PROCEDURE — 80053 COMPREHEN METABOLIC PANEL: CPT

## 2019-02-22 PROCEDURE — 81001 URINALYSIS AUTO W/SCOPE: CPT

## 2019-02-22 PROCEDURE — 82962 GLUCOSE BLOOD TEST: CPT

## 2019-02-22 PROCEDURE — 93005 ELECTROCARDIOGRAM TRACING: CPT

## 2019-02-22 PROCEDURE — 36415 COLL VENOUS BLD VENIPUNCTURE: CPT

## 2019-02-22 PROCEDURE — 80307 DRUG TEST PRSMV CHEM ANLYZR: CPT

## 2019-02-22 PROCEDURE — 74011000250 HC RX REV CODE- 250: Performed by: EMERGENCY MEDICINE

## 2019-02-22 PROCEDURE — 99284 EMERGENCY DEPT VISIT MOD MDM: CPT

## 2019-02-22 PROCEDURE — 85027 COMPLETE CBC AUTOMATED: CPT

## 2019-02-22 PROCEDURE — 99285 EMERGENCY DEPT VISIT HI MDM: CPT

## 2019-02-22 RX ORDER — SODIUM CHLORIDE 0.9 % (FLUSH) 0.9 %
5-40 SYRINGE (ML) INJECTION AS NEEDED
Status: DISCONTINUED | OUTPATIENT
Start: 2019-02-22 | End: 2019-02-23 | Stop reason: HOSPADM

## 2019-02-22 RX ORDER — SODIUM CHLORIDE 0.9 % (FLUSH) 0.9 %
5-40 SYRINGE (ML) INJECTION EVERY 8 HOURS
Status: DISCONTINUED | OUTPATIENT
Start: 2019-02-22 | End: 2019-02-23 | Stop reason: HOSPADM

## 2019-02-22 RX ADMIN — LIDOCAINE HYDROCHLORIDE 40 ML: 20 SOLUTION ORAL; TOPICAL at 05:32

## 2019-02-22 NOTE — ED NOTES
Pt arrived to ED  with c/o elevated blood sugar over 300 at 3am. Pt is in no acute distress. Will continue to monitor. See nursing assessment. Safety precautions in place; call light within reach. Emergency Department Nursing Plan of Care The Nursing Plan of Care is developed from the Nursing assessment and Emergency Department Attending provider initial evaluation. The plan of care may be reviewed in the ED Provider note. The Plan of Care was developed with the following considerations:  
Patient / Family readiness to learn indicated by:verbalized understanding Persons(s) to be included in education: patient Barriers to Learning/Limitations:No 
 
Signed Cora Olivia RN   
2/22/2019   5:59 AM

## 2019-02-22 NOTE — ED TRIAGE NOTES
Pt concerned for elevated BS. Pt states she hasn't been checking her glucose at home. Pt states frequent urination and excessive thirst. Pt also states constant mid CP w/ SOB. Pt states the pain woke her from sleep.

## 2019-02-22 NOTE — ED PROVIDER NOTES
38 y.o. female with past medical history significant for diabetes, HTN, pancreatitis, GERD, asthma, schizophrenia, borderline personality disorder, depression, anxiety, alcohol abuse, s/p cholecystectomy, s/p gastric bypass, s/p  x 2, who presents to the ED stating \"my blood sugar is high and I have chest pain and shortness of breath. \" States her blood sugar dropped low, so she ate something sweet and it sheridan to 309 on her home glucometer. She not take insulin, because she is due to take her usual morning insulin dose at 7:30 or 8am (50u lantus and 15u humalog). States cp is 8/10 and worse with eating, moving, breathing. Onset several hours ago. She tells me this is typical of her hyperglycemic episodes. States she has been doing some drinking (wine and beer). On ROS reports polyuria and polydipsia. She thinks she is in DKA. Chart review shows multiple prior ED visits for same. Accuecheck done during h&p shows bs 107. Still asking for bloodwork because of polyuria and polydipsia. High Blood Sugar This is a recurrent problem. The pain is located in the chest. Associated symptoms include frequency and chest pain. Pertinent negatives include no fever, no diarrhea, no nausea, no constipation, no dysuria, no headaches, no arthralgias and no myalgias. The pain is worsened by eating, certain positions, deep breathing, activity and coughing. Chest Pain (Angina) Associated symptoms include shortness of breath. Pertinent negatives include no abdominal pain, no cough, no dizziness, no fever, no headaches and no nausea. Past Medical History:  
Diagnosis Date  Alcohol abuse, in remission   
 quit 17 days ago  Asthma   
 does not use inhalers  Borderline personality disorder ([facility] [address]  Diabetes ([facility] [address]  GERD (gastroesophageal reflux disease)  Hypertension  Other ill-defined conditions(799.89)   
 kidney stones ,passed one  Other ill-defined conditions(799.89) sickle cell trait  Other ill-defined conditions(799.89)   
 increased cholesterol  Pancreatitis  Psychiatric disorder   
 schizophrenia, bipolar, depression, anxiety Past Surgical History:  
Procedure Laterality Date  ABDOMEN SURGERY PROC UNLISTED  3/12/14 CHOLECYSTECTOMY LAPAROSCOPIC    
 HX GASTRIC BYPASS  HX GYN  11/8/2012  
 c section x2  HX OTHER SURGICAL  3/13/14 ENDOSCOPIC RETROGRADE CHOLANGIOPANCREATOGRAPHY  HX OTHER SURGICAL  8/4/14  
 endoscopic stent placed to bile duct  HX TUBAL LIGATION  2012 Family History:  
Problem Relation Age of Onset  Heart Disease Mother  Diabetes Mother  Hypertension Mother  Hypertension Maternal Grandmother Social History Socioeconomic History  Marital status: SINGLE Spouse name: Not on file  Number of children: Not on file  Years of education: Not on file  Highest education level: Not on file Social Needs  Financial resource strain: Not on file  Food insecurity - worry: Not on file  Food insecurity - inability: Not on file  Transportation needs - medical: Not on file  Transportation needs - non-medical: Not on file Occupational History  Not on file Tobacco Use  Smoking status: Current Every Day Smoker Packs/day: 0.50 Years: 14.00 Pack years: 7.00 Types: Cigarettes  Smokeless tobacco: Never Used  Tobacco comment: cigarettes Substance and Sexual Activity  Alcohol use: Yes Alcohol/week: 4.8 oz Types: 6 Cans of beer, 2 Glasses of wine per week  Drug use: Yes Types: Cocaine Comment: yesterday 12/27/18  Sexual activity: Yes  
  Partners: Female Birth control/protection: Surgical  
Other Topics Concern  Not on file Social History Narrative  Not on file ALLERGIES: Dilaudid [hydromorphone (bulk)] and Ibuprofen Review of Systems Constitutional: Negative.   Negative for chills, fever and unexpected weight change. HENT: Negative. Negative for congestion and trouble swallowing. Eyes: Negative for discharge. Respiratory: Positive for shortness of breath. Negative for cough and chest tightness. Cardiovascular: Positive for chest pain. Gastrointestinal: Negative. Negative for abdominal distention, abdominal pain, constipation, diarrhea and nausea. Endocrine: Negative. Genitourinary: Positive for frequency. Negative for difficulty urinating, dysuria and urgency. Musculoskeletal: Negative. Negative for arthralgias and myalgias. Skin: Negative. Negative for color change. Allergic/Immunologic: Negative. Neurological: Negative. Negative for dizziness, speech difficulty and headaches. Hematological: Negative. Psychiatric/Behavioral: Negative. Negative for agitation and confusion. All other systems reviewed and are negative. Vitals:  
 02/22/19 3170 BP: (!) 152/99 Pulse: (!) 101 Resp: 16 Temp: 98.1 °F (36.7 °C) SpO2: 100% Weight: 67.6 kg (149 lb) Height: 5' 6\" (1.676 m) Physical Exam  
Constitutional: She is oriented to person, place, and time. She appears well-developed and well-nourished. HENT:  
Head: Normocephalic and atraumatic. Eyes: Conjunctivae and EOM are normal.  
Neck: Neck supple. Cardiovascular: Normal rate, regular rhythm and intact distal pulses. Pulmonary/Chest: Effort normal. No respiratory distress. Abdominal: Soft. There is no tenderness. Musculoskeletal: Normal range of motion. She exhibits no deformity. Neurological: She is alert and oriented to person, place, and time. Skin: Skin is warm and dry. Psychiatric: She has a normal mood and affect. Her behavior is normal. Thought content normal.  
Vitals reviewed. MDM Number of Diagnoses or Management Options Diagnosis management comments: Alcoholic gastritis, gerd, acs, hyperglycemia, med noncompliance, dehydration, electrolyte abnormality.   
 
ED Course as of Feb 22 0719 Fri Feb 22, 2019  
9846 Patient tells me as I'm going in to discharge her that she saw doctor madisyn yesterday for this same chest pain, and had a stress test. She is to hear from him today. CP has not changed since she saw him. Encouraged her to follow-up as an out-patient for [facility] diabetes education.  [SS] ED Course User Index 
[SS] Monique Bains MD  
 
 
Procedures LABORATORY TESTS: 
Recent Results (from the past 12 hour(s)) GLUCOSE, POC Collection Time: 02/22/19  5:16 AM  
Result Value Ref Range Glucose (POC) 107 (H) 65 - 100 mg/dL Performed by Varinder Mariano URINALYSIS W/ RFLX MICROSCOPIC Collection Time: 02/22/19  5:35 AM  
Result Value Ref Range Color YELLOW/STRAW Appearance CLOUDY (A) CLEAR Specific gravity 1.020 1.003 - 1.030    
 pH (UA) 5.5 5.0 - 8.0 Protein NEGATIVE  NEG mg/dL Glucose NEGATIVE  NEG mg/dL Ketone NEGATIVE  NEG mg/dL Bilirubin NEGATIVE  NEG Blood NEGATIVE  NEG Urobilinogen 0.2 0.2 - 1.0 EU/dL Nitrites NEGATIVE  NEG Leukocyte Esterase SMALL (A) NEG    
METABOLIC PANEL, BASIC Collection Time: 02/22/19  5:35 AM  
Result Value Ref Range Sodium 139 136 - 145 mmol/L Potassium 5.3 (H) 3.5 - 5.1 mmol/L Chloride 105 97 - 108 mmol/L  
 CO2 27 21 - 32 mmol/L Anion gap 7 5 - 15 mmol/L Glucose 68 65 - 100 mg/dL BUN 18 6 - 20 MG/DL Creatinine 0.65 0.55 - 1.02 MG/DL  
 BUN/Creatinine ratio 28 (H) 12 - 20 GFR est AA >60 >60 ml/min/1.73m2 GFR est non-AA >60 >60 ml/min/1.73m2 Calcium 7.8 (L) 8.5 - 10.1 MG/DL  
TROPONIN I Collection Time: 02/22/19  5:35 AM  
Result Value Ref Range Troponin-I, Qt. <0.05 <0.05 ng/mL URINE MICROSCOPIC ONLY Collection Time: 02/22/19  5:35 AM  
Result Value Ref Range WBC 0-4 0 - 4 /hpf  
 RBC 0-5 0 - 5 /hpf Epithelial cells FEW FEW /lpf Bacteria 1+ (A) NEG /hpf IMAGING RESULTS: 
No orders to display MEDICATIONS GIVEN: Medications  
mylanta/viscous lidocaine (GI COCKTAIL) (40 mL Oral Given 2/22/19 0532) IMPRESSION: 
1. Uncontrolled type 2 diabetes mellitus with hyperglycemia ([address] 2. Chest pain, unspecified type PLAN: 
1. Discharge Medication List as of 2/22/2019  6:52 AM  
  
 
2. Follow-up Information Follow up With Specialties Details Why Contact Info Sriram Phan NP Nurse Practitioner Schedule an appointment as soon as possible for a visit  [place] [id number] [phone] Bindu Burton MD Cardiology, [address] [place] [address] Vascular Surgery, Internal Medicine   [address] 
[phone] Return to ED if worse

## 2019-02-22 NOTE — DISCHARGE INSTRUCTIONS
Patient Education        Learning About Certified Diabetes Educators  What is a certified diabetes educator? Certified diabetes educators are health professionals who have special training to help you manage your diabetes. They may be:  · Registered nurses. · Registered dietitians. · Pharmacists. · Social workers. · Doctors. Your diabetes educator will give you tips and help with daily diabetes care. He or she also may teach classes. These classes give you a chance to learn from and connect with others who have diabetes. Why see a diabetes educator? Your doctor wants you to get the personal support and help that a diabetes educator can give. And most insurance plans will cover part or all of the cost.  Learning is a key part of living with diabetes. A diabetes educator teaches about the most important parts of your care. You will learn about:  · Eating healthy meals. · Being active. · Taking medicine. · Checking your blood sugar. · Dealing with your feelings about having diabetes. He or she can help you find ways to live better with diabetes. And your diabetes educator can show you small changes that can make a big difference in your daily routine and your health. If you need to make a big change, he or she will be able to answer your questions and guide you though each step. When should you see one? It can be helpful to see a diabetes educator at certain points in your care. He or she can:  · Get you started when you're first diagnosed with diabetes. · Check in once a year for a review of your health and daily routine. · Show you how to handle a new health problem along with your diabetes. · Help you work with a new health care team.  Follow-up care is a key part of your treatment and safety. Be sure to make and go to all appointments, and call your doctor if you are having problems. It's also a good idea to know your test results and keep a list of the medicines you take.   Where can you learn more?  Go to http://miryam-polo.info/. Enter V932 in the search box to learn more about \"Learning About Certified Diabetes Educators. \"  Current as of: July 25, 2018  Content Version: 11.9  © 8042-0495 [employer]. Care instructions adapted under license by [employer] (which disclaims liability or warranty for this information). If you have questions about a medical condition or this instruction, always ask your healthcare professional. [address] any warranty or liability for your use of this information. Patient Education        Chest Pain: Care Instructions  Your Care Instructions    There are many things that can cause chest pain. Some are not serious and will get better on their own in a few days. But some kinds of chest pain need more testing and treatment. Your doctor may have recommended a follow-up visit in the next 8 to 12 hours. If you are not getting better, you may need more tests or treatment. Even though your doctor has released you, you still need to watch for any problems. The doctor carefully checked you, but sometimes problems can develop later. If you have new symptoms or if your symptoms do not get better, get medical care right away. If you have worse or different chest pain or pressure that lasts more than 5 minutes or you passed out (lost consciousness), call 911 or seek other emergency help right away. A medical visit is only one step in your treatment. Even if you feel better, you still need to do what your doctor recommends, such as going to all suggested follow-up appointments and taking medicines exactly as directed. This will help you recover and help prevent future problems. How can you care for yourself at home? · Rest until you feel better. · Take your medicine exactly as prescribed. Call your doctor if you think you are having a problem with your medicine.   · Do not drive after taking a prescription pain medicine. When should you call for help? Call 911 if:    · You passed out (lost consciousness).     · You have severe difficulty breathing.     · You have symptoms of a heart attack. These may include:  ? Chest pain or pressure, or a strange feeling in your chest.  ? Sweating. ? Shortness of breath. ? Nausea or vomiting. ? Pain, pressure, or a strange feeling in your back, neck, jaw, or upper belly or in one or both shoulders or arms. ? Lightheadedness or sudden weakness. ? A fast or irregular heartbeat. After you call 911, the  may tell you to chew 1 adult-strength or 2 to 4 low-dose aspirin. Wait for an ambulance. Do not try to drive yourself.    Call your doctor today if:    · You have any trouble breathing.     · Your chest pain gets worse.     · You are dizzy or lightheaded, or you feel like you may faint.     · You are not getting better as expected.     · You are having new or different chest pain. Where can you learn more? Go to http://miryam-polo.info/. Enter A120 in the search box to learn more about \"Chest Pain: Care Instructions. \"  Current as of: September 23, 2018  Content Version: 11.9  © 0938-1755 [employer]. Care instructions adapted under license by [employer] (which disclaims liability or warranty for this information). If you have questions about a medical condition or this instruction, always ask your healthcare professional. [place] [address] any warranty or liability for your use of this information. Patient Education     Nutrition Tips for Diabetes: After Your Visit  Your Care Instructions  A healthy diet is important to manage diabetes. It helps you lose weight (if you need to) and keep it off. It gives you the nutrition and energy your body needs and helps prevent heart disease. But a diet for diabetes does not mean that you have to eat special foods.  You can eat what your family eats, including occasional sweets and other favorites. But you do have to pay attention to how often you eat and how much you eat of certain foods. The right plan for you will give you meals that help you keep your blood sugar at healthy levels. Try to eat a variety of foods and to spread carbohydrate throughout the day. Carbohydrate raises blood sugar higher and more quickly than any other nutrient does. Carbohydrate is found in sugar, breads and cereals, fruit, starchy vegetables such as potatoes and corn, and milk and yogurt. You may want to work with a dietitian or diabetes educator to help you plan meals and snacks. A dietitian or diabetes educator also can help you lose weight if that is one of your goals. The following tips can help you enjoy your meals and stay healthy. Follow-up care is a key part of your treatment and safety. Be sure to make and go to all appointments, and call your doctor if you are having problems. Its also a good idea to know your test results and keep a list of the medicines you take. How can you care for yourself at home? · Learn which foods have carbohydrate and how much carbohydrate to eat. A dietitian or diabetes educator can help you learn to keep track of how much carbohydrate you eat. · Spread carbohydrate throughout the day. Eat some carbohydrate at all meals, but do not eat too much at any one time. · Plan meals to include food from all the food groups. These are the food groups and some example portion sizes:  ¨ Grains: 1 slice of bread (1 ounce), ½ cup of cooked cereal, and 1/3 cup of cooked pasta or rice. These have about 15 grams of carbohydrate in a serving. Choose whole grains such as whole wheat bread or crackers, oatmeal, and brown rice more often than refined grains. ¨ Fruit: 1 small fresh fruit, such as an apple or orange; ½ of a banana; ½ cup of chopped, cooked, or canned fruit; ½ cup of fruit juice; 1 cup of melon or raspberries; and 2 tablespoons of dried fruit.  These have about 15 grams of carbohydrate in a serving. ¨ Dairy: 1 cup of nonfat or low-fat milk and 2/3 cup of plain yogurt. These have about 15 grams of carbohydrate in a serving. ¨ Protein foods: Beef, chicken, turkey, fish, eggs, tofu, cheese, cottage cheese, and peanut butter. A serving size of meat is 3 ounces, which is about the size of a deck of cards. Examples of meat substitute serving sizes (equal to 1 ounce of meat) are 1/4 cup of cottage cheese, 1 egg, 1 tablespoon of peanut butter, and ½ cup of tofu. These have very little or no carbohydrate per serving. ¨ Vegetables: Starchy vegetables such as ½ cup of cooked dried beans, peas, potatoes, or corn have about 15 grams of carbohydrate. Nonstarchy vegetables have very little carbohydrate, such as 1 cup of raw leafy vegetables (such as spinach), ½ cup of other vegetables (cooked or chopped), and 3/4 cup of vegetable juice. · Use the plate format to plan meals. It is a good, quick way to make sure that you have a balanced meal. It also helps you spread carbohydrate throughout the day. You divide your plate by types of foods. Put vegetables on half the plate, meat or meat substitutes on one-quarter of the plate, and a grain or starchy vegetable (such as brown rice or a potato) in the final quarter of the plate. To this you can add a small piece of fruit and 1 cup of milk or yogurt, depending on how much carbohydrate you are supposed to eat at a meal.  · Talk to your dietitian or diabetes educator about ways to add limited amounts of sweets into your meal plan. You can eat these foods now and then, as long as you include the amount of carbohydrate they have in your daily carbohydrate allowance. · If you drink alcohol, limit it to no more than 1 drink a day for women and 2 drinks a day for men. If you are pregnant, no amount of alcohol is known to be safe. · Protein, fat, and fiber do not raise blood sugar as much as carbohydrate does.  If you eat a lot of these nutrients in a meal, your blood sugar will rise more slowly than it would otherwise. · Limit saturated fats, such as those from meat and dairy products. Try to replace it with monounsaturated fat, such as olive oil. This is a healthier choice because people who have diabetes are at higher-than-average risk of heart disease. But use a modest amount of olive oil. A tablespoon of olive oil has 14 grams of fat and 120 calories. · Exercise lowers blood sugar. If you take insulin by shots or pump, you can use less than you would if you were not exercising. Keep in mind that timing matters. If you exercise within 1 hour after a meal, your body may need less insulin for that meal than it would if you exercised 3 hours after the meal. Test your blood sugar to find out how exercise affects your need for insulin. · Exercise on most days of the week. Aim for at least 30 minutes. Exercise helps you stay at a healthy weight and helps your body use insulin. Walking is an easy way to get exercise. Gradually increase the amount you walk every day. You also may want to swim, bike, or do other activities. When you eat out  · Learn to estimate the serving sizes of foods that have carbohydrate. If you measure food at home, it will be easier to estimate the amount in a serving of restaurant food. · If the meal you order has too much carbohydrate (such as potatoes, corn, or baked beans), ask to have a low-carbohydrate food instead. Ask for a salad or green vegetables. · If you use insulin, check your blood sugar before and after eating out to help you plan how much to eat in the future. · If you eat more carbohydrate at a meal than you had planned, take a walk or do other exercise. This will help lower your blood sugar. Where can you learn more? Go to [employer] (roll.to).be  Enter A418 in the search box to learn more about \"Nutrition Tips for Diabetes: After Your Visit. \"   © 1335-2165 [employer]. Care instructions adapted under license by [facility] (which disclaims liability or warranty for this information). This care instruction is for use with your licensed healthcare professional. If you have questions about a medical condition or this instruction, always ask your healthcare professional. [address] any warranty or liability for your use of this information.   Content Version: 28.3.984582; Current as of: June 4, 2014

## 2019-02-23 VITALS
HEART RATE: 95 BPM | DIASTOLIC BLOOD PRESSURE: 85 MMHG | HEIGHT: 66 IN | TEMPERATURE: 97.4 F | OXYGEN SATURATION: 98 % | RESPIRATION RATE: 14 BRPM | WEIGHT: 155.3 LBS | BODY MASS INDEX: 24.96 KG/M2 | SYSTOLIC BLOOD PRESSURE: 103 MMHG

## 2019-02-23 LAB
ATRIAL RATE: 94 BPM
CALCULATED P AXIS, ECG09: 79 DEGREES
CALCULATED R AXIS, ECG10: 54 DEGREES
CALCULATED T AXIS, ECG11: 90 DEGREES
DIAGNOSIS, 93000: NORMAL
P-R INTERVAL, ECG05: 138 MS
Q-T INTERVAL, ECG07: 398 MS
QRS DURATION, ECG06: 70 MS
QTC CALCULATION (BEZET), ECG08: 497 MS
TROPONIN I SERPL-MCNC: <0.05 NG/ML
VENTRICULAR RATE, ECG03: 94 BPM

## 2019-02-23 RX ORDER — ALBUTEROL SULFATE 90 UG/1
2 AEROSOL, METERED RESPIRATORY (INHALATION)
Qty: 1 INHALER | Refills: 0 | Status: SHIPPED | OUTPATIENT
Start: 2019-02-23

## 2019-02-23 RX ORDER — NITROGLYCERIN 0.4 MG/1
0.4 TABLET SUBLINGUAL
Qty: 1 BOTTLE | Refills: 0 | Status: ON HOLD | OUTPATIENT
Start: 2019-02-23 | End: 2019-02-26

## 2019-02-23 NOTE — ED TRIAGE NOTES
Pt states mid CP. Pt states she just wants some Nitro and an inhaler and then she wants to go home. Pt states she said SI b/c she is tired of having CPs.

## 2019-02-23 NOTE — DISCHARGE INSTRUCTIONS

## 2019-02-23 NOTE — ED NOTES
Pt arrived in ED by way of EMS w/ complaint of mid CP, SI, depression, and alcohol use. Pt denies HI and auditory & visual hallucinations. Pt states she just wants Nitro and an inhaler and she wants to go home. Pt stated she has SI b/c she is tired of her CP. Pt is A&O X 4 and appears to be in no distress. Emergency Department Nursing Plan of Care The Nursing Plan of Care is developed from the Nursing assessment and Emergency Department Attending provider initial evaluation. The plan of care may be reviewed in the ED Provider note. The Plan of Care was developed with the following considerations:  
Patient / Family readiness to learn indicated by:verbalized understanding Persons(s) to be included in education: patient Barriers to Learning/Limitations:No 
 
Signed Jessica Cyr RN   
2/22/2019   11:28 PM

## 2019-02-23 NOTE — ED PROVIDER NOTES
EMERGENCY DEPARTMENT HISTORY AND PHYSICAL EXAM 
 
 
Date: 2/22/2019 Patient Name: Lennox Fulling History of Presenting Illness Chief Complaint Patient presents with  Suicidal  
 Chest Pain History Provided By: Patient HPI: Lennox Fulling, 38 y.o. female with PMHx significant for DM, HTN, HLD, GERD, asthma, pancreatitis, borderline personality disorder, schizophrenia, alcohol abuse, substance abuse presents via EMS to the ED with cc of constant chest pain for the past 3 weeks. She reports she has been evaluated by cardiology. She states \"all I need is nitroglycerin and an inhaler\". She reports drinking 2 pints of wine prior to arrival. According to chart review, patient was seen this morning in ED due to elevated blood sugar and has been seen numerous times in the past 2 months due to chest pain. HPI/ROS limited due to patient intoxication PCP: Marcel Gonzalez MD 
 
Current Facility-Administered Medications on File Prior to Encounter Medication Dose Route Frequency Provider Last Rate Last Dose  [COMPLETED] mylanta/viscous lidocaine (GI COCKTAIL)  40 mL Oral Giovanni Kilpatrick MD   40 mL at 02/22/19 0532  saline peripheral flush soln 10 mL  10 mL InterCATHeter PRN Zaid Ramsay MD   10 mL at 02/21/19 1057 Current Outpatient Medications on File Prior to Encounter Medication Sig Dispense Refill  insulin NPH/insulin regular (NOVOLIN 70/30, HUMULIN 70/30) 100 unit/mL (70-30) injection 15 Units by SubCUTAneous route two (2) times a day. 10 mL 0  
 glucose blood VI test strips (FREESTYLE INSULINX TEST STRIPS) strip As needed 100 Strip 0  Blood-Glucose Meter monitoring kit Daily as needed for blood glucose monitoring. 1 Kit 0  
 lancets 23 gauge [facility] Use daily for glucose monitoring as needed.  100 Lancet 0  
 raNITIdine hcl 150 mg capsule TAKE 1 TABLET BY MOUTH TWICE A DAY FOR 10 DAYS  0  
 aspirin delayed-release 81 mg tablet TAKE 1 TABLET BY MOUTH EVERY DAY WITH 6 TO 8 OUNCES OF PLAIN WATER  0  
 losartan (COZAAR) 100 mg tablet Take 1 Tab by mouth daily. 30 Tab 6  
 haloperidol (HALDOL) 5 mg tablet Take 1 Tab by mouth every twelve (12) hours. 60 Tab 0  
 benztropine (COGENTIN) 0.5 mg tablet Take 2 Tabs by mouth every twelve (12) hours for 30 days. 120 Tab 0  
 valproic acid (DEPAKENE) 250 mg capsule Take 2 Caps by mouth every twelve (12) hours for 30 days. 120 Cap 0  
 sertraline (ZOLOFT) 25 mg tablet Take 1 Tab by mouth daily for 30 days. 30 Tab 0  
 divalproex sodium (DEPAKOTE PO) Take  by mouth. Past History Past Medical History: 
Past Medical History:  
Diagnosis Date  Alcohol abuse, in remission   
 quit 17 days ago  Asthma   
 does not use inhalers  Borderline personality disorder ([facility] [address]  Diabetes ([facility] [address]  GERD (gastroesophageal reflux disease)  Hypertension  Other ill-defined conditions(799.89)   
 kidney stones ,passed one  Other ill-defined conditions(799.89) sickle cell trait  Other ill-defined conditions(799.89)   
 increased cholesterol  Pancreatitis  Psychiatric disorder   
 schizophrenia, bipolar, depression, anxiety Past Surgical History: 
Past Surgical History:  
Procedure Laterality Date  ABDOMEN SURGERY PROC UNLISTED  3/12/14 CHOLECYSTECTOMY LAPAROSCOPIC    
 HX GASTRIC BYPASS  HX GYN  11/8/2012  
 c section x2  HX OTHER SURGICAL  3/13/14 ENDOSCOPIC RETROGRADE CHOLANGIOPANCREATOGRAPHY  HX OTHER SURGICAL  8/4/14  
 endoscopic stent placed to bile duct  HX TUBAL LIGATION  2012 Family History: 
Family History Problem Relation Age of Onset  Heart Disease Mother  Diabetes Mother  Hypertension Mother  Hypertension Maternal Grandmother Social History: 
Social History Tobacco Use  Smoking status: Current Every Day Smoker Packs/day: 0.50 Years: 14.00 Pack years: 7.00 Types: Cigarettes  Smokeless tobacco: Never Used  Tobacco comment: cigarettes Substance Use Topics  Alcohol use: Yes Alcohol/week: 4.8 oz Types: 2 Glasses of wine, 6 Cans of beer per week  Drug use: No  
  Comment: yesterday 12/27/18 Allergies: Allergies Allergen Reactions  Dilaudid [Hydromorphone (Bulk)] Itching  Ibuprofen Not Reported This Time Review of Systems Review of Systems Unable to perform ROS: Other Physical Exam  
Physical Exam  
Constitutional: She appears well-developed and well-nourished. No distress. Appears intoxicated HENT:  
Head: Normocephalic and atraumatic. Eyes: Conjunctivae and EOM are normal. Pupils are equal, round, and reactive to light. Neck: Normal range of motion. Cardiovascular: Normal rate, regular rhythm and intact distal pulses. Pulmonary/Chest: Effort normal and breath sounds normal. No stridor. No respiratory distress. Abdominal: Soft. She exhibits no distension. There is no tenderness. Musculoskeletal: Normal range of motion. Neurological:  
Sleepy, arouses to tactile stimulation but quickly falls asleep again Skin: Skin is warm and dry. Psychiatric: She has a normal mood and affect. Nursing note and vitals reviewed. Diagnostic Study Results Labs - Recent Results (from the past 12 hour(s)) METABOLIC PANEL, COMPREHENSIVE Collection Time: 02/22/19 10:30 PM  
Result Value Ref Range Sodium 144 136 - 145 mmol/L Potassium 3.8 3.5 - 5.1 mmol/L Chloride 108 97 - 108 mmol/L  
 CO2 27 21 - 32 mmol/L Anion gap 9 5 - 15 mmol/L Glucose 193 (H) 65 - 100 mg/dL BUN 12 6 - 20 MG/DL Creatinine 0.76 0.55 - 1.02 MG/DL  
 BUN/Creatinine ratio 16 12 - 20 GFR est AA >60 >60 ml/min/1.73m2 GFR est non-AA >60 >60 ml/min/1.73m2 Calcium 8.0 (L) 8.5 - 10.1 MG/DL Bilirubin, total 0.5 0.2 - 1.0 MG/DL  
 ALT (SGPT) 27 12 - 78 U/L  
 AST (SGOT) 16 15 - 37 U/L Alk. phosphatase 101 45 - 117 U/L  Protein, total 6.2 (L) 6.4 - 8.2 g/dL Albumin 2.8 (L) 3.5 - 5.0 g/dL Globulin 3.4 2.0 - 4.0 g/dL A-G Ratio 0.8 (L) 1.1 - 2.2    
CBC W/O DIFF Collection Time: 02/22/19 10:30 PM  
Result Value Ref Range WBC 6.7 3.6 - 11.0 K/uL  
 RBC 4.79 3.80 - 5.20 M/uL  
 HGB 12.3 11.5 - 16.0 g/dL HCT 35.1 35.0 - 47.0 % MCV 73.3 (L) 80.0 - 99.0 FL  
 MCH 25.7 (L) 26.0 - 34.0 PG  
 MCHC 35.0 30.0 - 36.5 g/dL  
 RDW 16.6 (H) 11.5 - 14.5 % PLATELET 721 957 - 764 K/uL MPV 12.0 8.9 - 12.9 FL  
 NRBC 0.0 0  WBC ABSOLUTE NRBC 0.00 0.00 - 0.01 K/uL ETHYL ALCOHOL Collection Time: 02/22/19 10:30 PM  
Result Value Ref Range ALCOHOL(ETHYL),SERUM 149 (H) <10 MG/DL  
GLUCOSE, POC Collection Time: 02/22/19 10:30 PM  
Result Value Ref Range Glucose (POC) 201 (H) 65 - 100 mg/dL Performed by Donte Fang (Tech) TROPONIN I Collection Time: 02/22/19 10:30 PM  
Result Value Ref Range Troponin-I, Qt. <0.05 <0.05 ng/mL EKG, 12 LEAD, INITIAL Collection Time: 02/22/19 10:35 PM  
Result Value Ref Range Ventricular Rate 94 BPM  
 Atrial Rate 94 BPM  
 P-R Interval 138 ms QRS Duration 70 ms Q-T Interval 398 ms QTC Calculation (Bezet) 497 ms Calculated P Axis 79 degrees Calculated R Axis 54 degrees Calculated T Axis 90 degrees Diagnosis Normal sinus rhythm Nonspecific T wave abnormality Prolonged QT Abnormal ECG When compared with ECG of 22-FEB-2019 06:30, No significant change was found Radiologic Studies - No orders to display CT Results  (Last 48 hours) None CXR Results  (Last 48 hours) None Medical Decision Making I am the first provider for this patient. I reviewed the vital signs, available nursing notes, past medical history, past surgical history, family history and social history. Vital Signs-Reviewed the patient's vital signs.  
Patient Vitals for the past 12 hrs: 
 Temp Pulse Resp BP SpO2  
02/23/19 0200    103/85   
02/23/19 0100  Jennifer Brown 117/57 98 % 02/23/19 0001    143/84 99 % 02/22/19 2300    130/76 97 % 02/22/19 2218 97.4 °F (36.3 °C) 95 14 120/70 98 % Pulse Oximetry Analysis - 98% on RA Cardiac Monitor:  
Rate: 95 bpm 
Rhythm: Normal Sinus Rhythm EKG interpretation: (Preliminary) 2235 Rhythm: normal sinus rhythm; and regular . Rate (approx.): 94; Axis: normal; DC interval: normal; QRS interval: normal ; ST/T wave: non specific T wave abnormality, prolonged QT; Other findings: abnormal ekg. Records Reviewed: Nursing Notes and Old Medical Records Provider Notes (Medical Decision Making): Pt presents intoxicated with complaints of CP. Reportedly told nursing she was suicidal due to her persistent chest pain. Will re-eval when patient is more clinically sober and able to stay awake to answer questions. Will check basic labs, troponin, ekg ED Course:  
Initial assessment performed. The patients presenting problems have been discussed, and they are in agreement with the care plan formulated and outlined with them. I have encouraged them to ask questions as they arise throughout their visit. 1:11 AM 
Reevaluated patient who is now more alert and sitting upright. She reports she was seen at [facility] in the past for chest pain and was told she needed a cardiac stent placed. She states she does not recall making any suicidal statements and denies any SI or HI at this time. She states her pain has improved but is still having chest pain, which she reports has been constant for over a month. She reports she takes ASA daily. She recently had a stress test with Dr. Jimy Rubalcava but does not yet know the results. Pt now clinically sober. Has been ambulatory with a steady gait. 1:35 AM 
According to [facility] chart review patient was seen by cardiology at [facility] and had dobutamine stress test on January 26th that showed a \"small area of ischemic risk that was 2%. \" Interventional cardiology saw patient and thought that abnormal stress test was more related to cocaine use than obstructive CAD. Thought patient was high risk for catheterization procedure and that the procedure would be low yield due to patient's lack of compliance with medications and unstable social situation Labs with elevated EtOH level, otherwise wnl. Given negative troponin and reassuring ekg, doubt acute cardiac process. Pt is stable for discharge with outpatient follow up with cardiology Critical Care Time:  
none Disposition: 
Discharge Note: 
2:19 AM 
The patient has been re-evaluated and is ready for discharge. Reviewed available results with patient. Counseled patient on diagnosis and care plan. Patient has expressed understanding, and all questions have been answered. Patient agrees with plan and agrees to follow up as recommended, or to return to the ED if their symptoms worsen. Discharge instructions have been provided and explained to the patient, along with reasons to return to the ED. PLAN: 
1. Discharge Medication List as of 2/23/2019  2:13 AM  
  
CONTINUE these medications which have CHANGED Details  
nitroglycerin (NITROSTAT) 0.4 mg SL tablet Take 1 Tab by mouth every five (5) minutes as needed for Chest Pain. Sit/Lay down then put one tab under the tongue every 5 minutes as needed for chest pain for 3 doses, Print, Disp-1 Bottle, R-0  
  
albuterol (PROVENTIL HFA, VENTOLIN HFA, PROAIR HFA) 90 mcg/actuation inhaler Take 2 Puffs by inhalation every four (4) hours as needed for Wheezing., Print, Disp-1 Inhaler, R-0  
  
  
CONTINUE these medications which have NOT CHANGED Details  
insulin NPH/insulin regular (NOVOLIN 70/30, HUMULIN 70/30) 100 unit/mL (70-30) injection 15 Units by SubCUTAneous route two (2) times a day., Print, Disp-10 mL, R-0  
  
glucose blood VI test strips (FREESTYLE INSULINX TEST STRIPS) strip As needed, Normal, Disp-100 Strip, R-0 Blood-Glucose Meter monitoring kit Daily as needed for blood glucose monitoring., Normal, Disp-1 Kit, R-0  
  
lancets 23 gauge misc Use daily for glucose monitoring as needed., Normal, Disp-100 Lancet, R-0  
  
raNITIdine hcl 150 mg capsule TAKE 1 TABLET BY MOUTH TWICE A DAY FOR 10 DAYS, Historical Med, R-0  
  
aspirin delayed-release 81 mg tablet TAKE 1 TABLET BY MOUTH EVERY DAY WITH 6 TO 8 OUNCES OF PLAIN WATER, Historical Med, R-0  
  
losartan (COZAAR) 100 mg tablet Take 1 Tab by mouth daily. , Normal, Disp-30 Tab, R-6  
  
haloperidol (HALDOL) 5 mg tablet Take 1 Tab by mouth every twelve (12) hours. , Print, Disp-60 Tab, R-0  
  
benztropine (COGENTIN) 0.5 mg tablet Take 2 Tabs by mouth every twelve (12) hours for 30 days. , Print, Disp-120 Tab, R-0  
  
valproic acid (DEPAKENE) 250 mg capsule Take 2 Caps by mouth every twelve (12) hours for 30 days. , Print, Disp-120 Cap, R-0  
  
sertraline (ZOLOFT) 25 mg tablet Take 1 Tab by mouth daily for 30 days. , Print, Disp-30 Tab, R-0  
  
divalproex sodium (DEPAKOTE PO) Take  by mouth., Historical Med 2. Follow-up Information Follow up With Specialties Details Why Contact Info Other, MD Marcel  Schedule an appointment as soon as possible for a visit  Patient can only remember the practice name and not the physician Retta Ganser, MD Cardiology Schedule an appointment as soon as possible for a visit  [address] [place] [address] 
[phone] [facility] EMERGENCY DEPT Emergency Medicine  As needed, If symptoms worsen Steph Ramos Return to ED if worse Diagnosis Clinical Impression: 1. Chest pain, unspecified type 2. Alcoholic intoxication without complication ([address] Attestations: This note is prepared by Cele London, acting as Scribe for MD Giovanni Cantu MD: The scribe's documentation has been prepared under my direction and personally reviewed by me in its entirety.  I confirm that the note above accurately reflects all work, treatment, procedures, and medical decision making performed by me.

## 2019-02-25 ENCOUNTER — APPOINTMENT (OUTPATIENT)
Dept: CT IMAGING | Age: 44
DRG: 885 | End: 2019-02-25
Attending: PHYSICIAN ASSISTANT
Payer: MEDICARE

## 2019-02-25 ENCOUNTER — HOSPITAL ENCOUNTER (INPATIENT)
Age: 44
LOS: 7 days | Discharge: HOME | DRG: 885 | End: 2019-03-04
Attending: EMERGENCY MEDICINE | Admitting: PSYCHIATRY & NEUROLOGY
Payer: MEDICARE

## 2019-02-25 DIAGNOSIS — F20.9 SCHIZOPHRENIA, UNSPECIFIED TYPE (HCC): Primary | ICD-10-CM

## 2019-02-25 LAB
AMPHET UR QL SCN: NEGATIVE
ANION GAP SERPL CALC-SCNC: 8 MMOL/L (ref 5–15)
APPEARANCE UR: CLEAR
BACTERIA URNS QL MICRO: NEGATIVE /HPF
BARBITURATES UR QL SCN: NEGATIVE
BASOPHILS # BLD: 0.1 K/UL (ref 0–0.1)
BASOPHILS NFR BLD: 1 % (ref 0–1)
BENZODIAZ UR QL: NEGATIVE
BILIRUB UR QL: NEGATIVE
BUN SERPL-MCNC: 11 MG/DL (ref 6–20)
BUN/CREAT SERPL: 14 (ref 12–20)
CALCIUM SERPL-MCNC: 8.7 MG/DL (ref 8.5–10.1)
CANNABINOIDS UR QL SCN: NEGATIVE
CHLORIDE SERPL-SCNC: 97 MMOL/L (ref 97–108)
CO2 SERPL-SCNC: 29 MMOL/L (ref 21–32)
COCAINE UR QL SCN: POSITIVE
COLOR UR: ABNORMAL
CREAT SERPL-MCNC: 0.79 MG/DL (ref 0.55–1.02)
DIFFERENTIAL METHOD BLD: ABNORMAL
DRUG SCRN COMMENT,DRGCM: ABNORMAL
EOSINOPHIL # BLD: 0.2 K/UL (ref 0–0.4)
EOSINOPHIL NFR BLD: 2 % (ref 0–7)
EPITH CASTS URNS QL MICRO: ABNORMAL /LPF
ERYTHROCYTE [DISTWIDTH] IN BLOOD BY AUTOMATED COUNT: 17.6 % (ref 11.5–14.5)
ETHANOL SERPL-MCNC: <10 MG/DL
GLUCOSE SERPL-MCNC: 291 MG/DL (ref 65–100)
GLUCOSE UR STRIP.AUTO-MCNC: >1000 MG/DL
HCG UR QL: NEGATIVE
HCT VFR BLD AUTO: 46.5 % (ref 35–47)
HGB BLD-MCNC: 16.5 G/DL (ref 11.5–16)
HGB UR QL STRIP: NEGATIVE
IMM GRANULOCYTES # BLD AUTO: 0 K/UL (ref 0–0.04)
IMM GRANULOCYTES NFR BLD AUTO: 0 % (ref 0–0.5)
KETONES UR QL STRIP.AUTO: NEGATIVE MG/DL
LEUKOCYTE ESTERASE UR QL STRIP.AUTO: NEGATIVE
LYMPHOCYTES # BLD: 2.7 K/UL (ref 0.8–3.5)
LYMPHOCYTES NFR BLD: 33 % (ref 12–49)
MCH RBC QN AUTO: 25.8 PG (ref 26–34)
MCHC RBC AUTO-ENTMCNC: 35.5 G/DL (ref 30–36.5)
MCV RBC AUTO: 72.7 FL (ref 80–99)
METHADONE UR QL: NEGATIVE
MONOCYTES # BLD: 0.7 K/UL (ref 0–1)
MONOCYTES NFR BLD: 8 % (ref 5–13)
NEUTS SEG # BLD: 4.6 K/UL (ref 1.8–8)
NEUTS SEG NFR BLD: 56 % (ref 32–75)
NITRITE UR QL STRIP.AUTO: NEGATIVE
NRBC # BLD: 0 K/UL (ref 0–0.01)
NRBC BLD-RTO: 0 PER 100 WBC
OPIATES UR QL: NEGATIVE
PCP UR QL: NEGATIVE
PH UR STRIP: 5.5 [PH] (ref 5–8)
PLATELET # BLD AUTO: 169 K/UL (ref 150–400)
POTASSIUM SERPL-SCNC: 3.8 MMOL/L (ref 3.5–5.1)
PROT UR STRIP-MCNC: NEGATIVE MG/DL
RBC # BLD AUTO: 6.4 M/UL (ref 3.8–5.2)
RBC #/AREA URNS HPF: ABNORMAL /HPF (ref 0–5)
RBC MORPH BLD: ABNORMAL
SODIUM SERPL-SCNC: 134 MMOL/L (ref 136–145)
SP GR UR REFRACTOMETRY: 1.01 (ref 1–1.03)
UA: UC IF INDICATED,UAUC: ABNORMAL
UROBILINOGEN UR QL STRIP.AUTO: 0.2 EU/DL (ref 0.2–1)
VALPROATE SERPL-MCNC: <3 UG/ML (ref 50–100)
WBC # BLD AUTO: 8.3 K/UL (ref 3.6–11)
WBC URNS QL MICRO: ABNORMAL /HPF (ref 0–4)

## 2019-02-25 PROCEDURE — 99285 EMERGENCY DEPT VISIT HI MDM: CPT

## 2019-02-25 PROCEDURE — 70450 CT HEAD/BRAIN W/O DYE: CPT

## 2019-02-25 PROCEDURE — 81001 URINALYSIS AUTO W/SCOPE: CPT

## 2019-02-25 PROCEDURE — 90791 PSYCH DIAGNOSTIC EVALUATION: CPT

## 2019-02-25 PROCEDURE — 80048 BASIC METABOLIC PNL TOTAL CA: CPT

## 2019-02-25 PROCEDURE — 65220000003 HC RM SEMIPRIVATE PSYCH

## 2019-02-25 PROCEDURE — 81025 URINE PREGNANCY TEST: CPT

## 2019-02-25 PROCEDURE — 74011250636 HC RX REV CODE- 250/636: Performed by: PHYSICIAN ASSISTANT

## 2019-02-25 PROCEDURE — 80307 DRUG TEST PRSMV CHEM ANLYZR: CPT

## 2019-02-25 PROCEDURE — 80164 ASSAY DIPROPYLACETIC ACD TOT: CPT

## 2019-02-25 PROCEDURE — 74011250637 HC RX REV CODE- 250/637: Performed by: PHYSICIAN ASSISTANT

## 2019-02-25 PROCEDURE — 85025 COMPLETE CBC W/AUTO DIFF WBC: CPT

## 2019-02-25 PROCEDURE — 36415 COLL VENOUS BLD VENIPUNCTURE: CPT

## 2019-02-25 RX ORDER — LORAZEPAM 1 MG/1
1 TABLET ORAL
Status: DISCONTINUED | OUTPATIENT
Start: 2019-02-25 | End: 2019-03-04 | Stop reason: HOSPADM

## 2019-02-25 RX ORDER — ZOLPIDEM TARTRATE 10 MG/1
10 TABLET ORAL
Status: DISCONTINUED | OUTPATIENT
Start: 2019-02-25 | End: 2019-03-04 | Stop reason: HOSPADM

## 2019-02-25 RX ORDER — OLANZAPINE 5 MG/1
5 TABLET ORAL
Status: DISCONTINUED | OUTPATIENT
Start: 2019-02-25 | End: 2019-03-04 | Stop reason: HOSPADM

## 2019-02-25 RX ORDER — LOSARTAN POTASSIUM 25 MG/1
100 TABLET ORAL
Status: COMPLETED | OUTPATIENT
Start: 2019-02-25 | End: 2019-02-25

## 2019-02-25 RX ORDER — ADHESIVE BANDAGE
30 BANDAGE TOPICAL DAILY PRN
Status: DISCONTINUED | OUTPATIENT
Start: 2019-02-25 | End: 2019-03-04 | Stop reason: HOSPADM

## 2019-02-25 RX ORDER — IBUPROFEN 200 MG
1 TABLET ORAL
Status: DISCONTINUED | OUTPATIENT
Start: 2019-02-25 | End: 2019-03-04 | Stop reason: HOSPADM

## 2019-02-25 RX ORDER — LORAZEPAM 2 MG/ML
2 INJECTION INTRAMUSCULAR
Status: DISCONTINUED | OUTPATIENT
Start: 2019-02-25 | End: 2019-03-04 | Stop reason: HOSPADM

## 2019-02-25 RX ORDER — BENZTROPINE MESYLATE 1 MG/ML
2 INJECTION INTRAMUSCULAR; INTRAVENOUS
Status: DISCONTINUED | OUTPATIENT
Start: 2019-02-25 | End: 2019-03-04 | Stop reason: HOSPADM

## 2019-02-25 RX ORDER — BENZTROPINE MESYLATE 2 MG/1
2 TABLET ORAL
Status: DISCONTINUED | OUTPATIENT
Start: 2019-02-25 | End: 2019-03-04 | Stop reason: HOSPADM

## 2019-02-25 RX ORDER — ACETAMINOPHEN 325 MG/1
650 TABLET ORAL
Status: DISCONTINUED | OUTPATIENT
Start: 2019-02-25 | End: 2019-03-04 | Stop reason: HOSPADM

## 2019-02-25 RX ADMIN — LOSARTAN POTASSIUM 100 MG: 25 TABLET ORAL at 16:27

## 2019-02-25 RX ADMIN — SODIUM CHLORIDE 1000 ML: 900 INJECTION, SOLUTION INTRAVENOUS at 16:55

## 2019-02-25 NOTE — ED PROVIDER NOTES
EMERGENCY DEPARTMENT HISTORY AND PHYSICAL EXAM 
 
 
Date: 2/25/2019 Patient Name: Taurus Grajeda History of Presenting Illness Chief Complaint Patient presents with  Mental Health Problem History Provided By: Patient HPI: Taurus Grajeda, 38 y.o. female with PMHx significant for DM, htn, borderline personality ds, alcohol abuse, GERD, asthma, kidney stones, sickle cell trait, hypercholesterol, schizophrenia presents via ambulance to the ED. Pt typically only nods yes or no to give answers. Pt states family called ambulance for her. States she has not takes medication for 1-2 months. Pt admits to relapsing last night with illicit drugs. Admits to regular alcohol use. Admits to hearing voices, but will not describe what she hears. Reports decreased appetite and decreased sleeping. There are no other complaints, changes, or physical findings at this time. PCP: Lisa, MD Marcel 
 
No current facility-administered medications on file prior to encounter. Current Outpatient Medications on File Prior to Encounter Medication Sig Dispense Refill  albuterol (PROVENTIL HFA, VENTOLIN HFA, PROAIR HFA) 90 mcg/actuation inhaler Take 2 Puffs by inhalation every four (4) hours as needed for Wheezing. 1 Inhaler 0  
 insulin NPH/insulin regular (NOVOLIN 70/30, HUMULIN 70/30) 100 unit/mL (70-30) injection 15 Units by SubCUTAneous route two (2) times a day. 10 mL 0  
 losartan (COZAAR) 100 mg tablet Take 1 Tab by mouth daily. 30 Tab 6  
 haloperidol (HALDOL) 5 mg tablet Take 1 Tab by mouth every twelve (12) hours. 60 Tab 0  
 raNITIdine hcl 150 mg capsule TAKE 1 TABLET BY MOUTH TWICE A DAY FOR 10 DAYS  0  
 aspirin delayed-release 81 mg tablet TAKE 1 TABLET BY MOUTH EVERY DAY WITH 6 TO 8 OUNCES OF PLAIN WATER  0 Past History Past Medical History: 
Past Medical History:  
Diagnosis Date  Alcohol abuse, in remission   
 quit 17 days ago  Asthma   
 does not use inhalers  Borderline personality disorder ([facility] [address]  Diabetes ([facility] [address]  GERD (gastroesophageal reflux disease)  Hypertension  Other ill-defined conditions(799.89)   
 kidney stones ,passed one  Other ill-defined conditions(799.89) sickle cell trait  Other ill-defined conditions(799.89)   
 increased cholesterol  Pancreatitis  Psychiatric disorder   
 schizophrenia, bipolar, depression, anxiety Past Surgical History: 
Past Surgical History:  
Procedure Laterality Date  ABDOMEN SURGERY PROC UNLISTED  3/12/14 CHOLECYSTECTOMY LAPAROSCOPIC    
 HX GASTRIC BYPASS  HX GYN  11/8/2012  
 c section x2  HX OTHER SURGICAL  3/13/14 ENDOSCOPIC RETROGRADE CHOLANGIOPANCREATOGRAPHY  HX OTHER SURGICAL  8/4/14  
 endoscopic stent placed to bile duct  HX TUBAL LIGATION  2012 Family History: 
Family History Problem Relation Age of Onset  Heart Disease Mother  Diabetes Mother  Hypertension Mother  Hypertension Maternal Grandmother Social History: 
Social History Tobacco Use  Smoking status: Current Every Day Smoker Packs/day: 0.50 Years: 14.00 Pack years: 7.00 Types: Cigarettes  Smokeless tobacco: Never Used  Tobacco comment: cigarettes Substance Use Topics  Alcohol use: Yes Alcohol/week: 4.8 oz Types: 2 Glasses of wine, 6 Cans of beer per week  Drug use: No  
  Comment: yesterday 12/27/18 Allergies: Allergies Allergen Reactions  Dilaudid [Hydromorphone (Bulk)] Itching  Ibuprofen Not Reported This Time Review of Systems Review of Systems Constitutional: Negative for chills and fever. Respiratory: Negative for shortness of breath. Cardiovascular: Negative for chest pain. Gastrointestinal: Negative for abdominal pain, nausea and vomiting. Genitourinary: Negative for flank pain. Musculoskeletal: Negative for back pain and myalgias.   
Skin: Negative for color change, pallor, rash and wound.  
Neurological: Negative for dizziness, weakness and light-headedness. Psychiatric/Behavioral: Positive for hallucinations and sleep disturbance. Negative for agitation, behavioral problems, confusion, decreased concentration, dysphoric mood, self-injury and suicidal ideas. The patient is not nervous/anxious and is not hyperactive. All other systems reviewed and are negative. Physical Exam  
Physical Exam  
Constitutional: She is oriented to person, place, and time. She appears well-developed and well-nourished. No distress. HENT:  
Head: Normocephalic and atraumatic. Eyes: Conjunctivae are normal.  
Cardiovascular: Normal rate, regular rhythm and normal heart sounds. No murmur heard. Pulmonary/Chest: Effort normal and breath sounds normal. No respiratory distress. She has no wheezes. She has no rales. Abdominal: Soft. Bowel sounds are normal. She exhibits no distension. Musculoskeletal: Normal range of motion. Neurological: She is alert and oriented to person, place, and time. Skin: Skin is warm. No rash noted. Psychiatric: She has a normal mood and affect. She is slowed and withdrawn. She expresses no homicidal and no suicidal ideation. She expresses no suicidal plans and no homicidal plans. Pt typically only nods yes or no Does not appear distracted Mouth tick Nursing note and vitals reviewed. Diagnostic Study Results Labs - Recent Results (from the past 12 hour(s)) GLUCOSE, POC Collection Time: 02/26/19 12:07 PM  
Result Value Ref Range Glucose (POC) 487 (H) 65 - 100 mg/dL Performed by Altagracia Dalia GLUCOSE, POC Collection Time: 02/26/19  1:50 PM  
Result Value Ref Range Glucose (POC) 432 (H) 65 - 100 mg/dL Performed by Warren Lowe GLUCOSE, POC Collection Time: 02/26/19  2:52 PM  
Result Value Ref Range Glucose (POC) 251 (H) 65 - 100 mg/dL Performed by Warren Lowe GLUCOSE, POC  Collection Time: 02/26/19  4:24 PM Result Value Ref Range Glucose (POC) 147 (H) 65 - 100 mg/dL Performed by Cristian Finney GLUCOSE, POC Collection Time: 02/26/19  9:15 PM  
Result Value Ref Range Glucose (POC) 341 (H) 65 - 100 mg/dL Performed by Theresa Denney Radiologic Studies -  
CT HEAD WO CONT Final Result IMPRESSION:   
  
No acute intracranial abnormality. CT Results  (Last 48 hours) 02/25/19 1537  CT HEAD WO CONT Final result Impression:  IMPRESSION:   
   
No acute intracranial abnormality. Narrative:  EXAM:  CT HEAD WO CONT INDICATION: Unusual behavior. Mental health problems. COMPARISON: None TECHNIQUE: Noncontrast head CT. Coronal and sagittal reformats. CT dose  
reduction was achieved through use of a standardized protocol tailored for this  
examination and automatic exposure control for dose modulation. FINDINGS: The ventricles and sulci are age-appropriate without hydrocephalus. There is no mass effect or midline shift. There is no intracranial hemorrhage or  
extra-axial fluid collection. There is no abnormal parenchymal attenuation. The  
gray-white matter differentiation is maintained. The basal cisterns are patent. The osseous structures are intact. The visualized paranasal sinuses and mastoid  
air cells are clear. CXR Results  (Last 48 hours) None Medical Decision Making I am the first provider for this patient. I reviewed the vital signs, available nursing notes, past medical history, past surgical history, family history and social history. Vital Signs-Reviewed the patient's vital signs. Patient Vitals for the past 12 hrs: 
 Temp Pulse Resp BP SpO2  
02/26/19 2037 98.2 °F (36.8 °C) 80 16 136/61 100 % 02/26/19 1044 98 °F (36.7 °C) 81 16 138/63 100 % Records Reviewed: Nursing Notes and Old Medical Records Provider Notes (Medical Decision Making): DDx: Polysubstance abuse, alcohol intoxication, psychosis, UTI, electrolyte abnormality, ICH vs mass ED Course:  
Initial assessment performed. The patients presenting problems have been discussed, and they are in agreement with the care plan formulated and outlined with them. I have encouraged them to ask questions as they arise throughout their visit. 4:05 PM 
[facility] spoke with crisis. Pt would not speak with [facility] and she has always spoken with [facility] in the past. Because pt is not able to be evaluated, crisis called. Crisis states pt needs TDO. Will apply. 7:00 PM 
Crisis states they do not know if TDO has been set in motion. Pt has available bed at [facility] inpatient behavioral health services. TDO attained - Pt admitted to inpatient behavioral health. Disposition: 
Pt admitted to inpatient behavioral health at [facility]: 
1. Current Discharge Medication List  
  
 
2. Follow-up Information None Return to ED if worse Diagnosis Clinical Impression: 1. Schizophrenia, unspecified type ([facility] [address] Airway patent, Nasal mucosa clear. Mouth with normal mucosa. Throat has no vesicles, no oropharyngeal exudates and uvula is midline.

## 2019-02-25 NOTE — BSMART NOTE
Writer attempted to perform an assessment on this individual with ED  Margarette Fragoso present due to the patient's familiarity and comfort with her. On assessment she was only oriented to self and was unable to verbally answer questions and would shake her head and nod her head to answer other questions. She was observed to be moving her jaw uncontrollably. This is far from the patient's baseline mental status which several ED staff members are familiar with. Writer told patient if she was unable to answer my questions, I would have to call [facility] to come evaluate her. She shook her head and nodded her head. [facility] Crisis was called by this writer and a prescreening was requested. Cornelio Sun will come see the patient. ED  and nursing staff aware that if the patient tries to leave then writer will call to request and ECO. Writer spoke with Dr. Dunia Palm who agreed with the plan and requested a head CT. MAUREEN Gonzalez, Supervisee in Social Work

## 2019-02-25 NOTE — ED TRIAGE NOTES
Pt arrives by EMS, non verbal, reported to be a spitter. Family called EMS for non compliance with medications, altered behavior x few days.

## 2019-02-25 NOTE — ED NOTES
Patient given a meal tray. Pt quickly ate the meal tray with her hand even though she was provided with eating utensils. Pt finished drinking her tea and then threw the cup at the nurse.

## 2019-02-25 NOTE — ED NOTES
Bhavin Arreola from [facility] reports that the patient needs to be evaluated by CRISIS.

## 2019-02-25 NOTE — ED NOTES
Patient presents to the ED via EMS with c/o mental health. Pt family told EMS that the patient has not been taking her medications and is acting bizarre. Pt denies being SI or HI. Pt reports that she has not taken her medications in months. Pt denies any drug use or alcohol. EMS reports that patient was spitting, Pt did not spit at nurse or during triage Pt is alert and oriented to person and place. Pt skin is warm and dry. Pt has some tardive dyskinesia. Pt is ambulatory independently. Pt is uncooperative and does not want to answer questions. Pt stood up in room and was looking into a small window where the next room was dark. Patient belongings collected and secured. Pt placed in a gown. Emergency Department Nursing Plan of Care The Nursing Plan of Care is developed from the Nursing assessment and Emergency Department Attending provider initial evaluation. The plan of care may be reviewed in the ED Provider note. The Plan of Care was developed with the following considerations:  
Patient / Family readiness to learn indicated by:verbalized understanding Persons(s) to be included in education: patient Barriers to Learning/Limitations:No 
 
Signed Abdoulaye Cuff 2/25/2019   2:52 PM

## 2019-02-26 PROBLEM — F20.9 SCHIZOPHRENIA (HCC): Status: RESOLVED | Noted: 2019-02-25 | Resolved: 2019-02-26

## 2019-02-26 LAB
GLUCOSE BLD STRIP.AUTO-MCNC: 147 MG/DL (ref 65–100)
GLUCOSE BLD STRIP.AUTO-MCNC: 251 MG/DL (ref 65–100)
GLUCOSE BLD STRIP.AUTO-MCNC: 341 MG/DL (ref 65–100)
GLUCOSE BLD STRIP.AUTO-MCNC: 432 MG/DL (ref 65–100)
GLUCOSE BLD STRIP.AUTO-MCNC: 487 MG/DL (ref 65–100)
SERVICE CMNT-IMP: ABNORMAL

## 2019-02-26 PROCEDURE — 74011250636 HC RX REV CODE- 250/636: Performed by: INTERNAL MEDICINE

## 2019-02-26 PROCEDURE — 74011250637 HC RX REV CODE- 250/637: Performed by: INTERNAL MEDICINE

## 2019-02-26 PROCEDURE — 65220000003 HC RM SEMIPRIVATE PSYCH

## 2019-02-26 PROCEDURE — 82962 GLUCOSE BLOOD TEST: CPT

## 2019-02-26 PROCEDURE — 74011250637 HC RX REV CODE- 250/637: Performed by: PSYCHIATRY & NEUROLOGY

## 2019-02-26 PROCEDURE — 74011636637 HC RX REV CODE- 636/637: Performed by: INTERNAL MEDICINE

## 2019-02-26 RX ORDER — INSULIN LISPRO 100 [IU]/ML
10 INJECTION, SOLUTION INTRAVENOUS; SUBCUTANEOUS ONCE
Status: COMPLETED | OUTPATIENT
Start: 2019-02-26 | End: 2019-02-26

## 2019-02-26 RX ORDER — BENZTROPINE MESYLATE 1 MG/1
1 TABLET ORAL EVERY 12 HOURS
Status: DISCONTINUED | OUTPATIENT
Start: 2019-02-26 | End: 2019-03-04 | Stop reason: HOSPADM

## 2019-02-26 RX ORDER — LOSARTAN POTASSIUM 50 MG/1
100 TABLET ORAL DAILY
Status: DISCONTINUED | OUTPATIENT
Start: 2019-02-26 | End: 2019-03-04 | Stop reason: HOSPADM

## 2019-02-26 RX ORDER — DEXTROSE 50 % IN WATER (D50W) INTRAVENOUS SYRINGE
12.5-25 AS NEEDED
Status: DISCONTINUED | OUTPATIENT
Start: 2019-02-26 | End: 2019-03-04 | Stop reason: HOSPADM

## 2019-02-26 RX ORDER — LORAZEPAM 2 MG/ML
2 INJECTION INTRAMUSCULAR EVERY 12 HOURS
Status: DISCONTINUED | OUTPATIENT
Start: 2019-02-26 | End: 2019-02-26

## 2019-02-26 RX ORDER — INSULIN LISPRO 100 [IU]/ML
3 INJECTION, SOLUTION INTRAVENOUS; SUBCUTANEOUS
Status: DISCONTINUED | OUTPATIENT
Start: 2019-02-26 | End: 2019-02-27

## 2019-02-26 RX ORDER — LORAZEPAM 2 MG/ML
1 INJECTION INTRAMUSCULAR EVERY 12 HOURS
Status: DISCONTINUED | OUTPATIENT
Start: 2019-02-26 | End: 2019-03-04 | Stop reason: HOSPADM

## 2019-02-26 RX ORDER — DIVALPROEX SODIUM 500 MG/1
500 TABLET, DELAYED RELEASE ORAL EVERY 12 HOURS
Status: DISCONTINUED | OUTPATIENT
Start: 2019-02-26 | End: 2019-03-04 | Stop reason: HOSPADM

## 2019-02-26 RX ORDER — BENZTROPINE MESYLATE 1 MG/ML
1 INJECTION INTRAMUSCULAR; INTRAVENOUS EVERY 12 HOURS
Status: DISCONTINUED | OUTPATIENT
Start: 2019-02-26 | End: 2019-03-04 | Stop reason: HOSPADM

## 2019-02-26 RX ORDER — INSULIN LISPRO 100 [IU]/ML
INJECTION, SOLUTION INTRAVENOUS; SUBCUTANEOUS
Status: DISCONTINUED | OUTPATIENT
Start: 2019-02-26 | End: 2019-03-04 | Stop reason: HOSPADM

## 2019-02-26 RX ORDER — CLONAZEPAM 1 MG/1
1 TABLET ORAL EVERY 12 HOURS
Status: DISCONTINUED | OUTPATIENT
Start: 2019-02-26 | End: 2019-03-04 | Stop reason: HOSPADM

## 2019-02-26 RX ORDER — HALOPERIDOL 5 MG/1
5 TABLET ORAL EVERY 12 HOURS
Status: DISCONTINUED | OUTPATIENT
Start: 2019-02-26 | End: 2019-03-04 | Stop reason: HOSPADM

## 2019-02-26 RX ORDER — DIVALPROEX SODIUM 500 MG/1
500 TABLET, DELAYED RELEASE ORAL EVERY 12 HOURS
Status: DISCONTINUED | OUTPATIENT
Start: 2019-02-26 | End: 2019-02-26

## 2019-02-26 RX ORDER — MAGNESIUM SULFATE 100 %
4 CRYSTALS MISCELLANEOUS AS NEEDED
Status: DISCONTINUED | OUTPATIENT
Start: 2019-02-26 | End: 2019-03-04 | Stop reason: HOSPADM

## 2019-02-26 RX ORDER — HALOPERIDOL 5 MG/ML
5 INJECTION INTRAMUSCULAR EVERY 12 HOURS
Status: DISCONTINUED | OUTPATIENT
Start: 2019-02-26 | End: 2019-03-04 | Stop reason: HOSPADM

## 2019-02-26 RX ADMIN — INSULIN LISPRO 5 UNITS: 100 INJECTION, SOLUTION INTRAVENOUS; SUBCUTANEOUS at 16:28

## 2019-02-26 RX ADMIN — INSULIN LISPRO 7 UNITS: 100 INJECTION, SOLUTION INTRAVENOUS; SUBCUTANEOUS at 22:00

## 2019-02-26 RX ADMIN — LORAZEPAM 1 MG: 1 TABLET ORAL at 08:25

## 2019-02-26 RX ADMIN — HUMAN INSULIN 15 UNITS: 100 INJECTION, SUSPENSION SUBCUTANEOUS at 13:08

## 2019-02-26 RX ADMIN — INSULIN LISPRO 10 UNITS: 100 INJECTION, SOLUTION INTRAVENOUS; SUBCUTANEOUS at 12:18

## 2019-02-26 RX ADMIN — OLANZAPINE 5 MG: 5 TABLET, FILM COATED ORAL at 08:25

## 2019-02-26 RX ADMIN — BENZTROPINE MESYLATE 1 MG: 1 TABLET ORAL at 14:57

## 2019-02-26 RX ADMIN — BENZTROPINE MESYLATE 1 MG: 1 TABLET ORAL at 21:52

## 2019-02-26 RX ADMIN — HALOPERIDOL 5 MG: 5 TABLET ORAL at 14:57

## 2019-02-26 RX ADMIN — DIVALPROEX SODIUM 500 MG: 500 TABLET, DELAYED RELEASE ORAL at 14:57

## 2019-02-26 RX ADMIN — LOSARTAN POTASSIUM 100 MG: 50 TABLET, FILM COATED ORAL at 13:08

## 2019-02-26 RX ADMIN — HALOPERIDOL 5 MG: 5 TABLET ORAL at 21:53

## 2019-02-26 RX ADMIN — SODIUM CHLORIDE 1000 ML: 900 INJECTION, SOLUTION INTRAVENOUS at 13:22

## 2019-02-26 RX ADMIN — DIVALPROEX SODIUM 500 MG: 500 TABLET, DELAYED RELEASE ORAL at 21:53

## 2019-02-26 RX ADMIN — CLONAZEPAM 1 MG: 1 TABLET ORAL at 14:57

## 2019-02-26 RX ADMIN — INSULIN LISPRO 3 UNITS: 100 INJECTION, SOLUTION INTRAVENOUS; SUBCUTANEOUS at 16:30

## 2019-02-26 RX ADMIN — HUMAN INSULIN 15 UNITS: 100 INJECTION, SUSPENSION SUBCUTANEOUS at 21:00

## 2019-02-26 RX ADMIN — CLONAZEPAM 1 MG: 1 TABLET ORAL at 21:53

## 2019-02-26 NOTE — BH NOTES
0815 Pt in dayroom taking other pts food. Disorganized. Psychotic. Anxious. Pt states she has flee's. Pt is scratching private smelling hand. Poor hygiene. Bizarre . Pt came out of her room undressed this morning. Thought blocking. Pt was redirected about being appropriate in dayroom during meal time. Pt finished eating and was taken to her room for time out. Pt came back into dayroom. begin taking other patients food again. Other pt became upset about food being taken and left dayroom during meal time. Pt is difficult to redirect. Not processing information. Continues to snatch trays from other pt and staff and eat food off of dirty food trays. Pt given and accepted ativan 1mg po and Zyprexa 5mg po. Will continue to monitor pt and assess needs. 1000 medication was effective. Pt is calmer in room resting at this time. Will continue to monitor patient and assess needs.

## 2019-02-26 NOTE — BH NOTES
Pt ate breakfast before blood sugar could be taken this morning. Pt blood sugar was taken at lunch was 487. MD notified. Gave order to give 10 units of Humalog once. MD will see patient for H&P today. Will continue to monitor pt and assess needs.

## 2019-02-26 NOTE — PROGRESS NOTES
[facility] Admission Pharmacy Medication Reconciliation Recommendations/Findings:  
1) Please interpret medication list below with caution. Patient unable to participate in medication history due to being sedated at time of interview. Unable to verify over-the-counter medications (aspirin, ranitidine) at this time. Medication list updated based on chart review and recent fills at outpatient pharmacy only. Patient does have a documented history of non-compliance to medications. 2) Medications recently prescribed/filled have primarily been from ED providers. Patient has had 13 ED visits in 2019. 3) Patient has several medications \"on hold\" at her outpatient pharmacy including benztropine and sertraline 4) Humulin 70/30 was recently filled on 2/14/19, directions 15 units SQ twice daily The following changes were made to the PTA medication list:  Additions: N/A 
 Modifications: N/A 
 Deletions: benztropine, divalproex, nitroglycerin, sertraline, valproic acid capsules Total Time Spent: 30 minutes Information obtained from: chart review, Lety Muhammad 44 @ [facility] Patient allergies: Allergies as of 02/25/2019 - Review Complete 02/25/2019 Allergen Reaction Noted  Dilaudid [hydromorphone (bulk)] Itching 07/30/2014  Ibuprofen Not Reported This Time 02/15/2017 Prior to Admission Medications Prescriptions Last Dose Informant Patient Reported? Taking? albuterol (PROVENTIL HFA, VENTOLIN HFA, PROAIR HFA) 90 mcg/actuation inhaler   No Yes Sig: Take 2 Puffs by inhalation every four (4) hours as needed for Wheezing. aspirin delayed-release 81 mg tablet Unknown at Unknown time  Yes No  
Sig: TAKE 1 TABLET BY MOUTH EVERY DAY WITH 6 TO 8 OUNCES OF PLAIN WATER  
haloperidol (HALDOL) 5 mg tablet   No Yes Sig: Take 1 Tab by mouth every twelve (12) hours. insulin NPH/insulin regular (NOVOLIN 70/30, HUMULIN 70/30) 100 unit/mL (70-30) injection   No Yes Sig: 15 Units by SubCUTAneous route two (2) times a day. losartan (COZAAR) 100 mg tablet   No Yes Sig: Take 1 Tab by mouth daily. raNITIdine hcl 150 mg capsule Unknown at Unknown time  Yes No  
Sig: TAKE 1 TABLET BY MOUTH TWICE A DAY FOR 10 DAYS Facility-Administered Medications: None Thank you, Corry Valdivia, PHARMD, BCPS

## 2019-02-26 NOTE — BH NOTES
Patient arrived to unit via wheelchair from [facility]. Patient was brought to ER via EMS. Family called because patient was acting \"erradic and not taking her meds for 2 days. \" She tested positive for cocaine. She was uncooperative and uninterested. She would only say \"I'm hungry\" She was given a sandwich and diet ginger ale. She had diarrhea while sitting on a chair in the hallway. She began taking off the band aids from her fingers and throwing them in the floor. She would not answer any questions. She was directed to her room and encouraged to shower and change. She stripped her gown off in the hallway and walked in her room and got in bed. She refused skin assessment.

## 2019-02-26 NOTE — H&P
Hospitalist Admission Note NAME: Sheri Hoskins :  1975 MRN:  [id number] Room Number: 118/11  @ [facility]  
 
Date/Time:  2019 12:19 PM 
 
Patient PCP: Haven Neville MD 
______________________________________________________________________ Given the patient's current clinical presentation, I have a high level of concern for decompensation if discharged from the emergency department. Complex decision making was performed, which includes reviewing the patient's available past medical records, laboratory results, and x-ray films. My assessment of this patient's clinical condition and my plan of care is as follows. Assessment / Plan: 
 
Uncontrolled DM1 Asthma,mild intermittent HTN Schizoaffective disorder with anxiety and depression: POA 
with polysubstance abuse and drug-seeking behavior Non compliance with meds Dehydration,borderline low sodium PLAN: 
A1c Diabetic diet, ISS,NPH 15 U BID Resume losartan Give 1L NS bolus, labs- hemoconcentration Psychiatric treatment and management of health issues,Defer to psychiatrist for further management. No VTE prophylaxis indicated or warranted at this time. Subjective: CHIEF COMPLAINT: hallucination HISTORY OF PRESENT ILLNESS:    
Danae Askew is a 38 y.o. female with PMH of bipolar,IDDM who is admitted in [place]. Per psych-,  Miranda Hoskins, is a 38 y.o. BLACK [place]  female with a past psychiatric history significant for schizoaffective disorder and cocaine use disorder, who presents at this time with complaints of (and/or evidence of) the following emotional symptoms: agitation, psychotic behavior and sundar. Additional symptomatology include confusion. The above symptoms have been present for 24+ hours. These symptoms are of moderate to high severity. These symptoms are constant in nature. The patient's condition has been precipitated by psychosocial stressors. Patient's condition made worse by continued illicit drug use as well as treatment noncompliance. UDS: +cocaine; BAL=0. Patient admits being non complaint with insulin and other meds. Denies fever,chills,chest pain, sob,abd pan,diarrhea,constipation,lighhadedness. No current medical concerns at this time.  
  
We were asked to admit for work up and evaluation of the above problems. Past Medical History:  
Diagnosis Date  Alcohol abuse, in remission   
 quit 17 days ago  Asthma   
 does not use inhalers  Borderline personality disorder ([address]  Diabetes ([facility] [address]  GERD (gastroesophageal reflux disease)  Hypertension  Other ill-defined conditions(799.89)   
 kidney stones ,passed one  Other ill-defined conditions(799.89) sickle cell trait  Other ill-defined conditions(799.89)   
 increased cholesterol  Pancreatitis  Psychiatric disorder   
 schizophrenia, bipolar, depression, anxiety Past Surgical History:  
Procedure Laterality Date  ABDOMEN SURGERY PROC UNLISTED  3/12/14 CHOLECYSTECTOMY LAPAROSCOPIC    
 HX GASTRIC BYPASS  HX GYN  11/8/2012  
 c section x2  HX OTHER SURGICAL  3/13/14 ENDOSCOPIC RETROGRADE CHOLANGIOPANCREATOGRAPHY  HX OTHER SURGICAL  8/4/14  
 endoscopic stent placed to bile duct  HX TUBAL LIGATION  2012 Social History Tobacco Use  Smoking status: Current Every Day Smoker Packs/day: 0.50 Years: 14.00 Pack years: 7.00 Types: Cigarettes  Smokeless tobacco: Never Used  Tobacco comment: cigarettes Substance Use Topics  Alcohol use: Yes Alcohol/week: 4.8 oz Types: 2 Glasses of wine, 6 Cans of beer per week Family History Problem Relation Age of Onset  Heart Disease Mother  Diabetes Mother  Hypertension Mother  Hypertension Maternal Grandmother Allergies Allergen Reactions  Dilaudid [Hydromorphone (Bulk)] Itching  Ibuprofen Not Reported This Time Prior to Admission medications Medication Sig Start Date End Date Taking? Authorizing Provider  
nitroglycerin (NITROSTAT) 0.4 mg SL tablet Take 1 Tab by mouth every five (5) minutes as needed for Chest Pain. Sit/Lay down then put one tab under the tongue every 5 minutes as needed for chest pain for 3 doses 2/23/19   Ana Cristina Banks MD  
albuterol (PROVENTIL HFA, VENTOLIN HFA, PROAIR HFA) 90 mcg/actuation inhaler Take 2 Puffs by inhalation every four (4) hours as needed for Wheezing. 2/23/19   Ana Cristina Banks MD  
insulin NPH/insulin regular (NOVOLIN 70/30, HUMULIN 70/30) 100 unit/mL (70-30) injection 15 Units by SubCUTAneous route two (2) times a day. 2/20/19   BRENNAN Kerr  
raNITIdine hcl 150 mg capsule TAKE 1 TABLET BY MOUTH TWICE A DAY FOR 10 DAYS 1/20/19   Provider, Historical  
aspirin delayed-release 81 mg tablet TAKE 1 TABLET BY MOUTH EVERY DAY WITH 6 TO 8 OUNCES OF PLAIN WATER 1/17/19   Provider, Historical  
losartan (COZAAR) 100 mg tablet Take 1 Tab by mouth daily. 1/31/19   Jannet Farris MD  
haloperidol (HALDOL) 5 mg tablet Take 1 Tab by mouth every twelve (12) hours. 1/25/19   Jovita Diaz MD  
benztropine (COGENTIN) 0.5 mg tablet Take 2 Tabs by mouth every twelve (12) hours for 30 days. 1/25/19 2/26/19  Jovita Diaz MD  
valproic acid (DEPAKENE) 250 mg capsule Take 2 Caps by mouth every twelve (12) hours for 30 days. 1/25/19 2/26/19  Jovita Diaz MD  
sertraline (ZOLOFT) 25 mg tablet Take 1 Tab by mouth daily for 30 days. 1/25/19 2/26/19  Jovita Diaz MD  
divalproex sodium (DEPAKOTE PO) Take  by mouth. Other, MD Marcel  
 
 
REVIEW OF SYSTEMS:    
I am not able to complete the review of systems because: The patient is intubated and sedated The patient has altered mental status due to his acute medical problems The patient has baseline aphasia from prior stroke(s) The patient has baseline dementia and is not reliable historian  The patient is in acute medical distress and unable to provide information Total of 12 systems reviewed as follows:   
   POSITIVE= underlined text  Negative = text not underlined General:  fever, chills, sweats, generalized weakness, weight loss/gain,  
   loss of appetite Eyes:    blurred vision, eye pain, loss of vision, double vision ENT:    rhinorrhea, pharyngitis Respiratory:   cough, sputum production, SOB, BARRY, wheezing, pleuritic pain  
Cardiology:   chest pain, palpitations, orthopnea, PND, edema, syncope Gastrointestinal:  abdominal pain , N/V, diarrhea, dysphagia, constipation, bleeding Genitourinary:  frequency, urgency, dysuria, hematuria, incontinence Muskuloskeletal :  arthralgia, myalgia, back pain Hematology:  easy bruising, nose or gum bleeding, lymphadenopathy Dermatological: rash, ulceration, pruritis, color change / jaundice Endocrine:   hot flashes or polydipsia Neurological:  headache, dizziness, confusion, focal weakness, paresthesia, Speech difficulties, memory loss, gait difficulty Psychological: Feelings of anxiety, depression, agitation Objective: VITALS:   
Visit Vitals /63 (BP 1 Location: Right arm, BP Patient Position: At rest) Pulse 81 Temp 98 °F (36.7 °C) Resp 16 Ht 5' 6\" (1.676 m) Wt 61.2 kg (135 lb) SpO2 100% BMI 21.79 kg/m² PHYSICAL EXAM: 
 
General:    Alert, cooperative, no distress, appears stated age. HEENT: Atraumatic, anicteric sclerae, pink conjunctivae No oral ulcers, mucosa moist, throat clear, dentition fair Neck:  Supple, symmetrical,  thyroid: non tender Lungs:   Clear to auscultation bilaterally. No Wheezing or Rhonchi. No rales. Chest wall:  No tenderness  No Accessory muscle use. Heart:   Regular  rhythm,  No  murmur   No edema Abdomen:   Soft, non-tender. Not distended. Bowel sounds normal 
Extremities: No cyanosis.   No clubbing,   
  Skin turgor normal, Capillary refill normal, Radial dial pulse 2+ Skin:     Not pale. Not Jaundiced  No rashes Psych:  Poor insight. psychotic Neurologic: EOMs intact. No facial asymmetry. No aphasia or slurred speech. Symmetrical strength, Sensation grossly intact. Alert and oriented X 4.  
 
______________________________________________________________________ Care Plan discussed with:  Patient/Family and Nurse 
 
________________________________________________________________________ TOTAL TIME:  35 Minutes Critical Care Provided     Minutes non procedure based Comments Reviewed previous records  
>50% of visit spent in counseling and coordination of care  Discussion with patient and/or family and questions answered 
  
 
________________________________________________________________________ Signed: Chon Hannon MD 
 
Procedures: see electronic medical records for all procedures/Xrays and details which were not copied into this note but were reviewed prior to creation of Plan. LAB DATA REVIEWED:   
Recent Results (from the past 24 hour(s)) ETHYL ALCOHOL Collection Time: 02/25/19  4:01 PM  
Result Value Ref Range ALCOHOL(ETHYL),SERUM <10 <10 MG/DL  
CBC WITH AUTOMATED DIFF Collection Time: 02/25/19  4:01 PM  
Result Value Ref Range WBC 8.3 3.6 - 11.0 K/uL  
 RBC 6.40 (H) 3.80 - 5.20 M/uL  
 HGB 16.5 (H) 11.5 - 16.0 g/dL HCT 46.5 35.0 - 47.0 % MCV 72.7 (L) 80.0 - 99.0 FL  
 MCH 25.8 (L) 26.0 - 34.0 PG  
 MCHC 35.5 30.0 - 36.5 g/dL  
 RDW 17.6 (H) 11.5 - 14.5 % PLATELET 790 749 - 025 K/uL NRBC 0.0 0  WBC ABSOLUTE NRBC 0.00 0.00 - 0.01 K/uL NEUTROPHILS 56 32 - 75 % LYMPHOCYTES 33 12 - 49 % MONOCYTES 8 5 - 13 % EOSINOPHILS 2 0 - 7 % BASOPHILS 1 0 - 1 % IMMATURE GRANULOCYTES 0 0.0 - 0.5 % ABS. NEUTROPHILS 4.6 1.8 - 8.0 K/UL  
 ABS. LYMPHOCYTES 2.7 0.8 - 3.5 K/UL  
 ABS. MONOCYTES 0.7 0.0 - 1.0 K/UL  
 ABS. EOSINOPHILS 0.2 0.0 - 0.4 K/UL  
 ABS. BASOPHILS 0.1 0.0 - 0.1 K/UL  
 ABS. IMM.  GRANS. 0.0 0.00 - 0.04 K/UL  
 DF SMEAR SCANNED    
 RBC COMMENTS ANISOCYTOSIS 
1+ METABOLIC PANEL, BASIC Collection Time: 02/25/19  4:01 PM  
Result Value Ref Range Sodium 134 (L) 136 - 145 mmol/L Potassium 3.8 3.5 - 5.1 mmol/L Chloride 97 97 - 108 mmol/L  
 CO2 29 21 - 32 mmol/L Anion gap 8 5 - 15 mmol/L Glucose 291 (H) 65 - 100 mg/dL BUN 11 6 - 20 MG/DL Creatinine 0.79 0.55 - 1.02 MG/DL  
 BUN/Creatinine ratio 14 12 - 20 GFR est AA >60 >60 ml/min/1.73m2 GFR est non-AA >60 >60 ml/min/1.73m2 Calcium 8.7 8.5 - 10.1 MG/DL  
DRUG SCREEN, URINE Collection Time: 02/25/19  4:01 PM  
Result Value Ref Range AMPHETAMINES NEGATIVE  NEG    
 BARBITURATES NEGATIVE  NEG BENZODIAZEPINES NEGATIVE  NEG    
 COCAINE POSITIVE (A) NEG METHADONE NEGATIVE  NEG    
 OPIATES NEGATIVE  NEG    
 PCP(PHENCYCLIDINE) NEGATIVE  NEG    
 THC (TH-CANNABINOL) NEGATIVE  NEG Drug screen comment (NOTE) URINALYSIS W/ REFLEX CULTURE Collection Time: 02/25/19  4:01 PM  
Result Value Ref Range Color YELLOW/STRAW Appearance CLEAR CLEAR Specific gravity 1.015 1.003 - 1.030    
 pH (UA) 5.5 5.0 - 8.0 Protein NEGATIVE  NEG mg/dL Glucose >1,000 (A) NEG mg/dL Ketone NEGATIVE  NEG mg/dL Bilirubin NEGATIVE  NEG Blood NEGATIVE  NEG Urobilinogen 0.2 0.2 - 1.0 EU/dL Nitrites NEGATIVE  NEG Leukocyte Esterase NEGATIVE  NEG    
 WBC 0-4 0 - 4 /hpf  
 RBC 0-5 0 - 5 /hpf Epithelial cells FEW FEW /lpf Bacteria NEGATIVE  NEG /hpf  
 UA:UC IF INDICATED CULTURE NOT INDICATED BY UA RESULT CNI    
VALPROIC ACID Collection Time: 02/25/19  4:01 PM  
Result Value Ref Range Valproic acid <3 (L) 50 - 100 ug/ml HCG URINE, QL. - POC Collection Time: 02/25/19  4:57 PM  
Result Value Ref Range Pregnancy test,urine (POC) NEGATIVE  NEG    
GLUCOSE, POC Collection Time: 02/26/19 12:07 PM  
Result Value Ref Range Glucose (POC) 487 (H) 65 - 100 mg/dL  Performed by De Los Santos Divers

## 2019-02-26 NOTE — PROGRESS NOTES
Problem: Suicide/Homicide (Adult/Pediatric) Goal: *STG/LTG: Complies with medication therapy Outcome: Progressing Towards Goal 
Alert. Disoriented. Given 1 L of NS for dehydration and tolerated the infusion well. No distress noted. VSS. Accepted medications with encouragement. Hep lock intact in Right AC. Uncooperative at times. Eating excessively and attempting to get food off of others tray and demanding increased amounts of food.

## 2019-02-26 NOTE — H&P
INITIAL PSYCHIATRIC EVALUATION   
 
   
 
IDENTIFICATION:   
Patient Name  Khadijah Segovia Date of Birth 1975 [facility] [id number] Medical Record Number  [id number] Age  38 y.o. PCP Other, MD Marcel  
Admit date:  2/25/2019 Room Number  194/95  @ [facility]  
Date of Service  2/26/2019 HISTORY  
 
   
REASON FOR HOSPITALIZATION: 
CC: \"psychosis / Orvis Oneyda". Pt admitted under a temporary care home order (TDO) for severe psychosis proving to be an imminent danger to self and an inability to care for self. HISTORY OF PRESENT ILLNESS:   
The patient, Khadijah Segovia, is a 38 y.o. BLACK OR  female with a past psychiatric history significant for schizoaffective disorder and cocaine use disorder, who presents at this time with complaints of (and/or evidence of) the following emotional symptoms: agitation, psychotic behavior and sundar. Additional symptomatology include confusion. The above symptoms have been present for 24+ hours. These symptoms are of moderate to high severity. These symptoms are constant in nature. The patient's condition has been precipitated by psychosocial stressors. Patient's condition made worse by continued illicit drug use as well as treatment noncompliance. UDS: +cocaine; BAL=0. Patient seen in her room s/p PRN. She had taken off her clothes and was inappropriately in the morning resulting in PRN administration for agitation and psychosis. The patient is a poor hstorian. The patient does not corroborate the above narrative. The patient is unable to contract for safety on the unit but gives consent for the team to contact collateral. The patient is not amenable to initiating treatment while on the unit. ALLERGIES:  
Allergies Allergen Reactions  Dilaudid [Hydromorphone (Bulk)] Itching  Ibuprofen Not Reported This Time MEDICATIONS PRIOR TO ADMISSION:  
Medications Prior to Admission Medication Sig  nitroglycerin (NITROSTAT) 0.4 mg SL tablet Take 1 Tab by mouth every five (5) minutes as needed for Chest Pain. Sit/Lay down then put one tab under the tongue every 5 minutes as needed for chest pain for 3 doses  albuterol (PROVENTIL HFA, VENTOLIN HFA, PROAIR HFA) 90 mcg/actuation inhaler Take 2 Puffs by inhalation every four (4) hours as needed for Wheezing.  insulin NPH/insulin regular (NOVOLIN 70/30, HUMULIN 70/30) 100 unit/mL (70-30) injection 15 Units by SubCUTAneous route two (2) times a day.  raNITIdine hcl 150 mg capsule TAKE 1 TABLET BY MOUTH TWICE A DAY FOR 10 DAYS  aspirin delayed-release 81 mg tablet TAKE 1 TABLET BY MOUTH EVERY DAY WITH 6 TO 8 OUNCES OF PLAIN WATER  
 losartan (COZAAR) 100 mg tablet Take 1 Tab by mouth daily.  haloperidol (HALDOL) 5 mg tablet Take 1 Tab by mouth every twelve (12) hours.  benztropine (COGENTIN) 0.5 mg tablet Take 2 Tabs by mouth every twelve (12) hours for 30 days.  valproic acid (DEPAKENE) 250 mg capsule Take 2 Caps by mouth every twelve (12) hours for 30 days.  sertraline (ZOLOFT) 25 mg tablet Take 1 Tab by mouth daily for 30 days.  divalproex sodium (DEPAKOTE PO) Take  by mouth. PAST MEDICAL HISTORY:  
Past Medical History:  
Diagnosis Date  Alcohol abuse, in remission   
 quit 17 days ago  Asthma   
 does not use inhalers  Borderline personality disorder ([facility] [address]  Diabetes ([facility] [address]  GERD (gastroesophageal reflux disease)  Hypertension  Other ill-defined conditions(799.89)   
 kidney stones ,passed one  Other ill-defined conditions(799.89) sickle cell trait  Other ill-defined conditions(799.89)   
 increased cholesterol  Pancreatitis  Psychiatric disorder   
 schizophrenia, bipolar, depression, anxiety Past Surgical History:  
Procedure Laterality Date  ABDOMEN SURGERY PROC UNLISTED  3/12/14 CHOLECYSTECTOMY LAPAROSCOPIC    
 HX GASTRIC BYPASS  HX GYN  11/8/2012  
 c section x2  HX OTHER SURGICAL  3/13/14 ENDOSCOPIC RETROGRADE CHOLANGIOPANCREATOGRAPHY  HX OTHER SURGICAL  8/4/14  
 endoscopic stent placed to bile duct  HX TUBAL LIGATION  2012 SOCIAL HISTORY:  
Social History Socioeconomic History  Marital status: SINGLE Spouse name: Not on file  Number of children: Not on file  Years of education: Not on file  Highest education level: Not on file Social Needs  Financial resource strain: Not on file  Food insecurity - worry: Not on file  Food insecurity - inability: Not on file  Transportation needs - medical: Not on file  Transportation needs - non-medical: Not on file Occupational History  Not on file Tobacco Use  Smoking status: Current Every Day Smoker Packs/day: 0.50 Years: 14.00 Pack years: 7.00 Types: Cigarettes  Smokeless tobacco: Never Used  Tobacco comment: cigarettes Substance and Sexual Activity  Alcohol use: Yes Alcohol/week: 4.8 oz Types: 2 Glasses of wine, 6 Cans of beer per week  Drug use: No  
  Comment: yesterday 12/27/18  Sexual activity: Yes  
  Partners: Female Birth control/protection: Surgical  
Other Topics Concern  Not on file Social History Narrative  Not on file FAMILY HISTORY: History reviewed, pertinent family history as below:  
Family History Problem Relation Age of Onset  Heart Disease Mother  Diabetes Mother  Hypertension Mother  Hypertension Maternal Grandmother REVIEW OF SYSTEMS:  
Pertinent items are noted in the History of Present Illness. All other Systems reviewed and are considered negative. [facility] MENTAL STATUS EXAM (MSE): MSE FINDINGS ARE WITHIN NORMAL LIMITS (WNL) UNLESS OTHERWISE STATED BELOW. ( ALL OF THE BELOW CATEGORIES OF THE MSE HAVE BEEN REVIEWED (reviewed 2/26/2019) AND UPDATED AS DEEMED APPROPRIATE ) General Presentation age appropriate, passive and uncooperative Orientation not oriented to situation Vital Signs  See below (reviewed 2/26/2019); Vital Signs (BP, Pulse, & Temp) are within normal limits if not listed below. Gait and Station Stable/steady, no ataxia Musculoskeletal System No extrapyramidal symptoms (EPS); no abnormal muscular movements or Tardive Dyskinesia (TD); muscle strength and tone are within normal limits Language No aphasia or dysarthria Speech:  hypoverbal and increased latency of response Thought Processes illogical; normal rate of thoughts; poor abstract reasoning/computation Thought Associations blocked Thought Content internally preoccupied Suicidal Ideations none Homicidal Ideations none Mood:  irritable and labile Affect:  constricted and inappropriate Memory recent  impaired Memory remote:  impaired Concentration/Attention:  distractable Fund of Knowledge average Insight:  poor Reliability poor Judgment:  poor VITALS:    
Patient Vitals for the past 24 hrs: 
 Temp Pulse Resp BP SpO2  
02/26/19 1044 98 °F (36.7 °C) 81 16 138/63 100 % 02/26/19 0828 98.6 °F (37 °C) 99 18 (!) 182/100 100 % 02/25/19 2210 98.3 °F (36.8 °C) 95 16 (!) 162/98 100 % 02/25/19 2022    170/89   
02/25/19 2021 98.1 °F (36.7 °C)      
02/25/19 1900    173/89 100 % 02/25/19 1830    167/87 99 % 02/25/19 1815 98.5 °F (36.9 °C) 97 18 (!) 184/93 100 % 02/25/19 1730    (!) 178/91 99 % 02/25/19 1700    (!) 181/95 100 % 02/25/19 1630    185/89 99 % 02/25/19 1613  90 18 (!) 203/105 98 % 02/25/19 1427 98.2 °F (36.8 °C) 91 18 (!) 189/100 100 % Wt Readings from Last 3 Encounters:  
02/25/19 61.2 kg (135 lb)  
02/22/19 70.4 kg (155 lb 4.8 oz) 02/22/19 67.6 kg (149 lb) Temp Readings from Last 3 Encounters:  
02/26/19 98 °F (36.7 °C)  
02/22/19 97.4 °F (36.3 °C)  
02/22/19 98.1 °F (36.7 °C) BP Readings from Last 3 Encounters:  
02/26/19 138/63  
02/23/19 103/85  
02/22/19 144/82 Pulse Readings from Last 3 Encounters:  
02/26/19 81  
02/22/19 95  
02/22/19 95 DATA LABORATORY DATA: 
Labs Reviewed CBC WITH AUTOMATED DIFF - Abnormal; Notable for the following components:  
    Result Value RBC 6.40 (*) HGB 16.5 (*) MCV 72.7 (*) MCH 25.8 (*)   
 RDW 17.6 (*) All other components within normal limits METABOLIC PANEL, BASIC - Abnormal; Notable for the following components:  
 Sodium 134 (*) Glucose 291 (*) All other components within normal limits DRUG SCREEN, URINE - Abnormal; Notable for the following components:  
 COCAINE POSITIVE (*) All other components within normal limits URINALYSIS W/ REFLEX CULTURE - Abnormal; Notable for the following components:  
 Glucose >1,000 (*) All other components within normal limits VALPROIC ACID - Abnormal; Notable for the following components:  
 Valproic acid <3 (*) All other components within normal limits GLUCOSE, POC - Abnormal; Notable for the following components:  
 Glucose (POC) 487 (*) All other components within normal limits GLUCOSE, POC - Abnormal; Notable for the following components:  
 Glucose (POC) 432 (*) All other components within normal limits ETHYL ALCOHOL  
GLUCOSE, FASTING  
TSH 3RD GENERATION  
LIPID PANEL  
HCG URINE, QL. - POC Admission on 02/25/2019 Component Date Value Ref Range Status  ALCOHOL(ETHYL),SERUM 02/25/2019 <10  <10 MG/DL Final  
 WBC 02/25/2019 8.3  3.6 - 11.0 K/uL Final  
 RBC 02/25/2019 6.40* 3.80 - 5.20 M/uL Final  
 HGB 02/25/2019 16.5* 11.5 - 16.0 g/dL Final  
 HCT 02/25/2019 46.5  35.0 - 47.0 % Final  
 MCV 02/25/2019 72.7* 80.0 - 99.0 FL Final  
 MCH 02/25/2019 25.8* 26.0 - 34.0 PG Final  
 MCHC 02/25/2019 35.5  30.0 - 36.5 g/dL Final  
 RDW 02/25/2019 17.6* 11.5 - 14.5 % Final  
 PLATELET 36/02/6630 230  150 - 400 K/uL Final  
 NRBC 02/25/2019 0.0  0  WBC Final  
 ABSOLUTE NRBC 02/25/2019 0.00  0.00 - 0.01 K/uL Final  
 NEUTROPHILS 02/25/2019 56  32 - 75 % Final  
 LYMPHOCYTES 02/25/2019 33  12 - 49 % Final  
 MONOCYTES 02/25/2019 8  5 - 13 % Final  
 EOSINOPHILS 02/25/2019 2  0 - 7 % Final  
 BASOPHILS 02/25/2019 1  0 - 1 % Final  
 IMMATURE GRANULOCYTES 02/25/2019 0  0.0 - 0.5 % Final  
 ABS. NEUTROPHILS 02/25/2019 4.6  1.8 - 8.0 K/UL Final  
 ABS. LYMPHOCYTES 02/25/2019 2.7  0.8 - 3.5 K/UL Final  
 ABS. MONOCYTES 02/25/2019 0.7  0.0 - 1.0 K/UL Final  
 ABS. EOSINOPHILS 02/25/2019 0.2  0.0 - 0.4 K/UL Final  
 ABS. BASOPHILS 02/25/2019 0.1  0.0 - 0.1 K/UL Final  
 ABS. IMM. GRANS. 02/25/2019 0.0  0.00 - 0.04 K/UL Final  
 DF 02/25/2019 SMEAR SCANNED    Final  
 RBC COMMENTS 02/25/2019     Final  
                 Value:ANISOCYTOSIS 
1+  Sodium 02/25/2019 134* 136 - 145 mmol/L Final  
 Potassium 02/25/2019 3.8  3.5 - 5.1 mmol/L Final  
 Chloride 02/25/2019 97  97 - 108 mmol/L Final  
 CO2 02/25/2019 29  21 - 32 mmol/L Final  
 Anion gap 02/25/2019 8  5 - 15 mmol/L Final  
 Glucose 02/25/2019 291* 65 - 100 mg/dL Final  
 BUN 02/25/2019 11  6 - 20 MG/DL Final  
 Creatinine 02/25/2019 0.79  0.55 - 1.02 MG/DL Final  
 BUN/Creatinine ratio 02/25/2019 14  12 - 20   Final  
 GFR est AA 02/25/2019 >60  >60 ml/min/1.73m2 Final  
 GFR est non-AA 02/25/2019 >60  >60 ml/min/1.73m2 Final  
 Calcium 02/25/2019 8.7  8.5 - 10.1 MG/DL Final  
 AMPHETAMINES 02/25/2019 NEGATIVE   NEG   Final  
 BARBITURATES 02/25/2019 NEGATIVE   NEG   Final  
 BENZODIAZEPINES 02/25/2019 NEGATIVE   NEG   Final  
 COCAINE 02/25/2019 POSITIVE* NEG   Final  
 METHADONE 02/25/2019 NEGATIVE   NEG   Final  
 OPIATES 02/25/2019 NEGATIVE   NEG   Final  
 PCP(PHENCYCLIDINE) 02/25/2019 NEGATIVE   NEG   Final  
 THC (TH-CANNABINOL) 02/25/2019 NEGATIVE   NEG   Final  
 Drug screen comment 02/25/2019 (NOTE)   Final  
 Color 02/25/2019 YELLOW/STRAW    Final  
 Appearance 02/25/2019 CLEAR  CLEAR   Final  
 Specific gravity 02/25/2019 1.015  1.003 - 1.030   Final  
 pH (UA) 02/25/2019 5.5  5.0 - 8.0   Final  
 Protein 02/25/2019 NEGATIVE   NEG mg/dL Final  
 Glucose 02/25/2019 >1,000* NEG mg/dL Final  
 Ketone 02/25/2019 NEGATIVE   NEG mg/dL Final  
 Bilirubin 02/25/2019 NEGATIVE   NEG   Final  
 Blood 02/25/2019 NEGATIVE   NEG   Final  
 Urobilinogen 02/25/2019 0.2  0.2 - 1.0 EU/dL Final  
 Nitrites 02/25/2019 NEGATIVE   NEG   Final  
 Leukocyte Esterase 02/25/2019 NEGATIVE   NEG   Final  
 WBC 02/25/2019 0-4  0 - 4 /hpf Final  
 RBC 02/25/2019 0-5  0 - 5 /hpf Final  
 Epithelial cells 02/25/2019 FEW  FEW /lpf Final  
 Bacteria 02/25/2019 NEGATIVE   NEG /hpf Final  
 UA:UC IF INDICATED 02/25/2019 CULTURE NOT INDICATED BY UA RESULT  CNI   Final  
 Valproic acid 02/25/2019 <3* 50 - 100 ug/ml Final  
 Pregnancy test,urine (POC) 02/25/2019 NEGATIVE   NEG   Final  
 Glucose (POC) 02/26/2019 487* 65 - 100 mg/dL Final  
 Performed by 02/26/2019 Mealjoi BalderramaEnma   Final  
 Glucose (POC) 02/26/2019 432* 65 - 100 mg/dL Final  
 Performed by 02/26/2019 Christos Mendoza   Final  
 
  
RADIOLOGY REPORTS: 
Results from Hospital Encounter encounter on 02/15/19 XR CHEST PA LAT Narrative CLINICAL HISTORY: Dyspnea INDICATION: Dyspnea COMPARISON: 2/13/2019 FINDINGS:  
PA and lateral views of the chest are obtained. The cardiopericardial silhouette is within normal limits. There is no pleural 
effusion, pneumothorax or focal consolidation present. Impression IMPRESSION: No acute intrathoracic disease. Xr Chest Pa Lat Result Date: 2/16/2019 CLINICAL HISTORY: Dyspnea INDICATION: Dyspnea COMPARISON: 2/13/2019 FINDINGS: PA and lateral views of the chest are obtained. The cardiopericardial silhouette is within normal limits. There is no pleural effusion, pneumothorax or focal consolidation present. IMPRESSION: No acute intrathoracic disease. Xr Chest Pa Lat Result Date: 1/22/2019 INDICATION: chest pain EXAM: CXR 2 Views. COMPARISON: 1/20/2019. Ledell Michael FINDINGS: Frontal and lateral views of the chest show the lungs are free of acute disease. There remain left upper lobe granulomas. Heart size is normal. There is no overt pulmonary edema. There is no evident pneumothorax, adenopathy or pleural effusion. IMPRESSION: No acute disease. No significant interval change. Xr Chest Pa Lat Result Date: 1/13/2019 INDICATION:   cp COMPARISON: January 8, 2019 FINDINGS: Frontal and lateral views of the chest demonstrate a normal cardiomediastinal silhouette. The lungs are adequately expanded. There is no edema, effusion, consolidation, or pneumothorax. The osseous structures are unremarkable. IMPRESSION: No acute process. Xr Chest Pa Lat Result Date: 1/8/2019 INDICATION: . cp Additional history: Alcohol and crack user that once detox COMPARISON: Previous chest xray, 12/28/2018. Ledell Michael FINDINGS: PA and lateral view of the chest. . Lines/tubes/surgical: Cardiac monitor leads overly the patient. Heart/mediastinum: Unremarkable. Lungs/pleura:  No focal consolidation or mass. No visualized pleural effusion or pneumothorax. Additional Comments: Surgical clips in the upper abdomen. Ledell Michael IMPRESSION: 1. No radiographic evidence of acute cardiopulmonary disease. Xr Chest Pa Lat Result Date: 12/28/2018 CLINICAL HISTORY: Chest pain INDICATION: Chest pain left-sided COMPARISON: 11/22/2018 FINDINGS: PA and lateral views of the chest are obtained. The cardiopericardial silhouette is within normal limits. There is no pleural effusion, pneumothorax or focal consolidation present. IMPRESSION: No acute intrathoracic disease. Ct Head Wo Cont Result Date: 2/25/2019 EXAM:  CT HEAD WO CONT INDICATION: Unusual behavior. Mental health problems. COMPARISON: None TECHNIQUE: Noncontrast head CT. Coronal and sagittal reformats.  CT dose reduction was achieved through use of a standardized protocol tailored for this examination and automatic exposure control for dose modulation. FINDINGS: The ventricles and sulci are age-appropriate without hydrocephalus. There is no mass effect or midline shift. There is no intracranial hemorrhage or extra-axial fluid collection. There is no abnormal parenchymal attenuation. The gray-white matter differentiation is maintained. The basal cisterns are patent. The osseous structures are intact. The visualized paranasal sinuses and mastoid air cells are clear. IMPRESSION: No acute intracranial abnormality. Cta Chest W Or W Wo Cont Result Date: 12/28/2018 CLINICAL HISTORY: Chest pain, dyspnea INDICATION: Chest pain, dyspnea COMPARISON: 2008 TECHNIQUE: CT of the chest with  IV contrast , Isovue-370 is performed. Axial images from the thoracic inlet to the level of the upper abdomen are obtained. Manual post-processing of the images and coronal reformatting is also performed. CT dose reduction was achieved through use of a standardized protocol tailored for this examination and automatic exposure control for dose modulation. Multiplanar reformatted imaging was performed. Sagittal and coronal reformatting. 3-D Postprocessing of imaging was performed. 3-D MIP reconstructed images were obtained in the coronal plane. FINDINGS: There is no pulmonary embolism. There is no aortic aneurysm or dissection. There are small bilateral pleural effusions. The pulmonary artery is prominent consistent with pulmonary arterial hypertension. Thoracic aortic ectasia. There is a small-to-moderate hiatal hernia. Distal esophageal wall thickening. Minimal scattered groundglass opacities most predominantly in the right middle lobe but also within the upper lobes and lower lobes bilaterally. Postcholecystectomy. There is no pleural or pericardial effusion. There is no mediastinal, axillary or hilar lymphadenopathy. The aorta is normal in course and caliber.  The proximal pulmonary arteries are without evidence of filling defects. No lytic or blastic lesions are identified. The remainder of the upper abdomen visualized is unremarkable. IMPRESSION: There is no pulmonary embolism. There is no aortic aneurysm or dissection. Small bilateral pleural effusions. Small to moderate hiatal hernia. Esophageal wall thickening is circumferential. This may be related to esophagitis. Nonspecific finding. Further clinical correlation recommended. Scattered groundglass opacity with bronchial wall thickening most prominent in the right middle lobe. May be related to infectious/inflammatory, reactive airways process. Follow-up chest CT in 4-6 weeks to evaluate for resolution recommended. Ct Abd Pelv W Cont Result Date: 1/24/2019 EXAM: CT ABD PELV W CONT INDICATION: Ascites . Distended abdomen, history of liver disease. Midsternal chest pain for 3 weeks. Shortness of breath. COMPARISON: 11/22/2018 CONTRAST: 100 mL of Isovue-370. TECHNIQUE: Following the uneventful intravenous administration of contrast, thin axial images were obtained through the abdomen and pelvis. Coronal and sagittal reconstructions were generated. Oral contrast was not administered. CT dose reduction was achieved through use of a standardized protocol tailored for this examination and automatic exposure control for dose modulation. FINDINGS: LUNG BASES: Clear. INCIDENTALLY IMAGED HEART AND MEDIASTINUM: Unremarkable. LIVER: No mass or biliary dilatation. GALLBLADDER: Surgically absent. SPLEEN: No mass. PANCREAS: The pancreas is atrophic with coarse calcifications consistent with chronic pancreatitis as previously reported. ADRENALS: Unremarkable. KIDNEYS: No mass, calculus, or hydronephrosis. STOMACH: Stomach wall appears thickened, likely related to nondistention. SMALL BOWEL: Mild prominence of small bowel caliber without debbi distention. COLON: No dilatation or wall thickening. Large amount of retained fecal material throughout the colon.  APPENDIX: Not well seen, but no acute appendiceal abnormality is suspected. PERITONEUM: Small amount of free fluid in the cul-de-sac. RETROPERITONEUM: No lymphadenopathy or aortic aneurysm. REPRODUCTIVE ORGANS: Unremarkable for age. URINARY BLADDER: No mass or calculus. BONES: No destructive bone lesion. ADDITIONAL COMMENTS: Generalized subcutaneous edema throughout the abdomen and pelvis. IMPRESSION: 1. Previously described chronic pancreatitis. 2. Status post cholecystectomy. 3. Large amount of retained fecal material throughout the colon. 4. Small amount of free fluid in the cul-de-sac which may conceivably be physiologic. 5. Generalized subcutaneous edema throughout the abdomen and pelvis. Xr Chest [facility] Result Date: 2/13/2019 EXAM: Portable CXR. 1835 hours  INDICATION: Chest pain FINDINGS: The lungs are clear. Heart is normal in size. There is no overt pulmonary edema. There is no evident pneumothorax, adenopathy or pleural effusion. IMPRESSION: No Acute Disease. Xr Chest [facility] Result Date: 1/27/2019 INDICATION: chest pain EXAM:  AP CHEST RADIOGRAPH COMPARISON: January 22, 2019 FINDINGS: AP portable view of the chest demonstrates a normal cardiomediastinal silhouette. The lungs are adequately expanded. There is no edema, effusion, consolidation, or pneumothorax. The osseous structures are unremarkable. IMPRESSION: No acute process. Xr Chest [facility] Result Date: 1/20/2019 EXAM:  Portable chest view INDICATION: Sharp chest pain for several days. COMPARISON: Chest views on 1/13/2019. TECHNIQUE: Erect portable chest radiograph FINDINGS: Cardiac size is within normal limits. Aortic arch is not enlarged. Pulmonary vasculature is within normal limits. Left upper lobe calcified granulomas are still present. No focal airspace opacity or pulmonary edema. No pneumothorax. Bones are within normal limits. IMPRESSION: No acute process on portable chest. No change given difference in technique. MEDICATIONS ALL MEDICATIONS Current Facility-Administered Medications Medication Dose Route Frequency  losartan (COZAAR) tablet 100 mg  100 mg Oral DAILY  insulin lispro (HUMALOG) injection   SubCUTAneous AC&HS  
 glucose chewable tablet 16 g  4 Tab Oral PRN  
 dextrose (D50W) injection syrg 12.5-25 g  12.5-25 g IntraVENous PRN  
 glucagon (GLUCAGEN) injection 1 mg  1 mg IntraMUSCular PRN  
 insulin NPH (NOVOLIN N, HUMULIN N) injection 15 Units  15 Units SubCUTAneous Q12H  
 haloperidol (HALDOL) tablet 5 mg  5 mg Oral Q12H Or  
 haloperidol lactate (HALDOL) injection 5 mg  5 mg IntraMUSCular Q12H  
 benztropine (COGENTIN) tablet 1 mg  1 mg Oral Q12H Or  
 benztropine (COGENTIN) injection 1 mg  1 mg IntraMUSCular Q12H  clonazePAM (KlonoPIN) tablet 1 mg  1 mg Oral Q12H Or  
 LORazepam (ATIVAN) injection 1 mg  1 mg IntraMUSCular Q12H  
 LORazepam (ATIVAN) injection 1 mg  1 mg IntraMUSCular Q12H Or  
 divalproex DR (DEPAKOTE) tablet 500 mg  500 mg Oral Q12H  
 ziprasidone (GEODON) 20 mg in sterile water (preservative free) 1 mL injection  20 mg IntraMUSCular BID PRN  
 OLANZapine (ZyPREXA) tablet 5 mg  5 mg Oral Q6H PRN  
 benztropine (COGENTIN) tablet 2 mg  2 mg Oral BID PRN  
 benztropine (COGENTIN) injection 2 mg  2 mg IntraMUSCular BID PRN  
 LORazepam (ATIVAN) injection 2 mg  2 mg IntraMUSCular Q4H PRN  
 LORazepam (ATIVAN) tablet 1 mg  1 mg Oral Q4H PRN  
 zolpidem (AMBIEN) tablet 10 mg  10 mg Oral QHS PRN  
 acetaminophen (TYLENOL) tablet 650 mg  650 mg Oral Q4H PRN  
 magnesium hydroxide (MILK OF MAGNESIA) 400 mg/5 mL oral suspension 30 mL  30 mL Oral DAILY PRN  
 nicotine (NICODERM CQ) 21 mg/24 hr patch 1 Patch  1 Patch TransDERmal DAILY PRN  
  
SCHEDULED MEDICATIONS Current Facility-Administered Medications Medication Dose Route Frequency  losartan (COZAAR) tablet 100 mg  100 mg Oral DAILY  insulin lispro (HUMALOG) injection   SubCUTAneous AC&HS  insulin NPH (NOVOLIN N, HUMULIN N) injection 15 Units  15 Units SubCUTAneous Q12H  
 haloperidol (HALDOL) tablet 5 mg  5 mg Oral Q12H Or  
 haloperidol lactate (HALDOL) injection 5 mg  5 mg IntraMUSCular Q12H  
 benztropine (COGENTIN) tablet 1 mg  1 mg Oral Q12H Or  
 benztropine (COGENTIN) injection 1 mg  1 mg IntraMUSCular Q12H  clonazePAM (KlonoPIN) tablet 1 mg  1 mg Oral Q12H Or  
 LORazepam (ATIVAN) injection 1 mg  1 mg IntraMUSCular Q12H  
 LORazepam (ATIVAN) injection 1 mg  1 mg IntraMUSCular Q12H Or  
 divalproex DR (DEPAKOTE) tablet 500 mg  500 mg Oral Q12H  
 
  
 
 
 
 
ASSESSMENT & PLAN The patient, Hang Crabtree, is a 38 y.o.  female who presents at this time for treatment of the following diagnoses: 
Patient Active Hospital Problem List: 
 Schizoaffective disorder ([facility] [address] (8/8/2018) Assessment: patient grossly psychotic, recent substance use and likely medication non-compliance. Patient a candidate for court ordered medication as she has been refusing since being on the unit. Plan: 
COURT ORDERED MEDICATION: 
  Haldol 5 mg PO *OR* IM Q12H for psychosis Cogentin 1 mg PO *OR* IM Q12H for EPS prophylaxis Depakote 500 mg *OR* Ativan 1 mg Q12H for sundar Klonopin 1 mg *OR* Ativan 1 mg Q12H for agitation 
 
- IGM therapy as tolerated - Expand database / obtain collateral 
- Dispo planning I will continue to monitor blood levels (valproic acid---a drug with a narrow therapeutic index= NTI) and associated labs for drug therapy implemented that require intense monitoring for toxicity as deemed appropriate based on current medication side effects and pharmacodynamically determined drug 1/2 lives. A coordinated, multidisplinary treatment team (includes the nurse, unit pharmacist,  and writer) round was conducted for this initial evaluation with the patient present.   
 
The following regarding medications was addressed during rounds with patient: the risks and benefits of the proposed medication. The patient was given the opportunity to ask questions. Informed consent given to the use of the above medications. I will continue to adjust psychiatric and non-psychiatric medications (see above \"medication\" section and orders section for details) as deemed appropriate & based upon diagnoses and response to treatment. I have reviewed admission (and previous/old) labs and medical tests in the EHR and or transferring hospital documents. I will continue to order blood tests/labs and diagnostic tests as deemed appropriate and review results as they become available (see orders for details). I have reviewed old psychiatric and medical records available in the EHR. I Will order additional psychiatric records from other institutions to further elucidate the nature of patient's psychopathology and review once available. I will gather additional collateral information from friends, family and o/p treatment team to further elucidate the nature of patient's psychopathology and baselline level of psychiatric functioning. ESTIMATED LENGTH OF STAY:  5-7 days STRENGTHS: 
Exercising self-direction/Resourceful and Access to housing/residential stability SIGNED:   
Carisa Rocha MD 
2/26/2019
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
## 2019-02-26 NOTE — PROGRESS NOTES
Laboratory monitoring for mood stabilizer and antipsychotics: 
 
Recommended baseline monitoring has been completed based on this patient's current medication regimen. The patient is currently taking the following medication(s):  
Current Facility-Administered Medications Medication Dose Route Frequency  losartan (COZAAR) tablet 100 mg  100 mg Oral DAILY  insulin lispro (HUMALOG) injection   SubCUTAneous AC&HS  insulin NPH (NOVOLIN N, HUMULIN N) injection 15 Units  15 Units SubCUTAneous Q12H  
 haloperidol (HALDOL) tablet 5 mg  5 mg Oral Q12H Or  
 haloperidol lactate (HALDOL) injection 5 mg +++COURT ORDERED MEDICATION FOR REFUSAL OF PO HALOPERIDOL+++  5 mg IntraMUSCular Q12H  
 benztropine (COGENTIN) tablet 1 mg  1 mg Oral Q12H Or  
 benztropine (COGENTIN) injection 1 mg +++COURT ORDERED MEDICATION FOR REFUSAL OF PO BENZTROPINE+++  1 mg IntraMUSCular Q12H  clonazePAM (KlonoPIN) tablet 1 mg  1 mg Oral Q12H Or  
 LORazepam (ATIVAN) injection 1 mg +++COURT ORDERED MEDICATION FOR REFUSAL OF PO CLONAZEPAM+++  1 mg IntraMUSCular Q12H  
 LORazepam (ATIVAN) injection 1 mg +++COURT ORDERED MEDICATION FOR REFUSAL OF PO DEPAKOTE+++  1 mg IntraMUSCular Q12H Or  
 divalproex DR (DEPAKOTE) tablet 500 mg  500 mg Oral Q12H Serum Drug Level(s) VALPROIC ACID Recent Labs  
  02/25/19 
1601 VALP <3* Height, Weight, BMI Estimation Estimated body mass index is 21.79 kg/m² as calculated from the following: 
  Height as of this encounter: 167.6 cm (66\"). Weight as of this encounter: 61.2 kg (135 lb). Renal Function, Hepatic Function and Chemistry Estimated Creatinine Clearance: 86 mL/min (based on SCr of 0.79 mg/dL). Lab Results Component Value Date/Time  Sodium 134 (L) 02/25/2019 04:01 PM  
 Potassium 3.8 02/25/2019 04:01 PM  
 Chloride 97 02/25/2019 04:01 PM  
 CO2 29 02/25/2019 04:01 PM  
 Anion gap 8 02/25/2019 04:01 PM  
 Glucose 291 (H) 02/25/2019 04:01 PM  
 Glucose 274 (H) 08/09/2018 04:41 AM  
 BUN 11 02/25/2019 04:01 PM  
 Creatinine 0.79 02/25/2019 04:01 PM  
 BUN/Creatinine ratio 14 02/25/2019 04:01 PM  
 GFR est AA >60 02/25/2019 04:01 PM  
 GFR est non-AA >60 02/25/2019 04:01 PM  
 Calcium 8.7 02/25/2019 04:01 PM  
 ALT (SGPT) 27 02/22/2019 10:30 PM  
 AST (SGOT) 16 02/22/2019 10:30 PM  
 Alk. phosphatase 101 02/22/2019 10:30 PM  
 Protein, total 6.2 (L) 02/22/2019 10:30 PM  
 Albumin 2.8 (L) 02/22/2019 10:30 PM  
 Globulin 3.4 02/22/2019 10:30 PM  
 A-G Ratio 0.8 (L) 02/22/2019 10:30 PM  
 Bilirubin, total 0.5 02/22/2019 10:30 PM  
 
 
Lab Results Component Value Date/Time Glucose 291 (H) 02/25/2019 04:01 PM  
 Glucose 274 (H) 08/09/2018 04:41 AM  
 Glucose (POC) 251 (H) 02/26/2019 02:52 PM  
 
 
Lab Results Component Value Date/Time Hemoglobin A1c 15.8 (H) 12/10/2018 02:40 PM  
 
 
Hematology Lab Results Component Value Date/Time WBC 8.3 02/25/2019 04:01 PM  
 Hemoglobin (POC) 16.0 09/12/2017 02:01 PM  
 HGB 16.5 (H) 02/25/2019 04:01 PM  
 Hematocrit (POC) 39 01/13/2019 09:21 AM  
 HCT 46.5 02/25/2019 04:01 PM  
 PLATELET 918 97/77/3576 04:01 PM  
 MCV 72.7 (L) 02/25/2019 04:01 PM  
 
 
Lipids Lab Results Component Value Date/Time Cholesterol, total 105 08/09/2018 04:41 AM  
 HDL Cholesterol 44 08/09/2018 04:41 AM  
 LDL,Direct 144 (H) 04/03/2011 04:10 AM  
 LDL, calculated 48.2 08/09/2018 04:41 AM  
 Triglyceride 64 08/09/2018 04:41 AM  
 CHOL/HDL Ratio 2.4 08/09/2018 04:41 AM  
 
 
Thyroid Function Lab Results Component Value Date/Time TSH 2.30 08/09/2018 04:41 AM  
 
Vitals Visit Vitals /63 (BP 1 Location: Right arm, BP Patient Position: At rest) Pulse 81 Temp 98 °F (36.7 °C) Resp 16 Ht 167.6 cm (66\") Wt 61.2 kg (135 lb) SpO2 100% BMI 21.79 kg/m² Pregnancy Test 
Lab Results Component Value Date/Time  HCG urine, QL NEGATIVE  02/26/2014 08:30 PM  
 Pregnancy test,urine (POC) NEGATIVE  02/25/2019 04:57 PM  
 HCG, Ql. NEGATIVE  09/30/2017 08:59 AM  
 
 
Destiney Blue, PharmD, BCPS 
[phone] (pharmacy)

## 2019-02-26 NOTE — ED NOTES
Bedside and Verbal shift change report given to Cosmo Garcia RN  (oncoming nurse) by Talha Fernández RN (offgoing nurse). Report included the following information SBAR, ED Summary, MAR and Recent Results. Mendez Anthony
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
## 2019-02-26 NOTE — ED NOTES
TRANSFER - OUT REPORT: 
 
Verbal report given to [facility], RN(name) on Samuel Parks  being transferred to 3rd floor(unit) for routine progression of care Report consisted of patients Situation, Background, Assessment and  
Recommendations(SBAR). Information from the following report(s) SBAR, Kardex, [facility] and Recent Results was reviewed with the receiving nurse. Lines:  
Peripheral IV 02/25/19 Left Antecubital (Active) Site Assessment Clean, dry, & intact 2/25/2019  4:08 PM  
Phlebitis Assessment 0 2/25/2019  4:08 PM  
Infiltration Assessment 0 2/25/2019  4:08 PM  
Dressing Status Clean, dry, & intact 2/25/2019  4:08 PM  
Dressing Type Transparent 2/25/2019  4:08 PM  
Hub Color/Line Status Pink;Flushed 2/25/2019  4:08 PM  
Action Taken Blood drawn 2/25/2019  4:08 PM  
  
 
Opportunity for questions and clarification was provided. Patient transported [address] [place] [address] Patient belongings

## 2019-02-26 NOTE — BH NOTES
Pt was present during creative expression group-sat away from participants while receiving nursing care.

## 2019-02-27 LAB
CHOLEST SERPL-MCNC: 142 MG/DL
GLUCOSE BLD STRIP.AUTO-MCNC: 139 MG/DL (ref 65–100)
GLUCOSE BLD STRIP.AUTO-MCNC: 193 MG/DL (ref 65–100)
GLUCOSE BLD STRIP.AUTO-MCNC: 264 MG/DL (ref 65–100)
GLUCOSE BLD STRIP.AUTO-MCNC: 270 MG/DL (ref 65–100)
GLUCOSE BLD STRIP.AUTO-MCNC: 279 MG/DL (ref 65–100)
GLUCOSE P FAST SERPL-MCNC: 268 MG/DL (ref 65–100)
HDLC SERPL-MCNC: 71 MG/DL
HDLC SERPL: 2 {RATIO} (ref 0–5)
LDLC SERPL CALC-MCNC: 50.4 MG/DL (ref 0–100)
LIPID PROFILE,FLP: NORMAL
SERVICE CMNT-IMP: ABNORMAL
TRIGL SERPL-MCNC: 103 MG/DL (ref ?–150)
TSH SERPL DL<=0.05 MIU/L-ACNC: 1.49 UIU/ML (ref 0.36–3.74)
VLDLC SERPL CALC-MCNC: 20.6 MG/DL

## 2019-02-27 PROCEDURE — 82962 GLUCOSE BLOOD TEST: CPT

## 2019-02-27 PROCEDURE — 82947 ASSAY GLUCOSE BLOOD QUANT: CPT

## 2019-02-27 PROCEDURE — 74011250637 HC RX REV CODE- 250/637: Performed by: INTERNAL MEDICINE

## 2019-02-27 PROCEDURE — 80061 LIPID PANEL: CPT

## 2019-02-27 PROCEDURE — 74011250637 HC RX REV CODE- 250/637: Performed by: PSYCHIATRY & NEUROLOGY

## 2019-02-27 PROCEDURE — 74011636637 HC RX REV CODE- 636/637: Performed by: INTERNAL MEDICINE

## 2019-02-27 PROCEDURE — 65220000003 HC RM SEMIPRIVATE PSYCH

## 2019-02-27 PROCEDURE — 84443 ASSAY THYROID STIM HORMONE: CPT

## 2019-02-27 RX ORDER — INSULIN LISPRO 100 [IU]/ML
5 INJECTION, SOLUTION INTRAVENOUS; SUBCUTANEOUS
Status: DISCONTINUED | OUTPATIENT
Start: 2019-02-27 | End: 2019-03-01

## 2019-02-27 RX ORDER — HYDROCHLOROTHIAZIDE 25 MG/1
12.5 TABLET ORAL DAILY
Status: DISCONTINUED | OUTPATIENT
Start: 2019-02-27 | End: 2019-03-02

## 2019-02-27 RX ADMIN — HUMAN INSULIN 17 UNITS: 100 INJECTION, SUSPENSION SUBCUTANEOUS at 20:09

## 2019-02-27 RX ADMIN — BENZTROPINE MESYLATE 1 MG: 1 TABLET ORAL at 08:38

## 2019-02-27 RX ADMIN — INSULIN LISPRO 5 UNITS: 100 INJECTION, SOLUTION INTRAVENOUS; SUBCUTANEOUS at 16:58

## 2019-02-27 RX ADMIN — DIVALPROEX SODIUM 500 MG: 500 TABLET, DELAYED RELEASE ORAL at 08:38

## 2019-02-27 RX ADMIN — CLONAZEPAM 1 MG: 1 TABLET ORAL at 21:00

## 2019-02-27 RX ADMIN — INSULIN LISPRO 5 UNITS: 100 INJECTION, SOLUTION INTRAVENOUS; SUBCUTANEOUS at 12:14

## 2019-02-27 RX ADMIN — CLONAZEPAM 1 MG: 1 TABLET ORAL at 08:38

## 2019-02-27 RX ADMIN — HYDROCHLOROTHIAZIDE 12.5 MG: 25 TABLET ORAL at 12:12

## 2019-02-27 RX ADMIN — INSULIN LISPRO 5 UNITS: 100 INJECTION, SOLUTION INTRAVENOUS; SUBCUTANEOUS at 08:40

## 2019-02-27 RX ADMIN — HUMAN INSULIN 15 UNITS: 100 INJECTION, SUSPENSION SUBCUTANEOUS at 08:39

## 2019-02-27 RX ADMIN — LOSARTAN POTASSIUM 100 MG: 50 TABLET, FILM COATED ORAL at 08:38

## 2019-02-27 RX ADMIN — DIVALPROEX SODIUM 500 MG: 500 TABLET, DELAYED RELEASE ORAL at 21:00

## 2019-02-27 RX ADMIN — HALOPERIDOL 5 MG: 5 TABLET ORAL at 08:39

## 2019-02-27 RX ADMIN — HALOPERIDOL 5 MG: 5 TABLET ORAL at 21:00

## 2019-02-27 RX ADMIN — BENZTROPINE MESYLATE 1 MG: 1 TABLET ORAL at 21:00

## 2019-02-27 RX ADMIN — INSULIN LISPRO 3 UNITS: 100 INJECTION, SOLUTION INTRAVENOUS; SUBCUTANEOUS at 08:41

## 2019-02-27 NOTE — DIABETES MGMT
DTC Progress Note Recommendations/ Comments:  Chart reviewed for hyperglycemia. Pt's BG > 400 mg/dL yesterday, trending downward 270 mg/dL this am. NPH 15 units BID , lispro correction and lispro with meals started for coverage. Agree with current regimen. Pt was last seen by DTC in 11/2018 - see note by Sheyla Nayak RD for additional details if desired. Current hospital DM medications: NPH 15 units BID, lispro with meals 3 units, lispro correction - normal sensitivity Chart reviewed and initial evaluation complete on [address] A1c:  
Lab Results Component Value Date/Time Hemoglobin A1c 15.8 (H) 12/10/2018 02:40 PM  
 
 
Recent Glucose Results:  
Lab Results Component Value Date/Time  (H) 02/27/2019 04:49 AM  
 GLUCPOC 264 (H) 02/27/2019 08:15 AM  
 GLUCPOC 270 (H) 02/27/2019 05:27 AM  
 GLUCPOC 341 (H) 02/26/2019 09:15 PM  
  
 
Lab Results Component Value Date/Time Creatinine 0.79 02/25/2019 04:01 PM  
 
Estimated Creatinine Clearance: 86 mL/min (based on SCr of 0.79 mg/dL). Active Orders Diet DIET DIABETIC WITH OPTIONS Consistent Carb 1500-1600kcal; Regular PO intake: No data found. Will continue to follow as needed. Thank you. Sheyla Nayak RD [facility] Office: [phone]

## 2019-02-27 NOTE — PROGRESS NOTES
Patient actively participated in Spirituality Group about [facility]OPE on February 27, 2019 Behavioral Health unit [address]  Paging Service [phone])

## 2019-02-27 NOTE — BH NOTES
GROUP THERAPY PROGRESS NOTE The patient García pineda 38 y.o. female is participating in Creative Expression Group. Group time: 1 hour Personal goal for participation: To concentrate on selected task Goal orientation: social 
 
Group therapy participation: active Therapeutic interventions reviewed and discussed: Crafts, games, music Impression of participation: The patient was attentive-required prompting Kathy Snow 2/27/2019  5:29 PM

## 2019-02-27 NOTE — BH NOTES
PSYCHIATRIC PROGRESS NOTE Patient Name  Mallory Gunter Date of Birth 1975 CSN [id number] Medical Record Number  [id number] Age  38 y.o. PCP Other, MD Marcel  
Admit date:  2/25/2019 Room Number  667/95  @ [facility]  
Date of Service  2/27/2019 E & M PROGRESS NOTE: 
  
 
 
HISTORY  
   
CC:  \"psychosis / Marcus Pretty" HISTORY OF PRESENT ILLNESS/INTERVAL HISTORY:  (reviewed/updated 2/27/2019). per initial evaluation: The patient, Mallory Gunter, is a 38 y.o. BLACK OR  female with a past psychiatric history significant for schizoaffective disorder and cocaine use disorder, who presents at this time with complaints of (and/or evidence of) the following emotional symptoms: agitation, psychotic behavior and sundar. Additional symptomatology include confusion. The above symptoms have been present for 24+ hours. These symptoms are of moderate to high severity. These symptoms are constant in nature. The patient's condition has been precipitated by psychosocial stressors. Patient's condition made worse by continued illicit drug use as well as treatment noncompliance. UDS: +cocaine; BAL=0.  
  
Patient seen in her room s/p PRN. She had taken off her clothes and was inappropriately in the morning resulting in PRN administration for agitation and psychosis. The patient is a poor hstorian. The patient does not corroborate the above narrative. The patient is unable to contract for safety on the unit but gives consent for the team to contact collateral. The patient is not amenable to initiating treatment while on the unit. 2/27- patient started on court ordered medications. She is odd, visible, discharge focused. Patient with an episode of anxiety when told she would not be discharged today. Patient voices no other complaints. 
  
  
  
SIDE EFFECTS: (reviewed/updated 2/27/2019) None reported or admitted to.  
No noted toxicity with use of Depakote ALLERGIES:(reviewed/updated 2/27/2019) Allergies Allergen Reactions  Dilaudid [Hydromorphone (Bulk)] Itching  Ibuprofen Not Reported This Time MEDICATIONS PRIOR TO ADMISSION:(reviewed/updated 2/27/2019) Medications Prior to Admission Medication Sig  
 albuterol (PROVENTIL HFA, VENTOLIN HFA, PROAIR HFA) 90 mcg/actuation inhaler Take 2 Puffs by inhalation every four (4) hours as needed for Wheezing.  insulin NPH/insulin regular (NOVOLIN 70/30, HUMULIN 70/30) 100 unit/mL (70-30) injection 15 Units by SubCUTAneous route two (2) times a day.  losartan (COZAAR) 100 mg tablet Take 1 Tab by mouth daily.  haloperidol (HALDOL) 5 mg tablet Take 1 Tab by mouth every twelve (12) hours.  raNITIdine hcl 150 mg capsule TAKE 1 TABLET BY MOUTH TWICE A DAY FOR 10 DAYS  aspirin delayed-release 81 mg tablet TAKE 1 TABLET BY MOUTH EVERY DAY WITH 6 TO 8 OUNCES OF PLAIN WATER  
  
PAST MEDICAL HISTORY: Past medical history from the initial psychiatric evaluation has been reviewed (reviewed/updated 2/27/2019) with no additional updates (I asked patient and no additional past medical history provided). Past Medical History:  
Diagnosis Date  Alcohol abuse, in remission   
 quit 17 days ago  Asthma   
 does not use inhalers  Borderline personality disorder ([address]  Diabetes ([facility] [address]  GERD (gastroesophageal reflux disease)  Hypertension  Other ill-defined conditions(799.89)   
 kidney stones ,passed one  Other ill-defined conditions(799.89) sickle cell trait  Other ill-defined conditions(799.89)   
 increased cholesterol  Pancreatitis  Psychiatric disorder   
 schizophrenia, bipolar, depression, anxiety Past Surgical History:  
Procedure Laterality Date  ABDOMEN SURGERY PROC UNLISTED  3/12/14 CHOLECYSTECTOMY LAPAROSCOPIC    
 HX GASTRIC BYPASS  HX GYN  11/8/2012  
 c section x2  HX OTHER SURGICAL  3/13/14  ENDOSCOPIC RETROGRADE CHOLANGIOPANCREATOGRAPHY  HX OTHER SURGICAL  8/4/14  
 endoscopic stent placed to bile duct  HX TUBAL LIGATION  2012 SOCIAL HISTORY: Social history from the initial psychiatric evaluation has been reviewed (reviewed/updated 2/27/2019) with no additional updates (I asked patient and no additional social history provided). Social History Socioeconomic History  Marital status: SINGLE Spouse name: Not on file  Number of children: Not on file  Years of education: Not on file  Highest education level: Not on file Social Needs  Financial resource strain: Not on file  Food insecurity - worry: Not on file  Food insecurity - inability: Not on file  Transportation needs - medical: Not on file  Transportation needs - non-medical: Not on file Occupational History  Not on file Tobacco Use  Smoking status: Current Every Day Smoker Packs/day: 0.50 Years: 14.00 Pack years: 7.00 Types: Cigarettes  Smokeless tobacco: Never Used  Tobacco comment: cigarettes Substance and Sexual Activity  Alcohol use: Yes Alcohol/week: 4.8 oz Types: 2 Glasses of wine, 6 Cans of beer per week  Drug use: No  
  Comment: yesterday 12/27/18  Sexual activity: Yes  
  Partners: Female Birth control/protection: Surgical  
Other Topics Concern  Not on file Social History Narrative  Not on file FAMILY HISTORY: Family history from the initial psychiatric evaluation has been reviewed (reviewed/updated 2/27/2019) with no additional updates (I asked patient and no additional family history provided). Family History Problem Relation Age of Onset  Heart Disease Mother  Diabetes Mother  Hypertension Mother  Hypertension Maternal Grandmother REVIEW OF SYSTEMS: (reviewed/updated 2/27/2019) Appetite:no change from normal  
Sleep: poor with DIMS (difficulty initiating & maintaining sleep) All other Review of Systems: Negative except per HPI MENTAL STATUS EXAM & VITALS MENTAL STATUS EXAM (MSE): MSE FINDINGS ARE WITHIN NORMAL LIMITS (WNL) UNLESS OTHERWISE STATED BELOW. ( ALL OF THE BELOW CATEGORIES OF THE MSE HAVE BEEN REVIEWED (reviewed 2/27/2019) AND UPDATED AS DEEMED APPROPRIATE ) General Presentation age appropriate, cooperative Orientation not oriented to situation Vital Signs  See below (reviewed 2/27/2019); Vital Signs (BP, Pulse, & Temp) are within normal limits if not listed below. Gait and Station Stable/steady, no ataxia Musculoskeletal System No extrapyramidal symptoms (EPS); no abnormal muscular movements or Tardive Dyskinesia (TD); muscle strength and tone are within normal limits Language No aphasia or dysarthria Speech:  normal volume and non-pressured Thought Processes illogical; normal rate of thoughts; fair abstract reasoning/computation Thought Associations tangential  
Thought Content poverty of content Suicidal Ideations none Homicidal Ideations none Mood:  anxious Affect:  mood-congruent Memory recent  intact Memory remote:  intact Concentration/Attention:  intact Fund of Knowledge below average Insight:  limited Reliability fair Judgment:  poor VITALS:    
Patient Vitals for the past 24 hrs: 
 Temp Pulse Resp BP SpO2  
02/27/19 0930 98.1 °F (36.7 °C) (!) 102 16 142/71 100 % 02/27/19 0844 98.3 °F (36.8 °C) (!) 106 16 (!) 147/93 100 % 02/26/19 2037 98.2 °F (36.8 °C) 80 16 136/61 100 % Wt Readings from Last 3 Encounters:  
02/25/19 61.2 kg (135 lb)  
02/22/19 70.4 kg (155 lb 4.8 oz) 02/22/19 67.6 kg (149 lb) Temp Readings from Last 3 Encounters:  
02/27/19 98.1 °F (36.7 °C)  
02/22/19 97.4 °F (36.3 °C)  
02/22/19 98.1 °F (36.7 °C) BP Readings from Last 3 Encounters:  
02/27/19 142/71  
02/23/19 103/85  
02/22/19 144/82 Pulse Readings from Last 3 Encounters:  
02/27/19 (!) 102  
02/22/19 95  
02/22/19 95 DATA LABORATORY DATA:(reviewed/updated 2/27/2019) Recent Results (from the past 24 hour(s)) GLUCOSE, POC Collection Time: 02/26/19  4:24 PM  
Result Value Ref Range Glucose (POC) 147 (H) 65 - 100 mg/dL Performed by Anam Jaimes GLUCOSE, POC Collection Time: 02/26/19  9:15 PM  
Result Value Ref Range Glucose (POC) 341 (H) 65 - 100 mg/dL Performed by Tory Dean GLUCOSE, FASTING Collection Time: 02/27/19  4:49 AM  
Result Value Ref Range Glucose 268 (H) 65 - 100 MG/DL  
LIPID PANEL Collection Time: 02/27/19  4:49 AM  
Result Value Ref Range LIPID PROFILE Cholesterol, total 142 <200 MG/DL Triglyceride 103 <150 MG/DL  
 HDL Cholesterol 71 MG/DL  
 LDL, calculated 50.4 0 - 100 MG/DL VLDL, calculated 20.6 MG/DL  
 CHOL/HDL Ratio 2.0 0 - 5.0 TSH 3RD GENERATION Collection Time: 02/27/19  4:49 AM  
Result Value Ref Range TSH 1.49 0.36 - 3.74 uIU/mL GLUCOSE, POC Collection Time: 02/27/19  5:27 AM  
Result Value Ref Range Glucose (POC) 270 (H) 65 - 100 mg/dL Performed by Nehemiah Hinkle GLUCOSE, POC Collection Time: 02/27/19  8:15 AM  
Result Value Ref Range Glucose (POC) 264 (H) 65 - 100 mg/dL Performed by Evan Graff GLUCOSE, POC Collection Time: 02/27/19 12:08 PM  
Result Value Ref Range Glucose (POC) 139 (H) 65 - 100 mg/dL Performed by Lily Wang (RN) Lab Results Component Value Date/Time Valproic acid <3 (L) 02/25/2019 04:01 PM  
 
No results found for: LITHM  
RADIOLOGY REPORTS:(reviewed/updated 2/27/2019) Xr Chest Pa Lat Result Date: 2/16/2019 CLINICAL HISTORY: Dyspnea INDICATION: Dyspnea COMPARISON: 2/13/2019 FINDINGS: PA and lateral views of the chest are obtained. The cardiopericardial silhouette is within normal limits. There is no pleural effusion, pneumothorax or focal consolidation present. IMPRESSION: No acute intrathoracic disease. Xr Chest Pa Lat Result Date: 1/22/2019 INDICATION: chest pain EXAM: CXR 2 Views. COMPARISON: 1/20/2019. [facility] Daily FINDINGS: Frontal and lateral views of the chest show the lungs are free of acute disease. There remain left upper lobe granulomas. Heart size is normal. There is no overt pulmonary edema. There is no evident pneumothorax, adenopathy or pleural effusion. IMPRESSION: No acute disease. No significant interval change. Xr Chest Pa Lat Result Date: 1/13/2019 INDICATION:   cp COMPARISON: January 8, 2019 FINDINGS: Frontal and lateral views of the chest demonstrate a normal cardiomediastinal silhouette. The lungs are adequately expanded. There is no edema, effusion, consolidation, or pneumothorax. The osseous structures are unremarkable. IMPRESSION: No acute process. Xr Chest Pa Lat Result Date: 1/8/2019 INDICATION: . cp Additional history: Alcohol and crack user that once detox COMPARISON: Previous chest xray, 12/28/2018. [facility] Daily FINDINGS: PA and lateral view of the chest. . Lines/tubes/surgical: Cardiac monitor leads overly the patient. Heart/mediastinum: Unremarkable. Lungs/pleura:  No focal consolidation or mass. No visualized pleural effusion or pneumothorax. Additional Comments: Surgical clips in the upper abdomen. [facility] Daily IMPRESSION: 1. No radiographic evidence of acute cardiopulmonary disease. Xr Chest Pa Lat Result Date: 12/28/2018 CLINICAL HISTORY: Chest pain INDICATION: Chest pain left-sided COMPARISON: 11/22/2018 FINDINGS: PA and lateral views of the chest are obtained. The cardiopericardial silhouette is within normal limits. There is no pleural effusion, pneumothorax or focal consolidation present. IMPRESSION: No acute intrathoracic disease. Ct Head Wo Cont Result Date: 2/25/2019 EXAM:  CT HEAD WO CONT INDICATION: Unusual behavior. Mental health problems. COMPARISON: None TECHNIQUE: Noncontrast head CT. Coronal and sagittal reformats.  CT dose reduction was achieved through use of a standardized protocol tailored for this examination and automatic exposure control for dose modulation. FINDINGS: The ventricles and sulci are age-appropriate without hydrocephalus. There is no mass effect or midline shift. There is no intracranial hemorrhage or extra-axial fluid collection. There is no abnormal parenchymal attenuation. The gray-white matter differentiation is maintained. The basal cisterns are patent. The osseous structures are intact. The visualized paranasal sinuses and mastoid air cells are clear. IMPRESSION: No acute intracranial abnormality. Cta Chest W Or W Wo Cont Result Date: 12/28/2018 CLINICAL HISTORY: Chest pain, dyspnea INDICATION: Chest pain, dyspnea COMPARISON: 2008 TECHNIQUE: CT of the chest with  IV contrast , Isovue-370 is performed. Axial images from the thoracic inlet to the level of the upper abdomen are obtained. Manual post-processing of the images and coronal reformatting is also performed. CT dose reduction was achieved through use of a standardized protocol tailored for this examination and automatic exposure control for dose modulation. Multiplanar reformatted imaging was performed. Sagittal and coronal reformatting. 3-D Postprocessing of imaging was performed. 3-D MIP reconstructed images were obtained in the coronal plane. FINDINGS: There is no pulmonary embolism. There is no aortic aneurysm or dissection. There are small bilateral pleural effusions. The pulmonary artery is prominent consistent with pulmonary arterial hypertension. Thoracic aortic ectasia. There is a small-to-moderate hiatal hernia. Distal esophageal wall thickening. Minimal scattered groundglass opacities most predominantly in the right middle lobe but also within the upper lobes and lower lobes bilaterally. Postcholecystectomy. There is no pleural or pericardial effusion. There is no mediastinal, axillary or hilar lymphadenopathy. The aorta is normal in course and caliber. The proximal pulmonary arteries are without evidence of filling defects.  No lytic or blastic lesions are identified. The remainder of the upper abdomen visualized is unremarkable. IMPRESSION: There is no pulmonary embolism. There is no aortic aneurysm or dissection. Small bilateral pleural effusions. Small to moderate hiatal hernia. Esophageal wall thickening is circumferential. This may be related to esophagitis. Nonspecific finding. Further clinical correlation recommended. Scattered groundglass opacity with bronchial wall thickening most prominent in the right middle lobe. May be related to infectious/inflammatory, reactive airways process. Follow-up chest CT in 4-6 weeks to evaluate for resolution recommended. Ct Abd Pelv W Cont Result Date: 1/24/2019 EXAM: CT ABD PELV W CONT INDICATION: Ascites . Distended abdomen, history of liver disease. Midsternal chest pain for 3 weeks. Shortness of breath. COMPARISON: 11/22/2018 CONTRAST: 100 mL of Isovue-370. TECHNIQUE: Following the uneventful intravenous administration of contrast, thin axial images were obtained through the abdomen and pelvis. Coronal and sagittal reconstructions were generated. Oral contrast was not administered. CT dose reduction was achieved through use of a standardized protocol tailored for this examination and automatic exposure control for dose modulation. FINDINGS: LUNG BASES: Clear. INCIDENTALLY IMAGED HEART AND MEDIASTINUM: Unremarkable. LIVER: No mass or biliary dilatation. GALLBLADDER: Surgically absent. SPLEEN: No mass. PANCREAS: The pancreas is atrophic with coarse calcifications consistent with chronic pancreatitis as previously reported. ADRENALS: Unremarkable. KIDNEYS: No mass, calculus, or hydronephrosis. STOMACH: Stomach wall appears thickened, likely related to nondistention. SMALL BOWEL: Mild prominence of small bowel caliber without debbi distention. COLON: No dilatation or wall thickening. Large amount of retained fecal material throughout the colon.  APPENDIX: Not well seen, but no acute appendiceal abnormality is suspected. PERITONEUM: Small amount of free fluid in the cul-de-sac. RETROPERITONEUM: No lymphadenopathy or aortic aneurysm. REPRODUCTIVE ORGANS: Unremarkable for age. URINARY BLADDER: No mass or calculus. BONES: No destructive bone lesion. ADDITIONAL COMMENTS: Generalized subcutaneous edema throughout the abdomen and pelvis. IMPRESSION: 1. Previously described chronic pancreatitis. 2. Status post cholecystectomy. 3. Large amount of retained fecal material throughout the colon. 4. Small amount of free fluid in the cul-de-sac which may conceivably be physiologic. 5. Generalized subcutaneous edema throughout the abdomen and pelvis. Xr Chest [facility] Result Date: 2/13/2019 EXAM: Portable CXR. 1835 hours  INDICATION: Chest pain FINDINGS: The lungs are clear. Heart is normal in size. There is no overt pulmonary edema. There is no evident pneumothorax, adenopathy or pleural effusion. IMPRESSION: No Acute Disease. Xr Chest [facility] Result Date: 1/27/2019 INDICATION: chest pain EXAM:  AP CHEST RADIOGRAPH COMPARISON: January 22, 2019 FINDINGS: AP portable view of the chest demonstrates a normal cardiomediastinal silhouette. The lungs are adequately expanded. There is no edema, effusion, consolidation, or pneumothorax. The osseous structures are unremarkable. IMPRESSION: No acute process. Xr Chest [facility] Result Date: 1/20/2019 EXAM:  Portable chest view INDICATION: Sharp chest pain for several days. COMPARISON: Chest views on 1/13/2019. TECHNIQUE: Erect portable chest radiograph FINDINGS: Cardiac size is within normal limits. Aortic arch is not enlarged. Pulmonary vasculature is within normal limits. Left upper lobe calcified granulomas are still present. No focal airspace opacity or pulmonary edema. No pneumothorax. Bones are within normal limits. IMPRESSION: No acute process on portable chest. No change given difference in technique. MEDICATIONS ALL MEDICATIONS:  
Current Facility-Administered Medications Medication Dose Route Frequency  insulin lispro (HUMALOG) injection 5 Units  5 Units SubCUTAneous TIDAC  hydroCHLOROthiazide (HYDRODIURIL) tablet 12.5 mg  12.5 mg Oral DAILY  insulin NPH (NOVOLIN N, HUMULIN N) injection 17 Units  17 Units SubCUTAneous Q12H  
 losartan (COZAAR) tablet 100 mg  100 mg Oral DAILY  insulin lispro (HUMALOG) injection   SubCUTAneous AC&HS  
 glucose chewable tablet 16 g  4 Tab Oral PRN  
 dextrose (D50W) injection syrg 12.5-25 g  12.5-25 g IntraVENous PRN  
 glucagon (GLUCAGEN) injection 1 mg  1 mg IntraMUSCular PRN  
 haloperidol (HALDOL) tablet 5 mg  5 mg Oral Q12H Or  
 haloperidol lactate (HALDOL) injection 5 mg +++COURT ORDERED MEDICATION FOR REFUSAL OF PO HALOPERIDOL+++  5 mg IntraMUSCular Q12H  
 benztropine (COGENTIN) tablet 1 mg  1 mg Oral Q12H Or  
 benztropine (COGENTIN) injection 1 mg +++COURT ORDERED MEDICATION FOR REFUSAL OF PO BENZTROPINE+++  1 mg IntraMUSCular Q12H  clonazePAM (KlonoPIN) tablet 1 mg  1 mg Oral Q12H Or  
 LORazepam (ATIVAN) injection 1 mg +++COURT ORDERED MEDICATION FOR REFUSAL OF PO CLONAZEPAM+++  1 mg IntraMUSCular Q12H  
 LORazepam (ATIVAN) injection 1 mg +++COURT ORDERED MEDICATION FOR REFUSAL OF PO DEPAKOTE+++  1 mg IntraMUSCular Q12H  Or  
 divalproex DR (DEPAKOTE) tablet 500 mg  500 mg Oral Q12H  
 ziprasidone (GEODON) 20 mg in sterile water (preservative free) 1 mL injection  20 mg IntraMUSCular BID PRN  
 OLANZapine (ZyPREXA) tablet 5 mg  5 mg Oral Q6H PRN  
 benztropine (COGENTIN) tablet 2 mg  2 mg Oral BID PRN  
 benztropine (COGENTIN) injection 2 mg  2 mg IntraMUSCular BID PRN  
 LORazepam (ATIVAN) injection 2 mg  2 mg IntraMUSCular Q4H PRN  
 LORazepam (ATIVAN) tablet 1 mg  1 mg Oral Q4H PRN  
 zolpidem (AMBIEN) tablet 10 mg  10 mg Oral QHS PRN  
 acetaminophen (TYLENOL) tablet 650 mg  650 mg Oral Q4H PRN  
 magnesium hydroxide (MILK OF MAGNESIA) 400 mg/5 mL oral suspension 30 mL  30 mL Oral DAILY PRN  
 nicotine (NICODERM CQ) 21 mg/24 hr patch 1 Patch  1 Patch TransDERmal DAILY PRN  
  
SCHEDULED MEDICATIONS:  
Current Facility-Administered Medications Medication Dose Route Frequency  insulin lispro (HUMALOG) injection 5 Units  5 Units SubCUTAneous TIDAC  hydroCHLOROthiazide (HYDRODIURIL) tablet 12.5 mg  12.5 mg Oral DAILY  insulin NPH (NOVOLIN N, HUMULIN N) injection 17 Units  17 Units SubCUTAneous Q12H  
 losartan (COZAAR) tablet 100 mg  100 mg Oral DAILY  insulin lispro (HUMALOG) injection   SubCUTAneous AC&HS  
 haloperidol (HALDOL) tablet 5 mg  5 mg Oral Q12H Or  
 haloperidol lactate (HALDOL) injection 5 mg +++COURT ORDERED MEDICATION FOR REFUSAL OF PO HALOPERIDOL+++  5 mg IntraMUSCular Q12H  
 benztropine (COGENTIN) tablet 1 mg  1 mg Oral Q12H Or  
 benztropine (COGENTIN) injection 1 mg +++COURT ORDERED MEDICATION FOR REFUSAL OF PO BENZTROPINE+++  1 mg IntraMUSCular Q12H  clonazePAM (KlonoPIN) tablet 1 mg  1 mg Oral Q12H Or  
 LORazepam (ATIVAN) injection 1 mg +++COURT ORDERED MEDICATION FOR REFUSAL OF PO CLONAZEPAM+++  1 mg IntraMUSCular Q12H  
 LORazepam (ATIVAN) injection 1 mg +++COURT ORDERED MEDICATION FOR REFUSAL OF PO DEPAKOTE+++  1 mg IntraMUSCular Q12H Or  
 divalproex DR (DEPAKOTE) tablet 500 mg  500 mg Oral Q12H  
  
 
 
ASSESSMENT & PLAN  
 
DIAGNOSES REQUIRING ACTIVE TREATMENT AND MONITORING: (reviewed/updated 2/27/2019) Patient Active Hospital Problem List: 
 Schizoaffective disorder ([facility] [address] (8/8/2018) Assessment: patient grossly psychotic, recent substance use and likely medication non-compliance. Patient a candidate for court ordered medication as she has been refusing since being on the unit. Plan: 
COURT ORDERED MEDICATION: 
  Haldol 5 mg PO *OR* IM Q12H for psychosis Cogentin 1 mg PO *OR* IM Q12H for EPS prophylaxis Depakote 500 mg *OR* Ativan 1 mg Q12H for sundar Klonopin 1 mg *OR* Ativan 1 mg Q12H for agitation 
  
- IGM therapy as tolerated - Expand database / obtain collateral 
- Dispo planning 
  
I will continue to monitor blood levels (Depakote---a drug with a narrow therapeutic index= NTI) and associated labs for drug therapy implemented that require intense monitoring for toxicity as deemed appropriate based on current medication side effects and pharmacodynamically determined drug 1/2 lives. In summary, Larisa Franz, is a 38 y.o.  female who presents with a severe exacerbation of the principal diagnosis of Schizoaffective disorder ([facility] [address] Patient's condition is improving. Patient requires continued inpatient hospitalization for further stabilization, safety monitoring and medication management. I will continue to coordinate the provision of individual, milieu, occupational, group, and substance abuse therapies to address target symptoms/diagnoses as deemed appropriate for the individual patient. A coordinated, multidisplinary treatment team round was conducted with the patient (this team consists of the nurse, psychiatric unit pharmcist,  and writer). Complete current electronic health record for patient has been reviewed today including consultant notes, ancillary staff notes, nurses and psychiatric tech notes. Suicide risk assessment completed and patient deemed to be of low risk for suicide at this time. The following regarding medications was addressed during rounds with patient:  
the risks and benefits of the proposed medication. The patient was given the opportunity to ask questions. Informed consent given to the use of the above medications. Will continue to adjust psychiatric and non-psychiatric medications (see above \"medication\" section and orders section for details) as deemed appropriate & based upon diagnoses and response to treatment.   
 
I will continue to order blood tests/labs and diagnostic tests as deemed appropriate and review results as they become available (see orders for details and above listed lab/test results). I will order psychiatric records from previous [facility] hospitals to further elucidate the nature of patient's psychopathology and review once available. I will gather additional collateral information from friends, family and o/p treatment team to further elucidate the nature of patient's psychopathology and baselline level of psychiatric functioning. I certify that this patient's inpatient psychiatric hospital services furnished since the previous certification were, and continue to be, required for treatment that could reasonably be expected to improve the patient's condition, or for diagnostic study, and that the patient continues to need, on a daily basis, active treatment furnished directly by or requiring the supervision of inpatient psychiatric facility personnel. In addition, the hospital records show that services furnished were intensive treatment services, admission or related services, or equivalent services. EXPECTED DISCHARGE DATE/DAY: 3/1/2019 DISPOSITION: Home Signed By:  
Bernarda Starr MD 
2/27/2019

## 2019-02-27 NOTE — PROGRESS NOTES
Problem: Psychosis Goal: *STG: Patient will develop strategies to regulate emotions and corresponding behaviors Outcome: Not Progressing Towards Goal 
Patient remained isolative to self and room on 7p - 11p shift. Patient exited room for snack & promptly returned. Patient's blood sugar was 341, however patient became upset when attempt was made to give insulin. Patient refused to exit room for medications, and writer took medications to room, patient appeared angry about medications being brought to room, however she accepted all except the insulin. Will continue to monitor for safety and support. Goal: *STG/LTG: Complies with medication therapy Outcome: Not Progressing Towards Goal 
Patient blood sugar 341, refused insulin

## 2019-02-27 NOTE — PROGRESS NOTES
Hospitalist Progress Note NAME: Macho Hoskins :  1975 MRN:  [id number] Room Number:  980/52  @ [facility] Summary: 38 y.o. female whom presented on 2019 with Assessment / Plan: 
 
Uncontrolled DM1 Asthma,mild intermittent HTN Schizoaffective disorder with anxiety and depression: POA 
with polysubstance abuse and drug-seeking behavior Non compliance with meds 
  PLAN: 
A1c Diabetic diet, ISS,increase NPH 17 U BID,Add 5 units pre meal 
Resume losartan Psychiatric treatment and management of health issues,Defer to psychiatrist for further management. No VTE prophylaxis indicated or warranted at this time Subjective: Chief Complaint / Reason for Physician Visit \" ok\". Discussed with RN events overnight. Review of Systems: 
Symptom Y/N Comments  Symptom Y/N Comments Fever/Chills n   Chest Pain n   
Poor Appetite n   Edema Cough n   Abdominal Pain n   
Sputum n   Joint Pain SOB/BARRY n   Pruritis/Rash Nausea/vomit n   Tolerating PT/OT Diarrhea n   Tolerating Diet y Constipation n   Other Could NOT obtain due to:   
 
Objective: VITALS:  
Last 24hrs VS reviewed since prior progress note. Most recent are: 
Patient Vitals for the past 24 hrs: 
 Temp Pulse Resp BP SpO2  
19 0844 98.3 °F (36.8 °C) (!) 106 16 (!) 147/93   
19 2037 98.2 °F (36.8 °C) 80 16 136/61 100 % 19 1044 98 °F (36.7 °C) 81 16 138/63 100 % No intake or output data in the 24 hours ending 19 9006 PHYSICAL EXAM: 
General: WD, WN. Alert, cooperative, no acute distress   
EENT:  EOMI. Anicteric sclerae. MMM Resp:  CTA bilaterally, no wheezing or rales. No accessory muscle use CV:  Regular  rhythm,  No edema GI:  Soft, Non distended, Non tender.  +Bowel sounds Neurologic:  Alert and oriented X 3, normal speech, Psych:   Good insight. Not anxious nor agitated Skin:  No rashes. No jaundice Reviewed most current lab test results and cultures  YES Reviewed most current radiology test results   YES Review and summation of old records today    NO Reviewed patient's current orders and MAR    YES 
PMH/SH reviewed - no change compared to H&P 
________________________________________________________________________ Care Plan discussed with: 
  Comments Patient x Family RN x Care Manager Consultant Multidiciplinary team rounds were held today with , nursing, pharmacist and clinical coordinator. Patient's plan of care was discussed; medications were reviewed and discharge planning was addressed. ________________________________________________________________________ Total NON critical care TIME:  17   Minutes Total CRITICAL CARE TIME Spent:   Minutes non procedure based Comments >50% of visit spent in counseling and coordination of care    
________________________________________________________________________ Chon Hannon MD  
 
Procedures: see electronic medical records for all procedures/Xrays and details which were not copied into this note but were reviewed prior to creation of Plan. LABS: 
I reviewed today's most current labs and imaging studies. Pertinent labs include: 
Recent Labs  
  02/25/19 
1601 WBC 8.3 HGB 16.5* HCT 46.5  Recent Labs  
  02/27/19 
0449 02/25/19 
1601 NA  --  134* K  --  3.8 CL  --  97  
CO2  --  29  
* 291* BUN  --  11  
CREA  --  0.79 CA  --  8.7 Signed: Chon Hannon MD

## 2019-02-27 NOTE — BH NOTES
Patient had an episode of incontinence during the night. Patient showered and linens were changed. Remained in room, monitored q 15minutes for safety, and slept for over 8 hours. AM labs were obtained. Patient requested blood sugar to be checked, result 270, no medications at this time. Will continue to monitor for remainder of shift for changes/issues/complaints.

## 2019-02-27 NOTE — PROGRESS NOTES
Problem: Psychosis Goal: *STG: Decreased hallucinations Outcome: Progressing Towards Goal 
Pt is visible on the unit. Medication and meal complaint. Pt is attending groups. socializing  with peers. Denies si/hi/a/v burnett. Pt does appear preoccupied. More alert. Pt asked to be discharged today and started crying. Nurse educated pt she has anticipated discharge Friday. Pt states ok and went to room. Will continue to monitor pt and assess needs.

## 2019-02-28 LAB
GLUCOSE BLD STRIP.AUTO-MCNC: 104 MG/DL (ref 65–100)
GLUCOSE BLD STRIP.AUTO-MCNC: 118 MG/DL (ref 65–100)
GLUCOSE BLD STRIP.AUTO-MCNC: 128 MG/DL (ref 65–100)
GLUCOSE BLD STRIP.AUTO-MCNC: 152 MG/DL (ref 65–100)
SERVICE CMNT-IMP: ABNORMAL

## 2019-02-28 PROCEDURE — 74011250637 HC RX REV CODE- 250/637: Performed by: PSYCHIATRY & NEUROLOGY

## 2019-02-28 PROCEDURE — 82962 GLUCOSE BLOOD TEST: CPT

## 2019-02-28 PROCEDURE — 65220000003 HC RM SEMIPRIVATE PSYCH

## 2019-02-28 PROCEDURE — 74011250637 HC RX REV CODE- 250/637: Performed by: INTERNAL MEDICINE

## 2019-02-28 PROCEDURE — 74011636637 HC RX REV CODE- 636/637: Performed by: INTERNAL MEDICINE

## 2019-02-28 RX ADMIN — HYDROCHLOROTHIAZIDE 12.5 MG: 25 TABLET ORAL at 08:42

## 2019-02-28 RX ADMIN — BENZTROPINE MESYLATE 1 MG: 1 TABLET ORAL at 08:42

## 2019-02-28 RX ADMIN — DIVALPROEX SODIUM 500 MG: 500 TABLET, DELAYED RELEASE ORAL at 21:12

## 2019-02-28 RX ADMIN — DIVALPROEX SODIUM 500 MG: 500 TABLET, DELAYED RELEASE ORAL at 08:43

## 2019-02-28 RX ADMIN — INSULIN LISPRO 5 UNITS: 100 INJECTION, SOLUTION INTRAVENOUS; SUBCUTANEOUS at 11:53

## 2019-02-28 RX ADMIN — LORAZEPAM 1 MG: 1 TABLET ORAL at 18:30

## 2019-02-28 RX ADMIN — INSULIN LISPRO 5 UNITS: 100 INJECTION, SOLUTION INTRAVENOUS; SUBCUTANEOUS at 09:03

## 2019-02-28 RX ADMIN — CLONAZEPAM 1 MG: 1 TABLET ORAL at 08:42

## 2019-02-28 RX ADMIN — HUMAN INSULIN 17 UNITS: 100 INJECTION, SUSPENSION SUBCUTANEOUS at 21:11

## 2019-02-28 RX ADMIN — HALOPERIDOL 5 MG: 5 TABLET ORAL at 08:42

## 2019-02-28 RX ADMIN — CLONAZEPAM 1 MG: 1 TABLET ORAL at 21:12

## 2019-02-28 RX ADMIN — HUMAN INSULIN 17 UNITS: 100 INJECTION, SUSPENSION SUBCUTANEOUS at 09:03

## 2019-02-28 RX ADMIN — BENZTROPINE MESYLATE 1 MG: 1 TABLET ORAL at 21:12

## 2019-02-28 RX ADMIN — LOSARTAN POTASSIUM 100 MG: 50 TABLET, FILM COATED ORAL at 09:10

## 2019-02-28 RX ADMIN — INSULIN LISPRO 5 UNITS: 100 INJECTION, SOLUTION INTRAVENOUS; SUBCUTANEOUS at 16:46

## 2019-02-28 RX ADMIN — HALOPERIDOL 5 MG: 5 TABLET ORAL at 21:12

## 2019-02-28 RX ADMIN — ZOLPIDEM TARTRATE 10 MG: 10 TABLET ORAL at 21:39

## 2019-02-28 NOTE — BH NOTES
GROUP THERAPY PROGRESS NOTE The patient Larisa pineda 38 y.o. female is participating in [employer]. Group time: 1 hour Personal goal for participation: To concentrate on selected task Goal orientation: social 
 
Group therapy participation: active Therapeutic interventions reviewed and discussed: Crafts, games, music Impression of participation: The patient was attentive with prompts-tearful at times-reported wanting to go home Lorin Richardson 2/28/2019  5:46 PM

## 2019-02-28 NOTE — BH NOTES
Patient has been med compliant. Patient denies AVH. No complaints of depression or anxiety. Patient has had multiple requests for food in between meals and snacks. Patient has been educated on appropriate nutrition for diabetic disorder. Patient is present on milieu minimal interaction with peers. Focused on discharge. Patient will be provided with safety and support per unit policy.

## 2019-02-28 NOTE — BH NOTES
PSYCHIATRIC PROGRESS NOTE Patient Name  Hayder Pereira Date of Birth 1975 [facility] [id number] Medical Record Number  [id number] Age  38 y.o. PCP Other, MD Marcel  
Admit date:  2/25/2019 Room Number  312/13  @ [facility]  
Date of Service  2/28/2019 E & M PROGRESS NOTE: 
  
 
 
HISTORY  
   
CC:  \"psychosis / Sharon Even" HISTORY OF PRESENT ILLNESS/INTERVAL HISTORY:  (reviewed/updated 2/28/2019). per initial evaluation: The patient, Hayder Pereira, is a 38 y.o. BLACK OR  female with a past psychiatric history significant for schizoaffective disorder and cocaine use disorder, who presents at this time with complaints of (and/or evidence of) the following emotional symptoms: agitation, psychotic behavior and sundar. Additional symptomatology include confusion. The above symptoms have been present for 24+ hours. These symptoms are of moderate to high severity. These symptoms are constant in nature. The patient's condition has been precipitated by psychosocial stressors. Patient's condition made worse by continued illicit drug use as well as treatment noncompliance. UDS: +cocaine; BAL=0.  
  
Patient seen in her room s/p PRN. She had taken off her clothes and was inappropriately in the morning resulting in PRN administration for agitation and psychosis. The patient is a poor hstorian. The patient does not corroborate the above narrative. The patient is unable to contract for safety on the unit but gives consent for the team to contact collateral. The patient is not amenable to initiating treatment while on the unit. 2/27- patient started on court ordered medications. She is odd, visible, discharge focused. Patient with an episode of anxiety when told she would not be discharged today. Patient voices no other complaints. 2/28- patient is odd, discharge focused.  Labile and tearful, has been repeatedly asking to leave hospital. No behavioral outbursts, no PRNs given. Patient informed she may be discharged to her family's care a day early but continued to cry. 
  
  
  
SIDE EFFECTS: (reviewed/updated 2/28/2019) None reported or admitted to. No noted toxicity with use of Depakote ALLERGIES:(reviewed/updated 2/28/2019) Allergies Allergen Reactions  Dilaudid [Hydromorphone (Bulk)] Itching  Ibuprofen Not Reported This Time MEDICATIONS PRIOR TO ADMISSION:(reviewed/updated 2/28/2019) Medications Prior to Admission Medication Sig  
 albuterol (PROVENTIL HFA, VENTOLIN HFA, PROAIR HFA) 90 mcg/actuation inhaler Take 2 Puffs by inhalation every four (4) hours as needed for Wheezing.  insulin NPH/insulin regular (NOVOLIN 70/30, HUMULIN 70/30) 100 unit/mL (70-30) injection 15 Units by SubCUTAneous route two (2) times a day.  losartan (COZAAR) 100 mg tablet Take 1 Tab by mouth daily.  haloperidol (HALDOL) 5 mg tablet Take 1 Tab by mouth every twelve (12) hours.  raNITIdine hcl 150 mg capsule TAKE 1 TABLET BY MOUTH TWICE A DAY FOR 10 DAYS  aspirin delayed-release 81 mg tablet TAKE 1 TABLET BY MOUTH EVERY DAY WITH 6 TO 8 OUNCES OF PLAIN WATER  
  
PAST MEDICAL HISTORY: Past medical history from the initial psychiatric evaluation has been reviewed (reviewed/updated 2/28/2019) with no additional updates (I asked patient and no additional past medical history provided). Past Medical History:  
Diagnosis Date  Alcohol abuse, in remission   
 quit 17 days ago  Asthma   
 does not use inhalers  Borderline personality disorder ([facility] [address]  Diabetes ([facility] [address]  GERD (gastroesophageal reflux disease)  Hypertension  Other ill-defined conditions(799.89)   
 kidney stones ,passed one  Other ill-defined conditions(799.89) sickle cell trait  Other ill-defined conditions(799.89)   
 increased cholesterol  Pancreatitis  Psychiatric disorder   
 schizophrenia, bipolar, depression, anxiety Past Surgical History: Procedure Laterality Date  ABDOMEN SURGERY PROC UNLISTED  3/12/14 CHOLECYSTECTOMY LAPAROSCOPIC    
 HX GASTRIC BYPASS  HX GYN  11/8/2012  
 c section x2  HX OTHER SURGICAL  3/13/14 ENDOSCOPIC RETROGRADE CHOLANGIOPANCREATOGRAPHY  HX OTHER SURGICAL  8/4/14  
 endoscopic stent placed to bile duct  HX TUBAL LIGATION  2012 SOCIAL HISTORY: Social history from the initial psychiatric evaluation has been reviewed (reviewed/updated 2/28/2019) with no additional updates (I asked patient and no additional social history provided). Social History Socioeconomic History  Marital status: SINGLE Spouse name: Not on file  Number of children: Not on file  Years of education: Not on file  Highest education level: Not on file Social Needs  Financial resource strain: Not on file  Food insecurity - worry: Not on file  Food insecurity - inability: Not on file  Transportation needs - medical: Not on file  Transportation needs - non-medical: Not on file Occupational History  Not on file Tobacco Use  Smoking status: Current Every Day Smoker Packs/day: 0.50 Years: 14.00 Pack years: 7.00 Types: Cigarettes  Smokeless tobacco: Never Used  Tobacco comment: cigarettes Substance and Sexual Activity  Alcohol use: Yes Alcohol/week: 4.8 oz Types: 2 Glasses of wine, 6 Cans of beer per week  Drug use: No  
  Comment: yesterday 12/27/18  Sexual activity: Yes  
  Partners: Female Birth control/protection: Surgical  
Other Topics Concern  Not on file Social History Narrative  Not on file FAMILY HISTORY: Family history from the initial psychiatric evaluation has been reviewed (reviewed/updated 2/28/2019) with no additional updates (I asked patient and no additional family history provided). Family History Problem Relation Age of Onset  Heart Disease Mother  Diabetes Mother  Hypertension Mother  Hypertension Maternal Grandmother REVIEW OF SYSTEMS: (reviewed/updated 2/28/2019) Appetite:no change from normal  
Sleep: poor with DIMS (difficulty initiating & maintaining sleep) All other Review of Systems: Negative except per HPI [place] MENTAL STATUS EXAM (MSE): MSE FINDINGS ARE WITHIN NORMAL LIMITS (WNL) UNLESS OTHERWISE STATED BELOW. ( ALL OF THE BELOW CATEGORIES OF THE MSE HAVE BEEN REVIEWED (reviewed 2/28/2019) AND UPDATED AS DEEMED APPROPRIATE ) General Presentation age appropriate, cooperative Orientation not oriented to situation Vital Signs  See below (reviewed 2/28/2019); Vital Signs (BP, Pulse, & Temp) are within normal limits if not listed below. Gait and Station Stable/steady, no ataxia Musculoskeletal System No extrapyramidal symptoms (EPS); no abnormal muscular movements or Tardive Dyskinesia (TD); muscle strength and tone are within normal limits Language No aphasia or dysarthria Speech:  normal volume and non-pressured Thought Processes illogical; normal rate of thoughts; fair abstract reasoning/computation Thought Associations tangential  
Thought Content poverty of content Suicidal Ideations none Homicidal Ideations none Mood:  anxious Affect:  mood-congruent Memory recent  intact Memory remote:  intact Concentration/Attention:  intact Fund of Knowledge below average Insight:  limited Reliability fair Judgment:  poor VITALS:    
Patient Vitals for the past 24 hrs: 
 Temp Pulse Resp BP SpO2  
02/28/19 0841 98.7 °F (37.1 °C) (!) 107 16 (!) 153/92 100 % 02/27/19 2019 98.1 °F (36.7 °C) (!) 109 16 117/82 100 % Wt Readings from Last 3 Encounters:  
02/25/19 61.2 kg (135 lb)  
02/22/19 70.4 kg (155 lb 4.8 oz) 02/22/19 67.6 kg (149 lb) Temp Readings from Last 3 Encounters:  
02/28/19 98.7 °F (37.1 °C)  
02/22/19 97.4 °F (36.3 °C)  
02/22/19 98.1 °F (36.7 °C) BP Readings from Last 3 Encounters: 02/28/19 (!) 153/92  
02/23/19 103/85  
02/22/19 144/82 Pulse Readings from Last 3 Encounters:  
02/28/19 (!) 107  
02/22/19 95  
02/22/19 95 DATA LABORATORY DATA:(reviewed/updated 2/28/2019) Recent Results (from the past 24 hour(s)) GLUCOSE, POC Collection Time: 02/27/19 12:08 PM  
Result Value Ref Range Glucose (POC) 139 (H) 65 - 100 mg/dL Performed by Asmita Pabon (RN)   
GLUCOSE, POC Collection Time: 02/27/19  4:07 PM  
Result Value Ref Range Glucose (POC) 279 (H) 65 - 100 mg/dL Performed by Vicenta Dejesus GLUCOSE, POC Collection Time: 02/27/19  8:01 PM  
Result Value Ref Range Glucose (POC) 193 (H) 65 - 100 mg/dL Performed by Falguni Byrd GLUCOSE, POC Collection Time: 02/28/19  8:04 AM  
Result Value Ref Range Glucose (POC) 118 (H) 65 - 100 mg/dL Performed by Diann Carrel Lab Results Component Value Date/Time Valproic acid <3 (L) 02/25/2019 04:01 PM  
 
No results found for: LITHM  
RADIOLOGY REPORTS:(reviewed/updated 2/28/2019) Xr Chest Pa Lat Result Date: 2/16/2019 CLINICAL HISTORY: Dyspnea INDICATION: Dyspnea COMPARISON: 2/13/2019 FINDINGS: PA and lateral views of the chest are obtained. The cardiopericardial silhouette is within normal limits. There is no pleural effusion, pneumothorax or focal consolidation present. IMPRESSION: No acute intrathoracic disease. Xr Chest Pa Lat Result Date: 1/22/2019 INDICATION: chest pain EXAM: CXR 2 Views. COMPARISON: 1/20/2019. Keshia Loose FINDINGS: Frontal and lateral views of the chest show the lungs are free of acute disease. There remain left upper lobe granulomas. Heart size is normal. There is no overt pulmonary edema. There is no evident pneumothorax, adenopathy or pleural effusion. IMPRESSION: No acute disease. No significant interval change. Xr Chest Pa Lat Result Date: 1/13/2019 INDICATION:   cp COMPARISON: January 8, 2019 FINDINGS: Frontal and lateral views of the chest demonstrate a normal cardiomediastinal silhouette. The lungs are adequately expanded. There is no edema, effusion, consolidation, or pneumothorax. The osseous structures are unremarkable. IMPRESSION: No acute process. Xr Chest Pa Lat Result Date: 1/8/2019 INDICATION: . cp Additional history: Alcohol and crack user that once detox COMPARISON: Previous chest xray, 12/28/2018. Robin Culp FINDINGS: PA and lateral view of the chest. . Lines/tubes/surgical: Cardiac monitor leads overly the patient. Heart/mediastinum: Unremarkable. Lungs/pleura:  No focal consolidation or mass. No visualized pleural effusion or pneumothorax. Additional Comments: Surgical clips in the upper abdomen. Robin Culp IMPRESSION: 1. No radiographic evidence of acute cardiopulmonary disease. Xr Chest Pa Lat Result Date: 12/28/2018 CLINICAL HISTORY: Chest pain INDICATION: Chest pain left-sided COMPARISON: 11/22/2018 FINDINGS: PA and lateral views of the chest are obtained. The cardiopericardial silhouette is within normal limits. There is no pleural effusion, pneumothorax or focal consolidation present. IMPRESSION: No acute intrathoracic disease. Ct Head Wo Cont Result Date: 2/25/2019 EXAM:  CT HEAD WO CONT INDICATION: Unusual behavior. Mental health problems. COMPARISON: None TECHNIQUE: Noncontrast head CT. Coronal and sagittal reformats. CT dose reduction was achieved through use of a standardized protocol tailored for this examination and automatic exposure control for dose modulation. FINDINGS: The ventricles and sulci are age-appropriate without hydrocephalus. There is no mass effect or midline shift. There is no intracranial hemorrhage or extra-axial fluid collection. There is no abnormal parenchymal attenuation. The gray-white matter differentiation is maintained. The basal cisterns are patent. The osseous structures are intact. The visualized paranasal sinuses and mastoid air cells are clear.   
 
IMPRESSION: No acute intracranial abnormality. Cta Chest W Or W Wo Cont Result Date: 12/28/2018 CLINICAL HISTORY: Chest pain, dyspnea INDICATION: Chest pain, dyspnea COMPARISON: 2008 TECHNIQUE: CT of the chest with  IV contrast , Isovue-370 is performed. Axial images from the thoracic inlet to the level of the upper abdomen are obtained. Manual post-processing of the images and coronal reformatting is also performed. CT dose reduction was achieved through use of a standardized protocol tailored for this examination and automatic exposure control for dose modulation. Multiplanar reformatted imaging was performed. Sagittal and coronal reformatting. 3-D Postprocessing of imaging was performed. 3-D MIP reconstructed images were obtained in the coronal plane. FINDINGS: There is no pulmonary embolism. There is no aortic aneurysm or dissection. There are small bilateral pleural effusions. The pulmonary artery is prominent consistent with pulmonary arterial hypertension. Thoracic aortic ectasia. There is a small-to-moderate hiatal hernia. Distal esophageal wall thickening. Minimal scattered groundglass opacities most predominantly in the right middle lobe but also within the upper lobes and lower lobes bilaterally. Postcholecystectomy. There is no pleural or pericardial effusion. There is no mediastinal, axillary or hilar lymphadenopathy. The aorta is normal in course and caliber. The proximal pulmonary arteries are without evidence of filling defects. No lytic or blastic lesions are identified. The remainder of the upper abdomen visualized is unremarkable. IMPRESSION: There is no pulmonary embolism. There is no aortic aneurysm or dissection. Small bilateral pleural effusions. Small to moderate hiatal hernia. Esophageal wall thickening is circumferential. This may be related to esophagitis. Nonspecific finding. Further clinical correlation recommended.  Scattered groundglass opacity with bronchial wall thickening most prominent in the right middle lobe. May be related to infectious/inflammatory, reactive airways process. Follow-up chest CT in 4-6 weeks to evaluate for resolution recommended. Ct Abd Pelv W Cont Result Date: 1/24/2019 EXAM: CT ABD PELV W CONT INDICATION: Ascites . Distended abdomen, history of liver disease. Midsternal chest pain for 3 weeks. Shortness of breath. COMPARISON: 11/22/2018 CONTRAST: 100 mL of Isovue-370. TECHNIQUE: Following the uneventful intravenous administration of contrast, thin axial images were obtained through the abdomen and pelvis. Coronal and sagittal reconstructions were generated. Oral contrast was not administered. CT dose reduction was achieved through use of a standardized protocol tailored for this examination and automatic exposure control for dose modulation. FINDINGS: LUNG BASES: Clear. INCIDENTALLY IMAGED HEART AND MEDIASTINUM: Unremarkable. LIVER: No mass or biliary dilatation. GALLBLADDER: Surgically absent. SPLEEN: No mass. PANCREAS: The pancreas is atrophic with coarse calcifications consistent with chronic pancreatitis as previously reported. ADRENALS: Unremarkable. KIDNEYS: No mass, calculus, or hydronephrosis. STOMACH: Stomach wall appears thickened, likely related to nondistention. SMALL BOWEL: Mild prominence of small bowel caliber without debbi distention. COLON: No dilatation or wall thickening. Large amount of retained fecal material throughout the colon. APPENDIX: Not well seen, but no acute appendiceal abnormality is suspected. PERITONEUM: Small amount of free fluid in the cul-de-sac. RETROPERITONEUM: No lymphadenopathy or aortic aneurysm. REPRODUCTIVE ORGANS: Unremarkable for age. URINARY BLADDER: No mass or calculus. BONES: No destructive bone lesion. ADDITIONAL COMMENTS: Generalized subcutaneous edema throughout the abdomen and pelvis. IMPRESSION: 1. Previously described chronic pancreatitis. 2. Status post cholecystectomy.  3. Large amount of retained fecal material throughout the colon. 4. Small amount of free fluid in the cul-de-sac which may conceivably be physiologic. 5. Generalized subcutaneous edema throughout the abdomen and pelvis. Xr Chest [facility] Result Date: 2/13/2019 EXAM: Portable CXR. 1835 hours  INDICATION: Chest pain FINDINGS: The lungs are clear. Heart is normal in size. There is no overt pulmonary edema. There is no evident pneumothorax, adenopathy or pleural effusion. IMPRESSION: No Acute Disease. Xr Chest [facility] Result Date: 1/27/2019 INDICATION: chest pain EXAM:  AP CHEST RADIOGRAPH COMPARISON: January 22, 2019 FINDINGS: AP portable view of the chest demonstrates a normal cardiomediastinal silhouette. The lungs are adequately expanded. There is no edema, effusion, consolidation, or pneumothorax. The osseous structures are unremarkable. IMPRESSION: No acute process. Xr Chest [facility] Result Date: 1/20/2019 EXAM:  Portable chest view INDICATION: Sharp chest pain for several days. COMPARISON: Chest views on 1/13/2019. TECHNIQUE: Erect portable chest radiograph FINDINGS: Cardiac size is within normal limits. Aortic arch is not enlarged. Pulmonary vasculature is within normal limits. Left upper lobe calcified granulomas are still present. No focal airspace opacity or pulmonary edema. No pneumothorax. Bones are within normal limits. IMPRESSION: No acute process on portable chest. No change given difference in technique. MEDICATIONS ALL MEDICATIONS:  
Current Facility-Administered Medications Medication Dose Route Frequency  insulin lispro (HUMALOG) injection 5 Units  5 Units SubCUTAneous TIDAC  hydroCHLOROthiazide (HYDRODIURIL) tablet 12.5 mg  12.5 mg Oral DAILY  insulin NPH (NOVOLIN N, HUMULIN N) injection 17 Units  17 Units SubCUTAneous Q12H  
 losartan (COZAAR) tablet 100 mg  100 mg Oral DAILY  insulin lispro (HUMALOG) injection   SubCUTAneous AC&HS  
 glucose chewable tablet 16 g  4 Tab Oral PRN  
 dextrose (D50W) injection syrg 12.5-25 g  12.5-25 g IntraVENous PRN  
 glucagon (GLUCAGEN) injection 1 mg  1 mg IntraMUSCular PRN  
 haloperidol (HALDOL) tablet 5 mg  5 mg Oral Q12H Or  
 haloperidol lactate (HALDOL) injection 5 mg +++COURT ORDERED MEDICATION FOR REFUSAL OF PO HALOPERIDOL+++  5 mg IntraMUSCular Q12H  
 benztropine (COGENTIN) tablet 1 mg  1 mg Oral Q12H Or  
 benztropine (COGENTIN) injection 1 mg +++COURT ORDERED MEDICATION FOR REFUSAL OF PO BENZTROPINE+++  1 mg IntraMUSCular Q12H  clonazePAM (KlonoPIN) tablet 1 mg  1 mg Oral Q12H Or  
 LORazepam (ATIVAN) injection 1 mg +++COURT ORDERED MEDICATION FOR REFUSAL OF PO CLONAZEPAM+++  1 mg IntraMUSCular Q12H  
 LORazepam (ATIVAN) injection 1 mg +++COURT ORDERED MEDICATION FOR REFUSAL OF PO DEPAKOTE+++  1 mg IntraMUSCular Q12H Or  
 divalproex DR (DEPAKOTE) tablet 500 mg  500 mg Oral Q12H  
 ziprasidone (GEODON) 20 mg in sterile water (preservative free) 1 mL injection  20 mg IntraMUSCular BID PRN  
 OLANZapine (ZyPREXA) tablet 5 mg  5 mg Oral Q6H PRN  
 benztropine (COGENTIN) tablet 2 mg  2 mg Oral BID PRN  
 benztropine (COGENTIN) injection 2 mg  2 mg IntraMUSCular BID PRN  
 LORazepam (ATIVAN) injection 2 mg  2 mg IntraMUSCular Q4H PRN  
 LORazepam (ATIVAN) tablet 1 mg  1 mg Oral Q4H PRN  
 zolpidem (AMBIEN) tablet 10 mg  10 mg Oral QHS PRN  
 acetaminophen (TYLENOL) tablet 650 mg  650 mg Oral Q4H PRN  
 magnesium hydroxide (MILK OF MAGNESIA) 400 mg/5 mL oral suspension 30 mL  30 mL Oral DAILY PRN  
 nicotine (NICODERM CQ) 21 mg/24 hr patch 1 Patch  1 Patch TransDERmal DAILY PRN  
  
SCHEDULED MEDICATIONS:  
Current Facility-Administered Medications Medication Dose Route Frequency  insulin lispro (HUMALOG) injection 5 Units  5 Units SubCUTAneous TIDAC  hydroCHLOROthiazide (HYDRODIURIL) tablet 12.5 mg  12.5 mg Oral DAILY  insulin NPH (NOVOLIN N, HUMULIN N) injection 17 Units  17 Units SubCUTAneous Q12H  losartan (COZAAR) tablet 100 mg  100 mg Oral DAILY  insulin lispro (HUMALOG) injection   SubCUTAneous AC&HS  
 haloperidol (HALDOL) tablet 5 mg  5 mg Oral Q12H Or  
 haloperidol lactate (HALDOL) injection 5 mg +++COURT ORDERED MEDICATION FOR REFUSAL OF PO HALOPERIDOL+++  5 mg IntraMUSCular Q12H  
 benztropine (COGENTIN) tablet 1 mg  1 mg Oral Q12H Or  
 benztropine (COGENTIN) injection 1 mg +++COURT ORDERED MEDICATION FOR REFUSAL OF PO BENZTROPINE+++  1 mg IntraMUSCular Q12H  clonazePAM (KlonoPIN) tablet 1 mg  1 mg Oral Q12H Or  
 LORazepam (ATIVAN) injection 1 mg +++COURT ORDERED MEDICATION FOR REFUSAL OF PO CLONAZEPAM+++  1 mg IntraMUSCular Q12H  
 LORazepam (ATIVAN) injection 1 mg +++COURT ORDERED MEDICATION FOR REFUSAL OF PO DEPAKOTE+++  1 mg IntraMUSCular Q12H Or  
 divalproex DR (DEPAKOTE) tablet 500 mg  500 mg Oral Q12H  
  
 
 
ASSESSMENT & PLAN  
 
DIAGNOSES REQUIRING ACTIVE TREATMENT AND MONITORING: (reviewed/updated 2/28/2019) Patient Active Hospital Problem List: 
 Schizoaffective disorder ([facility] [address] (8/8/2018) Assessment: patient grossly psychotic, recent substance use and likely medication non-compliance. Patient a candidate for court ordered medication as she has been refusing since being on the unit. Plan: 
COURT ORDERED MEDICATION: 
  Haldol 5 mg PO *OR* IM Q12H for psychosis Cogentin 1 mg PO *OR* IM Q12H for EPS prophylaxis Depakote 500 mg *OR* Ativan 1 mg Q12H for sundar Klonopin 1 mg *OR* Ativan 1 mg Q12H for agitation 
 
- IGM therapy as tolerated - Expand database / obtain collateral 
- Dispo planning 
  
I will continue to monitor blood levels (Depakote---a drug with a narrow therapeutic index= NTI) and associated labs for drug therapy implemented that require intense monitoring for toxicity as deemed appropriate based on current medication side effects and pharmacodynamically determined drug 1/2 lives.  
 
In summary, Alan Fernandez, is a 38 y.o.  female who presents with a severe exacerbation of the principal diagnosis of Schizoaffective disorder ([facility] [address] Patient's condition is improving. Patient requires continued inpatient hospitalization for further stabilization, safety monitoring and medication management. I will continue to coordinate the provision of individual, milieu, occupational, group, and substance abuse therapies to address target symptoms/diagnoses as deemed appropriate for the individual patient. A coordinated, multidisplinary treatment team round was conducted with the patient (this team consists of the nurse, psychiatric unit pharmcist,  and writer). Complete current electronic health record for patient has been reviewed today including consultant notes, ancillary staff notes, nurses and psychiatric tech notes. Suicide risk assessment completed and patient deemed to be of low risk for suicide at this time. The following regarding medications was addressed during rounds with patient:  
the risks and benefits of the proposed medication. The patient was given the opportunity to ask questions. Informed consent given to the use of the above medications. Will continue to adjust psychiatric and non-psychiatric medications (see above \"medication\" section and orders section for details) as deemed appropriate & based upon diagnoses and response to treatment. I will continue to order blood tests/labs and diagnostic tests as deemed appropriate and review results as they become available (see orders for details and above listed lab/test results). I will order psychiatric records from previous [facility] hospitals to further elucidate the nature of patient's psychopathology and review once available. I will gather additional collateral information from friends, family and o/p treatment team to further elucidate the nature of patient's psychopathology and baselline level of psychiatric functioning.  
  
 
 
I certify that this patient's inpatient psychiatric hospital services furnished since the previous certification were, and continue to be, required for treatment that could reasonably be expected to improve the patient's condition, or for diagnostic study, and that the patient continues to need, on a daily basis, active treatment furnished directly by or requiring the supervision of inpatient psychiatric facility personnel. In addition, the hospital records show that services furnished were intensive treatment services, admission or related services, or equivalent services. EXPECTED DISCHARGE DATE/DAY: 3/1/2019 DISPOSITION: Home Signed By:  
Lashonda Carson MD 
2/28/2019

## 2019-02-28 NOTE — BH NOTES
Problem: Psychosis Goal: *STG: Decreased hallucinations Outcome: Progressing Towards Goal 
Za Sung in room start of shift got up for accucheck  stay up till snack watch TV for awhile walk down [place] lay on floor states tired help up took back to room took HS medication. Slept 6 hours 15 minutes

## 2019-02-28 NOTE — BH NOTES
GROUP THERAPY PROGRESS NOTE The patient Blanca pineda 38 y.o. female is participating in [address]. Group time: 45 minutes Personal goal for participation: To participate in healthy relationships bingo game Goal orientation:  personal 
 
Group therapy participation: minimal 
 
Therapeutic interventions reviewed and discussed: things pertaining to relationships Impression of participation:  The patient was present-arrived late-joined in group activity with prompting Rafaela Love 2/28/2019  5:26 PM

## 2019-02-28 NOTE — BH NOTES
GROUP THERAPY PROGRESS NOTE Khadijah Segovia is participating in [facility]. Group time: 30 minutes Personal goal for participation: Feelings Identification & supports sytems Goal orientation: personal 
 
Group therapy participation: minimal 
 
Therapeutic interventions reviewed and discussed: Feelings check-in chart; Milk and Cookies activity to identify support system; closing poem read. Impression of participation: Pt minimally participated and shared during group. Pt presented as groggy and sleepy intermittently closed her eyes during group. Pt shared she felt bashful, shy and then she quietly stated ashamed and began crying. Pt shared she was ashamed because she was not of God like the rest of the patients on the unit and she felt left out. Pt stated during group that people fail you when she was asked to identify her support sytem. When writer began milk & cookies activity, pt very seriously stated that she could do nothing in the activity because the pice of paper I provided with a picture of a glass of milk and a cookie belonged to [place]. Pt became fixated on [place] and was unable to share about support system. Laquita Marquez, Resident In Counseling

## 2019-03-01 LAB
GLUCOSE BLD STRIP.AUTO-MCNC: 180 MG/DL (ref 65–100)
GLUCOSE BLD STRIP.AUTO-MCNC: 294 MG/DL (ref 65–100)
GLUCOSE BLD STRIP.AUTO-MCNC: 294 MG/DL (ref 65–100)
GLUCOSE BLD STRIP.AUTO-MCNC: 307 MG/DL (ref 65–100)
GLUCOSE BLD STRIP.AUTO-MCNC: 57 MG/DL (ref 65–100)
GLUCOSE BLD STRIP.AUTO-MCNC: 59 MG/DL (ref 65–100)
GLUCOSE BLD STRIP.AUTO-MCNC: 66 MG/DL (ref 65–100)
GLUCOSE BLD STRIP.AUTO-MCNC: 71 MG/DL (ref 65–100)
GLUCOSE BLD STRIP.AUTO-MCNC: 75 MG/DL (ref 65–100)
SERVICE CMNT-IMP: ABNORMAL
SERVICE CMNT-IMP: NORMAL

## 2019-03-01 PROCEDURE — 74011636637 HC RX REV CODE- 636/637: Performed by: FAMILY MEDICINE

## 2019-03-01 PROCEDURE — 82962 GLUCOSE BLOOD TEST: CPT

## 2019-03-01 PROCEDURE — 74011250637 HC RX REV CODE- 250/637: Performed by: PSYCHIATRY & NEUROLOGY

## 2019-03-01 PROCEDURE — 74011636637 HC RX REV CODE- 636/637: Performed by: INTERNAL MEDICINE

## 2019-03-01 PROCEDURE — 74011250636 HC RX REV CODE- 250/636: Performed by: PSYCHIATRY & NEUROLOGY

## 2019-03-01 PROCEDURE — 65220000003 HC RM SEMIPRIVATE PSYCH

## 2019-03-01 PROCEDURE — 74011250637 HC RX REV CODE- 250/637: Performed by: INTERNAL MEDICINE

## 2019-03-01 RX ORDER — HYDROCHLOROTHIAZIDE 12.5 MG/1
12.5 TABLET ORAL DAILY
Qty: 30 TAB | Refills: 0 | Status: SHIPPED | OUTPATIENT
Start: 2019-03-02

## 2019-03-01 RX ORDER — HALOPERIDOL 5 MG/1
5 TABLET ORAL EVERY 12 HOURS
Qty: 42 TAB | Refills: 0 | Status: SHIPPED | OUTPATIENT
Start: 2019-03-01

## 2019-03-01 RX ORDER — HALOPERIDOL DECANOATE 100 MG/ML
100 INJECTION INTRAMUSCULAR
Qty: 1 VIAL | Refills: 0 | Status: SHIPPED | OUTPATIENT
Start: 2019-03-29

## 2019-03-01 RX ORDER — LOSARTAN POTASSIUM 100 MG/1
100 TABLET ORAL DAILY
Qty: 30 TAB | Refills: 0 | Status: SHIPPED | OUTPATIENT
Start: 2019-03-02

## 2019-03-01 RX ORDER — BENZTROPINE MESYLATE 1 MG/1
1 TABLET ORAL EVERY 12 HOURS
Qty: 30 TAB | Refills: 1 | Status: SHIPPED | OUTPATIENT
Start: 2019-03-01

## 2019-03-01 RX ORDER — CLONAZEPAM 1 MG/1
1 TABLET ORAL EVERY 12 HOURS
Qty: 7 TAB | Refills: 0 | Status: SHIPPED | OUTPATIENT
Start: 2019-03-01

## 2019-03-01 RX ORDER — DIVALPROEX SODIUM 500 MG/1
500 TABLET, DELAYED RELEASE ORAL EVERY 12 HOURS
Qty: 30 TAB | Refills: 1 | Status: SHIPPED | OUTPATIENT
Start: 2019-03-01

## 2019-03-01 RX ORDER — HALOPERIDOL DECANOATE 100 MG/ML
100 INJECTION INTRAMUSCULAR
Status: DISCONTINUED | OUTPATIENT
Start: 2019-03-01 | End: 2019-03-04 | Stop reason: HOSPADM

## 2019-03-01 RX ADMIN — ZOLPIDEM TARTRATE 10 MG: 10 TABLET ORAL at 21:37

## 2019-03-01 RX ADMIN — HYDROCHLOROTHIAZIDE 12.5 MG: 25 TABLET ORAL at 07:57

## 2019-03-01 RX ADMIN — DIVALPROEX SODIUM 500 MG: 500 TABLET, DELAYED RELEASE ORAL at 08:01

## 2019-03-01 RX ADMIN — DIVALPROEX SODIUM 500 MG: 500 TABLET, DELAYED RELEASE ORAL at 21:36

## 2019-03-01 RX ADMIN — CLONAZEPAM 1 MG: 1 TABLET ORAL at 21:36

## 2019-03-01 RX ADMIN — LOSARTAN POTASSIUM 100 MG: 50 TABLET, FILM COATED ORAL at 07:57

## 2019-03-01 RX ADMIN — BENZTROPINE MESYLATE 1 MG: 1 TABLET ORAL at 08:01

## 2019-03-01 RX ADMIN — BENZTROPINE MESYLATE 1 MG: 1 TABLET ORAL at 21:36

## 2019-03-01 RX ADMIN — HUMAN INSULIN 17 UNITS: 100 INJECTION, SUSPENSION SUBCUTANEOUS at 08:12

## 2019-03-01 RX ADMIN — INSULIN LISPRO 3 UNITS: 100 INJECTION, SOLUTION INTRAVENOUS; SUBCUTANEOUS at 21:35

## 2019-03-01 RX ADMIN — INSULIN LISPRO 5 UNITS: 100 INJECTION, SOLUTION INTRAVENOUS; SUBCUTANEOUS at 11:30

## 2019-03-01 RX ADMIN — OLANZAPINE 5 MG: 5 TABLET, FILM COATED ORAL at 13:55

## 2019-03-01 RX ADMIN — HALOPERIDOL DECANOATE 100 MG: 100 INJECTION INTRAMUSCULAR at 12:12

## 2019-03-01 RX ADMIN — HALOPERIDOL 5 MG: 5 TABLET ORAL at 08:01

## 2019-03-01 RX ADMIN — INSULIN LISPRO 7 UNITS: 100 INJECTION, SOLUTION INTRAVENOUS; SUBCUTANEOUS at 12:13

## 2019-03-01 RX ADMIN — HUMAN INSULIN 14 UNITS: 100 INJECTION, SUSPENSION SUBCUTANEOUS at 21:34

## 2019-03-01 RX ADMIN — HALOPERIDOL 5 MG: 5 TABLET ORAL at 21:35

## 2019-03-01 RX ADMIN — CLONAZEPAM 1 MG: 1 TABLET ORAL at 08:01

## 2019-03-01 RX ADMIN — LORAZEPAM 1 MG: 1 TABLET ORAL at 13:55

## 2019-03-01 NOTE — BH NOTES
Pt given PRN Zyprexa and Ativan due to increased anxiety and agitation. Pt was tearful and in obvious distress due to not being discharged today. Pt was also responding to internal stimuli in the hallway. Pt also layed down in the hallway and was crying. PRNs given to help with distress.

## 2019-03-01 NOTE — PROGRESS NOTES
Called about hypoglycemia FBG in 50's mg/dl, corrected with orange juice. Decreased NPH from 17 units to 14 units BID Discontinued boluses insulin 5 units pre meals Continue with SSI only for the next 2 days Will determine dose of pre meal bolus based on SSI requirements Hypoglycemic precautions Signed By: Hung Cuello MD   
 March 1, 2019

## 2019-03-01 NOTE — BH NOTES
Pt scheduled ACHS check at 1627 was 59 on the left hand, 1631 was 57 on the right hand, 1643 was 66 on the left hand, and 1653 was 75 on right hand. Pts insulin was held per MD order from Dr. Navjot Larson. MD stated he was going to make med adjustments. No lightheadedness or other symptoms from hypoglycemic event present at this time.

## 2019-03-01 NOTE — BH NOTES
GROUP THERAPY PROGRESS NOTE Larisa Franz is participating in [facility]. Group time:  35 minutes Personal goal for participation:  The goal is for the patient to develop and strengthen healthy and productive coping and personal growth strategies. Goal orientation: personal 
 
Group therapy participation: Minimal 
 
Therapeutic interventions reviewed and discussed: The group completed a feelings check in with a self-assessment tool. The group discussed ways to build happiness. The activity included socialization skills, meditation, mental health and physical health and wellness, gratitude, and personal growth as paths to self-fulfillment. Impression of participation: The patient was present and shared she was feeling paranoid and guilty. Patient shared that she once called 911 for a sick man on the side of the road when others continued to pass by and do nothing. Patient was an active listener through most of group.

## 2019-03-01 NOTE — BH NOTES
PSYCHIATRIC PROGRESS NOTE Patient Name  Hung Melton Date of Birth 1975 [facility] [id number] Medical Record Number  [id number] Age  38 y.o. PCP Other, MD Marcel  
Admit date:  2/25/2019 Room Number  848/45  @ [facility]  
Date of Service  3/1/2019 E & M PROGRESS NOTE: 
  
 
 
HISTORY  
   
CC:  \"psychosis / [place] Penta" HISTORY OF PRESENT ILLNESS/INTERVAL HISTORY:  (reviewed/updated 3/1/2019). per initial evaluation: The patient, Hung Melton, is a 38 y.o. BLACK OR  female with a past psychiatric history significant for schizoaffective disorder and cocaine use disorder, who presents at this time with complaints of (and/or evidence of) the following emotional symptoms: agitation, psychotic behavior and sundar. Additional symptomatology include confusion. The above symptoms have been present for 24+ hours. These symptoms are of moderate to high severity. These symptoms are constant in nature. The patient's condition has been precipitated by psychosocial stressors. Patient's condition made worse by continued illicit drug use as well as treatment noncompliance. UDS: +cocaine; BAL=0.  
  
Patient seen in her room s/p PRN. She had taken off her clothes and was inappropriately in the morning resulting in PRN administration for agitation and psychosis. The patient is a poor hstorian. The patient does not corroborate the above narrative. The patient is unable to contract for safety on the unit but gives consent for the team to contact collateral. The patient is not amenable to initiating treatment while on the unit. 2/27- patient started on court ordered medications. She is odd, visible, discharge focused. Patient with an episode of anxiety when told she would not be discharged today. Patient voices no other complaints. 2/28- patient is odd, discharge focused.  Labile and tearful, has been repeatedly asking to leave hospital. No behavioral outbursts, no PRNs given. Patient informed she may be discharged to her family's care a day early but continued to cry. 
3/01- patient has been childlike, compliant with court ordered medication. Patient's FSG has been variable, managed by IM consult. Patient nereida, received Ambien and Ativan. Pt signed collateral release: per family, patient told them she was in the hospital for problems with her blood pressure and is not as her baseline.  
  
  
SIDE EFFECTS: (reviewed/updated 3/1/2019) None reported or admitted to. No noted toxicity with use of Depakote ALLERGIES:(reviewed/updated 3/1/2019) Allergies Allergen Reactions  Dilaudid [Hydromorphone (Bulk)] Itching  Ibuprofen Not Reported This Time MEDICATIONS PRIOR TO ADMISSION:(reviewed/updated 3/1/2019) Medications Prior to Admission Medication Sig  
 albuterol (PROVENTIL HFA, VENTOLIN HFA, PROAIR HFA) 90 mcg/actuation inhaler Take 2 Puffs by inhalation every four (4) hours as needed for Wheezing.  insulin NPH/insulin regular (NOVOLIN 70/30, HUMULIN 70/30) 100 unit/mL (70-30) injection 15 Units by SubCUTAneous route two (2) times a day.  losartan (COZAAR) 100 mg tablet Take 1 Tab by mouth daily.  haloperidol (HALDOL) 5 mg tablet Take 1 Tab by mouth every twelve (12) hours.  raNITIdine hcl 150 mg capsule TAKE 1 TABLET BY MOUTH TWICE A DAY FOR 10 DAYS  aspirin delayed-release 81 mg tablet TAKE 1 TABLET BY MOUTH EVERY DAY WITH 6 TO 8 OUNCES OF PLAIN WATER  
  
PAST MEDICAL HISTORY: Past medical history from the initial psychiatric evaluation has been reviewed (reviewed/updated 3/1/2019) with no additional updates (I asked patient and no additional past medical history provided). Past Medical History:  
Diagnosis Date  Alcohol abuse, in remission   
 quit 17 days ago  Asthma   
 does not use inhalers  Borderline personality disorder ([address]  Diabetes ([facility]  GERD (gastroesophageal reflux disease)  Hypertension  Other ill-defined conditions(799.89)   
 kidney stones ,passed one  Other ill-defined conditions(799.89) sickle cell trait  Other ill-defined conditions(799.89)   
 increased cholesterol  Pancreatitis  Psychiatric disorder   
 schizophrenia, bipolar, depression, anxiety Past Surgical History:  
Procedure Laterality Date  ABDOMEN SURGERY PROC UNLISTED  3/12/14 CHOLECYSTECTOMY LAPAROSCOPIC    
 HX GASTRIC BYPASS  HX GYN  11/8/2012  
 c section x2  HX OTHER SURGICAL  3/13/14 ENDOSCOPIC RETROGRADE CHOLANGIOPANCREATOGRAPHY  HX OTHER SURGICAL  8/4/14  
 endoscopic stent placed to bile duct  HX TUBAL LIGATION  2012 SOCIAL HISTORY: Social history from the initial psychiatric evaluation has been reviewed (reviewed/updated 3/1/2019) with no additional updates (I asked patient and no additional social history provided). Social History Socioeconomic History  Marital status: SINGLE Spouse name: Not on file  Number of children: Not on file  Years of education: Not on file  Highest education level: Not on file Social Needs  Financial resource strain: Not on file  Food insecurity - worry: Not on file  Food insecurity - inability: Not on file  Transportation needs - medical: Not on file  Transportation needs - non-medical: Not on file Occupational History  Not on file Tobacco Use  Smoking status: Current Every Day Smoker Packs/day: 0.50 Years: 14.00 Pack years: 7.00 Types: Cigarettes  Smokeless tobacco: Never Used  Tobacco comment: cigarettes Substance and Sexual Activity  Alcohol use: Yes Alcohol/week: 4.8 oz Types: 2 Glasses of wine, 6 Cans of beer per week  Drug use: No  
  Comment: yesterday 12/27/18  Sexual activity: Yes  
  Partners: Female Birth control/protection: Surgical  
Other Topics Concern  Not on file Social History Narrative  Not on file FAMILY HISTORY: Family history from the initial psychiatric evaluation has been reviewed (reviewed/updated 3/1/2019) with no additional updates (I asked patient and no additional family history provided). Family History Problem Relation Age of Onset  Heart Disease Mother  Diabetes Mother  Hypertension Mother  Hypertension Maternal Grandmother REVIEW OF SYSTEMS: (reviewed/updated 3/1/2019) Appetite:no change from normal  
Sleep: poor with DIMS (difficulty initiating & maintaining sleep) All other Review of Systems: Negative except per HPI [place] MENTAL STATUS EXAM (MSE): MSE FINDINGS ARE WITHIN NORMAL LIMITS (WNL) UNLESS OTHERWISE STATED BELOW. ( ALL OF THE BELOW CATEGORIES OF THE MSE HAVE BEEN REVIEWED (reviewed 3/1/2019) AND UPDATED AS DEEMED APPROPRIATE ) General Presentation age appropriate, cooperative Orientation not oriented to situation Vital Signs  See below (reviewed 3/1/2019); Vital Signs (BP, Pulse, & Temp) are within normal limits if not listed below. Gait and Station Stable/steady, no ataxia Musculoskeletal System No extrapyramidal symptoms (EPS); no abnormal muscular movements or Tardive Dyskinesia (TD); muscle strength and tone are within normal limits Language No aphasia or dysarthria Speech:  normal volume and non-pressured Thought Processes illogical; normal rate of thoughts; fair abstract reasoning/computation Thought Associations tangential  
Thought Content poverty of content Suicidal Ideations none Homicidal Ideations none Mood:  anxious Affect:  mood-congruent Memory recent  intact Memory remote:  intact Concentration/Attention:  intact Fund of Knowledge below average Insight:  limited Reliability fair Judgment:  poor VITALS:    
Patient Vitals for the past 24 hrs: 
 Temp Pulse Resp BP SpO2  
03/01/19 0941 97.7 °F (36.5 °C) 91 16 135/85 100 % 02/28/19 2011 98.3 °F (36.8 °C) (!) 106  (!) 146/96 99 % Wt Readings from Last 3 Encounters:  
02/25/19 61.2 kg (135 lb)  
02/22/19 70.4 kg (155 lb 4.8 oz) 02/22/19 67.6 kg (149 lb) Temp Readings from Last 3 Encounters:  
03/01/19 97.7 °F (36.5 °C)  
02/22/19 97.4 °F (36.3 °C)  
02/22/19 98.1 °F (36.7 °C) BP Readings from Last 3 Encounters:  
03/01/19 135/85  
02/23/19 103/85  
02/22/19 144/82 Pulse Readings from Last 3 Encounters:  
03/01/19 91  
02/22/19 95  
02/22/19 95 DATA LABORATORY DATA:(reviewed/updated 3/1/2019) Recent Results (from the past 24 hour(s)) GLUCOSE, POC Collection Time: 02/28/19  9:03 PM  
Result Value Ref Range Glucose (POC) 152 (H) 65 - 100 mg/dL Performed by Yusra Pathak GLUCOSE, POC Collection Time: 03/01/19  8:09 AM  
Result Value Ref Range Glucose (POC) 71 65 - 100 mg/dL Performed by [facility]ated [facility] GLUCOSE, POC Collection Time: 03/01/19 11:18 AM  
Result Value Ref Range Glucose (POC) 307 (H) 65 - 100 mg/dL Performed by [facility] GLUCOSE, POC Collection Time: 03/01/19  4:27 PM  
Result Value Ref Range Glucose (POC) 59 (L) 65 - 100 mg/dL Performed by [facility] GLUCOSE, POC Collection Time: 03/01/19  4:31 PM  
Result Value Ref Range Glucose (POC) 57 (L) 65 - 100 mg/dL Performed by [facility] GLUCOSE, POC Collection Time: 03/01/19  4:43 PM  
Result Value Ref Range Glucose (POC) 66 65 - 100 mg/dL Performed by [facility] GLUCOSE, POC Collection Time: 03/01/19  4:53 PM  
Result Value Ref Range Glucose (POC) 75 65 - 100 mg/dL Performed by [facility] GLUCOSE, POC Collection Time: 03/01/19  5:25 PM  
Result Value Ref Range Glucose (POC) 180 (H) 65 - 100 mg/dL Performed by [facility] GLUCOSE, POC Collection Time: 03/01/19  6:00 PM  
Result Value Ref Range Glucose (POC) 294 (H) 65 - 100 mg/dL Performed by [facility] Lab Results Component Value Date/Time  Valproic acid <3 (L) 02/25/2019 04:01 PM  
 
No results found for: LITHYULISSA  
RADIOLOGY REPORTS:(reviewed/updated 3/1/2019) Xr Chest Pa Lat Result Date: 2/16/2019 CLINICAL HISTORY: Dyspnea INDICATION: Dyspnea COMPARISON: 2/13/2019 FINDINGS: PA and lateral views of the chest are obtained. The cardiopericardial silhouette is within normal limits. There is no pleural effusion, pneumothorax or focal consolidation present. IMPRESSION: No acute intrathoracic disease. Xr Chest Pa Lat Result Date: 1/22/2019 INDICATION: chest pain EXAM: CXR 2 Views. COMPARISON: 1/20/2019. [place] [facility] FINDINGS: Frontal and lateral views of the chest show the lungs are free of acute disease. There remain left upper lobe granulomas. Heart size is normal. There is no overt pulmonary edema. There is no evident pneumothorax, adenopathy or pleural effusion. IMPRESSION: No acute disease. No significant interval change. Xr Chest Pa Lat Result Date: 1/13/2019 INDICATION:   cp COMPARISON: January 8, 2019 FINDINGS: Frontal and lateral views of the chest demonstrate a normal cardiomediastinal silhouette. The lungs are adequately expanded. There is no edema, effusion, consolidation, or pneumothorax. The osseous structures are unremarkable. IMPRESSION: No acute process. Xr Chest Pa Lat Result Date: 1/8/2019 INDICATION: . cp Additional history: Alcohol and crack user that once detox COMPARISON: Previous chest xray, 12/28/2018. [place] [facility] FINDINGS: PA and lateral view of the chest. . Lines/tubes/surgical: Cardiac monitor leads overly the patient. Heart/mediastinum: Unremarkable. Lungs/pleura:  No focal consolidation or mass. No visualized pleural effusion or pneumothorax. Additional Comments: Surgical clips in the upper abdomen. [place] [facility] IMPRESSION: 1. No radiographic evidence of acute cardiopulmonary disease. Xr Chest Pa Lat Result Date: 12/28/2018 CLINICAL HISTORY: Chest pain INDICATION: Chest pain left-sided COMPARISON: 11/22/2018 FINDINGS: PA and lateral views of the chest are obtained. The cardiopericardial silhouette is within normal limits. There is no pleural effusion, pneumothorax or focal consolidation present. IMPRESSION: No acute intrathoracic disease. Ct Head Wo Cont Result Date: 2/25/2019 EXAM:  CT HEAD WO CONT INDICATION: Unusual behavior. Mental health problems. COMPARISON: None TECHNIQUE: Noncontrast head CT. Coronal and sagittal reformats. CT dose reduction was achieved through use of a standardized protocol tailored for this examination and automatic exposure control for dose modulation. FINDINGS: The ventricles and sulci are age-appropriate without hydrocephalus. There is no mass effect or midline shift. There is no intracranial hemorrhage or extra-axial fluid collection. There is no abnormal parenchymal attenuation. The gray-white matter differentiation is maintained. The basal cisterns are patent. The osseous structures are intact. The visualized paranasal sinuses and mastoid air cells are clear. IMPRESSION: No acute intracranial abnormality. Cta Chest W Or W Wo Cont Result Date: 12/28/2018 CLINICAL HISTORY: Chest pain, dyspnea INDICATION: Chest pain, dyspnea COMPARISON: 2008 TECHNIQUE: CT of the chest with  IV contrast , Isovue-370 is performed. Axial images from the thoracic inlet to the level of the upper abdomen are obtained. Manual post-processing of the images and coronal reformatting is also performed. CT dose reduction was achieved through use of a standardized protocol tailored for this examination and automatic exposure control for dose modulation. Multiplanar reformatted imaging was performed. Sagittal and coronal reformatting. 3-D Postprocessing of imaging was performed. 3-D MIP reconstructed images were obtained in the coronal plane. FINDINGS: There is no pulmonary embolism. There is no aortic aneurysm or dissection. There are small bilateral pleural effusions.  The pulmonary artery is prominent consistent with pulmonary arterial hypertension. Thoracic aortic ectasia. There is a small-to-moderate hiatal hernia. Distal esophageal wall thickening. Minimal scattered groundglass opacities most predominantly in the right middle lobe but also within the upper lobes and lower lobes bilaterally. Postcholecystectomy. There is no pleural or pericardial effusion. There is no mediastinal, axillary or hilar lymphadenopathy. The aorta is normal in course and caliber. The proximal pulmonary arteries are without evidence of filling defects. No lytic or blastic lesions are identified. The remainder of the upper abdomen visualized is unremarkable. IMPRESSION: There is no pulmonary embolism. There is no aortic aneurysm or dissection. Small bilateral pleural effusions. Small to moderate hiatal hernia. Esophageal wall thickening is circumferential. This may be related to esophagitis. Nonspecific finding. Further clinical correlation recommended. Scattered groundglass opacity with bronchial wall thickening most prominent in the right middle lobe. May be related to infectious/inflammatory, reactive airways process. Follow-up chest CT in 4-6 weeks to evaluate for resolution recommended. Ct Abd Pelv W Cont Result Date: 1/24/2019 EXAM: CT ABD PELV W CONT INDICATION: Ascites . Distended abdomen, history of liver disease. Midsternal chest pain for 3 weeks. Shortness of breath. COMPARISON: 11/22/2018 CONTRAST: 100 mL of Isovue-370. TECHNIQUE: Following the uneventful intravenous administration of contrast, thin axial images were obtained through the abdomen and pelvis. Coronal and sagittal reconstructions were generated. Oral contrast was not administered. CT dose reduction was achieved through use of a standardized protocol tailored for this examination and automatic exposure control for dose modulation. FINDINGS: LUNG BASES: Clear. INCIDENTALLY IMAGED HEART AND MEDIASTINUM: Unremarkable. LIVER: No mass or biliary dilatation.  GALLBLADDER: Surgically absent. SPLEEN: No mass. PANCREAS: The pancreas is atrophic with coarse calcifications consistent with chronic pancreatitis as previously reported. ADRENALS: Unremarkable. KIDNEYS: No mass, calculus, or hydronephrosis. STOMACH: Stomach wall appears thickened, likely related to nondistention. SMALL BOWEL: Mild prominence of small bowel caliber without debbi distention. COLON: No dilatation or wall thickening. Large amount of retained fecal material throughout the colon. APPENDIX: Not well seen, but no acute appendiceal abnormality is suspected. PERITONEUM: Small amount of free fluid in the cul-de-sac. RETROPERITONEUM: No lymphadenopathy or aortic aneurysm. REPRODUCTIVE ORGANS: Unremarkable for age. URINARY BLADDER: No mass or calculus. BONES: No destructive bone lesion. ADDITIONAL COMMENTS: Generalized subcutaneous edema throughout the abdomen and pelvis. IMPRESSION: 1. Previously described chronic pancreatitis. 2. Status post cholecystectomy. 3. Large amount of retained fecal material throughout the colon. 4. Small amount of free fluid in the cul-de-sac which may conceivably be physiologic. 5. Generalized subcutaneous edema throughout the abdomen and pelvis. Xr Chest [facility] Result Date: 2/13/2019 EXAM: Portable CXR. 1835 hours  INDICATION: Chest pain FINDINGS: The lungs are clear. Heart is normal in size. There is no overt pulmonary edema. There is no evident pneumothorax, adenopathy or pleural effusion. IMPRESSION: No Acute Disease. Xr Chest [facility] Result Date: 1/27/2019 INDICATION: chest pain EXAM:  AP CHEST RADIOGRAPH COMPARISON: January 22, 2019 FINDINGS: AP portable view of the chest demonstrates a normal cardiomediastinal silhouette. The lungs are adequately expanded. There is no edema, effusion, consolidation, or pneumothorax. The osseous structures are unremarkable. IMPRESSION: No acute process. Xr Chest [facility] Result Date: 1/20/2019 EXAM:  Portable chest view INDICATION: Sharp chest pain for several days. COMPARISON: Chest views on 1/13/2019. TECHNIQUE: Erect portable chest radiograph FINDINGS: Cardiac size is within normal limits. Aortic arch is not enlarged. Pulmonary vasculature is within normal limits. Left upper lobe calcified granulomas are still present. No focal airspace opacity or pulmonary edema. No pneumothorax. Bones are within normal limits. IMPRESSION: No acute process on portable chest. No change given difference in technique. MEDICATIONS ALL MEDICATIONS:  
Current Facility-Administered Medications Medication Dose Route Frequency  haloperidol decanoate (HALDOL DECANOATE) 100 mg/mL LA injection 100 mg +++COURT ORDERED+++  100 mg IntraMUSCular Q28D  
 insulin NPH (NOVOLIN N, HUMULIN N) injection 14 Units  14 Units SubCUTAneous Q12H  hydroCHLOROthiazide (HYDRODIURIL) tablet 12.5 mg  12.5 mg Oral DAILY  losartan (COZAAR) tablet 100 mg  100 mg Oral DAILY  insulin lispro (HUMALOG) injection   SubCUTAneous AC&HS  
 glucose chewable tablet 16 g  4 Tab Oral PRN  
 dextrose (D50W) injection syrg 12.5-25 g  12.5-25 g IntraVENous PRN  
 glucagon (GLUCAGEN) injection 1 mg  1 mg IntraMUSCular PRN  
 haloperidol (HALDOL) tablet 5 mg  5 mg Oral Q12H Or  
 haloperidol lactate (HALDOL) injection 5 mg +++COURT ORDERED MEDICATION FOR REFUSAL OF PO HALOPERIDOL+++  5 mg IntraMUSCular Q12H  
 benztropine (COGENTIN) tablet 1 mg  1 mg Oral Q12H Or  
 benztropine (COGENTIN) injection 1 mg +++COURT ORDERED MEDICATION FOR REFUSAL OF PO BENZTROPINE+++  1 mg IntraMUSCular Q12H  clonazePAM (KlonoPIN) tablet 1 mg  1 mg Oral Q12H Or  
 LORazepam (ATIVAN) injection 1 mg +++COURT ORDERED MEDICATION FOR REFUSAL OF PO CLONAZEPAM+++  1 mg IntraMUSCular Q12H  
 LORazepam (ATIVAN) injection 1 mg +++COURT ORDERED MEDICATION FOR REFUSAL OF PO DEPAKOTE+++  1 mg IntraMUSCular Q12H  Or  
 divalproex DR (DEPAKOTE) tablet 500 mg  500 mg Oral Q12H  ziprasidone (GEODON) 20 mg in sterile water (preservative free) 1 mL injection  20 mg IntraMUSCular BID PRN  
 OLANZapine (ZyPREXA) tablet 5 mg  5 mg Oral Q6H PRN  
 benztropine (COGENTIN) tablet 2 mg  2 mg Oral BID PRN  
 benztropine (COGENTIN) injection 2 mg  2 mg IntraMUSCular BID PRN  
 LORazepam (ATIVAN) injection 2 mg  2 mg IntraMUSCular Q4H PRN  
 LORazepam (ATIVAN) tablet 1 mg  1 mg Oral Q4H PRN  
 zolpidem (AMBIEN) tablet 10 mg  10 mg Oral QHS PRN  
 acetaminophen (TYLENOL) tablet 650 mg  650 mg Oral Q4H PRN  
 magnesium hydroxide (MILK OF MAGNESIA) 400 mg/5 mL oral suspension 30 mL  30 mL Oral DAILY PRN  
 nicotine (NICODERM CQ) 21 mg/24 hr patch 1 Patch  1 Patch TransDERmal DAILY PRN  
  
SCHEDULED MEDICATIONS:  
Current Facility-Administered Medications Medication Dose Route Frequency  haloperidol decanoate (HALDOL DECANOATE) 100 mg/mL LA injection 100 mg +++COURT ORDERED+++  100 mg IntraMUSCular Q28D  
 insulin NPH (NOVOLIN N, HUMULIN N) injection 14 Units  14 Units SubCUTAneous Q12H  hydroCHLOROthiazide (HYDRODIURIL) tablet 12.5 mg  12.5 mg Oral DAILY  losartan (COZAAR) tablet 100 mg  100 mg Oral DAILY  insulin lispro (HUMALOG) injection   SubCUTAneous AC&HS  
 haloperidol (HALDOL) tablet 5 mg  5 mg Oral Q12H Or  
 haloperidol lactate (HALDOL) injection 5 mg +++COURT ORDERED MEDICATION FOR REFUSAL OF PO HALOPERIDOL+++  5 mg IntraMUSCular Q12H  
 benztropine (COGENTIN) tablet 1 mg  1 mg Oral Q12H Or  
 benztropine (COGENTIN) injection 1 mg +++COURT ORDERED MEDICATION FOR REFUSAL OF PO BENZTROPINE+++  1 mg IntraMUSCular Q12H  clonazePAM (KlonoPIN) tablet 1 mg  1 mg Oral Q12H Or  
 LORazepam (ATIVAN) injection 1 mg +++COURT ORDERED MEDICATION FOR REFUSAL OF PO CLONAZEPAM+++  1 mg IntraMUSCular Q12H  
 LORazepam (ATIVAN) injection 1 mg +++COURT ORDERED MEDICATION FOR REFUSAL OF PO DEPAKOTE+++  1 mg IntraMUSCular Q12H  Or  
 divalproex DR (DEPAKOTE) tablet 500 mg  500 mg Oral Q12H  
  
 
 
ASSESSMENT & PLAN  
 
DIAGNOSES REQUIRING ACTIVE TREATMENT AND MONITORING: (reviewed/updated 3/1/2019) Patient Active Hospital Problem List: 
 [facility] (8/8/2018) Assessment: patient grossly psychotic, recent substance use and likely medication non-compliance. Patient a candidate for court ordered medication as she has been refusing since being on the unit. Plan: 
COURT ORDERED MEDICATION: 
  Haldol 5 mg PO *OR* IM Q12H for psychosis Cogentin 1 mg PO *OR* IM Q12H for EPS prophylaxis Depakote 500 mg *OR* Ativan 1 mg Q12H for sundar VPA level 03/02/19 Klonopin 1 mg *OR* Ativan 1 mg Q12H for agitation 
 
- IGM therapy as tolerated - Expand database / obtain collateral 
- Dispo planning 
  
I will continue to monitor blood levels (Depakote---a drug with a narrow therapeutic index= NTI) and associated labs for drug therapy implemented that require intense monitoring for toxicity as deemed appropriate based on current medication side effects and pharmacodynamically determined drug 1/2 lives. In summary, Rafi Mcfarlane, is a 38 y.o.  female who presents with a severe exacerbation of the principal diagnosis of Schizoaffective disorder ([facility] [address] Patient's condition is improving. Patient requires continued inpatient hospitalization for further stabilization, safety monitoring and medication management. I will continue to coordinate the provision of individual, milieu, occupational, group, and substance abuse therapies to address target symptoms/diagnoses as deemed appropriate for the individual patient. A coordinated, multidisplinary treatment team round was conducted with the patient (this team consists of the nurse, psychiatric unit pharmcist,  and writer). Complete current electronic health record for patient has been reviewed today including consultant notes, ancillary staff notes, nurses and psychiatric tech notes.  
 
Suicide risk assessment completed and patient deemed to be of low risk for suicide at this time. The following regarding medications was addressed during rounds with patient:  
the risks and benefits of the proposed medication. The patient was given the opportunity to ask questions. Informed consent given to the use of the above medications. Will continue to adjust psychiatric and non-psychiatric medications (see above \"medication\" section and orders section for details) as deemed appropriate & based upon diagnoses and response to treatment. I will continue to order blood tests/labs and diagnostic tests as deemed appropriate and review results as they become available (see orders for details and above listed lab/test results). I will order psychiatric records from previous [facility] to further elucidate the nature of patient's psychopathology and review once available. I will gather additional collateral information from friends, family and o/p treatment team to further elucidate the nature of patient's psychopathology and baselline level of psychiatric functioning. I certify that this patient's inpatient psychiatric hospital services furnished since the previous certification were, and continue to be, required for treatment that could reasonably be expected to improve the patient's condition, or for diagnostic study, and that the patient continues to need, on a daily basis, active treatment furnished directly by or requiring the supervision of inpatient psychiatric facility personnel. In addition, the hospital records show that services furnished were intensive treatment services, admission or related services, or equivalent services. EXPECTED DISCHARGE DATE/DAY: 3/4/2019 DISPOSITION: Home Signed By:  
Laureano Huggins MD 
3/1/2019

## 2019-03-01 NOTE — PROGRESS NOTES
Problem: Suicide/Homicide (Adult/Pediatric) Goal: *STG:  Verbalizes alternative ways of dealing with maladaptive feelings/behaviors Outcome: Not Progressing Towards Goal 
Attempted to discuss positive coping mechanisms several times today with patient. Pt stated her coping mechanism is to cry and that's what she's going to do. Pt was given PRN Ativan and Zyprexa earlier to help with distress she was having. Pts mood has become more positive since administration. Pt is no longer pacing or crying. Pt denied SI, HI, halluciantions, and delusions. Denied pain or discomfort. Pt is not having any behaviors or issues at this time.

## 2019-03-01 NOTE — BH NOTES
GROUP THERAPY PROGRESS NOTE The patient Edith Pope a 38 y.o. female is participating in [employer]. Group time: 1 hour Personal goal for participation: To concentrate on selected task Goal orientation: social 
 
Group therapy participation: minimal 
 
Therapeutic interventions reviewed and discussed: Crafts, games, music Impression of participation: The patient was present-left early Iza Slipper 3/1/2019  5:25 PM

## 2019-03-01 NOTE — BH NOTES
Pt was seen in treatment team this morning. Pt is alert and oriented. Pt denies SI/HI. Pt's mood is labile, irritable, tearful, affect is labile. Pt's thought process is disorganized. Pt's insight and judgment are impaired, reliability is poor.  spoke with patient daughter Brigid Naqvi who reports pt told her she was on medical floor of blood pressure problems - daughter does not feel patient is at baseline or discharge ready today. [facility] appointments scheduled. Social work department will continue to coordinate discharge plans.

## 2019-03-01 NOTE — BH NOTES
GROUP THERAPY PROGRESS NOTE The patient Jamarcus pineda 38 y.o. female is participating in [address] [place] [address]. Group time: 45 minutes Personal goal for participation: To participate in self esteem booster Goal orientation:  personal 
 
Group therapy participation: active Therapeutic interventions reviewed and discussed: handout Impression of participation:  The patient was attentive-required prompting Diandra Schulte 3/1/2019  5:13 PM

## 2019-03-01 NOTE — BH NOTES
Pt is diet/med compliant. Pt received prn Ambien. Pt observed resting with eyes closed, respirations even and unlabored. Will continue to monitor pt for health and safety.

## 2019-03-01 NOTE — BH NOTES
Recheck BS per patient request and result is 294. Attempted to educate patient again on diabetic control with food. Pt continued to demand cookies. No cookies were given.

## 2019-03-01 NOTE — BH NOTES
Pt is currenlty laying in the floor of the hallway. Pt put herself on the floor after telling her that she was not being discharged. Pt has been having behaviors like this throughout the day. PRNs have been administered with minimal relief. Coping skills have been attempted to be taught with patient stating she wouldn't do anything. Attempted to talk to patient about her behaviors, but patient continues to state that staff and patients are bullying her. Pt stated this due to the staff and other patients not giving her food. Pts blood sugar at this time is 180. It was went up by 123 in an hour. Attempted to educate pt on her diabetes, but she refused.

## 2019-03-02 LAB
GLUCOSE BLD STRIP.AUTO-MCNC: 159 MG/DL (ref 65–100)
GLUCOSE BLD STRIP.AUTO-MCNC: 223 MG/DL (ref 65–100)
GLUCOSE BLD STRIP.AUTO-MCNC: 230 MG/DL (ref 65–100)
GLUCOSE BLD STRIP.AUTO-MCNC: 234 MG/DL (ref 65–100)
GLUCOSE BLD STRIP.AUTO-MCNC: 260 MG/DL (ref 65–100)
SERVICE CMNT-IMP: ABNORMAL
VALPROATE SERPL-MCNC: 61 UG/ML (ref 50–100)

## 2019-03-02 PROCEDURE — 74011636637 HC RX REV CODE- 636/637: Performed by: FAMILY MEDICINE

## 2019-03-02 PROCEDURE — 74011636637 HC RX REV CODE- 636/637: Performed by: INTERNAL MEDICINE

## 2019-03-02 PROCEDURE — 65220000003 HC RM SEMIPRIVATE PSYCH

## 2019-03-02 PROCEDURE — 80164 ASSAY DIPROPYLACETIC ACD TOT: CPT

## 2019-03-02 PROCEDURE — 36415 COLL VENOUS BLD VENIPUNCTURE: CPT

## 2019-03-02 PROCEDURE — 74011250637 HC RX REV CODE- 250/637: Performed by: PSYCHIATRY & NEUROLOGY

## 2019-03-02 PROCEDURE — 74011250637 HC RX REV CODE- 250/637: Performed by: INTERNAL MEDICINE

## 2019-03-02 PROCEDURE — 82962 GLUCOSE BLOOD TEST: CPT

## 2019-03-02 RX ORDER — HYDROCHLOROTHIAZIDE 25 MG/1
25 TABLET ORAL DAILY
Status: DISCONTINUED | OUTPATIENT
Start: 2019-03-03 | End: 2019-03-04 | Stop reason: HOSPADM

## 2019-03-02 RX ADMIN — CLONAZEPAM 1 MG: 1 TABLET ORAL at 21:20

## 2019-03-02 RX ADMIN — INSULIN LISPRO 2 UNITS: 100 INJECTION, SOLUTION INTRAVENOUS; SUBCUTANEOUS at 17:04

## 2019-03-02 RX ADMIN — DIVALPROEX SODIUM 500 MG: 500 TABLET, DELAYED RELEASE ORAL at 08:01

## 2019-03-02 RX ADMIN — HALOPERIDOL 5 MG: 5 TABLET ORAL at 21:20

## 2019-03-02 RX ADMIN — INSULIN LISPRO 3 UNITS: 100 INJECTION, SOLUTION INTRAVENOUS; SUBCUTANEOUS at 11:53

## 2019-03-02 RX ADMIN — HUMAN INSULIN 16 UNITS: 100 INJECTION, SUSPENSION SUBCUTANEOUS at 21:21

## 2019-03-02 RX ADMIN — BENZTROPINE MESYLATE 1 MG: 1 TABLET ORAL at 21:20

## 2019-03-02 RX ADMIN — INSULIN LISPRO 3 UNITS: 100 INJECTION, SOLUTION INTRAVENOUS; SUBCUTANEOUS at 08:14

## 2019-03-02 RX ADMIN — DIVALPROEX SODIUM 500 MG: 500 TABLET, DELAYED RELEASE ORAL at 21:20

## 2019-03-02 RX ADMIN — ACETAMINOPHEN 650 MG: 325 TABLET, FILM COATED ORAL at 21:23

## 2019-03-02 RX ADMIN — HUMAN INSULIN 14 UNITS: 100 INJECTION, SUSPENSION SUBCUTANEOUS at 08:14

## 2019-03-02 RX ADMIN — BENZTROPINE MESYLATE 1 MG: 1 TABLET ORAL at 08:01

## 2019-03-02 RX ADMIN — HYDROCHLOROTHIAZIDE 12.5 MG: 25 TABLET ORAL at 08:01

## 2019-03-02 RX ADMIN — CLONAZEPAM 1 MG: 1 TABLET ORAL at 08:01

## 2019-03-02 RX ADMIN — INSULIN LISPRO 2 UNITS: 100 INJECTION, SOLUTION INTRAVENOUS; SUBCUTANEOUS at 21:21

## 2019-03-02 RX ADMIN — HALOPERIDOL 5 MG: 5 TABLET ORAL at 08:01

## 2019-03-02 RX ADMIN — LOSARTAN POTASSIUM 100 MG: 50 TABLET, FILM COATED ORAL at 07:59

## 2019-03-02 NOTE — PROGRESS NOTES
Problem: Suicide/Homicide (Adult/Pediatric) Goal: *STG/LTG: Complies with medication therapy Outcome: Progressing Towards Goal 
Pt has been med and diet compliant today with no concerns. Today has been a much better day than yesterday for the patient overall. Pt has not obsessed over food or had periods of crying. Pt denied SI, HI, hallucinations, or delusions. Pt denied pain or discomfort. Diabetic teaching was continued today with the patient being more susceptible to the teaching today. Blood sugars have required insulin coverage today. Pt has not had any bizarre or concerning behaviors this shift.

## 2019-03-02 NOTE — PROGRESS NOTES
Problem: Suicide/Homicide (Adult/Pediatric) Goal: *STG/LTG: Complies with medication therapy Outcome: Progressing Towards Goal 
Patient out most of the shift watching television in the dayroom, interacted with certain peers. Her mood remains sad/affect flat/dull. She denies the feeling of depression/SI/HI/anxiety. Her BS this [id number], she was given 3 units per sliding scale along with Lantus. No falling out or crying this shift, she continue to ask for extra snacks, staff wouldn't comply. Informed her she had enough to eat. Emanuel Benson Received Ambien PRN tonight for insomnia, with good results. Remains on Q 15 min observation. Patient slept 6.5 hrs on this night shift, allowed lab work to be obtained this morning and sent to lab. Tolerated procedure well.

## 2019-03-03 LAB
GLUCOSE BLD STRIP.AUTO-MCNC: 149 MG/DL (ref 65–100)
GLUCOSE BLD STRIP.AUTO-MCNC: 175 MG/DL (ref 65–100)
GLUCOSE BLD STRIP.AUTO-MCNC: 195 MG/DL (ref 65–100)
GLUCOSE BLD STRIP.AUTO-MCNC: 292 MG/DL (ref 65–100)
GLUCOSE BLD STRIP.AUTO-MCNC: 293 MG/DL (ref 65–100)
SERVICE CMNT-IMP: ABNORMAL

## 2019-03-03 PROCEDURE — 74011636637 HC RX REV CODE- 636/637: Performed by: INTERNAL MEDICINE

## 2019-03-03 PROCEDURE — 74011636637 HC RX REV CODE- 636/637: Performed by: FAMILY MEDICINE

## 2019-03-03 PROCEDURE — 74011250637 HC RX REV CODE- 250/637: Performed by: FAMILY MEDICINE

## 2019-03-03 PROCEDURE — 74011250637 HC RX REV CODE- 250/637: Performed by: PSYCHIATRY & NEUROLOGY

## 2019-03-03 PROCEDURE — 82962 GLUCOSE BLOOD TEST: CPT

## 2019-03-03 PROCEDURE — 65220000003 HC RM SEMIPRIVATE PSYCH

## 2019-03-03 PROCEDURE — 74011250637 HC RX REV CODE- 250/637: Performed by: INTERNAL MEDICINE

## 2019-03-03 RX ADMIN — LORAZEPAM 1 MG: 1 TABLET ORAL at 02:59

## 2019-03-03 RX ADMIN — CLONAZEPAM 1 MG: 1 TABLET ORAL at 08:23

## 2019-03-03 RX ADMIN — CLONAZEPAM 1 MG: 1 TABLET ORAL at 21:00

## 2019-03-03 RX ADMIN — LOSARTAN POTASSIUM 100 MG: 50 TABLET, FILM COATED ORAL at 08:23

## 2019-03-03 RX ADMIN — HUMAN INSULIN 16 UNITS: 100 INJECTION, SUSPENSION SUBCUTANEOUS at 08:24

## 2019-03-03 RX ADMIN — INSULIN LISPRO 3 UNITS: 100 INJECTION, SOLUTION INTRAVENOUS; SUBCUTANEOUS at 21:04

## 2019-03-03 RX ADMIN — MAGNESIUM HYDROXIDE 30 ML: 400 SUSPENSION ORAL at 19:30

## 2019-03-03 RX ADMIN — HALOPERIDOL 5 MG: 5 TABLET ORAL at 08:23

## 2019-03-03 RX ADMIN — DIVALPROEX SODIUM 500 MG: 500 TABLET, DELAYED RELEASE ORAL at 21:01

## 2019-03-03 RX ADMIN — INSULIN LISPRO 2 UNITS: 100 INJECTION, SOLUTION INTRAVENOUS; SUBCUTANEOUS at 17:12

## 2019-03-03 RX ADMIN — BENZTROPINE MESYLATE 1 MG: 1 TABLET ORAL at 08:24

## 2019-03-03 RX ADMIN — HALOPERIDOL 5 MG: 5 TABLET ORAL at 21:00

## 2019-03-03 RX ADMIN — ZOLPIDEM TARTRATE 10 MG: 10 TABLET ORAL at 21:04

## 2019-03-03 RX ADMIN — DIVALPROEX SODIUM 500 MG: 500 TABLET, DELAYED RELEASE ORAL at 08:24

## 2019-03-03 RX ADMIN — HYDROCHLOROTHIAZIDE 25 MG: 25 TABLET ORAL at 08:23

## 2019-03-03 RX ADMIN — BENZTROPINE MESYLATE 1 MG: 1 TABLET ORAL at 21:01

## 2019-03-03 RX ADMIN — INSULIN LISPRO 2 UNITS: 100 INJECTION, SOLUTION INTRAVENOUS; SUBCUTANEOUS at 08:24

## 2019-03-03 RX ADMIN — HUMAN INSULIN 16 UNITS: 100 INJECTION, SUSPENSION SUBCUTANEOUS at 21:01

## 2019-03-03 RX ADMIN — INSULIN LISPRO 5 UNITS: 100 INJECTION, SOLUTION INTRAVENOUS; SUBCUTANEOUS at 12:04

## 2019-03-03 RX ADMIN — LORAZEPAM 1 MG: 1 TABLET ORAL at 19:30

## 2019-03-03 RX ADMIN — ACETAMINOPHEN 650 MG: 325 TABLET, FILM COATED ORAL at 11:58

## 2019-03-03 NOTE — PROGRESS NOTES
CC diet as tolerated. Cont hypoglycemia precautions per MD.  Pt noted to be meal compliant, seeking extra food. Hx notable for chronic pancreatitis, DM controlled with DKA, HTN, rosmery, hyperlipidemia, drug-seeking behavior. Ht: 5'6\" Wt: 156 lb BMI: 21.79 kg/(m^2) c/w normal weight Est energy needs: 1656 kcal, 64 g protein 1 mL/kcal fluids Pt will consume > 75% of meals at follow up 7-10 days LOS

## 2019-03-03 NOTE — PROGRESS NOTES
Problem: Suicide/Homicide (Adult/Pediatric) Goal: *STG: Remains safe in hospital 
Outcome: Progressing Towards Goal 
Pt endorses depression but denies SI/HI/AH/VH. Pt is visible on the unit, diet/med compliant. Pt's HS blood sugar was 230 and pt received Insulin lispro 2 units and NPH 16 units. Pt received prn Tylenol for arm pain and prn Ativan 1 mg for anxiety. . Pt observed resting in bed with eyes closed with no acute respiratory distress. Will continue to monitor pt for health and safety. Pt slept for 5 hours

## 2019-03-03 NOTE — PROGRESS NOTES
Problem: Psychosis Goal: *STG: Decreased hallucinations Outcome: Progressing Towards Goal 
Pt is calm and cooperative. Medication and meal complaint. Pt is alert. Visible on the unit. Denies acute symptoms. Will continue to monitor patient and assess needs.

## 2019-03-03 NOTE — BH NOTES
PSYCHIATRIC PROGRESS NOTE Patient Name  Nelly Cordero Date of Birth 1975 [facility] [id number] Medical Record Number  [id number] Age  38 y.o. PCP Other, MD Marecl  
Admit date:  2/25/2019 Room Number  450/29  @ [facility]  
Date of Service  3/3/2019 E & M PROGRESS NOTE: 
  
 
 
HISTORY  
   
CC:  \"psychosis / Pecolia Jarrett" HISTORY OF PRESENT ILLNESS/INTERVAL HISTORY:  (reviewed/updated 3/3/2019). per initial evaluation: The patient, Nelly Cordero, is a 38 y.o. BLACK OR  female with a past psychiatric history significant for schizoaffective disorder and cocaine use disorder, who presents at this time with complaints of (and/or evidence of) the following emotional symptoms: agitation, psychotic behavior and sundar. Additional symptomatology include confusion. The above symptoms have been present for 24+ hours. These symptoms are of moderate to high severity. These symptoms are constant in nature. The patient's condition has been precipitated by psychosocial stressors. Patient's condition made worse by continued illicit drug use as well as treatment noncompliance. UDS: +cocaine; BAL=0.  
  
Patient seen in her room s/p PRN. She had taken off her clothes and was inappropriately in the morning resulting in PRN administration for agitation and psychosis. The patient is a poor hstorian. The patient does not corroborate the above narrative. The patient is unable to contract for safety on the unit but gives consent for the team to contact collateral. The patient is not amenable to initiating treatment while on the unit. 2/27- patient started on court ordered medications. She is odd, visible, discharge focused. Patient with an episode of anxiety when told she would not be discharged today. Patient voices no other complaints. 2/28- patient is odd, discharge focused.  Labile and tearful, has been repeatedly asking to leave hospital. No behavioral outbursts, no PRNs given. Patient informed she may be discharged to her family's care a day early but continued to cry. 
3/01- patient has been childlike, compliant with court ordered medication. Patient's FSG has been variable, managed by IM consult. Patient odd, received Ambien and Ativan. Pt signed collateral release: per family, patient told them she was in the hospital for problems with her blood pressure and is not as her baseline.  
3/02-Patient was seen during the AM rounds. Presented appropriate. She was counseled on food and quantity and a Nutritionist consult is requested. Compliant with current meds. Slept 6.5 hrs after Ambien-prn. 
3/03-Presents alert, pleasant and cooperative. Significant improvement noticed on mood and affect. Pt has not displayed inappropriate eating or other behaviors since yesterday. Slept 5 hrs. No prn's used. SIDE EFFECTS: (reviewed/updated 3/3/2019) None reported or admitted to. No noted toxicity with use of Depakote ALLERGIES:(reviewed/updated 3/3/2019) Allergies Allergen Reactions  Dilaudid [Hydromorphone (Bulk)] Itching  Ibuprofen Not Reported This Time MEDICATIONS PRIOR TO ADMISSION:(reviewed/updated 3/3/2019) Medications Prior to Admission Medication Sig  
 albuterol (PROVENTIL HFA, VENTOLIN HFA, PROAIR HFA) 90 mcg/actuation inhaler Take 2 Puffs by inhalation every four (4) hours as needed for Wheezing.  insulin NPH/insulin regular (NOVOLIN 70/30, HUMULIN 70/30) 100 unit/mL (70-30) injection 15 Units by SubCUTAneous route two (2) times a day.  losartan (COZAAR) 100 mg tablet Take 1 Tab by mouth daily.  haloperidol (HALDOL) 5 mg tablet Take 1 Tab by mouth every twelve (12) hours.  raNITIdine hcl 150 mg capsule TAKE 1 TABLET BY MOUTH TWICE A DAY FOR 10 DAYS  aspirin delayed-release 81 mg tablet TAKE 1 TABLET BY MOUTH EVERY DAY WITH 6 TO 8 OUNCES OF PLAIN WATER  
  
PAST MEDICAL HISTORY: Past medical history from the initial psychiatric evaluation has been reviewed (reviewed/updated 3/3/2019) with no additional updates (I asked patient and no additional past medical history provided). Past Medical History:  
Diagnosis Date  Alcohol abuse, in remission   
 quit 17 days ago  Asthma   
 does not use inhalers  Borderline personality disorder ([facility]  Diabetes ([facility]  GERD (gastroesophageal reflux disease)  Hypertension  Other ill-defined conditions(799.89)   
 kidney stones ,passed one  Other ill-defined conditions(799.89) sickle cell trait  Other ill-defined conditions(799.89)   
 increased cholesterol  Pancreatitis  Psychiatric disorder   
 schizophrenia, bipolar, depression, anxiety Past Surgical History:  
Procedure Laterality Date  ABDOMEN SURGERY PROC UNLISTED  3/12/14 CHOLECYSTECTOMY LAPAROSCOPIC    
 HX GASTRIC BYPASS  HX GYN  11/8/2012  
 c section x2  HX OTHER SURGICAL  3/13/14 ENDOSCOPIC RETROGRADE CHOLANGIOPANCREATOGRAPHY  HX OTHER SURGICAL  8/4/14  
 endoscopic stent placed to bile duct  HX TUBAL LIGATION  2012 SOCIAL HISTORY: Social history from the initial psychiatric evaluation has been reviewed (reviewed/updated 3/3/2019) with no additional updates (I asked patient and no additional social history provided). Social History Socioeconomic History  Marital status: SINGLE Spouse name: Not on file  Number of children: Not on file  Years of education: Not on file  Highest education level: Not on file Social Needs  Financial resource strain: Not on file  Food insecurity - worry: Not on file  Food insecurity - inability: Not on file  Transportation needs - medical: Not on file  Transportation needs - non-medical: Not on file Occupational History  Not on file Tobacco Use  Smoking status: Current Every Day Smoker Packs/day: 0.50 Years: 14.00 Pack years: 7.00 Types: Cigarettes  Smokeless tobacco: Never Used  Tobacco comment: cigarettes Substance and Sexual Activity  Alcohol use: Yes Alcohol/week: 4.8 oz Types: 2 Glasses of wine, 6 Cans of beer per week  Drug use: No  
  Comment: yesterday 12/27/18  Sexual activity: Yes  
  Partners: Female Birth control/protection: Surgical  
Other Topics Concern  Not on file Social History Narrative  Not on file FAMILY HISTORY: Family history from the initial psychiatric evaluation has been reviewed (reviewed/updated 3/3/2019) with no additional updates (I asked patient and no additional family history provided). Family History Problem Relation Age of Onset  Heart Disease Mother  Diabetes Mother  Hypertension Mother  Hypertension Maternal Grandmother REVIEW OF SYSTEMS: (reviewed/updated 3/3/2019) Appetite:no change from normal  
Sleep: poor with DIMS (difficulty initiating & maintaining sleep) All other Review of Systems: Negative except per HPI [place] MENTAL STATUS EXAM (MSE): MSE FINDINGS ARE WITHIN NORMAL LIMITS (WNL) UNLESS OTHERWISE STATED BELOW. ( ALL OF THE BELOW CATEGORIES OF THE MSE HAVE BEEN REVIEWED (reviewed 3/3/2019) AND UPDATED AS DEEMED APPROPRIATE ) General Presentation age appropriate, cooperative Orientation not oriented to situation Vital Signs  See below (reviewed 3/3/2019); Vital Signs (BP, Pulse, & Temp) are within normal limits if not listed below. Gait and Station Stable/steady, no ataxia Musculoskeletal System No extrapyramidal symptoms (EPS); no abnormal muscular movements or Tardive Dyskinesia (TD); muscle strength and tone are within normal limits Language No aphasia or dysarthria Speech:  normal volume and non-pressured Thought Processes logical; normal rate of thoughts; fair abstract reasoning/computation Thought Associations Goal directed Thought Content No AVH Suicidal Ideations none Homicidal Ideations none Mood:  euthymic Affect:  mood-congruent Memory recent  intact Memory remote:  intact Concentration/Attention:  intact Fund of Knowledge below average Insight:  limited Reliability fair Judgment:  poor VITALS:    
Patient Vitals for the past 24 hrs: 
 Temp Pulse Resp BP SpO2  
03/03/19 0811 98.3 °F (36.8 °C) 94 16 140/90 99 % 03/03/19 0633 98.2 °F (36.8 °C) 77 18 153/84 99 % 03/03/19 0257 98.1 °F (36.7 °C) 96 22 156/79 99 % 03/02/19 2050 97.9 °F (36.6 °C) (!) 121 18 (!) 176/101 100 % Wt Readings from Last 3 Encounters:  
02/25/19 61.2 kg (135 lb)  
02/22/19 70.4 kg (155 lb 4.8 oz) 02/22/19 67.6 kg (149 lb) Temp Readings from Last 3 Encounters:  
03/03/19 98.3 °F (36.8 °C)  
02/22/19 97.4 °F (36.3 °C)  
02/22/19 98.1 °F (36.7 °C) BP Readings from Last 3 Encounters:  
03/03/19 140/90  
02/23/19 103/85  
02/22/19 144/82 Pulse Readings from Last 3 Encounters:  
03/03/19 94  
02/22/19 95  
02/22/19 95 DATA LABORATORY DATA:(reviewed/updated 3/3/2019) Recent Results (from the past 24 hour(s)) GLUCOSE, POC Collection Time: 03/02/19  4:51 PM  
Result Value Ref Range Glucose (POC) 159 (H) 65 - 100 mg/dL Performed by David Nugent GLUCOSE, POC Collection Time: 03/02/19  8:50 PM  
Result Value Ref Range Glucose (POC) 230 (H) 65 - 100 mg/dL Performed by Edwin Edmonds GLUCOSE, POC Collection Time: 03/03/19  2:58 AM  
Result Value Ref Range Glucose (POC) 195 (H) 65 - 100 mg/dL Performed by Edwin Edmonds GLUCOSE, POC Collection Time: 03/03/19  8:10 AM  
Result Value Ref Range Glucose (POC) 149 (H) 65 - 100 mg/dL Performed by Basil Law GLUCOSE, POC Collection Time: 03/03/19 12:00 PM  
Result Value Ref Range Glucose (POC) 293 (H) 65 - 100 mg/dL Performed by Basil Law Lab Results Component Value Date/Time  Valproic acid 61 03/02/2019 05:04 AM  
 
No results found for: [id number] [facility] REPORTS:(reviewed/updated 3/3/2019) Xr Chest Pa Lat Result Date: 2/16/2019 CLINICAL HISTORY: Dyspnea INDICATION: Dyspnea COMPARISON: 2/13/2019 FINDINGS: PA and lateral views of the chest are obtained. The cardiopericardial silhouette is within normal limits. There is no pleural effusion, pneumothorax or focal consolidation present. IMPRESSION: No acute intrathoracic disease. Xr Chest Pa Lat Result Date: 1/22/2019 INDICATION: chest pain EXAM: CXR 2 Views. COMPARISON: 1/20/2019. Annamary Ripa FINDINGS: Frontal and lateral views of the chest show the lungs are free of acute disease. There remain left upper lobe granulomas. Heart size is normal. There is no overt pulmonary edema. There is no evident pneumothorax, adenopathy or pleural effusion. IMPRESSION: No acute disease. No significant interval change. Xr Chest Pa Lat Result Date: 1/13/2019 INDICATION:   cp COMPARISON: January 8, 2019 FINDINGS: Frontal and lateral views of the chest demonstrate a normal cardiomediastinal silhouette. The lungs are adequately expanded. There is no edema, effusion, consolidation, or pneumothorax. The osseous structures are unremarkable. IMPRESSION: No acute process. Xr Chest Pa Lat Result Date: 1/8/2019 INDICATION: . cp Additional history: Alcohol and crack user that once detox COMPARISON: Previous chest xray, 12/28/2018. Annamary Ripa FINDINGS: PA and lateral view of the chest. . Lines/tubes/surgical: Cardiac monitor leads overly the patient. Heart/mediastinum: Unremarkable. Lungs/pleura:  No focal consolidation or mass. No visualized pleural effusion or pneumothorax. Additional Comments: Surgical clips in the upper abdomen. Annamary Ripa IMPRESSION: 1. No radiographic evidence of acute cardiopulmonary disease. Xr Chest Pa Lat Result Date: 12/28/2018 CLINICAL HISTORY: Chest pain INDICATION: Chest pain left-sided COMPARISON: 11/22/2018 FINDINGS: PA and lateral views of the chest are obtained.  The cardiopericardial silhouette is within normal limits. There is no pleural effusion, pneumothorax or focal consolidation present. IMPRESSION: No acute intrathoracic disease. Ct Head Wo Cont Result Date: 2/25/2019 EXAM:  CT HEAD WO CONT INDICATION: Unusual behavior. Mental health problems. COMPARISON: None TECHNIQUE: Noncontrast head CT. Coronal and sagittal reformats. CT dose reduction was achieved through use of a standardized protocol tailored for this examination and automatic exposure control for dose modulation. FINDINGS: The ventricles and sulci are age-appropriate without hydrocephalus. There is no mass effect or midline shift. There is no intracranial hemorrhage or extra-axial fluid collection. There is no abnormal parenchymal attenuation. The gray-white matter differentiation is maintained. The basal cisterns are patent. The osseous structures are intact. The visualized paranasal sinuses and mastoid air cells are clear. IMPRESSION: No acute intracranial abnormality. Cta Chest W Or W Wo Cont Result Date: 12/28/2018 CLINICAL HISTORY: Chest pain, dyspnea INDICATION: Chest pain, dyspnea COMPARISON: 2008 TECHNIQUE: CT of the chest with  IV contrast , Isovue-370 is performed. Axial images from the thoracic inlet to the level of the upper abdomen are obtained. Manual post-processing of the images and coronal reformatting is also performed. CT dose reduction was achieved through use of a standardized protocol tailored for this examination and automatic exposure control for dose modulation. Multiplanar reformatted imaging was performed. Sagittal and coronal reformatting. 3-D Postprocessing of imaging was performed. 3-D MIP reconstructed images were obtained in the coronal plane. FINDINGS: There is no pulmonary embolism. There is no aortic aneurysm or dissection. There are small bilateral pleural effusions. The pulmonary artery is prominent consistent with pulmonary arterial hypertension. Thoracic aortic ectasia.  There is a small-to-moderate hiatal hernia. Distal esophageal wall thickening. Minimal scattered groundglass opacities most predominantly in the right middle lobe but also within the upper lobes and lower lobes bilaterally. Postcholecystectomy. There is no pleural or pericardial effusion. There is no mediastinal, axillary or hilar lymphadenopathy. The aorta is normal in course and caliber. The proximal pulmonary arteries are without evidence of filling defects. No lytic or blastic lesions are identified. The remainder of the upper abdomen visualized is unremarkable. IMPRESSION: There is no pulmonary embolism. There is no aortic aneurysm or dissection. Small bilateral pleural effusions. Small to moderate hiatal hernia. Esophageal wall thickening is circumferential. This may be related to esophagitis. Nonspecific finding. Further clinical correlation recommended. Scattered groundglass opacity with bronchial wall thickening most prominent in the right middle lobe. May be related to infectious/inflammatory, reactive airways process. Follow-up chest CT in 4-6 weeks to evaluate for resolution recommended. Ct Abd Pelv W Cont Result Date: 1/24/2019 EXAM: CT ABD PELV W CONT INDICATION: Ascites . Distended abdomen, history of liver disease. Midsternal chest pain for 3 weeks. Shortness of breath. COMPARISON: 11/22/2018 CONTRAST: 100 mL of Isovue-370. TECHNIQUE: Following the uneventful intravenous administration of contrast, thin axial images were obtained through the abdomen and pelvis. Coronal and sagittal reconstructions were generated. Oral contrast was not administered. CT dose reduction was achieved through use of a standardized protocol tailored for this examination and automatic exposure control for dose modulation. FINDINGS: LUNG BASES: Clear. INCIDENTALLY IMAGED HEART AND MEDIASTINUM: Unremarkable. LIVER: No mass or biliary dilatation. GALLBLADDER: Surgically absent. SPLEEN: No mass.  PANCREAS: The pancreas is atrophic with coarse calcifications consistent with chronic pancreatitis as previously reported. ADRENALS: Unremarkable. KIDNEYS: No mass, calculus, or hydronephrosis. STOMACH: Stomach wall appears thickened, likely related to nondistention. SMALL BOWEL: Mild prominence of small bowel caliber without debbi distention. COLON: No dilatation or wall thickening. Large amount of retained fecal material throughout the colon. APPENDIX: Not well seen, but no acute appendiceal abnormality is suspected. PERITONEUM: Small amount of free fluid in the cul-de-sac. RETROPERITONEUM: No lymphadenopathy or aortic aneurysm. REPRODUCTIVE ORGANS: Unremarkable for age. URINARY BLADDER: No mass or calculus. BONES: No destructive bone lesion. ADDITIONAL COMMENTS: Generalized subcutaneous edema throughout the abdomen and pelvis. IMPRESSION: 1. Previously described chronic pancreatitis. 2. Status post cholecystectomy. 3. Large amount of retained fecal material throughout the colon. 4. Small amount of free fluid in the cul-de-sac which may conceivably be physiologic. 5. Generalized subcutaneous edema throughout the abdomen and pelvis. Xr Chest [facility] Result Date: 2/13/2019 EXAM: Portable CXR. 1835 hours  INDICATION: Chest pain FINDINGS: The lungs are clear. Heart is normal in size. There is no overt pulmonary edema. There is no evident pneumothorax, adenopathy or pleural effusion. IMPRESSION: No Acute Disease. Xr Chest [facility] Result Date: 1/27/2019 INDICATION: chest pain EXAM:  AP CHEST RADIOGRAPH COMPARISON: January 22, 2019 FINDINGS: AP portable view of the chest demonstrates a normal cardiomediastinal silhouette. The lungs are adequately expanded. There is no edema, effusion, consolidation, or pneumothorax. The osseous structures are unremarkable. IMPRESSION: No acute process. Xr Chest [facility] Result Date: 1/20/2019 EXAM:  Portable chest view INDICATION: Sharp chest pain for several days.  COMPARISON: Chest views on 1/13/2019. TECHNIQUE: Erect portable chest radiograph FINDINGS: Cardiac size is within normal limits. Aortic arch is not enlarged. Pulmonary vasculature is within normal limits. Left upper lobe calcified granulomas are still present. No focal airspace opacity or pulmonary edema. No pneumothorax. Bones are within normal limits. IMPRESSION: No acute process on portable chest. No change given difference in technique. MEDICATIONS ALL MEDICATIONS:  
Current Facility-Administered Medications Medication Dose Route Frequency  hydroCHLOROthiazide (HYDRODIURIL) tablet 25 mg  25 mg Oral DAILY  insulin NPH (NOVOLIN N, HUMULIN N) injection 16 Units  16 Units SubCUTAneous Q12H  
 haloperidol decanoate (HALDOL DECANOATE) 100 mg/mL LA injection 100 mg +++COURT ORDERED+++  100 mg IntraMUSCular Q28D  
 losartan (COZAAR) tablet 100 mg  100 mg Oral DAILY  insulin lispro (HUMALOG) injection   SubCUTAneous AC&HS  
 glucose chewable tablet 16 g  4 Tab Oral PRN  
 dextrose (D50W) injection syrg 12.5-25 g  12.5-25 g IntraVENous PRN  
 glucagon (GLUCAGEN) injection 1 mg  1 mg IntraMUSCular PRN  
 haloperidol (HALDOL) tablet 5 mg  5 mg Oral Q12H Or  
 haloperidol lactate (HALDOL) injection 5 mg +++COURT ORDERED MEDICATION FOR REFUSAL OF PO HALOPERIDOL+++  5 mg IntraMUSCular Q12H  
 benztropine (COGENTIN) tablet 1 mg  1 mg Oral Q12H Or  
 benztropine (COGENTIN) injection 1 mg +++COURT ORDERED MEDICATION FOR REFUSAL OF PO BENZTROPINE+++  1 mg IntraMUSCular Q12H  clonazePAM (KlonoPIN) tablet 1 mg  1 mg Oral Q12H Or  
 LORazepam (ATIVAN) injection 1 mg +++COURT ORDERED MEDICATION FOR REFUSAL OF PO CLONAZEPAM+++  1 mg IntraMUSCular Q12H  
 LORazepam (ATIVAN) injection 1 mg +++COURT ORDERED MEDICATION FOR REFUSAL OF PO DEPAKOTE+++  1 mg IntraMUSCular Q12H  Or  
 divalproex DR (DEPAKOTE) tablet 500 mg  500 mg Oral Q12H  
 ziprasidone (GEODON) 20 mg in sterile water (preservative free) 1 mL injection  20 mg IntraMUSCular BID PRN  
 OLANZapine (ZyPREXA) tablet 5 mg  5 mg Oral Q6H PRN  
 benztropine (COGENTIN) tablet 2 mg  2 mg Oral BID PRN  
 benztropine (COGENTIN) injection 2 mg  2 mg IntraMUSCular BID PRN  
 LORazepam (ATIVAN) injection 2 mg  2 mg IntraMUSCular Q4H PRN  
 LORazepam (ATIVAN) tablet 1 mg  1 mg Oral Q4H PRN  
 zolpidem (AMBIEN) tablet 10 mg  10 mg Oral QHS PRN  
 acetaminophen (TYLENOL) tablet 650 mg  650 mg Oral Q4H PRN  
 magnesium hydroxide (MILK OF MAGNESIA) 400 mg/5 mL oral suspension 30 mL  30 mL Oral DAILY PRN  
 nicotine (NICODERM CQ) 21 mg/24 hr patch 1 Patch  1 Patch TransDERmal DAILY PRN  
  
SCHEDULED MEDICATIONS:  
Current Facility-Administered Medications Medication Dose Route Frequency  hydroCHLOROthiazide (HYDRODIURIL) tablet 25 mg  25 mg Oral DAILY  insulin NPH (NOVOLIN N, HUMULIN N) injection 16 Units  16 Units SubCUTAneous Q12H  
 haloperidol decanoate (HALDOL DECANOATE) 100 mg/mL LA injection 100 mg +++COURT ORDERED+++  100 mg IntraMUSCular Q28D  
 losartan (COZAAR) tablet 100 mg  100 mg Oral DAILY  insulin lispro (HUMALOG) injection   SubCUTAneous AC&HS  
 haloperidol (HALDOL) tablet 5 mg  5 mg Oral Q12H Or  
 haloperidol lactate (HALDOL) injection 5 mg +++COURT ORDERED MEDICATION FOR REFUSAL OF PO HALOPERIDOL+++  5 mg IntraMUSCular Q12H  
 benztropine (COGENTIN) tablet 1 mg  1 mg Oral Q12H Or  
 benztropine (COGENTIN) injection 1 mg +++COURT ORDERED MEDICATION FOR REFUSAL OF PO BENZTROPINE+++  1 mg IntraMUSCular Q12H  clonazePAM (KlonoPIN) tablet 1 mg  1 mg Oral Q12H Or  
 LORazepam (ATIVAN) injection 1 mg +++COURT ORDERED MEDICATION FOR REFUSAL OF PO CLONAZEPAM+++  1 mg IntraMUSCular Q12H  
 LORazepam (ATIVAN) injection 1 mg +++COURT ORDERED MEDICATION FOR REFUSAL OF PO DEPAKOTE+++  1 mg IntraMUSCular Q12H  Or  
 divalproex DR (DEPAKOTE) tablet 500 mg  500 mg Oral Q12H  
  
 
 
ASSESSMENT & PLAN  
 
DIAGNOSES REQUIRING ACTIVE TREATMENT AND MONITORING: (reviewed/updated 3/3/2019) Patient Active Hospital Problem List: 
 [facility] (8/8/2018) Assessment: patient grossly psychotic, recent substance use and likely medication non-compliance. Patient a candidate for court ordered medication as she has been refusing since being on the unit. Plan: 
COURT ORDERED MEDICATION: 
  Haldol 5 mg PO *OR* IM Q12H for psychosis Cogentin 1 mg PO *OR* IM Q12H for EPS prophylaxis Depakote 500 mg *OR* Ativan 1 mg Q12H for sundar VPA level 03/02/19 Klonopin 1 mg *OR* Ativan 1 mg Q12H for agitation 
 
- IGM therapy as tolerated - Expand database / obtain collateral 
- Dispo planning 
  
I will continue to monitor blood levels (Depakote---a drug with a narrow therapeutic index= NTI) and associated labs for drug therapy implemented that require intense monitoring for toxicity as deemed appropriate based on current medication side effects and pharmacodynamically determined drug 1/2 lives. In summary, Tex Thakkar, is a 38 y.o.  female who presents with a severe exacerbation of the principal diagnosis of Schizoaffective disorder ([facility] Patient's condition is improving. Patient requires continued inpatient hospitalization for further stabilization, safety monitoring and medication management. I will continue to coordinate the provision of individual, milieu, occupational, group, and substance abuse therapies to address target symptoms/diagnoses as deemed appropriate for the individual patient. A coordinated, multidisplinary treatment team round was conducted with the patient (this team consists of the nurse, psychiatric unit pharmcist,  and writer). Complete current electronic health record for patient has been reviewed today including consultant notes, ancillary staff notes, nurses and psychiatric tech notes.  
 
Suicide risk assessment completed and patient deemed to be of low risk for suicide at this time. The following regarding medications was addressed during rounds with patient:  
the risks and benefits of the proposed medication. The patient was given the opportunity to ask questions. Informed consent given to the use of the above medications. Will continue to adjust psychiatric and non-psychiatric medications (see above \"medication\" section and orders section for details) as deemed appropriate & based upon diagnoses and response to treatment. I will continue to order blood tests/labs and diagnostic tests as deemed appropriate and review results as they become available (see orders for details and above listed lab/test results). I will order psychiatric records from previous [facility] hospitals to further elucidate the nature of patient's psychopathology and review once available. I will gather additional collateral information from friends, family and o/p treatment team to further elucidate the nature of patient's psychopathology and baselline level of psychiatric functioning. I certify that this patient's inpatient psychiatric hospital services furnished since the previous certification were, and continue to be, required for treatment that could reasonably be expected to improve the patient's condition, or for diagnostic study, and that the patient continues to need, on a daily basis, active treatment furnished directly by or requiring the supervision of inpatient psychiatric facility personnel. In addition, the hospital records show that services furnished were intensive treatment services, admission or related services, or equivalent services. EXPECTED DISCHARGE DATE/DAY: 3/4/2019 DISPOSITION: Home Signed By:  
Katy Christensen MD 
3/3/2019

## 2019-03-04 VITALS
DIASTOLIC BLOOD PRESSURE: 81 MMHG | HEART RATE: 82 BPM | SYSTOLIC BLOOD PRESSURE: 142 MMHG | BODY MASS INDEX: 21.69 KG/M2 | TEMPERATURE: 97.9 F | HEIGHT: 66 IN | OXYGEN SATURATION: 98 % | WEIGHT: 135 LBS | RESPIRATION RATE: 16 BRPM

## 2019-03-04 LAB
GLUCOSE BLD STRIP.AUTO-MCNC: 214 MG/DL (ref 65–100)
GLUCOSE BLD STRIP.AUTO-MCNC: 384 MG/DL (ref 65–100)
SERVICE CMNT-IMP: ABNORMAL
SERVICE CMNT-IMP: ABNORMAL

## 2019-03-04 PROCEDURE — 82962 GLUCOSE BLOOD TEST: CPT

## 2019-03-04 PROCEDURE — 74011250637 HC RX REV CODE- 250/637: Performed by: FAMILY MEDICINE

## 2019-03-04 PROCEDURE — 74011636637 HC RX REV CODE- 636/637: Performed by: INTERNAL MEDICINE

## 2019-03-04 PROCEDURE — 74011250637 HC RX REV CODE- 250/637: Performed by: PSYCHIATRY & NEUROLOGY

## 2019-03-04 PROCEDURE — 74011250637 HC RX REV CODE- 250/637: Performed by: INTERNAL MEDICINE

## 2019-03-04 PROCEDURE — 74011636637 HC RX REV CODE- 636/637: Performed by: FAMILY MEDICINE

## 2019-03-04 RX ORDER — INSULIN LISPRO 100 [IU]/ML
10 INJECTION, SOLUTION INTRAVENOUS; SUBCUTANEOUS ONCE
Status: COMPLETED | OUTPATIENT
Start: 2019-03-04 | End: 2019-03-04

## 2019-03-04 RX ADMIN — BENZTROPINE MESYLATE 1 MG: 1 TABLET ORAL at 08:00

## 2019-03-04 RX ADMIN — HALOPERIDOL 5 MG: 5 TABLET ORAL at 08:01

## 2019-03-04 RX ADMIN — LOSARTAN POTASSIUM 100 MG: 50 TABLET, FILM COATED ORAL at 08:01

## 2019-03-04 RX ADMIN — HYDROCHLOROTHIAZIDE 25 MG: 25 TABLET ORAL at 07:59

## 2019-03-04 RX ADMIN — INSULIN LISPRO 3 UNITS: 100 INJECTION, SOLUTION INTRAVENOUS; SUBCUTANEOUS at 08:01

## 2019-03-04 RX ADMIN — HUMAN INSULIN 16 UNITS: 100 INJECTION, SUSPENSION SUBCUTANEOUS at 08:01

## 2019-03-04 RX ADMIN — INSULIN LISPRO 10 UNITS: 100 INJECTION, SOLUTION INTRAVENOUS; SUBCUTANEOUS at 12:30

## 2019-03-04 RX ADMIN — DIVALPROEX SODIUM 500 MG: 500 TABLET, DELAYED RELEASE ORAL at 08:00

## 2019-03-04 RX ADMIN — CLONAZEPAM 1 MG: 1 TABLET ORAL at 08:00

## 2019-03-04 NOTE — BH NOTES
Behavioral Health Transition Record to Provider Patient Name: Gasper Mcrae YOB: 1975 Medical Record Number: [id number] Date of Admission: 2/25/2019 Date of Discharge: 3/4/2019 Attending Provider: Brandon Santa, * Discharging Provider: Dyana Palacio MD 
To contact this individual call [phone] and ask the  to page. If unavailable, ask to be transferred to [facility] Provider on call. [facility] Provider will be available on call 24/7 and during holidays. Primary Care Provider: Lisa, MD Marcel 
 
Allergies Allergen Reactions  Dilaudid [Hydromorphone (Bulk)] Itching  Ibuprofen Not Reported This Time Reason for Admission: Pt was a poor historian during this assessment and remained silent laying in her bed. Per prescreening pt relapsed on cocaine and alcohol the previous night. Pt was reported to have 17 ED visits in 2019. [facility] Extensive history of substance abuse. Admission Diagnosis: Schizophrenia ([facility] [address] [F20.9] * No surgery found * Results for orders placed or performed during the hospital encounter of 02/25/19 ETHYL ALCOHOL Result Value Ref Range ALCOHOL(ETHYL),SERUM <10 <10 MG/DL  
CBC WITH AUTOMATED DIFF Result Value Ref Range WBC 8.3 3.6 - 11.0 K/uL  
 RBC 6.40 (H) 3.80 - 5.20 M/uL  
 HGB 16.5 (H) 11.5 - 16.0 g/dL HCT 46.5 35.0 - 47.0 % MCV 72.7 (L) 80.0 - 99.0 FL  
 MCH 25.8 (L) 26.0 - 34.0 PG  
 MCHC 35.5 30.0 - 36.5 g/dL  
 RDW 17.6 (H) 11.5 - 14.5 % PLATELET 142 568 - 467 K/uL NRBC 0.0 0  WBC ABSOLUTE NRBC 0.00 0.00 - 0.01 K/uL NEUTROPHILS 56 32 - 75 % LYMPHOCYTES 33 12 - 49 % MONOCYTES 8 5 - 13 % EOSINOPHILS 2 0 - 7 % BASOPHILS 1 0 - 1 % IMMATURE GRANULOCYTES 0 0.0 - 0.5 % ABS. NEUTROPHILS 4.6 1.8 - 8.0 K/UL  
 ABS. LYMPHOCYTES 2.7 0.8 - 3.5 K/UL  
 ABS. MONOCYTES 0.7 0.0 - 1.0 K/UL  
 ABS.  EOSINOPHILS 0.2 0.0 - 0.4 K/UL  
 ABS. BASOPHILS 0.1 0.0 - 0.1 K/UL  
 ABS. IMM. GRANS. 0.0 0.00 - 0.04 K/UL  
 DF SMEAR SCANNED    
 RBC COMMENTS ANISOCYTOSIS 
1+ METABOLIC PANEL, BASIC Result Value Ref Range Sodium 134 (L) 136 - 145 mmol/L Potassium 3.8 3.5 - 5.1 mmol/L Chloride 97 97 - 108 mmol/L  
 CO2 29 21 - 32 mmol/L Anion gap 8 5 - 15 mmol/L Glucose 291 (H) 65 - 100 mg/dL BUN 11 6 - 20 MG/DL Creatinine 0.79 0.55 - 1.02 MG/DL  
 BUN/Creatinine ratio 14 12 - 20 GFR est AA >60 >60 ml/min/1.73m2 GFR est non-AA >60 >60 ml/min/1.73m2 Calcium 8.7 8.5 - 10.1 MG/DL  
DRUG SCREEN, URINE Result Value Ref Range AMPHETAMINES NEGATIVE  NEG    
 BARBITURATES NEGATIVE  NEG BENZODIAZEPINES NEGATIVE  NEG    
 COCAINE POSITIVE (A) NEG METHADONE NEGATIVE  NEG    
 OPIATES NEGATIVE  NEG    
 PCP(PHENCYCLIDINE) NEGATIVE  NEG    
 THC (TH-CANNABINOL) NEGATIVE  NEG Drug screen comment (NOTE) URINALYSIS W/ REFLEX CULTURE Result Value Ref Range Color YELLOW/STRAW Appearance CLEAR CLEAR Specific gravity 1.015 1.003 - 1.030    
 pH (UA) 5.5 5.0 - 8.0 Protein NEGATIVE  NEG mg/dL Glucose >1,000 (A) NEG mg/dL Ketone NEGATIVE  NEG mg/dL Bilirubin NEGATIVE  NEG Blood NEGATIVE  NEG Urobilinogen 0.2 0.2 - 1.0 EU/dL Nitrites NEGATIVE  NEG Leukocyte Esterase NEGATIVE  NEG    
 WBC 0-4 0 - 4 /hpf  
 RBC 0-5 0 - 5 /hpf Epithelial cells FEW FEW /lpf Bacteria NEGATIVE  NEG /hpf  
 UA:UC IF INDICATED CULTURE NOT INDICATED BY UA RESULT CNI    
VALPROIC ACID Result Value Ref Range Valproic acid <3 (L) 50 - 100 ug/ml GLUCOSE, FASTING Result Value Ref Range Glucose 268 (H) 65 - 100 MG/DL  
LIPID PANEL Result Value Ref Range LIPID PROFILE Cholesterol, total 142 <200 MG/DL Triglyceride 103 <150 MG/DL  
 HDL Cholesterol 71 MG/DL  
 LDL, calculated 50.4 0 - 100 MG/DL VLDL, calculated 20.6 MG/DL  
 CHOL/HDL Ratio 2.0 0 - 5.0 TSH 3RD GENERATION Result Value Ref Range TSH 1.49 0.36 - 3.74 uIU/mL VALPROIC ACID Result Value Ref Range Valproic acid 61 50 - 100 ug/ml HCG URINE, QL. - POC Result Value Ref Range Pregnancy test,urine (POC) NEGATIVE  NEG    
GLUCOSE, POC Result Value Ref Range Glucose (POC) 487 (H) 65 - 100 mg/dL Performed by Vielka Pellet GLUCOSE, POC Result Value Ref Range Glucose (POC) 432 (H) 65 - 100 mg/dL Performed by Jarod Lo GLUCOSE, POC Result Value Ref Range Glucose (POC) 251 (H) 65 - 100 mg/dL Performed by Jarod Lo GLUCOSE, POC Result Value Ref Range Glucose (POC) 147 (H) 65 - 100 mg/dL Performed by Jarod Lo GLUCOSE, POC Result Value Ref Range Glucose (POC) 341 (H) 65 - 100 mg/dL Performed by Meredith Garzon GLUCOSE, POC Result Value Ref Range Glucose (POC) 270 (H) 65 - 100 mg/dL Performed by Gaudencio Mcclure GLUCOSE, POC Result Value Ref Range Glucose (POC) 264 (H) 65 - 100 mg/dL Performed by Vielka Pellet GLUCOSE, POC Result Value Ref Range Glucose (POC) 139 (H) 65 - 100 mg/dL Performed by Omar Simpson (MATHEW)   
GLUCOSE, POC Result Value Ref Range Glucose (POC) 279 (H) 65 - 100 mg/dL Performed by Vielka Pellet GLUCOSE, POC Result Value Ref Range Glucose (POC) 193 (H) 65 - 100 mg/dL Performed by Adams Barajas GLUCOSE, POC Result Value Ref Range Glucose (POC) 118 (H) 65 - 100 mg/dL Performed by Cameron Hoff GLUCOSE, POC Result Value Ref Range Glucose (POC) 104 (H) 65 - 100 mg/dL Performed by Sarina Spangler GLUCOSE, POC Result Value Ref Range Glucose (POC) 128 (H) 65 - 100 mg/dL Performed by Vielka Pellet GLUCOSE, POC Result Value Ref Range Glucose (POC) 152 (H) 65 - 100 mg/dL Performed by Howie Harden GLUCOSE, POC Result Value Ref Range Glucose (POC) 71 65 - 100 mg/dL Performed by Shadia Fontanez GLUCOSE, POC Result Value Ref Range Glucose (POC) 307 (H) 65 - 100 mg/dL Performed by Klooff GLUCOSE, POC Result Value Ref Range Glucose (POC) 59 (L) 65 - 100 mg/dL Performed by Klooff GLUCOSE, POC Result Value Ref Range Glucose (POC) 57 (L) 65 - 100 mg/dL Performed by Klooff GLUCOSE, POC Result Value Ref Range Glucose (POC) 66 65 - 100 mg/dL Performed by Klooff GLUCOSE, POC Result Value Ref Range Glucose (POC) 75 65 - 100 mg/dL Performed by Klooff GLUCOSE, POC Result Value Ref Range Glucose (POC) 180 (H) 65 - 100 mg/dL Performed by Klooff GLUCOSE, POC Result Value Ref Range Glucose (POC) 294 (H) 65 - 100 mg/dL Performed by Bishnu Sell GLUCOSE, POC Result Value Ref Range Glucose (POC) 294 (H) 65 - 100 mg/dL Performed by [employer] GLUCOSE, POC Result Value Ref Range Glucose (POC) 260 (H) 65 - 100 mg/dL Performed by Karley Exon GLUCOSE, POC Result Value Ref Range Glucose (POC) 223 (H) 65 - 100 mg/dL Performed by Earlis Lover GLUCOSE, POC Result Value Ref Range Glucose (POC) 234 (H) 65 - 100 mg/dL Performed by Earlis Lover GLUCOSE, POC Result Value Ref Range Glucose (POC) 159 (H) 65 - 100 mg/dL Performed by Earlis Lover GLUCOSE, POC Result Value Ref Range Glucose (POC) 230 (H) 65 - 100 mg/dL Performed by Karley Exon GLUCOSE, POC Result Value Ref Range Glucose (POC) 195 (H) 65 - 100 mg/dL Performed by Karley Exon GLUCOSE, POC Result Value Ref Range Glucose (POC) 149 (H) 65 - 100 mg/dL Performed by Hermon Side GLUCOSE, POC Result Value Ref Range Glucose (POC) 293 (H) 65 - 100 mg/dL Performed by Hermon Side GLUCOSE, POC Result Value Ref Range Glucose (POC) 175 (H) 65 - 100 mg/dL Performed by Maddy Lamas GLUCOSE, POC Result Value Ref Range Glucose (POC) 292 (H) 65 - 100 mg/dL  Performed by Miguel Duong (RN) GLUCOSE, POC Result Value Ref Range Glucose (POC) 214 (H) 65 - 100 mg/dL Performed by eKna Cohen ([facility]) GLUCOSE, POC Result Value Ref Range Glucose (POC) 384 (H) 65 - 100 mg/dL Performed by Kena Cohen ([facility]) Immunizations administered during this encounter:  
Immunization History Administered Date(s) Administered  Influenza Vaccine 12/01/2013  Influenza Vaccine (Quad) PF 11/23/2018  Pneumococcal Vaccine (Unspecified Type) 12/01/2013  Tdap 12/10/2018 Screening for Metabolic Disorders for Patients on Antipsychotic Medications 
(Data obtained from the EMR) Estimated Body Mass Index Estimated body mass index is 21.79 kg/m² as calculated from the following: 
  Height as of this encounter: 5' 6\" (1.676 m). Weight as of this encounter: 61.2 kg (135 lb). Vital Signs/Blood Pressure Visit Vitals /81 (BP 1 Location: Right arm, BP Patient Position: At rest) Pulse 82 Temp 97.9 °F (36.6 °C) Resp 16 Ht 5' 6\" (1.676 m) Wt 61.2 kg (135 lb) SpO2 98% BMI 21.79 kg/m² Blood Glucose/Hemoglobin A1c Lab Results Component Value Date/Time Glucose 268 (H) 02/27/2019 04:49 AM  
 Glucose (POC) 384 (H) 03/04/2019 11:35 AM  
 
 
Lab Results Component Value Date/Time Hemoglobin A1c 15.8 (H) 12/10/2018 02:40 PM  
 
  
Lipid Panel Lab Results Component Value Date/Time Cholesterol, total 142 02/27/2019 04:49 AM  
 HDL Cholesterol 71 02/27/2019 04:49 AM  
 LDL,Direct 144 (H) 04/03/2011 04:10 AM  
 LDL, calculated 50.4 02/27/2019 04:49 AM  
 Triglyceride 103 02/27/2019 04:49 AM  
 CHOL/HDL Ratio 2.0 02/27/2019 04:49 AM  
 
  
Discharge Diagnosis: Please refer to physician's discharge summary. Discharge Plan: Pt was discharged today and transported by family home to [facility]. Pt denies SI/HI/AH. Pt's mood is euthymic and affect is mood congruent and pt appears to be at baseline level of functioning.   Pt is in agreement with plan for seeing  Thursday afternoon, medication management appointment and continuing plan for her long acting injection. Discharge Medication List and Instructions:  
Current Discharge Medication List  
  
START taking these medications Details  
benztropine (COGENTIN) 1 mg tablet Take 1 Tab by mouth every twelve (12) hours. Qty: 30 Tab, Refills: 1  
  
clonazePAM (KLONOPIN) 1 mg tablet Take 1 Tab by mouth every twelve (12) hours. Max Daily Amount: 2 mg. One week taper - do not refill 
Qty: 7 Tab, Refills: 0 Associated Diagnoses: [facility] divalproex DR (DEPAKOTE) 500 mg tablet Take 1 Tab by mouth every twelve (12) hours. Qty: 30 Tab, Refills: 1  
  
haloperidol decanoate (HALDOL DECANOATE) 100 mg/mL injection 1 mL by IntraMUSCular route every twenty-eight (28) days. NEXT DOSE DUE 3/29 Qty: 1 Vial, Refills: 0  
  
hydroCHLOROthiazide (HYDRODIURIL) 12.5 mg tablet Take 1 Tab by mouth daily. Qty: 30 Tab, Refills: 0 CONTINUE these medications which have CHANGED Details  
losartan (COZAAR) 100 mg tablet Take 1 Tab by mouth daily. Qty: 30 Tab, Refills: 0  
  
haloperidol (HALDOL) 5 mg tablet Take 1 Tab by mouth every twelve (12) hours. 3 week overlap for PÉREZ, do not refill 
Qty: 42 Tab, Refills: 0 CONTINUE these medications which have NOT CHANGED Details  
albuterol (PROVENTIL HFA, VENTOLIN HFA, PROAIR HFA) 90 mcg/actuation inhaler Take 2 Puffs by inhalation every four (4) hours as needed for Wheezing. Qty: 1 Inhaler, Refills: 0  
  
insulin NPH/insulin regular (NOVOLIN 70/30, HUMULIN 70/30) 100 unit/mL (70-30) injection 15 Units by SubCUTAneous route two (2) times a day. Qty: 10 mL, Refills: 0  
  
aspirin delayed-release 81 mg tablet TAKE 1 TABLET BY MOUTH EVERY DAY WITH 6 TO 8 OUNCES OF PLAIN WATER Refills: 0 STOP taking these medications  
  
 glucose blood VI test strips (FREESTYLE INSULINX TEST STRIPS) strip Comments:  
Reason for Stopping:   
   
 Blood-Glucose Meter monitoring kit Comments:  
Reason for Stopping:   
   
 lancets 23 gauge misc Comments:  
Reason for Stopping:   
   
 raNITIdine hcl 150 mg capsule Comments:  
Reason for Stopping:   
   
  
 
 
Unresulted Labs (24h ago, onward) None To obtain results of studies pending at discharge, please contact N/A. Follow-up Information Follow up With Specialties Details Why Contact Info LARRY  Go on 3/14/2019 Medication management appointment on March 14th at 3:40 PM.   [address] [place] [address] Address: [address] [place] [address],[facility] [address] Phone: [phone] Fax: [phone] LARRY - Ne Brady  Go on 3/7/2019 Case management appointment on Thursday, March 7th - Please come between 12:30 PM and 4:00 PM.  [address] [place] [address] Address: [address] [place] [address],[facility] [address] Phone: [phone] Fax: [phone] Narcotics Anonymous Meeting List   In your discharge paperwork we have provided March 2019 [place] meetings list. [email or web address] [email or web address]    CALL 24 Hr: [phone] Haloperidol decanoate 100 mg IM injection  On 3/29/2019 Haloperidol decanoate 100 mg IM injection was given on 3/1/19, next injection is due on 3/29/19,  [address] [place] [address] Advanced Directive:  
Does the patient have an appointed surrogate decision maker? Unknown Does the patient have a Medical Advance Directive? Unknown Does the patient have a Psychiatric Advance Directive? Unknown If the patient does not have a surrogate or Medical Advance Directive AND Psychiatric Advance Directive, the patient was offered information on these advance directives. Unknown Patient Instructions: Please continue all medications until otherwise directed by physician. Tobacco Cessation Discharge Plan:  
Is the patient a smoker and needs referral for smoking cessation?  No 
Patient referred to the following for smoking cessation with an appointment? No  
Patient was offered medication to assist with smoking cessation at discharge? No 
Was education for smoking cessation added to the discharge instructions? No 
  
Alcohol/Substance Abuse Discharge Plan:  
Does the patient have a history of substance/alcohol abuse and requires a referral for treatment? Yes Patient referred to the following for substance/alcohol abuse treatment with an appointment? Yes Patient was offered medication to assist with alcohol cessation at discharge? No 
Was education for substance/alcohol abuse added to discharge instructions? Yes Patient discharged to Home; provided to the patient/caregiver either in hard copy or electronically. Continuing care paperwork was faxed to community mental health providers.

## 2019-03-04 NOTE — PROGRESS NOTES
Problem: Psychosis Goal: *STG/LTG: Complies with medication therapy Outcome: Progressing Towards Goal 
Patient compliant with medications during shift.

## 2019-03-04 NOTE — BH NOTES
Pt is alert and oriented x person, place and time. Pt denies any SI/HI or AV hallucinations. Pt denies any depression. Pt plans to return home. Discharge information reviewed with patient. Pt verbalizes understanding. Pt's belongings/valuables returned. Pt to be transported home by self.

## 2019-03-04 NOTE — PROGRESS NOTES
Laboratory Monitoring for Valproic Acid This patient is currently prescribed the following medication(s):  
Current Facility-Administered Medications Medication Dose Route Frequency  hydroCHLOROthiazide (HYDRODIURIL) tablet 25 mg  25 mg Oral DAILY  insulin NPH (NOVOLIN N, HUMULIN N) injection 16 Units  16 Units SubCUTAneous Q12H  
 haloperidol decanoate (HALDOL DECANOATE) 100 mg/mL LA injection 100 mg +++COURT ORDERED+++  100 mg IntraMUSCular Q28D  
 losartan (COZAAR) tablet 100 mg  100 mg Oral DAILY  insulin lispro (HUMALOG) injection   SubCUTAneous AC&HS  
 haloperidol (HALDOL) tablet 5 mg  5 mg Oral Q12H Or  
 haloperidol lactate (HALDOL) injection 5 mg +++COURT ORDERED MEDICATION FOR REFUSAL OF PO HALOPERIDOL+++  5 mg IntraMUSCular Q12H  
 benztropine (COGENTIN) tablet 1 mg  1 mg Oral Q12H Or  
 benztropine (COGENTIN) injection 1 mg +++COURT ORDERED MEDICATION FOR REFUSAL OF PO BENZTROPINE+++  1 mg IntraMUSCular Q12H  clonazePAM (KlonoPIN) tablet 1 mg  1 mg Oral Q12H Or  
 LORazepam (ATIVAN) injection 1 mg +++COURT ORDERED MEDICATION FOR REFUSAL OF PO CLONAZEPAM+++  1 mg IntraMUSCular Q12H  
 LORazepam (ATIVAN) injection 1 mg +++COURT ORDERED MEDICATION FOR REFUSAL OF PO DEPAKOTE+++  1 mg IntraMUSCular Q12H Or  
 divalproex DR (DEPAKOTE) tablet 500 mg  500 mg Oral Q12H The following labs have been completed for monitoring of valproic acid: 
 
Valproic Acid Serum Concentration Lab Results Component Value Date/Time Valproic acid 61 03/02/2019 05:04 AM  
   
 
Hepatic Function Lab Results Component Value Date/Time Bilirubin, total 0.5 02/22/2019 10:30 PM  
 Protein, total 6.2 (L) 02/22/2019 10:30 PM  
 Albumin 2.8 (L) 02/22/2019 10:30 PM  
 Globulin 3.4 02/22/2019 10:30 PM  
 A-G Ratio 0.8 (L) 02/22/2019 10:30 PM  
 ALT (SGPT) 27 02/22/2019 10:30 PM  
 Alk. phosphatase 101 02/22/2019 10:30 PM  
 
 
Hematology Lab Results Component Value Date/Time  WBC 8.3 02/25/2019 04:01 PM  
 RBC 6.40 (H) 02/25/2019 04:01 PM  
 HGB 16.5 (H) 02/25/2019 04:01 PM  
 HCT 46.5 02/25/2019 04:01 PM  
 MCV 72.7 (L) 02/25/2019 04:01 PM  
 MCH 25.8 (L) 02/25/2019 04:01 PM  
 MCHC 35.5 02/25/2019 04:01 PM  
 RDW 17.6 (H) 02/25/2019 04:01 PM  
 PLATELET 157 49/96/4313 04:01 PM  
 
 
Assessment/Plan: VPA level drawn on 3/2/19 @ 0404 AM is within therapeutic limits @ 61 mcg/mL(range  mcg/mL). Level was drawn approximately 8 hours post-dose and after 4 days of therapy. Recommend to continue same dose. Luís Nuñez, PharmD 
[phone]

## 2019-03-04 NOTE — DISCHARGE SUMMARY
PSYCHIATRIC DISCHARGE SUMMARY         IDENTIFICATION:    Patient Name  Hang Crabtree   Date of Birth 1975   [facility] [id number]   Medical Record Number  [id number]      Age  38 y.o.    PCP Other, MD Marcel   Admit date:  2/25/2019    Discharge date: 3/4/2019   Room Number  18/26  @ [facility]   Date of Service  3/4/2019            TYPE OF DISCHARGE: REGULAR               CONDITION AT DISCHARGE: improved and stable       PROVISIONAL & DISCHARGE DIAGNOSES:    Problem List  Date Reviewed: 12/30/2018          Codes Class    * (Principal) Schizoaffective disorder (HCC) ICD-10-CM: F25.9  ICD-9-CM: 295.70         Asthma ICD-10-CM: J45.909  ICD-9-CM: 493.90         DKA (diabetic ketoacidoses) ([facility] ICD-10-CM: E13.10  ICD-9-CM: 250.10         Essential hypertension ICD-10-CM: I10  ICD-9-CM: 401.9         History of cholecystectomy ICD-10-CM: Z90.49  ICD-9-CM: V45.79         Polysubstance abuse ([facility] ICD-10-CM: F19.10  ICD-9-CM: 305.90     Overview Signed 5/9/2016  3:33 PM by Zaid Camarillo     ETOH, cocaine             Hyperlipidemia ICD-10-CM: E78.5  ICD-9-CM: 272.4         Drug-seeking behavior ICD-10-CM: Z76.5  ICD-9-CM: 305.90         Uncontrolled diabetes mellitus ([facility] ICD-10-CM: E11.65  ICD-9-CM: 250.02         Hyponatremia ICD-10-CM: E87.1  ICD-9-CM: 276.1         Chronic pancreatitis ([facility] ICD-10-CM: K86.1  ICD-9-CM: [address] Problems    *Schizoaffective disorder ([facility]        DISCHARGE DIAGNOSIS:   Axis I:  SEE ABOVE  Axis II: SEE ABOVE  Axis III: SEE ABOVE  Axis IV:  lack of structure  Axis V:  30 on admission, 60 on discharge     CC & HISTORY OF PRESENT ILLNESS:  \"psychosis\"    The [facility], is a 46 y.o.  BLACK OR  female with a past psychiatric history significant for schizoaffective disorder and cocaine use disorder, who presents at this time with complaints of (and/or evidence of) the following emotional symptoms: agitation, psychotic behavior and sundar.  Additional symptomatology include confusion.  The above symptoms have been present for 24+ hours. These symptoms are of moderate to high severity. These symptoms are constant in nature.  The patient's condition has been precipitated by psychosocial stressors.  Patient's condition made worse by continued illicit drug use as well as treatment noncompliance. UDS: +cocaine; BAL=0.      Patient seen in her room s/p PRN. She had taken off her clothes and was inappropriately in the morning resulting in PRN administration for agitation and psychosis. The patient is a poor hstorian. The patient does not corroborate the above narrative. The patient is unable to contract for safety on the unit but gives consent for the team to contact collateral. The patient is not amenable to initiating treatment while on the unit.     2/27- patient started on court ordered medications. She is odd, visible, discharge focused. Patient with an episode of anxiety when told she would not be discharged today. Patient voices no other complaints. 2/28- patient is odd, discharge focused. Labile and tearful, has been repeatedly asking to leave hospital. No behavioral outbursts, no PRNs given. Patient informed she may be discharged to her family's care a day early but continued to cry.  3/01- patient has been childlike, compliant with court ordered medication. Patient's FSG has been variable, managed by IM consult. Patient odd, received Ambien and Ativan. Pt signed collateral release: per family, patient told them she was in the hospital for problems with her blood pressure and is not as her baseline.   3/02-Patient was seen during the AM rounds. Presented appropriate. She was counseled on food and quantity and a Nutritionist consult is requested. Compliant with current meds. Slept 6.5 hrs after Ambien-prn.  3/03-Presents alert, pleasant and cooperative. Significant improvement noticed on mood and affect.  Pt has not displayed inappropriate eating or other behaviors since yesterday. Slept 5 hrs. No prn's      SOCIAL HISTORY:    Social History     Socioeconomic History    Marital status: SINGLE     Spouse name: Not on file    Number of children: Not on file    Years of education: Not on file    Highest education level: Not on file   Social Needs    Financial resource strain: Not on file    Food insecurity - worry: Not on file    Food insecurity - inability: Not on file    Transportation needs - medical: Not on file   [employer] needs - non-medical: Not on file   Occupational History    Not on file   Tobacco Use    Smoking status: Current Every Day Smoker     Packs/day: 0.50     Years: 14.00     Pack years: 7.00     Types: Cigarettes    Smokeless tobacco: Never Used    Tobacco comment: cigarettes   Substance and Sexual Activity    Alcohol use: Yes     Alcohol/week: 4.8 oz     Types: 2 Glasses of wine, 6 Cans of beer per week    Drug use: No     Comment: yesterday 12/27/18    Sexual activity: Yes     Partners: Female     Birth control/protection: Surgical   Other Topics Concern    Not on file   Social History Narrative    Not on file      FAMILY HISTORY:   Family History   Problem Relation Age of Onset    Heart Disease Mother     Diabetes Mother     Hypertension Mother     Hypertension Maternal Grandmother              HOSPITALIZATION COURSE:    Hayder Pereira was admitted to the inpatient psychiatric unit [facility] for acute psychiatric stabilization in regards to symptomatology as described in the HPI above. The differential diagnosis at time of admission included: schizophrenia vs schizoaffective disorder. While on the unit Hayder Pereira was involved in individual, group, occupational and milieu therapy. Psychiatric medications were adjusted during this hospitalization including Cogentin, Haldol and Depakote.    Hayder Pereira demonstrated a slow, but progressive improvement in overall condition. Much of patient's initial presentation appeared to be related to situational stressors, effects of medication non-compliance drugs of abuse, and psychological factors. Please see individual progress notes for more specific details regarding patient's hospitalization course. At time of discharge, Jamarcus Pepe is without significant problems of depression, psychosis, or sundar. Patient free of suicidal and homicidal ideations (appears to be at very low risk of suicide or homicide) and reports many positive predictive factors in terms of not attempting suicide or homicide. Overall presentation at time of discharge is most consistent with the diagnosis of schizoaffective disorder. Patient has maximized benefit to be derived from acute inpatient psychiatric treatment. All members of the treatment team concur with each other in regards to plans for discharge today. Patient and family are aware and in agreement with discharge and discharge plan.          LABS AND IMAGAING:    Labs Reviewed   CBC WITH AUTOMATED DIFF - Abnormal; Notable for the following components:       Result Value    RBC 6.40 (*)     HGB 16.5 (*)     MCV 72.7 (*)     MCH 25.8 (*)     RDW 17.6 (*)     All other components within normal limits   METABOLIC PANEL, BASIC - Abnormal; Notable for the following components:    Sodium 134 (*)     Glucose 291 (*)     All other components within normal limits   DRUG SCREEN, URINE - Abnormal; Notable for the following components:    COCAINE POSITIVE (*)     All other components within normal limits   URINALYSIS W/ REFLEX CULTURE - Abnormal; Notable for the following components:    Glucose >1,000 (*)     All other components within normal limits   VALPROIC ACID - Abnormal; Notable for the following components:    Valproic acid <3 (*)     All other components within normal limits   GLUCOSE, FASTING - Abnormal; Notable for the following components:    Glucose 268 (*)     All other components within normal limits   GLUCOSE, POC - Abnormal; Notable for the following components:    Glucose (POC) 487 (*)     All other components within normal limits   GLUCOSE, POC - Abnormal; Notable for the following components:    Glucose (POC) 432 (*)     All other components within normal limits   GLUCOSE, POC - Abnormal; Notable for the following components:    Glucose (POC) 251 (*)     All other components within normal limits   GLUCOSE, POC - Abnormal; Notable for the following components:    Glucose (POC) 147 (*)     All other components within normal limits   GLUCOSE, POC - Abnormal; Notable for the following components:    Glucose (POC) 341 (*)     All other components within normal limits   GLUCOSE, POC - Abnormal; Notable for the following components:    Glucose (POC) 270 (*)     All other components within normal limits   GLUCOSE, POC - Abnormal; Notable for the following components:    Glucose (POC) 264 (*)     All other components within normal limits   GLUCOSE, POC - Abnormal; Notable for the following components:    Glucose (POC) 139 (*)     All other components within normal limits   GLUCOSE, POC - Abnormal; Notable for the following components:    Glucose (POC) 279 (*)     All other components within normal limits   GLUCOSE, POC - Abnormal; Notable for the following components:    Glucose (POC) 193 (*)     All other components within normal limits   GLUCOSE, POC - Abnormal; Notable for the following components:    Glucose (POC) 118 (*)     All other components within normal limits   GLUCOSE, POC - Abnormal; Notable for the following components:    Glucose (POC) 104 (*)     All other components within normal limits   GLUCOSE, POC - Abnormal; Notable for the following components:    Glucose (POC) 128 (*)     All other components within normal limits   GLUCOSE, POC - Abnormal; Notable for the following components:    Glucose (POC) 152 (*)     All other components within normal limits   GLUCOSE, POC - Abnormal; Notable for the following components:    Glucose (POC) 307 (*)     All other components within normal limits   GLUCOSE, POC - Abnormal; Notable for the following components:    Glucose (POC) 59 (*)     All other components within normal limits   GLUCOSE, POC - Abnormal; Notable for the following components:    Glucose (POC) 57 (*)     All other components within normal limits   GLUCOSE, POC - Abnormal; Notable for the following components:    Glucose (POC) 180 (*)     All other components within normal limits   GLUCOSE, POC - Abnormal; Notable for the following components:    Glucose (POC) 294 (*)     All other components within normal limits   GLUCOSE, POC - Abnormal; Notable for the following components:    Glucose (POC) 294 (*)     All other components within normal limits   GLUCOSE, POC - Abnormal; Notable for the following components:    Glucose (POC) 260 (*)     All other components within normal limits   GLUCOSE, POC - Abnormal; Notable for the following components:    Glucose (POC) 223 (*)     All other components within normal limits   GLUCOSE, POC - Abnormal; Notable for the following components:    Glucose (POC) 234 (*)     All other components within normal limits   GLUCOSE, POC - Abnormal; Notable for the following components:    Glucose (POC) 159 (*)     All other components within normal limits   GLUCOSE, POC - Abnormal; Notable for the following components:    Glucose (POC) 230 (*)     All other components within normal limits   GLUCOSE, POC - Abnormal; Notable for the following components:    Glucose (POC) 195 (*)     All other components within normal limits   GLUCOSE, POC - Abnormal; Notable for the following components:    Glucose (POC) 149 (*)     All other components within normal limits   GLUCOSE, POC - Abnormal; Notable for the following components:    Glucose (POC) 293 (*)     All other components within normal limits   GLUCOSE, POC - Abnormal; Notable for the following components:    Glucose (POC) 175 (*)     All other components within normal limits   GLUCOSE, POC - Abnormal; Notable for the following components:    Glucose (POC) 292 (*)     All other components within normal limits   GLUCOSE, POC - Abnormal; Notable for the following components:    Glucose (POC) 214 (*)     All other components within normal limits   GLUCOSE, POC - Abnormal; Notable for the following components:    Glucose (POC) 384 (*)     All other components within normal limits   ETHYL ALCOHOL   LIPID PANEL   TSH 3RD GENERATION   VALPROIC ACID   HCG URINE, QL. - POC   GLUCOSE, POC   GLUCOSE, POC   GLUCOSE, POC     Lab Results   Component Value Date/Time    Valproic acid 61 03/02/2019 05:04 AM     Admission on 02/25/2019, Discharged on 03/04/2019   Component Date Value Ref Range Status    ALCOHOL(ETHYL),SERUM 02/25/2019 <10  <10 MG/DL Final    WBC 02/25/2019 8.3  3.6 - 11.0 K/uL Final    RBC 02/25/2019 6.40* 3.80 - 5.20 M/uL Final    HGB 02/25/2019 16.5* 11.5 - 16.0 g/dL Final    HCT 02/25/2019 46.5  35.0 - 47.0 % Final    MCV 02/25/2019 72.7* 80.0 - 99.0 FL Final    MCH 02/25/2019 25.8* 26.0 - 34.0 PG Final    MCHC 02/25/2019 35.5  30.0 - 36.5 g/dL Final    RDW 02/25/2019 17.6* 11.5 - 14.5 % Final    PLATELET 22/91/3930 704  150 - 400 K/uL Final    NRBC 02/25/2019 0.0  0  WBC Final    ABSOLUTE NRBC 02/25/2019 0.00  0.00 - 0.01 K/uL Final    NEUTROPHILS 02/25/2019 56  32 - 75 % Final    LYMPHOCYTES 02/25/2019 33  12 - 49 % Final    MONOCYTES 02/25/2019 8  5 - 13 % Final    EOSINOPHILS 02/25/2019 2  0 - 7 % Final    BASOPHILS 02/25/2019 1  0 - 1 % Final    IMMATURE GRANULOCYTES 02/25/2019 0  0.0 - 0.5 % Final    ABS. NEUTROPHILS 02/25/2019 4.6  1.8 - 8.0 K/UL Final    ABS. LYMPHOCYTES 02/25/2019 2.7  0.8 - 3.5 K/UL Final    ABS. MONOCYTES 02/25/2019 0.7  0.0 - 1.0 K/UL Final    ABS. EOSINOPHILS 02/25/2019 0.2  0.0 - 0.4 K/UL Final    ABS.  BASOPHILS 02/25/2019 0.1  0.0 - 0.1 K/UL Final    ABS. IMM.  GRANS. 02/25/2019 0.0  0.00 - 0.04 K/UL Final    DF 02/25/2019 SMEAR SCANNED    Final    RBC COMMENTS 02/25/2019     Final                    Value:ANISOCYTOSIS  1+      Sodium 02/25/2019 134* 136 - 145 mmol/L Final    Potassium 02/25/2019 3.8  3.5 - 5.1 mmol/L Final    Chloride 02/25/2019 97  97 - 108 mmol/L Final    CO2 02/25/2019 29  21 - 32 mmol/L Final    Anion gap 02/25/2019 8  5 - 15 mmol/L Final    Glucose 02/25/2019 291* 65 - 100 mg/dL Final    BUN 02/25/2019 11  6 - 20 MG/DL Final    Creatinine 02/25/2019 0.79  0.55 - 1.02 MG/DL Final    BUN/Creatinine ratio 02/25/2019 14  12 - 20   Final    GFR est AA 02/25/2019 >60  >60 ml/min/1.73m2 Final    GFR est non-AA 02/25/2019 >60  >60 ml/min/1.73m2 Final    Calcium 02/25/2019 8.7  8.5 - 10.1 MG/DL Final    AMPHETAMINES 02/25/2019 NEGATIVE   NEG   Final    BARBITURATES 02/25/2019 NEGATIVE   NEG   Final    BENZODIAZEPINES 02/25/2019 NEGATIVE   NEG   Final    COCAINE 02/25/2019 POSITIVE* NEG   Final    METHADONE 02/25/2019 NEGATIVE   NEG   Final    OPIATES 02/25/2019 NEGATIVE   NEG   Final    PCP(PHENCYCLIDINE) 02/25/2019 NEGATIVE   NEG   Final    THC (TH-CANNABINOL) 02/25/2019 NEGATIVE   NEG   Final    Drug screen comment 02/25/2019 (NOTE)   Final    Color 02/25/2019 YELLOW/STRAW    Final    Appearance 02/25/2019 CLEAR  CLEAR   Final    Specific gravity 02/25/2019 1.015  1.003 - 1.030   Final    pH (UA) 02/25/2019 5.5  5.0 - 8.0   Final    Protein 02/25/2019 NEGATIVE   NEG mg/dL Final    Glucose 02/25/2019 >1,000* NEG mg/dL Final    Ketone 02/25/2019 NEGATIVE   NEG mg/dL Final    Bilirubin 02/25/2019 NEGATIVE   NEG   Final    Blood 02/25/2019 NEGATIVE   NEG   Final    Urobilinogen 02/25/2019 0.2  0.2 - 1.0 EU/dL Final    Nitrites 02/25/2019 NEGATIVE   NEG   Final    Leukocyte Esterase 02/25/2019 NEGATIVE   NEG   Final    WBC 02/25/2019 0-4  0 - 4 /hpf Final    RBC 02/25/2019 0-5  0 - 5 /hpf Final    Epithelial cells 02/25/2019 FEW  FEW /lpf Final    Bacteria 02/25/2019 NEGATIVE   NEG /hpf Final    UA:UC IF INDICATED 02/25/2019 CULTURE NOT INDICATED BY UA RESULT  CNI   Final    Valproic acid 02/25/2019 <3* 50 - 100 ug/ml Final    Pregnancy test,urine (POC) 02/25/2019 NEGATIVE   NEG   Final    Glucose (POC) 02/26/2019 487* 65 - 100 mg/dL Final    Performed by 02/26/2019 Mealy Enma   Final    Glucose (POC) 02/26/2019 432* 65 - 100 mg/dL Final    Performed by 02/26/2019 [employer]   Final    Glucose (POC) 02/26/2019 251* 65 - 100 mg/dL Final    [facility]   [facility]    Glucose (POC) 02/26/2019 147* 65 - 100 mg/dL Final    [facility]   Final    Glucose (POC) 02/26/2019 341* 65 - 100 mg/dL Final    Performed by 02/26/2019 Myron Rios   Final    Glucose 02/27/2019 268* 65 - 100 MG/DL Final    LIPID PROFILE 02/27/2019        Final    Cholesterol, total 02/27/2019 142  <200 MG/DL Final    Triglyceride 02/27/2019 103  <150 MG/DL Final    HDL Cholesterol 02/27/2019 71  MG/DL Final    LDL, calculated 02/27/2019 50.4  0 - 100 MG/DL Final    VLDL, calculated 02/27/2019 20.6  MG/DL Final    CHOL/HDL Ratio 02/27/2019 2.0  0 - 5.0   Final    TSH 02/27/2019 1.49  0.36 - 3.74 uIU/mL Final    Glucose (POC) 02/27/2019 270* 65 - 100 mg/dL Final    Performed by 02/27/2019 Atif Vega   Final    Glucose (POC) 02/27/2019 264* 65 - 100 mg/dL Final    Performed by 02/27/2019 Mealy Enma   Final    Glucose (POC) 02/27/2019 139* 65 - 100 mg/dL Final    Performed by 02/27/2019 Christia Gitelman (RN)   Final    Glucose (POC) 02/27/2019 279* 65 - 100 mg/dL Final    Performed by 02/27/2019 Mealy Enma   Final    Glucose (POC) 02/27/2019 193* 65 - 100 mg/dL Final    Performed by 02/27/2019 Guillermo Castro   Final    Glucose (POC) 02/28/2019 118* 65 - 100 mg/dL Final    Performed by 02/28/2019 Amadou Ortiz   Final    Glucose (POC) 02/28/2019 104* 65 - 100 mg/dL Final    Performed by 02/28/2019 Mariella Gómez   Final    Glucose (POC) 02/28/2019 128* 65 - 100 mg/dL Final    Performed by 02/28/2019 Shannon Norwood   Final    Glucose (POC) 02/28/2019 152* 65 - 100 mg/dL Final    Performed by 02/28/2019 Yusra Pathak   Final    Glucose (POC) 03/01/2019 71  65 - 100 mg/dL Final    Performed by 03/01/2019 [facility]   Final    Glucose (POC) 03/01/2019 307* 65 - 100 mg/dL Final    Performed by 03/01/2019 [facility]   Final    Glucose (POC) 03/01/2019 59* 65 - 100 mg/dL Final    Performed by 03/01/2019 [facility]   Final    Glucose (POC) 03/01/2019 57* 65 - 100 mg/dL Final    Performed by 03/01/2019 [facility]   Final    Glucose (POC) 03/01/2019 66  65 - 100 mg/dL Final    Performed by 03/01/2019 [facility]   Final    Glucose (POC) 03/01/2019 75  65 - 100 mg/dL Final    Performed by 03/01/2019 [facility]   Final    Glucose (POC) 03/01/2019 180* 65 - 100 mg/dL Final    Performed by 03/01/2019 [facility]   Final    Glucose (POC) 03/01/2019 294* 65 - 100 mg/dL Final    Performed by 03/01/2019 [facility]   Final    Glucose (POC) 03/01/2019 294* 65 - 100 mg/dL Final    Performed by 03/01/2019 Ysura Pathak   Final    Valproic acid 03/02/2019 61  50 - 100 ug/ml Final    Glucose (POC) 03/02/2019 260* 65 - 100 mg/dL Final    Performed by 03/02/2019 Yusra Pathak   Final    Glucose (POC) 03/02/2019 223* 65 - 100 mg/dL Final    Performed by 03/02/2019 [facility]   Final    Glucose (POC) 03/02/2019 234* 65 - 100 mg/dL Final    Performed by 03/02/2019 Byrne    Final    Glucose (POC) 03/02/2019 159* 65 - 100 mg/dL Final    Performed by 03/02/2019 Byrne    Final    Glucose (POC) 03/02/2019 230* 65 - 100 mg/dL Final    Performed by 03/02/2019 Yusra Pathak   Final    Glucose (POC) 03/03/2019 195* 65 - 100 mg/dL Final    Performed by 03/03/2019 Yusra Pathak   Final    Glucose (POC) 03/03/2019 149* 65 - 100 mg/dL Final    Performed by 03/03/2019 Mealy Enma   Final    Glucose (POC) 03/03/2019 293* 65 - 100 mg/dL Final    Performed by 03/03/2019 Mealy Enma   Final    Glucose (POC) 03/03/2019 175* 65 - 100 mg/dL Final    Performed by 03/03/2019 Michelle Daley   Final    Glucose (POC) 03/03/2019 292* 65 - 100 mg/dL Final    Performed by 03/03/2019 Sabiha Toribio (RN)   Final    Glucose (POC) 03/04/2019 214* 65 - 100 mg/dL Final    Performed by 03/04/2019 Mei Barraza (T)   Final    Glucose (POC) 03/04/2019 384* 65 - 100 mg/dL Final    Performed by 03/04/2019 Mei Barraza ([facility])   Final   Admission on 02/22/2019, Discharged on 02/23/2019   Component Date Value Ref Range Status    Sodium 02/22/2019 144  136 - 145 mmol/L Final    Potassium 02/22/2019 3.8  3.5 - 5.1 mmol/L Final    Chloride 02/22/2019 108  97 - 108 mmol/L Final    CO2 02/22/2019 27  21 - 32 mmol/L Final    Anion gap 02/22/2019 9  5 - 15 mmol/L Final    Glucose 02/22/2019 193* 65 - 100 mg/dL Final    BUN 02/22/2019 12  6 - 20 MG/DL Final    Creatinine 02/22/2019 0.76  0.55 - 1.02 MG/DL Final    BUN/Creatinine ratio 02/22/2019 16  12 - 20   Final    GFR est AA 02/22/2019 >60  >60 ml/min/1.73m2 Final    GFR est non-AA 02/22/2019 >60  >60 ml/min/1.73m2 Final    Calcium 02/22/2019 8.0* 8.5 - 10.1 MG/DL Final    Bilirubin, total 02/22/2019 0.5  0.2 - 1.0 MG/DL Final    ALT (SGPT) 02/22/2019 27  12 - 78 U/L Final    AST (SGOT) 02/22/2019 16  15 - 37 U/L Final    Alk.  phosphatase 02/22/2019 101  45 - 117 U/L Final    Protein, total 02/22/2019 6.2* 6.4 - 8.2 g/dL Final    Albumin 02/22/2019 2.8* 3.5 - 5.0 g/dL Final    Globulin 02/22/2019 3.4  2.0 - 4.0 g/dL Final    A-G Ratio 02/22/2019 0.8* 1.1 - 2.2   Final    WBC 02/22/2019 6.7  3.6 - 11.0 K/uL Final    RBC 02/22/2019 4.79  3.80 - 5.20 M/uL Final    HGB 02/22/2019 12.3  11.5 - 16.0 g/dL Final    HCT 02/22/2019 35.1  35.0 - 47.0 % Final    MCV 02/22/2019 73.3* 80.0 - 99.0 FL Final    MCH 02/22/2019 25.7* 26.0 - 34.0 PG Final    MCHC 02/22/2019 35.0  30.0 - 36.5 g/dL Final    RDW 02/22/2019 16.6* 11.5 - 14.5 % Final    PLATELET 11/56/6943 617  150 - 400 K/uL Final    MPV 02/22/2019 12.0  8.9 - 12.9 FL Final    NRBC 02/22/2019 0.0  0  WBC Final    ABSOLUTE NRBC 02/22/2019 0.00  0.00 - 0.01 K/uL Final    ALCOHOL(ETHYL),SERUM 02/22/2019 149* <10 MG/DL Final    Ventricular Rate 02/22/2019 94  BPM Final    Atrial Rate 02/22/2019 94  BPM Final    P-R Interval 02/22/2019 138  ms Final    QRS Duration 02/22/2019 70  ms Final    Q-T Interval 02/22/2019 398  ms Final    QTC Calculation (Bezet) 02/22/2019 497  ms Final    Calculated P Axis 02/22/2019 79  degrees Final    Calculated R Axis 02/22/2019 54  degrees Final    Calculated T Axis 02/22/2019 90  degrees Final    Diagnosis 02/22/2019    Final                    Value:Normal sinus rhythm  Nonspecific T wave abnormality  RSR' or QR pattern in V1 suggests right ventricular conduction delay  Prolonged QT  When compared with ECG of 22-FEB-2019 06:30,  No significant change was found  Confirmed by Radha Rangel ([id number]) on 2/23/2019 1:48:47 PM      Glucose (POC) 02/22/2019 201* 65 - 100 mg/dL Final    Performed by 02/22/2019 Ratna Forbes (Tech)   Final    Troponin-I, Qt. 02/22/2019 <0.05  <0.05 ng/mL Final   Admission on 02/22/2019, Discharged on 02/22/2019   Component Date Value Ref Range Status    Glucose (POC) 02/22/2019 107* 65 - 100 mg/dL Final    Performed by 02/22/2019 Merribeth Skains   Final    Color 02/22/2019 YELLOW/STRAW    Final    Appearance 02/22/2019 CLOUDY* CLEAR   Final    Specific gravity 02/22/2019 1.020  1.003 - 1.030   Final    pH (UA) 02/22/2019 5.5  5.0 - 8.0   Final    Protein 02/22/2019 NEGATIVE   NEG mg/dL Final    Glucose 02/22/2019 NEGATIVE   NEG mg/dL Final    Ketone 02/22/2019 NEGATIVE   NEG mg/dL Final    Bilirubin 02/22/2019 NEGATIVE   NEG   Final    Blood 02/22/2019 NEGATIVE   NEG   Final    Urobilinogen 02/22/2019 0.2  0.2 - 1.0 EU/dL Final    Nitrites 02/22/2019 NEGATIVE   NEG   Final    Leukocyte Esterase 02/22/2019 SMALL* NEG   Final    Sodium 02/22/2019 139  136 - 145 mmol/L Final    Potassium 02/22/2019 5.3* 3.5 - 5.1 mmol/L Final    Chloride 02/22/2019 105  97 - 108 mmol/L Final    CO2 02/22/2019 27  21 - 32 mmol/L Final    Anion gap 02/22/2019 7  5 - 15 mmol/L Final    Glucose 02/22/2019 68  65 - 100 mg/dL Final    BUN 02/22/2019 18  6 - 20 MG/DL Final    Creatinine 02/22/2019 0.65  0.55 - 1.02 MG/DL Final    BUN/Creatinine ratio 02/22/2019 28* 12 - 20   Final    GFR est AA 02/22/2019 >60  >60 ml/min/1.73m2 Final    GFR est non-AA 02/22/2019 >60  >60 ml/min/1.73m2 Final    Calcium 02/22/2019 7.8* 8.5 - 10.1 MG/DL Final    Troponin-I, Qt. 02/22/2019 <0.05  <0.05 ng/mL Final    WBC 02/22/2019 0-4  0 - 4 /hpf Final    RBC 02/22/2019 0-5  0 - 5 /hpf Final    Epithelial cells 02/22/2019 FEW  FEW /lpf Final    Bacteria 02/22/2019 1+* NEG /hpf Final    Ventricular Rate 02/22/2019 92  BPM Final    Atrial Rate 02/22/2019 92  BPM Final    P-R Interval 02/22/2019 128  ms Final    QRS Duration 02/22/2019 74  ms Final    Q-T Interval 02/22/2019 386  ms Final    QTC Calculation (Bezet) 02/22/2019 477  ms Final    Calculated P Axis 02/22/2019 67  degrees Final    Calculated R Axis 02/22/2019 34  degrees Final    Calculated T Axis 02/22/2019 85  degrees Final    Diagnosis 02/22/2019    Final                    Value:Normal sinus rhythm  Minor T-wave inversion high lateral leads  No significant change since tracing of 2/17/19      Confirmed by Micah Grant ([id number]) on 2/22/2019 10:12:40 AM     Hospital Outpatient Visit on 02/21/2019   Component Date Value Ref Range Status    Target HR 02/21/2019 150  bpm Final    Baseline HR 02/21/2019 80  BPM Final    Baseline BP 02/21/2019 177  mmHg Final    Stress Base Diastolic BP 81/70/9410 955  mmHg Final  O2 sat rest 02/21/2019 100  % Final    Exercise duration time 02/21/2019 00:03:45   Final    Stress Base Systolic BP 11/88/8612 750  mmHg Final    Stress Base Diastolic BP 06/46/6665 399  mmHg Final    Post peak HR 02/21/2019 104  BPM Final    Estimated workload 02/21/2019 1.0  METS Final    Percent HR 02/21/2019 69  % Final    ST Elevation (mm) 02/21/2019 0  mm Final    ST Depression (mm) 02/21/2019 0  mm Final    Stress Rate Pressure Product 02/21/2019 21,736  BPM*mmHg Final   Admission on 02/20/2019, Discharged on 02/20/2019   Component Date Value Ref Range Status    Color 02/20/2019 YELLOW/STRAW    Final    Appearance 02/20/2019 CLEAR  CLEAR   Final    Specific gravity 02/20/2019 1.010  1.003 - 1.030   Final    pH (UA) 02/20/2019 5.5  5.0 - 8.0   Final    Protein 02/20/2019 NEGATIVE   NEG mg/dL Final    Glucose 02/20/2019 >1,000* NEG mg/dL Final    Ketone 02/20/2019 NEGATIVE   NEG mg/dL Final    Bilirubin 02/20/2019 NEGATIVE   NEG   Final    Blood 02/20/2019 NEGATIVE   NEG   Final    Urobilinogen 02/20/2019 0.2  0.2 - 1.0 EU/dL Final    Nitrites 02/20/2019 NEGATIVE   NEG   Final    Leukocyte Esterase 02/20/2019 NEGATIVE   NEG   Final    WBC 02/20/2019 0-4  0 - 4 /hpf Final    RBC 02/20/2019 0-5  0 - 5 /hpf Final    Epithelial cells 02/20/2019 FEW  FEW /lpf Final    Bacteria 02/20/2019 NEGATIVE   NEG /hpf Final    UA:UC IF INDICATED 02/20/2019 CULTURE NOT INDICATED BY UA RESULT  CNI   Final    Yeast 02/20/2019 PRESENT* NEG   Final    Glucose (POC) 02/20/2019 >600* 65 - 100 mg/dL Final    Performed by 02/20/2019 Macie JOHNS   Final    Glucose (POC) 02/20/2019 >600* 65 - 100 mg/dL Final    Performed by 02/20/2019 Macie JOHNS   Final    Sodium 02/20/2019 137  136 - 145 mmol/L Final    Potassium 02/20/2019 4.9  3.5 - 5.1 mmol/L Final    Chloride 02/20/2019 101  97 - 108 mmol/L Final    CO2 02/20/2019 29  21 - 32 mmol/L Final    Anion gap 02/20/2019 7  5 - 15 mmol/L Final    Glucose 02/20/2019 734* 65 - 100 mg/dL Final    BUN 02/20/2019 12  6 - 20 MG/DL Final    Creatinine 02/20/2019 0.87  0.55 - 1.02 MG/DL Final    BUN/Creatinine ratio 02/20/2019 14  12 - 20   Final    GFR est AA 02/20/2019 >60  >60 ml/min/1.73m2 Final    GFR est non-AA 02/20/2019 >60  >60 ml/min/1.73m2 Final    Calcium 02/20/2019 8.6  8.5 - 10.1 MG/DL Final    Bilirubin, total 02/20/2019 0.6  0.2 - 1.0 MG/DL Final    ALT (SGPT) 02/20/2019 44  12 - 78 U/L Final    AST (SGOT) 02/20/2019 18  15 - 37 U/L Final    Alk. phosphatase 02/20/2019 141* 45 - 117 U/L Final    Protein, total 02/20/2019 7.7  6.4 - 8.2 g/dL Final    Albumin 02/20/2019 3.4* 3.5 - 5.0 g/dL Final    Globulin 02/20/2019 4.3* 2.0 - 4.0 g/dL Final    A-G Ratio 02/20/2019 0.8* 1.1 - 2.2   Final    WBC 02/20/2019 6.0  3.6 - 11.0 K/uL Final    RBC 02/20/2019 5.80* 3.80 - 5.20 M/uL Final    HGB 02/20/2019 15.0  11.5 - 16.0 g/dL Final    HCT 02/20/2019 42.5  35.0 - 47.0 % Final    MCV 02/20/2019 73.3* 80.0 - 99.0 FL Final    MCH 02/20/2019 25.9* 26.0 - 34.0 PG Final    MCHC 02/20/2019 35.3  30.0 - 36.5 g/dL Final    RDW 02/20/2019 16.7* 11.5 - 14.5 % Final    PLATELET 34/56/7875 605  150 - 400 K/uL Final    NRBC 02/20/2019 0.0  0  WBC Final    ABSOLUTE NRBC 02/20/2019 0.00  0.00 - 0.01 K/uL Final    NEUTROPHILS 02/20/2019 47  32 - 75 % Final    LYMPHOCYTES 02/20/2019 40  12 - 49 % Final    MONOCYTES 02/20/2019 9  5 - 13 % Final    EOSINOPHILS 02/20/2019 3  0 - 7 % Final    BASOPHILS 02/20/2019 1  0 - 1 % Final    IMMATURE GRANULOCYTES 02/20/2019 0  0.0 - 0.5 % Final    ABS. NEUTROPHILS 02/20/2019 2.8  1.8 - 8.0 K/UL Final    ABS. LYMPHOCYTES 02/20/2019 2.4  0.8 - 3.5 K/UL Final    ABS. MONOCYTES 02/20/2019 0.5  0.0 - 1.0 K/UL Final    ABS. EOSINOPHILS 02/20/2019 0.2  0.0 - 0.4 K/UL Final    ABS. BASOPHILS 02/20/2019 0.0  0.0 - 0.1 K/UL Final    ABS. IMM.  GRANS. 02/20/2019 0.0  0.00 - 0.04 K/UL Final    DF 02/20/2019 AUTOMATED    Final    Magnesium 02/20/2019 2.0  1.6 - 2.4 mg/dL Final    Phosphorus 02/20/2019 4.4  2.6 - 4.7 MG/DL Final    VENOUS PH 02/20/2019 7.33  7.32 - 7.42   Final    VENOUS PCO2 02/20/2019 55* 41 - 51 mmHg Final    VENOUS PO2 02/20/2019 29  25 - 40 mmHg Final    VENOUS O2 SATURATION 02/20/2019 49* 65 - 88 % Final    VENOUS BICARBONATE 02/20/2019 28  23 - 28 mmol/L Final    VENOUS BASE EXCESS 02/20/2019 0.7  mmol/L Final    O2 METHOD 02/20/2019 ROOM AIR    Final    Sample source 02/20/2019 VENOUS    Final    SITE 02/20/2019 OTHER    Final    AMPHETAMINES 02/20/2019 NEGATIVE   NEG   Final    BARBITURATES 02/20/2019 NEGATIVE   NEG   Final    BENZODIAZEPINES 02/20/2019 NEGATIVE   NEG   Final    COCAINE 02/20/2019 NEGATIVE   NEG   Final    METHADONE 02/20/2019 NEGATIVE   NEG   Final    OPIATES 02/20/2019 NEGATIVE   NEG   Final    PCP(PHENCYCLIDINE) 02/20/2019 NEGATIVE   NEG   Final    THC (TH-CANNABINOL) 02/20/2019 NEGATIVE   NEG   Final    Drug screen comment 02/20/2019 (NOTE)   Final    Pregnancy test,urine (POC) 02/20/2019 NEGATIVE   NEG   Final    Glucose (POC) 02/20/2019 >600* 65 - 100 mg/dL Final    Performed by 02/20/2019 Jamil JOHNS   Final    Glucose (POC) 02/20/2019 584* 65 - 100 mg/dL Final    Performed by 02/20/2019 Jamil JOHNS   Final    Glucose (POC) 02/20/2019 381* 65 - 100 mg/dL Final    Performed by 02/20/2019 [facility])   Final     Xr Chest Pa Lat    Result Date: 2/16/2019  CLINICAL HISTORY: Dyspnea INDICATION: Dyspnea COMPARISON: 2/13/2019 FINDINGS: PA and lateral views of the chest are obtained. The cardiopericardial silhouette is within normal limits. There is no pleural effusion, pneumothorax or focal consolidation present. IMPRESSION: No acute intrathoracic disease. Ct Head Wo Cont    Result Date: 2/25/2019  EXAM:  CT HEAD WO CONT INDICATION: Unusual behavior. Mental health problems.  COMPARISON: None TECHNIQUE: Noncontrast head CT. Coronal and sagittal reformats. CT dose reduction was achieved through use of a standardized protocol tailored for this examination and automatic exposure control for dose modulation. FINDINGS: The ventricles and sulci are age-appropriate without hydrocephalus. There is no mass effect or midline shift. There is no intracranial hemorrhage or extra-axial fluid collection. There is no abnormal parenchymal attenuation. The gray-white matter differentiation is maintained. The basal cisterns are patent. The osseous structures are intact. The visualized paranasal sinuses and mastoid air cells are clear. IMPRESSION: No acute intracranial abnormality. Xr Chest Port    Result Date: 2/13/2019  EXAM: Portable CXR. 1835 hours  INDICATION: Chest pain FINDINGS: The lungs are clear. Heart is normal in size. There is no overt pulmonary edema. There is no evident pneumothorax, adenopathy or pleural effusion. IMPRESSION: No Acute Disease. DISPOSITION:    Home. Patient to f/u with drug/etoh rehabilitation, psychiatric, and psychotherapy appointments. Patient is to f/u with internist as directed. Patient should have a depakote level and associated labs checked within the next 1-2 weeks by patient's o/p psychiatrist/internist.               FOLLOW-UP CARE:    Activity as tolerated  Regular diet  Wound Care: none needed.   Follow-up Information     Follow up With Specialties Details Why Contact Info    LARRY  Go on 3/14/2019 Medication management appointment on March 14th at 3:40 PM.   [address] [place] [address]   Address: [address] [place] [address],[facility] [address]  Phone: [phone]  Fax: [phone]      Doc Brady  Go on 3/7/2019 Case management appointment on Thursday, March 7th - Please come between 12:30 PM and 4:00 PM.  [address] [place] [address]   Address: [address] [place] [address],[address], [address]  Phone: [phone]  Fax: [phone]    Narcotics Anonymous Meeting List   In your discharge paperwork we have provided March 2019 [facility] list. [email or web address]    CALL 24 Hr: [phone]    Haloperidol decanoate 100 mg IM injection  On 3/29/2019 Haloperidol decanoate 100 mg IM injection was given on 3/1/19, next injection is due on 3/29/19,  Enrique Lehman MD    Patient can only remember the practice name and not the physician                   PROGNOSIS:   Calvin Groves ---- based on nature of patient's pathology/ies and treatment compliance issues. Prognosis is greatly dependent upon patient's ability to remain sober and to follow up with scheduled appointments as well as to comply with psychiatric medications as prescribed. DISCHARGE MEDICATIONS:    Informed consent given for the use of following psychotropic medications:  Discharge Medication List as of 3/4/2019  1:01 PM      START taking these medications    Details   benztropine (COGENTIN) 1 mg tablet Take 1 Tab by mouth every twelve (12) hours. , Print, Disp-30 Tab, R-1      clonazePAM (KLONOPIN) 1 mg tablet Take 1 Tab by mouth every twelve (12) hours. Max Daily Amount: 2 mg. One week taper - do not refill, Print, Disp-7 Tab, R-0      divalproex DR (DEPAKOTE) 500 mg tablet Take 1 Tab by mouth every twelve (12) hours. , Print, Disp-30 Tab, R-1      haloperidol decanoate (HALDOL DECANOATE) 100 mg/mL injection 1 mL by IntraMUSCular route every twenty-eight (28) days. NEXT DOSE DUE 3/29, Print, Disp-1 Vial, R-0      hydroCHLOROthiazide (HYDRODIURIL) 12.5 mg tablet Take 1 Tab by mouth daily. , Print, Disp-30 Tab, R-0         CONTINUE these medications which have CHANGED    Details   losartan (COZAAR) 100 mg tablet Take 1 Tab by mouth daily. , Print, Disp-30 Tab, R-0      haloperidol (HALDOL) 5 mg tablet Take 1 Tab by mouth every twelve (12) hours.  3 week overlap for PÉREZ, do not refill, Print, Disp-42 Tab, R-0         CONTINUE these medications which have NOT CHANGED    Details   albuterol (PROVENTIL HFA, VENTOLIN HFA, PROAIR HFA) 90 mcg/actuation inhaler Take 2 Puffs by inhalation every four (4) hours as needed for Wheezing., Print, Disp-1 Inhaler, R-0      insulin NPH/insulin regular (NOVOLIN 70/30, HUMULIN 70/30) 100 unit/mL (70-30) injection 15 Units by SubCUTAneous route two (2) times a day., Print, Disp-10 mL, R-0      aspirin delayed-release 81 mg tablet TAKE 1 TABLET BY MOUTH EVERY DAY WITH 6 TO 8 OUNCES OF PLAIN WATER, Historical Med, R-0         STOP taking these medications       glucose blood VI test strips (FREESTYLE INSULINX TEST STRIPS) strip Comments:   Reason for Stopping:         Blood-Glucose Meter monitoring kit Comments:   Reason for Stopping:         lancets 23 gauge misc Comments:   Reason for Stopping:         raNITIdine hcl 150 mg capsule Comments:   Reason for Stopping:                      A coordinated, multidisplinary treatment team round was conducted with Yessica Salcedo---this is done daily here at [facility]. This team consists of the nurse, psychiatric unit pharmacist,  and Ketty Miller. I have spent greater than 35 minutes on discharge work.     Signed:  Milton Fritz MD  3/4/2019

## 2019-03-04 NOTE — BH NOTES
Patient calm and cooperative. Interacted with peers and staff during shift. Med compliant. Patient was given Ativan at [address] for anxiety and Ambien was given at 2100. BS before bed was 292, gave 3 units of Lispro to cover. Will continue to monitor patient for safety.

## 2019-03-04 NOTE — DISCHARGE INSTRUCTIONS
DISCHARGE SUMMARY from Nurse    PATIENT INSTRUCTIONS:  What to do at Home:  Recommended activity: Activity as tolerated,  *  Please give a list of your current medications to your Primary Care Provider. *  Please update this list whenever your medications are discontinued, doses are      changed, or new medications (including over-the-counter products) are added. *  Please carry medication information at all times in case of emergency situations. These are general instructions for a healthy lifestyle:    No smoking/ No tobacco products/ Avoid exposure to second hand smoke  Surgeon General's Warning:  Quitting smoking now greatly reduces serious risk to your health. Obesity, smoking, and sedentary lifestyle greatly increases your risk for illness    A healthy diet, regular physical exercise & weight monitoring are important for maintaining a healthy lifestyle  The discharge information has been reviewed with the patient. The patient verbalized understanding. Discharge medications reviewed with the patient and appropriate educational materials and side effects teaching were provided. ___________________________________________________________________________________________________________________________________  . If I feel I am at risk of hurting myself or others, I will call the crisis office and speak with a crisis worker who will assist me during my crisis. [address]  [phone]  [address] [place] [address] [phone]  [address]-  [phone]

## 2019-03-18 ENCOUNTER — APPOINTMENT (OUTPATIENT)
Dept: GENERAL RADIOLOGY | Age: 44
DRG: 637 | End: 2019-03-18
Attending: EMERGENCY MEDICINE
Payer: MEDICARE

## 2019-03-18 ENCOUNTER — HOSPITAL ENCOUNTER (INPATIENT)
Age: 44
LOS: 1 days | Discharge: HOME | DRG: 637 | End: 2019-03-19
Attending: EMERGENCY MEDICINE | Admitting: INTERNAL MEDICINE
Payer: MEDICARE

## 2019-03-18 DIAGNOSIS — R73.9 HYPERGLYCEMIA WITHOUT KETOSIS: Primary | ICD-10-CM

## 2019-03-18 DIAGNOSIS — F14.10 COCAINE ABUSE (HCC): ICD-10-CM

## 2019-03-18 PROBLEM — E11.00 HYPEROSMOLAR NON-KETOTIC STATE IN PATIENT WITH TYPE 2 DIABETES MELLITUS (HCC): Status: ACTIVE | Noted: 2019-03-18

## 2019-03-18 LAB
ADMINISTERED INITIALS, ADMINIT: NORMAL
ALBUMIN SERPL-MCNC: 3.3 G/DL (ref 3.5–5)
ALBUMIN/GLOB SERPL: 0.8 {RATIO} (ref 1.1–2.2)
ALP SERPL-CCNC: 121 U/L (ref 45–117)
ALT SERPL-CCNC: 26 U/L (ref 12–78)
ANION GAP SERPL CALC-SCNC: 11 MMOL/L (ref 5–15)
AST SERPL-CCNC: 8 U/L (ref 15–37)
BASOPHILS # BLD: 0 K/UL (ref 0–0.1)
BASOPHILS # BLD: 0 K/UL (ref 0–0.1)
BASOPHILS NFR BLD: 1 % (ref 0–1)
BASOPHILS NFR BLD: 1 % (ref 0–1)
BILIRUB SERPL-MCNC: 0.6 MG/DL (ref 0.2–1)
BUN SERPL-MCNC: 11 MG/DL (ref 6–20)
BUN/CREAT SERPL: 9 (ref 12–20)
CALCIUM SERPL-MCNC: 8.8 MG/DL (ref 8.5–10.1)
CHLORIDE SERPL-SCNC: 91 MMOL/L (ref 97–108)
CO2 SERPL-SCNC: 27 MMOL/L (ref 21–32)
COMMENT, HOLDF: NORMAL
CREAT SERPL-MCNC: 1.27 MG/DL (ref 0.55–1.02)
D50 ADMINISTERED, D50ADM: 0 ML
D50 ORDER, D50ORD: 0 ML
DIFFERENTIAL METHOD BLD: ABNORMAL
DIFFERENTIAL METHOD BLD: ABNORMAL
EOSINOPHIL # BLD: 0.1 K/UL (ref 0–0.4)
EOSINOPHIL # BLD: 0.3 K/UL (ref 0–0.4)
EOSINOPHIL NFR BLD: 2 % (ref 0–7)
EOSINOPHIL NFR BLD: 3 % (ref 0–7)
ERYTHROCYTE [DISTWIDTH] IN BLOOD BY AUTOMATED COUNT: 17.8 % (ref 11.5–14.5)
ERYTHROCYTE [DISTWIDTH] IN BLOOD BY AUTOMATED COUNT: 18.5 % (ref 11.5–14.5)
EST. AVERAGE GLUCOSE BLD GHB EST-MCNC: 260 MG/DL
GLOBULIN SER CALC-MCNC: 4.3 G/DL (ref 2–4)
GLSCOM COMMENTS: NORMAL
GLUCOSE BLD STRIP.AUTO-MCNC: 152 MG/DL (ref 65–100)
GLUCOSE BLD STRIP.AUTO-MCNC: 330 MG/DL (ref 65–100)
GLUCOSE BLD STRIP.AUTO-MCNC: 544 MG/DL (ref 65–100)
GLUCOSE BLD STRIP.AUTO-MCNC: 96 MG/DL (ref 65–100)
GLUCOSE BLD STRIP.AUTO-MCNC: 98 MG/DL (ref 65–100)
GLUCOSE SERPL-MCNC: 718 MG/DL (ref 65–100)
GLUCOSE, GLC: 152 MG/DL
GLUCOSE, GLC: 330 MG/DL
GLUCOSE, GLC: 544 MG/DL
GLUCOSE, GLC: 98 MG/DL
GLUCOSE, GLC: 98 MG/DL
HBA1C MFR BLD: 10.7 % (ref 4.2–6.3)
HCT VFR BLD AUTO: 41.6 % (ref 35–47)
HCT VFR BLD AUTO: 43 % (ref 35–47)
HGB BLD-MCNC: 14.3 G/DL (ref 11.5–16)
HGB BLD-MCNC: 14.6 G/DL (ref 11.5–16)
HIGH TARGET, HITG: 180 MG/DL
IMM GRANULOCYTES # BLD AUTO: 0 K/UL (ref 0–0.04)
IMM GRANULOCYTES # BLD AUTO: 0 K/UL (ref 0–0.04)
IMM GRANULOCYTES NFR BLD AUTO: 0 % (ref 0–0.5)
IMM GRANULOCYTES NFR BLD AUTO: 0 % (ref 0–0.5)
INSULIN ADMINSTERED, INSADM: 0 UNITS/HOUR
INSULIN ADMINSTERED, INSADM: 0 UNITS/HOUR
INSULIN ADMINSTERED, INSADM: 0.4 UNITS/HOUR
INSULIN ADMINSTERED, INSADM: 5.4 UNITS/HOUR
INSULIN ADMINSTERED, INSADM: 9.7 UNITS/HOUR
INSULIN ORDER, INSORD: 0 UNITS/HOUR
INSULIN ORDER, INSORD: 0 UNITS/HOUR
INSULIN ORDER, INSORD: 0.4 UNITS/HOUR
INSULIN ORDER, INSORD: 5.4 UNITS/HOUR
INSULIN ORDER, INSORD: 9.7 UNITS/HOUR
LOW TARGET, LOT: 140 MG/DL
LYMPHOCYTES # BLD: 1.4 K/UL (ref 0.8–3.5)
LYMPHOCYTES # BLD: 3 K/UL (ref 0.8–3.5)
LYMPHOCYTES NFR BLD: 25 % (ref 12–49)
LYMPHOCYTES NFR BLD: 37 % (ref 12–49)
MAGNESIUM SERPL-MCNC: 2 MG/DL (ref 1.6–2.4)
MCH RBC QN AUTO: 25.4 PG (ref 26–34)
MCH RBC QN AUTO: 26 PG (ref 26–34)
MCHC RBC AUTO-ENTMCNC: 34 G/DL (ref 30–36.5)
MCHC RBC AUTO-ENTMCNC: 34.4 G/DL (ref 30–36.5)
MCV RBC AUTO: 74.8 FL (ref 80–99)
MCV RBC AUTO: 75.6 FL (ref 80–99)
MINUTES UNTIL NEXT BG, NBG: 60 MIN
MONOCYTES # BLD: 0.4 K/UL (ref 0–1)
MONOCYTES # BLD: 0.8 K/UL (ref 0–1)
MONOCYTES NFR BLD: 10 % (ref 5–13)
MONOCYTES NFR BLD: 7 % (ref 5–13)
MULTIPLIER, MUL: 0
MULTIPLIER, MUL: 0
MULTIPLIER, MUL: 0.01
MULTIPLIER, MUL: 0.02
MULTIPLIER, MUL: 0.02
NEUTS SEG # BLD: 3.6 K/UL (ref 1.8–8)
NEUTS SEG # BLD: 3.9 K/UL (ref 1.8–8)
NEUTS SEG NFR BLD: 49 % (ref 32–75)
NEUTS SEG NFR BLD: 65 % (ref 32–75)
NRBC # BLD: 0 K/UL (ref 0–0.01)
NRBC # BLD: 0 K/UL (ref 0–0.01)
NRBC BLD-RTO: 0 PER 100 WBC
NRBC BLD-RTO: 0 PER 100 WBC
ORDER INITIALS, ORDINIT: NORMAL
PHOSPHATE SERPL-MCNC: 4.1 MG/DL (ref 2.6–4.7)
PLATELET # BLD AUTO: 222 K/UL (ref 150–400)
PLATELET # BLD AUTO: 222 K/UL (ref 150–400)
PMV BLD AUTO: 10.8 FL (ref 8.9–12.9)
PMV BLD AUTO: 11.3 FL (ref 8.9–12.9)
POTASSIUM SERPL-SCNC: 4.4 MMOL/L (ref 3.5–5.1)
PROT SERPL-MCNC: 7.6 G/DL (ref 6.4–8.2)
RBC # BLD AUTO: 5.5 M/UL (ref 3.8–5.2)
RBC # BLD AUTO: 5.75 M/UL (ref 3.8–5.2)
SAMPLES BEING HELD,HOLD: NORMAL
SERVICE CMNT-IMP: ABNORMAL
SERVICE CMNT-IMP: NORMAL
SERVICE CMNT-IMP: NORMAL
SODIUM SERPL-SCNC: 129 MMOL/L (ref 136–145)
TROPONIN I SERPL-MCNC: <0.05 NG/ML
WBC # BLD AUTO: 5.5 K/UL (ref 3.6–11)
WBC # BLD AUTO: 8 K/UL (ref 3.6–11)

## 2019-03-18 PROCEDURE — 74011000258 HC RX REV CODE- 258: Performed by: FAMILY MEDICINE

## 2019-03-18 PROCEDURE — 99284 EMERGENCY DEPT VISIT MOD MDM: CPT

## 2019-03-18 PROCEDURE — 83735 ASSAY OF MAGNESIUM: CPT

## 2019-03-18 PROCEDURE — 93005 ELECTROCARDIOGRAM TRACING: CPT

## 2019-03-18 PROCEDURE — 85025 COMPLETE CBC W/AUTO DIFF WBC: CPT

## 2019-03-18 PROCEDURE — 71045 X-RAY EXAM CHEST 1 VIEW: CPT

## 2019-03-18 PROCEDURE — 36415 COLL VENOUS BLD VENIPUNCTURE: CPT

## 2019-03-18 PROCEDURE — 84484 ASSAY OF TROPONIN QUANT: CPT

## 2019-03-18 PROCEDURE — 96365 THER/PROPH/DIAG IV INF INIT: CPT

## 2019-03-18 PROCEDURE — 80053 COMPREHEN METABOLIC PANEL: CPT

## 2019-03-18 PROCEDURE — 83036 HEMOGLOBIN GLYCOSYLATED A1C: CPT

## 2019-03-18 PROCEDURE — 82962 GLUCOSE BLOOD TEST: CPT

## 2019-03-18 PROCEDURE — 84100 ASSAY OF PHOSPHORUS: CPT

## 2019-03-18 PROCEDURE — 74011250636 HC RX REV CODE- 250/636

## 2019-03-18 PROCEDURE — 74011250636 HC RX REV CODE- 250/636: Performed by: INTERNAL MEDICINE

## 2019-03-18 PROCEDURE — 74011636637 HC RX REV CODE- 636/637: Performed by: EMERGENCY MEDICINE

## 2019-03-18 PROCEDURE — 74011000250 HC RX REV CODE- 250: Performed by: INTERNAL MEDICINE

## 2019-03-18 PROCEDURE — 65660000001 HC RM ICU INTERMED STEPDOWN

## 2019-03-18 PROCEDURE — 74011000258 HC RX REV CODE- 258: Performed by: EMERGENCY MEDICINE

## 2019-03-18 RX ORDER — HYDROCHLOROTHIAZIDE 25 MG/1
12.5 TABLET ORAL DAILY
Status: DISCONTINUED | OUTPATIENT
Start: 2019-03-19 | End: 2019-03-19 | Stop reason: HOSPADM

## 2019-03-18 RX ORDER — ACETAMINOPHEN 325 MG/1
650 TABLET ORAL
Status: DISCONTINUED | OUTPATIENT
Start: 2019-03-18 | End: 2019-03-19 | Stop reason: HOSPADM

## 2019-03-18 RX ORDER — DIVALPROEX SODIUM 500 MG/1
500 TABLET, DELAYED RELEASE ORAL EVERY 12 HOURS
Status: DISCONTINUED | OUTPATIENT
Start: 2019-03-18 | End: 2019-03-19 | Stop reason: HOSPADM

## 2019-03-18 RX ORDER — ASPIRIN 81 MG/1
81 TABLET ORAL DAILY
Status: DISCONTINUED | OUTPATIENT
Start: 2019-03-19 | End: 2019-03-19 | Stop reason: HOSPADM

## 2019-03-18 RX ORDER — LORAZEPAM 2 MG/ML
INJECTION INTRAMUSCULAR
Status: COMPLETED
Start: 2019-03-18 | End: 2019-03-18

## 2019-03-18 RX ORDER — SODIUM CHLORIDE 0.9 % (FLUSH) 0.9 %
5-40 SYRINGE (ML) INJECTION AS NEEDED
Status: DISCONTINUED | OUTPATIENT
Start: 2019-03-18 | End: 2019-03-19 | Stop reason: HOSPADM

## 2019-03-18 RX ORDER — ONDANSETRON 2 MG/ML
4 INJECTION INTRAMUSCULAR; INTRAVENOUS
Status: DISCONTINUED | OUTPATIENT
Start: 2019-03-18 | End: 2019-03-19 | Stop reason: HOSPADM

## 2019-03-18 RX ORDER — HYDRALAZINE HYDROCHLORIDE 20 MG/ML
10 INJECTION INTRAMUSCULAR; INTRAVENOUS
Status: DISCONTINUED | OUTPATIENT
Start: 2019-03-18 | End: 2019-03-19 | Stop reason: HOSPADM

## 2019-03-18 RX ORDER — HEPARIN SODIUM 5000 [USP'U]/ML
5000 INJECTION, SOLUTION INTRAVENOUS; SUBCUTANEOUS EVERY 8 HOURS
Status: DISCONTINUED | OUTPATIENT
Start: 2019-03-18 | End: 2019-03-19 | Stop reason: HOSPADM

## 2019-03-18 RX ORDER — HALOPERIDOL DECANOATE 100 MG/ML
100 INJECTION INTRAMUSCULAR
Status: DISCONTINUED | OUTPATIENT
Start: 2019-03-18 | End: 2019-03-19 | Stop reason: HOSPADM

## 2019-03-18 RX ORDER — DEXTROSE MONOHYDRATE AND SODIUM CHLORIDE 5; .9 G/100ML; G/100ML
125 INJECTION, SOLUTION INTRAVENOUS CONTINUOUS
Status: DISCONTINUED | OUTPATIENT
Start: 2019-03-18 | End: 2019-03-19

## 2019-03-18 RX ORDER — SODIUM CHLORIDE 0.9 % (FLUSH) 0.9 %
5-40 SYRINGE (ML) INJECTION EVERY 8 HOURS
Status: DISCONTINUED | OUTPATIENT
Start: 2019-03-18 | End: 2019-03-19 | Stop reason: HOSPADM

## 2019-03-18 RX ORDER — LORAZEPAM 2 MG/ML
2 INJECTION INTRAMUSCULAR
Status: COMPLETED | OUTPATIENT
Start: 2019-03-18 | End: 2019-03-18

## 2019-03-18 RX ORDER — MAGNESIUM SULFATE 100 %
4 CRYSTALS MISCELLANEOUS AS NEEDED
Status: DISCONTINUED | OUTPATIENT
Start: 2019-03-18 | End: 2019-03-19

## 2019-03-18 RX ORDER — IBUPROFEN 200 MG
1 TABLET ORAL EVERY 24 HOURS
Status: DISCONTINUED | OUTPATIENT
Start: 2019-03-18 | End: 2019-03-19 | Stop reason: HOSPADM

## 2019-03-18 RX ORDER — HALOPERIDOL 5 MG/1
5 TABLET ORAL EVERY 12 HOURS
Status: DISCONTINUED | OUTPATIENT
Start: 2019-03-18 | End: 2019-03-19 | Stop reason: HOSPADM

## 2019-03-18 RX ORDER — BENZTROPINE MESYLATE 1 MG/1
1 TABLET ORAL EVERY 12 HOURS
Status: DISCONTINUED | OUTPATIENT
Start: 2019-03-18 | End: 2019-03-19 | Stop reason: HOSPADM

## 2019-03-18 RX ORDER — DEXTROSE 50 % IN WATER (D50W) INTRAVENOUS SYRINGE
25-50 AS NEEDED
Status: DISCONTINUED | OUTPATIENT
Start: 2019-03-18 | End: 2019-03-19

## 2019-03-18 RX ORDER — LOSARTAN POTASSIUM 50 MG/1
100 TABLET ORAL DAILY
Status: DISCONTINUED | OUTPATIENT
Start: 2019-03-19 | End: 2019-03-19 | Stop reason: HOSPADM

## 2019-03-18 RX ORDER — INSULIN LISPRO 100 [IU]/ML
INJECTION, SOLUTION INTRAVENOUS; SUBCUTANEOUS
Status: DISCONTINUED | OUTPATIENT
Start: 2019-03-18 | End: 2019-03-19

## 2019-03-18 RX ORDER — CLONAZEPAM 1 MG/1
1 TABLET ORAL EVERY 12 HOURS
Status: DISCONTINUED | OUTPATIENT
Start: 2019-03-18 | End: 2019-03-19 | Stop reason: HOSPADM

## 2019-03-18 RX ADMIN — FOLIC ACID: 5 INJECTION, SOLUTION INTRAMUSCULAR; INTRAVENOUS; SUBCUTANEOUS at 23:40

## 2019-03-18 RX ADMIN — LORAZEPAM 2 MG: 2 INJECTION INTRAMUSCULAR; INTRAVENOUS at 19:38

## 2019-03-18 RX ADMIN — SODIUM CHLORIDE 0.4 UNITS/HR: 900 INJECTION, SOLUTION INTRAVENOUS at 20:49

## 2019-03-18 RX ADMIN — SODIUM CHLORIDE 5.4 UNITS/HR: 900 INJECTION, SOLUTION INTRAVENOUS at 19:34

## 2019-03-18 RX ADMIN — DEXTROSE MONOHYDRATE AND SODIUM CHLORIDE 125 ML/HR: 5; .9 INJECTION, SOLUTION INTRAVENOUS at 20:49

## 2019-03-18 RX ADMIN — Medication 10 ML: at 22:44

## 2019-03-18 RX ADMIN — SODIUM CHLORIDE 9.7 UNITS/HR: 900 INJECTION, SOLUTION INTRAVENOUS at 18:26

## 2019-03-18 RX ADMIN — LORAZEPAM 2 MG: 2 INJECTION INTRAMUSCULAR at 19:38

## 2019-03-18 NOTE — ED TRIAGE NOTES
Pt arrives by EMS for chest pain. Pt was snorting cocaine went chest pain started. 8/10. Pt given 2.5mg versed IV en route and 500 cc NS. Per EMS, pts HR was in 200s prior to admin of versed.

## 2019-03-18 NOTE — ED PROVIDER NOTES
38 y.o. female with past medical history significant for diabetes, HTN, pancreatitis, borderline personality disorder, alcohol abuse, GERD, asthma, kidney stones, sickle cell trait, increased cholesterol, and psychiatric disorder who presents from home via EMS with chief complaint of chest pain. Patient states that she \"smoked cocaine\" earlier today and subsequently developed chest pain that radiated to her neck. She also complains that her \"heart began beating fast.\"  Patient reports that she \"smokes cocaine every 3 to 4 days. \"  She states that she is starting to feel better in the ED. Patient was recently admitted to the hospital on 2/25/19 for schizophrenia. She visited the ED on 2/22/19 for chest pain and alcoholic intoxication without complication, on 9/02/55 for uncontrolled type 2 diabetes mellitus with hyperglycemia, on 2/20/19 for uncontrolled type 2 diabetes mellitus with hyperglycemia, on 2/17/19 for other chest pain, on 2/15/19 for chest pain, on 2/13/19 for uncontrolled type 2 diabetes mellitus with hyperglycemia, on 1/27/19 for dizziness, on 1/24/19 for schizophrenia, on 1/22/19 for chest wall pain, on 1/19/19 for other chest pain, on 1/13/19 for chest wall pain, on 1/12/19 for recurrent chest pain and cocaine use, on 1/8/19 for other chest pain. Patient was admitted to the hospital on 12/28/18 for moderate persistent asthma with acute exacerbation and on 12/10/18 for diabetic ketoacidosis without coma associated with type 1 diabetes mellitus. Patient was also admitted to the hospital on 11/22/18 for diabetic ketoacidosis without coma associated with type 1 diabetes mellitus, on 11/3/18 for diabetic ketoacidosis without coma associated with type 1 diabetes mellitus, and on 10/20/18 for hyperglycemia. She denies using other forms of narcotics. She denies nausea, vomiting, numbness in her extremities, tingling in her extremities, and previous h/o heart attack.   There are no other acute medical concerns at this time. Social hx: daily tobacco use; positive alcohol use; positive drug use  PCP: Lisa, MD Marcel    Note written by Vivek Jameson, as dictated by Vini Gleason MD 4:54 PM      The history is provided by the patient. No  was used.         Past Medical History:   Diagnosis Date    Alcohol abuse, in remission     quit 17 days ago    Asthma     does not use inhalers    Borderline personality disorder ([address]     Diabetes ([address]     GERD (gastroesophageal reflux disease)     Hypertension     Other ill-defined conditions(799.89)     kidney stones ,passed one    Other ill-defined conditions(799.89)     sickle cell trait    Other ill-defined conditions(799.89)     increased cholesterol    Pancreatitis     Psychiatric disorder     schizophrenia, bipolar, depression, anxiety        Past Surgical History:   Procedure Laterality Date    ABDOMEN SURGERY PROC UNLISTED  3/12/14    CHOLECYSTECTOMY LAPAROSCOPIC      HX CHOLECYSTECTOMY      HX GASTRIC BYPASS      HX GYN  11/8/2012    c section x2    HX OTHER SURGICAL  3/13/14    ENDOSCOPIC RETROGRADE CHOLANGIOPANCREATOGRAPHY     HX OTHER SURGICAL  8/4/14    endoscopic stent placed to bile duct    HX TUBAL LIGATION  2012         Family History:   Problem Relation Age of Onset    Heart Disease Mother     Diabetes Mother     Hypertension Mother     Hypertension Maternal Grandmother        Social History     Socioeconomic History    Marital status: SINGLE     Spouse name: Not on file    Number of children: Not on file    Years of education: Not on file    Highest education level: Not on file   Social Needs    Financial resource strain: Not on file    Food insecurity - worry: Not on file    Food insecurity - inability: Not on file   [employer] needs - medical: Not on file   [employer] needs - non-medical: Not on file   Occupational History    Not on file   Tobacco Use    Smoking status: Current Every Day Smoker     Packs/day: 0.50     Years: 14.00     Pack years: 7.00     Types: Cigarettes    Smokeless tobacco: Never Used    Tobacco comment: cigarettes   Substance and Sexual Activity    Alcohol use: Yes     Alcohol/week: 4.8 oz     Types: 2 Glasses of wine, 6 Cans of beer per week    Drug use: No     Comment: yesterday 12/27/18    Sexual activity: Yes     Partners: Female     Birth control/protection: Surgical   Other Topics Concern    Not on file   Social History Narrative    Not on file         ALLERGIES: Dilaudid [hydromorphone (bulk)] and Ibuprofen    Review of Systems   Constitutional: Negative for chills, diaphoresis and fever. HENT: Negative for congestion, postnasal drip, rhinorrhea and sore throat. Eyes: Negative for photophobia, discharge, redness and visual disturbance. Respiratory: Negative for cough, chest tightness, shortness of breath and wheezing. Cardiovascular: Positive for chest pain and palpitations. Negative for leg swelling. Gastrointestinal: Negative for abdominal distention, abdominal pain, blood in stool, constipation, diarrhea, nausea and vomiting. Genitourinary: Negative for difficulty urinating, dysuria, frequency, hematuria and urgency. Musculoskeletal: Positive for neck pain. Negative for arthralgias, back pain, joint swelling and myalgias. Skin: Negative for color change and rash. Neurological: Negative for dizziness, speech difficulty, weakness, light-headedness, numbness and headaches. Psychiatric/Behavioral: Negative for confusion. The patient is not nervous/anxious. All other systems reviewed and are negative. Vitals:    03/18/19 1618   BP: (!) 180/91   Pulse: 97   Resp: 20   Temp: 98.4 °F (36.9 °C)   SpO2: 99%            Physical Exam   Constitutional: She is oriented to person, place, and time. She appears well-developed and well-nourished. No distress. HENT:   Head: Normocephalic and atraumatic.    Right Ear: External ear normal. Left Ear: External ear normal.   Nose: Nose normal.   Mouth/Throat: Oropharynx is clear and moist.   Eyes: Conjunctivae and EOM are normal. Pupils are equal, round, and reactive to light. No scleral icterus. Neck: Normal range of motion. Neck supple. No JVD present. No tracheal deviation present. No thyromegaly present. Cardiovascular: Normal rate, regular rhythm and normal heart sounds. Exam reveals no gallop and no friction rub. No murmur heard. Pulmonary/Chest: Effort normal and breath sounds normal. No respiratory distress. She has no wheezes. She has no rales. She exhibits no tenderness. Clear lungs   Abdominal: Soft. Bowel sounds are normal. She exhibits no distension and no mass. There is no tenderness. There is no rebound and no guarding. Musculoskeletal: Normal range of motion. She exhibits no edema or tenderness. Lymphadenopathy:     She has no cervical adenopathy. Neurological: She is alert and oriented to person, place, and time. She has normal strength. She displays no atrophy and no tremor. No cranial nerve deficit. She exhibits normal muscle tone. Coordination and gait normal.   Skin: Skin is warm and dry. No rash noted. She is not diaphoretic. No erythema. Psychiatric: She has a normal mood and affect. Her behavior is normal. Judgment and thought content normal.   Nursing note and vitals reviewed. Note written by Deepthi Lemus, as dictated by Mónica Fagan MD 4:54 PM    MDM  Number of Diagnoses or Management Options  Diagnosis management comments: AMADO  Impression: 81-year-old female presenting to the emergency department with acute onset of chest pain while smoking crack cocaine. Per EMS her heart rate was elevated, she was given medication for anxiety and on my interviewing the patient her heart rate is back within normal limits and she has no further complaints.  No history of heart attacks or angina in the past.    Plan of care we baseline labs included troponin I and a chest x-ray. Considerations are MI equivalent, vasospasm, pneumothorax. If all is within normal limits we'll recheck a troponin in 3 hours and if negative will discharge home. Procedures    ED EKG interpretation:  Rhythm: normal sinus rhythm; Rate (approx.): 90 bpm; Prolonged QT  Note written by Silvano Olivares, as dictated by Blanche Alberts MD 4:23 PM      Hospitalist Huong for Admission  6:04 PM    ED Room Number: ER15/15  Patient Name and age:  Ethan Valerio 38 y.o.  female  Working Diagnosis:   1. Hyperglycemia without ketosis    2. Cocaine abuse ([facility] [address]      Readmission: no  Isolation Requirements:  no  Recommended Level of Care:  ICU  Code Status:  Full  Other:      CONSULT NOTE:  7:20 PM Blanche Alberts MD communicated with Dr. Srini Cruz, Consult for Hospitalist via [facility] Text. Discussed available diagnostic tests and clinical findings. Dr. Srini Cruz will accept the patient for admission.

## 2019-03-18 NOTE — PROGRESS NOTES
Admission Medication Reconciliation:    Information obtained from: Discharge summary (3/4), Rx Query    Comments/Recommendations:     Spoke with pt regarding patient's medications and updated last doses. Pt was a poor historian. Pt was provided a medication list on discharge summary from [facility] on 3/4 which was used to update list. Pt stated she has ran out of all her medications x3 days, including her insulin    The following changes were made to the PTA medication list:  Medications added: None  Medications removed: None  Medications changed: None    Allergies and reactions were verified with the patient. Patient's pharmacy: N/A       Significant PMH/Disease States:   Past Medical History:   Diagnosis Date    Alcohol abuse, in remission     quit 17 days ago    Asthma     does not use inhalers    Borderline personality disorder ([address]     Diabetes ([facility]     GERD (gastroesophageal reflux disease)     Hypertension     Other ill-defined conditions(799.89)     kidney stones ,passed one    Other ill-defined conditions(799.89)     sickle cell trait    Other ill-defined conditions(799.89)     increased cholesterol    Pancreatitis     Psychiatric disorder     schizophrenia, bipolar, depression, anxiety        Chief Complaint for this Admission:    Chief Complaint   Patient presents with    Chest Pain       Allergies:  Dilaudid [hydromorphone (bulk)] and Ibuprofen    Prior to Admission Medications:   Prior to Admission Medications   Prescriptions Last Dose Informant Patient Reported? Taking? albuterol (PROVENTIL HFA, VENTOLIN HFA, PROAIR HFA) 90 mcg/actuation inhaler Unknown at Unknown time  No No   Sig: Take 2 Puffs by inhalation every four (4) hours as needed for Wheezing.    aspirin delayed-release 81 mg tablet Unknown at Unknown time  Yes No   Sig: TAKE 1 TABLET BY MOUTH EVERY DAY WITH 6 TO 8 OUNCES OF PLAIN WATER   benztropine (COGENTIN) 1 mg tablet Unknown at Unknown time  No No   Sig: Take 1 Tab by mouth every twelve (12) hours. clonazePAM (KLONOPIN) 1 mg tablet Unknown at Unknown time  No No   Sig: Take 1 Tab by mouth every twelve (12) hours. Max Daily Amount: 2 mg. One week taper - do not refill   divalproex DR (DEPAKOTE) 500 mg tablet Unknown at Unknown time  No No   Sig: Take 1 Tab by mouth every twelve (12) hours.   haloperidol (HALDOL) 5 mg tablet Unknown at Unknown time  No No   Sig: Take 1 Tab by mouth every twelve (12) hours. 3 week overlap for PÉREZ, do not refill   haloperidol decanoate (HALDOL DECANOATE) 100 mg/mL injection Unknown at Unknown time  No No   Si mL by IntraMUSCular route every twenty-eight (28) days. NEXT DOSE DUE 3/29   hydroCHLOROthiazide (HYDRODIURIL) 12.5 mg tablet Unknown at Unknown time  No No   Sig: Take 1 Tab by mouth daily. insulin NPH/insulin regular (NOVOLIN 70/30, HUMULIN 70/30) 100 unit/mL (70-30) injection Unknown at Unknown time  No No   Sig: 15 Units by SubCUTAneous route two (2) times a day. losartan (COZAAR) 100 mg tablet Unknown at Unknown time  No No   Sig: Take 1 Tab by mouth daily.       Facility-Administered Medications: None              Mary Moore, PharmD Candidate [id number]

## 2019-03-19 VITALS
OXYGEN SATURATION: 99 % | HEIGHT: 66 IN | BODY MASS INDEX: 21.61 KG/M2 | DIASTOLIC BLOOD PRESSURE: 102 MMHG | RESPIRATION RATE: 18 BRPM | TEMPERATURE: 98.3 F | WEIGHT: 134.48 LBS | SYSTOLIC BLOOD PRESSURE: 163 MMHG | HEART RATE: 94 BPM

## 2019-03-19 LAB
ADMINISTERED INITIALS, ADMINIT: NORMAL
ALBUMIN SERPL-MCNC: 2.4 G/DL (ref 3.5–5)
ALBUMIN/GLOB SERPL: 0.7 {RATIO} (ref 1.1–2.2)
ALP SERPL-CCNC: 82 U/L (ref 45–117)
ALT SERPL-CCNC: 21 U/L (ref 12–78)
AMPHET UR QL SCN: NEGATIVE
ANION GAP SERPL CALC-SCNC: 5 MMOL/L (ref 5–15)
ANION GAP SERPL CALC-SCNC: 9 MMOL/L (ref 5–15)
AST SERPL-CCNC: 14 U/L (ref 15–37)
ATRIAL RATE: 90 BPM
BARBITURATES UR QL SCN: NEGATIVE
BASOPHILS # BLD: 0 K/UL (ref 0–0.1)
BASOPHILS NFR BLD: 1 % (ref 0–1)
BENZODIAZ UR QL: POSITIVE
BILIRUB SERPL-MCNC: 0.9 MG/DL (ref 0.2–1)
BUN SERPL-MCNC: 12 MG/DL (ref 6–20)
BUN SERPL-MCNC: 13 MG/DL (ref 6–20)
BUN/CREAT SERPL: 15 (ref 12–20)
BUN/CREAT SERPL: 20 (ref 12–20)
CALCIUM SERPL-MCNC: 7.6 MG/DL (ref 8.5–10.1)
CALCIUM SERPL-MCNC: 9.2 MG/DL (ref 8.5–10.1)
CALCULATED P AXIS, ECG09: 62 DEGREES
CALCULATED R AXIS, ECG10: 17 DEGREES
CALCULATED T AXIS, ECG11: 67 DEGREES
CANNABINOIDS UR QL SCN: NEGATIVE
CHLORIDE SERPL-SCNC: 107 MMOL/L (ref 97–108)
CHLORIDE SERPL-SCNC: 113 MMOL/L (ref 97–108)
CHOLEST SERPL-MCNC: 106 MG/DL
CK MB CFR SERPL CALC: 2.5 % (ref 0–2.5)
CK MB SERPL-MCNC: 1.6 NG/ML (ref 5–25)
CK SERPL-CCNC: 64 U/L (ref 26–192)
CO2 SERPL-SCNC: 22 MMOL/L (ref 21–32)
CO2 SERPL-SCNC: 29 MMOL/L (ref 21–32)
COCAINE UR QL SCN: POSITIVE
CREAT SERPL-MCNC: 0.61 MG/DL (ref 0.55–1.02)
CREAT SERPL-MCNC: 0.85 MG/DL (ref 0.55–1.02)
D DIMER PPP FEU-MCNC: 0.45 MG/L FEU (ref 0–0.65)
D50 ADMINISTERED, D50ADM: 0 ML
D50 ADMINISTERED, D50ADM: 12 ML
D50 ORDER, D50ORD: 0 ML
D50 ORDER, D50ORD: 12 ML
DIAGNOSIS, 93000: NORMAL
DIFFERENTIAL METHOD BLD: ABNORMAL
DRUG SCRN COMMENT,DRGCM: ABNORMAL
EOSINOPHIL # BLD: 0.2 K/UL (ref 0–0.4)
EOSINOPHIL NFR BLD: 4 % (ref 0–7)
ERYTHROCYTE [DISTWIDTH] IN BLOOD BY AUTOMATED COUNT: 17.1 % (ref 11.5–14.5)
GLOBULIN SER CALC-MCNC: 3.4 G/DL (ref 2–4)
GLSCOM COMMENTS: NORMAL
GLUCOSE BLD STRIP.AUTO-MCNC: 104 MG/DL (ref 65–100)
GLUCOSE BLD STRIP.AUTO-MCNC: 117 MG/DL (ref 65–100)
GLUCOSE BLD STRIP.AUTO-MCNC: 166 MG/DL (ref 65–100)
GLUCOSE BLD STRIP.AUTO-MCNC: 176 MG/DL (ref 65–100)
GLUCOSE BLD STRIP.AUTO-MCNC: 200 MG/DL (ref 65–100)
GLUCOSE BLD STRIP.AUTO-MCNC: 222 MG/DL (ref 65–100)
GLUCOSE BLD STRIP.AUTO-MCNC: 369 MG/DL (ref 65–100)
GLUCOSE BLD STRIP.AUTO-MCNC: 69 MG/DL (ref 65–100)
GLUCOSE SERPL-MCNC: 125 MG/DL (ref 65–100)
GLUCOSE SERPL-MCNC: 165 MG/DL (ref 65–100)
GLUCOSE, GLC: 104 MG/DL
GLUCOSE, GLC: 117 MG/DL
GLUCOSE, GLC: 166 MG/DL
GLUCOSE, GLC: 176 MG/DL
GLUCOSE, GLC: 200 MG/DL
GLUCOSE, GLC: 222 MG/DL
GLUCOSE, GLC: 69 MG/DL
HCT VFR BLD AUTO: 38.8 % (ref 35–47)
HDLC SERPL-MCNC: 58 MG/DL
HDLC SERPL: 1.8 {RATIO} (ref 0–5)
HGB BLD-MCNC: 13.3 G/DL (ref 11.5–16)
HIGH TARGET, HITG: 180 MG/DL
IMM GRANULOCYTES # BLD AUTO: 0 K/UL (ref 0–0.04)
IMM GRANULOCYTES NFR BLD AUTO: 0 % (ref 0–0.5)
INSULIN ADMINSTERED, INSADM: 0 UNITS/HOUR
INSULIN ADMINSTERED, INSADM: 1.4 UNITS/HOUR
INSULIN ADMINSTERED, INSADM: 2.1 UNITS/HOUR
INSULIN ADMINSTERED, INSADM: 3.2 UNITS/HOUR
INSULIN ORDER, INSORD: 0 UNITS/HOUR
INSULIN ORDER, INSORD: 1.4 UNITS/HOUR
INSULIN ORDER, INSORD: 2.1 UNITS/HOUR
INSULIN ORDER, INSORD: 3.2 UNITS/HOUR
LDLC SERPL CALC-MCNC: 40.8 MG/DL (ref 0–100)
LIPID PROFILE,FLP: NORMAL
LOW TARGET, LOT: 140 MG/DL
LYMPHOCYTES # BLD: 2.8 K/UL (ref 0.8–3.5)
LYMPHOCYTES NFR BLD: 45 % (ref 12–49)
MAGNESIUM SERPL-MCNC: 1.6 MG/DL (ref 1.6–2.4)
MCH RBC QN AUTO: 26.1 PG (ref 26–34)
MCHC RBC AUTO-ENTMCNC: 34.3 G/DL (ref 30–36.5)
MCV RBC AUTO: 76.2 FL (ref 80–99)
METHADONE UR QL: NEGATIVE
MINUTES UNTIL NEXT BG, NBG: 15 MIN
MINUTES UNTIL NEXT BG, NBG: 60 MIN
MONOCYTES # BLD: 0.5 K/UL (ref 0–1)
MONOCYTES NFR BLD: 9 % (ref 5–13)
MULTIPLIER, MUL: 0
MULTIPLIER, MUL: 0.01
MULTIPLIER, MUL: 0.01
MULTIPLIER, MUL: 0.02
MULTIPLIER, MUL: 0.02
NEUTS SEG # BLD: 2.5 K/UL (ref 1.8–8)
NEUTS SEG NFR BLD: 41 % (ref 32–75)
NRBC # BLD: 0 K/UL (ref 0–0.01)
NRBC BLD-RTO: 0 PER 100 WBC
OPIATES UR QL: NEGATIVE
ORDER INITIALS, ORDINIT: NORMAL
P-R INTERVAL, ECG05: 134 MS
PCP UR QL: NEGATIVE
PHOSPHATE SERPL-MCNC: 3.8 MG/DL (ref 2.6–4.7)
PLATELET # BLD AUTO: 191 K/UL (ref 150–400)
PMV BLD AUTO: 11.3 FL (ref 8.9–12.9)
POTASSIUM SERPL-SCNC: 3 MMOL/L (ref 3.5–5.1)
POTASSIUM SERPL-SCNC: 3.3 MMOL/L (ref 3.5–5.1)
PROT SERPL-MCNC: 5.8 G/DL (ref 6.4–8.2)
Q-T INTERVAL, ECG07: 402 MS
QRS DURATION, ECG06: 76 MS
QTC CALCULATION (BEZET), ECG08: 491 MS
RBC # BLD AUTO: 5.09 M/UL (ref 3.8–5.2)
SERVICE CMNT-IMP: ABNORMAL
SERVICE CMNT-IMP: NORMAL
SODIUM SERPL-SCNC: 141 MMOL/L (ref 136–145)
SODIUM SERPL-SCNC: 144 MMOL/L (ref 136–145)
TRIGL SERPL-MCNC: 36 MG/DL (ref ?–150)
TROPONIN I SERPL-MCNC: <0.05 NG/ML
VENTRICULAR RATE, ECG03: 90 BPM
VLDLC SERPL CALC-MCNC: 7.2 MG/DL
WBC # BLD AUTO: 6.1 K/UL (ref 3.6–11)

## 2019-03-19 PROCEDURE — 84100 ASSAY OF PHOSPHORUS: CPT

## 2019-03-19 PROCEDURE — 85025 COMPLETE CBC W/AUTO DIFF WBC: CPT

## 2019-03-19 PROCEDURE — 82550 ASSAY OF CK (CPK): CPT

## 2019-03-19 PROCEDURE — 82962 GLUCOSE BLOOD TEST: CPT

## 2019-03-19 PROCEDURE — 74011250637 HC RX REV CODE- 250/637: Performed by: INTERNAL MEDICINE

## 2019-03-19 PROCEDURE — 74011000258 HC RX REV CODE- 258: Performed by: EMERGENCY MEDICINE

## 2019-03-19 PROCEDURE — 74011636637 HC RX REV CODE- 636/637: Performed by: HOSPITALIST

## 2019-03-19 PROCEDURE — 80307 DRUG TEST PRSMV CHEM ANLYZR: CPT

## 2019-03-19 PROCEDURE — 36415 COLL VENOUS BLD VENIPUNCTURE: CPT

## 2019-03-19 PROCEDURE — 84484 ASSAY OF TROPONIN QUANT: CPT

## 2019-03-19 PROCEDURE — 83735 ASSAY OF MAGNESIUM: CPT

## 2019-03-19 PROCEDURE — 74011000250 HC RX REV CODE- 250: Performed by: EMERGENCY MEDICINE

## 2019-03-19 PROCEDURE — 74011250637 HC RX REV CODE- 250/637: Performed by: HOSPITALIST

## 2019-03-19 PROCEDURE — 80053 COMPREHEN METABOLIC PANEL: CPT

## 2019-03-19 PROCEDURE — 74011636637 HC RX REV CODE- 636/637: Performed by: EMERGENCY MEDICINE

## 2019-03-19 PROCEDURE — 80061 LIPID PANEL: CPT

## 2019-03-19 PROCEDURE — 85379 FIBRIN DEGRADATION QUANT: CPT

## 2019-03-19 RX ORDER — INSULIN GLARGINE 100 [IU]/ML
10 INJECTION, SOLUTION SUBCUTANEOUS DAILY
Status: DISCONTINUED | OUTPATIENT
Start: 2019-03-19 | End: 2019-03-19 | Stop reason: HOSPADM

## 2019-03-19 RX ORDER — INSULIN LISPRO 100 [IU]/ML
INJECTION, SOLUTION INTRAVENOUS; SUBCUTANEOUS
Status: DISCONTINUED | OUTPATIENT
Start: 2019-03-19 | End: 2019-03-19 | Stop reason: HOSPADM

## 2019-03-19 RX ORDER — IBUPROFEN 200 MG
1 TABLET ORAL EVERY 24 HOURS
Qty: 30 PATCH | Refills: 0 | OUTPATIENT
Start: 2019-03-19 | End: 2019-04-18

## 2019-03-19 RX ORDER — MAGNESIUM SULFATE 100 %
4 CRYSTALS MISCELLANEOUS AS NEEDED
Status: DISCONTINUED | OUTPATIENT
Start: 2019-03-19 | End: 2019-03-19 | Stop reason: HOSPADM

## 2019-03-19 RX ORDER — DEXTROSE 50 % IN WATER (D50W) INTRAVENOUS SYRINGE
25-50 AS NEEDED
Status: DISCONTINUED | OUTPATIENT
Start: 2019-03-19 | End: 2019-03-19 | Stop reason: HOSPADM

## 2019-03-19 RX ORDER — POTASSIUM CHLORIDE 750 MG/1
40 TABLET, FILM COATED, EXTENDED RELEASE ORAL
Status: COMPLETED | OUTPATIENT
Start: 2019-03-19 | End: 2019-03-19

## 2019-03-19 RX ADMIN — DEXTROSE MONOHYDRATE 25 G: 500 INJECTION PARENTERAL at 05:10

## 2019-03-19 RX ADMIN — POTASSIUM CHLORIDE 40 MEQ: 750 TABLET, EXTENDED RELEASE ORAL at 09:12

## 2019-03-19 RX ADMIN — LOSARTAN POTASSIUM 100 MG: 50 TABLET ORAL at 09:12

## 2019-03-19 RX ADMIN — INSULIN GLARGINE 10 UNITS: 100 INJECTION, SOLUTION SUBCUTANEOUS at 10:28

## 2019-03-19 RX ADMIN — INSULIN LISPRO 12 UNITS: 100 INJECTION, SOLUTION INTRAVENOUS; SUBCUTANEOUS at 12:11

## 2019-03-19 RX ADMIN — DIVALPROEX SODIUM 500 MG: 500 TABLET, DELAYED RELEASE ORAL at 09:13

## 2019-03-19 RX ADMIN — ASPIRIN 81 MG: 81 TABLET ORAL at 09:13

## 2019-03-19 RX ADMIN — HYDROCHLOROTHIAZIDE 12.5 MG: 25 TABLET ORAL at 09:12

## 2019-03-19 RX ADMIN — SODIUM CHLORIDE 1.4 UNITS/HR: 900 INJECTION, SOLUTION INTRAVENOUS at 01:48

## 2019-03-19 NOTE — PROGRESS NOTES
Spiritual Care Assessment/Progress Note [address] 
 
 
NAME: Ole James      MRN: [id number] AGE: 38 y.o. SEX: female Taoist Affiliation: Raiing  
Language: [place] 3/19/2019     Total Time (in minutes): 10 Spiritual Assessment begun in [address] [facility] [address] DEP through conversation with: 
  
    [x]Patient        [] Family    [] Friend(s) Reason for Consult: Emergency Department visit Spiritual beliefs: (Please include comment if needed) [x] Identifies with a celsa tradition:     
   [] Supported by a celsa community:        
   [] Claims no spiritual orientation:       
   [] Seeking spiritual identity:            
   [] Adheres to an individual form of spirituality:       
   [] Not able to assess:                   
 
    
Identified resources for coping:  
   [x] Prayer                           
   [] Music                  [] Guided Imagery 
   [] Family/friends                 [] Pet visits [x] Devotional reading                         [] Unknown 
   [] Other:                                        
 
 
Interventions offered during this visit: (See comments for more details) Patient Interventions: Affirmation of emotions/emotional suffering, Affirmation of celsa, Prayer (assurance of), Prayer (actual), Other (comment) Plan of Care: 
 
 [] Support spiritual and/or cultural needs  
 [] Support AMD and/or advance care planning process    
 [] Support grieving process 
 [] Coordinate Rites and/or Rituals  
 [] Coordination with community clergy [] No spiritual needs identified at this time 
 [] Detailed Plan of Care below (See Comments)  [] Make referral to Music Therapy 
[] Make referral to Pet Therapy    
[] Make referral to Addiction services 
[] Make referral to [facility] 
[] Make referral to Spiritual Care Partner 
[] No future visits requested       
[x] Follow up visits as needed Comments: Initial spiritual assessment in Ed 15/15 Patient was laying in bed, she shared why she was in the hospital, patient requested a bible and prayer. No family and friend were present at the time of visit. Provided listening and prayer to the patient. Consulted with nurse. Advised of  Availability. Rev. Kaz Leisure. Rosendo Underwood

## 2019-03-19 NOTE — CDMP QUERY
Patient admitted with Nonketotic hyperosmolar syndrome , noted to have AMS in ED requiring ativan. If possible, please document in progress notes and d/c summary if you are evaluating and/or treating any of the following: 
 
 
=> Metabolic encephalopathy  
=> Toxic encephalopathy (cocaine) 
=> Other explanation of clinical findings 
=> Clinically Undetermined (no explanation for clinical findings) The medical record reflects the following: 
   Risk Factors: cocaine, hyponatremia Clinical Indicators: ED-Pt screaming out and climbing about of bed. Unable to reorient patient and get back in bed, pt pulling at IV. Pt screaming out \"come to [facility]! \" repeatedly. HPD and 3 RN at bedside and unable to redirect. MD ordered 2mg Ativan and administered by RN. Pt resting quietly in bed at this time. Treatment: ativan Thank you, 
       Jose Luis Antony [facility], [address] [facility]

## 2019-03-19 NOTE — ED NOTES
1202: Paged hospitalist, Dr. Anthony Mccarthy in regards to BG og [phone]. MD informed to give patient 12U humalog. [phone]: Paged hospitalist to inquire about downgrading patient. MD informed that he will come and evaluate patient to get patient discharged. Charge RN updated.

## 2019-03-19 NOTE — ED NOTES
Johana. Dr. Gomez [facility]. PT . Told to stop Insulin drip as well as D5 NS. Will draw cbc and cmp stat.

## 2019-03-19 NOTE — H&P
[address]  HISTORY AND PHYSICAL    Name:  Pedro Valles  MR#:  [id number]  :  1975  ACCOUNT #:  [de-identified]  ADMIT DATE:  2019      PRIMARY CARE PHYSICIAN:  Unknown. SOURCE OF INFORMATION:  Patient. CHIEF COMPLAINT:  Chest pain. HISTORY OF PRESENT ILLNESS:  This is a 81-year-old woman with past medical history significant for type 2 diabetes, hypertension, and schizoaffective disorder, who was in her usual state of health until the day of her presentation at the emergency room when the patient developed chest pain. The pain is located at the center of the chest with radiation to the neck, 7/10 in severity. The chest pain started after the patient smoked cocaine. The patient smokes cocaine every 3 to 4 days. She had been admitted to the psychiatric unit multiple time. The chest pain is constant. No known relieving factors. The patient came to the emergency room for further evaluation. When the patient arrived at the emergency room, she was found to have elevated blood sugar but not in DKA. She was started on continuous infusion of insulin and was referred to the hospitalist service for evaluation for admission. No history of fever, no rigors, no chills. PAST MEDICAL HISTORY:  1.  Schizoaffective disorder. 2.  Hypertension. 3.  Type 2 diabetes. ALLERGIES:  THE PATIENT IS ALLERGIC TO MOTRIN, DILAUDID. MEDICATIONS:  1. Albuterol 90 mcg two puffs by inhalation every 4 hours as needed for wheezing. 2.  Aspirin 81 mg daily. 3.  Cogentin 1 mg every 12 hours. 4.  Klonopin 1 mg every 12 hours. 5.  Depakote 500 mg every 12 hours. 6.  Haldol 5 mg every 12 hours. 7.  Hydrochlorothiazide 12.5 mg daily. 8.  NPH insulin 70/30, 15 units subcutaneously twice daily. 9.  Losartan 100 mg daily. FAMILY HISTORY:  This was reviewed. Her mother had diabetes, heart disease, and hypertension.     PAST SURGICAL HISTORY:  This is significant for gastric bypass, cholecystectomy, tubal ligation. SOCIAL HISTORY:  The patient smokes about a pack of cigarettes daily, 3 to 4 drinks on daily basis. REVIEW OF SYSTEMS:  HEAD, EYES, EARS, NOSE AND THROAT:  No headache, no dizziness, no blurring of vision, no photophobia. RESPIRATORY SYSTEM:  No cough, no shortness of breath, no hemoptysis. CARDIOVASCULAR SYSTEM:  This is positive for chest pain. No orthopnea, no palpation  GASTROINTESTINAL SYSTEM:  No nausea or vomiting. No diarrhea, no constipation. GENITOURINARY SYSTEM:  No dysuria, no urgency, no frequency. All other systems are reviewed and they are negative. PHYSICAL EXAMINATION:  GENERAL APPEARANCE:  The patient appeared ill, in moderate distress. VITAL SIGNS:  On arrival at the emergency room, temperature 98.4, pulse 97, respiratory rate 20, blood pressure 180/91, and oxygen saturation 99% on room air. HEAD:  Normocephalic, atraumatic. EYES:  Normal eye movement. No redness, no drainage, no discharge. EARS:  Normal external ears with no evidence of drainage. NOSE:  No deformity, no drainage. MOUTH AND THROAT:  No visible oral lesion. Dry oral mucosa. NECK:  Neck is supple. No JVD, no thyromegaly. CHEST:  A few expiratory wheezing. No crackles. HEART:  Normal S1 and S2, regular. No clinically appreciable murmur. ABDOMEN:  Soft, nontender. Normal bowel sounds. CNS:  Alert, oriented x3. No gross focal neurological deficit. EXTREMITIES:  No edema. Pulses 2+ bilaterally. MUSCULOSKELETAL SYSTEM:  No evidence of joint deformity or swelling. SKIN:  No active skin lesions seen in the exposed part of the body. PSYCHIATRY:  Normal mood and affect. LYMPHATIC SYSTEM:  No cervical lymphadenopathy. DIAGNOSTIC DATA:  Chest x-ray, no acute cardiopulmonary disease. EKG shows normal sinus rhythm, prolonged QT. No significant ST or T waves abnormalities. LABORATORY DATA:  Hematology:  WBC 5.5, hemoglobin 14.6, hematocrit 43.0, platelets of 848. Cardiac profile:  Troponin less than 0.05. Chemistry:  Sodium 129, potassium 4.4, chloride 91, CO2 of 27, glucose 718, BUN 11, creatinine 1.27, calcium 8.8, total bilirubin 0.6, ALT 26, AST 8, alkaline phosphatase 121, total protein 7.6, albumin level 3.3, globulin 4.3. ASSESSMENT:  1.  Nonketotic hyperosmolar syndrome. 2.  Hyponatremia. 3.  Hypertension. 4.  Tobacco abuse. 5.  Schizoaffective disorder. 6.  Alcohol abuse. 7.  Substance abuse. 8.  Chest pain. PLAN:  1. Nonketotic hyperosmolar syndrome: We will admit the patient for further evaluation and treatment. This is most likely as a result of noncompliance with treatment. We will carry out a fluid therapy. We will continue with continuous infusion of insulin started in the emergency room. 2.  Hyponatremia: This is as a result of the nonketotic hyperosmolar syndrome. We will carry out fluid therapy with normal saline and repeat sodium level. 3.  Hypertension: We will resume a preadmission medication. The patient was to be placed on hydralazine as needed for better blood pressure control. We will check TSH level. 4.  Tobacco abuse: The patient advised to quit smoking. We will place the patient on Nicoderm patch. 5.  Schizoaffective disorder: We will resume preadmission medication. 6.  Alcohol abuse: We will place the patient on banana bag. If the patient's CIWA score becomes elevated, we will initiate CIWA protocol. 7.  Substance abuse: The patient advised to quit. We will check a urine drug screen. 8.  Chest pain:  The patient's chest pain is fairly atypical.  We will check cardiac markers to rule out acute myocardiac infarction. We will check lipase and amylase level. We will also check D-dimer to evaluate the patient for thromboembolism as the possible cause of chest pain. 9.  Other issues:  Code status: The patient is a full code. We will place the patient on heparin for DVT prophylaxis.     FUNCTIONAL STATUS PRIOR TO ADMISSION:  THE PATIENT IS AMBULATORY WITHOUT ASSISTANCE OR DEVICE.         Jn Hall MD      [id number]  D:  03/18/2019 18:54  T:  03/18/2019 20:53  JOB #:  [id number]

## 2019-03-19 NOTE — DISCHARGE INSTRUCTIONS
Discharge Instructions       PATIENT ID: Olu Branch  MRN: [id number]   YOB: 1975    DATE OF ADMISSION: 3/18/2019  4:13 PM    DATE OF DISCHARGE: 3/19/2019    PRIMARY CARE PROVIDER: Marcel Gonzalez MD     ATTENDING PHYSICIAN: Lizzie Valiente MD  DISCHARGING PROVIDER: Ruperto Lima MD    To contact this individual call [phone] and ask the  to page. If unavailable ask to be transferred the Adult Hospitalist Department. DISCHARGE DIAGNOSES   Chest pain  Urine drug positive of cocaine    CONSULTATIONS: IP CONSULT TO CARDIOLOGY    PROCEDURES/SURGERIES: * No surgery found *    PENDING TEST RESULTS:   At the time of discharge the following test results are still pending: none    FOLLOW UP APPOINTMENTS:   Follow-up Information     Follow up With Specialties Details Why Contact Info    PCP  In 1 week             ADDITIONAL CARE RECOMMENDATIONS:   Follow up with PMD    DIET: Diabetic Diet     ACTIVITY: Activity as tolerated      DISCHARGE MEDICATIONS:   See Medication Reconciliation Form    · It is important that you take the medication exactly as they are prescribed. · Keep your medication in the bottles provided by the pharmacist and keep a list of the medication names, dosages, and times to be taken in your wallet. · Do not take other medications without consulting your doctor. NOTIFY YOUR PHYSICIAN FOR ANY OF THE FOLLOWING:   Fever over 101 degrees for 24 hours. Chest pain, shortness of breath, fever, chills, nausea, vomiting, diarrhea, change in mentation, falling, weakness, bleeding. Severe pain or pain not relieved by medications. Or, any other signs or symptoms that you may have questions about.       DISPOSITION:  x  Home With:   OT  PT  HH  RN       SNF/Inpatient Rehab/LTAC    Independent/assisted living    Hospice    Other:     CDMP Checked:   Yes x     PROBLEM LIST Updated:  Yes x       Signed:   Ruperto Lima MD  3/19/2019  12:44 PM

## 2019-03-19 NOTE — ED NOTES
Gave bedside report regarding, SBAR, MAR, and plan of care to Mary Ayon RN. Transfered care of patient to RN.

## 2019-03-19 NOTE — ED NOTES
7446: Verbal shift change report given to Nuvia Leach RN (oncoming nurse) by Miriam Yates RN (offgoing nurse). Report included the following information SBAR, Kardex, ED Summary, [facility], Recent Results and Cardiac Rhythm NSR.  
 
0913: Patient is alert and oriented. MARIA. NSR on the cardiac monitor, 97% on RA, denies any chest pain, SOB. Patient requesting to speak with pastoral care. Call bell within reach, bed-wheels locked, side rails up x 2.  
 
0954: Dr. David Torre, Cardiology, at the bedside. 9854: Patient ambulatory to the restroom.

## 2019-03-19 NOTE — CONSULTS
Date of  Admission: 3/18/2019  4:13 PM     Galina Mohr is a 38 y.o. female admitted for Hyperosmolar non-ketotic state in patient with type 2 diabetes mellitus ([facility] [address] [E11.01]  Subjective:  Asked to see for chest pain. Pt with fairly persistent cp for last month or two, but comes and goes. Chest is sore and worse with cough and deep breath. Comes up frequently in context of er visits or hospitalizations for substance abuse. Saw dr Parveen Lin at [facility] and January and subsequently had normal nuclear stress test.    denies dyspnea, palpitations, syncope, orthopnea, paroxysmal nocturnal dyspnea, exertional chest pressure/discomfort, claudication, lower extremity edema. Cardiac risk factors: smoking/ tobacco exposure, family history, diabetes mellitus. Assessment/Plan: chest pain is atypical, reproducible and recent negative stress test. Obviously cocaine use may be contributory as well, though currently no evidence of myocardial ischemia. She needs no further cardiac testing or medications at this time. Strongly advised to stop illegal drug use.     Patient Active Problem List    Diagnosis Date Noted    Hyperosmolar non-ketotic state in patient with type 2 diabetes mellitus ([facility] [address] 03/18/2019    Asthma 12/28/2018    DKA (diabetic ketoacidoses) ([address] 10/20/2018    Schizoaffective disorder ([address] 08/08/2018    Essential hypertension 05/09/2016    History of cholecystectomy 05/09/2016    Polysubstance abuse ([address] 05/09/2016    Hyperlipidemia 05/09/2016    Drug-seeking behavior 05/09/2016    Uncontrolled diabetes mellitus ([address] 04/25/2016    Hyponatremia 04/25/2016    Chronic pancreatitis ([address] 01/18/2010      Other, MD Marcel  Past Medical History:   Diagnosis Date    Alcohol abuse, in remission     quit 17 days ago    Asthma     does not use inhalers    Borderline personality disorder ([address]     Diabetes ([address]     GERD (gastroesophageal reflux disease)     Hypertension     Other ill-defined conditions(799.89)     kidney stones ,passed one    Other ill-defined conditions(799.89)     sickle cell trait    Other ill-defined conditions(799.89)     increased cholesterol    Pancreatitis     Psychiatric disorder     schizophrenia, bipolar, depression, anxiety       Past Surgical History:   Procedure Laterality Date    ABDOMEN SURGERY PROC UNLISTED  3/12/14    CHOLECYSTECTOMY LAPAROSCOPIC      HX CHOLECYSTECTOMY      HX GASTRIC BYPASS      HX GYN  11/8/2012    c section x2    HX OTHER SURGICAL  3/13/14    ENDOSCOPIC RETROGRADE CHOLANGIOPANCREATOGRAPHY     HX OTHER SURGICAL  8/4/14    endoscopic stent placed to bile duct    HX TUBAL LIGATION  2012     Allergies   Allergen Reactions    Dilaudid [Hydromorphone (Bulk)] Itching    Ibuprofen Not Reported This Time      Family History   Problem Relation Age of Onset    Heart Disease Mother     Diabetes Mother     Hypertension Mother     Hypertension Maternal Grandmother       Current Facility-Administered Medications   Medication Dose Route Frequency    insulin glargine (LANTUS) injection 10 Units  10 Units SubCUTAneous DAILY    glucose chewable tablet 16 g  4 Tab Oral PRN    dextrose (D50W) injection syrg 12.5-25 g  25-50 mL IntraVENous PRN    glucagon (GLUCAGEN) injection 1 mg  1 mg IntraMUSCular PRN    insulin lispro (HUMALOG) injection   SubCUTAneous AC&HS    aspirin delayed-release tablet 81 mg  81 mg Oral DAILY    benztropine (COGENTIN) tablet 1 mg  1 mg Oral Q12H    clonazePAM (KlonoPIN) tablet 1 mg  1 mg Oral Q12H    divalproex DR (DEPAKOTE) tablet 500 mg  500 mg Oral Q12H    haloperidol (HALDOL) tablet 5 mg  5 mg Oral Q12H    haloperidol decanoate (HALDOL DECANOATE) 100 mg/mL LA injection 100 mg  100 mg IntraMUSCular Q28D    hydroCHLOROthiazide (HYDRODIURIL) tablet 12.5 mg  12.5 mg Oral DAILY    losartan (COZAAR) tablet 100 mg  100 mg Oral DAILY    sodium chloride (NS) flush 5-40 mL  5-40 mL IntraVENous Q8H    sodium chloride (NS) flush 5-40 mL  5-40 mL IntraVENous PRN    acetaminophen (TYLENOL) tablet 650 mg  650 mg Oral Q4H PRN    ondansetron (ZOFRAN) injection 4 mg  4 mg IntraVENous Q4H PRN    nicotine (NICODERM CQ) 21 mg/24 hr patch 1 Patch  1 Patch TransDERmal Q24H    heparin (porcine) injection 5,000 Units  5,000 Units SubCUTAneous Q8H    0.9% sodium chloride 2,412 mL with folic acid 1 mg, thiamine 100 mg, mvi, adult no. 4 with vit K 10 mL infusion   IntraVENous Q24H    hydrALAZINE (APRESOLINE) 20 mg/mL injection 10 mg  10 mg IntraVENous Q6H PRN     Current Outpatient Medications   Medication Sig    losartan (COZAAR) 100 mg tablet Take 1 Tab by mouth daily.  benztropine (COGENTIN) 1 mg tablet Take 1 Tab by mouth every twelve (12) hours.  clonazePAM (KLONOPIN) 1 mg tablet Take 1 Tab by mouth every twelve (12) hours. Max Daily Amount: 2 mg. One week taper - do not refill    divalproex DR (DEPAKOTE) 500 mg tablet Take 1 Tab by mouth every twelve (12) hours.  haloperidol (HALDOL) 5 mg tablet Take 1 Tab by mouth every twelve (12) hours. 3 week overlap for PÉREZ, do not refill    [START ON 3/29/2019] haloperidol decanoate (HALDOL DECANOATE) 100 mg/mL injection 1 mL by IntraMUSCular route every twenty-eight (28) days. NEXT DOSE DUE 3/29    hydroCHLOROthiazide (HYDRODIURIL) 12.5 mg tablet Take 1 Tab by mouth daily.  albuterol (PROVENTIL HFA, VENTOLIN HFA, PROAIR HFA) 90 mcg/actuation inhaler Take 2 Puffs by inhalation every four (4) hours as needed for Wheezing.  insulin NPH/insulin regular (NOVOLIN 70/30, HUMULIN 70/30) 100 unit/mL (70-30) injection 15 Units by SubCUTAneous route two (2) times a day.  aspirin delayed-release 81 mg tablet TAKE 1 TABLET BY MOUTH EVERY DAY WITH 6 TO 8 OUNCES OF PLAIN WATER         Review of Symptoms:  A comprehensive review of systems was negative except for that written in the HPI.     Physical Exam    Visit Vitals  BP (!) 160/97   Pulse 86   Temp 97.7 °F (36.5 °C)   Resp 16   Ht 5' 6\" (1.676 m)   Wt 134 lb 7.7 oz (61 kg)   SpO2 97%   BMI 21.71 kg/m²     Neck: supple, symmetrical, trachea midline, no adenopathy, no carotid bruit and no JVD  Heart: regular rate and rhythm, S1, S2 normal, no murmur, click, rub or gallop  Lungs: clear to auscultation bilaterally  Abdomen: soft, non-tender. Bowel sounds normal. No masses,  no organomegaly  Extremities: extremities normal, atraumatic, no cyanosis or edema  Pulses: 2+ and symmetric  Neurologic: Grossly normal    Cardiographics    Telemetry: normal sinus rhythm  ECG: normal sinus rhythm, prolonged QT interval  Echocardiogram: Not done    Recent radiology, intake/output and wt reviewed    Labs:   Recent Results (from the past 24 hour(s))   EKG, 12 LEAD, INITIAL    Collection Time: 03/18/19  4:23 PM   Result Value Ref Range    Ventricular Rate 90 BPM    Atrial Rate 90 BPM    P-R Interval 134 ms    QRS Duration 76 ms    Q-T Interval 402 ms    QTC Calculation (Bezet) 491 ms    Calculated P Axis 62 degrees    Calculated R Axis 17 degrees    Calculated T Axis 67 degrees    Diagnosis       Normal sinus rhythm  Prolonged QT  When compared with ECG of 22-FEB-2019 22:35,  No significant change was found  Confirmed by Maribell Turner MD. ([id number]) on 3/19/2019 12:04:17 AM     CBC WITH AUTOMATED DIFF    Collection Time: 03/18/19  4:26 PM   Result Value Ref Range    WBC 5.5 3.6 - 11.0 K/uL    RBC 5.75 (H) 3.80 - 5.20 M/uL    HGB 14.6 11.5 - 16.0 g/dL    HCT 43.0 35.0 - 47.0 %    MCV 74.8 (L) 80.0 - 99.0 FL    MCH 25.4 (L) 26.0 - 34.0 PG    MCHC 34.0 30.0 - 36.5 g/dL    RDW 18.5 (H) 11.5 - 14.5 %    PLATELET 042 244 - 252 K/uL    MPV 11.3 8.9 - 12.9 FL    NRBC 0.0 0  WBC    ABSOLUTE NRBC 0.00 0.00 - 0.01 K/uL    NEUTROPHILS 65 32 - 75 %    LYMPHOCYTES 25 12 - 49 %    MONOCYTES 7 5 - 13 %    EOSINOPHILS 2 0 - 7 %    BASOPHILS 1 0 - 1 %    IMMATURE GRANULOCYTES 0 0.0 - 0.5 %    ABS. NEUTROPHILS 3.6 1.8 - 8.0 K/UL    ABS. LYMPHOCYTES 1.4 0.8 - 3.5 K/UL    ABS. MONOCYTES 0.4 0.0 - 1.0 K/UL    ABS. EOSINOPHILS 0.1 0.0 - 0.4 K/UL    ABS. BASOPHILS 0.0 0.0 - 0.1 K/UL    ABS. IMM. GRANS. 0.0 0.00 - 0.04 K/UL    DF AUTOMATED     METABOLIC PANEL, COMPREHENSIVE    Collection Time: 03/18/19  4:26 PM   Result Value Ref Range    Sodium 129 (L) 136 - 145 mmol/L    Potassium 4.4 3.5 - 5.1 mmol/L    Chloride 91 (L) 97 - 108 mmol/L    CO2 27 21 - 32 mmol/L    Anion gap 11 5 - 15 mmol/L    Glucose 718 (HH) 65 - 100 mg/dL    BUN 11 6 - 20 MG/DL    Creatinine 1.27 (H) 0.55 - 1.02 MG/DL    BUN/Creatinine ratio 9 (L) 12 - 20      GFR est AA 56 (L) >60 ml/min/1.73m2    GFR est non-AA 46 (L) >60 ml/min/1.73m2    Calcium 8.8 8.5 - 10.1 MG/DL    Bilirubin, total 0.6 0.2 - 1.0 MG/DL    ALT (SGPT) 26 12 - 78 U/L    AST (SGOT) 8 (L) 15 - 37 U/L    Alk. phosphatase 121 (H) 45 - 117 U/L    Protein, total 7.6 6.4 - 8.2 g/dL    Albumin 3.3 (L) 3.5 - 5.0 g/dL    Globulin 4.3 (H) 2.0 - 4.0 g/dL    A-G Ratio 0.8 (L) 1.1 - 2.2     SAMPLES BEING HELD    Collection Time: 03/18/19  4:26 PM   Result Value Ref Range    SAMPLES BEING HELD 1RED 1BLU 2UA 1UC     COMMENT        Add-on orders for these samples will be processed based on acceptable specimen integrity and analyte stability, which may vary by analyte.    TROPONIN I    Collection Time: 03/18/19  4:26 PM   Result Value Ref Range    Troponin-I, Qt. <0.05 <0.05 ng/mL   HEMOGLOBIN A1C WITH EAG    Collection Time: 03/18/19  4:26 PM   Result Value Ref Range    Hemoglobin A1c 10.7 (H) 4.2 - 6.3 %    Est. average glucose 260 mg/dL   MAGNESIUM    Collection Time: 03/18/19  4:26 PM   Result Value Ref Range    Magnesium 2.0 1.6 - 2.4 mg/dL   PHOSPHORUS    Collection Time: 03/18/19  4:26 PM   Result Value Ref Range    Phosphorus 4.1 2.6 - 4.7 MG/DL   GLUCOSE, POC    Collection Time: 03/18/19  6:21 PM   Result Value Ref Range    Glucose (POC) 544 (H) 65 - 100 mg/dL    Performed by Pia Nobles    Collection Time: 03/18/19  6:26 PM   Result Value Ref Range    Glucose 544 mg/dL    Insulin order 9.7 units/hour    Insulin adminstered 9.7 units/hour    Multiplier 0.020     Low target 140 mg/dL    High target 180 mg/dL    D50 order 0.0 ml    D50 administered 0.00 ml    Minutes until next BG 60 min    Order initials JEH     Administered initials [address] Comments     GLUCOSE, POC    Collection Time: 03/18/19  7:32 PM   Result Value Ref Range    Glucose (POC) 330 (H) 65 - 100 mg/dL    Performed by Erika Lou    Collection Time: 03/18/19  7:33 PM   Result Value Ref Range    Glucose 330 mg/dL    Insulin order 5.4 units/hour    Insulin adminstered 5.4 units/hour    Multiplier 0.020     Low target 140 mg/dL    High target 180 mg/dL    D50 order 0.0 ml    D50 administered 0.00 ml    Minutes until next BG 60 min    Order initials AKIN     Administered initials [address] Comments     GLUCOSE, POC    Collection Time: 03/18/19  8:42 PM   Result Value Ref Range    Glucose (POC) 98 65 - 100 mg/dL    Performed by Eboni TruantTodayon    GLUCOSTABILIZER    Collection Time: 03/18/19  8:45 PM   Result Value Ref Range    Glucose 98 mg/dL    Insulin order 0.4 units/hour    Insulin adminstered 0.4 units/hour    Multiplier 0.010     Low target 140 mg/dL    High target 180 mg/dL    D50 order 0.0 ml    D50 administered 0.00 ml    Minutes until next BG 60 min    Order initials GAR     Administered initials [facility] Comments     GLUCOSE, POC    Collection Time: 03/18/19 10:18 PM   Result Value Ref Range    Glucose (POC) 96 65 - 100 mg/dL    Performed by Eboni Croon    GLUCOSTABILIZER    Collection Time: 03/18/19 10:20 PM   Result Value Ref Range    Glucose 98 mg/dL    Insulin order 0.0 units/hour    Insulin adminstered 0.0 units/hour    Multiplier 0.000     Low target 140 mg/dL    High target 180 mg/dL    D50 order 0.0 ml    D50 administered 0.00 ml    Minutes until next BG 60 min    Order initials GAR     Administered initials [facility] Comments     GLUCOSE, POC    Collection Time: 03/18/19 11:24 PM   Result Value Ref Range    Glucose (POC) 152 (H) 65 - 100 mg/dL    Performed by Kayden Cadet    Collection Time: 03/18/19 11:26 PM   Result Value Ref Range    Glucose 152 mg/dL    Insulin order 0.0 units/hour    Insulin adminstered 0.0 units/hour    Multiplier 0.000     Low target 140 mg/dL    High target 180 mg/dL    D50 order 0.0 ml    D50 administered 0.00 ml    Minutes until next BG 60 min    Order initials GAR     Administered initials GAR     [facility] Comments     METABOLIC PANEL, BASIC    Collection Time: 03/18/19 11:39 PM   Result Value Ref Range    Sodium 141 136 - 145 mmol/L    Potassium 3.3 (L) 3.5 - 5.1 mmol/L    Chloride 107 97 - 108 mmol/L    CO2 29 21 - 32 mmol/L    Anion gap 5 5 - 15 mmol/L    Glucose 165 (H) 65 - 100 mg/dL    BUN 13 6 - 20 MG/DL    Creatinine 0.85 0.55 - 1.02 MG/DL    BUN/Creatinine ratio 15 12 - 20      GFR est AA >60 >60 ml/min/1.73m2    GFR est non-AA >60 >60 ml/min/1.73m2    Calcium 9.2 8.5 - 10.1 MG/DL   CBC WITH AUTOMATED DIFF    Collection Time: 03/18/19 11:39 PM   Result Value Ref Range    WBC 8.0 3.6 - 11.0 K/uL    RBC 5.50 (H) 3.80 - 5.20 M/uL    HGB 14.3 11.5 - 16.0 g/dL    HCT 41.6 35.0 - 47.0 %    MCV 75.6 (L) 80.0 - 99.0 FL    MCH 26.0 26.0 - 34.0 PG    MCHC 34.4 30.0 - 36.5 g/dL    RDW 17.8 (H) 11.5 - 14.5 %    PLATELET 830 480 - 464 K/uL    MPV 10.8 8.9 - 12.9 FL    NRBC 0.0 0  WBC    ABSOLUTE NRBC 0.00 0.00 - 0.01 K/uL    NEUTROPHILS 49 32 - 75 %    LYMPHOCYTES 37 12 - 49 %    MONOCYTES 10 5 - 13 %    EOSINOPHILS 3 0 - 7 %    BASOPHILS 1 0 - 1 %    IMMATURE GRANULOCYTES 0 0.0 - 0.5 %    ABS. NEUTROPHILS 3.9 1.8 - 8.0 K/UL    ABS. LYMPHOCYTES 3.0 0.8 - 3.5 K/UL    ABS. MONOCYTES 0.8 0.0 - 1.0 K/UL    ABS. EOSINOPHILS 0.3 0.0 - 0.4 K/UL    ABS. BASOPHILS 0.0 0.0 - 0.1 K/UL    ABS. IMM.  GRANS. 0.0 0.00 - 0.04 K/UL    DF AUTOMATED     GLUCOSE, POC    Collection Time: 03/19/19 12:41 AM   Result Value Ref Range    Glucose (POC) 176 (H) 65 - 100 mg/dL    Performed by Radha tok tok tokas    GLUCOSTABILIZER    Collection Time: 03/19/19 12:42 AM   Result Value Ref Range    Glucose 176 mg/dL    Insulin order 0.0 units/hour    Insulin adminstered 0.0 units/hour    Multiplier 0.000     Low target 140 mg/dL    High target 180 mg/dL    D50 order 0.0 ml    D50 administered 0.00 ml    Minutes until next BG 60 min    Order initials GAR     Administered initials GAR     [facility] Comments     GLUCOSE, POC    Collection Time: 03/19/19  1:45 AM   Result Value Ref Range    Glucose (POC) 200 (H) 65 - 100 mg/dL    Performed by Radha Sepulveda    GLUCOSTABILIZER    Collection Time: 03/19/19  1:46 AM   Result Value Ref Range    Glucose 200 mg/dL    Insulin order 1.4 units/hour    Insulin adminstered 1.4 units/hour    Multiplier 0.010     Low target 140 mg/dL    High target 180 mg/dL    D50 order 0.0 ml    D50 administered 0.00 ml    Minutes until next BG 60 min    Order initials GAR     Administered initials GAR     [facility] Comments     GLUCOSE, POC    Collection Time: 03/19/19  2:49 AM   Result Value Ref Range    Glucose (POC) 222 (H) 65 - 100 mg/dL    Performed by Radha Sepulveda    GLUCOSTABILIZER    Collection Time: 03/19/19  2:50 AM   Result Value Ref Range    Glucose 222 mg/dL    Insulin order 3.2 units/hour    Insulin adminstered 3.2 units/hour    Multiplier 0.020     Low target 140 mg/dL    High target 180 mg/dL    D50 order 0.0 ml    D50 administered 0.00 ml    Minutes until next BG 60 min    Order initials GAR     Administered initials SCARLETT     [facility] Comments     GLUCOSE, POC    Collection Time: 03/19/19  3:53 AM   Result Value Ref Range    Glucose (POC) 166 (H) 65 - 100 mg/dL    Performed by Stacy PADILLA    GLUCOSTABILIZER    Collection Time: 03/19/19  3:54 AM   Result Value Ref Range    Glucose 166 mg/dL    Insulin order 2.1 units/hour    Insulin adminstered 2.1 units/hour    Multiplier 0.020     Low target 140 mg/dL High target 180 mg/dL    D50 order 0.0 ml    D50 administered 0.00 ml    Minutes until next BG 60 min    Order initials gar     Administered initials gar     [facility] Comments     MAGNESIUM    Collection Time: 03/19/19  4:05 AM   Result Value Ref Range    Magnesium 1.6 1.6 - 2.4 mg/dL   PHOSPHORUS    Collection Time: 03/19/19  4:05 AM   Result Value Ref Range    Phosphorus 3.8 2.6 - 4.7 MG/DL   D DIMER    Collection Time: 03/19/19  4:05 AM   Result Value Ref Range    D-dimer 0.45 0.00 - 0.65 mg/L FEU   METABOLIC PANEL, COMPREHENSIVE    Collection Time: 03/19/19  4:05 AM   Result Value Ref Range    Sodium 144 136 - 145 mmol/L    Potassium 3.0 (L) 3.5 - 5.1 mmol/L    Chloride 113 (H) 97 - 108 mmol/L    CO2 22 21 - 32 mmol/L    Anion gap 9 5 - 15 mmol/L    Glucose 125 (H) 65 - 100 mg/dL    BUN 12 6 - 20 MG/DL    Creatinine 0.61 0.55 - 1.02 MG/DL    BUN/Creatinine ratio 20 12 - 20      GFR est AA >60 >60 ml/min/1.73m2    GFR est non-AA >60 >60 ml/min/1.73m2    Calcium 7.6 (L) 8.5 - 10.1 MG/DL    Bilirubin, total 0.9 0.2 - 1.0 MG/DL    ALT (SGPT) 21 12 - 78 U/L    AST (SGOT) 14 (L) 15 - 37 U/L    Alk.  phosphatase 82 45 - 117 U/L    Protein, total 5.8 (L) 6.4 - 8.2 g/dL    Albumin 2.4 (L) 3.5 - 5.0 g/dL    Globulin 3.4 2.0 - 4.0 g/dL    A-G Ratio 0.7 (L) 1.1 - 2.2     LIPID PANEL    Collection Time: 03/19/19  4:05 AM   Result Value Ref Range    LIPID PROFILE          Cholesterol, total 106 <200 MG/DL    Triglyceride 36 <150 MG/DL    HDL Cholesterol 58 MG/DL    LDL, calculated 40.8 0 - 100 MG/DL    VLDL, calculated 7.2 MG/DL    CHOL/HDL Ratio 1.8 0.0 - 5.0     CBC WITH AUTOMATED DIFF    Collection Time: 03/19/19  4:05 AM   Result Value Ref Range    WBC 6.1 3.6 - 11.0 K/uL    RBC 5.09 3.80 - 5.20 M/uL    HGB 13.3 11.5 - 16.0 g/dL    HCT 38.8 35.0 - 47.0 %    MCV 76.2 (L) 80.0 - 99.0 FL    MCH 26.1 26.0 - 34.0 PG    MCHC 34.3 30.0 - 36.5 g/dL    RDW 17.1 (H) 11.5 - 14.5 %    PLATELET 268 819 - 559 K/uL    MPV 11.3 8.9 - 12.9 FL    NRBC 0.0 0  WBC    ABSOLUTE NRBC 0.00 0.00 - 0.01 K/uL    NEUTROPHILS 41 32 - 75 %    LYMPHOCYTES 45 12 - 49 %    MONOCYTES 9 5 - 13 %    EOSINOPHILS 4 0 - 7 %    BASOPHILS 1 0 - 1 %    IMMATURE GRANULOCYTES 0 0.0 - 0.5 %    ABS. NEUTROPHILS 2.5 1.8 - 8.0 K/UL    ABS. LYMPHOCYTES 2.8 0.8 - 3.5 K/UL    ABS. MONOCYTES 0.5 0.0 - 1.0 K/UL    ABS. EOSINOPHILS 0.2 0.0 - 0.4 K/UL    ABS. BASOPHILS 0.0 0.0 - 0.1 K/UL    ABS. IMM.  GRANS. 0.0 0.00 - 0.04 K/UL    DF AUTOMATED     TROPONIN I    Collection Time: 03/19/19  4:05 AM   Result Value Ref Range    Troponin-I, Qt. <0.05 <0.05 ng/mL   CK W/ CKMB & INDEX    Collection Time: 03/19/19  4:05 AM   Result Value Ref Range    CK 64 26 - 192 U/L    CK - MB 1.6 <3.6 NG/ML    CK-MB Index 2.5 0.0 - 2.5     DRUG SCREEN, URINE    Collection Time: 03/19/19  4:06 AM   Result Value Ref Range    AMPHETAMINES NEGATIVE  NEG      BARBITURATES NEGATIVE  NEG      BENZODIAZEPINES POSITIVE (A) NEG      COCAINE POSITIVE (A) NEG      METHADONE NEGATIVE  NEG      OPIATES NEGATIVE  NEG      PCP(PHENCYCLIDINE) NEGATIVE  NEG      THC (TH-CANNABINOL) NEGATIVE  NEG      Drug screen comment (NOTE)    GLUCOSE, POC    Collection Time: 03/19/19  5:07 AM   Result Value Ref Range    Glucose (POC) 69 65 - 100 mg/dL    Performed by "[employer]"    Collection Time: 03/19/19  5:07 AM   Result Value Ref Range    Glucose 69 mg/dL    Insulin order 0.0 units/hour    Insulin adminstered 0.0 units/hour    Multiplier 0.010     Low target 140 mg/dL    High target 180 mg/dL    D50 order 12.0 ml    D50 administered 12.00 ml    Minutes until next BG 15 min    Order initials sw     Administered initials sw     GL[facility] Comments     GLUCOSE, POC    Collection Time: 03/19/19  5:26 AM   Result Value Ref Range    Glucose (POC) 117 (H) 65 - 100 mg/dL    Performed by "[employer]"    Collection Time: 03/19/19  5:27 AM   Result Value Ref Range    Glucose 117 mg/dL    Insulin order 0.0 units/hour    Insulin adminstered 0.0 units/hour    Multiplier 0.000     Low target 140 mg/dL    High target 180 mg/dL    D50 order 0.0 ml    D50 administered 0.00 ml    Minutes until next BG 60 min    Order initials sw     Administered initials sw     GLSC[facility] Comments     GLUCOSE, POC    Collection Time: 03/19/19  6:35 AM   Result Value Ref Range    Glucose (POC) 104 (H) 65 - 100 mg/dL    Performed by Houston Rios    GLUCOSTABILIZER    Collection Time: 03/19/19  6:38 AM   Result Value Ref Range    Glucose 104 mg/dL    Insulin order 0.0 units/hour    Insulin adminstered 0.0 units/hour    Multiplier 0.000     Low target 140 mg/dL    High target 180 mg/dL    D50 order 0.0 ml    D50 administered 0.00 ml    Minutes until next BG 60 min    Order initials gar     Administered initials gar     [facility] Comments

## 2019-03-19 NOTE — ED NOTES
Pt screaming out and climbing about of bed. Unable to reorient patient and get back in bed, pt pulling at IV. Pt screaming out \"come to [facility]! \" repeatedly. HPD and 3 RN at bedside and unable to redirect. MD ordered 2mg Ativan and administered by RN. Pt resting quietly in bed at this time.

## 2019-03-19 NOTE — DISCHARGE SUMMARY
Discharge Summary       PATIENT ID: Theresa Huddleston  MRN: [id number]   YOB: 1975    DATE OF ADMISSION: 3/18/2019  4:13 PM    DATE OF DISCHARGE: 3/19/2019   PRIMARY CARE PROVIDER: Marcel Gonzalez MD     ATTENDING PHYSICIAN: Dr Alice Kendrick  DISCHARGING PROVIDER: Alice Kendrick MD    To contact this individual call [phone] and ask the  to page. If unavailable ask to be transferred the Adult Hospitalist Department. CONSULTATIONS: IP CONSULT TO CARDIOLOGY    PROCEDURES/SURGERIES: * No surgery found *    ADMITTING DIAGNOSES & HOSPITAL COURSE:   Nonketotic hyperosmolar syndrome  -Now switched to insulin  -SSI/Lantus  -Will switch to home regimen and discharge the patient    Hyponatremia  -Now improved    Hypertension  -Continue home med     Chest pain  -likely from cocaine  -Counseled extensively  -Cleared by Cardiology    Tobacco abuse  -on Nicotine patch  -Counseled    Schizoaffective disorder:    -We will resume preadmission medication. Alcohol abuse  -Counseled extensively, the patient understands     Urine drug positive of cocaine  -Counseled    Diabetic diet    Code status: FULL CODE  DVT prophylaxis: scd  PTA: home        DISCHARGE DIAGNOSES / PLAN:      1. Chest pain  2. Urine drug positive of cocaine     ADDITIONAL CARE RECOMMENDATIONS:   Follow up with PMD    PENDING TEST RESULTS:   At the time of discharge the following test results are still pending: none    FOLLOW UP APPOINTMENTS:    Follow-up Information     Follow up With Specialties Details Why Contact Info    PCP  In 1 week               DIET: Diabetic Diet    ACTIVITY: Activity as tolerated      DISCHARGE MEDICATIONS:  Current Discharge Medication List      START taking these medications    Details   nicotine (NICODERM CQ) 21 mg/24 hr 1 Patch by TransDERmal route every twenty-four (24) hours for 30 days.   Qty: 30 Patch, Refills: 0         CONTINUE these medications which have NOT CHANGED    Details   losartan (COZAAR) 100 mg tablet Take 1 Tab by mouth daily. Qty: 30 Tab, Refills: 0      benztropine (COGENTIN) 1 mg tablet Take 1 Tab by mouth every twelve (12) hours. Qty: 30 Tab, Refills: 1      clonazePAM (KLONOPIN) 1 mg tablet Take 1 Tab by mouth every twelve (12) hours. Max Daily Amount: 2 mg. One week taper - do not refill  Qty: 7 Tab, Refills: 0    Associated Diagnoses: Schizophrenia, unspecified type ([facility]      divalproex DR (DEPAKOTE) 500 mg tablet Take 1 Tab by mouth every twelve (12) hours. Qty: 30 Tab, Refills: 1      haloperidol (HALDOL) 5 mg tablet Take 1 Tab by mouth every twelve (12) hours. 3 week overlap for PÉREZ, do not refill  Qty: 42 Tab, Refills: 0      haloperidol decanoate (HALDOL DECANOATE) 100 mg/mL injection 1 mL by IntraMUSCular route every twenty-eight (28) days. NEXT DOSE DUE 3/29  Qty: 1 Vial, Refills: 0      hydroCHLOROthiazide (HYDRODIURIL) 12.5 mg tablet Take 1 Tab by mouth daily. Qty: 30 Tab, Refills: 0      albuterol (PROVENTIL HFA, VENTOLIN HFA, PROAIR HFA) 90 mcg/actuation inhaler Take 2 Puffs by inhalation every four (4) hours as needed for Wheezing. Qty: 1 Inhaler, Refills: 0      insulin NPH/insulin regular (NOVOLIN 70/30, HUMULIN 70/30) 100 unit/mL (70-30) injection 15 Units by SubCUTAneous route two (2) times a day. Qty: 10 mL, Refills: 0      aspirin delayed-release 81 mg tablet TAKE 1 TABLET BY MOUTH EVERY DAY WITH 6 TO 8 OUNCES OF PLAIN WATER  Refills: 0               NOTIFY YOUR PHYSICIAN FOR ANY OF THE FOLLOWING:   Fever over 101 degrees for 24 hours. Chest pain, shortness of breath, fever, chills, nausea, vomiting, diarrhea, change in mentation, falling, weakness, bleeding. Severe pain or pain not relieved by medications. Or, any other signs or symptoms that you may have questions about.     DISPOSITION:  x  Home With:   OT  PT  HH  RN       Long term SNF/Inpatient Rehab    Independent/assisted living    Hospice    Other:       PATIENT CONDITION AT DISCHARGE:     Functional status Poor     Deconditioned    x Independent      Cognition    x Lucid     Forgetful     Dementia      Catheters/lines (plus indication)    Anva     PICC     PEG    x None      Code status   x  Full code     DNR      PHYSICAL EXAMINATION AT DISCHARGE:  Please see progress note      CHRONIC MEDICAL DIAGNOSES:  Problem List as of 3/19/2019 Date Reviewed: 3/19/2019          Codes Class Noted - Resolved    * (Principal) Hyperosmolar non-ketotic state in patient with type 2 diabetes mellitus ([facility] ICD-10-CM: E11.01  ICD-9-CM: 250.20  3/18/2019 - Present        Asthma ICD-10-CM: J45.909  ICD-9-CM: 493.90  12/28/2018 - Present        DKA (diabetic ketoacidoses) ([facility] ICD-10-CM: E13.10  ICD-9-CM: 250.10  10/20/2018 - Present        Schizoaffective disorder ([facility] ICD-10-CM: F25.9  ICD-9-CM: 295.70  8/8/2018 - Present        Essential hypertension ICD-10-CM: I10  ICD-9-CM: 401.9  5/9/2016 - Present        History of cholecystectomy ICD-10-CM: Z90.49  ICD-9-CM: V45.79  5/9/2016 - Present        Polysubstance abuse ([facility] ICD-10-CM: F19.10  ICD-9-CM: 305.90  5/9/2016 - Present    Overview Signed 5/9/2016  3:33 PM by Giovanni Beth     ETOH, cocaine             Hyperlipidemia ICD-10-CM: E78.5  ICD-9-CM: 272.4  5/9/2016 - Present        Drug-seeking behavior ICD-10-CM: Z76.5  ICD-9-CM: 305.90  5/9/2016 - Present        Uncontrolled diabetes mellitus ([facility] ICD-10-CM: E11.65  ICD-9-CM: 250.02  4/25/2016 - Present        Hyponatremia ICD-10-CM: E87.1  ICD-9-CM: 276.1  4/25/2016 - Present        Chronic pancreatitis ([facility] ICD-10-CM: K86.1  ICD-9-CM: 577.1  1/18/2010 - Present        RESOLVED: Schizophrenia ([facility] ICD-10-CM: F20.9  ICD-9-CM: 295.90  2/25/2019 - 2/26/2019        RESOLVED: Paranoid schizophrenia ([facility] ICD-10-CM: F20.0  ICD-9-CM: 295.30  4/25/2016 - 2/26/2019              Greater than 35 minutes were spent with the patient on counseling and coordination of care    Signed:   Vicky Brown MD  3/19/2019  12:45 PM

## 2019-03-19 NOTE — PROGRESS NOTES
Spoke with patient to verify how many days she has left on her clonazepam taper and how many days she has left on her haloperidol overlap Patient was aware that she had received a haloperidol injection, but did not remember how many days she has left on the overlap Patient was also unaware of how many days she has left on her clonazepam taper Asked patient if there was anyone we could contact to find out the information and she said she was not sure Patient heavily sedated and was falling asleep while talking, while attempt to ask again later Gordo Pham, PharmD Candidate [id number]

## 2019-03-19 NOTE — DIABETES MGMT
Consult noted for hospital glucose management, however, patient is being discharged from the ED. She has been seen multiple times by DTC. Patient was on insulin drip and received transition insulin.   Tj Parsons MS, RN, CDE

## 2019-03-22 ENCOUNTER — APPOINTMENT (OUTPATIENT)
Dept: GENERAL RADIOLOGY | Age: 44
End: 2019-03-22
Attending: EMERGENCY MEDICINE
Payer: MEDICARE

## 2019-03-22 ENCOUNTER — HOSPITAL ENCOUNTER (EMERGENCY)
Age: 44
Discharge: HOME | End: 2019-03-22
Attending: EMERGENCY MEDICINE | Admitting: EMERGENCY MEDICINE
Payer: MEDICARE

## 2019-03-22 VITALS
RESPIRATION RATE: 18 BRPM | BODY MASS INDEX: 21.71 KG/M2 | SYSTOLIC BLOOD PRESSURE: 158 MMHG | HEIGHT: 66 IN | DIASTOLIC BLOOD PRESSURE: 85 MMHG | TEMPERATURE: 98.4 F | OXYGEN SATURATION: 98 % | HEART RATE: 93 BPM

## 2019-03-22 DIAGNOSIS — F14.10 COCAINE ABUSE (HCC): ICD-10-CM

## 2019-03-22 DIAGNOSIS — R07.9 CHEST PAIN, UNSPECIFIED TYPE: Primary | ICD-10-CM

## 2019-03-22 LAB
ALBUMIN SERPL-MCNC: 3.3 G/DL (ref 3.5–5)
ALBUMIN/GLOB SERPL: 0.8 {RATIO} (ref 1.1–2.2)
ALP SERPL-CCNC: 111 U/L (ref 45–117)
ALT SERPL-CCNC: 30 U/L (ref 12–78)
ANION GAP SERPL CALC-SCNC: 7 MMOL/L (ref 5–15)
AST SERPL-CCNC: 19 U/L (ref 15–37)
ATRIAL RATE: 92 BPM
BASOPHILS # BLD: 0 K/UL (ref 0–0.1)
BASOPHILS NFR BLD: 0 % (ref 0–1)
BILIRUB SERPL-MCNC: 0.6 MG/DL (ref 0.2–1)
BUN SERPL-MCNC: 9 MG/DL (ref 6–20)
BUN/CREAT SERPL: 10 (ref 12–20)
CALCIUM SERPL-MCNC: 8.8 MG/DL (ref 8.5–10.1)
CALCULATED P AXIS, ECG09: 68 DEGREES
CALCULATED R AXIS, ECG10: -8 DEGREES
CALCULATED T AXIS, ECG11: 65 DEGREES
CHLORIDE SERPL-SCNC: 96 MMOL/L (ref 97–108)
CO2 SERPL-SCNC: 27 MMOL/L (ref 21–32)
COMMENT, HOLDF: NORMAL
CREAT SERPL-MCNC: 0.89 MG/DL (ref 0.55–1.02)
DIAGNOSIS, 93000: NORMAL
DIFFERENTIAL METHOD BLD: ABNORMAL
EOSINOPHIL # BLD: 0.2 K/UL (ref 0–0.4)
EOSINOPHIL NFR BLD: 3 % (ref 0–7)
ERYTHROCYTE [DISTWIDTH] IN BLOOD BY AUTOMATED COUNT: 18.5 % (ref 11.5–14.5)
GLOBULIN SER CALC-MCNC: 4.1 G/DL (ref 2–4)
GLUCOSE SERPL-MCNC: 543 MG/DL (ref 65–100)
HCT VFR BLD AUTO: 41.2 % (ref 35–47)
HGB BLD-MCNC: 14.2 G/DL (ref 11.5–16)
IMM GRANULOCYTES # BLD AUTO: 0 K/UL (ref 0–0.04)
IMM GRANULOCYTES NFR BLD AUTO: 0 % (ref 0–0.5)
LYMPHOCYTES # BLD: 2 K/UL (ref 0.8–3.5)
LYMPHOCYTES NFR BLD: 29 % (ref 12–49)
MCH RBC QN AUTO: 25.7 PG (ref 26–34)
MCHC RBC AUTO-ENTMCNC: 34.5 G/DL (ref 30–36.5)
MCV RBC AUTO: 74.6 FL (ref 80–99)
MONOCYTES # BLD: 0.6 K/UL (ref 0–1)
MONOCYTES NFR BLD: 9 % (ref 5–13)
NEUTS SEG # BLD: 4.2 K/UL (ref 1.8–8)
NEUTS SEG NFR BLD: 60 % (ref 32–75)
NRBC # BLD: 0 K/UL (ref 0–0.01)
NRBC BLD-RTO: 0 PER 100 WBC
P-R INTERVAL, ECG05: 134 MS
PLATELET # BLD AUTO: 195 K/UL (ref 150–400)
PMV BLD AUTO: 11.9 FL (ref 8.9–12.9)
POTASSIUM SERPL-SCNC: 4 MMOL/L (ref 3.5–5.1)
PROT SERPL-MCNC: 7.4 G/DL (ref 6.4–8.2)
Q-T INTERVAL, ECG07: 400 MS
QRS DURATION, ECG06: 72 MS
QTC CALCULATION (BEZET), ECG08: 494 MS
RBC # BLD AUTO: 5.52 M/UL (ref 3.8–5.2)
SAMPLES BEING HELD,HOLD: NORMAL
SODIUM SERPL-SCNC: 130 MMOL/L (ref 136–145)
TROPONIN I BLD-MCNC: <0.04 NG/ML (ref 0–0.08)
TROPONIN I SERPL-MCNC: <0.05 NG/ML
VENTRICULAR RATE, ECG03: 92 BPM
WBC # BLD AUTO: 7 K/UL (ref 3.6–11)

## 2019-03-22 PROCEDURE — 99285 EMERGENCY DEPT VISIT HI MDM: CPT

## 2019-03-22 PROCEDURE — 85025 COMPLETE CBC W/AUTO DIFF WBC: CPT

## 2019-03-22 PROCEDURE — 84484 ASSAY OF TROPONIN QUANT: CPT

## 2019-03-22 PROCEDURE — 71045 X-RAY EXAM CHEST 1 VIEW: CPT

## 2019-03-22 PROCEDURE — 36415 COLL VENOUS BLD VENIPUNCTURE: CPT

## 2019-03-22 PROCEDURE — 80053 COMPREHEN METABOLIC PANEL: CPT

## 2019-03-22 PROCEDURE — 93005 ELECTROCARDIOGRAM TRACING: CPT

## 2019-03-22 PROCEDURE — 74011250637 HC RX REV CODE- 250/637: Performed by: EMERGENCY MEDICINE

## 2019-03-22 RX ORDER — IBUPROFEN 400 MG/1
800 TABLET ORAL
Status: COMPLETED | OUTPATIENT
Start: 2019-03-22 | End: 2019-03-22

## 2019-03-22 RX ORDER — NITROGLYCERIN 0.4 MG/1
0.4 TABLET SUBLINGUAL
Status: COMPLETED | OUTPATIENT
Start: 2019-03-22 | End: 2019-03-22

## 2019-03-22 RX ORDER — ACETAMINOPHEN 325 MG/1
650 TABLET ORAL
Status: COMPLETED | OUTPATIENT
Start: 2019-03-22 | End: 2019-03-22

## 2019-03-22 RX ADMIN — ACETAMINOPHEN 650 MG: 325 TABLET ORAL at 02:09

## 2019-03-22 RX ADMIN — NITROGLYCERIN 0.4 MG: 0.4 TABLET, ORALLY DISINTEGRATING SUBLINGUAL at 03:41

## 2019-03-22 RX ADMIN — IBUPROFEN 800 MG: 400 TABLET, FILM COATED ORAL at 02:09

## 2019-03-22 NOTE — ED PROVIDER NOTES
38 y.o. female with past medical history significant for DM, HTN, GERD, asthma, schizophrenia, pancreatitis, and ETOH abuse who presents from home via EMS with chief complaint of chest pain. Pt presents to the ED and reports chest pain which arose a few hours prior to ED arrival after the pt had just smoked crack cocaine. Pt also states she has mild SOB. Pt reports that she does not smoke crack every day and she wants to stay clean but notes that she currently lives with people who smoke crack often. EMS administered aspirin to the pt during transit but the pt states it provided no pain alleviation. Pt states she has nobody who can come get her if she is discharged. Pt denies any abdominal pain, fever, chills, palpitations, or diaphoresis. 
  
There are no other acute medical concerns at this time. 
  
Surgical hx - gastric bypass, cholecystectomy, tubal ligation, bile duct stent placement Social hx - Tobacco use: daily smoker (0.5 pack/day), Alcohol Use: 4.8 oz/week, Drug Use: Crack cocaine smoker  
  
PCP: Lisa, MD Marcel 
  
Note written by Deepthi Reilly, as dictated by Stephanie Somers MD 1:39 AM. The history is provided by the EMS personnel and the patient. No  was used. Past Medical History:  
Diagnosis Date  Alcohol abuse, in remission   
 quit 17 days ago  Asthma   
 does not use inhalers  Borderline personality disorder ([address]  Diabetes ([address]  GERD (gastroesophageal reflux disease)  Hypertension  Other ill-defined conditions(799.89)   
 kidney stones ,passed one  Other ill-defined conditions(799.89) sickle cell trait  Other ill-defined conditions(799.89)   
 increased cholesterol  Pancreatitis  Psychiatric disorder   
 schizophrenia, bipolar, depression, anxiety Past Surgical History:  
Procedure Laterality Date  ABDOMEN SURGERY PROC UNLISTED  3/12/14  CHOLECYSTECTOMY LAPAROSCOPIC    
 HX CHOLECYSTECTOMY  HX GASTRIC BYPASS  HX GYN  11/8/2012  
 c section x2  HX OTHER SURGICAL  3/13/14 ENDOSCOPIC RETROGRADE CHOLANGIOPANCREATOGRAPHY  HX OTHER SURGICAL  8/4/14  
 endoscopic stent placed to bile duct  HX TUBAL LIGATION  2012 Family History:  
Problem Relation Age of Onset  Heart Disease Mother  Diabetes Mother  Hypertension Mother  Hypertension Maternal Grandmother Social History Socioeconomic History  Marital status: SINGLE Spouse name: Not on file  Number of children: Not on file  Years of education: Not on file  Highest education level: Not on file Occupational History  Not on file Social Needs  Financial resource strain: Not on file  Food insecurity:  
  Worry: Not on file Inability: Not on file  Transportation needs:  
  Medical: Not on file Non-medical: Not on file Tobacco Use  Smoking status: Current Every Day Smoker Packs/day: 0.50 Years: 14.00 Pack years: 7.00 Types: Cigarettes  Smokeless tobacco: Never Used  Tobacco comment: cigarettes Substance and Sexual Activity  Alcohol use: Yes Alcohol/week: 4.8 oz Types: 2 Glasses of wine, 6 Cans of beer per week  Drug use: No  
  Types: Cocaine Comment: yesterday 12/27/18  Sexual activity: Yes  
  Partners: Female Birth control/protection: Surgical  
Lifestyle  Physical activity:  
  Days per week: Not on file Minutes per session: Not on file  Stress: Not on file Relationships  Social connections:  
  Talks on phone: Not on file Gets together: Not on file Attends Denominational service: Not on file Active member of club or organization: Not on file Attends meetings of clubs or organizations: Not on file Relationship status: Not on file  Intimate partner violence:  
  Fear of current or ex partner: Not on file Emotionally abused: Not on file   Physically abused: Not on file  
  Forced sexual activity: Not on file Other Topics Concern  Not on file Social History Narrative  Not on file ALLERGIES: Dilaudid [hydromorphone (bulk)] and Ibuprofen Review of Systems Constitutional: Negative for chills and fever. HENT: Negative for congestion and sore throat. Eyes: Negative for pain. Respiratory: Positive for shortness of breath (mild). Cardiovascular: Positive for chest pain. Gastrointestinal: Negative for abdominal pain, diarrhea, nausea and vomiting. Genitourinary: Negative for dysuria and flank pain. Musculoskeletal: Negative for back pain and neck pain. Skin: Negative for rash. Neurological: Negative for dizziness and headaches. Vitals:  
 03/22/19 0117 BP: 164/90 Pulse: 92 Resp: 22 Temp: 98.3 °F (36.8 °C) SpO2: 96% Height: 5' 6\" (1.676 m) Physical Exam  
Constitutional: She is oriented to person, place, and time. She appears well-developed and well-nourished. No distress. HENT:  
Head: Normocephalic. Mouth/Throat: Oropharynx is clear and moist.  
Eyes: Pupils are equal, round, and reactive to light. Conjunctivae and EOM are normal.  
Neck: Normal range of motion. Neck supple. Cardiovascular: Normal rate, regular rhythm and normal heart sounds. Pulmonary/Chest: Effort normal and breath sounds normal. No respiratory distress. Abdominal: Soft. Bowel sounds are normal. There is no tenderness. Musculoskeletal: Normal range of motion. Neurological: She is alert and oriented to person, place, and time. No gross motor or sensory deficits Skin: Skin is warm. Capillary refill takes less than 2 seconds. No rash noted. Vitals reviewed. Note written by Deepthi Collins, as dictated by Otf Shaw MD 1:39 AM  
Recent Results (from the past 24 hour(s)) EKG, 12 LEAD, INITIAL Collection Time: 03/22/19  1:19 AM  
Result Value Ref Range  Ventricular Rate 92 BPM  
 Atrial Rate 92 BPM  
 P-R Interval 134 ms QRS Duration 72 ms Q-T Interval 400 ms QTC Calculation (Bezet) 494 ms Calculated P Axis 68 degrees Calculated R Axis -8 degrees Calculated T Axis 65 degrees Diagnosis Normal sinus rhythm When compared with ECG of 18-MAR-2019 16:23, No significant change was found Confirmed by Anjelica Dial M.D., [address] [facility] ([id number]) on 3/22/2019 6:09:19 AM 
  
SAMPLES BEING HELD Collection Time: 03/22/19  1:26 AM  
Result Value Ref Range SAMPLES BEING HELD RD,LV,BL,GRN   
 COMMENT Add-on orders for these samples will be processed based on acceptable specimen integrity and analyte stability, which may vary by analyte. CBC WITH AUTOMATED DIFF Collection Time: 03/22/19  1:26 AM  
Result Value Ref Range WBC 7.0 3.6 - 11.0 K/uL  
 RBC 5.52 (H) 3.80 - 5.20 M/uL  
 HGB 14.2 11.5 - 16.0 g/dL HCT 41.2 35.0 - 47.0 % MCV 74.6 (L) 80.0 - 99.0 FL  
 MCH 25.7 (L) 26.0 - 34.0 PG  
 MCHC 34.5 30.0 - 36.5 g/dL  
 RDW 18.5 (H) 11.5 - 14.5 % PLATELET 737 873 - 221 K/uL MPV 11.9 8.9 - 12.9 FL  
 NRBC 0.0 0  WBC ABSOLUTE NRBC 0.00 0.00 - 0.01 K/uL NEUTROPHILS 60 32 - 75 % LYMPHOCYTES 29 12 - 49 % MONOCYTES 9 5 - 13 % EOSINOPHILS 3 0 - 7 % BASOPHILS 0 0 - 1 % IMMATURE GRANULOCYTES 0 0.0 - 0.5 % ABS. NEUTROPHILS 4.2 1.8 - 8.0 K/UL  
 ABS. LYMPHOCYTES 2.0 0.8 - 3.5 K/UL  
 ABS. MONOCYTES 0.6 0.0 - 1.0 K/UL  
 ABS. EOSINOPHILS 0.2 0.0 - 0.4 K/UL  
 ABS. BASOPHILS 0.0 0.0 - 0.1 K/UL  
 ABS. IMM. GRANS. 0.0 0.00 - 0.04 K/UL  
 DF AUTOMATED METABOLIC PANEL, COMPREHENSIVE Collection Time: 03/22/19  1:26 AM  
Result Value Ref Range Sodium 130 (L) 136 - 145 mmol/L Potassium 4.0 3.5 - 5.1 mmol/L Chloride 96 (L) 97 - 108 mmol/L  
 CO2 27 21 - 32 mmol/L Anion gap 7 5 - 15 mmol/L Glucose 543 (H) 65 - 100 mg/dL BUN 9 6 - 20 MG/DL Creatinine 0.89 0.55 - 1.02 MG/DL  
 BUN/Creatinine ratio 10 (L) 12 - 20  GFR est AA >60 >60 ml/min/1.73m2 GFR est non-AA >60 >60 ml/min/1.73m2 Calcium 8.8 8.5 - 10.1 MG/DL Bilirubin, total 0.6 0.2 - 1.0 MG/DL  
 ALT (SGPT) 30 12 - 78 U/L  
 AST (SGOT) 19 15 - 37 U/L Alk. phosphatase 111 45 - 117 U/L Protein, total 7.4 6.4 - 8.2 g/dL Albumin 3.3 (L) 3.5 - 5.0 g/dL Globulin 4.1 (H) 2.0 - 4.0 g/dL A-G Ratio 0.8 (L) 1.1 - 2.2    
TROPONIN I Collection Time: 03/22/19  1:26 AM  
Result Value Ref Range Troponin-I, Qt. <0.05 <0.05 ng/mL POC TROPONIN-I Collection Time: 03/22/19  4:55 AM  
Result Value Ref Range Troponin-I (POC) <0.04 0.00 - 0.08 ng/mL Xr Chest [place] Result Date: 3/22/2019 EXAM:  XR CHEST PORT INDICATION:   CP COMPARISON: Chest radiograph 3/18/2019. FINDINGS: AP radiograph of the chest was obtained. No evidence of focal consolidation. Small calcified granulomas in the left upper lung. No pleural effusion or pneumothorax. Heart, irasema, mediastinum are within normal limits. No acute osseous abnormalities. IMPRESSION: No acute cardiopulmonary process. [facility] 
ED Course as of Mar 22 0844 Fri Mar 22, 2019  
0351 Patient reporting chest pain while sitting or asking for food. No improvement nitroglycerin. Patient appears in no acute distress. Troponin-I, Qt.: <0.05 [ZD] 0505 Repeat troponin negative. Patient is sleeping and resting comfortably in the room. No chest pain. [ZD] ED Course User Index [ZD] Abundio More MD  
 
 
Procedures ED EKG interpretation: 
Rhythm: normal sinus rhythm; Rate (approx.): 92; Prolonged QT; 
  
Note written by Deepthi Maher, as dictated by Abundio More MD 1:19 AM

## 2019-03-22 NOTE — ED TRIAGE NOTES
Triage note : pt arrives via ems with 8/10  chest pain   after smoking crack cocaine at 2345 tonight   ; en route pt got 324 of Asprin . Pt denies SOB , n/v .  Pt is a&o x4 . Pt has hypertension

## 2019-03-22 NOTE — DISCHARGE INSTRUCTIONS
Patient Education        Chest Pain: Care Instructions  Your Care Instructions    There are many things that can cause chest pain. Some are not serious and will get better on their own in a few days. But some kinds of chest pain need more testing and treatment. Your doctor may have recommended a follow-up visit in the next 8 to 12 hours. If you are not getting better, you may need more tests or treatment. Even though your doctor has released you, you still need to watch for any problems. The doctor carefully checked you, but sometimes problems can develop later. If you have new symptoms or if your symptoms do not get better, get medical care right away. If you have worse or different chest pain or pressure that lasts more than 5 minutes or you passed out (lost consciousness), call 911 or seek other emergency help right away. A medical visit is only one step in your treatment. Even if you feel better, you still need to do what your doctor recommends, such as going to all suggested follow-up appointments and taking medicines exactly as directed. This will help you recover and help prevent future problems. How can you care for yourself at home? · Rest until you feel better. · Take your medicine exactly as prescribed. Call your doctor if you think you are having a problem with your medicine. · Do not drive after taking a prescription pain medicine. When should you call for help? Call 911 if:    · You passed out (lost consciousness).     · You have severe difficulty breathing.     · You have symptoms of a heart attack. These may include:  ? Chest pain or pressure, or a strange feeling in your chest.  ? Sweating. ? Shortness of breath. ? Nausea or vomiting. ? Pain, pressure, or a strange feeling in your back, neck, jaw, or upper belly or in one or both shoulders or arms. ? Lightheadedness or sudden weakness. ? A fast or irregular heartbeat.   After you call 911, the  may tell you to chew 1 adult-strength or 2 to 4 low-dose aspirin. Wait for an ambulance. Do not try to drive yourself.    Call your doctor today if:    · You have any trouble breathing.     · Your chest pain gets worse.     · You are dizzy or lightheaded, or you feel like you may faint.     · You are not getting better as expected.     · You are having new or different chest pain. Where can you learn more? Go to http://miryam-polo.info/. Enter A120 in the search box to learn more about \"Chest Pain: Care Instructions. \"  Current as of: September 23, 2018  Content Version: 11.9  © 5782-8974 [employer] Don[employer]. Care instructions adapted under license by [employer] (which disclaims liability or warranty for this information). If you have questions about a medical condition or this instruction, always ask your healthcare professional. [place] [address] any warranty or liability for your use of this information. Patient Education        Cocaine Misuse: Care Instructions  Your Care Instructions    Using cocaine can cause physical and mental harm. It can increase your heart rate and blood pressure, which can lead to a heart attack and even death. It can raise your body temperature. You may have nausea, vomiting, and chills. If you smoke cocaine, the fumes can cause breathing problems. If you snort cocaine, it can damage your nasal passages. If you inject cocaine, it can cause an abscess at the injection site or an infection throughout your body. You may become shaky and restless. You also may see or hear things that are not there (hallucinations), or believe things that are not true. When the doctor treated you, he or she may have:  · Watched your symptoms or done tests to find out how much cocaine was in your body. · Treated you to control your heart rate, temperature, and blood pressure. · Given you oxygen to help you breathe.   · Given you medicine to settle your thoughts and help keep you calm. The doctor has checked you carefully, but problems can develop later. If you notice any problems or new symptoms, get medical treatment right away. How can you care for yourself at home? · When you use cocaine regularly, your body and brain get used to it. This is called dependency. If you are dependent on this drug, you may have withdrawal symptoms when you stop using it. You may feel drowsy, have vivid dreams, or feel hungry, tired, or depressed. You may also feel confused and have trouble thinking clearly. To help get past these symptoms:  ? Get plenty of rest.  ? Drink lots of fluids. ? Stay active, but don't tire yourself. ? Eat a healthy diet. · Talk to your doctor about drug counseling programs that can help you stop using cocaine. · When you stop using cocaine, you may develop abstinence syndrome, which can last for months. Symptoms of this may include feeling depressed, being tired, having trouble concentrating, and craving cocaine. When should you call for help? Call 911 anytime you think you may need emergency care. For example, call if:    · You have symptoms of a heart attack. These may include:  ? Chest pain or pressure, or a strange feeling in the chest.  ? Sweating. ? Shortness of breath. ? Nausea or vomiting. ? Pain, pressure, or a strange feeling in the back, neck, jaw, or upper belly or in one or both shoulders or arms. ? A fast or irregular heartbeat. After you call 911, the  may tell you to chew 1 adult-strength or 2 to 4 low-dose aspirin. Wait for an ambulance. Do not try to drive yourself.     · You feel you cannot stop from hurting yourself or someone else.   [facility] your doctor now or seek immediate medical care if:    · You have severe side effects from using cocaine.  These may include problems with thinking, such as seeing things that aren't there or thinking that someone is trying to harm you (paranoia).     · You have new or worse withdrawal symptoms.    Watch closely for changes in your health, and be sure to contact your doctor if:    · You need more help or support to stop. Where can you learn more? Go to http://miryam-polo.info/. Enter B036 in the search box to learn more about \"Cocaine Misuse: Care Instructions. \"  Current as of: May 7, 2018  Content Version: 11.9  © 6177-2762 [employer]. Care instructions adapted under license by [employer] (which disclaims liability or warranty for this information). If you have questions about a medical condition or this instruction, always ask your healthcare professional. [place] [address] any warranty or liability for your use of this information.

## 2019-03-25 ENCOUNTER — HOSPITAL ENCOUNTER (EMERGENCY)
Age: 44
Discharge: HOME | End: 2019-03-25
Attending: EMERGENCY MEDICINE
Payer: MEDICARE

## 2019-03-25 VITALS
DIASTOLIC BLOOD PRESSURE: 95 MMHG | WEIGHT: 145 LBS | TEMPERATURE: 97.7 F | RESPIRATION RATE: 16 BRPM | HEIGHT: 66 IN | SYSTOLIC BLOOD PRESSURE: 187 MMHG | BODY MASS INDEX: 23.3 KG/M2 | HEART RATE: 93 BPM | OXYGEN SATURATION: 99 %

## 2019-03-25 DIAGNOSIS — B37.31 VULVOVAGINAL CANDIDIASIS: Primary | ICD-10-CM

## 2019-03-25 DIAGNOSIS — N76.0 BV (BACTERIAL VAGINOSIS): ICD-10-CM

## 2019-03-25 DIAGNOSIS — B96.89 BV (BACTERIAL VAGINOSIS): ICD-10-CM

## 2019-03-25 DIAGNOSIS — Z20.2 STD EXPOSURE: ICD-10-CM

## 2019-03-25 DIAGNOSIS — R03.0 ELEVATED BLOOD PRESSURE READING: ICD-10-CM

## 2019-03-25 LAB
APPEARANCE UR: CLEAR
BACTERIA URNS QL MICRO: NEGATIVE /HPF
BILIRUB UR QL: NEGATIVE
CLUE CELLS VAG QL WET PREP: NORMAL
COLOR UR: ABNORMAL
EPITH CASTS URNS QL MICRO: ABNORMAL /LPF
GLUCOSE UR STRIP.AUTO-MCNC: >1000 MG/DL
HCG UR QL: NEGATIVE
HGB UR QL STRIP: ABNORMAL
KETONES UR QL STRIP.AUTO: 15 MG/DL
KOH PREP SPEC: NORMAL
LEUKOCYTE ESTERASE UR QL STRIP.AUTO: NEGATIVE
NITRITE UR QL STRIP.AUTO: NEGATIVE
PH UR STRIP: 6.5 [PH] (ref 5–8)
PROT UR STRIP-MCNC: NEGATIVE MG/DL
RBC #/AREA URNS HPF: ABNORMAL /HPF (ref 0–5)
SERVICE CMNT-IMP: NORMAL
SP GR UR REFRACTOMETRY: 1 (ref 1–1.03)
T VAGINALIS VAG QL WET PREP: NORMAL
UA: UC IF INDICATED,UAUC: ABNORMAL
UROBILINOGEN UR QL STRIP.AUTO: 0.2 EU/DL (ref 0.2–1)
WBC URNS QL MICRO: ABNORMAL /HPF (ref 0–4)
YEAST BUDDING URNS QL: PRESENT

## 2019-03-25 PROCEDURE — 74011250637 HC RX REV CODE- 250/637: Performed by: PHYSICIAN ASSISTANT

## 2019-03-25 PROCEDURE — 81025 URINE PREGNANCY TEST: CPT

## 2019-03-25 PROCEDURE — 87491 CHLMYD TRACH DNA AMP PROBE: CPT

## 2019-03-25 PROCEDURE — 87210 SMEAR WET MOUNT SALINE/INK: CPT

## 2019-03-25 PROCEDURE — 99283 EMERGENCY DEPT VISIT LOW MDM: CPT

## 2019-03-25 PROCEDURE — 81001 URINALYSIS AUTO W/SCOPE: CPT

## 2019-03-25 PROCEDURE — 74011250636 HC RX REV CODE- 250/636: Performed by: PHYSICIAN ASSISTANT

## 2019-03-25 PROCEDURE — 96372 THER/PROPH/DIAG INJ SC/IM: CPT

## 2019-03-25 RX ORDER — ASPIRIN 325 MG
1 TABLET, DELAYED RELEASE (ENTERIC COATED) ORAL
Qty: 20 G | Refills: 0 | Status: SHIPPED | OUTPATIENT
Start: 2019-03-25 | End: 2019-04-08

## 2019-03-25 RX ORDER — AZITHROMYCIN 500 MG/1
1000 TABLET, FILM COATED ORAL
Status: COMPLETED | OUTPATIENT
Start: 2019-03-25 | End: 2019-03-25

## 2019-03-25 RX ORDER — METRONIDAZOLE 500 MG/1
500 TABLET ORAL 2 TIMES DAILY
Qty: 14 TAB | Refills: 0 | Status: SHIPPED | OUTPATIENT
Start: 2019-03-25 | End: 2019-03-28

## 2019-03-25 RX ADMIN — AZITHROMYCIN 1000 MG: 500 TABLET, FILM COATED ORAL at 22:41

## 2019-03-25 RX ADMIN — LIDOCAINE HYDROCHLORIDE 250 MG: 10 INJECTION, SOLUTION EPIDURAL; INFILTRATION; INTRACAUDAL; PERINEURAL at 22:41

## 2019-03-26 NOTE — ED NOTES
Emergency Department Nursing Plan of Care The Nursing Plan of Care is developed from the Nursing assessment and Emergency Department Attending provider initial evaluation. The plan of care may be reviewed in the ED Provider note. The Plan of Care was developed with the following considerations:  
Patient / Family readiness to learn indicated by:verbalized understanding Persons(s) to be included in education: patient Barriers to Learning/Limitations:No 
 
Signed Amarilys Ng RN   
3/25/2019   10:23 PM

## 2019-03-26 NOTE — DISCHARGE INSTRUCTIONS
Patient Education        Bacterial Vaginosis: Care Instructions  Your Care Instructions    Bacterial vaginosis is a type of vaginal infection. It is caused by excess growth of certain bacteria that are normally found in the vagina. Symptoms can include itching, swelling, pain when you urinate or have sex, and a gray or yellow discharge with a \"fishy\" odor. It is not considered an infection that is spread through sexual contact. Although symptoms can be annoying and uncomfortable, bacterial vaginosis does not usually cause other health problems. However, if you have it while you are pregnant, it can cause complications. While the infection may go away on its own, most doctors use antibiotics to treat it. You may have been prescribed pills or vaginal cream. With treatment, bacterial vaginosis usually clears up in 5 to 7 days. Follow-up care is a key part of your treatment and safety. Be sure to make and go to all appointments, and call your doctor if you are having problems. It's also a good idea to know your test results and keep a list of the medicines you take. How can you care for yourself at home? · Take your antibiotics as directed. Do not stop taking them just because you feel better. You need to take the full course of antibiotics. · Do not eat or drink anything that contains alcohol if you are taking metronidazole (Flagyl). · Keep using your medicine if you start your period. Use pads instead of tampons while using a vaginal cream or suppository. Tampons can absorb the medicine. · Wear loose cotton clothing. Do not wear nylon and other materials that hold body heat and moisture close to the skin. · Do not scratch. Relieve itching with a cold pack or a cool bath. · Do not wash your vaginal area more than once a day. Use plain water or a mild, unscented soap. Do not douche. When should you call for help?   Watch closely for changes in your health, and be sure to contact your doctor if:    · You have unexpected vaginal bleeding.     · You have a fever.     · You have new or increased pain in your vagina or pelvis.     · You are not getting better after 1 week.     · Your symptoms return after you finish the course of your medicine. Where can you learn more? Go to http://miryam-polo.info/. Larry Harris in the search box to learn more about \"Bacterial Vaginosis: Care Instructions. \"  Current as of: May 14, 2018  Content Version: 11.9  © 0974-1704 [employer]. Care instructions adapted under license by [employer] (which disclaims liability or warranty for this information). If you have questions about a medical condition or this instruction, always ask your healthcare professional. [address] any warranty or liability for your use of this information. Patient Education        Elevated Blood Pressure: Care Instructions  Your Care Instructions    Blood pressure is a measure of how hard the blood pushes against the walls of your arteries. It's normal for blood pressure to go up and down throughout the day. But if it stays up over time, you have high blood pressure. Two numbers tell you your blood pressure. The first number is the systolic pressure. It shows how hard the blood pushes when your heart is pumping. The second number is the diastolic pressure. It shows how hard the blood pushes between heartbeats, when your heart is relaxed and filling with blood. An ideal blood pressure in adults is less than 120/80 (say \"120 over 80\"). High blood pressure is 140/90 or higher. You have high blood pressure if your top number is 140 or higher or your bottom number is 90 or higher, or both. The main test for high blood pressure is simple, fast, and painless. To diagnose high blood pressure, your doctor will test your blood pressure at different times.  After testing your blood pressure, your doctor may ask you to test it again when you are home.  If you are diagnosed with high blood pressure, you can work with your doctor to make a long-term plan to manage it. Follow-up care is a key part of your treatment and safety. Be sure to make and go to all appointments, and call your doctor if you are having problems. It's also a good idea to know your test results and keep a list of the medicines you take. How can you care for yourself at home? · Do not smoke. Smoking increases your risk for heart attack and stroke. If you need help quitting, talk to your doctor about stop-smoking programs and medicines. These can increase your chances of quitting for good. · Stay at a healthy weight. · Try to limit how much sodium you eat to less than 2,300 milligrams (mg) a day. Your doctor may ask you to try to eat less than 1,500 mg a day. · Be physically active. Get at least 30 minutes of exercise on most days of the week. Walking is a good choice. You also may want to do other activities, such as running, swimming, cycling, or playing tennis or team sports. · Avoid or limit alcohol. Talk to your doctor about whether you can drink any alcohol. · Eat plenty of fruits, vegetables, and low-fat dairy products. Eat less saturated and total fats. · Learn how to check your blood pressure at home. When should you call for help? Call your doctor now or seek immediate medical care if:  ? · Your blood pressure is much higher than normal (such as 180/110 or higher). ? · You think high blood pressure is causing symptoms such as:  ¨ Severe headache. ¨ Blurry vision. ? Watch closely for changes in your health, and be sure to contact your doctor if:  ? · You do not get better as expected. Where can you learn more? Go to http://miryam-polo.info/. Enter D534 in the search box to learn more about \"Elevated Blood Pressure: Care Instructions. \"  Current as of: September 21, 2016  Content Version: 11.4  © 9939-1420 [employer], [employer].  Care instructions adapted under license by [employer] (which disclaims liability or warranty for this information). If you have questions about a medical condition or this instruction, always ask your healthcare professional. [address] any warranty or liability for your use of this information. Patient Education        Candidiasis: Care Instructions  Your Care Instructions  Candidiasis (say \"mhs-xec-ZT-uh-annie\") is a yeast infection. Yeast normally lives in your body. But it can cause problems if your body's defenses don't work as they should. Some medicines can increase your chance of getting a yeast infection. These include antibiotics, steroids, and cancer drugs. And some diseases like AIDS and diabetes can make you more likely to get yeast infections. There are different types of yeast infections. Alois Douse is a yeast infection in the mouth. It usually occurs in people with weak immune systems. It causes white patches inside the mouth and throat. Yeast infections of the skin usually occur in skin folds where the skin stays moist. They cause red, oozing patches on your skin. Babies can get these infections under the diaper. People who often wear gloves can get them on their hands. Many women get vaginal yeast infections. They are most common when women take antibiotics. These infections can cause the vagina to itch and burn. They also cause white discharge that looks like cottage cheese. In rare cases, yeast infects the blood. This can cause serious disease. This kind of infection is treated with medicine given through a needle into a vein (IV). After you start treatment, a yeast infection usually goes away quickly. But if your immune system is weak, the infection may come back. Tell your doctor if you get yeast infections often. Follow-up care is a key part of your treatment and safety.  Be sure to make and go to all appointments, and call your doctor if you are having problems. It's also a good idea to know your test results and keep a list of the medicines you take. How can you care for yourself at home? · Take your medicines exactly as prescribed. Call your doctor if you think you are having a problem with your medicine. · Use antibiotics only as directed by your doctor. · Eat yogurt with live cultures. It has bacteria called lactobacillus. It may help prevent some types of yeast infections. · Keep your skin clean and dry. Put powder on moist places. · If you are using a cream or suppository to treat a vaginal yeast infection, don't use condoms or a diaphragm. Use a different type of birth control. · Eat a healthy diet and get regular exercise. This will help keep your immune system strong. When should you call for help? Watch closely for changes in your health, and be sure to contact your doctor if:    · You do not get better as expected. Where can you learn more? Go to http://miryam-polo.info/. Enter L094 in the search box to learn more about \"Candidiasis: Care Instructions. \"  Current as of: May 14, 2018  Content Version: 11.9  © 8302-3429 [employer]. Care instructions adapted under license by [employer] (which disclaims liability or warranty for this information). If you have questions about a medical condition or this instruction, always ask your healthcare professional. [address] any warranty or liability for your use of this information. Patient Education        Exposure to Sexually Transmitted Infections: Care Instructions  Your Care Instructions  Sexually transmitted infections (STIs) are those diseases spread by sexual contact. There are at least 20 different STIs, including chlamydia, gonorrhea, syphilis, and human immunodeficiency virus (HIV), which causes AIDS. Bacteria-caused STIs can be treated and cured. STIs caused by viruses, such as HIV, can be treated but not cured. Some STIs can reduce a woman's chances of getting pregnant in the future. STIs are spread during sexual contact, such as vaginal intercourse and oral or anal sex. Follow-up care is a key part of your treatment and safety. Be sure to make and go to all appointments, and call your doctor if you are having problems. It's also a good idea to know your test results and keep a list of the medicines you take. How can you care for yourself at home? · Your doctor may have given you a shot of antibiotics. If your doctor prescribed antibiotic pills, take them as directed. Do not stop taking them just because you feel better. You need to take the full course of antibiotics. · Do not have sexual contact while you have symptoms of an STI or are being treated for an STI. · Tell your sex partner (or partners) that he or she will need treatment. · If you are a woman, do not douche. Douching changes the normal balance of bacteria in the vagina and may spread an infection up into your reproductive organs. To prevent exposure to STIs in the future  · Use latex condoms every time you have sex. Use them from the beginning to the end of sexual contact. · Talk to your partner before you have sex. Find out if he or she has or is at risk for any STI. Keep in mind that a person may be able to spread an STI even if he or she does not have symptoms. · Do not have sex if you are being treated for an STI. · Do not have sex with anyone who has symptoms of an STI, such as sores on the genitals or mouth. · Having one sex partner (who does not have STIs and does not have sex with anyone else) is a good way to avoid STIs. When should you call for help? Call your doctor now or seek immediate medical care if:    · You have new pain in your belly or pelvis.     · You have symptoms of a urinary tract infection. These may include:  ? Pain or burning when you urinate. ? A frequent need to urinate without being able to pass much urine.   ? Pain in the flank, which is just below the rib cage and above the waist on either side of the back. ? Blood in your urine. ? A fever.     · You have new or worsening pain or swelling in the scrotum.    Watch closely for changes in your health, and be sure to contact your doctor if:    · You have unusual vaginal bleeding.     · You have a discharge from the vagina or penis.     · You have any new symptoms, such as sores, bumps, rashes, blisters, or warts.     · You have itching, tingling, pain, or burning in the genital or anal area.     · You think you may have an STI. Where can you learn more? Go to http://miryam-polo.info/. Enter I668 in the search box to learn more about \"Exposure to Sexually Transmitted Infections: Care Instructions. \"  Current as of: September 11, 2018  Content Version: 11.9  © 4131-4818 [employer]. Care instructions adapted under license by [employer] (which disclaims liability or warranty for this information). If you have questions about a medical condition or this instruction, always ask your healthcare professional. [place] [address] any warranty or liability for your use of this information.

## 2019-03-26 NOTE — ED PROVIDER NOTES
EMERGENCY DEPARTMENT HISTORY AND PHYSICAL EXAM 
 
 
Date: 3/25/2019 Patient Name: Reddy Garcia History of Presenting Illness HPI: Reddy Garcia is a 38 y.o. female with PMHx of DM2, pancreatitis, HTN, borderline personality d/o, GERD, schizoaffective d/o presents to the ED for vaginal d/c and itching x 1 month. Pt says her sx's are constant and progressively worsening. She denies being in any pain, pelvic pain, fever/chills, among other assoc sx's. Pt says that she has had unprotected sex recently. PCP: None Current Outpatient Medications Medication Sig Dispense Refill  metroNIDAZOLE (FLAGYL) 500 mg tablet Take 1 Tab by mouth two (2) times a day for 7 days. 14 Tab 0  clotrimazole (MYCELEX) 1 % vaginal cream Insert 1 Applicator into vagina nightly for 14 days. 20 g 0  
 nicotine (NICODERM CQ) 21 mg/24 hr 1 Patch by TransDERmal route every twenty-four (24) hours for 30 days. 30 Patch 0  
 losartan (COZAAR) 100 mg tablet Take 1 Tab by mouth daily. 30 Tab 0  
 benztropine (COGENTIN) 1 mg tablet Take 1 Tab by mouth every twelve (12) hours. 30 Tab 1  clonazePAM (KLONOPIN) 1 mg tablet Take 1 Tab by mouth every twelve (12) hours. Max Daily Amount: 2 mg. One week taper - do not refill 7 Tab 0  
 divalproex DR (DEPAKOTE) 500 mg tablet Take 1 Tab by mouth every twelve (12) hours. 30 Tab 1  
 haloperidol (HALDOL) 5 mg tablet Take 1 Tab by mouth every twelve (12) hours. 3 week overlap for PÉREZ, do not refill 42 Tab 0  
 [START ON 3/29/2019] haloperidol decanoate (HALDOL DECANOATE) 100 mg/mL injection 1 mL by IntraMUSCular route every twenty-eight (28) days. NEXT DOSE DUE 3/29 1 Vial 0  
 hydroCHLOROthiazide (HYDRODIURIL) 12.5 mg tablet Take 1 Tab by mouth daily. 30 Tab 0  
 albuterol (PROVENTIL HFA, VENTOLIN HFA, PROAIR HFA) 90 mcg/actuation inhaler Take 2 Puffs by inhalation every four (4) hours as needed for Wheezing.  1 Inhaler 0  
 insulin NPH/insulin regular (NOVOLIN 70/30, HUMULIN 70/30) 100 unit/mL (70-30) injection 15 Units by SubCUTAneous route two (2) times a day. 10 mL 0  
 aspirin delayed-release 81 mg tablet TAKE 1 TABLET BY MOUTH EVERY DAY WITH 6 TO 8 OUNCES OF PLAIN WATER  0 Past History Past Medical History: 
Past Medical History:  
Diagnosis Date  Alcohol abuse, in remission   
 quit 17 days ago  Asthma   
 does not use inhalers  Borderline personality disorder ([facility] [address]  Diabetes ([facility] [address]  GERD (gastroesophageal reflux disease)  Hypertension  Other ill-defined conditions(799.89)   
 kidney stones ,passed one  Other ill-defined conditions(799.89) sickle cell trait  Other ill-defined conditions(799.89)   
 increased cholesterol  Pancreatitis  Psychiatric disorder   
 schizophrenia, bipolar, depression, anxiety Past Surgical History: 
Past Surgical History:  
Procedure Laterality Date  ABDOMEN SURGERY PROC UNLISTED  3/12/14 CHOLECYSTECTOMY LAPAROSCOPIC    
 HX CHOLECYSTECTOMY  HX GASTRIC BYPASS  HX GYN  11/8/2012  
 c section x2  HX OTHER SURGICAL  3/13/14 ENDOSCOPIC RETROGRADE CHOLANGIOPANCREATOGRAPHY  HX OTHER SURGICAL  8/4/14  
 endoscopic stent placed to bile duct  HX TUBAL LIGATION  2012 Family History: 
Family History Problem Relation Age of Onset  Heart Disease Mother  Diabetes Mother  Hypertension Mother  Hypertension Maternal Grandmother Social History: 
Social History Tobacco Use  Smoking status: Current Every Day Smoker Packs/day: 0.50 Years: 14.00 Pack years: 7.00 Types: Cigarettes  Smokeless tobacco: Never Used  Tobacco comment: cigarettes Substance Use Topics  Alcohol use: Yes Alcohol/week: 4.8 oz Types: 2 Glasses of wine, 6 Cans of beer per week  Drug use: No  
  Types: Cocaine Comment: yesterday 12/27/18 Allergies: Allergies Allergen Reactions  Dilaudid [Hydromorphone (Bulk)] Itching  Ibuprofen Not Reported This Time Review of Systems Review of Systems Constitutional: Negative for activity change, appetite change, chills, diaphoresis, fatigue, fever and unexpected weight change. HENT: Negative for congestion, dental problem, ear pain, facial swelling, postnasal drip, sinus pressure, sinus pain, sore throat, trouble swallowing and voice change. Eyes: Negative for photophobia and pain. Respiratory: Negative for cough, shortness of breath and wheezing. Cardiovascular: Negative for chest pain, palpitations and leg swelling. Gastrointestinal: Negative for abdominal pain, blood in stool, constipation, diarrhea, nausea and vomiting. Endocrine: Negative for polydipsia, polyphagia and polyuria. Genitourinary: Positive for vaginal discharge. Negative for dysuria, flank pain, frequency, hematuria, menstrual problem, pelvic pain, urgency and vaginal bleeding. Denies pregnancy Musculoskeletal: Negative for back pain, joint swelling, myalgias, neck pain and neck stiffness. Skin: Negative for color change, pallor, rash and wound. Neurological: Negative for dizziness, syncope, speech difficulty, weakness, light-headedness, numbness and headaches. Physical Exam  
 
Vitals:  
 03/25/19 2123 BP: (!) 187/95 Pulse: 93 Resp: 16 Temp: 97.7 °F (36.5 °C) SpO2: 99% Weight: 65.8 kg (145 lb) Height: 5' 6\" (1.676 m) Physical Exam  
Constitutional: She is oriented to person, place, and time. She appears well-developed and well-nourished. No distress. Cardiovascular: Normal rate, regular rhythm, normal heart sounds and intact distal pulses. Pulmonary/Chest: Effort normal and breath sounds normal. No respiratory distress. Abdominal: Soft. Bowel sounds are normal. She exhibits no distension and no mass. There is no tenderness. There is no rebound and no guarding. Neurological: She is alert and oriented to person, place, and time. Skin: Skin is warm and dry.  No rash noted. She is not diaphoretic. No erythema. No pallor. Psychiatric: She has a normal mood and affect. Her behavior is normal. Judgment and thought content normal.  
Nursing note and vitals reviewed. Diagnostic Study Results Labs - Recent Results (from the past 12 hour(s)) DAISY, OTHER SOURCES Collection Time: 03/25/19 10:12 PM  
Result Value Ref Range Special Requests: NO SPECIAL REQUESTS    
 KOH HYPHAL ELEMENTS SEEN    
WET PREP Collection Time: 03/25/19 10:12 PM  
Result Value Ref Range Clue cells CLUE CELLS PRESENT Wet prep NO TRICHOMONAS SEEN    
URINALYSIS W/ REFLEX CULTURE Collection Time: 03/25/19 10:12 PM  
Result Value Ref Range Color YELLOW/STRAW Appearance CLEAR CLEAR Specific gravity 1.005 1.003 - 1.030    
 pH (UA) 6.5 5.0 - 8.0 Protein NEGATIVE  NEG mg/dL Glucose >1,000 (A) NEG mg/dL Ketone 15 (A) NEG mg/dL Bilirubin NEGATIVE  NEG Blood SMALL (A) NEG Urobilinogen 0.2 0.2 - 1.0 EU/dL Nitrites NEGATIVE  NEG Leukocyte Esterase NEGATIVE  NEG    
 WBC 0-4 0 - 4 /hpf  
 RBC 0-5 0 - 5 /hpf Epithelial cells FEW FEW /lpf Bacteria NEGATIVE  NEG /hpf  
 UA:UC IF INDICATED CULTURE NOT INDICATED BY UA RESULT CNI Budding yeast PRESENT (A) NEG    
HCG URINE, QL. - POC Collection Time: 03/25/19 10:14 PM  
Result Value Ref Range Pregnancy test,urine (POC) NEGATIVE  NEG Radiologic Studies - No orders to display CT Results  (Last 48 hours) None CXR Results  (Last 48 hours) None Medical Decision Making I am the first provider for this patient. I reviewed the vital signs, available nursing notes, past medical history, past surgical history, family history, social history ED Course:  
Initial assessment performed. The patients presenting problems have been discussed, and they are in agreement with the care plan formulated and outlined with them.   I have encouraged them to ask questions as they arise throughout their visit. Vital Signs-Reviewed the patient's vital signs. Vitals:  
 03/25/19 2123 BP: (!) 187/95 BP 1 Location: Left arm BP Patient Position: At rest;Sitting Pulse: 93 Resp: 16 Temp: 97.7 °F (36.5 °C) SpO2: 99% Weight: 65.8 kg (145 lb) Height: 5' 6\" (1.676 m) Medications Administered During ED Course Medications  
azithromycin (ZITHROMAX) tablet 1,000 mg (1,000 mg Oral Given 3/25/19 2241) cefTRIAXone (ROCEPHIN) 250 mg in lidocaine (PF) (XYLOCAINE) 10 mg/mL (1 %) IM injection (250 mg IntraMUSCular Given 3/25/19 2241) Progress Note: On re evaluation pt is resting comfortably, is requesting discharge, and has no new complaints, changes, or physical findings. Disposition: D/c home DISCHARGE NOTE:  
I Counseled the patient on diagnosis and care plan. All available lab and imaging results have been reviewed by me and were discussed with the patient, including all incidental findings. The likelihood of other entities in the differential is insufficient to justify any further testing for them. This was explained to the patient. Patient agrees with plan and agrees to follow up with PCP as recommended, or return to the ED if their symptoms worsen. All medications were reviewed with the patient. All of pt's questions and concerns were addressed. The patient was advised that new or worsening symptoms would require further evaluation and should prompt immediate return to the Emergency Department. Discharge instructions have been provided and explained to the patient, along with reasons to return to the ED. Patient voices understanding and is agreeable with the plan for discharge. Patient is ready to go home. Follow-up Information Follow up With Specialties Details Why Contact Sara HOWARD AT [facility] Schedule an appointment as soon as possible for a visit  [address] [facility] 
[phone] [facility] EMERGENCY DEPT Emergency Medicine Go to If symptoms worsen [address] [facility]  Schedule an appointment as soon as possible for a visit  [address] [facility] [address], [address] [place] [address] 
[phone] Current Discharge Medication List  
  
START taking these medications Details  
metroNIDAZOLE (FLAGYL) 500 mg tablet Take 1 Tab by mouth two (2) times a day for 7 days. Qty: 14 Tab, Refills: 0  
  
clotrimazole (MYCELEX) 1 % vaginal cream Insert 1 Applicator into vagina nightly for 14 days. Qty: 20 g, Refills: 0 Provider Notes (Medical Decision Making): DDx: BV, candidiasis, G/C, HSV, trichomoniasis, PID Procedures: 
Procedures Critical Care Time: none Diagnosis Clinical Impression: 1. Vulvovaginal candidiasis 2. BV (bacterial vaginosis) 3. STD exposure 4. Elevated blood pressure reading

## 2019-03-28 ENCOUNTER — HOSPITAL ENCOUNTER (EMERGENCY)
Age: 44
Discharge: HOME | End: 2019-03-28
Attending: EMERGENCY MEDICINE
Payer: MEDICARE

## 2019-03-28 ENCOUNTER — APPOINTMENT (OUTPATIENT)
Dept: GENERAL RADIOLOGY | Age: 44
End: 2019-03-28
Attending: EMERGENCY MEDICINE
Payer: MEDICARE

## 2019-03-28 VITALS
HEIGHT: 66 IN | OXYGEN SATURATION: 98 % | BODY MASS INDEX: 25.88 KG/M2 | HEART RATE: 83 BPM | TEMPERATURE: 98.4 F | RESPIRATION RATE: 15 BRPM | SYSTOLIC BLOOD PRESSURE: 179 MMHG | WEIGHT: 161 LBS | DIASTOLIC BLOOD PRESSURE: 88 MMHG

## 2019-03-28 DIAGNOSIS — R07.9 CHEST PAIN, UNSPECIFIED TYPE: Primary | ICD-10-CM

## 2019-03-28 LAB
ALBUMIN SERPL-MCNC: 2.9 G/DL (ref 3.5–5)
ALBUMIN/GLOB SERPL: 0.7 {RATIO} (ref 1.1–2.2)
ALP SERPL-CCNC: 111 U/L (ref 45–117)
ALT SERPL-CCNC: 20 U/L (ref 12–78)
AMPHET UR QL SCN: NEGATIVE
ANION GAP SERPL CALC-SCNC: 10 MMOL/L (ref 5–15)
APPEARANCE UR: ABNORMAL
AST SERPL-CCNC: 10 U/L (ref 15–37)
ATRIAL RATE: 78 BPM
BACTERIA URNS QL MICRO: NEGATIVE /HPF
BARBITURATES UR QL SCN: NEGATIVE
BASOPHILS # BLD: 0 K/UL (ref 0–0.1)
BASOPHILS NFR BLD: 0 % (ref 0–1)
BENZODIAZ UR QL: NEGATIVE
BILIRUB SERPL-MCNC: 0.3 MG/DL (ref 0.2–1)
BILIRUB UR QL: NEGATIVE
BUN SERPL-MCNC: 8 MG/DL (ref 6–20)
BUN/CREAT SERPL: 11 (ref 12–20)
CALCIUM SERPL-MCNC: 8.5 MG/DL (ref 8.5–10.1)
CALCULATED P AXIS, ECG09: 69 DEGREES
CALCULATED R AXIS, ECG10: -9 DEGREES
CALCULATED T AXIS, ECG11: 86 DEGREES
CANNABINOIDS UR QL SCN: NEGATIVE
CHLORIDE SERPL-SCNC: 98 MMOL/L (ref 97–108)
CO2 SERPL-SCNC: 26 MMOL/L (ref 21–32)
COCAINE UR QL SCN: POSITIVE
COLOR UR: ABNORMAL
CREAT SERPL-MCNC: 0.72 MG/DL (ref 0.55–1.02)
DIAGNOSIS, 93000: NORMAL
DIFFERENTIAL METHOD BLD: ABNORMAL
DRUG SCRN COMMENT,DRGCM: ABNORMAL
EOSINOPHIL # BLD: 0.1 K/UL (ref 0–0.4)
EOSINOPHIL NFR BLD: 1 % (ref 0–7)
EPITH CASTS URNS QL MICRO: NORMAL /LPF
ERYTHROCYTE [DISTWIDTH] IN BLOOD BY AUTOMATED COUNT: 18.3 % (ref 11.5–14.5)
ETHANOL SERPL-MCNC: <10 MG/DL
GLOBULIN SER CALC-MCNC: 4.2 G/DL (ref 2–4)
GLUCOSE BLD STRIP.AUTO-MCNC: 395 MG/DL (ref 65–100)
GLUCOSE BLD STRIP.AUTO-MCNC: 454 MG/DL (ref 65–100)
GLUCOSE SERPL-MCNC: 506 MG/DL (ref 65–100)
GLUCOSE UR STRIP.AUTO-MCNC: >1000 MG/DL
HCT VFR BLD AUTO: 42.6 % (ref 35–47)
HGB BLD-MCNC: 15.1 G/DL (ref 11.5–16)
HGB UR QL STRIP: NEGATIVE
IMM GRANULOCYTES # BLD AUTO: 0 K/UL
IMM GRANULOCYTES NFR BLD AUTO: 0 %
KETONES UR QL STRIP.AUTO: NEGATIVE MG/DL
LEUKOCYTE ESTERASE UR QL STRIP.AUTO: ABNORMAL
LIPASE SERPL-CCNC: 30 U/L (ref 73–393)
LYMPHOCYTES # BLD: 3.1 K/UL (ref 0.8–3.5)
LYMPHOCYTES NFR BLD: 45 % (ref 12–49)
MCH RBC QN AUTO: 25.8 PG (ref 26–34)
MCHC RBC AUTO-ENTMCNC: 35.4 G/DL (ref 30–36.5)
MCV RBC AUTO: 72.7 FL (ref 80–99)
METHADONE UR QL: NEGATIVE
MONOCYTES # BLD: 0.1 K/UL (ref 0–1)
MONOCYTES NFR BLD: 2 % (ref 5–13)
NEUTS BAND NFR BLD MANUAL: 1 % (ref 0–6)
NEUTS SEG # BLD: 3.6 K/UL (ref 1.8–8)
NEUTS SEG NFR BLD: 51 % (ref 32–75)
NITRITE UR QL STRIP.AUTO: NEGATIVE
NRBC # BLD: 0 K/UL (ref 0–0.01)
NRBC BLD-RTO: 0 PER 100 WBC
OPIATES UR QL: NEGATIVE
P-R INTERVAL, ECG05: 132 MS
PCP UR QL: NEGATIVE
PH UR STRIP: 5.5 [PH] (ref 5–8)
PLATELET # BLD AUTO: 144 K/UL (ref 150–400)
POTASSIUM SERPL-SCNC: 3.8 MMOL/L (ref 3.5–5.1)
PROT SERPL-MCNC: 7.1 G/DL (ref 6.4–8.2)
PROT UR STRIP-MCNC: NEGATIVE MG/DL
Q-T INTERVAL, ECG07: 406 MS
QRS DURATION, ECG06: 74 MS
QTC CALCULATION (BEZET), ECG08: 462 MS
RBC # BLD AUTO: 5.86 M/UL (ref 3.8–5.2)
RBC #/AREA URNS HPF: NORMAL /HPF (ref 0–5)
RBC MORPH BLD: ABNORMAL
SERVICE CMNT-IMP: ABNORMAL
SERVICE CMNT-IMP: ABNORMAL
SODIUM SERPL-SCNC: 134 MMOL/L (ref 136–145)
SP GR UR REFRACTOMETRY: 1.01 (ref 1–1.03)
TROPONIN I BLD-MCNC: <0.04 NG/ML (ref 0–0.08)
TROPONIN I SERPL-MCNC: <0.05 NG/ML
UROBILINOGEN UR QL STRIP.AUTO: 0.2 EU/DL (ref 0.2–1)
VENTRICULAR RATE, ECG03: 78 BPM
WBC # BLD AUTO: 6.9 K/UL (ref 3.6–11)
WBC URNS QL MICRO: NORMAL /HPF (ref 0–4)

## 2019-03-28 PROCEDURE — 74011250636 HC RX REV CODE- 250/636: Performed by: EMERGENCY MEDICINE

## 2019-03-28 PROCEDURE — 81001 URINALYSIS AUTO W/SCOPE: CPT

## 2019-03-28 PROCEDURE — 96374 THER/PROPH/DIAG INJ IV PUSH: CPT

## 2019-03-28 PROCEDURE — 36415 COLL VENOUS BLD VENIPUNCTURE: CPT

## 2019-03-28 PROCEDURE — 74011250637 HC RX REV CODE- 250/637: Performed by: EMERGENCY MEDICINE

## 2019-03-28 PROCEDURE — 82962 GLUCOSE BLOOD TEST: CPT

## 2019-03-28 PROCEDURE — 85025 COMPLETE CBC W/AUTO DIFF WBC: CPT

## 2019-03-28 PROCEDURE — 80307 DRUG TEST PRSMV CHEM ANLYZR: CPT

## 2019-03-28 PROCEDURE — 84484 ASSAY OF TROPONIN QUANT: CPT

## 2019-03-28 PROCEDURE — 96361 HYDRATE IV INFUSION ADD-ON: CPT

## 2019-03-28 PROCEDURE — 74011000250 HC RX REV CODE- 250: Performed by: EMERGENCY MEDICINE

## 2019-03-28 PROCEDURE — 93005 ELECTROCARDIOGRAM TRACING: CPT

## 2019-03-28 PROCEDURE — 83690 ASSAY OF LIPASE: CPT

## 2019-03-28 PROCEDURE — 99285 EMERGENCY DEPT VISIT HI MDM: CPT

## 2019-03-28 PROCEDURE — 74011636637 HC RX REV CODE- 636/637: Performed by: EMERGENCY MEDICINE

## 2019-03-28 PROCEDURE — 80053 COMPREHEN METABOLIC PANEL: CPT

## 2019-03-28 RX ORDER — LISINOPRIL 5 MG/1
10 TABLET ORAL
Status: COMPLETED | OUTPATIENT
Start: 2019-03-28 | End: 2019-03-28

## 2019-03-28 RX ORDER — GUAIFENESIN 100 MG/5ML
325 LIQUID (ML) ORAL
Status: COMPLETED | OUTPATIENT
Start: 2019-03-28 | End: 2019-03-28

## 2019-03-28 RX ORDER — CLONIDINE HYDROCHLORIDE 0.1 MG/1
0.2 TABLET ORAL
Status: DISCONTINUED | OUTPATIENT
Start: 2019-03-28 | End: 2019-03-28 | Stop reason: HOSPADM

## 2019-03-28 RX ADMIN — LISINOPRIL 10 MG: 5 TABLET ORAL at 08:43

## 2019-03-28 RX ADMIN — SODIUM CHLORIDE 1000 ML: 900 INJECTION, SOLUTION INTRAVENOUS at 07:36

## 2019-03-28 RX ADMIN — LIDOCAINE HYDROCHLORIDE 40 ML: 20 SOLUTION ORAL; TOPICAL at 07:12

## 2019-03-28 RX ADMIN — HUMAN INSULIN 10 UNITS: 100 INJECTION, SOLUTION SUBCUTANEOUS at 08:22

## 2019-03-28 RX ADMIN — ASPIRIN 324 MG: 81 TABLET, CHEWABLE ORAL at 07:13

## 2019-03-28 NOTE — DISCHARGE INSTRUCTIONS

## 2019-03-28 NOTE — ED NOTES
Bedside shift change report given to [address] [place] [address] (oncoming nurse) by Tai Lopez RN 
 (offgoing nurse). Report included the following information SBAR.

## 2019-03-28 NOTE — ED NOTES
Pt refused to go x-ray at this time reported she tired. want to sleep so going later for x-ray,explained important of test,but she said she want later not right now. Provider and charge nurse made aware.

## 2019-03-28 NOTE — ED PROVIDER NOTES
EMERGENCY DEPARTMENT HISTORY AND PHYSICAL EXAM 
 
 
Date: 3/28/2019 Patient Name: Carlotta Johnson History of Presenting Illness Chief Complaint Patient presents with  Chest Pain History Provided By: Patient HPI: Carlotta Johnson, 38 y.o. female with PMHx significant for chest pain and psychiatric illness, presents private vehicle to the ED with cc of chest pain for 1 hour this patient is well-known to the emergency department for presenting with chest pain on multiple occasions. She also has hypertension and diabetes. Dates that she is had pain for 1 hour with some mild shortness of breath. No nausea or vomiting. There are no other complaints, changes, or physical findings at this time. PCP: None Current Facility-Administered Medications Medication Dose Route Frequency Provider Last Rate Last Dose  cloNIDine HCl (CATAPRES) tablet 0.2 mg  0.2 mg Oral NOW Justin Do MD      
 
Current Outpatient Medications Medication Sig Dispense Refill  insulin NPH/insulin regular (NOVOLIN 70/30, HUMULIN 70/30) 100 unit/mL (70-30) injection 15 Units by SubCUTAneous route two (2) times a day. 10 mL 0  
 clotrimazole (MYCELEX) 1 % vaginal cream Insert 1 Applicator into vagina nightly for 14 days. 20 g 0  
 nicotine (NICODERM CQ) 21 mg/24 hr 1 Patch by TransDERmal route every twenty-four (24) hours for 30 days. 30 Patch 0  
 losartan (COZAAR) 100 mg tablet Take 1 Tab by mouth daily. 30 Tab 0  
 benztropine (COGENTIN) 1 mg tablet Take 1 Tab by mouth every twelve (12) hours. 30 Tab 1  clonazePAM (KLONOPIN) 1 mg tablet Take 1 Tab by mouth every twelve (12) hours. Max Daily Amount: 2 mg. One week taper - do not refill 7 Tab 0  
 divalproex DR (DEPAKOTE) 500 mg tablet Take 1 Tab by mouth every twelve (12) hours. 30 Tab 1  
 haloperidol (HALDOL) 5 mg tablet Take 1 Tab by mouth every twelve (12) hours.  3 week overlap for PÉREZ, do not refill 42 Tab 0  
 [START ON 3/29/2019] haloperidol decanoate (HALDOL DECANOATE) 100 mg/mL injection 1 mL by IntraMUSCular route every twenty-eight (28) days. NEXT DOSE DUE 3/29 1 Vial 0  
 hydroCHLOROthiazide (HYDRODIURIL) 12.5 mg tablet Take 1 Tab by mouth daily. 30 Tab 0  
 albuterol (PROVENTIL HFA, VENTOLIN HFA, PROAIR HFA) 90 mcg/actuation inhaler Take 2 Puffs by inhalation every four (4) hours as needed for Wheezing. 1 Inhaler 0  
 aspirin delayed-release 81 mg tablet TAKE 1 TABLET BY MOUTH EVERY DAY WITH 6 TO 8 OUNCES OF PLAIN WATER  0 Past History Past Medical History: 
Past Medical History:  
Diagnosis Date  Alcohol abuse, in remission   
 quit 17 days ago  Asthma   
 does not use inhalers  Borderline personality disorder ([address]  Diabetes [address] [address]  GERD (gastroesophageal reflux disease)  Hypertension  Other ill-defined conditions(799.89)   
 kidney stones ,passed one  Other ill-defined conditions(799.89) sickle cell trait  Other ill-defined conditions(799.89)   
 increased cholesterol  Pancreatitis  Psychiatric disorder   
 schizophrenia, bipolar, depression, anxiety Past Surgical History: 
Past Surgical History:  
Procedure Laterality Date  ABDOMEN SURGERY PROC UNLISTED  3/12/14 CHOLECYSTECTOMY LAPAROSCOPIC    
 HX CHOLECYSTECTOMY  HX GASTRIC BYPASS  HX GYN  11/8/2012  
 c section x2  HX OTHER SURGICAL  3/13/14 ENDOSCOPIC RETROGRADE CHOLANGIOPANCREATOGRAPHY  HX OTHER SURGICAL  8/4/14  
 endoscopic stent placed to bile duct  HX TUBAL LIGATION  2012 Family History: 
Family History Problem Relation Age of Onset  Heart Disease Mother  Diabetes Mother  Hypertension Mother  Hypertension Maternal Grandmother Social History: 
Social History Tobacco Use  Smoking status: Current Every Day Smoker Packs/day: 0.50 Years: 14.00 Pack years: 7.00 Types: Cigarettes  Smokeless tobacco: Never Used  Tobacco comment: cigarettes Substance Use Topics  Alcohol use: Yes Alcohol/week: 4.8 oz Types: 2 Glasses of wine, 6 Cans of beer per week  Drug use: No  
  Types: Cocaine Comment: yesterday 12/27/18 Allergies: Allergies Allergen Reactions  Dilaudid [Hydromorphone (Bulk)] Itching  Ibuprofen Not Reported This Time Review of Systems Review of Systems Constitutional: Negative for chills and fever. HENT: Negative for congestion, rhinorrhea, sneezing and sore throat. Respiratory: Negative for shortness of breath. Cardiovascular: Positive for chest pain. Gastrointestinal: Negative for abdominal pain, nausea and vomiting. Musculoskeletal: Negative for back pain, myalgias and neck stiffness. Skin: Negative for rash. Neurological: Negative for dizziness, weakness and headaches. All other systems reviewed and are negative. Physical Exam  
Physical Exam  
Constitutional: She is oriented to person, place, and time. She appears well-developed and well-nourished. HENT:  
Head: Normocephalic and atraumatic. Mouth/Throat: Oropharynx is clear and moist.  
Eyes: Conjunctivae and EOM are normal.  
Neck: Normal range of motion and full passive range of motion without pain. Neck supple. Cardiovascular: Normal rate, regular rhythm, S1 normal, S2 normal, normal heart sounds, intact distal pulses and normal pulses. No murmur heard. Pulmonary/Chest: Effort normal and breath sounds normal. No respiratory distress. She has no wheezes. Abdominal: Soft. Normal appearance and bowel sounds are normal. She exhibits no distension. There is no tenderness. There is no rebound. Musculoskeletal: Normal range of motion. Neurological: She is alert and oriented to person, place, and time. She has normal strength. Skin: Skin is warm, dry and intact. No rash noted. Psychiatric: She has a normal mood and affect.  Her speech is normal and behavior is normal. Judgment and thought content normal.  
Nursing note and vitals reviewed. Diagnostic Study Results Labs - Recent Results (from the past 12 hour(s)) EKG, 12 LEAD, INITIAL Collection Time: 03/28/19  6:29 AM  
Result Value Ref Range Ventricular Rate 78 BPM  
 Atrial Rate 78 BPM  
 P-R Interval 132 ms QRS Duration 74 ms Q-T Interval 406 ms QTC Calculation (Bezet) 462 ms Calculated P Axis 69 degrees Calculated R Axis -9 degrees Calculated T Axis 86 degrees Diagnosis Normal sinus rhythm Minimal voltage criteria for LVH, may be normal variant Borderline ECG When compared with ECG of 22-MAR-2019 01:19, No significant change was found CBC WITH AUTOMATED DIFF Collection Time: 03/28/19  6:50 AM  
Result Value Ref Range WBC 6.9 3.6 - 11.0 K/uL  
 RBC 5.86 (H) 3.80 - 5.20 M/uL  
 HGB 15.1 11.5 - 16.0 g/dL HCT 42.6 35.0 - 47.0 % MCV 72.7 (L) 80.0 - 99.0 FL  
 MCH 25.8 (L) 26.0 - 34.0 PG  
 MCHC 35.4 30.0 - 36.5 g/dL  
 RDW 18.3 (H) 11.5 - 14.5 % PLATELET 359 (L) 740 - 400 K/uL NRBC 0.0 0  WBC ABSOLUTE NRBC 0.00 0.00 - 0.01 K/uL NEUTROPHILS 51 32 - 75 % BAND NEUTROPHILS 1 0 - 6 % LYMPHOCYTES 45 12 - 49 % MONOCYTES 2 (L) 5 - 13 % EOSINOPHILS 1 0 - 7 % BASOPHILS 0 0 - 1 % IMMATURE GRANULOCYTES 0 %  
 ABS. NEUTROPHILS 3.6 1.8 - 8.0 K/UL  
 ABS. LYMPHOCYTES 3.1 0.8 - 3.5 K/UL  
 ABS. MONOCYTES 0.1 0.0 - 1.0 K/UL  
 ABS. EOSINOPHILS 0.1 0.0 - 0.4 K/UL  
 ABS. BASOPHILS 0.0 0.0 - 0.1 K/UL  
 ABS. IMM. GRANS. 0.0 K/UL  
 DF MANUAL    
 RBC COMMENTS ANISOCYTOSIS 
1+ METABOLIC PANEL, COMPREHENSIVE Collection Time: 03/28/19  6:50 AM  
Result Value Ref Range Sodium 134 (L) 136 - 145 mmol/L Potassium 3.8 3.5 - 5.1 mmol/L Chloride 98 97 - 108 mmol/L  
 CO2 26 21 - 32 mmol/L Anion gap 10 5 - 15 mmol/L Glucose 506 (HH) 65 - 100 mg/dL BUN 8 6 - 20 MG/DL Creatinine 0.72 0.55 - 1.02 MG/DL  
 BUN/Creatinine ratio 11 (L) 12 - 20  GFR est AA >60 >60 ml/min/1.73m2 GFR est non-AA >60 >60 ml/min/1.73m2 Calcium 8.5 8.5 - 10.1 MG/DL Bilirubin, total 0.3 0.2 - 1.0 MG/DL  
 ALT (SGPT) 20 12 - 78 U/L  
 AST (SGOT) 10 (L) 15 - 37 U/L Alk. phosphatase 111 45 - 117 U/L Protein, total 7.1 6.4 - 8.2 g/dL Albumin 2.9 (L) 3.5 - 5.0 g/dL Globulin 4.2 (H) 2.0 - 4.0 g/dL A-G Ratio 0.7 (L) 1.1 - 2.2 LIPASE Collection Time: 03/28/19  6:50 AM  
Result Value Ref Range Lipase 30 (L) 73 - 393 U/L  
ETHYL ALCOHOL Collection Time: 03/28/19  6:50 AM  
Result Value Ref Range ALCOHOL(ETHYL),SERUM <10 <10 MG/DL  
TROPONIN I Collection Time: 03/28/19  6:50 AM  
Result Value Ref Range Troponin-I, Qt. <0.05 <0.05 ng/mL GLUCOSE, POC Collection Time: 03/28/19  8:09 AM  
Result Value Ref Range Glucose (POC) 454 (H) 65 - 100 mg/dL Performed by Ileana Olivia (Tech) GLUCOSE, POC Collection Time: 03/28/19  8:48 AM  
Result Value Ref Range Glucose (POC) 395 (H) 65 - 100 mg/dL Performed by Sonia Dobbs POC TROPONIN-I Collection Time: 03/28/19  8:50 AM  
Result Value Ref Range Troponin-I (POC) <0.04 0.00 - 0.08 ng/mL Radiologic Studies -  
XR CHEST PA LAT    (Results Pending) CT Results  (Last 48 hours) None CXR Results  (Last 48 hours) None EKG: normal EKG, normal sinus rhythm, unchanged from previous tracings. Medical Decision Making I am the first provider for this patient. I reviewed the vital signs, available nursing notes, past medical history, past surgical history, family history and social history. Vital Signs-Reviewed the patient's vital signs. Patient Vitals for the past 12 hrs: 
 Temp Pulse Resp BP SpO2  
03/28/19 0830  83 15 189/88 98 % 03/28/19 0800  80 15 (!) 186/99 98 % 03/28/19 0730  84 23 184/90 97 % 03/28/19 0702  82 17 (!) 186/100 99 % 03/28/19 0621 98.4 °F (36.9 °C) 80 18 (!) 182/96 98 % Records Reviewed: Nursing Notes Provider Notes (Medical Decision Making): Acute coronary syndrome versus arrhythmia versus chest wall pain versus malingering ED Course:  
Initial assessment performed. The patients presenting problems have been discussed, and they are in agreement with the care plan formulated and outlined with them. I have encouraged them to ask questions as they arise throughout their visit. Throughout the emergency department stay patient is found to be uncooperative. She refuses chest x-ray other testing and medicines offered for her medical conditions in the emergency department. She is found to have a blood sugar greater than 500 and was given a liter of fluid as well as 10 units of insulin IV which brought her blood sugar less than 400. Disposition: 
Patient informed of results of workup and is comfortable with discharge to home to follow up with PCP. They are instructed to return as needed for worsening condition. PLAN: 
1. Current Discharge Medication List  
  
 
2. Follow-up Information None Return to ED if worse Diagnosis Clinical Impression: No diagnosis found.

## 2019-03-28 NOTE — ED NOTES
Pt presents via EMS to ED complaining of mid chest pain x 1 hour. Pt reports using crack cocaine around 1900 yesterday with ETOH. Pt is alert and oriented x 3, RR even and unlabored, skin is warm and dry. Assesment completed and pt updated on plan of care. Emergency Department Nursing Plan of Care The Nursing Plan of Care is developed from the Nursing assessment and Emergency Department Attending provider initial evaluation. The plan of care may be reviewed in the ED Provider note. The Plan of Care was developed with the following considerations:  
Patient / Family readiness to learn indicated by:verbalized understanding Persons(s) to be included in education: patient Barriers to Learning/Limitations:No 
 
Signed James Joe RN   
3/28/2019   6:37 AM

## 2019-03-28 NOTE — ED NOTES
Bedside and Verbal shift change report given to [id number] (oncoming nurse) by Janine Cole RN (offgoing nurse). Report included the following information SBAR, Kardex and ED Summary.

## 2019-03-28 NOTE — ED NOTES
PROVIDER IN TRIAGE NOTE: 
6:46 AM 
Fani Deluna has evaluated the patient as the Provider in Triage (PIT). They were seen for chest pain. They have reviewed the vital signs and the triage nurse assessment. They have talked with the patient and any available family and advised that the appropriate studies have been ordered to initiate the work up based on the clinical presentation during the assessment.  . The pt has been requested to contact the triage nurse or PIT immediately if they experiences any changes in their condition during this brief waiting period.

## 2019-04-16 ENCOUNTER — HOSPITAL ENCOUNTER (EMERGENCY)
Age: 44
Discharge: LEFT BEFORE DISCHARGE | End: 2019-04-16
Attending: EMERGENCY MEDICINE
Payer: MEDICARE

## 2019-04-16 VITALS
BODY MASS INDEX: 31.44 KG/M2 | OXYGEN SATURATION: 99 % | HEIGHT: 60 IN | SYSTOLIC BLOOD PRESSURE: 157 MMHG | HEART RATE: 87 BPM | RESPIRATION RATE: 16 BRPM | DIASTOLIC BLOOD PRESSURE: 83 MMHG

## 2019-04-16 DIAGNOSIS — B37.31 VAGINAL CANDIDIASIS: ICD-10-CM

## 2019-04-16 DIAGNOSIS — R73.9 HYPERGLYCEMIA: ICD-10-CM

## 2019-04-16 DIAGNOSIS — F25.9 SCHIZOAFFECTIVE DISORDER, UNSPECIFIED TYPE (HCC): Primary | ICD-10-CM

## 2019-04-16 LAB
ALBUMIN SERPL-MCNC: 3 G/DL (ref 3.5–5)
ALBUMIN/GLOB SERPL: 0.7 {RATIO} (ref 1.1–2.2)
ALP SERPL-CCNC: 96 U/L (ref 45–117)
ALT SERPL-CCNC: 32 U/L (ref 12–78)
AMPHET UR QL SCN: NEGATIVE
ANION GAP SERPL CALC-SCNC: 9 MMOL/L (ref 5–15)
APPEARANCE UR: CLEAR
AST SERPL-CCNC: 25 U/L (ref 15–37)
BACTERIA URNS QL MICRO: NEGATIVE /HPF
BARBITURATES UR QL SCN: NEGATIVE
BASOPHILS # BLD: 0 K/UL (ref 0–0.1)
BASOPHILS NFR BLD: 0 % (ref 0–1)
BENZODIAZ UR QL: NEGATIVE
BILIRUB SERPL-MCNC: 0.4 MG/DL (ref 0.2–1)
BILIRUB UR QL: NEGATIVE
BUN SERPL-MCNC: 5 MG/DL (ref 6–20)
BUN/CREAT SERPL: 6 (ref 12–20)
CALCIUM SERPL-MCNC: 8.6 MG/DL (ref 8.5–10.1)
CANNABINOIDS UR QL SCN: NEGATIVE
CHLORIDE SERPL-SCNC: 94 MMOL/L (ref 97–108)
CLUE CELLS VAG QL WET PREP: NORMAL
CO2 SERPL-SCNC: 28 MMOL/L (ref 21–32)
COCAINE UR QL SCN: POSITIVE
COLOR UR: ABNORMAL
CREAT SERPL-MCNC: 0.87 MG/DL (ref 0.55–1.02)
DIFFERENTIAL METHOD BLD: ABNORMAL
DRUG SCRN COMMENT,DRGCM: ABNORMAL
EOSINOPHIL # BLD: 0.1 K/UL (ref 0–0.4)
EOSINOPHIL NFR BLD: 1 % (ref 0–7)
EPITH CASTS URNS QL MICRO: ABNORMAL /LPF
ERYTHROCYTE [DISTWIDTH] IN BLOOD BY AUTOMATED COUNT: 19.3 % (ref 11.5–14.5)
ETHANOL SERPL-MCNC: <10 MG/DL
GLOBULIN SER CALC-MCNC: 4.3 G/DL (ref 2–4)
GLUCOSE BLD STRIP.AUTO-MCNC: 400 MG/DL (ref 65–100)
GLUCOSE SERPL-MCNC: 655 MG/DL (ref 65–100)
GLUCOSE UR STRIP.AUTO-MCNC: >1000 MG/DL
HCG UR QL: NEGATIVE
HCT VFR BLD AUTO: 40.7 % (ref 35–47)
HGB BLD-MCNC: 14.4 G/DL (ref 11.5–16)
HGB UR QL STRIP: NEGATIVE
IMM GRANULOCYTES # BLD AUTO: 0 K/UL (ref 0–0.04)
IMM GRANULOCYTES NFR BLD AUTO: 0 % (ref 0–0.5)
KETONES UR QL STRIP.AUTO: NEGATIVE MG/DL
KOH PREP SPEC: NORMAL
LEUKOCYTE ESTERASE UR QL STRIP.AUTO: NEGATIVE
LYMPHOCYTES # BLD: 1.5 K/UL (ref 0.8–3.5)
LYMPHOCYTES NFR BLD: 25 % (ref 12–49)
MCH RBC QN AUTO: 26.5 PG (ref 26–34)
MCHC RBC AUTO-ENTMCNC: 35.4 G/DL (ref 30–36.5)
MCV RBC AUTO: 74.8 FL (ref 80–99)
METHADONE UR QL: NEGATIVE
MONOCYTES # BLD: 0.8 K/UL (ref 0–1)
MONOCYTES NFR BLD: 13 % (ref 5–13)
NEUTS SEG # BLD: 3.5 K/UL (ref 1.8–8)
NEUTS SEG NFR BLD: 61 % (ref 32–75)
NITRITE UR QL STRIP.AUTO: NEGATIVE
NRBC # BLD: 0 K/UL (ref 0–0.01)
NRBC BLD-RTO: 0 PER 100 WBC
OPIATES UR QL: NEGATIVE
PCP UR QL: NEGATIVE
PH UR STRIP: 7 [PH] (ref 5–8)
PLATELET # BLD AUTO: 276 K/UL (ref 150–400)
POTASSIUM SERPL-SCNC: 4.7 MMOL/L (ref 3.5–5.1)
PROT SERPL-MCNC: 7.3 G/DL (ref 6.4–8.2)
PROT UR STRIP-MCNC: NEGATIVE MG/DL
RBC # BLD AUTO: 5.44 M/UL (ref 3.8–5.2)
RBC #/AREA URNS HPF: ABNORMAL /HPF (ref 0–5)
RBC MORPH BLD: ABNORMAL
SERVICE CMNT-IMP: ABNORMAL
SERVICE CMNT-IMP: NORMAL
SODIUM SERPL-SCNC: 131 MMOL/L (ref 136–145)
SP GR UR REFRACTOMETRY: 1 (ref 1–1.03)
T VAGINALIS VAG QL WET PREP: NORMAL
UA: UC IF INDICATED,UAUC: ABNORMAL
UROBILINOGEN UR QL STRIP.AUTO: 0.2 EU/DL (ref 0.2–1)
WBC # BLD AUTO: 5.9 K/UL (ref 3.6–11)
WBC URNS QL MICRO: ABNORMAL /HPF (ref 0–4)
YEAST URNS QL MICRO: PRESENT

## 2019-04-16 PROCEDURE — 36415 COLL VENOUS BLD VENIPUNCTURE: CPT

## 2019-04-16 PROCEDURE — 81025 URINE PREGNANCY TEST: CPT

## 2019-04-16 PROCEDURE — 96374 THER/PROPH/DIAG INJ IV PUSH: CPT

## 2019-04-16 PROCEDURE — 99285 EMERGENCY DEPT VISIT HI MDM: CPT

## 2019-04-16 PROCEDURE — 96361 HYDRATE IV INFUSION ADD-ON: CPT

## 2019-04-16 PROCEDURE — 87210 SMEAR WET MOUNT SALINE/INK: CPT

## 2019-04-16 PROCEDURE — 96376 TX/PRO/DX INJ SAME DRUG ADON: CPT

## 2019-04-16 PROCEDURE — 74011636637 HC RX REV CODE- 636/637: Performed by: EMERGENCY MEDICINE

## 2019-04-16 PROCEDURE — 80053 COMPREHEN METABOLIC PANEL: CPT

## 2019-04-16 PROCEDURE — 81001 URINALYSIS AUTO W/SCOPE: CPT

## 2019-04-16 PROCEDURE — 82962 GLUCOSE BLOOD TEST: CPT

## 2019-04-16 PROCEDURE — 80307 DRUG TEST PRSMV CHEM ANLYZR: CPT

## 2019-04-16 PROCEDURE — 74011250637 HC RX REV CODE- 250/637: Performed by: EMERGENCY MEDICINE

## 2019-04-16 PROCEDURE — 85025 COMPLETE CBC W/AUTO DIFF WBC: CPT

## 2019-04-16 PROCEDURE — 74011250636 HC RX REV CODE- 250/636: Performed by: EMERGENCY MEDICINE

## 2019-04-16 PROCEDURE — 90791 PSYCH DIAGNOSTIC EVALUATION: CPT

## 2019-04-16 RX ORDER — ACETAMINOPHEN 500 MG
1000 TABLET ORAL ONCE
Status: COMPLETED | OUTPATIENT
Start: 2019-04-16 | End: 2019-04-16

## 2019-04-16 RX ORDER — ASPIRIN 325 MG
1 TABLET, DELAYED RELEASE (ENTERIC COATED) ORAL
Status: DISCONTINUED | OUTPATIENT
Start: 2019-04-16 | End: 2019-04-16

## 2019-04-16 RX ORDER — MICONAZOLE NITRATE 2 %
1 CREAM WITH APPLICATOR VAGINAL
Status: COMPLETED | OUTPATIENT
Start: 2019-04-16 | End: 2019-04-16

## 2019-04-16 RX ADMIN — SODIUM CHLORIDE 1000 ML: 900 INJECTION, SOLUTION INTRAVENOUS at 20:07

## 2019-04-16 RX ADMIN — ACETAMINOPHEN 1000 MG: 500 TABLET, FILM COATED ORAL at 19:33

## 2019-04-16 RX ADMIN — SODIUM CHLORIDE 1000 ML: 900 INJECTION, SOLUTION INTRAVENOUS at 18:15

## 2019-04-16 RX ADMIN — HUMAN INSULIN 10 UNITS: 100 INJECTION, SOLUTION SUBCUTANEOUS at 20:07

## 2019-04-16 RX ADMIN — MICONAZOLE NITRATE 1 APPLICATOR: 20 CREAM VAGINAL at 19:11

## 2019-04-16 RX ADMIN — HUMAN INSULIN 10 UNITS: 100 INJECTION, SOLUTION SUBCUTANEOUS at 18:27

## 2019-04-16 NOTE — ED NOTES
Emergency Department Nursing Plan of Care The Nursing Plan of Care is developed from the Nursing assessment and Emergency Department Attending provider initial evaluation. The plan of care may be reviewed in the ED Provider note. The Plan of Care was developed with the following considerations:  
Patient / Family readiness to learn indicated by:verbalized understanding Persons(s) to be included in education: patient Barriers to Learning/Limitations:No 
 
Signed Jarrod Roca 4/16/2019   5:26 PM

## 2019-04-16 NOTE — ED NOTES
Attempted to obtain urine sample from pt. Pt states that she is tired and will not try at this point. States that she will try and provide one in 'a little while'.

## 2019-04-16 NOTE — ED NOTES
Pt arrives in ED via EMS after removing all of her clothes in a store and walking around naked scratching her vagina. Pt reports vaginal itching x months. Pt alert and oriented to self however cannot explain why she removed all of her clothes in the store PTA. Pt slow to answer questions and follow commands.

## 2019-04-16 NOTE — ED NOTES
Bedside and Verbal shift change report given to wade tamayo (oncoming nurse) by Stephanie Diehl (offgoing nurse). Report included the following information SBAR, Kardex, MAR and Recent Results.

## 2019-04-16 NOTE — ED PROVIDER NOTES
EMERGENCY DEPARTMENT HISTORY AND PHYSICAL EXAM 
 
 
Date: 2019 Patient Name: Elenita Zimmerman History of Presenting Illness Chief Complaint Patient presents with  Mental Health Problem History Provided By: Patient and EMS 
 
HPI: Elenita Zimmerman, 38 y.o. female with PMHx significant for schizophrenia, bipolar disorder, depression, anxiety, hypertension, diabetes, GERD who presents via EMS to the ED with cc of strange behavior. Per EMS they were called because patient went to a store and took all her clothes off and was walking around in the store naked. Patient states she does remember doing this but does not know why she took her clothes off. She denies any auditory or visual hallucinations, denies SI or HI. Her only complaint is vaginal itching for several months. She does states she is gone off all her psychiatric medications. PMHx: Schizophrenia, bipolar disorder, depression, anxiety, hypertension, diabetes, GERD PSHx: Cholecystectomy, ERCP, BTL, , gastric bypass Social Hx: Yes EtOH; yes smoker; no illicit Drugs PCP: None There are no other complaints, changes, or physical findings at this time. Current Outpatient Medications Medication Sig Dispense Refill  nicotine (NICODERM CQ) 21 mg/24 hr 1 Patch by TransDERmal route every twenty-four (24) hours for 30 days. 30 Patch 0  
 losartan (COZAAR) 100 mg tablet Take 1 Tab by mouth daily. 30 Tab 0  
 benztropine (COGENTIN) 1 mg tablet Take 1 Tab by mouth every twelve (12) hours. 30 Tab 1  clonazePAM (KLONOPIN) 1 mg tablet Take 1 Tab by mouth every twelve (12) hours. Max Daily Amount: 2 mg. One week taper - do not refill 7 Tab 0  
 divalproex DR (DEPAKOTE) 500 mg tablet Take 1 Tab by mouth every twelve (12) hours. 30 Tab 1  
 haloperidol (HALDOL) 5 mg tablet Take 1 Tab by mouth every twelve (12) hours.  3 week overlap for PÉREZ, do not refill 42 Tab 0  
 haloperidol decanoate (HALDOL DECANOATE) 100 mg/mL injection 1 mL by IntraMUSCular route every twenty-eight (28) days. NEXT DOSE DUE 3/29 1 Vial 0  
 hydroCHLOROthiazide (HYDRODIURIL) 12.5 mg tablet Take 1 Tab by mouth daily. 30 Tab 0  
 albuterol (PROVENTIL HFA, VENTOLIN HFA, PROAIR HFA) 90 mcg/actuation inhaler Take 2 Puffs by inhalation every four (4) hours as needed for Wheezing. 1 Inhaler 0  
 insulin NPH/insulin regular (NOVOLIN 70/30, HUMULIN 70/30) 100 unit/mL (70-30) injection 15 Units by SubCUTAneous route two (2) times a day. 10 mL 0  
 aspirin delayed-release 81 mg tablet TAKE 1 TABLET BY MOUTH EVERY DAY WITH 6 TO 8 OUNCES OF PLAIN WATER  0 Past History Past Medical History: 
Past Medical History:  
Diagnosis Date  Alcohol abuse, in remission   
 quit 17 days ago  Asthma   
 does not use inhalers  Borderline personality disorder ([facility] [address]  Diabetes ([facility] [address]  GERD (gastroesophageal reflux disease)  Hypertension  Other ill-defined conditions(799.89)   
 kidney stones ,passed one  Other ill-defined conditions(799.89) sickle cell trait  Other ill-defined conditions(799.89)   
 increased cholesterol  Pancreatitis  Psychiatric disorder   
 schizophrenia, bipolar, depression, anxiety Past Surgical History: 
Past Surgical History:  
Procedure Laterality Date  ABDOMEN SURGERY PROC UNLISTED  3/12/14 CHOLECYSTECTOMY LAPAROSCOPIC    
 HX CHOLECYSTECTOMY  HX GASTRIC BYPASS  HX GYN  11/8/2012  
 c section x2  HX OTHER SURGICAL  3/13/14 ENDOSCOPIC RETROGRADE CHOLANGIOPANCREATOGRAPHY  HX OTHER SURGICAL  8/4/14  
 endoscopic stent placed to bile duct  HX TUBAL LIGATION  2012 Family History: 
Family History Problem Relation Age of Onset  Heart Disease Mother  Diabetes Mother  Hypertension Mother  Hypertension Maternal Grandmother Social History: 
Social History Tobacco Use  Smoking status: Current Every Day Smoker Packs/day: 0.50 Years: 14.00   Pack years: 7.00 Types: Cigarettes  Smokeless tobacco: Never Used  Tobacco comment: cigarettes Substance Use Topics  Alcohol use: Yes Alcohol/week: 4.8 oz Types: 2 Glasses of wine, 6 Cans of beer per week  Drug use: No  
  Types: Cocaine Comment: yesterday 12/27/18 Allergies: Allergies Allergen Reactions  Dilaudid [Hydromorphone (Bulk)] Itching  Ibuprofen Not Reported This Time Review of Systems Review of Systems Unable to perform ROS: Psychiatric disorder Physical Exam  
Physical Exam  
Constitutional: She is oriented to person, place, and time. She appears well-developed and well-nourished. HENT:  
Head: Normocephalic and atraumatic. Eyes: Conjunctivae are normal.  
Neck: Neck supple. Cardiovascular: Normal rate, regular rhythm and normal heart sounds. Pulmonary/Chest: Effort normal and breath sounds normal. She has no wheezes. She has no rales. She exhibits no tenderness. Abdominal: Soft. Bowel sounds are normal. There is no tenderness. Musculoskeletal: Normal range of motion. Neurological: She is alert and oriented to person, place, and time. Skin: Skin is warm and dry. Psychiatric: She is slowed. Cognition and memory are impaired. She expresses inappropriate judgment. She expresses no homicidal and no suicidal ideation. Flat affect Nursing note and vitals reviewed. Diagnostic Study Results Labs - No results found for this or any previous visit (from the past 12 hour(s)). Radiologic Studies - No orders to display No results found. Medical Decision Making I am the first provider for this patient. I reviewed the vital signs, available nursing notes, past medical history, past surgical history, family history and social history. Vital Signs-Reviewed the patient's vital signs. Patient Vitals for the past 12 hrs: 
 Pulse Resp BP SpO2  
04/16/19 1713 87 16 157/83 99 % Pulse Oximetry Analysis -99 % on RA Cardiac Monitor:  
Rate: 87 bpm 
Rhythm: Normal Sinus Rhythm Records Reviewed: Nursing Notes and Old Medical Records Provider Notes (Medical Decision Making): Psychosis, schizophrenia, medication noncompliance, candidiasis, UTI, STD 
 
ED Course:  
Initial assessment performed. The patients presenting problems have been discussed, and they are in agreement with the care plan formulated and outlined with them. I have encouraged them to ask questions as they arise throughout their visit. ED Course as of Apr 16 2324 Tue Apr 16, 2019 2128 Patient not in her room. It appears she removed her own IV and eloped. [TC] ED Course User Index [TC] Call, Paul Reyes MD  
 
 
Progress Note:  
 
Updated pt on all returned results and findings. Discussed the importance of proper follow up as referred below along with return precautions. Pt in agreement with the care plan and expresses agreement with and understanding of all items discussed. Disposition: 
Eloped PLAN: 
1. Current Discharge Medication List  
  
 
2. Follow-up Information None Return to ED if worse Diagnosis Clinical Impression: 1. Schizoaffective disorder, unspecified type ([facility] [address] 2. Hyperglycemia 3. Vaginal candidiasis

## 2019-04-16 NOTE — ED NOTES
Pt sexually inappropriate while medical staff was in room by placing hand in her pants throughout assessment.

## 2019-04-16 NOTE — ED NOTES
BSmart at bedside. Subjective:      Patient ID: Jhoana Nieto is a 13 y o  male    Fever 3 weeks ago - unsure how high  Resolved after 24-48 hours  Has had right ear pain since then  No cough or sore throa, but he has had off and on congestion  Bitemporal headache last night, lasted 2 hours  No V/D  No swimming  He is eating and drinking well  The following portions of the patient's history were reviewed and updated as appropriate:   He  has a past medical history of Asthma  He There are no active problems to display for this patient  Current Outpatient Prescriptions   Medication Sig Dispense Refill    naproxen (NAPROSYN) 500 mg tablet TAKE 1/2 TABLET BY MOUTH TWICE A DAY WITH MEALS 15 tablet 0     No current facility-administered medications for this visit  He has No Known Allergies  Review of Systems as per HPI    Objective: There were no vitals filed for this visit  Physical Exam   Constitutional: He appears well-developed  HENT:   Nose: Nose normal    Left TM pearly gray, soft brown wax is noted in canal  Right TM not visible due to significant cerumen impaction   Eyes: Conjunctivae are normal    Neck: Neck supple  Cardiovascular: Regular rhythm and normal heart sounds  No murmur heard  Pulmonary/Chest: Effort normal and breath sounds normal    Lymphadenopathy:     He has no cervical adenopathy  Ear cerumen removal  Date/Time: 9/5/2018 12:06 PM  Performed by: Arti Aguilar  Authorized by: Arti Aguilar     Consent:     Risks discussed:  Bleeding  Procedure details:     Location:  R ear and L ear    Procedure type: irrigation    Post-procedure details:     Hearing quality:  Improved    Patient tolerance of procedure: Tolerated well, no immediate complications      Assessment/Plan:     Diagnoses and all orders for this visit:    Right ear pain    Bilateral impacted cerumen    Other orders  -     Ear cerumen removal    Wax successfully removed with flush and curette    TM visible now bilaterally and pearly amezcua      Aydee Betancourt, PA-C

## 2019-04-17 NOTE — BSMART NOTE
Received call from charge nurse that the patient appears to have eloped from the ER while waiting on her glucose level to reach a more appropriate level for medical clearance. Informed bed board of elopement. Patient denies any suicidal and homicidal ideation. She was alert and oriented and was not hallucinating. There are not grounds to call for a welfare check as patient reports only complaint as vaginal itching and wanting to get back on her medication.  
 
Ethel Kennedy [facility]

## 2019-04-17 NOTE — BSMART NOTE
Comprehensive Assessment Form Part 1 Section I - Disposition Axis I - Schizoaffective Disorder Cocaine Use Disorder Severe Axis II - Deferred, Borderline Personality Disorder by history Axis III - Past Medical History:  
Diagnosis Date  Alcohol abuse, in remission   
 quit 17 days ago  Asthma   
 does not use inhalers  Borderline personality disorder ([facility] [address]  Diabetes ([facility] [address]  GERD (gastroesophageal reflux disease)  Hypertension  Other ill-defined conditions(799.89)   
 kidney stones ,passed one  Other ill-defined conditions(799.89) sickle cell trait  Other ill-defined conditions(799.89)   
 increased cholesterol  Pancreatitis  Psychiatric disorder   
 schizophrenia, bipolar, depression, anxiety Axis IV - medication noncompliance, treatment noncompliance, housing [place] [facility] - 40 The Medical Doctor to Psychiatrist conference was not completed. The Medical Doctor is in agreement with Psychiatrist disposition because of (reason) patient is currently willing to be admitted but will be reassessed once medically stable and glucose is in a normal range. The plan is TBD. The on-call Psychiatrist consulted was Dr. Xenia Leonardo. The admitting Psychiatrist will be Dr. Xenia Leonardo. The admitting Diagnosis is schizoaffective. The Payor source is medicare. Section II - Integrated Summary Summary:  Patient is a 47yo female who presents to ER from the store where she removed all her clothes and walked around the store scratching her crotch. Patient reports that she is at the hospital because she has vaginal itching and finds it unbearable at times. She denies arriving for mental health issues but reports she would like to be admitted for them. Patient denies suicidal and homicidal ideation. She denies hallucinations. She is alert and oriented to person, place, day, year.  She is able to report she took her clothes off at the store but is unsure why she did so stating she does not know or does not remember. Patient reports that she is not taking her medication and has not taken her medication since she was discharged. She reports she sees [facility] but has not seen her counselor or psychiatrist in months and not since discharge from the hospital. Patient reports that she is still smoking crack cocaine with last use yesterday and that she drank 1 16oz beer earlier today. She denies any medical issues at this time other that itching. She reports that she needs to be admitted to get back on her medication but denies any mental health symptoms. Patient is slowed in her response time to questions and her speech is slowed. She reports living with a male friend and that she can go back there. Per chart review patient was last admitted to [facility] at the end of February on a TDO due to confusion and schizoaffective disorder. She has a long history of treatment and medication noncompliance. Patient's blood sugar was 655 at 1730. Once patient's blood sugar has come down, [facility] counselor will reassess patient to see if she still wants admission and to verify any symptoms other than being sleepy and slowed. The patient has demonstrated mental capacity to provide informed consent. The information is given by the patient and past medical records. The Chief Complaint is vaginal itching. The Precipitant Factors are medication noncompliance. Previous Hospitalizations: yes The patient has not previously been in restraints. Current Psychiatrist and/or  is [facility]. Lethality Assessment: 
 
The potential for suicide noted by the following: not noted . The potential for homicide is not noted. The patient has not been a perpetrator of sexual or physical abuse. There are not pending charges. The patient is not felt to be at risk for self harm or harm to others. The attending nurse was advised that security has not been notified.  
 
Section III - Psychosocial 
The patient's overall mood and attitude is irritable and sleepy. Feelings of helplessness and hopelessness are observed by verbal report. Generalized anxiety is not observed. Panic is not observed. Phobias are not observed. Obsessive compulsive tendencies are not observed. Section IV - Mental Status Exam 
The patient's appearance is unkempt and shows poor hygiene. The patient's behavior shows poor eye contact and slowed. The patient is oriented to time, place, person and situation. The patient's speech is slowed and is soft. The patient's mood is euthymic. The range of affect is flat. The patient's thought content demonstrates paranoia. The thought process shows no evidence of impairment. The patient's perception shows no evidence of impairment. The patient's memory is impaired. The patient's appetite shows no evidence of impairment. The patient's sleep has evidence of hypersomnia. The patient shows no insight. The patient's judgement shows no evidence of impairment. Section V - Substance Abuse The patient is using substances. The patient is using alcohol for greater than 10 years with last use on today and cocaine by inhalation for greater than 10 years with last use on yesterday. The patient has experienced the following withdrawal symptoms: N/A. Section VI - Living Arrangements The patient is single. The patient lives with a friend. The patient has 7 children ages 29,21,19,24,14,15,7 minors have been adopted as reported. The patient does plan to return home upon discharge. The patient does not have legal issues pending. The patient's source of income comes from unknown. Buddhist and cultural practices have not been voiced at this time. 
  
The patient's greatest support comes from no one reported and this person will not be involved with the treatment.    
The patient has not been in an event described as horrible or outside the realm of ordinary life experience either currently or in the past. The patient has not been a victim of sexual/physical abuse. 
  
Section VII - Other Areas of Clinical Concern The highest grade achieved is unknown with the overall quality of school experience being described as unknown. The patient is currently unemployed and speaks [place] as a primary language. The patient has no communication impairments affecting communication. The patient's preference for learning can be described as: can read and write adequately.   The patient's hearing is normal.  The patient's vision is normal. 
 
 
 
 
Betsy Colvin LPC [facility]

## 2019-04-23 ENCOUNTER — DOCUMENTATION ONLY (OUTPATIENT)
Dept: CARDIOLOGY CLINIC | Age: 44
End: 2019-04-23
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
## 2022-02-10 NOTE — DISCHARGE INSTRUCTIONS
High Blood Pressure: Care Instructions  Your Care Instructions    If your blood pressure is usually above 130/80, you have high blood pressure, or hypertension. That means the top number is 130 or higher or the bottom number is 80 or higher, or both. Despite what a lot of people think, high blood pressure usually doesn't cause headaches or make you feel dizzy or lightheaded. It usually has no symptoms. But it does increase your risk for heart attack, stroke, and kidney or eye damage. The higher your blood pressure, the more your risk increases. Your doctor will give you a goal for your blood pressure. Your goal will be based on your health and your age. Lifestyle changes, such as eating healthy and being active, are always important to help lower blood pressure. You might also take medicine to reach your blood pressure goal.  Follow-up care is a key part of your treatment and safety. Be sure to make and go to all appointments, and call your doctor if you are having problems. It's also a good idea to know your test results and keep a list of the medicines you take. How can you care for yourself at home? Medical treatment  · If you stop taking your medicine, your blood pressure will go back up. You may take one or more types of medicine to lower your blood pressure. Be safe with medicines. Take your medicine exactly as prescribed. Call your doctor if you think you are having a problem with your medicine. · Talk to your doctor before you start taking aspirin every day. Aspirin can help certain people lower their risk of a heart attack or stroke. But taking aspirin isn't right for everyone, because it can cause serious bleeding. · See your doctor regularly. You may need to see the doctor more often at first or until your blood pressure comes down. · If you are taking blood pressure medicine, talk to your doctor before you take decongestants or anti-inflammatory medicine, such as ibuprofen.  Some of these medicines can raise blood pressure. · Learn how to check your blood pressure at home. Lifestyle changes  · Stay at a healthy weight. This is especially important if you put on weight around the waist. Losing even 10 pounds can help you lower your blood pressure. · If your doctor recommends it, get more exercise. Walking is a good choice. Bit by bit, increase the amount you walk every day. Try for at least 30 minutes on most days of the week. You also may want to swim, bike, or do other activities. · Avoid or limit alcohol. Talk to your doctor about whether you can drink any alcohol. · Try to limit how much sodium you eat to less than 2,300 milligrams (mg) a day. Your doctor may ask you to try to eat less than 1,500 mg a day. · Eat plenty of fruits (such as bananas and oranges), vegetables, legumes, whole grains, and low-fat dairy products. · Lower the amount of saturated fat in your diet. Saturated fat is found in animal products such as milk, cheese, and meat. Limiting these foods may help you lose weight and also lower your risk for heart disease. · Do not smoke. Smoking increases your risk for heart attack and stroke. If you need help quitting, talk to your doctor about stop-smoking programs and medicines. These can increase your chances of quitting for good. When should you call for help? Call 911 anytime you think you may need emergency care. This may mean having symptoms that suggest that your blood pressure is causing a serious heart or blood vessel problem. Your blood pressure may be over 180/120.   For example, call 911 if:    · You have symptoms of a heart attack. These may include:  ? Chest pain or pressure, or a strange feeling in the chest.  ? Sweating. ? Shortness of breath. ? Nausea or vomiting. ? Pain, pressure, or a strange feeling in the back, neck, jaw, or upper belly or in one or both shoulders or arms. ? Lightheadedness or sudden weakness.   ? A fast or irregular heartbeat.     · You have symptoms of a stroke. These may include:  ? Sudden numbness, tingling, weakness, or loss of movement in your face, arm, or leg, especially on only one side of your body. ? Sudden vision changes. ? Sudden trouble speaking. ? Sudden confusion or trouble understanding simple statements. ? Sudden problems with walking or balance. ? A sudden, severe headache that is different from past headaches.     · You have severe back or belly pain.    Do not wait until your blood pressure comes down on its own. Get help right away.   Call your doctor now or seek immediate care if:    · Your blood pressure is much higher than normal (such as 180/120 or higher), but you don't have symptoms.     · You think high blood pressure is causing symptoms, such as:  ? Severe headache.  ? Blurry vision.    Watch closely for changes in your health, and be sure to contact your doctor if:    · Your blood pressure measures higher than your doctor recommends at least 2 times. That means the top number is higher or the bottom number is higher, or both.     · You think you may be having side effects from your blood pressure medicine. Where can you learn more? Go to http://miryam-polo.info/. Enter O061 in the search box to learn more about \"High Blood Pressure: Care Instructions. \"  Current as of: December 6, 2017  Content Version: 11.8  © 4292-2682 [employer]. Care instructions adapted under license by [employer] (which disclaims liability or warranty for this information). If you have questions about a medical condition or this instruction, always ask your healthcare professional. [place] [address] any warranty or liability for your use of this information. Learning About High Blood Sugar  What is high blood sugar? Your body turns the food you eat into glucose (sugar), which it uses for energy.  But if your body isn't able to use the sugar right away, it can build up in your blood and lead to high blood sugar. When the amount of sugar in your blood stays too high for too much of the time, you may have diabetes. Diabetes is a disease that can cause serious health problems. The good news is that lifestyle changes may help you get your blood sugar back to normal and avoid or delay diabetes. What causes high blood sugar? Sugar (glucose) can build up in your blood if you:  · Are overweight. · Have a family history of diabetes. · Take certain medicines, such as steroids. What are the symptoms? Having high blood sugar may not cause any symptoms at all. Or it may make you feel very thirsty or very hungry. You may also urinate more often than usual, have blurry vision, or lose weight without trying. How is high blood sugar treated? You can take steps to lower your blood sugar level if you understand what makes it get higher. Your doctor may want you to learn how to test your blood sugar level at home. Then you can see how illness, stress, or different kinds of food or medicine raise or lower your blood sugar level. Other tests may be needed to see if you have diabetes. How can you prevent high blood sugar? · Watch your weight. If you're overweight, losing just a small amount of weight may help. Reducing fat around your waist is most important. · Limit the amount of calories, sweets, and unhealthy fat you eat. Ask your doctor if a dietitian can help you. A registered dietitian can help you create meal plans that fit your lifestyle. · Get at least 30 minutes of exercise on most days of the week. Exercise helps control your blood sugar. It also helps you maintain a healthy weight. Walking is a good choice. You also may want to do other activities, such as running, swimming, cycling, or playing tennis or team sports. · If your doctor prescribed medicines, take them exactly as prescribed. Call your doctor if you think you are having a problem with your medicine.  You will get more details on the specific medicines your doctor prescribes. Follow-up care is a key part of your treatment and safety. Be sure to make and go to all appointments, and call your doctor if you are having problems. It's also a good idea to know your test results and keep a list of the medicines you take. Where can you learn more? Go to http://miryam-polo.info/. Enter O108 in the search box to learn more about \"Learning About High Blood Sugar. \"  Current as of: December 7, 2017  Content Version: 11.8  © 3930-0120 [employer]. Care instructions adapted under license by [employer] (which disclaims liability or warranty for this information). If you have questions about a medical condition or this instruction, always ask your healthcare professional. [address] any warranty or liability for your use of this information. Schizophrenia: Care Instructions  Your Care Instructions  Schizophrenia is a disease that makes it hard to think clearly, manage emotions, and interact with other people. It can cause:  · Delusions. These are beliefs that are not real.  · Hallucinations. These are things that you may see or hear that are not really there. · Paranoia. This is the belief that others are lying, cheating, using you, or trying to harm you. The disease may change your ability to enjoy life, express emotions, or function. At times, you may hear voices, behave strangely, have trouble speaking or understanding speech, or keep to yourself. The goal of treatment is to lower your stress and help your brain function normally. You may need lifelong treatment with medicines and counseling to keep your schizophrenia under control. When schizophrenia is not treated, the risks are higher for suicide, a hospital stay, and other problems.  Early treatment called coordinated specialty care [facility]) may help a person who is having his or her first episode of psychotic thoughts. Ask your doctor about [address]. Follow-up care is a key part of your treatment and safety. Be sure to make and go to all appointments, and call your doctor if you are having problems. It's also a good idea to know your test results and keep a list of the medicines you take. How can you care for yourself at home? · Be safe with medicines. Take your medicines exactly as prescribed. Call your doctor if you think you are having a problem with your medicine. When you feel good, you may think that you do not need your medicines. But it is important to keep taking them as scheduled. · Go to your counseling sessions. Call and talk with your counselor if you can't attend or if you don't think the sessions are helping. Do not just stop going. · Eat a healthy diet. Talk with a dietitian about what type of diet may be best for you. · Do not use alcohol or illegal drugs. · Keep the numbers for these national suicide hotlines: 4-599-303-TALK (7-630.288.6056) and 7-178-PVCOBOZ (8-527.954.9152). If you or someone you know talks about suicide or feeling hopeless, get help right away. What should you do if someone in your family has schizophrenia? · Learn about the disease and how it may get worse over time. · Remind your family member that you love him or her. · Make a plan with all family members about how to take care of your loved one when his or her symptoms are bad. · Talk about your fears and concerns and those of other family members. Seek counseling if needed. · Do not focus attention only on the person who is in treatment. · Remind yourself that it will take time for changes to occur. · Do not blame yourself for the disease. · Know your legal rights and the legal rights of your family member. · Take care of yourself. Stay involved with your own interests, such as your career, hobbies, and friends.  Use exercise, positive self-talk, relaxation, and deep breathing to help lower your stress. · Give yourself time to grieve. You may need to deal with emotions such as anger, fear, and frustration. After you work through your feelings, you will be better able to care for yourself and your family. · If you are having a hard time with your feelings and your interactions with your family member, talk with a counselor. When should you call for help? Call 911 anytime you think you may need emergency care. For example, call if:    · You are thinking about suicide or are threatening suicide.     · You feel like hurting yourself or someone else.    Call your doctor now or seek immediate medical care if:    · You hear voices.     · You think someone is trying to harm you.     · You cannot concentrate or are easily confused.    Watch closely for changes in your health, and be sure to contact your doctor if:    · You are having trouble taking care of yourself.     · You cannot attend your counseling sessions. Where can you learn more? Go to http://miryam-polo.info/. Enter K424 in the search box to learn more about \"Schizophrenia: Care Instructions. \"  Current as of: December 7, 2017  Content Version: 11.8  © 1763-3788 [employer]. Care instructions adapted under license by [employer] (which disclaims liability or warranty for this information). If you have questions about a medical condition or this instruction, always ask your healthcare professional. [place] [address] any warranty or liability for your use of this information. Suicidal Thoughts and Behavior: Care Instructions  Your Care Instructions  You have been seen by a doctor because you've had thoughts about killing yourself. Maybe you have tried to do it. This is much different from having fleeting thoughts of death, which many people have from time to time. Your doctor and support team will work hard to help keep you safe.  Your team may include a , a social worker, and a counselor. Most people who think about suicide don't want to die. They think suicide will end their problems and pain. People who consider suicide often feel hopeless, helpless, and worthless. These thoughts can make a person feel that there is no other choice. But you do have a choice. Help is always available. The doctor and staff members are taking you and your pain very seriously. It is important to remember that there are people who are willing and able to talk with you about your suicidal thoughts. Treatment and close follow-up care can help you feel better about life. Thoughts of hopelessness and suicide may come from being depressed. Depression is a medical condition. When you have depression, there may be problems with activity levels in certain parts of your brain. Chemicals in your brain called neurotransmitters may be out of balance. But depression can be treated. Treatment for depression includes counseling, medicines, and lifestyle changes. With treatment, you can feel better. Your doctor doesn't want you to hurt yourself. He or she may ask you to sign a \"no harm\" agreement or contract. This contract is your promise that you will not hurt yourself between now and your next visit. Be completely honest with your doctor if you feel that you can't sign it. You will get help. Follow-up care is a key part of your treatment and safety. Be sure to make and go to all appointments, and call your doctor if you are having problems. It's also a good idea to know your test results and keep a list of the medicines you take. How can you care for yourself at home? · Talk to someone. Reach out to family members, friends, your doctor, or a counselor. Be open and honest with them about your thoughts and feelings. · Be safe with medicines. Take your medicines exactly as prescribed. Call your doctor if you think you are having a problem with your medicine. · Avoid illegal drugs and alcohol.   · Attend all counseling sessions recommended by your doctor. · Have someone take away sharp or dangerous objects, guns, and drugs from your home. · Keep the numbers for these national suicide hotlines: 2-803-362-TALK (3-443.607.5062) and 2-673-PWLPQHF (6-377.509.6937). When should you call for help? Call 911 anytime you think you may need emergency care. For example, call if:    · You feel you cannot stop from hurting yourself or someone else.   [facility] your doctor now or seek immediate medical care if:    · You have one or more warning signs of suicide. For example, call if:  ? You feel like giving away your possessions. ? You use illegal drugs or drink alcohol heavily. ? You talk or write about death. This may include writing suicide notes and talking about guns, knives, or pills. ? You start to spend a lot of time alone or spend more time alone than usual.     · You hear voices.     · You start acting in an aggressive way that's not normal for you.    Watch closely for changes in your health, and be sure to contact your doctor if you have any problems. Where can you learn more? Go to http://miryam-polo.info/. Enter Q902 in the search box to learn more about \"Suicidal Thoughts and Behavior: Care Instructions. \"  Current as of: December 7, 2017  Content Version: 11.8  © 3589-1567 [employer]. Care instructions adapted under license by [employer] (which disclaims liability or warranty for this information). If you have questions about a medical condition or this instruction, always ask your healthcare professional. [address] any warranty or liability for your use of this information. Clofazimine Pregnancy And Lactation Text: This medication is Pregnancy Category C and isn't considered safe during pregnancy. It is excreted in breast milk.

## 2024-03-19 NOTE — ED PROVIDER NOTES
EMERGENCY DEPARTMENT HISTORY AND PHYSICAL EXAM 
 
 
Date: 2018 Patient Name: Mariah Santillan History of Presenting Illness Chief Complaint Patient presents with  
 High Blood Sugar History Provided By: Patient and EMS 
 
HPI: Mariah Santillan, 44 y.o. female with PMHx significant for Schizophrenia, Bipolar, HTN, and diabetes who presents via EMS to the ED with cc of not feeling well and high blood sugar. Pt states that she called EMS when she started feeling weak and was told her sugar was high. She states that since she was discharged, she has not taken any of her medication (Lantus, Humalin, Haldol, or Depakote). She also states that she smokes crack regularly (last used 5 hours before arrival) and drinks 2 24 oz beers daily. She states that she is hungry, homeless, and now is suicidal. She states that she has a primary care doctor and a psychiatrist, but hasn't seen either of them in a while. She does endorse nausea and vomiting, but no diarrhea. PMHx: HTN, DM, Schizophrenia, Bipolar PSHx: Cholecystectomy, Gastric Bypass, , Tubal Ligation Social Hx: Daily EtOH; 1ppd Smoker; Crack cocaine daily PCP: None Psychiatrist: Dr. Sherri Valadez There are no other complaints, changes, or physical findings at this time. Current Outpatient Medications Medication Sig Dispense Refill  haloperidol decanoate (HALDOL DECANOATE) 100 mg/mL injection 100 mg by IntraMUSCular route every twenty-eight (28) days.  metFORMIN (GLUCOPHAGE) 1,000 mg tablet Take 1,000 mg by mouth.  meloxicam (MOBIC) 15 mg tablet Take 15 mg by mouth daily as needed for Pain.  insulin regular (HUMULIN R REGULAR U-100 INSULN) 100 unit/mL injection by SubCUTAneous route. Sliding scale  insulin aspart U-100 (NOVOLOG FLEXPEN U-100 INSULIN) 100 unit/mL inpn by SubCUTAneous route three (3) times daily as needed. Sliding scale  sertraline (ZOLOFT) 100 mg tablet Take 100 mg by mouth daily.     
 benztropine (COGENTIN) 1 mg tablet Take 1 Tab by mouth two (2) times a day. Indications: drug-induced extrapyramidal reaction (Patient taking differently: Take 1 mg by mouth every twelve (12) hours.) 28 Tab 0  Blood-Glucose Meter monitoring kit Monitor BG 4 times daily. Dx uncontrolled type 2 DM E11.65  Indications: Diabetes Mellitus 1 Kit 0  
 divalproex DR (DEPAKOTE) 500 mg tablet Take 1 Tab by mouth two (2) times a day. Indications: Schizoaffective disorder (Patient taking differently: Take 500 mg by mouth every twelve (12) hours.) 28 Tab 0  
 haloperidol (HALDOL) 5 mg tablet Take 1 Tab by mouth every twelve (12) hours. Indications: Schizoaffective disorder 28 Tab 0  
 albuterol (PROVENTIL HFA, VENTOLIN HFA, PROAIR HFA) 90 mcg/actuation inhaler Take 1 Puff by inhalation every four (4) hours as needed for Wheezing.  insulin glargine (LANTUS) 100 unit/mL injection 30 Units by SubCUTAneous route nightly. Indications: type 1 diabetes mellitus (Patient taking differently: 30 Units by SubCUTAneous route daily. Indications: type 1 diabetes mellitus) 1 Vial 5 Past History Past Medical History: 
Past Medical History:  
Diagnosis Date  Alcohol abuse, in remission   
 quit 17 days ago  Asthma   
 does not use inhalers  Borderline personality disorder ([address]  Diabetes ([facility] [address]  GERD (gastroesophageal reflux disease)  Hypertension  Other ill-defined conditions(799.89)   
 kidney stones ,passed one  Other ill-defined conditions(799.89) sickle cell trait  Other ill-defined conditions(799.89)   
 increased cholesterol  Pancreatitis  Psychiatric disorder   
 schizophrenia, bipolar, depression, anxiety Past Surgical History: 
Past Surgical History:  
Procedure Laterality Date  ABDOMEN SURGERY PROC UNLISTED  3/12/14 CHOLECYSTECTOMY LAPAROSCOPIC    
 HX GASTRIC BYPASS  HX GYN  11/8/2012  
 c section x2  HX OTHER SURGICAL  3/13/14  ENDOSCOPIC RETROGRADE CHOLANGIOPANCREATOGRAPHY  HX OTHER SURGICAL  8/4/14  
 endoscopic stent placed to bile duct  HX TUBAL LIGATION  2012 Family History: 
Family History Problem Relation Age of Onset  Heart Disease Mother  Diabetes Mother  Hypertension Mother  Hypertension Maternal Grandmother Social History: 
Social History Tobacco Use  Smoking status: Current Every Day Smoker Packs/day: 0.50 Years: 14.00 Pack years: 7.00 Types: Cigarettes  Smokeless tobacco: Never Used  Tobacco comment: cigarettes Substance Use Topics  Alcohol use: Yes Alcohol/week: 0.6 oz Types: 1 Cans of beer per week Comment: occasionally, last had \"i had one beer\" today  Drug use: Yes Types: Cocaine Comment: every day Allergies: Allergies Allergen Reactions  Dilaudid [Hydromorphone (Bulk)] Itching  Ibuprofen Not Reported This Time Review of Systems Review of Systems Constitutional: Positive for fatigue. Negative for chills and fever. HENT: Negative for congestion, rhinorrhea, sneezing and sore throat. Respiratory: Negative for shortness of breath. Cardiovascular: Negative for chest pain. Gastrointestinal: Positive for nausea and vomiting. Negative for abdominal pain. Musculoskeletal: Negative for back pain, myalgias and neck stiffness. Skin: Negative for rash. Neurological: Positive for weakness. Negative for dizziness and headaches. Psychiatric/Behavioral: Positive for dysphoric mood and suicidal ideas. All other systems reviewed and are negative. Physical Exam  
Physical Exam  
Constitutional: She is oriented to person, place, and time. She appears well-developed and well-nourished. HENT:  
Head: Normocephalic and atraumatic. Mouth/Throat: Oropharynx is clear and moist.  
Eyes: Conjunctivae and EOM are normal.  
Neck: Normal range of motion and full passive range of motion without pain. Neck supple.   
Cardiovascular: Normal rate, regular rhythm, S1 normal, S2 normal, normal heart sounds, intact distal pulses and normal pulses. No murmur heard. Pulmonary/Chest: Effort normal and breath sounds normal. No respiratory distress. She has no wheezes. Abdominal: Soft. Normal appearance and bowel sounds are normal. She exhibits no distension. There is no tenderness. There is no rebound. Musculoskeletal: Normal range of motion. Neurological: She is alert and oriented to person, place, and time. She has normal strength. Skin: Skin is warm, dry and intact. No rash noted. Psychiatric: Judgment normal. Her speech is delayed. She is slowed. She exhibits a depressed mood. She expresses suicidal ideation. Flat affect Nursing note and vitals reviewed. Diagnostic Study Results Labs - Recent Results (from the past 12 hour(s)) GLUCOSE, POC Collection Time: 11/18/18  2:45 AM  
Result Value Ref Range Glucose (POC) 434 (H) 65 - 100 mg/dL Performed by Radha Matute (PCT) TROPONIN I Collection Time: 11/18/18  3:15 AM  
Result Value Ref Range Troponin-I, Qt. <0.05 <0.05 ng/mL ETHYL ALCOHOL Collection Time: 11/18/18  3:15 AM  
Result Value Ref Range ALCOHOL(ETHYL),SERUM 24 (H) <10 MG/DL  
CBC WITH AUTOMATED DIFF Collection Time: 11/18/18  3:15 AM  
Result Value Ref Range WBC 12.0 (H) 3.6 - 11.0 K/uL  
 RBC 5.36 (H) 3.80 - 5.20 M/uL  
 HGB 14.6 11.5 - 16.0 g/dL HCT 39.7 35.0 - 47.0 % MCV 74.1 (L) 80.0 - 99.0 FL  
 MCH 27.2 26.0 - 34.0 PG  
 MCHC 36.8 (H) 30.0 - 36.5 g/dL  
 RDW 16.6 (H) 11.5 - 14.5 % PLATELET 218 061 - 988 K/uL NRBC 0.0 0  WBC ABSOLUTE NRBC 0.00 0.00 - 0.01 K/uL NEUTROPHILS 74 32 - 75 % LYMPHOCYTES 17 12 - 49 % MONOCYTES 8 5 - 13 % EOSINOPHILS 1 0 - 7 % BASOPHILS 0 0 - 1 % IMMATURE GRANULOCYTES 0 0.0 - 0.5 % ABS. NEUTROPHILS 8.9 (H) 1.8 - 8.0 K/UL  
 ABS. LYMPHOCYTES 2.0 0.8 - 3.5 K/UL  
 ABS. MONOCYTES 0.9 0.0 - 1.0 K/UL  
 ABS.  EOSINOPHILS 0.1 0.0 - 0.4 K/UL  
 ABS. BASOPHILS 0.0 0.0 - 0.1 K/UL  
 ABS. IMM. GRANS. 0.0 0.00 - 0.04 K/UL  
 DF AUTOMATED METABOLIC PANEL, COMPREHENSIVE Collection Time: 11/18/18  3:15 AM  
Result Value Ref Range Sodium 135 (L) 136 - 145 mmol/L Potassium 3.6 3.5 - 5.1 mmol/L Chloride 95 (L) 97 - 108 mmol/L  
 CO2 22 21 - 32 mmol/L Anion gap 18 (H) 5 - 15 mmol/L Glucose 405 (H) 65 - 100 mg/dL BUN 9 6 - 20 MG/DL Creatinine 0.99 0.55 - 1.02 MG/DL  
 BUN/Creatinine ratio 9 (L) 12 - 20 GFR est AA >60 >60 ml/min/1.73m2 GFR est non-AA >60 >60 ml/min/1.73m2 Calcium 9.1 8.5 - 10.1 MG/DL Bilirubin, total 0.5 0.2 - 1.0 MG/DL  
 ALT (SGPT) 32 12 - 78 U/L  
 AST (SGOT) 12 (L) 15 - 37 U/L Alk. phosphatase 112 45 - 117 U/L Protein, total 7.9 6.4 - 8.2 g/dL Albumin 3.4 (L) 3.5 - 5.0 g/dL Globulin 4.5 (H) 2.0 - 4.0 g/dL A-G Ratio 0.8 (L) 1.1 - 2.2 CK W/ CKMB & INDEX Collection Time: 11/18/18  3:15 AM  
Result Value Ref Range CK 44 26 - 192 U/L  
 CK - MB 1.4 <3.6 NG/ML  
 CK-MB Index 3.2 (H) 0 - 2.5 EKG, 12 LEAD, INITIAL Collection Time: 11/18/18  3:22 AM  
Result Value Ref Range Ventricular Rate 87 BPM  
 Atrial Rate 87 BPM  
 P-R Interval 124 ms QRS Duration 76 ms  
 Q-T Interval 368 ms QTC Calculation (Bezet) 442 ms Calculated P Axis 65 degrees Calculated R Axis 23 degrees Calculated T Axis 63 degrees Diagnosis Normal sinus rhythm Normal ECG When compared with ECG of 03-NOV-2018 20:40, 
fusion complexes are no longer present 
premature ventricular complexes are no longer present DRUG SCREEN, URINE Collection Time: 11/18/18  5:08 AM  
Result Value Ref Range AMPHETAMINES NEGATIVE  NEG    
 BARBITURATES NEGATIVE  NEG BENZODIAZEPINES NEGATIVE  NEG    
 COCAINE POSITIVE (A) NEG METHADONE NEGATIVE  NEG    
 OPIATES NEGATIVE  NEG    
 PCP(PHENCYCLIDINE) NEGATIVE  NEG    
 THC (TH-CANNABINOL) NEGATIVE  NEG  Drug screen comment (NOTE) HCG URINE, QL. - POC Collection Time: 11/18/18  5:21 AM  
Result Value Ref Range Pregnancy test,urine (POC) NEGATIVE  NEG    
URINALYSIS W/ REFLEX CULTURE Collection Time: 11/18/18  6:16 AM  
Result Value Ref Range Color YELLOW/STRAW Appearance CLEAR CLEAR Specific gravity 1.025 1.003 - 1.030    
 pH (UA) 5.5 5.0 - 8.0 Protein NEGATIVE  NEG mg/dL Glucose >1,000 (A) NEG mg/dL Ketone >80 (A) NEG mg/dL Bilirubin NEGATIVE  NEG Blood NEGATIVE  NEG Urobilinogen 0.2 0.2 - 1.0 EU/dL Nitrites NEGATIVE  NEG Leukocyte Esterase TRACE (A) NEG    
 WBC 0-4 0 - 4 /hpf  
 RBC 0-5 0 - 5 /hpf Epithelial cells FEW FEW /lpf Bacteria NEGATIVE  NEG /hpf  
 UA:UC IF INDICATED CULTURE NOT INDICATED BY UA RESULT CNI Budding yeast PRESENT (A) NEG    
GLUCOSE, POC Collection Time: 11/18/18  6:18 AM  
Result Value Ref Range Glucose (POC) 292 (H) 65 - 100 mg/dL Performed by [facility] Radiologic Studies - No orders to display No results found. Medical Decision Making I am the first provider for this patient. I reviewed the vital signs, available nursing notes, past medical history, past surgical history, family history and social history. Vital Signs-Reviewed the patient's vital signs. Patient Vitals for the past 12 hrs: 
 Temp Pulse Resp BP SpO2  
11/18/18 0247 97.6 °F (36.4 °C) 98 18 (!) 168/93 100 % Pulse Oximetry Analysis - 100% on RA Cardiac Monitor:  
Rate: 98 bpm 
Rhythm: Normal Sinus Rhythm ED EKG interpretation: 03:22 Rhythm: normal sinus rhythm; and regular . Rate (approx.): 87; Axis: normal; P wave: normal; QRS interval: normal ; ST/T wave: normal; Other findings: normal. This EKG was interpreted by Tone Hussein MD,ED Provider. Records Reviewed: Nursing Notes, Old Medical Records, Previous Radiology Studies and Previous Laboratory Studies Provider Notes (Medical Decision Making):  
39IS female with schizophrenia and diabetes who is non-compliant with medications. Will check labs, treat glucose, and if pt still suicidal, will discuss with [facility]. Pt normally leaves AMA from upstairs, so doubt she will stay this time. Likely substance induced mood disorder. ED Course:  
Initial assessment performed. The patients presenting problems have been discussed, and they are in agreement with the care plan formulated and outlined with them. I have encouraged them to ask questions as they arise throughout their visit. Progress Note 
5:05 AM 
I have re-evaluated pt and she is more awake, walked to the bathroom to provide urine sample. Pt not in DKA today. Labs so far only significant for hyperglycemia. 5:59 AM 
Ray Gasca MD spoke with Claire Hernandez for [facility]. Discussed available diagnostic tests and clinical findings. He is in agreement with care plans as outlined. He will see and evaluate the patient via Telepsychiatry. Augustus Allison MD  
 
Progress Note 6:46 AM 
I have re-evaluated pt and Tatefredrick Shweta with [facility] has seen and evaluated patient. She endorses SI, but states that she won't hurt herself. She knows she needs to follow up with [facility]. Pt agrees to follow up on Monday. [place] Shweta has discussed her with the psychiatrist on call who agrees to discharge. Will discharge per psychiatry recommendations. Progress Note:  
6:48 AM 
Updated pt on all returned results and findings. Discussed the importance of proper follow up as referred below along with return precautions. Pt in agreement with the care plan and expresses agreement with and understanding of all items discussed. Disposition: 
Discharge PLAN: 
1. Current Discharge Medication List  
  
CONTINUE these medications which have CHANGED Details  
insulin glargine (LANTUS) 100 unit/mL injection 30 Units by SubCUTAneous route daily.  
Qty: 1 Vial, Refills: 1  
  
insulin regular (HUMULIN R REGULAR U-100 INSULN) 100 unit/mL injection Sliding scale Qty: 1 Vial, Refills: 1 2. Follow-up Information Follow up With Specialties Details Why Contact Info Lety Ling De Shi [phone] on 11/19/2018  [address] Return to ED if worse Diagnosis Clinical Impression: 1. Hyperglycemia 2. Homelessness 3. Schizophrenia, unspecified type ([facility] [address] 4. Noncompliance with medication regimen 5. Cocaine abuse ([facility] [address] 6. Suicidal ideation 7. Essential hypertension This note will not be viewable in [address] show

## 2025-02-05 NOTE — PROGRESS NOTES
IDR With MD, team and Patient this am.  
Patient had been smoking in the room. Patient seen by Psych and patient agreed for outpatient follow-up DM Educator did see patient. She did not wait for follow-up appointments to be made. She left AMA last time/ Readmit Patient decided to leave AMA. Patient has Medicare Advantage Plan. Inpatient. Did not sign IMM Letter - Copy on chart. Patient. CM will do follow-up call tomorrow. Will call insurance CM to see if they can reach out to patient also. [facility] MAUREEN RN  
 
[phone] (E4) spontaneous